# Patient Record
Sex: MALE | Race: WHITE | NOT HISPANIC OR LATINO | Employment: OTHER | URBAN - METROPOLITAN AREA
[De-identification: names, ages, dates, MRNs, and addresses within clinical notes are randomized per-mention and may not be internally consistent; named-entity substitution may affect disease eponyms.]

---

## 2018-05-16 ENCOUNTER — OFFICE VISIT (OUTPATIENT)
Dept: UROLOGY | Facility: AMBULATORY SURGERY CENTER | Age: 83
End: 2018-05-16
Payer: MEDICARE

## 2018-05-16 VITALS
HEIGHT: 67 IN | WEIGHT: 186.4 LBS | DIASTOLIC BLOOD PRESSURE: 62 MMHG | BODY MASS INDEX: 29.26 KG/M2 | HEART RATE: 76 BPM | SYSTOLIC BLOOD PRESSURE: 118 MMHG

## 2018-05-16 DIAGNOSIS — R39.9 LOWER URINARY TRACT SYMPTOMS: Primary | ICD-10-CM

## 2018-05-16 DIAGNOSIS — C61 PROSTATE CANCER (HCC): ICD-10-CM

## 2018-05-16 DIAGNOSIS — C67.0 MALIGNANT NEOPLASM OF TRIGONE OF URINARY BLADDER (HCC): ICD-10-CM

## 2018-05-16 PROCEDURE — 99204 OFFICE O/P NEW MOD 45 MIN: CPT | Performed by: UROLOGY

## 2018-05-16 RX ORDER — GABAPENTIN 100 MG/1
100 CAPSULE ORAL
COMMUNITY
End: 2018-07-10

## 2018-05-16 RX ORDER — FUROSEMIDE 40 MG/1
40 TABLET ORAL DAILY
COMMUNITY
End: 2018-05-25 | Stop reason: HOSPADM

## 2018-05-16 RX ORDER — MINERAL OIL AND PETROLATUM 150; 830 MG/G; MG/G
OINTMENT OPHTHALMIC
COMMUNITY
End: 2018-06-20 | Stop reason: CLARIF

## 2018-05-16 RX ORDER — CARBOXYMETHYLCELLULOSE SODIUM 5 MG/ML
1 SOLUTION/ DROPS OPHTHALMIC 3 TIMES DAILY PRN
COMMUNITY
End: 2018-06-20 | Stop reason: CLARIF

## 2018-05-16 RX ORDER — NITROGLYCERIN 0.4 MG/1
0.4 TABLET SUBLINGUAL
COMMUNITY
End: 2019-06-19 | Stop reason: HOSPADM

## 2018-05-16 RX ORDER — CARVEDILOL 3.12 MG/1
3.12 TABLET ORAL 2 TIMES DAILY WITH MEALS
COMMUNITY
End: 2018-08-30 | Stop reason: SDUPTHER

## 2018-05-16 RX ORDER — DIPHENOXYLATE HYDROCHLORIDE AND ATROPINE SULFATE 2.5; .025 MG/1; MG/1
1 TABLET ORAL DAILY
COMMUNITY
End: 2018-05-22

## 2018-05-16 RX ORDER — ASPIRIN 81 MG/1
81 TABLET ORAL DAILY
COMMUNITY

## 2018-05-16 RX ORDER — FINASTERIDE 5 MG/1
5 TABLET, FILM COATED ORAL DAILY
COMMUNITY
End: 2018-07-10 | Stop reason: SDUPTHER

## 2018-05-16 RX ORDER — OXYBUTYNIN CHLORIDE 10 MG/1
10 TABLET, EXTENDED RELEASE ORAL
Qty: 30 TABLET | Refills: 1 | Status: SHIPPED | OUTPATIENT
Start: 2018-05-16 | End: 2018-07-10 | Stop reason: SINTOL

## 2018-05-16 RX ORDER — TAMSULOSIN HYDROCHLORIDE 0.4 MG/1
0.4 CAPSULE ORAL
COMMUNITY
End: 2018-07-10 | Stop reason: SDUPTHER

## 2018-05-16 RX ORDER — MELATONIN
1000 DAILY
COMMUNITY
End: 2018-05-22

## 2018-05-16 RX ORDER — LANOLIN ALCOHOL/MO/W.PET/CERES
3 CREAM (GRAM) TOPICAL
COMMUNITY
End: 2018-08-16 | Stop reason: ALTCHOICE

## 2018-05-16 RX ORDER — POTASSIUM CHLORIDE 20 MEQ/1
20 TABLET, EXTENDED RELEASE ORAL 2 TIMES DAILY
COMMUNITY
End: 2018-08-30 | Stop reason: SDUPTHER

## 2018-05-16 RX ORDER — CHLORAL HYDRATE 500 MG
1000 CAPSULE ORAL DAILY
COMMUNITY
End: 2018-08-30 | Stop reason: SDUPTHER

## 2018-05-16 RX ORDER — ENALAPRIL MALEATE 2.5 MG/1
2.5 TABLET ORAL DAILY
COMMUNITY
End: 2018-08-16 | Stop reason: ALTCHOICE

## 2018-05-16 NOTE — ASSESSMENT & PLAN NOTE
The patient has not had any cystoscopy or surveillance since his surgery in December  We will schedule him for office cystoscopy to rule out any recurrence

## 2018-05-16 NOTE — ASSESSMENT & PLAN NOTE
The patient is currently on tamsulosin and finasteride  For now we will keep these medications going  Most of his symptoms are overactive in nature  I will start him on oxybutynin  Side effects were discussed  He will follow up in 4-6 weeks for us to re-evaluate his symptoms on the new medication

## 2018-05-16 NOTE — PROGRESS NOTES
Assessment/Plan:    Lower urinary tract symptoms  The patient is currently on tamsulosin and finasteride  For now we will keep these medications going  Most of his symptoms are overactive in nature  I will start him on oxybutynin  Side effects were discussed  He will follow up in 4-6 weeks for us to re-evaluate his symptoms on the new medication  Malignant neoplasm of trigone of urinary bladder [facility])  The patient has not had any cystoscopy or surveillance since his surgery in December  We will schedule him for office cystoscopy to rule out any recurrence  Prostate cancer [facility])  I will order a PSA and review this with him at his next visit  Diagnoses and all orders for this visit:    Lower urinary tract symptoms  -     oxybutynin (DITROPAN-XL) 10 MG 24 hr tablet; Take 1 tablet (10 mg total) by mouth daily at bedtime    Malignant neoplasm of trigone of urinary bladder (HCC)    Prostate cancer (HCC)  -     PSA Total, Diagnostic; Future    Other orders  -     artificial tear (LUBRIFRESH P M ) 83-15 % ophthalmic ointment; daily at bedtime  -     aspirin (ECOTRIN LOW STRENGTH) 81 mg EC tablet; Take 81 mg by mouth daily  -     carvedilol (COREG) 3 125 mg tablet; Take 3 125 mg by mouth 2 (two) times a day with meals  -     carbamide peroxide (DEBROX) 6 5 % otic solution; 5 drops 2 (two) times a day  -     enalapril (VASOTEC) 2 5 mg tablet; Take 2 5 mg by mouth daily  -     finasteride (PROSCAR) 5 mg tablet; Take 5 mg by mouth daily  -     Omega-3 Fatty Acids (FISH OIL) 1,000 mg; Take 1,000 mg by mouth daily  -     furosemide (LASIX) 40 mg tablet; Take 40 mg by mouth 2 (two) times a day  -     gabapentin (NEURONTIN) 100 mg capsule; Take 100 mg by mouth 3 (three) times a day  -     Empagliflozin (JARDIANCE) 10 MG TABS; Take 10 mg by mouth every morning  -     melatonin 3 mg; Take 3 mg by mouth daily at bedtime  -     potassium chloride (K-DUR,KLOR-CON) 20 mEq tablet;  Take 20 mEq by mouth 2 (two) times a day  -     carboxymethylcellulose 0 5 % SOLN; 1 drop 3 (three) times a day as needed for dry eyes  -     tamsulosin (FLOMAX) 0 4 mg; Take 0 4 mg by mouth daily with dinner  -     cholecalciferol (VITAMIN D3) 1,000 units tablet; Take 1,000 Units by mouth daily  -     nitroglycerin (NITROSTAT) 0 4 mg SL tablet; Place 0 4 mg under the tongue every 5 (five) minutes as needed for chest pain  -     Hypromellose (SYSTANE OVERNIGHT THERAPY) 0 3 % GEL; Apply to eye  -     multivitamin (THERAGRAN) TABS; Take 1 tablet by mouth daily  -     IPRATROPIUM-ALBUTEROL IN; Inhale          Total visit time was 60 minutes of which over 50% was spent on counseling  Subjective:     Patient ID: Marcus Dominguez is a 80 y o  male    49-year-old male presents to [facility] care  He was previously seeing another urologist who performed cystoscopy on him in December and discovered a papillary bladder tumor  Pathology demonstrated low grade cancer  He also has a distant history of prostate cancer and is status post radiation therapy with concurrent androgen deprivation therapy  He denies having any recent PSAs  He does have significant lower urinary tract symptoms including urinary frequency every 0 5 hr both during the day and at night  He did have some gross hematuria 3 days ago  He has no other complaints  The following portions of the patient's history were reviewed and updated as appropriate: allergies, current medications, past family history, past medical history, past social history, past surgical history and problem list     Review of Systems   Constitutional: Negative  HENT: Negative  Eyes: Negative  Respiratory: Negative  Cardiovascular: Negative  Gastrointestinal: Negative  Endocrine: Negative  Genitourinary:        As noted per HPI   Musculoskeletal: Negative  Skin: Negative  Allergic/Immunologic: Negative  Neurological: Negative  Hematological: Negative      Psychiatric/Behavioral: Negative  AUA SYMPTOM SCORE      Most Recent Value   AUA SYMPTOM SCORE   How often have you had a sensation of not emptying your bladder completely after you finished urinating? 3   How often have you had to urinate again less than two hours after you finished urinating? 5   How often have you found you stopped and started again several times when you urinate? 5   How often have you found it difficult to postpone urination? 5   How often have you had a weak urinary stream?  0   How often have you had to push or strain to begin urination? 0   How many times did you most typically get up to urinate from the time you went to bed at night until the time you got up in the morning? 5   Quality of Life: If you were to spend the rest of your life with your urinary condition just the way it is now, how would you feel about that?  3   AUA SYMPTOM SCORE  23          Urinary Incontinence Screening      Most Recent Value   Urinary Incontinence   Urinary Incontinence? No   Incomplete emptying? Yes   Urinary frequency? Yes   Urinary urgency? Yes   Urinary hesitancy? Yes   Dysuria (painful difficult urination)? Yes   Nocturia (waking up to use the bathroom)? Yes [every half hour]   Straining (having to push to go)? No   Weak stream?  No   Intermittent stream?  No   Post void dribbling? Yes          Objective:    Physical Exam   Constitutional: He is oriented to person, place, and time  He appears well-developed and well-nourished  Elderly male with walker   Neck: Normal range of motion  Cardiovascular: Intact distal pulses  Pulmonary/Chest: Effort normal    Abdominal: Soft  Bowel sounds are normal  He exhibits no distension and no mass  There is no tenderness  There is no rebound and no guarding  Musculoskeletal: Normal range of motion  Neurological: He is alert and oriented to person, place, and time  Skin: Skin is warm and dry  Psychiatric: He has a normal mood and affect     Vitals reviewed          Results  No results found for: PSA  No results found for: GLUCOSE, CALCIUM, NA, K, CO2, CL, BUN, CREATININE  No results found for: WBC, HGB, HCT, MCV, PLT    No results found for this or any previous visit (from the past 1 hour(s)) ]

## 2018-05-16 NOTE — LETTER
May 16, 2018     Kimberli Lyle, [address]     Patient: Marcus Dominguez   YOB: 1930   Date of Visit: 5/16/2018       Dear Dr Gely Meier:    Thank you for referring Marcus Dominguez to me for evaluation  Below are my notes for this consultation  If you have questions, please do not hesitate to call me  I look forward to following your patient along with you  Sincerely,        Nikhil Leon MD        CC: No Recipients  Nikhil Leon MD  5/16/2018  1:13 PM  Sign at close encounter  Assessment/Plan:    Lower urinary tract symptoms  The patient is currently on tamsulosin and finasteride  For now we will keep these medications going  Most of his symptoms are overactive in nature  I will start him on oxybutynin  Side effects were discussed  He will follow up in 4-6 weeks for us to re-evaluate his symptoms on the new medication  Malignant neoplasm of trigone of urinary bladder [facility])  The patient has not had any cystoscopy or surveillance since his surgery in December  We will schedule him for office cystoscopy to rule out any recurrence  Prostate cancer [facility])  I will order a PSA and review this with him at his next visit  Diagnoses and all orders for this visit:    Lower urinary tract symptoms  -     oxybutynin (DITROPAN-XL) 10 MG 24 hr tablet; Take 1 tablet (10 mg total) by mouth daily at bedtime    Malignant neoplasm of trigone of urinary bladder (HCC)    Prostate cancer (HCC)  -     PSA Total, Diagnostic; Future    Other orders  -     artificial tear (LUBRIFRESH P M ) 83-15 % ophthalmic ointment; daily at bedtime  -     aspirin (ECOTRIN LOW STRENGTH) 81 mg EC tablet; Take 81 mg by mouth daily  -     carvedilol (COREG) 3 125 mg tablet; Take 3 125 mg by mouth 2 (two) times a day with meals  -     carbamide peroxide (DEBROX) 6 5 % otic solution; 5 drops 2 (two) times a day  -     enalapril (VASOTEC) 2 5 mg tablet;  Take 2 5 mg by mouth daily  -     finasteride (PROSCAR) 5 mg tablet; Take 5 mg by mouth daily  -     Omega-3 Fatty Acids (FISH OIL) 1,000 mg; Take 1,000 mg by mouth daily  -     furosemide (LASIX) 40 mg tablet; Take 40 mg by mouth 2 (two) times a day  -     gabapentin (NEURONTIN) 100 mg capsule; Take 100 mg by mouth 3 (three) times a day  -     Empagliflozin (JARDIANCE) 10 MG TABS; Take 10 mg by mouth every morning  -     melatonin 3 mg; Take 3 mg by mouth daily at bedtime  -     potassium chloride (K-DUR,KLOR-CON) 20 mEq tablet; Take 20 mEq by mouth 2 (two) times a day  -     carboxymethylcellulose 0 5 % SOLN; 1 drop 3 (three) times a day as needed for dry eyes  -     tamsulosin (FLOMAX) 0 4 mg; Take 0 4 mg by mouth daily with dinner  -     cholecalciferol (VITAMIN D3) 1,000 units tablet; Take 1,000 Units by mouth daily  -     nitroglycerin (NITROSTAT) 0 4 mg SL tablet; Place 0 4 mg under the tongue every 5 (five) minutes as needed for chest pain  -     Hypromellose (SYSTANE OVERNIGHT THERAPY) 0 3 % GEL; Apply to eye  -     multivitamin (THERAGRAN) TABS; Take 1 tablet by mouth daily  -     IPRATROPIUM-ALBUTEROL IN; Inhale          Total visit time was 60 minutes of which over 50% was spent on counseling  Subjective:     Patient ID: Marcus Dominguez is a 80 y o  male    42-year-old male presents to establish care  He was previously seeing another urologist who performed cystoscopy on him in December and discovered a papillary bladder tumor  Pathology demonstrated low grade cancer  He also has a distant history of prostate cancer and is status post radiation therapy with concurrent androgen deprivation therapy  He denies having any recent PSAs  He does have significant lower urinary tract symptoms including urinary frequency every 0 5 hr both during the day and at night  He did have some gross hematuria 3 days ago  He has no other complaints            The following portions of the patient's history were reviewed and updated as appropriate: allergies, current medications, past family history, past medical history, past social history, past surgical history and problem list     Review of Systems   Constitutional: Negative  HENT: Negative  Eyes: Negative  Respiratory: Negative  Cardiovascular: Negative  Gastrointestinal: Negative  Endocrine: Negative  Genitourinary:        As noted per HPI   Musculoskeletal: Negative  Skin: Negative  Allergic/Immunologic: Negative  Neurological: Negative  Hematological: Negative  Psychiatric/Behavioral: Negative  AUA SYMPTOM SCORE      Most Recent Value   AUA SYMPTOM SCORE   How often have you had a sensation of not emptying your bladder completely after you finished urinating? 3   How often have you had to urinate again less than two hours after you finished urinating? 5   How often have you found you stopped and started again several times when you urinate? 5   How often have you found it difficult to postpone urination? 5   How often have you had a weak urinary stream?  0   How often have you had to push or strain to begin urination? 0   How many times did you most typically get up to urinate from the time you went to bed at night until the time you got up in the morning? 5   Quality of Life: If you were to spend the rest of your life with your urinary condition just the way it is now, how would you feel about that?  3   AUA SYMPTOM SCORE  23          Urinary Incontinence Screening      Most Recent Value   Urinary Incontinence   Urinary Incontinence? No   Incomplete emptying? Yes   Urinary frequency? Yes   Urinary urgency? Yes   Urinary hesitancy? Yes   Dysuria (painful difficult urination)? Yes   Nocturia (waking up to use the bathroom)? Yes [every half hour]   Straining (having to push to go)? No   Weak stream?  No   Intermittent stream?  No   Post void dribbling?   Yes          Objective:    Physical Exam   Constitutional: He is oriented to person, place, and time  He appears well-developed and well-nourished  Elderly male with walker   Neck: Normal range of motion  Cardiovascular: Intact distal pulses  Pulmonary/Chest: Effort normal    Abdominal: Soft  Bowel sounds are normal  He exhibits no distension and no mass  There is no tenderness  There is no rebound and no guarding  Musculoskeletal: Normal range of motion  Neurological: He is alert and oriented to person, place, and time  Skin: Skin is warm and dry  Psychiatric: He has a normal mood and affect  Vitals reviewed          Results  No results found for: PSA  No results found for: GLUCOSE, CALCIUM, NA, K, CO2, CL, BUN, CREATININE  No results found for: WBC, HGB, HCT, MCV, PLT    No results found for this or any previous visit (from the past 1 hour(s)) ]

## 2018-05-22 ENCOUNTER — HOSPITAL ENCOUNTER (INPATIENT)
Facility: HOSPITAL | Age: 83
LOS: 3 days | Discharge: HOME | DRG: 291 | End: 2018-05-25
Attending: EMERGENCY MEDICINE | Admitting: INTERNAL MEDICINE
Payer: MEDICARE

## 2018-05-22 ENCOUNTER — APPOINTMENT (EMERGENCY)
Dept: RADIOLOGY | Facility: HOSPITAL | Age: 83
DRG: 291 | End: 2018-05-22
Payer: MEDICARE

## 2018-05-22 DIAGNOSIS — J44.9 COPD (CHRONIC OBSTRUCTIVE PULMONARY DISEASE) (HCC): ICD-10-CM

## 2018-05-22 DIAGNOSIS — I50.9 ACUTE ON CHRONIC CONGESTIVE HEART FAILURE, UNSPECIFIED HEART FAILURE TYPE (HCC): ICD-10-CM

## 2018-05-22 DIAGNOSIS — I50.9 CHF EXACERBATION (HCC): Primary | ICD-10-CM

## 2018-05-22 DIAGNOSIS — I21.4 NSTEMI (NON-ST ELEVATED MYOCARDIAL INFARCTION) (HCC): ICD-10-CM

## 2018-05-22 DIAGNOSIS — I48.0 PAROXYSMAL ATRIAL FIBRILLATION (HCC): Chronic | ICD-10-CM

## 2018-05-22 PROBLEM — Z95.0 PACEMAKER: Chronic | Status: ACTIVE | Noted: 2018-05-22

## 2018-05-22 PROBLEM — I50.43 ACUTE ON CHRONIC COMBINED SYSTOLIC AND DIASTOLIC CHF (CONGESTIVE HEART FAILURE) (HCC): Status: ACTIVE | Noted: 2018-05-22

## 2018-05-22 PROBLEM — I25.10 CAD (CORONARY ARTERY DISEASE): Chronic | Status: ACTIVE | Noted: 2018-05-22

## 2018-05-22 PROBLEM — E11.9 TYPE 2 DIABETES MELLITUS (HCC): Chronic | Status: ACTIVE | Noted: 2018-05-22

## 2018-05-22 PROBLEM — N18.30 CKD (CHRONIC KIDNEY DISEASE) STAGE 3, GFR 30-59 ML/MIN (HCC): Chronic | Status: ACTIVE | Noted: 2018-05-22

## 2018-05-22 LAB
ALBUMIN SERPL BCP-MCNC: 3 G/DL (ref 3.5–5)
ALP SERPL-CCNC: 99 U/L (ref 46–116)
ALT SERPL W P-5'-P-CCNC: 13 U/L (ref 12–78)
ANION GAP SERPL CALCULATED.3IONS-SCNC: 6 MMOL/L (ref 4–13)
APTT PPP: 32 SECONDS (ref 24–36)
AST SERPL W P-5'-P-CCNC: 15 U/L (ref 5–45)
ATRIAL RATE: 76 BPM
BASOPHILS # BLD AUTO: 0.03 THOUSANDS/ΜL (ref 0–0.1)
BASOPHILS NFR BLD AUTO: 1 % (ref 0–1)
BILIRUB SERPL-MCNC: 0.69 MG/DL (ref 0.2–1)
BUN SERPL-MCNC: 22 MG/DL (ref 5–25)
CALCIUM SERPL-MCNC: 9 MG/DL (ref 8.3–10.1)
CHLORIDE SERPL-SCNC: 102 MMOL/L (ref 100–108)
CO2 SERPL-SCNC: 29 MMOL/L (ref 21–32)
CREAT SERPL-MCNC: 1.5 MG/DL (ref 0.6–1.3)
EOSINOPHIL # BLD AUTO: 0.1 THOUSAND/ΜL (ref 0–0.61)
EOSINOPHIL NFR BLD AUTO: 2 % (ref 0–6)
ERYTHROCYTE [DISTWIDTH] IN BLOOD BY AUTOMATED COUNT: 16.3 % (ref 11.6–15.1)
GFR SERPL CREATININE-BSD FRML MDRD: 41 ML/MIN/1.73SQ M
GLUCOSE SERPL-MCNC: 164 MG/DL (ref 65–140)
GLUCOSE SERPL-MCNC: 341 MG/DL (ref 65–140)
HCT VFR BLD AUTO: 33.7 % (ref 36.5–49.3)
HGB BLD-MCNC: 10.7 G/DL (ref 12–17)
INR PPP: 1.2 (ref 0.86–1.17)
LYMPHOCYTES # BLD AUTO: 0.5 THOUSANDS/ΜL (ref 0.6–4.47)
LYMPHOCYTES NFR BLD AUTO: 8 % (ref 14–44)
MCH RBC QN AUTO: 28.7 PG (ref 26.8–34.3)
MCHC RBC AUTO-ENTMCNC: 31.8 G/DL (ref 31.4–37.4)
MCV RBC AUTO: 90 FL (ref 82–98)
MONOCYTES # BLD AUTO: 0.52 THOUSAND/ΜL (ref 0.17–1.22)
MONOCYTES NFR BLD AUTO: 9 % (ref 4–12)
NEUTROPHILS # BLD AUTO: 4.98 THOUSANDS/ΜL (ref 1.85–7.62)
NEUTS SEG NFR BLD AUTO: 81 % (ref 43–75)
NRBC BLD AUTO-RTO: 0 /100 WBCS
NT-PROBNP SERPL-MCNC: 6044 PG/ML
PLATELET # BLD AUTO: 250 THOUSANDS/UL (ref 149–390)
PLATELET # BLD AUTO: 269 THOUSANDS/UL (ref 149–390)
PMV BLD AUTO: 10.5 FL (ref 8.9–12.7)
PMV BLD AUTO: 10.6 FL (ref 8.9–12.7)
POTASSIUM SERPL-SCNC: 4.1 MMOL/L (ref 3.5–5.3)
PROT SERPL-MCNC: 7.3 G/DL (ref 6.4–8.2)
PROTHROMBIN TIME: 15.3 SECONDS (ref 11.8–14.2)
QRS AXIS: 259 DEGREES
QRSD INTERVAL: 162 MS
QT INTERVAL: 468 MS
QTC INTERVAL: 536 MS
RBC # BLD AUTO: 3.73 MILLION/UL (ref 3.88–5.62)
SODIUM SERPL-SCNC: 137 MMOL/L (ref 136–145)
T WAVE AXIS: 73 DEGREES
TROPONIN I SERPL-MCNC: 0.05 NG/ML
VENTRICULAR RATE: 79 BPM
WBC # BLD AUTO: 6.14 THOUSAND/UL (ref 4.31–10.16)

## 2018-05-22 PROCEDURE — 36415 COLL VENOUS BLD VENIPUNCTURE: CPT

## 2018-05-22 PROCEDURE — 96374 THER/PROPH/DIAG INJ IV PUSH: CPT

## 2018-05-22 PROCEDURE — 83880 ASSAY OF NATRIURETIC PEPTIDE: CPT | Performed by: EMERGENCY MEDICINE

## 2018-05-22 PROCEDURE — 99285 EMERGENCY DEPT VISIT HI MDM: CPT

## 2018-05-22 PROCEDURE — 84484 ASSAY OF TROPONIN QUANT: CPT | Performed by: EMERGENCY MEDICINE

## 2018-05-22 PROCEDURE — 80053 COMPREHEN METABOLIC PANEL: CPT | Performed by: EMERGENCY MEDICINE

## 2018-05-22 PROCEDURE — 94640 AIRWAY INHALATION TREATMENT: CPT

## 2018-05-22 PROCEDURE — 87081 CULTURE SCREEN ONLY: CPT | Performed by: INTERNAL MEDICINE

## 2018-05-22 PROCEDURE — 71046 X-RAY EXAM CHEST 2 VIEWS: CPT

## 2018-05-22 PROCEDURE — 85025 COMPLETE CBC W/AUTO DIFF WBC: CPT | Performed by: EMERGENCY MEDICINE

## 2018-05-22 PROCEDURE — 85730 THROMBOPLASTIN TIME PARTIAL: CPT | Performed by: EMERGENCY MEDICINE

## 2018-05-22 PROCEDURE — 93010 ELECTROCARDIOGRAM REPORT: CPT | Performed by: INTERNAL MEDICINE

## 2018-05-22 PROCEDURE — 85610 PROTHROMBIN TIME: CPT | Performed by: EMERGENCY MEDICINE

## 2018-05-22 PROCEDURE — 82948 REAGENT STRIP/BLOOD GLUCOSE: CPT

## 2018-05-22 PROCEDURE — 85049 AUTOMATED PLATELET COUNT: CPT | Performed by: INTERNAL MEDICINE

## 2018-05-22 PROCEDURE — 93005 ELECTROCARDIOGRAM TRACING: CPT

## 2018-05-22 PROCEDURE — 96375 TX/PRO/DX INJ NEW DRUG ADDON: CPT

## 2018-05-22 PROCEDURE — 84484 ASSAY OF TROPONIN QUANT: CPT | Performed by: INTERNAL MEDICINE

## 2018-05-22 PROCEDURE — 99223 1ST HOSP IP/OBS HIGH 75: CPT | Performed by: INTERNAL MEDICINE

## 2018-05-22 RX ORDER — METHYLPREDNISOLONE SODIUM SUCCINATE 125 MG/2ML
125 INJECTION, POWDER, LYOPHILIZED, FOR SOLUTION INTRAMUSCULAR; INTRAVENOUS ONCE
Status: COMPLETED | OUTPATIENT
Start: 2018-05-22 | End: 2018-05-22

## 2018-05-22 RX ORDER — ASPIRIN 81 MG/1
81 TABLET ORAL DAILY
Status: DISCONTINUED | OUTPATIENT
Start: 2018-05-23 | End: 2018-05-25 | Stop reason: HOSPADM

## 2018-05-22 RX ORDER — ALBUTEROL SULFATE 2.5 MG/3ML
5 SOLUTION RESPIRATORY (INHALATION) ONCE
Status: COMPLETED | OUTPATIENT
Start: 2018-05-22 | End: 2018-05-22

## 2018-05-22 RX ORDER — ONDANSETRON 2 MG/ML
4 INJECTION INTRAMUSCULAR; INTRAVENOUS EVERY 6 HOURS PRN
Status: DISCONTINUED | OUTPATIENT
Start: 2018-05-22 | End: 2018-05-25 | Stop reason: HOSPADM

## 2018-05-22 RX ORDER — TAMSULOSIN HYDROCHLORIDE 0.4 MG/1
0.4 CAPSULE ORAL
Status: DISCONTINUED | OUTPATIENT
Start: 2018-05-22 | End: 2018-05-25 | Stop reason: HOSPADM

## 2018-05-22 RX ORDER — POLYVINYL ALCOHOL 14 MG/ML
1 SOLUTION/ DROPS OPHTHALMIC 4 TIMES DAILY
Status: DISCONTINUED | OUTPATIENT
Start: 2018-05-22 | End: 2018-05-25 | Stop reason: HOSPADM

## 2018-05-22 RX ORDER — GABAPENTIN 100 MG/1
100 CAPSULE ORAL
Status: DISCONTINUED | OUTPATIENT
Start: 2018-05-22 | End: 2018-05-25 | Stop reason: HOSPADM

## 2018-05-22 RX ORDER — NITROGLYCERIN 0.4 MG/1
0.4 TABLET SUBLINGUAL
Status: DISCONTINUED | OUTPATIENT
Start: 2018-05-22 | End: 2018-05-25 | Stop reason: HOSPADM

## 2018-05-22 RX ORDER — LANOLIN ALCOHOL/MO/W.PET/CERES
3 CREAM (GRAM) TOPICAL
Status: DISCONTINUED | OUTPATIENT
Start: 2018-05-22 | End: 2018-05-25 | Stop reason: HOSPADM

## 2018-05-22 RX ORDER — POTASSIUM CHLORIDE 20 MEQ/1
20 TABLET, EXTENDED RELEASE ORAL DAILY
Status: DISCONTINUED | OUTPATIENT
Start: 2018-05-23 | End: 2018-05-25 | Stop reason: HOSPADM

## 2018-05-22 RX ORDER — CARVEDILOL 3.12 MG/1
3.12 TABLET ORAL 2 TIMES DAILY WITH MEALS
Status: DISCONTINUED | OUTPATIENT
Start: 2018-05-22 | End: 2018-05-25 | Stop reason: HOSPADM

## 2018-05-22 RX ORDER — FINASTERIDE 5 MG/1
5 TABLET, FILM COATED ORAL
Status: DISCONTINUED | OUTPATIENT
Start: 2018-05-22 | End: 2018-05-25 | Stop reason: HOSPADM

## 2018-05-22 RX ORDER — FUROSEMIDE 10 MG/ML
20 INJECTION INTRAMUSCULAR; INTRAVENOUS ONCE
Status: COMPLETED | OUTPATIENT
Start: 2018-05-22 | End: 2018-05-22

## 2018-05-22 RX ORDER — HEPARIN SODIUM 5000 [USP'U]/ML
5000 INJECTION, SOLUTION INTRAVENOUS; SUBCUTANEOUS EVERY 8 HOURS SCHEDULED
Status: DISCONTINUED | OUTPATIENT
Start: 2018-05-22 | End: 2018-05-24

## 2018-05-22 RX ORDER — ERGOCALCIFEROL 1.25 MG/1
50000 CAPSULE ORAL WEEKLY
Status: DISCONTINUED | OUTPATIENT
Start: 2018-05-24 | End: 2018-05-25 | Stop reason: HOSPADM

## 2018-05-22 RX ORDER — OXYBUTYNIN CHLORIDE 5 MG/1
10 TABLET, EXTENDED RELEASE ORAL
Status: DISCONTINUED | OUTPATIENT
Start: 2018-05-22 | End: 2018-05-25 | Stop reason: HOSPADM

## 2018-05-22 RX ORDER — ACETAMINOPHEN 325 MG/1
650 TABLET ORAL EVERY 6 HOURS PRN
Status: DISCONTINUED | OUTPATIENT
Start: 2018-05-22 | End: 2018-05-25 | Stop reason: HOSPADM

## 2018-05-22 RX ORDER — ENALAPRIL MALEATE 2.5 MG/1
2.5 TABLET ORAL DAILY
Status: DISCONTINUED | OUTPATIENT
Start: 2018-05-23 | End: 2018-05-25 | Stop reason: HOSPADM

## 2018-05-22 RX ORDER — CHLORAL HYDRATE 500 MG
1000 CAPSULE ORAL DAILY
Status: DISCONTINUED | OUTPATIENT
Start: 2018-05-23 | End: 2018-05-25 | Stop reason: HOSPADM

## 2018-05-22 RX ORDER — FUROSEMIDE 10 MG/ML
40 INJECTION INTRAMUSCULAR; INTRAVENOUS DAILY
Status: DISCONTINUED | OUTPATIENT
Start: 2018-05-23 | End: 2018-05-23

## 2018-05-22 RX ORDER — ASPIRIN 81 MG/1
324 TABLET, CHEWABLE ORAL ONCE
Status: COMPLETED | OUTPATIENT
Start: 2018-05-22 | End: 2018-05-22

## 2018-05-22 RX ADMIN — GABAPENTIN 100 MG: 100 CAPSULE ORAL at 21:10

## 2018-05-22 RX ADMIN — INSULIN LISPRO 3 UNITS: 100 INJECTION, SOLUTION INTRAVENOUS; SUBCUTANEOUS at 23:40

## 2018-05-22 RX ADMIN — HEPARIN SODIUM 5000 UNITS: 5000 INJECTION, SOLUTION INTRAVENOUS; SUBCUTANEOUS at 16:45

## 2018-05-22 RX ADMIN — METHYLPREDNISOLONE SODIUM SUCCINATE 125 MG: 125 INJECTION, POWDER, FOR SOLUTION INTRAMUSCULAR; INTRAVENOUS at 13:02

## 2018-05-22 RX ADMIN — IPRATROPIUM BROMIDE 0.5 MG: 0.5 SOLUTION RESPIRATORY (INHALATION) at 13:08

## 2018-05-22 RX ADMIN — ASPIRIN 81 MG 324 MG: 81 TABLET ORAL at 13:03

## 2018-05-22 RX ADMIN — FUROSEMIDE 20 MG: 10 INJECTION, SOLUTION INTRAMUSCULAR; INTRAVENOUS at 13:03

## 2018-05-22 RX ADMIN — OXYBUTYNIN CHLORIDE 10 MG: 5 TABLET, FILM COATED, EXTENDED RELEASE ORAL at 21:10

## 2018-05-22 RX ADMIN — MELATONIN TAB 3 MG 3 MG: 3 TAB at 21:11

## 2018-05-22 RX ADMIN — ALBUTEROL SULFATE 5 MG: 2.5 SOLUTION RESPIRATORY (INHALATION) at 13:07

## 2018-05-22 RX ADMIN — TAMSULOSIN HYDROCHLORIDE 0.4 MG: 0.4 CAPSULE ORAL at 21:10

## 2018-05-22 RX ADMIN — HEPARIN SODIUM 5000 UNITS: 5000 INJECTION, SOLUTION INTRAVENOUS; SUBCUTANEOUS at 21:11

## 2018-05-22 RX ADMIN — POLYVINYL ALCOHOL 1 DROP: 14 SOLUTION/ DROPS OPHTHALMIC at 21:12

## 2018-05-22 RX ADMIN — FINASTERIDE 5 MG: 5 TABLET, FILM COATED ORAL at 21:10

## 2018-05-22 RX ADMIN — CARVEDILOL 3.12 MG: 3.12 TABLET, FILM COATED ORAL at 16:45

## 2018-05-22 NOTE — H&P
History and Physical - [facility] Internal Medicine    Patient Information: Shanell Duenas 80 y o  male MRN: [id number]  Unit/Bed#: [facility] 523-01 Encounter: [id number]  Admitting Physician: Roxy Sherwood MD  PCP: Michelle Muller MD  Date of Admission:  05/22/18    Assessment/plan:  1  Acute on chronic CHF - IV lasix, monitor I/O, daily weights  Get details of CHF and latest echo report from his primary cardiologist   Cardiology consult  2  Chest pressure - likely due to #1  R/o ACS/new CAD - Trop x 3  Ct meds per 1, 3  3  CAD s/p stents - ct ASA, BB, statins  4  Type 2 diabetes - hold Jardiance  Place on SSI  5  CKD-3 - daughter Karine Linder clarified CKD-3 but she and patient are unsure of his baseline creatinine  Get records from [place]  Monitor creatinine with diuresis  6  Afib - not on anticoagulation as outpatient  Ct ASA, BB  7  S/P PPM      VTE Prophylaxis: Heparin  / sequential compression device   Code Status: Level 1 - Full Code    Anticipated Length of Stay:  Patient will be admitted on an Inpatient basis with an anticipated length of stay of  Greater than 2 midnights  Justification for Hospital Stay: acute on chronic CHF    Discussed with daughter Karine Linder on the phone # [phone]    Total Time for Visit, including Counseling / Coordination of Care: 1 hour  Greater than 50% of this total time spent on direct patient counseling and coordination of care  Chief Complaint:     Shortness of breath    History of Present Illness:    Shanell Duenas is a 80 y o  male who presents with shortness of breath on mild exertion since 2 days  Since 2 days he has also noticed intermittent chest pressure lasting for a few minutes, not related to exertion and not associated with shortness of breath, diaphoresis or palpitations  He also complains of lower extremity edema and abdominal bloating since 2 days  No fever or cough  He has recently moved from [place] to [address]   He has a history of CAD status post 3 stents in 2016 and chronic CHF and has been following up with his cardiologist Dr Vannessa Morales or Dr Cesar Mora at [facility] in [place]  Review of Systems:    Review of Systems   Respiratory: Positive for shortness of breath  Cardiovascular: Positive for chest pain  All other systems reviewed and are negative  Past Medical and Surgical History:     Past Medical History:   Diagnosis Date    [facility]fib [facility] )     Anemia     Bladder cancer ([facility] )     CAD (coronary artery disease)     CHF (congestive heart failure) (HCC)     Chronic kidney failure     Colon cancer ([facility] )     Eye cancer, left (HCC)     Pacemaker     Prostate cancer ([facility] )     Type 2 diabetes mellitus ([place] [address] )        Past Surgical History:   Procedure Laterality Date    CARDIAC PACEMAKER PLACEMENT  2014    CORONARY ANGIOPLASTY WITH STENT PLACEMENT      GALLBLADDER SURGERY         Meds/Allergies:    all medications and allergies reviewed    Allergies: No Known Allergies  History:     Marital Status: Unknown   Substance Use History:   History   Alcohol Use    Yes     Comment: jayne cognac 1 oz/night      History   Smoking Status    Former Smoker   Smokeless Tobacco    Never Used     History   Drug Use No       Family History:    History reviewed  No pertinent family history  Physical Exam:     Vitals:   Blood Pressure: 130/68 (05/22/18 1500)  Pulse: 80 (05/22/18 1500)  Temperature: 97 7 °F (36 5 °C) (05/22/18 1500)  Temp Source: Oral (05/22/18 1500)  Respirations: 20 (05/22/18 1500)  Height: 5' 9" (175 3 cm) (05/22/18 1500)  Weight - Scale: 84 1 kg (185 lb 6 5 oz) (05/22/18 1500)  SpO2: 98 % (05/22/18 1500)    Physical Exam   Constitutional: He is oriented to person, place, and time  HENT:   Blind in left eye   Eyes: EOM are normal    Neck: Neck supple  No tracheal deviation present  No thyromegaly present  Cardiovascular: Normal rate, regular rhythm and normal heart sounds      Pulmonary/Chest: Effort normal    Basal rales Abdominal: Soft  Bowel sounds are normal  He exhibits no distension  There is no tenderness  There is no rebound  Musculoskeletal:   1 to 2+ bilateral lower extremity edema   Neurological: He is alert and oriented to person, place, and time  Skin: Skin is warm and dry  Psychiatric: He has a normal mood and affect  Lab Results: I have personally reviewed pertinent reports  Results from last 7 days  Lab Units 05/22/18  1147   WBC Thousand/uL 6 14   HEMOGLOBIN g/dL 10 7*   HEMATOCRIT % 33 7*   PLATELETS Thousands/uL 269   NEUTROS PCT % 81*   LYMPHS PCT % 8*   MONOS PCT % 9   EOS PCT % 2       Results from last 7 days  Lab Units 05/22/18  1147   SODIUM mmol/L 137   POTASSIUM mmol/L 4 1   CHLORIDE mmol/L 102   CO2 mmol/L 29   BUN mg/dL 22   CREATININE mg/dL 1 50*   CALCIUM mg/dL 9 0   TOTAL PROTEIN g/dL 7 3   BILIRUBIN TOTAL mg/dL 0 69   ALK PHOS U/L 99   ALT U/L 13   AST U/L 15   GLUCOSE RANDOM mg/dL 164*       Results from last 7 days  Lab Units 05/22/18  1147   INR  1 20*       Imaging: I have personally reviewed pertinent reports  X-ray Chest 2 Views    Result Date: 5/22/2018  Narrative: CHEST INDICATION:   Shortness of breath and chest pressure  COMPARISON:  None EXAM PERFORMED/VIEWS:  XR CHEST PA & LATERAL FINDINGS:  Left-sided chest wall pacemaker is identified  Pacemaker leads are intact  Cardiomediastinal silhouette appears unremarkable  No infiltrates  There is a small right pleural effusion, with a presumed loculated component along the right lower lateral chest wall  Osseous structures appear within normal limits for patient age  Impression: Small right pleural effusion, which appears to be partially loculated along the right lower lateral chest wall  Workstation performed: [id number]       EKG: V-paced    ** Please Note: Dragon 360 Dictation voice to text software may have been used in the creation of this document   **

## 2018-05-22 NOTE — ED ATTENDING ATTESTATION
Michelle Churchill DO, saw and evaluated the patient  I have discussed the patient with the resident/non-physician practitioner and agree with the resident's/non-physician practitioner's findings, Plan of Care, and MDM as documented in the resident's/non-physician practitioner's note, except where noted  All available labs and Radiology studies were reviewed  At this point I agree with the current assessment done in the Emergency Department  I have conducted an independent evaluation of this patient including a focused history and a physical exam     ED Note - Willie Andrade 80 y o  male MRN: [id number]  Unit/Bed#: ED 14 Encounter: [id number]    History of Present Illness   HPI  Willie Andrade is a 80 y o  male who presents for evaluation of progressive dyspnea/ RAYMOND and diffuse chest pressure  as well as edema  Hx of CHF on diuresis  No recent illness  No fever chills  Patiently recently moved from [place] and will be establishing cardiology care in this area  REVIEW OF SYSTEMS  See HPI for further details  12 systems reviewed and otherwise negative except as noted  Historical Information     PAST MEDICAL HISTORY  Past Medical History:   Diagnosis Date    [facility] ([facility] )     Anemia     Chronic kidney failure     [facility]     Edema     Hypothyroid     Prostate cancer (HCC)     Shortness of breath        FAMILY HISTORY  History reviewed  No pertinent family history      SOCIAL HISTORY  Social History     Social History    Marital status: Unknown     Spouse name: N/A    Number of children: N/A    Years of education: N/A     Social History Main Topics    Smoking status: Former Smoker    Smokeless tobacco: Never Used    Alcohol use Yes      Comment: jayne cognac    Drug use: No    Sexual activity: Not Asked     Other Topics Concern    None     Social History Narrative    None       SURGICAL HISTORY  Past Surgical History:   Procedure Laterality Date    CARDIAC PACEMAKER PLACEMENT 2014    GALLBLADDER SURGERY       Meds/Allergies     CURRENT MEDICATIONS  No current facility-administered medications for this encounter       Current Outpatient Prescriptions:     artificial tear (LUBRIFRESH P M ) 83-15 % ophthalmic ointment, daily at bedtime, Disp: , Rfl:     aspirin (ECOTRIN LOW STRENGTH) 81 mg EC tablet, Take 81 mg by mouth daily, Disp: , Rfl:     carbamide peroxide (DEBROX) 6 5 % otic solution, 5 drops 2 (two) times a day, Disp: , Rfl:     carboxymethylcellulose 0 5 % SOLN, 1 drop 3 (three) times a day as needed for dry eyes, Disp: , Rfl:     carvedilol (COREG) 3 125 mg tablet, Take 3 125 mg by mouth 2 (two) times a day with meals, Disp: , Rfl:     cholecalciferol (VITAMIN D3) 1,000 units tablet, Take 1,000 Units by mouth daily, Disp: , Rfl:     Empagliflozin (JARDIANCE) 10 MG TABS, Take 10 mg by mouth every morning, Disp: , Rfl:     enalapril (VASOTEC) 2 5 mg tablet, Take 2 5 mg by mouth daily, Disp: , Rfl:     finasteride (PROSCAR) 5 mg tablet, Take 5 mg by mouth daily, Disp: , Rfl:     furosemide (LASIX) 40 mg tablet, Take 40 mg by mouth 2 (two) times a day, Disp: , Rfl:     gabapentin (NEURONTIN) 100 mg capsule, Take 100 mg by mouth 3 (three) times a day, Disp: , Rfl:     Hypromellose (SYSTANE OVERNIGHT THERAPY) 0 3 % GEL, Apply to eye, Disp: , Rfl:     IPRATROPIUM-ALBUTEROL IN, Inhale, Disp: , Rfl:     melatonin 3 mg, Take 3 mg by mouth daily at bedtime, Disp: , Rfl:     multivitamin (THERAGRAN) TABS, Take 1 tablet by mouth daily, Disp: , Rfl:     nitroglycerin (NITROSTAT) 0 4 mg SL tablet, Place 0 4 mg under the tongue every 5 (five) minutes as needed for chest pain, Disp: , Rfl:     Omega-3 Fatty Acids (FISH OIL) 1,000 mg, Take 1,000 mg by mouth daily, Disp: , Rfl:     oxybutynin (DITROPAN-XL) 10 MG 24 hr tablet, Take 1 tablet (10 mg total) by mouth daily at bedtime, Disp: 30 tablet, Rfl: 1    potassium chloride (K-DUR,KLOR-CON) 20 mEq tablet, Take 20 mEq by mouth 2 (two) times a day, Disp: , Rfl:     tamsulosin (FLOMAX) 0 4 mg, Take 0 4 mg by mouth daily with dinner, Disp: , Rfl:     (Not in a hospital admission)    ALLERGIES  No Known Allergies  Objective     PHYSICAL EXAM    VITAL SIGNS: Blood pressure 113/58, pulse 75, temperature 98 1 °F (36 7 °C), temperature source Oral, resp  rate (!) 24, height 5' 9 5" (1 765 m), weight 84 4 kg (186 lb), SpO2 99 %  Constitutional:  Appears well developed and well nourished, no acute distress, non-toxic appearance   Eyes:  PERRL, EOMI, conjunctivae pink, sclerae non-icteric, no nystagmus, optic discs sharp/ no papilledema    HENT:  Normocephalic/Atraumatic, no rhinorrhea, mucous membranes moist   Neck: normal range of motion, no tenderness, supple   Respiratory:  No respiratory distress, no accessory muscle use, no intercostal retractions, +ve crackles, no rhonchi, +ve wheezing, no stridor   Cardiovascular:  Normal rate, normal rhythm, no murmurs, no gallops, no rubs, peripheral pulses intact, no carotid bruits, no JVD  GI:  Soft, non-tender, non-distended  :  No CVAT, no flank ecchymosis   Musculoskeletal:  No swelling or edema, no tenderness, no deformities  Integument:  Pink, warm, dry, Well hydrated, no rash, no erythema, no bullae   Lymphatic:  No cervical/ tonsillar/ submandibular lymphadenopathy noted   Neurologic:  Awake, Alert & oriented x 3, CN 2-12 intact, no focal neurological deficits  Psychiatric:  Speech and behavior appropriate       ED COURSE and MDM:    Assessment/Plan   Assessment:  Berlin Boxer is a 80 y o  male presents for evaluation of progressive dyspnea, CHF exacerbation  Plan:  Labs, EKG, CXR, Symptom management  Disposition as appropriate  Portions of the record may have been created with voice recognition software  Occasional wrong word or "sound a like" substitutions may have occurred due to the inherent limitations of voice recognition software       ED Provider  Electronically Signed by

## 2018-05-22 NOTE — ED PROVIDER NOTES
History  Chief Complaint   Patient presents with    Shortness of Breath     Patient presents to the E R  with worsening shortness of breath  This is an 49-year-old male with history of congestive heart failure multiple other medical problems who presents today with shortness of breath  Patient states for the past few days he has been extremely short of breath dyspnea on exertion  He states he can ambulate to the bathroom without - and puffing  He states he also noticed swelling in his ankles  He also has been having pressure in his chest not pain which comes and goes  He also states his  Abdomen is slightly bloated  No diarrhea constipation  He states he wakes up in the mouth and I going to the bathroom often  He has quit smoking   Many years ago  He does take a water pill which she has been compliant with  He states he recently moved here in March and does not have a cardiologist   He states today he came in because his shortness of breath got extremely worse  States he also increased his weight   49-year-old male that presents with shortness of breath  Most likely congestive heart failure will do a workup including labs and x-rays  Patient does exhibit some wheezing on exam   Will give her breathing treatments admit patient to the hospital             Prior to Admission Medications   Prescriptions Last Dose Informant Patient Reported? Taking?    Carbamide Peroxide (EAR DROPS OT)   Yes Yes   Sig: Administer into ears   Empagliflozin (JARDIANCE) 10 MG TABS  Outside Facility (Specify) Yes Yes   Sig: Take 10 mg by mouth every morning   Ergocalciferol (VITAMIN D2 PO)   Yes Yes   Sig: Take 50,000 Units by mouth   Hypromellose (SYSTANE OVERNIGHT THERAPY) 0 3 % GEL   Yes Yes   Sig: Apply to eye   IPRATROPIUM-ALBUTEROL IN   Yes Yes   Sig: Inhale as needed     Multiple Vitamin (TAB-A-ANETA PO)   Yes Yes   Sig: Take 1 tablet by mouth daily   Omega-3 Fatty Acids (FISH OIL) 1,000 mg  Outside Facility (Specify) Yes Yes   Sig: Take 1,000 mg by mouth daily   artificial tear (LUBRIFRESH P M ) 83-15 % ophthalmic ointment  Outside Facility (Specify) Yes Yes   Sig: daily at bedtime   aspirin (ECOTRIN LOW STRENGTH) 81 mg EC tablet  Outside Facility (Specify) Yes Yes   Sig: Take 81 mg by mouth daily   carboxymethylcellulose 0 5 % SOLN  Outside Facility (Specify) Yes Yes   Si drop 3 (three) times a day as needed for dry eyes   carvedilol (COREG) 3 125 mg tablet  Outside Facility (Specify) Yes Yes   Sig: Take 3 125 mg by mouth 2 (two) times a day with meals   enalapril (VASOTEC) 2 5 mg tablet  Outside Facility (Specify) Yes Yes   Sig: Take 2 5 mg by mouth daily   finasteride (PROSCAR) 5 mg tablet  Outside Facility (Specify) Yes Yes   Sig: Take 5 mg by mouth daily   furosemide (LASIX) 40 mg tablet  Outside Facility (Specify) Yes Yes   Sig: Take 40 mg by mouth daily     gabapentin (NEURONTIN) 100 mg capsule  Outside Facility (Specify) Yes Yes   Sig: Take 100 mg by mouth daily at bedtime     melatonin 3 mg  Outside Facility (Specify) Yes Yes   Sig: Take 3 mg by mouth daily at bedtime   nitroglycerin (NITROSTAT) 0 4 mg SL tablet   Yes Yes   Sig: Place 0 4 mg under the tongue every 5 (five) minutes as needed for chest pain   oxybutynin (DITROPAN-XL) 10 MG 24 hr tablet   No Yes   Sig: Take 1 tablet (10 mg total) by mouth daily at bedtime   potassium chloride (K-DUR,KLOR-CON) 20 mEq tablet  Outside Facility (Specify) Yes Yes   Sig: Take 20 mEq by mouth 2 (two) times a day   tamsulosin (FLOMAX) 0 4 mg   Yes Yes   Sig: Take 0 4 mg by mouth daily with dinner      Facility-Administered Medications: None       Past Medical History:   Diagnosis Date    [facility] )     Anemia     Chronic kidney failure     Colon cancer (HCC)     Edema     Hypothyroid     [facility]     Shortness of breath        Past Surgical History:   Procedure Laterality Date    CARDIAC PACEMAKER PLACEMENT      GALLBLADDER SURGERY         History reviewed  No pertinent family history  I have reviewed and agree with the history as documented  Social History   Substance Use Topics    Smoking status: Former Smoker    Smokeless tobacco: Never Used    Alcohol use Yes      Comment: jayne cognac 1 oz/night         Review of Systems   Constitutional: Positive for activity change  Negative for diaphoresis and fever  HENT: Negative  Respiratory: Positive for shortness of breath and wheezing  Negative for cough  Cardiovascular: Positive for leg swelling  Negative for chest pain and palpitations  Gastrointestinal: Negative for abdominal distention, abdominal pain, nausea and vomiting  Genitourinary: Negative  Musculoskeletal: Negative  Skin: Negative  Neurological: Negative  Psychiatric/Behavioral: Negative  All other systems reviewed and are negative  Physical Exam  ED Triage Vitals [05/22/18 1132]   Temperature Pulse Respirations Blood Pressure SpO2   98 1 °F (36 7 °C) 80 (!) 28 164/74 99 %      Temp Source Heart Rate Source Patient Position - Orthostatic VS BP Location FiO2 (%)   Oral Monitor Lying Right arm --      Pain Score       No Pain           Orthostatic Vital Signs  Vitals:    05/22/18 1132 05/22/18 1310 05/22/18 1457 05/22/18 1500   BP: 164/74 113/58 137/68 130/68   Pulse: 80 75 72 80   Patient Position - Orthostatic VS: Lying  Lying        Physical Exam   Constitutional: He is oriented to person, place, and time  He appears well-developed and well-nourished  No distress  HENT:   Head: Normocephalic and atraumatic  Nose: Nose normal    Mouth/Throat: Oropharynx is clear and moist    Eyes: Conjunctivae and EOM are normal  Pupils are equal, round, and reactive to light  Neck: Normal range of motion  Neck supple  Cardiovascular: Normal rate, regular rhythm and normal heart sounds  No murmur heard  Pulmonary/Chest: Effort normal  No respiratory distress  He has wheezes  He has rales  Abdominal: Soft   Bowel sounds are normal  He exhibits no distension  There is no tenderness  There is no rebound and no guarding  Musculoskeletal: Normal range of motion  He exhibits edema  He exhibits no tenderness or deformity  Neurological: He is alert and oriented to person, place, and time  No cranial nerve deficit  Skin: Skin is warm and dry  No rash noted  He is not diaphoretic  No pallor  Psychiatric: He has a normal mood and affect  Vitals reviewed        ED Medications  Medications   aspirin (ECOTRIN LOW STRENGTH) EC tablet 81 mg (not administered)   carvedilol (COREG) tablet 3 125 mg (3 125 mg Oral Given 5/22/18 1645)   enalapril (VASOTEC) tablet 2 5 mg (not administered)   finasteride (PROSCAR) tablet 5 mg (5 mg Oral Given 5/22/18 2110)   gabapentin (NEURONTIN) capsule 100 mg (100 mg Oral Given 5/22/18 2110)   melatonin tablet 3 mg (3 mg Oral Given 5/22/18 2111)   nitroglycerin (NITROSTAT) SL tablet 0 4 mg (not administered)   fish oil capsule 1,000 mg (not administered)   oxybutynin (DITROPAN-XL) 24 hr tablet 10 mg (10 mg Oral Given 5/22/18 2110)   potassium chloride (K-DUR,KLOR-CON) CR tablet 20 mEq (not administered)   tamsulosin (FLOMAX) capsule 0 4 mg (0 4 mg Oral Given 5/22/18 2110)   ergocalciferol (VITAMIN D2) capsule 50,000 Units (not administered)   ondansetron (ZOFRAN) injection 4 mg (not administered)   heparin (porcine) subcutaneous injection 5,000 Units (5,000 Units Subcutaneous Given 5/22/18 2111)   acetaminophen (TYLENOL) tablet 650 mg (not administered)   furosemide (LASIX) injection 40 mg (not administered)   polyvinyl alcohol (LIQUIFILM TEARS) 1 4 % ophthalmic solution 1 drop (1 drop Both Eyes Given 5/22/18 2112)   insulin lispro (HumaLOG) 100 units/mL subcutaneous injection 1-5 Units (not administered)   insulin lispro (HumaLOG) 100 units/mL subcutaneous injection 1-5 Units (not administered)   albuterol inhalation solution 5 mg (5 mg Nebulization Given 5/22/18 1307)   ipratropium (ATROVENT) 0 02 % inhalation solution 0 5 mg (0 5 mg Nebulization Given 5/22/18 1308)   methylPREDNISolone sodium succinate (Solu-MEDROL) injection 125 mg (125 mg Intravenous Given 5/22/18 1302)   aspirin chewable tablet 324 mg (324 mg Oral Given 5/22/18 1303)   furosemide (LASIX) injection 20 mg (20 mg Intravenous Given 5/22/18 1303)       Diagnostic Studies  Results Reviewed     Procedure Component Value Units Date/Time    BNP [[id number]]  (Abnormal) Collected:  05/22/18 1147    Lab Status:  Final result Specimen:  Blood from Arm, Right Updated:  05/22/18 1317     NT-proBNP 6,044 (H) pg/mL     Troponin I [[id number]]  (Abnormal) Collected:  05/22/18 1147    Lab Status:  Final result Specimen:  Blood from Arm, Right Updated:  05/22/18 1236     Troponin I 0 05 (H) ng/mL     Narrative:         [employer] Chemistry analyzer 99% cutoff is > 0 04 ng/mL in network labs    o cTnI 99% cutoff is useful only when applied to patients in the clinical setting of myocardial ischemia  o cTnI 99% cutoff should be interpreted in the context of clinical history, ECG findings and possibly cardiac imaging to establish correct diagnosis  o cTnI 99% cutoff may be suggestive but clearly not indicative of a coronary event without the clinical setting of myocardial ischemia      Comprehensive metabolic panel [[id number]]  (Abnormal) Collected:  05/22/18 1147    Lab Status:  Final result Specimen:  Blood from Arm, Right Updated:  05/22/18 1235     Sodium 137 mmol/L      Potassium 4 1 mmol/L      Chloride 102 mmol/L      CO2 29 mmol/L      Anion Gap 6 mmol/L      BUN 22 mg/dL      Creatinine 1 50 (H) mg/dL      Glucose 164 (H) mg/dL      Calcium 9 0 mg/dL      AST 15 U/L      ALT 13 U/L      Alkaline Phosphatase 99 U/L      Total Protein 7 3 g/dL      Albumin 3 0 (L) g/dL      Total Bilirubin 0 69 mg/dL      eGFR 41 ml/min/1 73sq m     Narrative:         National Kidney Disease Education Program recommendations are as follows:  GFR calculation is accurate only with a steady state creatinine  Chronic Kidney disease less than 60 ml/min/1 73 sq  meters  Kidney failure less than 15 ml/min/1 73 sq  meters  Protime-INR [[id number]]  (Abnormal) Collected:  05/22/18 1147    Lab Status:  Final result Specimen:  Blood from Arm, Right Updated:  05/22/18 1214     Protime 15 3 (H) seconds      INR 1 20 (H)    APTT [[id number]]  (Normal) Collected:  05/22/18 1147    Lab Status:  Final result Specimen:  Blood from Arm, Right Updated:  05/22/18 1214     PTT 32 seconds     CBC and differential [[id number]]  (Abnormal) Collected:  05/22/18 1147    Lab Status:  Final result Specimen:  Blood from Arm, Right Updated:  05/22/18 1205     WBC 6 14 Thousand/uL      RBC 3 73 (L) Million/uL      Hemoglobin 10 7 (L) g/dL      Hematocrit 33 7 (L) %      MCV 90 fL      MCH 28 7 pg      MCHC 31 8 g/dL      RDW 16 3 (H) %      MPV 10 6 fL      Platelets 390 Thousands/uL      nRBC 0 /100 WBCs      Neutrophils Relative 81 (H) %      Lymphocytes Relative 8 (L) %      Monocytes Relative 9 %      Eosinophils Relative 2 %      Basophils Relative 1 %      Neutrophils Absolute 4 98 Thousands/µL      Lymphocytes Absolute 0 50 (L) Thousands/µL      Monocytes Absolute 0 52 Thousand/µL      Eosinophils Absolute 0 10 Thousand/µL      Basophils Absolute 0 03 Thousands/µL                  X-ray chest 2 views   Final Result by Rich Harmon MD (05/22 1400)      Small right pleural effusion, which appears to be partially loculated along the right lower lateral chest wall              Workstation performed: [id number]               Procedures  Procedures      Phone Consults  ED Phone Contact    ED Course  ED Course as of May 22 2148   Tue May 22, 2018   1250 Troponin I: (!) 0 05                               MDM  CritCare Time    Disposition  Final diagnoses:   CHF exacerbation ([facility] )   COPD (chronic obstructive pulmonary disease) ([facility] )   NSTEMI (non-ST elevated myocardial infarction) [facility])     Time reflects when diagnosis was documented in both MDM as applicable and the Disposition within this note     Time User Action Codes Description Comment    5/22/2018  1:16 PM Sadia Quiles U  8  [I50 9] [facility]ation ([facility] )     5/22/2018  1:16 PM Odalys Quiles Add [J44 9] COPD (chronic obstructive pulmonary disease) ([facility] )     5/22/2018  1:16 PM Sadia Quiles U  8  [I21 4] NSTEMI (non-ST elevated myocardial infarction) [facility])       ED Disposition     ED Disposition Condition Comment    Admit  Case was discussed with deena and the patient's admission status was agreed to be Admission Status: inpatient status to the service of Dr Vikas Shah           Follow-up Information    None         Current Discharge Medication List      CONTINUE these medications which have NOT CHANGED    Details   artificial tear (LUBRIFRESH P M ) 83-15 % ophthalmic ointment daily at bedtime      aspirin (ECOTRIN LOW STRENGTH) 81 mg EC tablet Take 81 mg by mouth daily      Carbamide Peroxide (EAR DROPS OT) Administer into ears      carboxymethylcellulose 0 5 % SOLN 1 drop 3 (three) times a day as needed for dry eyes      carvedilol (COREG) 3 125 mg tablet Take 3 125 mg by mouth 2 (two) times a day with meals      Empagliflozin (JARDIANCE) 10 MG TABS Take 10 mg by mouth every morning      enalapril (VASOTEC) 2 5 mg tablet Take 2 5 mg by mouth daily      Ergocalciferol (VITAMIN D2 PO) Take 50,000 Units by mouth      finasteride (PROSCAR) 5 mg tablet Take 5 mg by mouth daily      furosemide (LASIX) 40 mg tablet Take 40 mg by mouth daily        gabapentin (NEURONTIN) 100 mg capsule Take 100 mg by mouth daily at bedtime        Hypromellose (SYSTANE OVERNIGHT THERAPY) 0 3 % GEL Apply to eye      IPRATROPIUM-ALBUTEROL IN Inhale as needed        melatonin 3 mg Take 3 mg by mouth daily at bedtime      Multiple Vitamin (TAB-A-ANETA PO) Take 1 tablet by mouth daily      nitroglycerin (NITROSTAT) 0 4 mg SL tablet Place 0 4 mg under the tongue every 5 (five) minutes as needed for chest pain Omega-3 Fatty Acids (FISH OIL) 1,000 mg Take 1,000 mg by mouth daily      oxybutynin (DITROPAN-XL) 10 MG 24 hr tablet Take 1 tablet (10 mg total) by mouth daily at bedtime  Qty: 30 tablet, Refills: 1    Associated Diagnoses: Lower urinary tract symptoms      potassium chloride (K-DUR,KLOR-CON) 20 mEq tablet Take 20 mEq by mouth 2 (two) times a day      tamsulosin (FLOMAX) 0 4 mg Take 0 4 mg by mouth daily with dinner           No discharge procedures on file  ED Provider  Attending physically available and evaluated Irina Erum SANDOVAL managed the patient along with the ED Attending      Electronically Signed by         Celso Gore MD  05/22/18 6830

## 2018-05-23 ENCOUNTER — APPOINTMENT (INPATIENT)
Dept: NON INVASIVE DIAGNOSTICS | Facility: HOSPITAL | Age: 83
DRG: 291 | End: 2018-05-23
Payer: MEDICARE

## 2018-05-23 LAB
ANION GAP SERPL CALCULATED.3IONS-SCNC: 8 MMOL/L (ref 4–13)
BASOPHILS # BLD AUTO: 0 THOUSANDS/ΜL (ref 0–0.1)
BASOPHILS NFR BLD AUTO: 0 % (ref 0–1)
BUN SERPL-MCNC: 28 MG/DL (ref 5–25)
CALCIUM SERPL-MCNC: 8.9 MG/DL (ref 8.3–10.1)
CHLORIDE SERPL-SCNC: 103 MMOL/L (ref 100–108)
CO2 SERPL-SCNC: 26 MMOL/L (ref 21–32)
CREAT SERPL-MCNC: 1.67 MG/DL (ref 0.6–1.3)
EOSINOPHIL # BLD AUTO: 0 THOUSAND/ΜL (ref 0–0.61)
EOSINOPHIL NFR BLD AUTO: 0 % (ref 0–6)
ERYTHROCYTE [DISTWIDTH] IN BLOOD BY AUTOMATED COUNT: 16.2 % (ref 11.6–15.1)
EST. AVERAGE GLUCOSE BLD GHB EST-MCNC: 166 MG/DL
GFR SERPL CREATININE-BSD FRML MDRD: 36 ML/MIN/1.73SQ M
GLUCOSE SERPL-MCNC: 160 MG/DL (ref 65–140)
GLUCOSE SERPL-MCNC: 198 MG/DL (ref 65–140)
GLUCOSE SERPL-MCNC: 218 MG/DL (ref 65–140)
GLUCOSE SERPL-MCNC: 219 MG/DL (ref 65–140)
GLUCOSE SERPL-MCNC: 227 MG/DL (ref 65–140)
HBA1C MFR BLD: 7.4 % (ref 4.2–6.3)
HCT VFR BLD AUTO: 32 % (ref 36.5–49.3)
HGB BLD-MCNC: 9.8 G/DL (ref 12–17)
LYMPHOCYTES # BLD AUTO: 0.36 THOUSANDS/ΜL (ref 0.6–4.47)
LYMPHOCYTES NFR BLD AUTO: 7 % (ref 14–44)
MAGNESIUM SERPL-MCNC: 2.6 MG/DL (ref 1.6–2.6)
MCH RBC QN AUTO: 27.9 PG (ref 26.8–34.3)
MCHC RBC AUTO-ENTMCNC: 30.6 G/DL (ref 31.4–37.4)
MCV RBC AUTO: 91 FL (ref 82–98)
MONOCYTES # BLD AUTO: 0.17 THOUSAND/ΜL (ref 0.17–1.22)
MONOCYTES NFR BLD AUTO: 3 % (ref 4–12)
MRSA NOSE QL CULT: NORMAL
NEUTROPHILS # BLD AUTO: 4.49 THOUSANDS/ΜL (ref 1.85–7.62)
NEUTS SEG NFR BLD AUTO: 89 % (ref 43–75)
NRBC BLD AUTO-RTO: 0 /100 WBCS
PLATELET # BLD AUTO: 245 THOUSANDS/UL (ref 149–390)
PMV BLD AUTO: 10.5 FL (ref 8.9–12.7)
POTASSIUM SERPL-SCNC: 4.1 MMOL/L (ref 3.5–5.3)
RBC # BLD AUTO: 3.51 MILLION/UL (ref 3.88–5.62)
SODIUM SERPL-SCNC: 137 MMOL/L (ref 136–145)
WBC # BLD AUTO: 5.03 THOUSAND/UL (ref 4.31–10.16)

## 2018-05-23 PROCEDURE — 85025 COMPLETE CBC W/AUTO DIFF WBC: CPT | Performed by: INTERNAL MEDICINE

## 2018-05-23 PROCEDURE — 93306 TTE W/DOPPLER COMPLETE: CPT

## 2018-05-23 PROCEDURE — 83735 ASSAY OF MAGNESIUM: CPT | Performed by: INTERNAL MEDICINE

## 2018-05-23 PROCEDURE — 80048 BASIC METABOLIC PNL TOTAL CA: CPT | Performed by: INTERNAL MEDICINE

## 2018-05-23 PROCEDURE — 93306 TTE W/DOPPLER COMPLETE: CPT | Performed by: INTERNAL MEDICINE

## 2018-05-23 PROCEDURE — 83036 HEMOGLOBIN GLYCOSYLATED A1C: CPT | Performed by: INTERNAL MEDICINE

## 2018-05-23 PROCEDURE — 99232 SBSQ HOSP IP/OBS MODERATE 35: CPT | Performed by: FAMILY MEDICINE

## 2018-05-23 PROCEDURE — 99221 1ST HOSP IP/OBS SF/LOW 40: CPT | Performed by: INTERNAL MEDICINE

## 2018-05-23 PROCEDURE — 82948 REAGENT STRIP/BLOOD GLUCOSE: CPT

## 2018-05-23 RX ORDER — FUROSEMIDE 10 MG/ML
40 INJECTION INTRAMUSCULAR; INTRAVENOUS
Status: DISCONTINUED | OUTPATIENT
Start: 2018-05-23 | End: 2018-05-25

## 2018-05-23 RX ADMIN — HEPARIN SODIUM 5000 UNITS: 5000 INJECTION, SOLUTION INTRAVENOUS; SUBCUTANEOUS at 14:17

## 2018-05-23 RX ADMIN — FINASTERIDE 5 MG: 5 TABLET, FILM COATED ORAL at 21:56

## 2018-05-23 RX ADMIN — HEPARIN SODIUM 5000 UNITS: 5000 INJECTION, SOLUTION INTRAVENOUS; SUBCUTANEOUS at 05:00

## 2018-05-23 RX ADMIN — INSULIN LISPRO 1 UNITS: 100 INJECTION, SOLUTION INTRAVENOUS; SUBCUTANEOUS at 08:26

## 2018-05-23 RX ADMIN — INSULIN LISPRO 2 UNITS: 100 INJECTION, SOLUTION INTRAVENOUS; SUBCUTANEOUS at 21:58

## 2018-05-23 RX ADMIN — INSULIN LISPRO 1 UNITS: 100 INJECTION, SOLUTION INTRAVENOUS; SUBCUTANEOUS at 12:26

## 2018-05-23 RX ADMIN — GABAPENTIN 100 MG: 100 CAPSULE ORAL at 21:57

## 2018-05-23 RX ADMIN — OXYBUTYNIN CHLORIDE 10 MG: 5 TABLET, FILM COATED, EXTENDED RELEASE ORAL at 21:56

## 2018-05-23 RX ADMIN — CARVEDILOL 3.12 MG: 3.12 TABLET, FILM COATED ORAL at 17:52

## 2018-05-23 RX ADMIN — ASPIRIN 81 MG: 81 TABLET, COATED ORAL at 08:25

## 2018-05-23 RX ADMIN — ENALAPRIL MALEATE 2.5 MG: 2.5 TABLET ORAL at 08:26

## 2018-05-23 RX ADMIN — FUROSEMIDE 40 MG: 10 INJECTION, SOLUTION INTRAMUSCULAR; INTRAVENOUS at 17:55

## 2018-05-23 RX ADMIN — POLYVINYL ALCOHOL 1 DROP: 14 SOLUTION/ DROPS OPHTHALMIC at 17:54

## 2018-05-23 RX ADMIN — FUROSEMIDE 40 MG: 10 INJECTION, SOLUTION INTRAMUSCULAR; INTRAVENOUS at 08:25

## 2018-05-23 RX ADMIN — POLYVINYL ALCOHOL 1 DROP: 14 SOLUTION/ DROPS OPHTHALMIC at 08:26

## 2018-05-23 RX ADMIN — Medication 1000 MG: at 08:25

## 2018-05-23 RX ADMIN — CARVEDILOL 3.12 MG: 3.12 TABLET, FILM COATED ORAL at 08:25

## 2018-05-23 RX ADMIN — INSULIN LISPRO 1 UNITS: 100 INJECTION, SOLUTION INTRAVENOUS; SUBCUTANEOUS at 17:55

## 2018-05-23 RX ADMIN — TAMSULOSIN HYDROCHLORIDE 0.4 MG: 0.4 CAPSULE ORAL at 21:57

## 2018-05-23 RX ADMIN — MELATONIN TAB 3 MG 3 MG: 3 TAB at 21:57

## 2018-05-23 RX ADMIN — POLYVINYL ALCOHOL 1 DROP: 14 SOLUTION/ DROPS OPHTHALMIC at 12:26

## 2018-05-23 RX ADMIN — HEPARIN SODIUM 5000 UNITS: 5000 INJECTION, SOLUTION INTRAVENOUS; SUBCUTANEOUS at 21:57

## 2018-05-23 RX ADMIN — POTASSIUM CHLORIDE 20 MEQ: 1500 TABLET, EXTENDED RELEASE ORAL at 08:25

## 2018-05-23 NOTE — SOCIAL WORK
CM met with patient, lives at [facility] [address] no  steps to entrance  As per Petra March at [address] living patient mostly uses wheelchair but has an walker and a cane  Has family support system daughters Keanu Lee and son Eneida Garcia  Fills prescription at assisted living  Reports POA but not in chart, patient stated will have daughter bring it  Denies ETOH, mental health dx or any mental health  inpatient stays  PCP is Dr Belgica Yarbrough [phone]  Patient will be discharged with [facility], QKN:[phone], CYT:[phone] or [phone], contact for discharge is Breann  Patient/caregiver received discharge checklist  Content reviewed  Patient/caregiver encouraged to participate in discharge plan of care prior to discharge home  CM reviewed d/c planning process including the following: identifying help at home, patient preference for d/c planning needs, Discharge Lounge, [facility] Meds to Bed program, availability of treatment team to discuss questions or concerns patient and/or family may have regarding understanding medications and recognizing signs and symptoms once discharged  CM also encouraged patient to follow up with all recommended appointments after discharge  Patient advised of importance for patient and family to participate in managing patients medical well being

## 2018-05-23 NOTE — ASSESSMENT & PLAN NOTE
· Patient with history of ischemic cardiomyopathy  · Obtaining records from [facility]  · Echocardiogram done earlier  · Cardiology evaluation appreciated  · Continue with the diuresis  · Symptomatically improving

## 2018-05-23 NOTE — PHYSICIAN ADVISOR
Current patient class: Inpatient  The patient is currently on Hospital Day: 2      The patient was admitted to the hospital at [phone] on 5/22/18 for the following diagnosis:  COPD (chronic obstructive pulmonary disease) ([facility] ) [J44 9]  SOB (shortness of breath) [R06 02]  NSTEMI (non-ST elevated myocardial infarction) ([place] [address] ) [I21 4]  CHF exacerbation ([place] [address] ) [I50 9]       There is documentation in the medical record of an expected length of stay of at least 2 midnights  The patient is therefore expected to satisfy the 2 midnight benchmark and given the 2 midnight presumption is appropriate for INPATIENT ADMISSION  Given this expectation of a satisfying stay, CMS instructs us that the patient is most often appropriate for inpatient admission under part A provided medical necessity is documented in the chart  After review of the relevant documentation, labs, vital signs and test results, the patient is appropriate for INPATIENT ADMISSION  Admission to the hospital as an inpatient is a complex decision making process which requires the practitioner to consider the patients presenting complaint, history and physical examination and all relevant testing  With this in mind, in this case, the patient was deemed appropriate for INPATIENT ADMISSION  After review of the documentation and testing available at the time of the admission I concur with this clinical determination of medical necessity  Rationale is as follows: The patient is a 80 yrs old Male who presented to the ED at 5/22/2018 11:29 AM with a chief complaint of Shortness of Breath (Patient presents to the E R  with worsening shortness of breath )     Patient admitted with a complaint of increasing shortness of breath for two days which has been associated with intermittent chest pressure  He also admits to lower extremity edema and abdominal bloating for two days    He has a history of CHF (unknown systolic or diastolic), CAD, and is s/p three cardiac stents  Records are being obtained to further clarify his history  Abnormal labs included a troponin of 0 05, creatinine of 1 67 and a BNP of 6,044  A CXR revealed a right pleural effusion  The patient is receiving IV diuretics ands was consulted with Cardiology who agreed with further diuresis  A two night admission status to the hospital would be considered appropriate for this patient      The patients vitals on arrival were ED Triage Vitals [05/22/18 1132]   Temperature Pulse Respirations Blood Pressure SpO2   98 1 °F (36 7 °C) 80 (!) 28 164/74 99 %      Temp Source Heart Rate Source Patient Position - Orthostatic VS BP Location FiO2 (%)   Oral Monitor Lying Right arm --      Pain Score       No Pain           Past Medical History:   Diagnosis Date    A-fib ([facility] )     Anemia     Bladder cancer (HCC)     CAD (coronary artery disease)     CHF (congestive heart failure) (HCC)     Chronic kidney failure     Colon cancer ([facility] )     Eye cancer, left ([facility] )     Pacemaker     Prostate cancer ([facility] )     Type 2 diabetes mellitus ([facility] )      Past Surgical History:   Procedure Laterality Date    CARDIAC PACEMAKER PLACEMENT  2014    CORONARY ANGIOPLASTY WITH STENT PLACEMENT      GALLBLADDER SURGERY             Consults have been placed to:   IP CONSULT TO CARDIOLOGY  IP CONSULT TO CASE MANAGEMENT    Vitals:    05/23/18 0538 05/23/18 0714 05/23/18 1017 05/23/18 1500   BP:  128/66 112/59 109/54   BP Location:       Pulse:  70 78 66   Resp:  18 20 18   Temp:  97 5 °F (36 4 °C) 97 8 °F (36 6 °C) 98 1 °F (36 7 °C)   TempSrc:    Oral   SpO2:  98% 99% 97%   Weight: 82 9 kg (182 lb 12 2 oz)      Height:           Most recent labs:    Recent Labs      05/22/18   1147   05/22/18   2212  05/23/18   0442   WBC  6 14   --    --   5 03   HGB  10 7*   --    --   9 8*   HCT  33 7*   --    --   32 0*   PLT  269   < >   --   245   K  4 1   --    --   4 1   NA  137   --    --   137   CALCIUM  9 0   --    --   8 9   BUN  22   -- --   28*   CREATININE  1 50*   --    --   1 67*   INR  1 20*   --    --    --    TROPONINI  0 05*   < >  0 05*   --    AST  15   --    --    --    ALT  13   --    --    --    ALKPHOS  99   --    --    --    BILITOT  0 69   --    --    --     < > = values in this interval not displayed         Scheduled Meds:  Current Facility-Administered Medications:  acetaminophen 650 mg Oral Q6H PRN Marcos Romero MD   aspirin 81 mg Oral Daily Marcos Romero MD   carvedilol 3 125 mg Oral BID With Meals Marcos Romero MD   enalapril 2 5 mg Oral Daily Marcos Romero MD   [START ON 5/24/2018] ergocalciferol 50,000 Units Oral Weekly Marcos Romero MD   finasteride 5 mg Oral HS Marcos Romero MD   fish oil 1,000 mg Oral Daily Marcos Romero MD   furosemide 40 mg Intravenous BID (diuretic) KEERTHI Quach   gabapentin 100 mg Oral HS Marcos Romero MD   heparin (porcine) 5,000 Units Subcutaneous [facility] Marcos Romero MD   insulin lispro 1-5 Units Subcutaneous TID [facility] Marcos Romero MD   insulin lispro 1-5 Units Subcutaneous HS Marcos Romero MD   melatonin 3 mg Oral HS Marcos Romero MD   nitroglycerin 0 4 mg Sublingual Q5 Min PRN Marcos Romero MD   ondansetron 4 mg Intravenous Q6H PRN Marcos Romero MD   oxybutynin 10 mg Oral HS Marcos Romero MD   polyvinyl alcohol 1 drop Both Eyes 4x Daily Marcos Romero MD   potassium chloride 20 mEq Oral Daily Marcos Romero MD   tamsulosin 0 4 mg Oral HS Marcos Romero MD     Continuous Infusions:   PRN Meds:   acetaminophen    nitroglycerin    ondansetron    Surgical procedures (if appropriate):

## 2018-05-23 NOTE — PROGRESS NOTES
Progress Note - Halle Meza 12/21/1930, 80 y o  male MRN: [id number]    Unit/Bed#: [facility] 523-01 Encounter: [id number]    Primary Care Provider: Eve Gavin MD   Date and time admitted to hospital: 5/22/2018 11:29 AM        Type 2 diabetes mellitus ([address] )   Assessment & Plan    · Continue with the sliding scale of insulin        CAD (coronary artery disease)   Assessment & Plan    · Continue with the current care  · Cardiology on board  · Obtain any records regarding his previous cardiac catheterization from [facility]        Paroxysmal atrial fibrillation [facility])   Assessment & Plan    · Patient with known history of paroxysmal atrial fibrillation  ·   Was on anticoagulation and stopped due to hematuria/rectal bleeding  · Monitor        CKD (chronic kidney disease) stage 3, GFR 30-59 ml/min   Assessment & Plan    · Monitor creatinine with diuresis        * CHF, acute on chronic [facility])   Assessment & Plan    · Patient with history of ischemic cardiomyopathy  · Obtaining records from [facility]  · Echocardiogram done earlier  · Cardiology evaluation appreciated  · Continue with the diuresis  · Symptomatically improving            VTE Pharmacologic Prophylaxis:   Pharmacologic: Heparin  Mechanical VTE Prophylaxis in Place: Yes    Patient Centered Rounds: I have performed bedside rounds with nursing staff today  Discussions with Specialists or Other Care Team Provider:  Cardiology    Education and Discussions with Family / Patient:     Time Spent for Care: 30 minutes  More than 50% of total time spent on counseling and coordination of care as described above  Current Length of Stay: 1 day(s)    Current Patient Status: Inpatient   Certification Statement: The patient will continue to require additional inpatient hospital stay due to CHF exacerbation requiring diuresis    Discharge Plan:     Code Status: Level 1 - Full Code      Subjective:   Patient seen and examined  Patient was complaining of shortness of breath , improving currently    Objective:     Vitals:   Temp (24hrs), Av 9 °F (36 6 °C), Min:97 4 °F (36 3 °C), Max:98 6 °F (37 °C)    HR:  [66-78] 75  Resp:  [18-20] 18  BP: (109-128)/(50-66) 112/54  SpO2:  [92 %-99 %] 94 %  Body mass index is 26 99 kg/m²  Input and Output Summary (last 24 hours): Intake/Output Summary (Last 24 hours) at 18  Last data filed at 18 1900   Gross per 24 hour   Intake              700 ml   Output             1845 ml   Net            -1145 ml       Physical Exam:     Physical Exam   Constitutional: He appears well-developed and well-nourished  HENT:   Head: Normocephalic and atraumatic  Eyes: EOM are normal  Pupils are equal, round, and reactive to light  Neck: Normal range of motion  Neck supple  No JVD present  Cardiovascular: Normal rate and regular rhythm  Pulmonary/Chest: Effort normal and breath sounds normal  No respiratory distress  bilaertal basal crackles   Abdominal: Soft  Bowel sounds are normal  He exhibits no distension  Musculoskeletal: Normal range of motion  He exhibits edema  Neurological: He is alert  No cranial nerve deficit         Additional Data:     Labs:      Results from last 7 days  Lab Units 18  0442   WBC Thousand/uL 5 03   HEMOGLOBIN g/dL 9 8*   HEMATOCRIT % 32 0*   PLATELETS Thousands/uL 245   NEUTROS PCT % 89*   LYMPHS PCT % 7*   MONOS PCT % 3*   EOS PCT % 0       Results from last 7 days  Lab Units 18  0442 18  1147   SODIUM mmol/L 137 137   POTASSIUM mmol/L 4 1 4 1   CHLORIDE mmol/L 103 102   CO2 mmol/L 26 29   BUN mg/dL 28* 22   CREATININE mg/dL 1 67* 1 50*   CALCIUM mg/dL 8 9 9 0   TOTAL PROTEIN g/dL  --  7 3   BILIRUBIN TOTAL mg/dL  --  0 69   ALK PHOS U/L  --  99   ALT U/L  --  13   AST U/L  --  15   GLUCOSE RANDOM mg/dL 198* 164*       Results from last 7 days  Lab Units 18  1147   INR  1 20*       * I Have Reviewed All Lab Data Listed Above   * Additional Pertinent Lab Tests Reviewed: Wendi [address] Admission Reviewed    Imaging:    Imaging Reports Reviewed Today Include:   Imaging Personally Reviewed by Myself Includes:     Recent Cultures (last 7 days):           Last 24 Hours Medication List:     Current Facility-Administered Medications:  acetaminophen 650 mg Oral Q6H PRN Reza Hooks MD   aspirin 81 mg Oral Daily Reza Hooks MD   carvedilol 3 125 mg Oral BID With Meals Reza Hooks MD   enalapril 2 5 mg Oral Daily Reza Hooks MD   [START ON 5/24/2018] ergocalciferol 50,000 Units Oral Weekly Reza Hooks MD   finasteride 5 mg Oral HS Reza Hooks MD   fish oil 1,000 mg Oral Daily Reza Hooks MD   furosemide 40 mg Intravenous BID (diuretic) KEERTHI Cabrera   gabapentin 100 mg Oral HS Reza Hooks MD   heparin (porcine) 5,000 Units Subcutaneous [facility] Reza Hooks MD   insulin lispro 1-5 Units Subcutaneous TID [facility] Reza Hooks MD   insulin lispro 1-5 Units Subcutaneous HS Reza Hooks MD   melatonin 3 mg Oral HS Reza Hooks MD   nitroglycerin 0 4 mg Sublingual Q5 Min PRN Reza Hooks MD   ondansetron 4 mg Intravenous Q6H PRN Reza Hooks MD   oxybutynin 10 mg Oral HS Reza Hooks MD   polyvinyl alcohol 1 drop Both Eyes 4x Daily Reza Hooks MD   potassium chloride 20 mEq Oral Daily Reza Hooks MD   tamsulosin 0 4 mg Oral HS Reza Hooks MD        Today, Patient Was Seen By: Rose Negro MD    ** Please Note: Dictation voice to text software may have been used in the creation of this document   **

## 2018-05-23 NOTE — CASE MANAGEMENT
Initial Clinical Review    Admission: Date/Time/Statement:INPT  5/22/18 @ 1316     Orders Placed This Encounter   Procedures    Inpatient Admission (expected length of stay for this patient is greater than two midnights)     Standing Status:   Standing     Number of Occurrences:   1     Order Specific Question:   Admitting Physician     Answer:   Lidia Jha [[id number]]     Order Specific Question:   Level of Care     Answer:   Med Surg [16]     Order Specific Question:   Estimated length of stay     Answer:   More than 2 Midnights     Order Specific Question:   Certification     Answer:   I certify that inpatient services are medically necessary for this patient for a duration of greater than two midnights  See H&P and MD Progress Notes for additional information about the patient's course of treatment  ED: Date/Time/Mode of Arrival:   ED Arrival Information     Expected Arrival Acuity Means of Arrival Escorted By Service Admission Type    - 5/22/2018 11:28 Emergent Ambulance [facility] Emergency    Arrival Complaint    SOB          Chief Complaint:   Chief Complaint   Patient presents with    Shortness of Breath     Patient presents to the E R  with worsening shortness of breath  History of Illness: Shanell Duenas is a 80 y o  male who presents with shortness of breath on mild exertion since 2 days  Since 2 days he has also noticed intermittent chest pressure lasting for a few minutes, not related to exertion and not associated with shortness of breath, diaphoresis or palpitations  He also complains of lower extremity edema and abdominal bloating since 2 days  No fever or cough  He has recently moved from [place] to [place] [address] [facility]   He has a history of CAD status post 3 stents in 2016 and chronic CHF and has been following up with his cardiologist Dr Quinn Garrett or Dr Jaylin Cevallos at [facility] in [place]      ED Vital Signs:   ED Triage Vitals [05/22/18 1132]   Temperature Pulse Respirations Blood Pressure SpO2   98 1 °F (36 7 °C) 80 (!) 28 164/74 99 %      Temp Source Heart Rate Source Patient Position - Orthostatic VS BP Location FiO2 (%)   Oral Monitor Lying Right arm --      Pain Score       No Pain        Wt Readings from Last 1 Encounters:   05/23/18 82 9 kg (182 lb 12 2 oz)       Vital Signs (abnormal):   05/22/18 1500  97 7 °F (36 5 °C)  80  20  130/68  98 %  --  --   05/22/18 1457  --  72  18  137/68  95 %  None (Room air)  Lying   05/22/18 1310  --  75   24  113/58  99 %  --  --   O2 Device: DUONEB at 05/22/18 1310   05/22/18 1132  98 1 °F (36 7 °C)  80   28  164/74  99 %  None (Room air)  Lying         Abnormal Labs/Diagnostic Test Results:   Lab Units 05/22/18  1147   WBC Thousand/uL 6 14   HEMOGLOBIN g/dL 10 7*   HEMATOCRIT % 33 7*   PLATELETS Thousands/uL 269   NEUTROS PCT % 81*   LYMPHS PCT % 8*   MONOS PCT % 9   EOS PCT % 2         Results from last 7 days  Lab Units 05/22/18  1147   SODIUM mmol/L 137   POTASSIUM mmol/L 4 1   CHLORIDE mmol/L 102   CO2 mmol/L 29   BUN mg/dL 22   CREATININE mg/dL 1 50*   CALCIUM mg/dL 9 0   TOTAL PROTEIN g/dL 7 3   BILIRUBIN TOTAL mg/dL 0 69   ALK PHOS U/L 99   ALT U/L 13   AST U/L 15   GLUCOSE RANDOM mg/dL 164*         Results from last 7 days  Lab Units 05/22/18  1147   INR   1 20*         Imaging: I have personally reviewed pertinent reports        X-ray Chest 2 Views     Result Date: 5/22/2018     Impression: Small right pleural effusion, which appears to be partially loculated along the right lower lateral chest wall        EKG: V-paced       ED Treatment:   Medication Administration from 05/22/2018 1128 to 05/22/2018 1528       Date/Time Order Dose Route Action Action by Comments     05/22/2018 1307 albuterol inhalation solution 5 mg 5 mg Nebulization Given Magda Garcia RN      08/83/5189 1308 ipratropium (ATROVENT) 0 02 % inhalation solution 0 5 mg 0 5 mg Nebulization Given Magda Garcia RN 05/22/2018 1302 methylPREDNISolone sodium succinate (Solu-MEDROL) injection 125 mg 125 mg Intravenous Given Dex Mahoney RN      55/39/7987 1303 aspirin chewable tablet 324 mg 324 mg Oral Given Dex Mahoney RN      89/53/4287 1303 furosemide (LASIX) injection 20 mg 20 mg Intravenous Given Dex Mahoney RN           Past Medical/Surgical History: Active Ambulatory Problems     Diagnosis Date Noted    Lower urinary tract symptoms 05/16/2018    Malignant neoplasm of trigone of urinary bladder ([place] [address] ) 05/16/2018    Prostate cancer ([place] [address] ) 05/16/2018     Resolved Ambulatory Problems     Diagnosis Date Noted    No Resolved Ambulatory Problems     Past Medical History:   Diagnosis Date    A-fib ([place] [address] )     Anemia     Bladder cancer ([place] [address] )     CAD (coronary artery disease)     [facility]     Chronic kidney failure     Colon cancer ([facility])     Eye cancer, left ([place] [address] )     Pacemaker     Prostate cancer ([place] [address] )     Type 2 diabetes mellitus ([place] [address] )        Admitting Diagnosis: COPD (chronic obstructive pulmonary disease) ([facility]) [J44 9]  SOB (shortness of breath) [R06 02]  NSTEMI (non-ST elevated myocardial infarction) ([place] [address] ) [I21 4]  CHF exacerbation ([place] [address] ) [I50 9]    Age/Sex: 80 y o  male    Assessment/Plan: Assessment/plan:  1  Acute on chronic CHF - IV lasix, monitor I/O, daily weights  Get details of CHF and latest echo report from his primary cardiologist   Cardiology consult  2  Chest pressure - likely due to #1  R/o ACS/new CAD - Trop x 3  Ct meds per 1, 3  3  CAD s/p stents - ct ASA, BB, statins  4  Type 2 diabetes - hold Jardiance  Place on SSI  5  CKD-3 - daughter Eryn Pruitt clarified CKD-3 but she and patient are unsure of his baseline creatinine  Get records from [place]  Monitor creatinine with diuresis  6  Afib - not on anticoagulation as outpatient  Ct ASA, BB  7   S/P PPM        VTE Prophylaxis: Heparin  / sequential compression device   Code Status: Level 1 - Full Code     Anticipated Length of Stay:  Patient will be admitted on an Inpatient basis with an anticipated length of stay of  Greater than 2 midnights     Justification for Hospital Stay: acute on chronic CHF    Admission Orders:  [address]  SEQ COMP DEVICE    Scheduled Meds:   Current Facility-Administered Medications:  acetaminophen 650 mg Oral Q6H PRN Reza Hooks MD   aspirin 81 mg Oral Daily Reza Hooks MD   carvedilol 3 125 mg Oral BID With Meals Reza Hooks MD   enalapril 2 5 mg Oral Daily Reza Hooks MD   [START ON 5/24/2018] ergocalciferol 50,000 Units Oral Weekly Reza Hooks MD   finasteride 5 mg Oral HS Reza Hooks MD   fish oil 1,000 mg Oral Daily Reza Hooks MD   furosemide 40 mg Intravenous BID (diuretic) KEERTHI Cabrera   gabapentin 100 mg Oral HS Reza Hooks MD   heparin (porcine) 5,000 Units Subcutaneous [facility] Reza Hooks MD   insulin lispro 1-5 Units Subcutaneous TID [facility] Reza Hooks MD   insulin lispro 1-5 Units Subcutaneous HS Reza Hooks MD   melatonin 3 mg Oral HS Reza Hooks MD   nitroglycerin 0 4 mg Sublingual Q5 Min PRN Reza Hooks MD   ondansetron 4 mg Intravenous Q6H PRN Reza Hooks MD   oxybutynin 10 mg Oral HS Reza Hooks MD   polyvinyl alcohol 1 drop Both Eyes 4x Daily Reza Hooks MD   potassium chloride 20 mEq Oral Daily Reza Hooks MD   tamsulosin 0 4 mg Oral HS Reza Hooks MD     Continuous Infusions:    PRN Meds:   acetaminophen    nitroglycerin    ondansetron

## 2018-05-23 NOTE — SOCIAL WORK
MCG Guide Used for Initial Round:Heart Failure RRG  Optimal GLOS: 2  Hospital Day: *1 day  DC Readiness: Discharge Readiness  Return to top of Heart Failure RRG - [facility]   Discharge readiness is indicated by patient meeting Recovery Milestones, including ALL of the following:   Hemodynamic stability   MI excluded   Cardiac rate and rhythm acceptable   Tachypnea absent   Immediate precipitating factors absent or controlled   Pulmonary edema absent or acceptable for next level of care   Peripheral or sacral edema absent, at baseline, or improved   Volume status acceptable on oral medication   Oxygenation at baseline or acceptable for next level of care   Renal function at baseline or acceptable for next level of care   Electrolyte levels normal or acceptable for next level of care   Mental status at baseline   Oral hydration, medications, and diet   Ambulatory   Discharge plans and education understood    Identified Barriers: IV Lasix  Discussion Date (Time): 05/23/18 with Dr Cynthia Cuba

## 2018-05-23 NOTE — PROGRESS NOTES
Spoke to Dr Keesha Mcgrath in regards to patient's O2 at night  Last night patient requested to be put on oxygen for sleeping only  Patient was on 1L NC  Per Dr Keesha Mcgrath we will evaluate him tonight and if he still feels he needs oxygen overnight she will consider ordering an overnight pulse ox for the following night 5/24/18

## 2018-05-23 NOTE — CONSULTS
Consultation - Cardiology Team One  Toño Grandchild 80 y o  male MRN: [id number]  Unit/Bed#: [facility] 523-01 Encounter: [id number]    Inpatient consult to Cardiology  Consult performed by: Veronika Roach  Consult ordered by: Soto Duenas        Physician Requesting Consult: Sasha Bermudez MD     Reason for Consult / Principal Problem: CHF    History of Present Illness        HPI: Toño Grandchild is a 80y o  year old male who has a history of atrial fibrillation not on [facility], CAD s p stenting 2016, s/p PPM, DM type II, CKD, hx of prostate and colon CA  Pt presented to ED on 5/22 with complaints of SOB  Pt states he has chronic exertional dyspnea but in the past few weeks it has been worse  He notes orthopnea and LE edema as well  He has been living in [place], [place] until march 2018 when he moved to assisted living at [facility] to be closer to his daughter  He was following with cardiology at [facility] in [place]  Records have been requested  He has reported history of CHF, unclear if systolic or diastolic  He takes lasix 40mg daily as OP; facility provides his food but he states he is on a salt restricted diet  He has not been experiencing any chest pain, palpitations or near syncopal episodes  Reports typical weight ~ 180lbs  He takes her pulse ox daily and reports typically % even with exertion; notes it was 88% yesterday AM      On presentation to ED :  He was afebrile, normotensive, mildy tachypneic; SPO2 98% on RA    EKG showed ventricular pacing at rate 79  NT-PRoBNP 6,044; serial troponin 0 05 x3  Cr 1 5, Na 137, hgn 10 7, WBC 6 14    Cxray showed partially loculated small right pleural effusion  He reportedly had wheezing on exam and was given 1 x dose of IV steroids  He was admitted and being treated for acute CHF; got 20mg IV lasix last night and started on 40mg IV daily  Pt reports large urine output although I/Os do not reflect this   His weight is down 3lbs overnight  He reports breathing is a little bit better this morning; he is still SOB with exertional and has LE edema  Other cardiac hx includes CAD with prior MI and stenting 2016  Also has hx of chris; appears to be maintaining SR; he is not on anticoagulation as OP  The patient does not think he has had any cardiac testing in past year  He is former smoker, quit ~ 40 years ago  He is also diabetic  He will be living in this area and would like to [facility]  Review of Systems   Constitution: Positive for malaise/fatigue  Negative for decreased appetite, fever, weakness and weight gain  Cardiovascular: Positive for dyspnea on exertion and leg swelling  Negative for chest pain, orthopnea, palpitations and syncope  Respiratory: Negative for cough, shortness of breath, sleep disturbances due to breathing and wheezing  Gastrointestinal: Negative for abdominal pain, nausea and vomiting  Genitourinary: Negative for dysuria  Neurological: Negative for dizziness and light-headedness  Psychiatric/Behavioral: Negative for altered mental status  Historical Information   Past Medical History:   Diagnosis Date    A-fib ([place] [address] )     Anemia     Bladder cancer ([place] [address] )     CAD (coronary artery disease)     CHF (congestive heart failure) (HCC)     Chronic kidney failure     Colon cancer ([place] [address] )     Eye cancer, left ([place] [address] )     Pacemaker     Prostate cancer ([place] [address] )     Type 2 diabetes mellitus ([place] [address] )      Past Surgical History:   Procedure Laterality Date    CARDIAC PACEMAKER PLACEMENT  2014    CORONARY ANGIOPLASTY WITH STENT PLACEMENT      GALLBLADDER SURGERY       History   Alcohol Use    Yes     Comment: jayne cognac 1 oz/night      History   Drug Use No     History   Smoking Status    Former Smoker   Smokeless Tobacco    Never Used     Family History: History reviewed  No pertinent family history      Meds/Allergies   current meds:   Current Facility-Administered Medications   Medication Dose Route Frequency    acetaminophen (TYLENOL) tablet 650 mg  650 mg Oral Q6H PRN    aspirin (ECOTRIN LOW STRENGTH) EC tablet 81 mg  81 mg Oral Daily    carvedilol (COREG) tablet 3 125 mg  3 125 mg Oral BID With Meals    enalapril (VASOTEC) tablet 2 5 mg  2 5 mg Oral Daily    [START ON 5/24/2018] ergocalciferol (VITAMIN D2) capsule 50,000 Units  50,000 Units Oral Weekly    finasteride (PROSCAR) tablet 5 mg  5 mg Oral HS    fish oil capsule 1,000 mg  1,000 mg Oral Daily    furosemide (LASIX) injection 40 mg  40 mg Intravenous Daily    gabapentin (NEURONTIN) capsule 100 mg  100 mg Oral HS    heparin (porcine) subcutaneous injection 5,000 Units  5,000 Units Subcutaneous Q8H [address]    insulin lispro (HumaLOG) 100 units/mL subcutaneous injection 1-5 Units  1-5 Units Subcutaneous TID AC    insulin lispro (HumaLOG) 100 units/mL subcutaneous injection 1-5 Units  1-5 Units Subcutaneous HS    melatonin tablet 3 mg  3 mg Oral HS    nitroglycerin (NITROSTAT) SL tablet 0 4 mg  0 4 mg Sublingual Q5 Min PRN    ondansetron (ZOFRAN) injection 4 mg  4 mg Intravenous Q6H PRN    oxybutynin (DITROPAN-XL) 24 hr tablet 10 mg  10 mg Oral HS    polyvinyl alcohol (LIQUIFILM TEARS) 1 4 % ophthalmic solution 1 drop  1 drop Both Eyes 4x Daily    potassium chloride (K-DUR,KLOR-CON) CR tablet 20 mEq  20 mEq Oral Daily    tamsulosin (FLOMAX) capsule 0 4 mg  0 4 mg Oral HS     No Known Allergies    Objective   Vitals: Blood pressure 128/66, pulse 70, temperature 97 5 °F (36 4 °C), resp  rate 18, height 5' 9" (1 753 m), weight 82 9 kg (182 lb 12 2 oz), SpO2 98 %  ,     Body mass index is 26 99 kg/m²  ,     Systolic (96HLY), TTX:172 , Min:113 , BIM:144     Diastolic (29DGQ), PNC:98, Min:57, Max:74      Intake/Output Summary (Last 24 hours) at 05/23/18 0946  Last data filed at 05/23/18 0824   Gross per 24 hour   Intake              620 ml   Output              700 ml   Net              -80 ml     Weight (last 2 days)     Date/Time   Weight    05/23/18 0538  82 9 (182 76)    05/22/18 1500  84 1 (185 41)    05/22/18 1132  84 4 (186)            Invasive Devices     Peripheral Intravenous Line            Peripheral IV 05/22/18 Right Antecubital less than 1 day              Physical Exam   Constitutional: He is oriented to person, place, and time  No distress  Pt laying nearly flat in bed in NAD   HENT:   Head: Normocephalic and atraumatic  Neck: JVD present  Cardiovascular: Normal rate, regular rhythm, S1 normal and S2 normal     No murmur heard  Mild +2 pitting b/l LE edema   Pulmonary/Chest: Effort normal  No respiratory distress  He has no wheezes  He has rales  Right sided rales   Abdominal: Soft  Musculoskeletal: He exhibits edema  Neurological: He is alert and oriented to person, place, and time  Skin: Skin is warm and dry  He is not diaphoretic  Psychiatric: He has a normal mood and affect  His behavior is normal    Nursing note and vitals reviewed      LABORATORY RESULTS:    Results from last 7 days  Lab Units 05/22/18  2212 05/22/18  1647 05/22/18  1147   TROPONIN I ng/mL 0 05* 0 05* 0 05*     CBC with diff:   Results from last 7 days  Lab Units 05/23/18  0442 05/22/18  1647 05/22/18  1147   WBC Thousand/uL 5 03  --  6 14   HEMOGLOBIN g/dL 9 8*  --  10 7*   HEMATOCRIT % 32 0*  --  33 7*   MCV fL 91  --  90   PLATELETS Thousands/uL 245 250 269   MCH pg 27 9  --  28 7   MCHC g/dL 30 6*  --  31 8   RDW % 16 2*  --  16 3*   MPV fL 10 5 10 5 10 6   NRBC AUTO /100 WBCs 0  --  0     CMP:  Results from last 7 days  Lab Units 05/23/18  0442 05/22/18  1147   SODIUM mmol/L 137 137   POTASSIUM mmol/L 4 1 4 1   CHLORIDE mmol/L 103 102   CO2 mmol/L 26 29   ANION GAP mmol/L 8 6   BUN mg/dL 28* 22   CREATININE mg/dL 1 67* 1 50*   GLUCOSE RANDOM mg/dL 198* 164*   CALCIUM mg/dL 8 9 9 0   AST U/L  --  15   ALT U/L  --  13   ALK PHOS U/L  --  99   TOTAL PROTEIN g/dL  --  7 3   BILIRUBIN TOTAL mg/dL  --  0 69   EGFR ml/min/1 73sq m 36 41     BMP:  Results from last 7 days  Lab Units 05/23/18  0442 05/22/18  1147   SODIUM mmol/L 137 137   POTASSIUM mmol/L 4 1 4 1   CHLORIDE mmol/L 103 102   CO2 mmol/L 26 29   BUN mg/dL 28* 22   CREATININE mg/dL 1 67* 1 50*   GLUCOSE RANDOM mg/dL 198* 164*   CALCIUM mg/dL 8 9 9 0     Lab Results   Component Value Date    NTBNP 6,044 (H) 05/22/2018        Results from last 7 days  Lab Units 05/23/18  0442   MAGNESIUM mg/dL 2 6        Results from last 7 days  Lab Units 05/23/18  0442   HEMOGLOBIN A1C % 7 4*       Results from last 7 days  Lab Units 05/22/18  1147   INR  1 20*     Imaging: I have personally reviewed pertinent reports  X-ray Chest 2 Views    Result Date: 5/22/2018  Narrative: CHEST INDICATION:   Shortness of breath and chest pressure  COMPARISON:  None EXAM PERFORMED/VIEWS:  XR CHEST PA & LATERAL FINDINGS:  Left-sided chest wall pacemaker is identified  Pacemaker leads are intact  Cardiomediastinal silhouette appears unremarkable  No infiltrates  There is a small right pleural effusion, with a presumed loculated component along the right lower lateral chest wall  Osseous structures appear within normal limits for patient age  Impression: Small right pleural effusion, which appears to be partially loculated along the right lower lateral chest wall  Workstation performed: [id number]       EKG reviewed personally:  5/22/18  Ventricular pacing   Rate 79    Telemetry reviewed personally:   Ventricular pacing    Assessment  Principal Problem:    CHF, acute on chronic ([facility])  Active Problems:    CKD (chronic kidney disease) stage 3, GFR 30-59 ml/min    Paroxysmal atrial fibrillation ([facility])    CAD (coronary artery disease)    Type 2 [facility] )    Pacemaker      Assessment/ Plan:    Acute on chronic congestive heart failure: unclear systolic vs diastolic?   Pt still volume overloaded on exam, increase lasix to 40mg IV BID; he takes 40mg PO daily as OP   - continue strict I/Os, daily weights and low Na diet  Unclear cause of exacerbation, perhaps diet change since moving to facility  Records have been requested from his former cardiologist in [place]; doubt pt has had echo in past year  Will check repeat echo here  Continue BB  He is also on ACE as OP  BMP in AM    CKD: unclear baseline, no prior records currently available  Cr 1 67 today; continue daily BMP while getting diuresed    Hx of PAF: unclear details; now in SR  He is not on AC as OP  Continue beta blocker    HTN: BP control adequate wes BB and ACE    CAD: with prior MI and stenting 2016; await records for details  Continue ASA, BB  He is not on statin as OP; check lipid panel in AM        Thank you for allowing us to participate in this patient's care  This pt will follow up with Dr Humaira Reeves once discharged  Counseling / Coordination of Care  Total floor / unit time spent today 50 minutes  Greater than 50% of total time was spent with the patient and / or family counseling and / or coordination of care  A description of the counseling / coordination of care: Review of history, current assessment, development of a plan  Code Status: Level 1 - Full Code    ** Please Note: Dragon 360 Dictation voice to text software may have been used in the creation of this document   **

## 2018-05-23 NOTE — PLAN OF CARE
Problem: Potential for Falls  Goal: Patient will remain free of falls  INTERVENTIONS:  - Assess patient frequently for physical needs  -  Identify cognitive and physical deficits and behaviors that affect risk of falls    -  [place] fall precautions as indicated by assessment   - Educate patient/family on patient safety including physical limitations  - Instruct patient to call for assistance with activity based on assessment  - Modify environment to reduce risk of injury  - Consider OT/PT consult to assist with strengthening/mobility   Outcome: Progressing      Problem: MUSCULOSKELETAL - ADULT  Goal: Maintain or return mobility to safest level of function  INTERVENTIONS:  - Assess patient's ability to carry out ADLs; assess patient's baseline for ADL function and identify physical deficits which impact ability to perform ADLs (bathing, care of mouth/teeth, toileting, grooming, dressing, etc )  - Assess/evaluate cause of self-care deficits   - Assess range of motion  - Assess patient's mobility; develop plan if impaired  - Assess patient's need for assistive devices and provide as appropriate  - Encourage maximum independence but intervene and supervise when necessary  - Involve family in performance of ADLs  - Assess for home care needs following discharge   - Request OT consult to assist with ADL evaluation and planning for discharge  - Provide patient education as appropriate  Outcome: Progressing    Goal: Maintain proper alignment of affected body part  INTERVENTIONS:  - Support, maintain and protect limb and body alignment  - Provide pt/fam with appropriate education  Outcome: Progressing      Problem: RESPIRATORY - ADULT  Goal: Achieves optimal ventilation and oxygenation  INTERVENTIONS:  - Assess for changes in respiratory status  - Assess for changes in mentation and behavior  - Position to facilitate oxygenation and minimize respiratory effort  - Oxygen administration by appropriate delivery method based on oxygen saturation (per order) or ABGs  - Initiate smoking cessation education as indicated  - Encourage broncho-pulmonary hygiene including cough, deep breathe, Incentive Spirometry  - Assess the need for suctioning and aspirate as needed  - Assess and instruct to report SOB or any respiratory difficulty  - Respiratory Therapy support as indicated  Outcome: Progressing

## 2018-05-23 NOTE — TREATMENT PLAN
Received cardiac cath report from [address] [place] [address] on 6/8/2016  Per report pt has prior MI and LUUL to left circumflex on 6/1/16 and returned for staged PCI and orbital atherectomy of LAD on 6/8/16  Per report:  Left main: large caliber vessel without evidence of disease  LAD: Long 80% complex bifurcating lesion in prox LAD; treated with atherectomy and Xience alpine 2 5 x 26mm LULU; post LULU 0% stenosis  D1: 80% lesion; treated with a TREX RX 2 0 x 12mm, Xience alpine 2 25 x 28mm and TREK RC 2 25 x 12mm; post intervention 0% stenosis  Final kissing balloon inflation was performed on the prox LAD/first D1 bifurcation  Circumflex: previously placed stent patent

## 2018-05-23 NOTE — ASSESSMENT & PLAN NOTE
· Patient with known history of paroxysmal atrial fibrillation  ·     Was on anticoagulation and stopped due to hematuria/rectal bleeding  · Monitor

## 2018-05-23 NOTE — ASSESSMENT & PLAN NOTE
· Continue with the current care  · Cardiology on board  · Obtain any records regarding his previous cardiac catheterization from [facility]

## 2018-05-24 LAB
ANION GAP SERPL CALCULATED.3IONS-SCNC: 7 MMOL/L (ref 4–13)
BASOPHILS # BLD AUTO: 0.02 THOUSANDS/ΜL (ref 0–0.1)
BASOPHILS NFR BLD AUTO: 0 % (ref 0–1)
BUN SERPL-MCNC: 32 MG/DL (ref 5–25)
CALCIUM SERPL-MCNC: 8.6 MG/DL (ref 8.3–10.1)
CHLORIDE SERPL-SCNC: 102 MMOL/L (ref 100–108)
CO2 SERPL-SCNC: 30 MMOL/L (ref 21–32)
CREAT SERPL-MCNC: 1.56 MG/DL (ref 0.6–1.3)
EOSINOPHIL # BLD AUTO: 0.16 THOUSAND/ΜL (ref 0–0.61)
EOSINOPHIL NFR BLD AUTO: 3 % (ref 0–6)
ERYTHROCYTE [DISTWIDTH] IN BLOOD BY AUTOMATED COUNT: 16.5 % (ref 11.6–15.1)
GFR SERPL CREATININE-BSD FRML MDRD: 39 ML/MIN/1.73SQ M
GLUCOSE SERPL-MCNC: 127 MG/DL (ref 65–140)
GLUCOSE SERPL-MCNC: 132 MG/DL (ref 65–140)
GLUCOSE SERPL-MCNC: 155 MG/DL (ref 65–140)
GLUCOSE SERPL-MCNC: 211 MG/DL (ref 65–140)
GLUCOSE SERPL-MCNC: 260 MG/DL (ref 65–140)
HCT VFR BLD AUTO: 33.3 % (ref 36.5–49.3)
HGB BLD-MCNC: 10.2 G/DL (ref 12–17)
LYMPHOCYTES # BLD AUTO: 0.59 THOUSANDS/ΜL (ref 0.6–4.47)
LYMPHOCYTES NFR BLD AUTO: 11 % (ref 14–44)
MCH RBC QN AUTO: 27.6 PG (ref 26.8–34.3)
MCHC RBC AUTO-ENTMCNC: 30.6 G/DL (ref 31.4–37.4)
MCV RBC AUTO: 90 FL (ref 82–98)
MONOCYTES # BLD AUTO: 0.57 THOUSAND/ΜL (ref 0.17–1.22)
MONOCYTES NFR BLD AUTO: 11 % (ref 4–12)
NEUTROPHILS # BLD AUTO: 3.85 THOUSANDS/ΜL (ref 1.85–7.62)
NEUTS SEG NFR BLD AUTO: 74 % (ref 43–75)
NRBC BLD AUTO-RTO: 0 /100 WBCS
PLATELET # BLD AUTO: 248 THOUSANDS/UL (ref 149–390)
PMV BLD AUTO: 10.6 FL (ref 8.9–12.7)
POTASSIUM SERPL-SCNC: 3.6 MMOL/L (ref 3.5–5.3)
RBC # BLD AUTO: 3.7 MILLION/UL (ref 3.88–5.62)
SODIUM SERPL-SCNC: 139 MMOL/L (ref 136–145)
WBC # BLD AUTO: 5.2 THOUSAND/UL (ref 4.31–10.16)

## 2018-05-24 PROCEDURE — 85025 COMPLETE CBC W/AUTO DIFF WBC: CPT | Performed by: FAMILY MEDICINE

## 2018-05-24 PROCEDURE — 99232 SBSQ HOSP IP/OBS MODERATE 35: CPT | Performed by: FAMILY MEDICINE

## 2018-05-24 PROCEDURE — 99232 SBSQ HOSP IP/OBS MODERATE 35: CPT | Performed by: INTERNAL MEDICINE

## 2018-05-24 PROCEDURE — 82948 REAGENT STRIP/BLOOD GLUCOSE: CPT

## 2018-05-24 PROCEDURE — 80048 BASIC METABOLIC PNL TOTAL CA: CPT | Performed by: NURSE PRACTITIONER

## 2018-05-24 RX ADMIN — POLYVINYL ALCOHOL 1 DROP: 14 SOLUTION/ DROPS OPHTHALMIC at 21:06

## 2018-05-24 RX ADMIN — INSULIN LISPRO 2 UNITS: 100 INJECTION, SOLUTION INTRAVENOUS; SUBCUTANEOUS at 21:23

## 2018-05-24 RX ADMIN — POLYVINYL ALCOHOL 1 DROP: 14 SOLUTION/ DROPS OPHTHALMIC at 08:16

## 2018-05-24 RX ADMIN — MELATONIN TAB 3 MG 3 MG: 3 TAB at 21:03

## 2018-05-24 RX ADMIN — CARVEDILOL 3.12 MG: 3.12 TABLET, FILM COATED ORAL at 08:13

## 2018-05-24 RX ADMIN — ERGOCALCIFEROL 50000 UNITS: 1.25 CAPSULE ORAL at 08:14

## 2018-05-24 RX ADMIN — GABAPENTIN 100 MG: 100 CAPSULE ORAL at 21:03

## 2018-05-24 RX ADMIN — FUROSEMIDE 40 MG: 10 INJECTION, SOLUTION INTRAMUSCULAR; INTRAVENOUS at 17:09

## 2018-05-24 RX ADMIN — INSULIN LISPRO 1 UNITS: 100 INJECTION, SOLUTION INTRAVENOUS; SUBCUTANEOUS at 11:35

## 2018-05-24 RX ADMIN — CARVEDILOL 3.12 MG: 3.12 TABLET, FILM COATED ORAL at 17:09

## 2018-05-24 RX ADMIN — TAMSULOSIN HYDROCHLORIDE 0.4 MG: 0.4 CAPSULE ORAL at 21:03

## 2018-05-24 RX ADMIN — OXYBUTYNIN CHLORIDE 10 MG: 5 TABLET, FILM COATED, EXTENDED RELEASE ORAL at 21:03

## 2018-05-24 RX ADMIN — FINASTERIDE 5 MG: 5 TABLET, FILM COATED ORAL at 21:03

## 2018-05-24 RX ADMIN — HEPARIN SODIUM 5000 UNITS: 5000 INJECTION, SOLUTION INTRAVENOUS; SUBCUTANEOUS at 05:26

## 2018-05-24 RX ADMIN — ASPIRIN 81 MG: 81 TABLET, COATED ORAL at 08:13

## 2018-05-24 RX ADMIN — INSULIN LISPRO 1 UNITS: 100 INJECTION, SOLUTION INTRAVENOUS; SUBCUTANEOUS at 08:14

## 2018-05-24 RX ADMIN — POTASSIUM CHLORIDE 20 MEQ: 1500 TABLET, EXTENDED RELEASE ORAL at 08:13

## 2018-05-24 RX ADMIN — POLYVINYL ALCOHOL 1 DROP: 14 SOLUTION/ DROPS OPHTHALMIC at 17:09

## 2018-05-24 RX ADMIN — FUROSEMIDE 40 MG: 10 INJECTION, SOLUTION INTRAMUSCULAR; INTRAVENOUS at 08:13

## 2018-05-24 RX ADMIN — APIXABAN 2.5 MG: 2.5 TABLET, FILM COATED ORAL at 17:08

## 2018-05-24 RX ADMIN — ENALAPRIL MALEATE 2.5 MG: 2.5 TABLET ORAL at 08:14

## 2018-05-24 RX ADMIN — POLYVINYL ALCOHOL 1 DROP: 14 SOLUTION/ DROPS OPHTHALMIC at 11:35

## 2018-05-24 RX ADMIN — Medication 1000 MG: at 08:13

## 2018-05-24 NOTE — ASSESSMENT & PLAN NOTE
· Patient with history of ischemic cardiomyopathy  · Echocardiogram reviewed  · Previous cardiology records reviewed  · Continue with the diuresis

## 2018-05-24 NOTE — SOCIAL WORK
Received call from [address] [place] [address] will be $189 24/mth   Pt made aware and is agreeable to cost

## 2018-05-24 NOTE — ASSESSMENT & PLAN NOTE
· Patient with known history of paroxysmal atrial fibrillation  ·  Was on anticoagulation and stopped due to hematuria/rectal bleeding  · Plan is to restart back on Pradaxa/Eliquis/Xarelto

## 2018-05-24 NOTE — PROGRESS NOTES
Cardiology Progress Note - Greg Garber 80 y o  male MRN: [id number]    Unit/Bed#: [facility] 523-01 Encounter: [id number]      Assessment:  1  Acute on chronic diastolic CHF  2  Moderate mitral regurgitation   3  CAD s/p LULU to proximal LAD, D1  4  Paroxysmal atrial fibrillation   5  Tachy-eduardo syndrome s/p PPM  6  Dyslipidemia    Plan:  1  Negative fluid balance, weight decreasing   2  Continue IV Lasix for next 24 hours  3  Blood pressure controlled   4  Agreeable to reinitiation of AC - Pradaxa 189/month - will look into cost of other two NOACs at request of daughter  5  AM BMP      Subjective:   Patient seen and examined  No significant events overnight  Objective:     Vitals: Blood pressure 123/61, pulse 67, temperature 97 5 °F (36 4 °C), temperature source Oral, resp  rate 16, height 5' 9" (1 753 m), weight 82 4 kg (181 lb 10 5 oz), SpO2 99 %  , Body mass index is 26 83 kg/m² , Orthostatic Blood Pressures      Most Recent Value   Blood Pressure  123/61 filed at 05/24/2018 0215   Patient Position - Orthostatic VS  Lying filed at 05/24/2018 7341            Intake/Output Summary (Last 24 hours) at 05/24/18 7018  Last data filed at 05/24/18 0532   Gross per 24 hour   Intake              360 ml   Output             2270 ml   Net            -1910 ml         Physical Exam:    GEN: Greg Garber appears well, alert and oriented x 3, pleasant and cooperative   HEENT: pupils equal, round, and reactive to light; extraocular muscles intact  NECK: supple, no carotid bruits, elevated JVP  HEART: regular rate and rhythm, normal S1/S2, no murmur   LUNGS:  Decreased breath sounds bilaterally   ABDOMEN: normal bowel sounds, soft, no tenderness, no distention  EXTREMITIES: peripheral pulses normal; no clubbing, cyanosis, + edema  NEURO: no focal findings   SKIN: normal without suspicious lesions on exposed skin    Medications:      Current Facility-Administered Medications:     acetaminophen (TYLENOL) tablet 650 mg, 650 mg, Oral, Q6H PRN, Alice Kidd MD    aspirin (ECOTRIN LOW STRENGTH) EC tablet 81 mg, 81 mg, Oral, Daily, Alice Kidd MD, 81 mg at 05/24/18 0813    carvedilol (COREG) tablet 3 125 mg, 3 125 mg, Oral, BID With Meals, Alice Kidd MD, 3 125 mg at 05/24/18 0813    enalapril (VASOTEC) tablet 2 5 mg, 2 5 mg, Oral, Daily, Alice Kidd MD, 2 5 mg at 05/24/18 6732    ergocalciferol (VITAMIN D2) capsule 50,000 Units, 50,000 Units, Oral, Weekly, Alice Kidd MD, 50,000 Units at 05/24/18 7866    finasteride (PROSCAR) tablet 5 mg, 5 mg, Oral, HS, Alice Kidd MD, 5 mg at 05/23/18 2156    fish oil capsule 1,000 mg, 1,000 mg, Oral, Daily, Alice Kidd MD, 1,000 mg at 05/24/18 0813    furosemide (LASIX) injection 40 mg, 40 mg, Intravenous, BID (diuretic), Maria Antoniajessica Grover, CRNP, 40 mg at 05/24/18 0813    gabapentin (NEURONTIN) capsule 100 mg, 100 mg, Oral, HS, Alice Kidd MD, 100 mg at 05/23/18 2157    heparin (porcine) subcutaneous injection 5,000 Units, 5,000 Units, Subcutaneous, Q8H [address], 5,000 Units at 05/24/18 0526 **AND** Platelet count, , , Once, Alice Kidd MD    insulin lispro (HumaLOG) 100 units/mL subcutaneous injection 1-5 Units, 1-5 Units, Subcutaneous, TID AC, 1 Units at 05/24/18 0814 **AND** Fingerstick Glucose (POCT), , , TID AC, Alice Kidd MD    insulin lispro (HumaLOG) 100 units/mL subcutaneous injection 1-5 Units, 1-5 Units, Subcutaneous, HS, Alice Kidd MD, 2 Units at 05/23/18 2158    melatonin tablet 3 mg, 3 mg, Oral, HS, Alice Kidd MD, 3 mg at 05/23/18 2157    nitroglycerin (NITROSTAT) SL tablet 0 4 mg, 0 4 mg, Sublingual, Q5 Min PRN, Alice Kidd MD    ondansetron [facility]) injection 4 mg, 4 mg, Intravenous, Q6H PRN, Alice Kidd MD    oxybutynin (DITROPAN-XL) 24 hr tablet 10 mg, 10 mg, Oral, HS, Alice Kidd MD, 10 mg at 05/23/18 2156    polyvinyl alcohol (LIQUIFILM TEARS) 1 4 % ophthalmic solution 1 drop, 1 drop, Both Eyes, 4x Daily, Alice Kidd MD, 1 drop at 05/24/18 0816    potassium chloride (K-DUR,KLOR-CON) CR tablet 20 mEq, 20 mEq, Oral, Daily, Alice Kidd MD, 20 mEq at 05/24/18 0813    tamsulosin [facility]) capsule 0 4 mg, 0 4 mg, Oral, HS, Alice Kidd MD, 0 4 mg at 05/23/18 2157     Labs & Results:      Results from last 7 days  Lab Units 05/22/18 2212 05/22/18  1647 05/22/18  1147   TROPONIN I ng/mL 0 05* 0 05* 0 05*       Results from last 7 days  Lab Units 05/24/18 0459 05/23/18 0442 05/22/18  1647 05/22/18  1147   WBC Thousand/uL 5 20 5 03  --  6 14   HEMOGLOBIN g/dL 10 2* 9 8*  --  10 7*   HEMATOCRIT % 33 3* 32 0*  --  33 7*   PLATELETS Thousands/uL 248 245 250 269           Results from last 7 days  Lab Units 05/24/18 0459 05/23/18  0442 05/22/18  1147   SODIUM mmol/L 139 137 137   POTASSIUM mmol/L 3 6 4 1 4 1   CHLORIDE mmol/L 102 103 102   CO2 mmol/L 30 26 29   BUN mg/dL 32* 28* 22   CREATININE mg/dL 1 56* 1 67* 1 50*   CALCIUM mg/dL 8 6 8 9 9 0   TOTAL PROTEIN g/dL  --   --  7 3   BILIRUBIN TOTAL mg/dL  --   --  0 69   ALK PHOS U/L  --   --  99   ALT U/L  --   --  13   AST U/L  --   --  15   GLUCOSE RANDOM mg/dL 132 198* 164*       Results from last 7 days  Lab Units 05/22/18  1147   INR  1 20*   PTT seconds 32       Results from last 7 days  Lab Units 05/23/18  0442   MAGNESIUM mg/dL 2 6       Counseling / Coordination of Care  Total floor / unit time spent today 20 minutes  Greater than 50% of total time was spent with the patient and / or family counseling and / or coordination of care

## 2018-05-24 NOTE — RESTORATIVE TECHNICIAN NOTE
Restorative Specialist Mobility Note       Activity: Chair, Dangle, Stand at bedside, Turn, Ambulate in room, Ambulate in glaser     Assistive Device: Front wheel walker     Ambulation Response:  Tolerated fairly well  Repositioned: Sitting, Up in chair

## 2018-05-24 NOTE — PROGRESS NOTES
Progress Note - Jessica Mcfarlane 12/21/1930, 80 y o  male MRN: [id number]    Unit/Bed#: [facility] 523-01 Encounter: [id number]    Primary Care Provider: Zakia Motley MD   Date and time admitted to hospital: 5/22/2018 11:29 AM        Type 2 diabetes mellitus ([facility] [address] )   Assessment & Plan    · Continue with the sliding scale of insulin        CAD (coronary artery disease)   Assessment & Plan    · Continue with the current care  · Cardiology on board  · Obtain any records regarding his previous cardiac catheterization from [facility]        Paroxysmal atrial fibrillation [facility])   Assessment & Plan    · Patient with known history of paroxysmal atrial fibrillation  ·  Was on anticoagulation and stopped due to hematuria/rectal bleeding  · Plan is to restart back on Pradaxa/Eliquis/Xarelto        CKD (chronic kidney disease) stage 3, GFR 30-59 ml/min   Assessment & Plan    · Monitor creatinine with diuresis        * CHF, acute on chronic (HCC)   Assessment & Plan    · Patient with history of ischemic cardiomyopathy  · Echocardiogram reviewed  · Previous cardiology records reviewed  · Continue with the diuresis            VTE Pharmacologic Prophylaxis:   Pharmacologic: Apixaban (Eliquis)  Mechanical VTE Prophylaxis in Place: Yes    Patient Centered Rounds: I have performed bedside rounds with nursing staff today  Discussions with Specialists or Other Care Team Provider: cardiololgy    Education and Discussions with Family / Patient: called daughter, updated over phoneTime Spent for Care: 30 minutes  More than 50% of total time spent on counseling and coordination of care as described above  Current Length of Stay: 2 day(s)    Current Patient Status: Inpatient   Certification Statement: The patient will continue to require additional inpatient hospital stay due to chf requiring diuresis    Discharge Plan:    Code Status: Level 1 - Full Code      Subjective:     Patient seen and examined    Patient denies any specific complaint  Still with shortness of breath with activity  Objective:     Vitals:   Temp (24hrs), Av 6 °F (36 4 °C), Min:97 3 °F (36 3 °C), Max:98 6 °F (37 °C)    HR:  [66-75] 67  Resp:  [16-20] 16  BP: (109-130)/(54-61) 109/54  SpO2:  [94 %-99 %] 99 %  Body mass index is 26 83 kg/m²  Input and Output Summary (last 24 hours): Intake/Output Summary (Last 24 hours) at 18 4828  Last data filed at 18 1700   Gross per 24 hour   Intake              600 ml   Output             2175 ml   Net            -1575 ml       Physical Exam:     Physical Exam   Constitutional: He appears well-developed and well-nourished  HENT:   Head: Normocephalic and atraumatic  Eyes: EOM are normal  Pupils are equal, round, and reactive to light  Neck: Normal range of motion  No JVD present  Cardiovascular:   Irregular   Pulmonary/Chest:   Decreased breath sounds bilateral   Abdominal: Soft  Bowel sounds are normal    Musculoskeletal: Normal range of motion  He exhibits edema (Bilateral trace lower extremity edema)  Neurological: He is alert  No cranial nerve deficit  Skin: Skin is warm and dry  Additional Data:     Labs:      Results from last 7 days  Lab Units 18  0459   WBC Thousand/uL 5 20   HEMOGLOBIN g/dL 10 2*   HEMATOCRIT % 33 3*   PLATELETS Thousands/uL 248   NEUTROS PCT % 74   LYMPHS PCT % 11*   MONOS PCT % 11   EOS PCT % 3       Results from last 7 days  Lab Units 18  0459  18  1147   SODIUM mmol/L 139  < > 137   POTASSIUM mmol/L 3 6  < > 4 1   CHLORIDE mmol/L 102  < > 102   CO2 mmol/L 30  < > 29   BUN mg/dL 32*  < > 22   CREATININE mg/dL 1 56*  < > 1 50*   CALCIUM mg/dL 8 6  < > 9 0   TOTAL PROTEIN g/dL  --   --  7 3   BILIRUBIN TOTAL mg/dL  --   --  0 69   ALK PHOS U/L  --   --  99   ALT U/L  --   --  13   AST U/L  --   --  15   GLUCOSE RANDOM mg/dL 132  < > 164*   < > = values in this interval not displayed      Results from last 7 days  Lab Units 05/22/18  1147   INR  1 20*       * I Have Reviewed All Lab Data Listed Above  * Additional Pertinent Lab Tests Reviewed: [address] 66 Admission Reviewed    Imaging:    Imaging Reports Reviewed Today Include:   Imaging Personally Reviewed by Myself Includes:      Recent Cultures (last 7 days):           Last 24 Hours Medication List:     Current Facility-Administered Medications:  acetaminophen 650 mg Oral Q6H PRN Harvie Goodell, MD   apixaban 2 5 mg Oral BID Fiorella Barron DO   aspirin 81 mg Oral Daily Harvie Goodell, MD   carvedilol 3 125 mg Oral BID With Meals Harvie Goodell, MD   enalapril 2 5 mg Oral Daily Harvie Goodell, MD   ergocalciferol 50,000 Units Oral Weekly Harvie Goodell, MD   finasteride 5 mg Oral HS Harvie Goodell, MD   fish oil 1,000 mg Oral Daily Harvie Goodell, MD   furosemide 40 mg Intravenous BID (diuretic) KEERTHI Matias   gabapentin 100 mg Oral HS Harvie Goodell, MD   insulin lispro 1-5 Units Subcutaneous TID Neelima Brooks MD   insulin lispro 1-5 Units Subcutaneous HS Harvie Goodell, MD   melatonin 3 mg Oral HS Harvie Goodell, MD   nitroglycerin 0 4 mg Sublingual Q5 Min PRN Harvie Goodell, MD   ondansetron 4 mg Intravenous Q6H PRN Harvie Goodell, MD   oxybutynin 10 mg Oral HS Harvie Goodell, MD   polyvinyl alcohol 1 drop Both Eyes 4x Daily Harvie Goodell, MD   potassium chloride 20 mEq Oral Daily Harvie Goodell, MD   tamsulosin 0 4 mg Oral HS Harvie Goodell, MD        Today, Patient Was Seen By: Antonio Manrique MD    ** Please Note: Dictation voice to text software may have been used in the creation of this document   **

## 2018-05-24 NOTE — SOCIAL WORK
Script for eliquis and xarelto sent to [facility] to check cost  eliquis and xarelto both $45/mth each  Daughter made aware and is agreeable to either one

## 2018-05-25 VITALS
WEIGHT: 181.88 LBS | RESPIRATION RATE: 16 BRPM | DIASTOLIC BLOOD PRESSURE: 65 MMHG | TEMPERATURE: 97.3 F | HEIGHT: 69 IN | OXYGEN SATURATION: 100 % | SYSTOLIC BLOOD PRESSURE: 137 MMHG | HEART RATE: 77 BPM | BODY MASS INDEX: 26.94 KG/M2

## 2018-05-25 LAB
ALBUMIN SERPL BCP-MCNC: 2.8 G/DL (ref 3.5–5)
ALP SERPL-CCNC: 88 U/L (ref 46–116)
ALT SERPL W P-5'-P-CCNC: 13 U/L (ref 12–78)
ANION GAP SERPL CALCULATED.3IONS-SCNC: 7 MMOL/L (ref 4–13)
AST SERPL W P-5'-P-CCNC: 10 U/L (ref 5–45)
BASOPHILS # BLD AUTO: 0.04 THOUSANDS/ΜL (ref 0–0.1)
BASOPHILS NFR BLD AUTO: 1 % (ref 0–1)
BILIRUB SERPL-MCNC: 0.71 MG/DL (ref 0.2–1)
BUN SERPL-MCNC: 38 MG/DL (ref 5–25)
CALCIUM SERPL-MCNC: 8.6 MG/DL (ref 8.3–10.1)
CHLORIDE SERPL-SCNC: 101 MMOL/L (ref 100–108)
CO2 SERPL-SCNC: 29 MMOL/L (ref 21–32)
CREAT SERPL-MCNC: 1.65 MG/DL (ref 0.6–1.3)
EOSINOPHIL # BLD AUTO: 0.22 THOUSAND/ΜL (ref 0–0.61)
EOSINOPHIL NFR BLD AUTO: 5 % (ref 0–6)
ERYTHROCYTE [DISTWIDTH] IN BLOOD BY AUTOMATED COUNT: 16.4 % (ref 11.6–15.1)
GFR SERPL CREATININE-BSD FRML MDRD: 37 ML/MIN/1.73SQ M
GLUCOSE SERPL-MCNC: 128 MG/DL (ref 65–140)
GLUCOSE SERPL-MCNC: 142 MG/DL (ref 65–140)
GLUCOSE SERPL-MCNC: 222 MG/DL (ref 65–140)
HCT VFR BLD AUTO: 32.8 % (ref 36.5–49.3)
HGB BLD-MCNC: 10.5 G/DL (ref 12–17)
LYMPHOCYTES # BLD AUTO: 0.72 THOUSANDS/ΜL (ref 0.6–4.47)
LYMPHOCYTES NFR BLD AUTO: 18 % (ref 14–44)
MCH RBC QN AUTO: 28.8 PG (ref 26.8–34.3)
MCHC RBC AUTO-ENTMCNC: 32 G/DL (ref 31.4–37.4)
MCV RBC AUTO: 90 FL (ref 82–98)
MONOCYTES # BLD AUTO: 0.52 THOUSAND/ΜL (ref 0.17–1.22)
MONOCYTES NFR BLD AUTO: 13 % (ref 4–12)
NEUTROPHILS # BLD AUTO: 2.53 THOUSANDS/ΜL (ref 1.85–7.62)
NEUTS SEG NFR BLD AUTO: 63 % (ref 43–75)
NRBC BLD AUTO-RTO: 0 /100 WBCS
PLATELET # BLD AUTO: 229 THOUSANDS/UL (ref 149–390)
PMV BLD AUTO: 10.6 FL (ref 8.9–12.7)
POTASSIUM SERPL-SCNC: 3.7 MMOL/L (ref 3.5–5.3)
PROT SERPL-MCNC: 6.8 G/DL (ref 6.4–8.2)
RBC # BLD AUTO: 3.64 MILLION/UL (ref 3.88–5.62)
SODIUM SERPL-SCNC: 137 MMOL/L (ref 136–145)
WBC # BLD AUTO: 4.04 THOUSAND/UL (ref 4.31–10.16)

## 2018-05-25 PROCEDURE — 80053 COMPREHEN METABOLIC PANEL: CPT | Performed by: FAMILY MEDICINE

## 2018-05-25 PROCEDURE — 99239 HOSP IP/OBS DSCHRG MGMT >30: CPT | Performed by: FAMILY MEDICINE

## 2018-05-25 PROCEDURE — 85025 COMPLETE CBC W/AUTO DIFF WBC: CPT | Performed by: INTERNAL MEDICINE

## 2018-05-25 PROCEDURE — 82948 REAGENT STRIP/BLOOD GLUCOSE: CPT

## 2018-05-25 PROCEDURE — 99232 SBSQ HOSP IP/OBS MODERATE 35: CPT | Performed by: INTERNAL MEDICINE

## 2018-05-25 RX ORDER — FUROSEMIDE 40 MG/1
40 TABLET ORAL
Qty: 60 TABLET | Refills: 0 | Status: SHIPPED | OUTPATIENT
Start: 2018-05-25 | End: 2018-07-10 | Stop reason: SDUPTHER

## 2018-05-25 RX ORDER — FUROSEMIDE 40 MG/1
40 TABLET ORAL
Status: DISCONTINUED | OUTPATIENT
Start: 2018-05-25 | End: 2018-05-25 | Stop reason: HOSPADM

## 2018-05-25 RX ORDER — IPRATROPIUM BROMIDE AND ALBUTEROL SULFATE 2.5; .5 MG/3ML; MG/3ML
3 SOLUTION RESPIRATORY (INHALATION) EVERY 6 HOURS PRN
Refills: 0 | Status: SHIPPED | OUTPATIENT
Start: 2018-05-25 | End: 2019-05-02 | Stop reason: CLARIF

## 2018-05-25 RX ADMIN — POLYVINYL ALCOHOL 1 DROP: 14 SOLUTION/ DROPS OPHTHALMIC at 10:23

## 2018-05-25 RX ADMIN — INSULIN LISPRO 1 UNITS: 100 INJECTION, SOLUTION INTRAVENOUS; SUBCUTANEOUS at 12:08

## 2018-05-25 RX ADMIN — Medication 1000 MG: at 09:00

## 2018-05-25 RX ADMIN — ENALAPRIL MALEATE 2.5 MG: 2.5 TABLET ORAL at 10:23

## 2018-05-25 RX ADMIN — ASPIRIN 81 MG: 81 TABLET, COATED ORAL at 09:01

## 2018-05-25 RX ADMIN — POTASSIUM CHLORIDE 20 MEQ: 1500 TABLET, EXTENDED RELEASE ORAL at 09:00

## 2018-05-25 RX ADMIN — FUROSEMIDE 40 MG: 10 INJECTION, SOLUTION INTRAMUSCULAR; INTRAVENOUS at 09:00

## 2018-05-25 RX ADMIN — CARVEDILOL 3.12 MG: 3.12 TABLET, FILM COATED ORAL at 09:00

## 2018-05-25 RX ADMIN — APIXABAN 2.5 MG: 2.5 TABLET, FILM COATED ORAL at 09:00

## 2018-05-25 NOTE — DISCHARGE INSTRUCTIONS
Take your medications as directed, and keep your follow up appointments  Adhere to a heart healthy lifestyle, maintaining a low sodium diet  Daily weight and record  If your weight increases 2-3 lbs in one day, or 5 lbs in 2 days, you are short of breath or have lower extremity swelling, please call the heart failure team at  [facility] at [phone]     BMP in a week

## 2018-05-25 NOTE — DISCHARGE SUMMARY
Discharge- Paula Age 12/21/1930, 80 y o  male MRN: [id number]    Unit/Bed#: [facility] 523-01 Encounter: [id number]    Primary Care Provider: Jose Bradshaw MD   Date and time admitted to hospital: 5/22/2018 11:29 AM        Type 2 diabetes mellitus ([facility] )   Assessment & Plan    · Restart back on the outpatient medication at the time of discharge        CAD (coronary artery disease)   Assessment & Plan    · Continue with the current care  · Cardiology on board    · Follow up with cardiology as outpatient        Paroxysmal atrial fibrillation [facility])   Assessment & Plan    · Patient with known history of paroxysmal atrial fibrillation  · Was on anticoagulation and stopped due to hematuria/rectal bleeding  · Currently is on Eliquis and tolerated the Eliquis as today  · Follow-up with Cardiology as an outpatient        CKD (chronic kidney disease) stage 3, GFR 30-59 ml/min   Assessment & Plan    · Monitor creatinine with diuresis  · Recheck BMP in a week        * CHF, acute on chronic ([place] [address] )   Assessment & Plan    · Patient with history of ischemic cardiomyopathy  · Echocardiogram reviewed  · Previous cardiology records reviewed  · Continue with the diuresis-plan is to change to p o  diuresis 40 mg of Lasix b i d  at the time of discharge  · Benazepril is for the cardiomyopathy            Discharging Physician / Practitioner: Itz Navarro MD  PCP: Jose Bradshaw MD  Admission Date:   Admission Orders     Ordered        05/22/18 1316  Inpatient Admission (expected length of stay for this patient is greater than two midnights)  Once             Discharge Date: 05/25/18    Resolved Problems  Date Reviewed: 5/25/2018    None          Consultations During Hospital Stay:  · Cardiology    Procedures Performed:     ·     Significant Findings / Test Results:     · Chest x-ray-small right effusion which appears to be partially loculated along the right lower lateral chest wall     Incidental Findings:   ·     Test Results Pending at Discharge (will require follow up):   ·      Outpatient Tests Requested:  · BMP in a week    Complications:  none    Reason for Admission:     Hospital Course:     Radha Moore is a 80 y o  male patient who originally presented to the hospital on 5/22/2018 due to shortness of breath  Patient with known history of ischemic cardiomyopathy  Patient was admitted to the hospital   Patient was diuresed with intravenous Lasix with significant improvement in his symptoms  Cardiology managed the diuresis while in the hospital   Patient with known history of atrial fibrillation and was on anticoagulation in the past and the anticoagulation has to be stopped due to hematuria  Given his high Cordell score it was decided that the patient is high risk for a on thromboembolic event and he will be restarted on the Eliquis at the time of discharge  Patient remained hemodynamically stable and afebrile and symptomatically he improved and he was discharged in a stable condition on 5/25/2018 with outpatient follow-up with the Cardiology  For details refer to the chart    Please see above list of diagnoses and related plan for additional information  Condition at Discharge: good     Discharge Day Visit / Exam:     Subjective:  Patient seen and examined  Patient reported that he is not going to get any better at this point and he wants to go home  Vitals: Blood Pressure: 137/65 (05/25/18 1051)  Pulse: 77 (05/25/18 1051)  Temperature: (!) 97 3 °F (36 3 °C) (05/25/18 1051)  Temp Source: Oral (05/25/18 1051)  Respirations: 16 (05/25/18 1051)  Height: 5' 9" (175 3 cm) (05/22/18 1500)  Weight - Scale: 82 5 kg (181 lb 14 1 oz) (05/25/18 0454)  SpO2: 100 % (05/25/18 1051)  Exam:   Physical Exam   Constitutional: He appears well-developed and well-nourished  HENT:   Head: Normocephalic and atraumatic  Eyes: EOM are normal  Pupils are equal, round, and reactive to light  Neck: Normal range of motion  Neck supple     Cardiovascular: Normal rate and regular rhythm  No murmur heard  Pulmonary/Chest: Effort normal and breath sounds normal  No respiratory distress  He has no wheezes  Abdominal: Soft  Bowel sounds are normal  He exhibits no distension  There is no tenderness  Musculoskeletal: Normal range of motion  He exhibits no edema  Neurological: He is alert  No cranial nerve deficit  Coordination normal    Skin: Skin is warm and dry  Discussion with Family: daughter updated over phone    Discharge instructions/Information to patient and family:   See after visit summary for information provided to patient and family  Provisions for Follow-Up Care:  See after visit summary for information related to follow-up care and any pertinent home health orders  Disposition:     Other: Assisted-living facility at [address] [place] [address] Cir to [facility]:   · Not Applicable to this Patient - Not Applicable to this Patient    Planned Readmission: none     Discharge Statement:  I spent 45  minutes discharging the patient  This time was spent on the day of discharge  I had direct contact with the patient on the day of discharge  Greater than 50% of the total time was spent examining patient, answering all patient questions, arranging and discussing plan of care with patient as well as directly providing post-discharge instructions  Additional time then spent on discharge activities  Discharge Medications:  See after visit summary for reconciled discharge medications provided to patient and family        ** Please Note: This note has been constructed using a voice recognition system **

## 2018-05-25 NOTE — ASSESSMENT & PLAN NOTE
· Continue with the current care  · Cardiology on board    · Follow up with cardiology as outpatient

## 2018-05-25 NOTE — ASSESSMENT & PLAN NOTE
· Patient with history of ischemic cardiomyopathy  · Echocardiogram reviewed  · Previous cardiology records reviewed  · Continue with the diuresis-plan is to change to p o  diuresis 40 mg of Lasix b i d  at the time of discharge  · Benazepril is for the cardiomyopathy

## 2018-05-25 NOTE — ASSESSMENT & PLAN NOTE
· Patient with known history of paroxysmal atrial fibrillation  · Was on anticoagulation and stopped due to hematuria/rectal bleeding  · Currently is on Eliquis and tolerated the Eliquis as today  · Follow-up with Cardiology as an outpatient

## 2018-05-25 NOTE — PROGRESS NOTES
Cardiology Progress Note - Sharlene Key 80 y o  male MRN: [id number]    Unit/Bed#: [facility] 523-01 Encounter: [id number]      Assessment:  1  Acute on chronic diastolic CHF  2  Moderate mitral regurgitation   3  CAD s/p LULU to proximal LAD, D1  4  Paroxysmal atrial fibrillation   5  Tachy-eduardo syndrome s/p PPM  6  Dyslipidemia    Plan:  1  Negative fluid balance, weight decreasing   2  Volume status improved - change to oral diuretics - Lasix 40 mg BID  3  Blood pressure controlled   4  Continue Eliquis at current dose  5  OK for discharge today - follow-up with CHF advanced practitioner 06/04  6  BMP in one week         Subjective:   Patient seen and examined  No significant events overnight  Objective:     Vitals: Blood pressure 119/53, pulse 66, temperature 97 6 °F (36 4 °C), temperature source Oral, resp  rate 16, height 5' 9" (1 753 m), weight 82 5 kg (181 lb 14 1 oz), SpO2 99 %  , Body mass index is 26 86 kg/m² ,   Orthostatic Blood Pressures      Most Recent Value   Blood Pressure  119/53 filed at 05/25/2018 8414   Patient Position - Orthostatic VS  Lying filed at 05/25/2018 3881            Intake/Output Summary (Last 24 hours) at 05/25/18 1019  Last data filed at 05/25/18 0900   Gross per 24 hour   Intake              900 ml   Output             1775 ml   Net             -875 ml         Physical Exam:    GEN: Sharlene Key appears well, alert and oriented x 3, pleasant and cooperative   HEENT: pupils equal, round, and reactive to light; extraocular muscles intact  NECK: supple, no carotid bruits, no elevated JVP  HEART: regular rate and rhythm, normal S1/S2, no murmur   LUNGS:  Decreased breath sounds bilaterally   ABDOMEN: normal bowel sounds, soft, no tenderness, no distention  EXTREMITIES: peripheral pulses normal; no clubbing, cyanosis, no edema  NEURO: no focal findings   SKIN: normal without suspicious lesions on exposed skin    Medications:      Current Facility-Administered Medications:   acetaminophen (TYLENOL) tablet 650 mg, 650 mg, Oral, Q6H PRN, Reza Hooks MD    [facility] tablet 2 5 mg, 2 5 mg, Oral, BID, María Duvall DO, 2 5 mg at 05/25/18 0900    aspirin (ECOTRIN LOW STRENGTH) EC tablet 81 mg, 81 mg, Oral, Daily, Reza Hooks MD, 81 mg at 05/25/18 0901    carvedilol (COREG) tablet 3 125 mg, 3 125 mg, Oral, BID With Meals, Reza Hooks MD, 3 125 mg at 05/25/18 0900    enalapril (VASOTEC) tablet 2 5 mg, 2 5 mg, Oral, Daily, Reza Hooks MD, 2 5 mg at 05/24/18 9439    ergocalciferol (VITAMIN D2) capsule 50,000 Units, 50,000 Units, Oral, Weekly, Reza Hooks MD, 50,000 Units at 05/24/18 7302    finasteride (PROSCAR) tablet 5 mg, 5 mg, Oral, HS, Reza Hooks MD, 5 mg at 05/24/18 2103    fish oil capsule 1,000 mg, 1,000 mg, Oral, Daily, Reza Hooks MD, 1,000 mg at 05/25/18 0900    furosemide (LASIX) injection 40 mg, 40 mg, Intravenous, BID (diuretic), KEERTHI Cabrera, 40 mg at 05/25/18 0900    gabapentin (NEURONTIN) capsule 100 mg, 100 mg, Oral, HS, Reza Hooks MD, 100 mg at 05/24/18 2103    insulin lispro (HumaLOG) 100 units/mL subcutaneous injection 1-5 Units, 1-5 Units, Subcutaneous, TID AC, 1 Units at 05/24/18 1135 **AND** Fingerstick Glucose (POCT), , , TID ACReza MD    insulin lispro (HumaLOG) 100 units/mL subcutaneous injection 1-5 Units, 1-5 Units, Subcutaneous, HS, Reza Hooks MD, 2 Units at 05/24/18 2123    melatonin tablet 3 mg, 3 mg, Oral, HS, Reza Hooks MD, 3 mg at 05/24/18 2103    nitroglycerin (NITROSTAT) SL tablet 0 4 mg, 0 4 mg, Sublingual, Q5 Min PRN, Reza Hooks MD    ondansetron [facility]) injection 4 mg, 4 mg, Intravenous, Q6H PRN, Reza Hooks MD    oxybutynin (DITROPAN-XL) 24 hr tablet 10 mg, 10 mg, Oral, HS, Reza Hooks MD, 10 mg at 05/24/18 2103    polyvinyl alcohol (LIQUIFILM TEARS) 1 4 % ophthalmic solution 1 drop, 1 drop, Both Eyes, 4x Daily, Mera Turk Moriah Vides MD, 1 drop at 05/24/18 2106    potassium chloride (K-DUR,KLOR-CON) CR tablet 20 mEq, 20 mEq, Oral, Daily, Sherry Hanson MD, 20 mEq at 05/25/18 0900    tamsulosin (FLOMAX) capsule 0 4 mg, 0 4 mg, Oral, HS, Sherry Hanson MD, 0 4 mg at 05/24/18 2103     Labs & Results:      Results from last 7 days  Lab Units 05/22/18  2212 05/22/18  1647 05/22/18  1147   TROPONIN I ng/mL 0 05* 0 05* 0 05*       Results from last 7 days  Lab Units 05/25/18  0453 05/24/18  0459 05/23/18  0442   WBC Thousand/uL 4 04* 5 20 5 03   HEMOGLOBIN g/dL 10 5* 10 2* 9 8*   HEMATOCRIT % 32 8* 33 3* 32 0*   PLATELETS Thousands/uL 229 248 245           Results from last 7 days  Lab Units 05/25/18  0453 05/24/18  0459 05/23/18  0442 05/22/18  1147   SODIUM mmol/L 137 139 137 137   POTASSIUM mmol/L 3 7 3 6 4 1 4 1   CHLORIDE mmol/L 101 102 103 102   CO2 mmol/L 29 30 26 29   BUN mg/dL 38* 32* 28* 22   CREATININE mg/dL 1 65* 1 56* 1 67* 1 50*   CALCIUM mg/dL 8 6 8 6 8 9 9 0   TOTAL PROTEIN g/dL 6 8  --   --  7 3   BILIRUBIN TOTAL mg/dL 0 71  --   --  0 69   ALK PHOS U/L 88  --   --  99   ALT U/L 13  --   --  13   AST U/L 10  --   --  15   GLUCOSE RANDOM mg/dL 128 132 198* 164*       Results from last 7 days  Lab Units 05/22/18  1147   INR  1 20*   PTT seconds 32       Results from last 7 days  Lab Units 05/23/18  0442   MAGNESIUM mg/dL 2 6       Counseling / Coordination of Care  Total floor / unit time spent today 20 minutes  Greater than 50% of total time was spent with the patient and / or family counseling and / or coordination of care

## 2018-05-25 NOTE — SOCIAL WORK
CM spoke with Gianfranco Waters at [facility], [address] [place] [address] faxed [phone] and reviewed  Facility willing to accept back patient  Facility arrange transportation

## 2018-05-25 NOTE — PLAN OF CARE
Problem: Potential for Falls  Goal: Patient will remain free of falls  INTERVENTIONS:  - Assess patient frequently for physical needs  -  Identify cognitive and physical deficits and behaviors that affect risk of falls    -  [place] fall precautions as indicated by assessment   - Educate patient/family on patient safety including physical limitations  - Instruct patient to call for assistance with activity based on assessment  - Modify environment to reduce risk of injury  - Consider OT/PT consult to assist with strengthening/mobility   Outcome: Progressing      Problem: MUSCULOSKELETAL - ADULT  Goal: Maintain or return mobility to safest level of function  INTERVENTIONS:  - Assess patient's ability to carry out ADLs; assess patient's baseline for ADL function and identify physical deficits which impact ability to perform ADLs (bathing, care of mouth/teeth, toileting, grooming, dressing, etc )  - Assess/evaluate cause of self-care deficits   - Assess range of motion  - Assess patient's mobility; develop plan if impaired  - Assess patient's need for assistive devices and provide as appropriate  - Encourage maximum independence but intervene and supervise when necessary  - Involve family in performance of ADLs  - Assess for home care needs following discharge   - Request OT consult to assist with ADL evaluation and planning for discharge  - Provide patient education as appropriate   Outcome: Progressing    Goal: Maintain proper alignment of affected body part  INTERVENTIONS:  - Support, maintain and protect limb and body alignment  - Provide pt/fam with appropriate education   Outcome: Progressing      Problem: RESPIRATORY - ADULT  Goal: Achieves optimal ventilation and oxygenation  INTERVENTIONS:  - Assess for changes in respiratory status  - Assess for changes in mentation and behavior  - Position to facilitate oxygenation and minimize respiratory effort  - Oxygen administration by appropriate delivery method based on oxygen saturation (per order) or ABGs  - Initiate smoking cessation education as indicated  - Encourage broncho-pulmonary hygiene including cough, deep breathe, Incentive Spirometry  - Assess the need for suctioning and aspirate as needed  - Assess and instruct to report SOB or any respiratory difficulty  - Respiratory Therapy support as indicated   Outcome: Progressing      Problem: DISCHARGE PLANNING - CARE MANAGEMENT  Goal: Discharge to post-acute care or home with appropriate resources  INTERVENTIONS:  - Conduct assessment to determine patient/family and health care team treatment goals, and need for post-acute services based on payer coverage, community resources, and patient preferences, and barriers to discharge  - Address psychosocial, clinical, and financial barriers to discharge as identified in assessment in conjunction with the patient/family and health care team  - Arrange appropriate level of post-acute services according to patient's   needs and preference and payer coverage in collaboration with the physician and health care team  - Communicate with and update the patient/family, physician, and health care team regarding progress on the discharge plan  - Arrange appropriate transportation to post-acute venues   Outcome: Progressing

## 2018-05-25 NOTE — PLAN OF CARE
Problem: DISCHARGE PLANNING - CARE MANAGEMENT  Goal: Discharge to post-acute care or home with appropriate resources  INTERVENTIONS:  - Conduct assessment to determine patient/family and health care team treatment goals, and need for post-acute services based on payer coverage, community resources, and patient preferences, and barriers to discharge  - Address psychosocial, clinical, and financial barriers to discharge as identified in assessment in conjunction with the patient/family and health care team  - Arrange appropriate level of post-acute services according to patient's   needs and preference and payer coverage in collaboration with the physician and health care team  - Communicate with and update the patient/family, physician, and health care team regarding progress on the discharge plan  - Arrange appropriate transportation to post-acute venues   Outcome: Adequate for Discharge  Pt discharge back to [facility]

## 2018-06-04 ENCOUNTER — OFFICE VISIT (OUTPATIENT)
Dept: CARDIOLOGY CLINIC | Facility: CLINIC | Age: 83
End: 2018-06-04
Payer: MEDICARE

## 2018-06-04 VITALS
WEIGHT: 179 LBS | OXYGEN SATURATION: 98 % | SYSTOLIC BLOOD PRESSURE: 110 MMHG | DIASTOLIC BLOOD PRESSURE: 56 MMHG | BODY MASS INDEX: 26.51 KG/M2 | HEIGHT: 69 IN

## 2018-06-04 DIAGNOSIS — I48.0 PAROXYSMAL ATRIAL FIBRILLATION (HCC): Chronic | ICD-10-CM

## 2018-06-04 DIAGNOSIS — I50.33 ACUTE ON CHRONIC DIASTOLIC CONGESTIVE HEART FAILURE (HCC): Primary | ICD-10-CM

## 2018-06-04 DIAGNOSIS — N18.30 CKD (CHRONIC KIDNEY DISEASE) STAGE 3, GFR 30-59 ML/MIN (HCC): Chronic | ICD-10-CM

## 2018-06-04 PROCEDURE — 99214 OFFICE O/P EST MOD 30 MIN: CPT | Performed by: NURSE PRACTITIONER

## 2018-06-04 NOTE — PROGRESS NOTES
Heart Failure Office Visit   Irina Danielson 80 y o  male MRN: [id number]    [facility]  Mr Irina Danielson is a 80y o  year old male who has a history of atrial fibrillation not on [facility], CAD s p stenting 2016, s/p PPM, DM type II, CKD, hx of prostate and colon CA  In 2018 Ralph Milian moved to [facility] from [place]  He was followed by cardiology at [address] [place] [address]   Ralph Milian was recently admitted to [facility] on 5/22-5/25/18 with Acute on chronic heart failure  He presented to [facility] ER with complaints of SOB  Pt states he has chronic exertional dyspnea but in the past few weeks it has become worse  He experienced orthopnea and LE edema  [facility]:    Left main: large caliber vessel without evidence of disease  LAD: Long 80% complex bifurcating lesion in prox LAD; treated with atherectomy and Xience alpine 2 5 x 26mm LULU; post LULU 0% stenosis  D1: 80% lesion; treated with a TREX RX 2 0 x 12mm, Xience alpine 2 25 x 28mm and TREK RC 2 25 x 12mm; post intervention 0% stenosis  Final kissing balloon inflation was performed on the prox LAD/first D1 bifurcation  Circumflex: previously placed stent patent  Ralph Milian was diuresed with IV Lasix with improvement of symptoms  TTE showed LVEF 08%, RV systolic function low normal, mod MR, mild TR, PAP 40mmHg  Ralph Milian has a history of Atrial fibrillation and was not on AC due to a hx of hematuria  He was started on Eliquis for stroke prevention  His weight at discharge 181 pounds  Lab studies showed BUN 38, creat 1 65    Today Ralph Milian presents to our office for a recent hospitalization follow up visit  He continues to reside at [facility]  He admits to dyspnea with exertion, denies CP, palpitations, orthopnea, lightheadedness or dizziness  His weight in the office is 179 pounds  I have re enforced a 2 gm sodium diet         Patient Active Problem List   Diagnosis  Lower urinary tract symptoms    Malignant neoplasm of trigone of urinary bladder (HCC)    Prostate cancer (HCC)    CHF, acute on chronic (HCC)    CKD (chronic kidney disease) stage 3, GFR 30-59 ml/min    Paroxysmal atrial fibrillation (HCC)    CAD (coronary artery disease)    Type 2 diabetes mellitus ([facility] )    Pacemaker     Review of Systems   Constitution: Negative  HENT: Negative  Eyes: Negative  Cardiovascular: Negative  Respiratory: Positive for shortness of breath  Skin: Negative  Musculoskeletal: Positive for arthritis  Gastrointestinal: Negative  Genitourinary: Negative  Psychiatric/Behavioral: Negative  Objective:   Vitals: HR 74 BPM,   Vitals:    06/04/18 1428   BP: 110/56   BP Location: Right arm   Patient Position: Sitting   Cuff Size: Standard   SpO2: 98%   Weight: 81 2 kg (179 lb)   Height: 5' 9" (1 753 m)     Body mass index is 26 43 kg/m²      Wt Readings from Last 3 Encounters:   06/04/18 81 2 kg (179 lb)   05/25/18 82 5 kg (181 lb 14 1 oz)   05/16/18 84 6 kg (186 lb 6 4 oz)         Physical Exam:  GEN: Paula Age appears well, alert and oriented x 3, pleasant and cooperative   HEENT: pupils equal, round, and reactive to light; extraocular muscles intact  NECK: supple, no carotid bruits   HEART: regular rhythm, normal S1 and S2, no murmurs, clicks, gallops or rubs, JVP is flat  LUNGS: clear to auscultation bilaterally; no wheezes, rales, or rhonchi   ABDOMEN: normal bowel sounds, soft, no tenderness, no distention  EXTREMITIES: peripheral pulses normal; no clubbing, cyanosis, trace christa LE edema  NEURO: no focal findings   SKIN: normal without suspicious lesions on exposed skin      Current Outpatient Prescriptions:     apixaban (ELIQUIS) 2 5 mg, Take 1 tablet (2 5 mg total) by mouth 2 (two) times a day, Disp: 30 tablet, Rfl: 0    artificial tear (LUBRIFRESH P M ) 83-15 % ophthalmic ointment, daily at bedtime, Disp: , Rfl:     aspirin (ECOTRIN LOW STRENGTH) 81 mg EC tablet, Take 81 mg by mouth daily, Disp: , Rfl:     carboxymethylcellulose 0 5 % SOLN, 1 drop 3 (three) times a day as needed for dry eyes, Disp: , Rfl:     carvedilol (COREG) 3 125 mg tablet, Take 3 125 mg by mouth 2 (two) times a day with meals, Disp: , Rfl:     Empagliflozin (JARDIANCE) 10 MG TABS, Take 10 mg by mouth every morning, Disp: , Rfl:     enalapril (VASOTEC) 2 5 mg tablet, Take 2 5 mg by mouth daily, Disp: , Rfl:     Ergocalciferol (VITAMIN D2 PO), Take 50,000 Units by mouth, Disp: , Rfl:     finasteride (PROSCAR) 5 mg tablet, Take 5 mg by mouth daily, Disp: , Rfl:     furosemide (LASIX) 40 mg tablet, Take 1 tablet (40 mg total) by mouth 2 (two) times a day, Disp: 60 tablet, Rfl: 0    gabapentin (NEURONTIN) 100 mg capsule, Take 100 mg by mouth daily at bedtime  , Disp: , Rfl:     Hypromellose (SYSTANE OVERNIGHT THERAPY) 0 3 % GEL, Apply to eye, Disp: , Rfl:     ipratropium-albuterol (DUO-NEB) 0 5-2 5 mg/3 mL, Take 3 mL by nebulization every 6 (six) hours as needed for wheezing, Disp: , Rfl: 0    melatonin 3 mg, Take 3 mg by mouth daily at bedtime, Disp: , Rfl:     Multiple Vitamin (TAB-A-ANETA PO), Take 1 tablet by mouth daily, Disp: , Rfl:     nitroglycerin (NITROSTAT) 0 4 mg SL tablet, Place 0 4 mg under the tongue every 5 (five) minutes as needed for chest pain, Disp: , Rfl:     Omega-3 Fatty Acids (FISH OIL) 1,000 mg, Take 1,000 mg by mouth daily, Disp: , Rfl:     oxybutynin (DITROPAN-XL) 10 MG 24 hr tablet, Take 1 tablet (10 mg total) by mouth daily at bedtime, Disp: 30 tablet, Rfl: 1    potassium chloride (K-DUR,KLOR-CON) 20 mEq tablet, Take 20 mEq by mouth 2 (two) times a day, Disp: , Rfl:     tamsulosin (FLOMAX) 0 4 mg, Take 0 4 mg by mouth daily with dinner, Disp: , Rfl:       Labs & Results:                Assessment/Plan:   1  Chronic diastolic heart failure NYHA class III stage C- Eu volemic and compensated HR and BP controlled on current medical regimen  Weight in the office is 179 pounds  Continue on Coreg 3 125mg BID Lasix 40mg BID, Enalapril 2 5mg daily, goal up titrate medications as tolerated  Daily weights  In view gain 3 pounds in one day, 5 pounds in one week, experience worsening shortness of breath or lower extremity swelling  Please call the heart failure office at [phone]  2  Paroxysmal atrial fibrillation -continues in atrial fibrillation controlled ventricular response 74 BPM, continue on BB and Eliquis 2 5mg BID  3  CKD III baseline creat 1 5-1 65    4  HTN- controlled goal  BP today is 110/56  Follow up with me in 2 week        KEERTHI Maya  6/4/2018  2:34 PM

## 2018-06-04 NOTE — PATIENT INSTRUCTIONS
Daily weights  If you gain 3 pounds in one day, 5 pounds in one week, experience worsening shortness of breath or lower extremity swelling    Please call the heart failure office at [phone]

## 2018-06-19 ENCOUNTER — OFFICE VISIT (OUTPATIENT)
Dept: CARDIOLOGY CLINIC | Facility: CLINIC | Age: 83
End: 2018-06-19
Payer: MEDICARE

## 2018-06-19 VITALS
HEART RATE: 52 BPM | SYSTOLIC BLOOD PRESSURE: 106 MMHG | OXYGEN SATURATION: 98 % | DIASTOLIC BLOOD PRESSURE: 50 MMHG | WEIGHT: 185.4 LBS | HEIGHT: 69 IN | BODY MASS INDEX: 27.46 KG/M2

## 2018-06-19 DIAGNOSIS — N18.30 CKD (CHRONIC KIDNEY DISEASE) STAGE 3, GFR 30-59 ML/MIN (HCC): Primary | Chronic | ICD-10-CM

## 2018-06-19 DIAGNOSIS — I50.32 CHRONIC DIASTOLIC CONGESTIVE HEART FAILURE (HCC): ICD-10-CM

## 2018-06-19 DIAGNOSIS — I48.0 PAROXYSMAL ATRIAL FIBRILLATION (HCC): Chronic | ICD-10-CM

## 2018-06-19 PROCEDURE — 99214 OFFICE O/P EST MOD 30 MIN: CPT | Performed by: NURSE PRACTITIONER

## 2018-06-19 RX ORDER — PREGABALIN 75 MG/1
CAPSULE ORAL
COMMUNITY
End: 2018-06-20 | Stop reason: CLARIF

## 2018-06-19 RX ORDER — NAPROXEN 375 MG/1
TABLET ORAL
COMMUNITY
End: 2018-06-20 | Stop reason: CLARIF

## 2018-06-19 RX ORDER — LEVOTHYROXINE SODIUM 0.03 MG/1
TABLET ORAL DAILY
COMMUNITY
Start: 2014-06-20 | End: 2018-06-20 | Stop reason: CLARIF

## 2018-06-19 RX ORDER — ATORVASTATIN CALCIUM 40 MG/1
TABLET, FILM COATED ORAL
COMMUNITY
End: 2018-06-20 | Stop reason: CLARIF

## 2018-06-19 RX ORDER — PREDNISOLONE ACETATE 10 MG/ML
SUSPENSION/ DROPS OPHTHALMIC
COMMUNITY
End: 2018-06-20 | Stop reason: CLARIF

## 2018-06-19 RX ORDER — GLIPIZIDE 10 MG/1
TABLET, FILM COATED, EXTENDED RELEASE ORAL
COMMUNITY
End: 2018-06-20 | Stop reason: CLARIF

## 2018-06-19 RX ORDER — DORZOLAMIDE HYDROCHLORIDE AND TIMOLOL MALEATE 20; 5 MG/ML; MG/ML
SOLUTION/ DROPS OPHTHALMIC
COMMUNITY
End: 2018-06-20 | Stop reason: CLARIF

## 2018-06-19 RX ORDER — MELOXICAM 7.5 MG/1
TABLET ORAL
COMMUNITY
End: 2018-06-20 | Stop reason: CLARIF

## 2018-06-19 RX ORDER — DIGOXIN 250 MCG
TABLET ORAL
COMMUNITY
End: 2018-06-20 | Stop reason: CLARIF

## 2018-06-19 RX ORDER — ASCORBIC ACID 100 MG
TABLET,CHEWABLE ORAL
COMMUNITY
Start: 1995-07-10 | End: 2018-06-20 | Stop reason: CLARIF

## 2018-06-19 NOTE — PROGRESS NOTES
Heart Failure Office Visit   Sam Herrera 80 y o  male MRN: [id number]    [facility]    Mr Sam Herrera is a 80y o  year old male who has a [facility], CAD s p stenting 2016, s/p PPM, DM type II, CKD, hx of prostate and colon CA  In 2018 Saskia Foley moved to [facility] from [place]  He was followed by cardiology at [address] [place] [address]   Saskia Foley was recently admitted to [facility] on 5/22-5/25/18 with Acute on chronic heart failure  He presented to [facility] ER with complaints of SOB  Pt states he has chronic exertional dyspnea but in the past few weeks it has become worse  He experienced orthopnea and LE edema  [facility]    Left main: large caliber vessel without evidence of disease  LAD: Long 80% complex bifurcating lesion in prox LAD; treated with atherectomy and Xience alpine 2 5 x 26mm LULU; post LULU 0% stenosis  D1: 80% lesion; treated with a TREX RX 2 0 x 12mm, Xience alpine 2 25 x 28mm and TREK RC 2 25 x 12mm; post intervention 0% stenosis  Final kissing balloon inflation was performed on the prox LAD/first D1 bifurcation  Circumflex: previously placed stent patent  Saskia Foley was diuresed with IV Lasix with improvement of symptoms  TTE showed LVEF 60%, RV systolic function low normal, mod MR, mild TR, PAP 40mmHg  Saskia Foley has a history of Atrial fibrillation and was not on AC due to a hx of hematuria  He was started on Eliquis for stroke prevention  His weight at discharge 181 pounds  Lab studies showed BUN 38, creat 1 65  He was last seen in our office for a hospitalization follow up visit  He was found to be eu volemic and compensated  Weight 179 pounds  Today Saskia Foley presents to our office for a follow up visit  He continues to reside at [facility]  He admits to dyspnea with exertion, denies CP, palpitations, orthopnea, lightheadedness or dizziness    His weight in the office is 185 pounds and at [facility] 182 pounds  He is eating what the facility is providing  I have re enforced a 2 gm sodium diet  Patient Active Problem List   Diagnosis    Lower urinary tract symptoms    Malignant neoplasm of trigone of urinary bladder (HCC)    Prostate cancer (HCC)    CHF, acute on chronic (HCC)    CKD (chronic kidney disease) stage 3, GFR 30-59 ml/min    Paroxysmal atrial fibrillation ([facility] [address] )    CAD (coronary artery disease)    Type 2 diabetes mellitus ([facility] )    Pacemaker     Review of Systems   Constitution: Negative  HENT: Negative  Eyes: Negative  Cardiovascular: Negative  Respiratory: Positive for shortness of breath  Skin: Negative  Musculoskeletal: Positive for arthritis  Gastrointestinal: Negative  Genitourinary: Negative  Psychiatric/Behavioral: Negative  Objective:   Vitals: HR 68 BPM, BP LUE sitting 120/56  Vitals:    06/19/18 1529   BP: 106/50   BP Location: Left arm   Patient Position: Sitting   Cuff Size: Standard   Pulse: (!) 52   SpO2: 98%   Weight: 84 1 kg (185 lb 6 4 oz)   Height: 5' 9" (1 753 m)     Body mass index is 27 38 kg/m²      Wt Readings from Last 3 Encounters:   06/19/18 84 1 kg (185 lb 6 4 oz)   06/04/18 81 2 kg (179 lb)   05/25/18 82 5 kg (181 lb 14 1 oz)         Physical Exam:  GEN: Maddie Zhu appears well, alert and oriented x 3, pleasant and cooperative   HEENT: pupils equal, round, and reactive to light; extraocular muscles intact  NECK: supple, no carotid bruits   HEART: regular rhythm, normal S1 and S2, no murmurs, clicks, gallops or rubs, JVP is flat  LUNGS: clear to auscultation bilaterally; no wheezes, rales, or rhonchi   ABDOMEN: normal bowel sounds, soft, no tenderness, no distention  EXTREMITIES: peripheral pulses normal; no clubbing, cyanosis, trace christa LE edema  NEURO: no focal findings   SKIN: normal without suspicious lesions on exposed skin      Current Outpatient Prescriptions:     apixaban (ELIQUIS) 2 5 mg, Take 1 tablet (2 5 mg total) by mouth 2 (two) times a day, Disp: 30 tablet, Rfl: 0    artificial tear (LUBRIFRESH P M ) 83-15 % ophthalmic ointment, daily at bedtime, Disp: , Rfl:     Ascorbic Acid (VITAMIN C) 100 MG CHEW, Vitamin C, Disp: , Rfl:     aspirin (ECOTRIN LOW STRENGTH) 81 mg EC tablet, Take 81 mg by mouth daily, Disp: , Rfl:     atorvastatin (LIPITOR) 40 mg tablet, atorvastatin 40 mg tablet, Disp: , Rfl:     carboxymethylcellulose 0 5 % SOLN, 1 drop 3 (three) times a day as needed for dry eyes, Disp: , Rfl:     carvedilol (COREG) 3 125 mg tablet, Take 3 125 mg by mouth 2 (two) times a day with meals, Disp: , Rfl:     digoxin (DIGOX) 0 25 mg tablet, Digox 250 mcg tablet, Disp: , Rfl:     dorzolamide-timolol (COSOPT) 22 3-6 8 MG/ML ophthalmic solution, dorzolamide 22 3 mg-timolol 6 8 mg/mL eye drops, Disp: , Rfl:     Empagliflozin (JARDIANCE) 10 MG TABS, Take 10 mg by mouth every morning, Disp: , Rfl:     enalapril (VASOTEC) 2 5 mg tablet, Take 2 5 mg by mouth daily, Disp: , Rfl:     Ergocalciferol (VITAMIN D2 PO), Take 50,000 Units by mouth, Disp: , Rfl:     finasteride (PROSCAR) 5 mg tablet, Take 5 mg by mouth daily, Disp: , Rfl:     fluticasone-salmeterol (ADVAIR DISKUS) 250-50 mcg/dose inhaler, Advair Diskus 250 mcg-50 mcg/dose powder for inhalation, Disp: , Rfl:     furosemide (LASIX) 40 mg tablet, Take 1 tablet (40 mg total) by mouth 2 (two) times a day, Disp: 60 tablet, Rfl: 0    gabapentin (NEURONTIN) 100 mg capsule, Take 100 mg by mouth daily at bedtime  , Disp: , Rfl:     gentamicin (GENTAK) 0 3 % ophthalmic ointment, Gentak 0 3 % (3 mg/gram) eye ointment, Disp: , Rfl:     glipiZIDE (GLUCOTROL XL) 10 mg 24 hr tablet, glipizide ER 10 mg tablet, extended release 24 hr, Disp: , Rfl:     Hypromellose (SYSTANE OVERNIGHT THERAPY) 0 3 % GEL, Apply to eye, Disp: , Rfl:     ipratropium-albuterol (DUO-NEB) 0 5-2 5 mg/3 mL, Take 3 mL by nebulization every 6 (six) hours as needed for wheezing, Disp: , Rfl: 0    leuprolide (LUPRON DEPOT, 3-MONTH,) 22 5 mg injection, 22 5 mg, Disp: , Rfl:     levothyroxine 25 mcg tablet, Daily, Disp: , Rfl:     melatonin 3 mg, Take 3 mg by mouth daily at bedtime, Disp: , Rfl:     meloxicam (MOBIC) 7 5 mg tablet, meloxicam 7 5 mg tablet, Disp: , Rfl:     metFORMIN (GLUCOPHAGE) 1000 MG tablet, metformin 1,000 mg tablet, Disp: , Rfl:     Multiple Vitamin (TAB-A-ANETA PO), Take 1 tablet by mouth daily, Disp: , Rfl:     naproxen (NAPROSYN) 375 mg tablet, naproxen 375 mg tablet, Disp: , Rfl:     nitroglycerin (NITROSTAT) 0 4 mg SL tablet, Place 0 4 mg under the tongue every 5 (five) minutes as needed for chest pain, Disp: , Rfl:     Omega-3 Fatty Acids (FISH OIL) 1,000 mg, Take 1,000 mg by mouth daily, Disp: , Rfl:     oxybutynin (DITROPAN-XL) 10 MG 24 hr tablet, Take 1 tablet (10 mg total) by mouth daily at bedtime, Disp: 30 tablet, Rfl: 1    potassium chloride (K-DUR,KLOR-CON) 20 mEq tablet, Take 20 mEq by mouth 2 (two) times a day, Disp: , Rfl:     prednisoLONE acetate (PRED FORTE) 1 % ophthalmic suspension, prednisolone acetate 1 % eye drops,suspension, Disp: , Rfl:     pregabalin (LYRICA) 75 mg capsule, Lyrica 75 mg capsule, Disp: , Rfl:     sitaGLIPtin (JANUVIA) 50 mg tablet, Januvia 50 mg tablet, Disp: , Rfl:     tamsulosin (FLOMAX) 0 4 mg, Take 0 4 mg by mouth daily with dinner, Disp: , Rfl:     Valsartan (DIOVAN PO), Diovan, Disp: , Rfl:     verapamil (CALAN-SR) 120 mg CR tablet, verapamil ER (SR) 120 mg tablet,extended release, Disp: , Rfl:       Labs & Results:                Assessment/Plan:   1  Chronic diastolic heart failure NYHA class III stage C- HR and BP controlled on current medical regimen  Continue on Coreg 3 125mg BID Lasix 40mg BID, Enalapril increase to 5mg daily at night,Weight in the office is 185 pounds  weight up 5 pounds since last office visit   Trace to 1+ christa tibial edema, increase lasix to 60mg in am and 40mg pm for 3 days then return to 40mg BID  BMP in one week         Daily weights  In view gain 3 pounds in one day, 5 pounds in one week, experience worsening shortness of breath or lower extremity swelling  Please call the heart failure office at [phone]  2  Paroxysmal atrial fibrillation - RRR with ectopic beats, HR 68 BPM, continue on BB and Eliquis 2 5mg BID  3  CKD III baseline creat 1 5-1 65  BMP in one week  4  HTN- controlled goal  BP today is 120/56    Dr Brandan Majano in one month      KEERTHI Bah  6/19/2018  3:46 PM

## 2018-06-19 NOTE — PATIENT INSTRUCTIONS
Increase lasix 60mg in am and 40mg in pm for 3 days then return to 40mg twice a day  Maintain a 2 gram daily sodium diet and 1500 ml daily fluid restriction  Check daily weights  If you gain 3 pounds in one day, 5 pounds in one week, or experience worsening shortness of breath or increasing lower leg swelling  Please call the heart failure office at [phone]    Please bring a  list of your current medications and daily weights to the office visit

## 2018-06-27 DIAGNOSIS — E08.59 DIABETES DUE TO UNDERLYING CONDITION W OTH CIRCULATORY COMP (HCC): Primary | ICD-10-CM

## 2018-06-27 RX ORDER — LANCETS 28 GAUGE
EACH MISCELLANEOUS
Qty: 100 EACH | Refills: 0 | Status: SHIPPED | OUTPATIENT
Start: 2018-06-27 | End: 2018-07-01

## 2018-07-01 ENCOUNTER — ANESTHESIA EVENT (OUTPATIENT)
Dept: PERIOP | Facility: HOSPITAL | Age: 83
DRG: 694 | End: 2018-07-01
Payer: MEDICARE

## 2018-07-01 ENCOUNTER — APPOINTMENT (OUTPATIENT)
Dept: RADIOLOGY | Facility: HOSPITAL | Age: 83
DRG: 694 | End: 2018-07-01
Payer: MEDICARE

## 2018-07-01 ENCOUNTER — HOSPITAL ENCOUNTER (INPATIENT)
Facility: HOSPITAL | Age: 83
LOS: 1 days | Discharge: HOME | DRG: 694 | End: 2018-07-03
Attending: EMERGENCY MEDICINE | Admitting: INTERNAL MEDICINE
Payer: MEDICARE

## 2018-07-01 ENCOUNTER — APPOINTMENT (EMERGENCY)
Dept: RADIOLOGY | Facility: HOSPITAL | Age: 83
DRG: 694 | End: 2018-07-01
Payer: MEDICARE

## 2018-07-01 ENCOUNTER — ANESTHESIA (OUTPATIENT)
Dept: PERIOP | Facility: HOSPITAL | Age: 83
DRG: 694 | End: 2018-07-01
Payer: MEDICARE

## 2018-07-01 DIAGNOSIS — S81.801A OPEN WOUND OF RIGHT LOWER EXTREMITY, INITIAL ENCOUNTER: ICD-10-CM

## 2018-07-01 DIAGNOSIS — N20.1 RIGHT URETERAL STONE: Primary | ICD-10-CM

## 2018-07-01 DIAGNOSIS — N39.0 UTI (URINARY TRACT INFECTION): ICD-10-CM

## 2018-07-01 DIAGNOSIS — N18.9 CKD (CHRONIC KIDNEY DISEASE): ICD-10-CM

## 2018-07-01 LAB
ALBUMIN SERPL BCP-MCNC: 2.9 G/DL (ref 3.5–5)
ALP SERPL-CCNC: 97 U/L (ref 46–116)
ALT SERPL W P-5'-P-CCNC: 13 U/L (ref 12–78)
ANION GAP SERPL CALCULATED.3IONS-SCNC: 5 MMOL/L (ref 4–13)
AST SERPL W P-5'-P-CCNC: 11 U/L (ref 5–45)
ATRIAL RATE: 72 BPM
BACTERIA UR QL AUTO: ABNORMAL /HPF
BASOPHILS # BLD AUTO: 0.04 THOUSANDS/ΜL (ref 0–0.1)
BASOPHILS NFR BLD AUTO: 1 % (ref 0–1)
BILIRUB SERPL-MCNC: 0.69 MG/DL (ref 0.2–1)
BILIRUB UR QL STRIP: NEGATIVE
BUN SERPL-MCNC: 26 MG/DL (ref 5–25)
CALCIUM SERPL-MCNC: 8.7 MG/DL (ref 8.3–10.1)
CHLORIDE SERPL-SCNC: 101 MMOL/L (ref 100–108)
CLARITY UR: CLEAR
CO2 SERPL-SCNC: 30 MMOL/L (ref 21–32)
COLOR UR: YELLOW
COLOR, POC: YELLOW
CREAT SERPL-MCNC: 1.7 MG/DL (ref 0.6–1.3)
EOSINOPHIL # BLD AUTO: 0.1 THOUSAND/ΜL (ref 0–0.61)
EOSINOPHIL NFR BLD AUTO: 2 % (ref 0–6)
ERYTHROCYTE [DISTWIDTH] IN BLOOD BY AUTOMATED COUNT: 15.9 % (ref 11.6–15.1)
GFR SERPL CREATININE-BSD FRML MDRD: 35 ML/MIN/1.73SQ M
GLUCOSE SERPL-MCNC: 160 MG/DL (ref 65–140)
GLUCOSE SERPL-MCNC: 175 MG/DL (ref 65–140)
GLUCOSE SERPL-MCNC: 264 MG/DL (ref 65–140)
GLUCOSE UR STRIP-MCNC: ABNORMAL MG/DL
HCT VFR BLD AUTO: 33.5 % (ref 36.5–49.3)
HGB BLD-MCNC: 10.3 G/DL (ref 12–17)
HGB UR QL STRIP.AUTO: ABNORMAL
IMM GRANULOCYTES # BLD AUTO: 0.02 THOUSAND/UL (ref 0–0.2)
IMM GRANULOCYTES NFR BLD AUTO: 1 % (ref 0–2)
KETONES UR STRIP-MCNC: NEGATIVE MG/DL
LACTATE SERPL-SCNC: 1.6 MMOL/L (ref 0.5–2)
LEUKOCYTE ESTERASE UR QL STRIP: ABNORMAL
LIPASE SERPL-CCNC: 98 U/L (ref 73–393)
LYMPHOCYTES # BLD AUTO: 0.37 THOUSANDS/ΜL (ref 0.6–4.47)
LYMPHOCYTES NFR BLD AUTO: 9 % (ref 14–44)
MCH RBC QN AUTO: 28.6 PG (ref 26.8–34.3)
MCHC RBC AUTO-ENTMCNC: 30.7 G/DL (ref 31.4–37.4)
MCV RBC AUTO: 93 FL (ref 82–98)
MONOCYTES # BLD AUTO: 0.52 THOUSAND/ΜL (ref 0.17–1.22)
MONOCYTES NFR BLD AUTO: 12 % (ref 4–12)
NEUTROPHILS # BLD AUTO: 3.25 THOUSANDS/ΜL (ref 1.85–7.62)
NEUTS SEG NFR BLD AUTO: 75 % (ref 43–75)
NITRITE UR QL STRIP: POSITIVE
NON-SQ EPI CELLS URNS QL MICRO: ABNORMAL /HPF
NRBC BLD AUTO-RTO: 0 /100 WBCS
PH UR STRIP.AUTO: 6.5 [PH] (ref 4.5–8)
PLATELET # BLD AUTO: 202 THOUSANDS/UL (ref 149–390)
PMV BLD AUTO: 10.3 FL (ref 8.9–12.7)
POTASSIUM SERPL-SCNC: 4.3 MMOL/L (ref 3.5–5.3)
PROT SERPL-MCNC: 6.9 G/DL (ref 6.4–8.2)
PROT UR STRIP-MCNC: ABNORMAL MG/DL
QRS AXIS: 263 DEGREES
QRSD INTERVAL: 168 MS
QT INTERVAL: 474 MS
QTC INTERVAL: 543 MS
RBC # BLD AUTO: 3.6 MILLION/UL (ref 3.88–5.62)
RBC #/AREA URNS AUTO: ABNORMAL /HPF
SODIUM SERPL-SCNC: 136 MMOL/L (ref 136–145)
SP GR UR STRIP.AUTO: 1.02 (ref 1–1.03)
T WAVE AXIS: 80 DEGREES
TROPONIN I SERPL-MCNC: 0.04 NG/ML
UROBILINOGEN UR QL STRIP.AUTO: 0.2 E.U./DL
VENTRICULAR RATE: 79 BPM
WBC # BLD AUTO: 4.3 THOUSAND/UL (ref 4.31–10.16)
WBC #/AREA URNS AUTO: ABNORMAL /HPF

## 2018-07-01 PROCEDURE — 87186 SC STD MICRODIL/AGAR DIL: CPT

## 2018-07-01 PROCEDURE — C2617 STENT, NON-COR, TEM W/O DEL: HCPCS | Performed by: UROLOGY

## 2018-07-01 PROCEDURE — 87077 CULTURE AEROBIC IDENTIFY: CPT

## 2018-07-01 PROCEDURE — 0T768DZ DILATION OF RIGHT URETER WITH INTRALUMINAL DEVICE, VIA NATURAL OR ARTIFICIAL OPENING ENDOSCOPIC: ICD-10-PCS | Performed by: UROLOGY

## 2018-07-01 PROCEDURE — 85025 COMPLETE CBC W/AUTO DIFF WBC: CPT | Performed by: EMERGENCY MEDICINE

## 2018-07-01 PROCEDURE — 96361 HYDRATE IV INFUSION ADD-ON: CPT

## 2018-07-01 PROCEDURE — 99220 PR INITIAL OBSERVATION CARE/DAY 70 MINUTES: CPT | Performed by: INTERNAL MEDICINE

## 2018-07-01 PROCEDURE — 83605 ASSAY OF LACTIC ACID: CPT | Performed by: EMERGENCY MEDICINE

## 2018-07-01 PROCEDURE — 93005 ELECTROCARDIOGRAM TRACING: CPT

## 2018-07-01 PROCEDURE — 94760 N-INVAS EAR/PLS OXIMETRY 1: CPT

## 2018-07-01 PROCEDURE — 99222 1ST HOSP IP/OBS MODERATE 55: CPT | Performed by: UROLOGY

## 2018-07-01 PROCEDURE — 81001 URINALYSIS AUTO W/SCOPE: CPT

## 2018-07-01 PROCEDURE — 74420 UROGRAPHY RTRGR +-KUB: CPT

## 2018-07-01 PROCEDURE — 87086 URINE CULTURE/COLONY COUNT: CPT

## 2018-07-01 PROCEDURE — 87081 CULTURE SCREEN ONLY: CPT | Performed by: INTERNAL MEDICINE

## 2018-07-01 PROCEDURE — BT1D1ZZ FLUOROSCOPY OF RIGHT KIDNEY, URETER AND BLADDER USING LOW OSMOLAR CONTRAST: ICD-10-PCS | Performed by: UROLOGY

## 2018-07-01 PROCEDURE — 84484 ASSAY OF TROPONIN QUANT: CPT | Performed by: EMERGENCY MEDICINE

## 2018-07-01 PROCEDURE — 83690 ASSAY OF LIPASE: CPT | Performed by: EMERGENCY MEDICINE

## 2018-07-01 PROCEDURE — 71046 X-RAY EXAM CHEST 2 VIEWS: CPT

## 2018-07-01 PROCEDURE — 74176 CT ABD & PELVIS W/O CONTRAST: CPT

## 2018-07-01 PROCEDURE — 87147 CULTURE TYPE IMMUNOLOGIC: CPT | Performed by: INTERNAL MEDICINE

## 2018-07-01 PROCEDURE — 96374 THER/PROPH/DIAG INJ IV PUSH: CPT

## 2018-07-01 PROCEDURE — 52332 CYSTOSCOPY AND TREATMENT: CPT | Performed by: UROLOGY

## 2018-07-01 PROCEDURE — 93010 ELECTROCARDIOGRAM REPORT: CPT | Performed by: INTERNAL MEDICINE

## 2018-07-01 PROCEDURE — 82948 REAGENT STRIP/BLOOD GLUCOSE: CPT

## 2018-07-01 PROCEDURE — 80053 COMPREHEN METABOLIC PANEL: CPT | Performed by: EMERGENCY MEDICINE

## 2018-07-01 PROCEDURE — 99285 EMERGENCY DEPT VISIT HI MDM: CPT

## 2018-07-01 PROCEDURE — C1769 GUIDE WIRE: HCPCS | Performed by: UROLOGY

## 2018-07-01 PROCEDURE — 36415 COLL VENOUS BLD VENIPUNCTURE: CPT | Performed by: EMERGENCY MEDICINE

## 2018-07-01 DEVICE — STENT URETERAL 6 FR 26CM INLAY OPTIMA
Type: IMPLANTABLE DEVICE | Site: URINARY BLADDER | Status: NON-FUNCTIONAL
Removed: 2018-07-31

## 2018-07-01 RX ORDER — IPRATROPIUM BROMIDE AND ALBUTEROL SULFATE 2.5; .5 MG/3ML; MG/3ML
3 SOLUTION RESPIRATORY (INHALATION) EVERY 6 HOURS PRN
Status: DISCONTINUED | OUTPATIENT
Start: 2018-07-01 | End: 2018-07-04 | Stop reason: HOSPADM

## 2018-07-01 RX ORDER — FENTANYL CITRATE 50 UG/ML
INJECTION, SOLUTION INTRAMUSCULAR; INTRAVENOUS AS NEEDED
Status: DISCONTINUED | OUTPATIENT
Start: 2018-07-01 | End: 2018-07-01 | Stop reason: SURG

## 2018-07-01 RX ORDER — POLYVINYL ALCOHOL 14 MG/ML
1 SOLUTION/ DROPS OPHTHALMIC
Status: DISCONTINUED | OUTPATIENT
Start: 2018-07-01 | End: 2018-07-01

## 2018-07-01 RX ORDER — LISINOPRIL 2.5 MG/1
2.5 TABLET ORAL DAILY
Status: DISCONTINUED | OUTPATIENT
Start: 2018-07-02 | End: 2018-07-04 | Stop reason: HOSPADM

## 2018-07-01 RX ORDER — FENTANYL CITRATE/PF 50 MCG/ML
25 SYRINGE (ML) INJECTION
Status: DISCONTINUED | OUTPATIENT
Start: 2018-07-01 | End: 2018-07-01 | Stop reason: HOSPADM

## 2018-07-01 RX ORDER — TAMSULOSIN HYDROCHLORIDE 0.4 MG/1
0.4 CAPSULE ORAL
Status: DISCONTINUED | OUTPATIENT
Start: 2018-07-01 | End: 2018-07-04 | Stop reason: HOSPADM

## 2018-07-01 RX ORDER — NITROGLYCERIN 0.4 MG/1
0.4 TABLET SUBLINGUAL
Status: DISCONTINUED | OUTPATIENT
Start: 2018-07-01 | End: 2018-07-04 | Stop reason: HOSPADM

## 2018-07-01 RX ORDER — LANOLIN ALCOHOL/MO/W.PET/CERES
3 CREAM (GRAM) TOPICAL
Status: DISCONTINUED | OUTPATIENT
Start: 2018-07-01 | End: 2018-07-04 | Stop reason: HOSPADM

## 2018-07-01 RX ORDER — SODIUM CHLORIDE, SODIUM LACTATE, POTASSIUM CHLORIDE, CALCIUM CHLORIDE 600; 310; 30; 20 MG/100ML; MG/100ML; MG/100ML; MG/100ML
100 INJECTION, SOLUTION INTRAVENOUS CONTINUOUS
Status: DISCONTINUED | OUTPATIENT
Start: 2018-07-01 | End: 2018-07-01

## 2018-07-01 RX ORDER — SODIUM CHLORIDE 9 MG/ML
50 INJECTION, SOLUTION INTRAVENOUS CONTINUOUS
Status: DISCONTINUED | OUTPATIENT
Start: 2018-07-01 | End: 2018-07-02

## 2018-07-01 RX ORDER — INSULIN GLARGINE 100 [IU]/ML
10 INJECTION, SOLUTION SUBCUTANEOUS
Status: DISCONTINUED | OUTPATIENT
Start: 2018-07-01 | End: 2018-07-04 | Stop reason: HOSPADM

## 2018-07-01 RX ORDER — CARVEDILOL 3.12 MG/1
3.12 TABLET ORAL 2 TIMES DAILY WITH MEALS
Status: DISCONTINUED | OUTPATIENT
Start: 2018-07-01 | End: 2018-07-04 | Stop reason: HOSPADM

## 2018-07-01 RX ORDER — OXYCODONE HYDROCHLORIDE 5 MG/1
5 TABLET ORAL EVERY 4 HOURS PRN
Status: DISCONTINUED | OUTPATIENT
Start: 2018-07-01 | End: 2018-07-04 | Stop reason: HOSPADM

## 2018-07-01 RX ORDER — MAGNESIUM HYDROXIDE 1200 MG/15ML
LIQUID ORAL AS NEEDED
Status: DISCONTINUED | OUTPATIENT
Start: 2018-07-01 | End: 2018-07-01 | Stop reason: HOSPADM

## 2018-07-01 RX ORDER — ONDANSETRON 2 MG/ML
4 INJECTION INTRAMUSCULAR; INTRAVENOUS EVERY 6 HOURS PRN
Status: DISCONTINUED | OUTPATIENT
Start: 2018-07-01 | End: 2018-07-04 | Stop reason: HOSPADM

## 2018-07-01 RX ORDER — FINASTERIDE 5 MG/1
5 TABLET, FILM COATED ORAL DAILY
Status: DISCONTINUED | OUTPATIENT
Start: 2018-07-01 | End: 2018-07-04 | Stop reason: HOSPADM

## 2018-07-01 RX ORDER — METOCLOPRAMIDE HYDROCHLORIDE 5 MG/ML
5 INJECTION INTRAMUSCULAR; INTRAVENOUS ONCE AS NEEDED
Status: DISCONTINUED | OUTPATIENT
Start: 2018-07-01 | End: 2018-07-01 | Stop reason: HOSPADM

## 2018-07-01 RX ORDER — SUCCINYLCHOLINE CHLORIDE 20 MG/ML
INJECTION INTRAMUSCULAR; INTRAVENOUS AS NEEDED
Status: DISCONTINUED | OUTPATIENT
Start: 2018-07-01 | End: 2018-07-01 | Stop reason: SURG

## 2018-07-01 RX ORDER — ALBUTEROL SULFATE 2.5 MG/3ML
2.5 SOLUTION RESPIRATORY (INHALATION) AS NEEDED
Status: DISCONTINUED | OUTPATIENT
Start: 2018-07-01 | End: 2018-07-01 | Stop reason: HOSPADM

## 2018-07-01 RX ORDER — ONDANSETRON 2 MG/ML
INJECTION INTRAMUSCULAR; INTRAVENOUS AS NEEDED
Status: DISCONTINUED | OUTPATIENT
Start: 2018-07-01 | End: 2018-07-01 | Stop reason: SURG

## 2018-07-01 RX ORDER — OXYBUTYNIN CHLORIDE 5 MG/1
5 TABLET ORAL EVERY 8 HOURS PRN
Status: DISCONTINUED | OUTPATIENT
Start: 2018-07-01 | End: 2018-07-01

## 2018-07-01 RX ORDER — FUROSEMIDE 40 MG/1
40 TABLET ORAL
Status: DISCONTINUED | OUTPATIENT
Start: 2018-07-02 | End: 2018-07-02

## 2018-07-01 RX ORDER — SODIUM CHLORIDE, SODIUM LACTATE, POTASSIUM CHLORIDE, CALCIUM CHLORIDE 600; 310; 30; 20 MG/100ML; MG/100ML; MG/100ML; MG/100ML
INJECTION, SOLUTION INTRAVENOUS CONTINUOUS PRN
Status: DISCONTINUED | OUTPATIENT
Start: 2018-07-01 | End: 2018-07-01 | Stop reason: SURG

## 2018-07-01 RX ORDER — OXYBUTYNIN CHLORIDE 5 MG/1
10 TABLET, EXTENDED RELEASE ORAL
Status: DISCONTINUED | OUTPATIENT
Start: 2018-07-01 | End: 2018-07-04 | Stop reason: HOSPADM

## 2018-07-01 RX ORDER — GABAPENTIN 100 MG/1
100 CAPSULE ORAL
Status: DISCONTINUED | OUTPATIENT
Start: 2018-07-01 | End: 2018-07-04 | Stop reason: HOSPADM

## 2018-07-01 RX ORDER — MINERAL OIL AND PETROLATUM 150; 830 MG/G; MG/G
OINTMENT OPHTHALMIC
Status: DISCONTINUED | OUTPATIENT
Start: 2018-07-01 | End: 2018-07-01

## 2018-07-01 RX ORDER — ASPIRIN 81 MG/1
81 TABLET, CHEWABLE ORAL DAILY
Status: DISCONTINUED | OUTPATIENT
Start: 2018-07-02 | End: 2018-07-04 | Stop reason: HOSPADM

## 2018-07-01 RX ORDER — POLYVINYL ALCOHOL 14 MG/ML
1 SOLUTION/ DROPS OPHTHALMIC AS NEEDED
Status: DISCONTINUED | OUTPATIENT
Start: 2018-07-01 | End: 2018-07-04 | Stop reason: HOSPADM

## 2018-07-01 RX ORDER — ONDANSETRON 2 MG/ML
4 INJECTION INTRAMUSCULAR; INTRAVENOUS ONCE AS NEEDED
Status: DISCONTINUED | OUTPATIENT
Start: 2018-07-01 | End: 2018-07-01 | Stop reason: HOSPADM

## 2018-07-01 RX ORDER — CHLORAL HYDRATE 500 MG
1000 CAPSULE ORAL DAILY
Status: DISCONTINUED | OUTPATIENT
Start: 2018-07-02 | End: 2018-07-04 | Stop reason: HOSPADM

## 2018-07-01 RX ORDER — POTASSIUM CHLORIDE 20 MEQ/1
20 TABLET, EXTENDED RELEASE ORAL 2 TIMES DAILY
Status: DISCONTINUED | OUTPATIENT
Start: 2018-07-02 | End: 2018-07-04 | Stop reason: HOSPADM

## 2018-07-01 RX ORDER — PROPOFOL 10 MG/ML
INJECTION, EMULSION INTRAVENOUS AS NEEDED
Status: DISCONTINUED | OUTPATIENT
Start: 2018-07-01 | End: 2018-07-01 | Stop reason: SURG

## 2018-07-01 RX ORDER — ACETAMINOPHEN 325 MG/1
650 TABLET ORAL EVERY 6 HOURS PRN
Status: DISCONTINUED | OUTPATIENT
Start: 2018-07-01 | End: 2018-07-04 | Stop reason: HOSPADM

## 2018-07-01 RX ADMIN — ONDANSETRON 4 MG: 2 INJECTION INTRAMUSCULAR; INTRAVENOUS at 14:57

## 2018-07-01 RX ADMIN — CEFTRIAXONE 1000 MG: 1 INJECTION, POWDER, FOR SOLUTION INTRAMUSCULAR; INTRAVENOUS at 11:53

## 2018-07-01 RX ADMIN — SODIUM CHLORIDE 500 ML: 0.9 INJECTION, SOLUTION INTRAVENOUS at 09:43

## 2018-07-01 RX ADMIN — INSULIN GLARGINE 10 UNITS: 100 INJECTION, SOLUTION SUBCUTANEOUS at 21:31

## 2018-07-01 RX ADMIN — CARVEDILOL 3.12 MG: 3.12 TABLET, FILM COATED ORAL at 16:29

## 2018-07-01 RX ADMIN — FENTANYL CITRATE 50 MCG: 50 INJECTION, SOLUTION INTRAMUSCULAR; INTRAVENOUS at 14:36

## 2018-07-01 RX ADMIN — SODIUM CHLORIDE 50 ML/HR: 0.9 INJECTION, SOLUTION INTRAVENOUS at 15:58

## 2018-07-01 RX ADMIN — INSULIN LISPRO 1 UNITS: 100 INJECTION, SOLUTION INTRAVENOUS; SUBCUTANEOUS at 16:34

## 2018-07-01 RX ADMIN — HYDROMORPHONE HYDROCHLORIDE 0.2 MG: 1 INJECTION, SOLUTION INTRAMUSCULAR; INTRAVENOUS; SUBCUTANEOUS at 19:36

## 2018-07-01 RX ADMIN — TAMSULOSIN HYDROCHLORIDE 0.4 MG: 0.4 CAPSULE ORAL at 16:29

## 2018-07-01 RX ADMIN — MELATONIN TAB 3 MG 3 MG: 3 TAB at 21:30

## 2018-07-01 RX ADMIN — GABAPENTIN 100 MG: 100 CAPSULE ORAL at 21:30

## 2018-07-01 RX ADMIN — SODIUM CHLORIDE, SODIUM LACTATE, POTASSIUM CHLORIDE, AND CALCIUM CHLORIDE: .6; .31; .03; .02 INJECTION, SOLUTION INTRAVENOUS at 14:35

## 2018-07-01 RX ADMIN — FENTANYL CITRATE 50 MCG: 50 INJECTION, SOLUTION INTRAMUSCULAR; INTRAVENOUS at 15:12

## 2018-07-01 RX ADMIN — OXYCODONE HYDROCHLORIDE 5 MG: 5 TABLET ORAL at 20:30

## 2018-07-01 RX ADMIN — OXYBUTYNIN CHLORIDE 10 MG: 5 TABLET, FILM COATED, EXTENDED RELEASE ORAL at 21:30

## 2018-07-01 RX ADMIN — FINASTERIDE 5 MG: 5 TABLET, FILM COATED ORAL at 13:39

## 2018-07-01 RX ADMIN — SODIUM CHLORIDE 50 ML/HR: 0.9 INJECTION, SOLUTION INTRAVENOUS at 13:40

## 2018-07-01 RX ADMIN — SUCCINYLCHOLINE CHLORIDE 100 MG: 20 INJECTION, SOLUTION INTRAMUSCULAR; INTRAVENOUS at 14:40

## 2018-07-01 RX ADMIN — DEXAMETHASONE SODIUM PHOSPHATE 5 MG: 10 INJECTION INTRAMUSCULAR; INTRAVENOUS at 14:57

## 2018-07-01 RX ADMIN — PROPOFOL 100 MG: 10 INJECTION, EMULSION INTRAVENOUS at 14:40

## 2018-07-01 NOTE — H&P
H&P- Fernando Lopez 12/21/1930, 80 y o  male MRN: [id number]    Unit/Bed#: ED 06 Encounter: [id number]    Primary Care Provider: Belgica Yarbrough MD   Date and time admitted to hospital: 7/1/2018  9:05 AM        * Right ureteral stone   Assessment & Plan    9 mm ureteral stone on the right with signs of infection on CT scan  Admit to inpatient  Consult Urology for cystoscopy and stent placement  IV ceftriaxone  IV analgesics  Urinary tract infection without hematuria   Assessment & Plan    Continue his ceftriaxone 1 g daily  Await blood and urine cultures  Monitor temps and WBC count daily  Paroxysmal atrial fibrillation (HCC)   Assessment & Plan    Continue coreg for rate control  Hold eliquis until after cysto        Type 2 diabetes mellitus ([facility] )   Assessment & Plan    Lab Results   Component Value Date    HGBA1C 7 4 (H) 05/23/2018       No results for input(s): POCGLU in the last 72 hours  Blood Sugar Average: Last 72 hrs: Well controlled as outpt  Hold empaglizlozin while inpatient  Basal lantus and sliding scale coverage while hospitalized  Restart empagliflozin upon discharge          CKD (chronic kidney disease) stage 3, GFR 30-59 ml/min   Assessment & Plan    Stable, near baseline  Monitor renal function while inpatient        CHF, acute on chronic [facility])   Assessment & Plan    Volume status is acceptable  Continue lasix after cystoscopy              History of Present Illness     HPI:  Fernando Lopez is a 80 y o  male who presents with worsening RLQ pain since early this morning  He reports occasional pain and dysuria in the area but not as severe and persistent as today  Denies fever,  Chills, sweats, hematuria  Respiratory status is stable, denies chest pain  Had one episode of "dry heaves" prior to arrival     Review of Systems   All other systems reviewed and are negative        Historical Information   Past Medical History:   Diagnosis Date    A-fib ([facility] )     Anemia     Bladder cancer ([facility] )     CAD (coronary artery disease)     CHF (congestive heart failure) (HCC)     Chronic kidney failure     Colon cancer ([place] [address] )     Eye cancer, left ([facility] )     Pacemaker     Prostate cancer ([place] [address] )     Type 2 diabetes mellitus ([place] [address] )      Past Surgical History:   Procedure Laterality Date    CARDIAC PACEMAKER PLACEMENT  2014    CORONARY ANGIOPLASTY WITH STENT PLACEMENT      GALLBLADDER SURGERY       Social History   History   Alcohol Use    Yes     Comment: jayne cognac 1 oz/night      History   Drug Use No     History   Smoking Status    Former Smoker   Smokeless Tobacco    Never Used     Family History: non-contributory    Meds/Allergies   all medications and allergies reviewed  Allergies   Allergen Reactions    Iv Contrast [Iodinated Diagnostic Agents]        Objective   Vitals: Blood pressure 124/78, pulse 78, temperature 97 7 °F (36 5 °C), temperature source Oral, resp  rate 16, weight 77 1 kg (170 lb), SpO2 98 %  No intake or output data in the 24 hours ending 07/01/18 1245    Invasive Devices     Peripheral Intravenous Line            Peripheral IV 07/01/18 Left Antecubital less than 1 day                Physical Exam   Constitutional: He is oriented to person, place, and time  He appears well-developed and well-nourished  HENT:   Head: Normocephalic and atraumatic  Eyes: EOM are normal    Left corneal opacitifcation   Neck: Neck supple  No JVD present  Cardiovascular: Normal rate and regular rhythm  Pulmonary/Chest: Effort normal  He has no wheezes  He has no rales  Abdominal: Soft  He exhibits no distension  There is tenderness  There is no rebound  Musculoskeletal:   Trace edema in legs bilateral   Neurological: He is alert and oriented to person, place, and time  Skin: Skin is warm and dry  Psychiatric: He has a normal mood and affect  His behavior is normal        Lab Results: I have personally reviewed pertinent reports      Imaging: I have personally reviewed pertinent reports  EKG, Pathology, and Other Studies: I have personally reviewed pertinent reports  Code Status: Prior  Advance Directive and Living Will:      Power of :    POLST:      Counseling / Coordination of Care  Total floor / unit time spent today 45 minutes  Greater than 50% of total time was spent with the patient and / or family counseling and / or coordination of care  A description of the counseling / coordination of care: discussed plan of care with the patient at the bedside

## 2018-07-01 NOTE — ASSESSMENT & PLAN NOTE
Continue his ceftriaxone 1 g daily  Await blood and urine cultures  Monitor temps and WBC count daily

## 2018-07-01 NOTE — ASSESSMENT & PLAN NOTE
Lab Results   Component Value Date    HGBA1C 7 4 (H) 05/23/2018       No results for input(s): POCGLU in the last 72 hours  Blood Sugar Average: Last 72 hrs: Well controlled as outpt    Hold empaglizlozin while inpatient  Basal lantus and sliding scale coverage while hospitalized  Restart empagliflozin upon discharge

## 2018-07-01 NOTE — PLAN OF CARE
DISCHARGE PLANNING     Discharge to home or other facility with appropriate resources Progressing        Knowledge Deficit     Patient/family/caregiver demonstrates understanding of disease process, treatment plan, medications, and discharge instructions Progressing        PAIN - ADULT     Verbalizes/displays adequate comfort level or baseline comfort level Progressing        Potential for Falls     Patient will remain free of falls Progressing

## 2018-07-01 NOTE — PERIOPERATIVE NURSING NOTE
Arrived PACU supine in bed  HOB 30 deg  Resps unlabored  No penile meatal drainage noted  Marte intact and patent punch colored urine

## 2018-07-01 NOTE — ANESTHESIA POSTPROCEDURE EVALUATION
Post-Op Assessment Note      CV Status:  Stable    Mental Status:  Alert and awake    Hydration Status:  Stable    PONV Controlled:  None    Airway Patency:  Patent    Post Op Vitals Reviewed: Yes          Staff: CRNA       Comments: hr 66, bp 135/65, O2 sat 98%, rr 20, temp 97 4          BP (P) 135/65 (07/01/18 1514)    Temp (!) (P) 97 4 °F (36 3 °C) (07/01/18 1514)    Pulse (P) 67 (07/01/18 1514)   Resp (P) 20 (07/01/18 1514)    SpO2

## 2018-07-01 NOTE — ED NOTES
[facility] called and informed of patients status in ED and pending admission        Tracey Simpson RN  07/01/18 4327

## 2018-07-01 NOTE — ED ATTENDING ATTESTATION
Howard Mckay DO, saw and evaluated the patient  I have discussed the patient with the resident/non-physician practitioner and agree with the resident's/non-physician practitioner's findings, Plan of Care, and MDM as documented in the resident's/non-physician practitioner's note, except where noted  All available labs and Radiology studies were reviewed  At this point I agree with the current assessment done in the Emergency Department  I have conducted an independent evaluation of this patient a history and physical is as follows:     51-year-old male presents emergency department with worsening right upper quadrant pain  He has had this pain intermittently for longer than he could recall but he says that has acutely worsened over night and into the morning  He is nauseated but has not vomited  Past Medical History:   Diagnosis Date    [facility]     Anemia     Bladder cancer ([facility]     CAD (coronary artery disease)     CHF (congestive heart failure) (HCC)     Chronic kidney failure     Colon cancer ([facility] )     [facility] cancer, left ([facility] )     Pacemaker     Prostate cancer ([facility] )     Type 2 diabetes mellitus ([facility] )      Past Surgical History:   Procedure Laterality Date    CARDIAC PACEMAKER PLACEMENT  2014    CORONARY ANGIOPLASTY WITH STENT PLACEMENT      GALLBLADDER SURGERY         /61 (BP Location: Right arm)   Pulse 78   Temp 97 7 °F (36 5 °C) (Oral)   Resp 18   Wt 77 1 kg (170 lb)   SpO2 93%   BMI 25 10 kg/m²     Patient alert and oriented x4  He is in no respiratory distress  Heart is a regular rate and rhythm  His abdomen is soft with right upper quadrant tenderness and guarding  His extremities have mild peripheral edema  Laboratory evaluation and CT scan of his abdomen pelvis  The patient states he does not have a contrast dye despite a documented history        Patient's renal function was not sufficient for contrast   Non con CT of abdomen pelvis revealed a ureteral stone        UTI evident on urinalysis, indicative of infected renal stone      Diagnosis   nephrolithiasis with renal colic an infected stone        Critical Care Time  CritCare Time    Procedures

## 2018-07-01 NOTE — CONSULTS
UROLOGY CONSULTATION NOTE     Patient Identifiers: Marisela Villeda (MRN [id number])  Service Requesting Consultation:  Medicine  Service Providing Consultation:  Urology, Gracy Parikh MD    Date of Service: 7/1/2018  Inpatient consult to Urology  Consult performed by: Clay Cintron ordered by: Sol Moreno          Reason for Consultation:  Ureteral calculus    History of Present Illness:     Marisela Villeda is a 80 y o  old with a history of nephrolithiasis, prostate cancer, and bladder cancer who presents to the ER complaining of right lower quadrant abdominal pain and nausea  The patient has also been experiencing worsening dysuria with urination  CT scan demonstrated a 9 mm right proximal ureteral calculus  He denies any fevers, chills, or gross hematuria  Past Medical, Past Surgical History:     Past Medical History:   Diagnosis Date    [facility]     Anemia     [facility]     CAD (coronary artery disease)     CHF (congestive heart failure) (HCC)     Chronic kidney failure     [facility]     [facility]     Pacemaker     Prostate cancer ([facility] )     Type 2 diabetes mellitus ([facility] )    :    Past Surgical History:   Procedure Laterality Date    CARDIAC PACEMAKER PLACEMENT  2014    CORONARY ANGIOPLASTY WITH STENT PLACEMENT      GALLBLADDER SURGERY     :    Medications, Allergies:     No current facility-administered medications for this encounter  Allergies: Allergies   Allergen Reactions    Iv Contrast [Iodinated Diagnostic Agents]    :    Social and Family History:   Social History:   Social History   Substance Use Topics    Smoking status: Former Smoker    Smokeless tobacco: Never Used    Alcohol use Yes      Comment: jayne cognac 1 oz/night      History   Smoking Status    Former Smoker   Smokeless Tobacco    Never Used       Family History:  History reviewed   No pertinent family history :     Review of Systems:     General: Fever, chills, or night sweats: negative  Cardiac: Negative for chest pain  Pulmonary: Negative for shortness of breath  Gastrointestinal: Abdominal pain positive  Nausea, vomiting, or diarrhea positive,  Genitourinary: See HPI above  Patient does not have hematuria  All other systems queried were negative  Physical Exam:   General: Patient is pleasant and in NAD  Awake and alert  /78 (BP Location: Right arm)   Pulse 78   Temp 97 7 °F (36 5 °C) (Oral)   Resp 16   Wt 77 1 kg (170 lb)   SpO2 98%   BMI 25 10 kg/m² Temp (24hrs), Av 7 °F (36 5 °C), Min:97 7 °F (36 5 °C), Max:97 7 °F (36 5 °C)  current; Temperature: 97 7 °F (36 5 °C)  No intake/output data recorded  Cardiac: Peripheral edema: negative  Pulmonary: Non-labored breathing  Abdomen: Soft, non-tender, non-distended  No surgical scars  No masses, tenderness, hernias noted  Genitourinary: Positive right CVA tenderness, negative suprapubic tenderness  Labs:     Lab Results   Component Value Date    HGB 10 3 (L) 2018    HCT 33 5 (L) 2018    WBC 4 30 (L) 2018     2018   ]    Lab Results   Component Value Date     2018    K 4 3 2018     2018    CO2 30 2018    BUN 26 (H) 2018    CREATININE 1 70 (H) 2018    CALCIUM 8 7 2018    GLUCOSE 175 (H) 2018   ]    Imaging:   I personally reviewed the images and report of the following studies, and reviewed them with the patient:    CT Abdomen/Pelvis: Per HPI      ASSESSMENT:     80 y o  old male with  right ureteral calculus in the setting of infection  PLAN:     I had a lengthy discussion with the patient  We discussed in the setting of UTI and ureteral stone we recommend stent placement  We will not go after the stone  Risks and benefits of cystoscopy, retrograde pyelogram, and stent placement were discussed and he was consented  Please keep the patient NPO  We will plan to go to the OR today  Otherwise continue broad-spectrum antibiotics and await urine culture results  Thank you for allowing me to participate in this patients care  Please do not hesitate to call with any additional questions  Tristen Ybarra MD    Total time of encounter was 80 minutes, of which 50 minutes was spent on counseling

## 2018-07-01 NOTE — ASSESSMENT & PLAN NOTE
9 mm ureteral stone on the right with signs of infection on CT scan  Admit to inpatient  Consult Urology for cystoscopy and stent placement  IV ceftriaxone  IV analgesics

## 2018-07-01 NOTE — OP NOTE
OPERATIVE REPORT  PATIENT NAME: Marcus Dominguez    :  1930  MRN: [id number]  Pt Location: [facility] CYSTO ROOM 01    SURGERY DATE: 2018    Surgeon(s) and Role:     Will Sena MD - Primary    Preop Diagnosis:  Right ureteral stone [N20 1]    Post-Op Diagnosis Codes:     * Right ureteral stone [N20 1]    Procedure(s) (LRB):  CYSTOSCOPY RETROGRADE PYELOGRAM WITH INSERTION STENT URETERAL (Right)    Specimen(s):  * No specimens in log *    Estimated Blood Loss:   Minimal    Drains:  Urethral Catheter Latex 18 Fr  (Active)   Site Assessment Clean 2018  2:54 PM   Collection Container Standard drainage bag 2018  2:54 PM   Securement Method Securing device (Describe) 2018  2:54 PM   Number of days: 0       Ureteral Drain/Stent Right ureter 6 Fr  (Active)   Number of days: 0       Anesthesia Type:   Choice    Operative Indications:  Right ureteral stone [N20 1]      Operative Findings: Moderate to severe hydronephrosis without filling defect  Purulent urine in the bladder  Complications:   None    Procedure and Technique:  After informed consent was obtained the patient was brought to the operating room  Preoperative antibiotics were given  Bilateral sequential compressive devices placed on lower extremities for DVT prophylaxis  A time-out was performed to identify the patient, surgery to be performed, and correct laterality  Patient was placed under general anesthesia prepped and draped in standard sterile fashion in dorsal lithotomy position  Twenty-two Korean rigid cystoscope was inserted per urethra and into the bladder  The bladder was inspected  The urine was bloody and purulent and our view was limited  The prostate was severely enlarged and appeared abnormal   There was some abnormality along the trigone floor that was difficult to tell whether this was just healing from his previous resection  The right ureteral orifice was identified and cannulated with a guidewire    The ureter was tortuous and difficult to traverse  I was finally able to get into the renal pelvis and this was confirmed with retrograde pyelogram which demonstrated severe hydronephrosis  The wire was replaced and a 6 x 26 double-J stent was placed under both visual and fluoroscopic guidance  No string was left on the stent  The cystoscope was removed and a 18 Pashto Marte catheter was placed  The patient was awoke and taken to the postanesthesia care unit stable condition     I was present for the entire procedure    Patient Disposition:  PACU     SIGNATURE: Eduardo Smith MD  DATE: July 1, 2018  TIME: 3:08 PM

## 2018-07-01 NOTE — ANESTHESIA PREPROCEDURE EVALUATION
Review of Systems/Medical History  Patient summary reviewed  Chart reviewed  No history of anesthetic complications     Cardiovascular  CAD , Dysrhythmias , atrial fibrillation, CHF compensated CHF,   Comment: Transthoracic Echocardiogram  2D, M-mode, Doppler, and Color Doppler     Study date:  23-May-2018     Diagnoses: I50 9 - Heart failure, unspecified     Sonographer:  Marley Bolaños RDCS  Primary Physician:  Marlena Gaspar MD  Referring Physician: KEERTHI Bermudez  Group:  [facility]  Interpreting Physician:  Marino Lopes MD     SUMMARY     LEFT VENTRICLE:  The cavity was small  Systolic function was normal  Ejection fraction was estimated to be 65 %  There were no regional wall motion abnormalities  Wall thickness was mildly to moderately increased,  Pulmonary  Smoker ex-smoker  ,        GI/Hepatic    GI malignancy,        Kidney stones (acutely developing chills/rigots - temp 99), Chronic kidney disease stage 3,   Comment: Bladder Ca     Endo/Other  Diabetes well controlled ,      GYN       Hematology  Anemia ,     Musculoskeletal  Negative musculoskeletal ROS        Neurology  Negative neurology ROS      Psychology   Negative psychology ROS              Physical Exam    Airway    Mallampati score: I  TM Distance: >3 FB       Dental   No notable dental hx     Cardiovascular      Pulmonary      Other Findings       Lab Results   Component Value Date    WBC 4 30 (L) 07/01/2018    HGB 10 3 (L) 07/01/2018     07/01/2018     Lab Results   Component Value Date     07/01/2018    K 4 3 07/01/2018    BUN 26 (H) 07/01/2018    CREATININE 1 70 (H) 07/01/2018    GLUCOSE 175 (H) 07/01/2018     Lab Results   Component Value Date    PTT 32 05/22/2018      Lab Results   Component Value Date    INR 1 20 (H) 05/22/2018     Lab Results   Component Value Date    HGBA1C 7 4 (H) 05/23/2018     Anesthesia Plan  ASA Score- 3 Emergent    Anesthesia Type- general with ASA Monitors  Additional Monitors:   Airway Plan: ETT  Plan Factors-    Induction- intravenous  Postoperative Plan-     Informed Consent- Anesthetic plan and risks discussed with patient  I personally reviewed this patient with the CRNA  Discussed and agreed on the Anesthesia Plan with the THONY Thomas

## 2018-07-01 NOTE — ED PROVIDER NOTES
History  Chief Complaint   Patient presents with    Abdominal Pain     RLQ abdominal pain intermittent x few days  reports increased pain this am that woke him out of sleep  + tenderness and gaurding  reports nausea  denies vomiting/diarrhea     80-year-old man with a history of AFib, anemia, bladder cancer, CAD, CHF, CKD, prostate cancer, diabetes, and colon cancer status post resection presents for evaluation of abdominal pain  Onset of symptoms was 2-3 days ago  Patient describes an intermittent sharp right lower quadrant pain without radiation  He woke up this morning with a worsening pain that is now constant  He has nausea, dysuria, and frequency without fevers, chills, vomiting, diarrhea, and hematuria  Previous abdominal surgeries include a partial colectomy, appendectomy, and cholecystectomy  On arrival, patient is afebrile with an oxygen saturation of 93 percent and otherwise normal vital signs  Physical exam shows significant tenderness to palpation in the right lower quadrant with guarding and no rebound  Remainder of physical exam is unremarkable for acute findings  Bedside ultrasound shows diameter of aorta less than 2 centimeters  Will check CBC, CMP, lipase, lactate, and urine dip  Will treat symptomatically with IV fluids, Zofran, fentanyl, and reassess  Will check CT abdomen/pelvis  Further evaluation and management pending above results  Prior to Admission Medications   Prescriptions Last Dose Informant Patient Reported? Taking?    Empagliflozin (JARDIANCE) 10 MG TABS 7/1/2018 at Unknown time Outside Facility ([address] [place] [address]) Yes Yes   Sig: Take 10 mg by mouth every morning   Ergocalciferol (VITAMIN D2 PO) 7/1/2018 at Unknown time Outside Facility (Specify) Yes Yes   Sig: Take 50,000 Units by mouth once a week     Hypromellose (SYSTANE OVERNIGHT THERAPY) 0 3 % GEL Unknown at Unknown time Outside Facility (Specify) Yes No   Sig: Apply to eye   Multiple Vitamin (TAB-A-ANETA PO) 7/1/2018 at Unknown time Outside Facility ([address] [place] [address]) Yes Yes   Sig: Take 1 tablet by mouth daily   Omega-3 Fatty Acids (FISH OIL) 1,000 mg 7/1/2018 at Unknown time Outside Facility ([address] [place] [address]) Yes Yes   Sig: Take 1,000 mg by mouth daily   Polyethyl Glycol-Propyl Glycol (SYSTANE) 0 4-0 3 % GEL Unknown at Unknown time Outside Facility (Specify) Yes No   Sig: Apply to eye   albuterol (5 mg/mL) 0 5 % nebulizer solution Unknown at Unknown time Outside Facility (Specify) Yes No   Sig: Take 2 5 mg by nebulization every 6 (six) hours as needed for wheezing   apixaban (ELIQUIS) 2 5 mg 7/1/2018 at Unknown time Outside Facility (Specify) No Yes   Sig: Take 1 tablet (2 5 mg total) by mouth 2 (two) times a day   aspirin (ECOTRIN LOW STRENGTH) 81 mg EC tablet 7/1/2018 at Unknown time Outside Facility ([address] [place] [address]) Yes Yes   Sig: Take 81 mg by mouth daily   carvedilol (COREG) 3 125 mg tablet 7/1/2018 at Unknown time Outside Facility ([address] [place] [address]) Yes Yes   Sig: Take 3 125 mg by mouth 2 (two) times a day with meals   enalapril (VASOTEC) 2 5 mg tablet 7/1/2018 at Unknown time Outside Facility ([address] [place] [address]) Yes Yes   Sig: Take 2 5 mg by mouth daily     finasteride (PROSCAR) 5 mg tablet 6/30/2018 at Unknown time Outside Facility ([address] [place] [address]) Yes Yes   Sig: Take 5 mg by mouth daily   furosemide (LASIX) 40 mg tablet 7/1/2018 at Unknown time Outside Facility (Specify) No Yes   Sig: Take 1 tablet (40 mg total) by mouth 2 (two) times a day   gabapentin (NEURONTIN) 100 mg capsule 6/30/2018 at Unknown time Outside Facility (Specify) Yes Yes   Sig: Take 100 mg by mouth daily at bedtime     ipratropium-albuterol (DUO-NEB) 0 5-2 5 mg/3 mL Unknown at Unknown time Outside Facility (Specify) No No   Sig: Take 3 mL by nebulization every 6 (six) hours as needed for wheezing   melatonin 3 mg 6/30/2018 at Unknown time Outside Facility (Specify) Yes Yes   Sig: Take 3 mg by mouth daily at bedtime   nitroglycerin (NITROSTAT) 0 4 mg SL tablet Unknown at Unknown time Outside Facility (Specify) Yes No   Sig: Place 0 4 mg under the tongue every 5 (five) minutes as needed for chest pain   oxybutynin (DITROPAN-XL) 10 MG 24 hr tablet 6/30/2018 at Unknown time Outside Facility (Specify) No Yes   Sig: Take 1 tablet (10 mg total) by mouth daily at bedtime   potassium chloride (K-DUR,KLOR-CON) 20 mEq tablet 7/1/2018 at Unknown time Outside Facility ([address] [place] [address]) Yes Yes   Sig: Take 20 mEq by mouth 2 (two) times a day   tamsulosin (FLOMAX) 0 4 mg 6/30/2018 at Unknown time Outside Facility ([address] [place] [address]) Yes Yes   Sig: Take 0 4 mg by mouth daily with dinner      Facility-Administered Medications: None       Past Medical History:   Diagnosis Date    A-fib ([place] [address] )     Anemia     Bladder cancer ([place] [address] )     CAD (coronary artery disease)     CHF (congestive heart failure) (HCC)     Chronic kidney failure     Colon cancer ([place] [address] )     Eye cancer, left ([place] [address] )     Pacemaker     Prostate cancer ([place] [address] )     Type 2 diabetes mellitus ([place] [address] )        Past Surgical History:   Procedure Laterality Date    CARDIAC PACEMAKER PLACEMENT  2014    CORONARY ANGIOPLASTY WITH STENT PLACEMENT      GALLBLADDER SURGERY         History reviewed  No pertinent family history  I have reviewed and agree with the history as documented  Social History   Substance Use Topics    Smoking status: Former Smoker    Smokeless tobacco: Never Used    Alcohol use Yes      Comment: jayne cognac 1 oz/night         Review of Systems   Constitutional: Negative for chills and fever  HENT: Negative for rhinorrhea and sore throat  Eyes: Negative for photophobia and visual disturbance  Respiratory: Positive for shortness of breath (Baseline)  Negative for cough  Cardiovascular: Negative for chest pain and leg swelling  Gastrointestinal: Positive for abdominal pain and nausea  Negative for blood in stool, constipation, diarrhea and vomiting  Genitourinary: Positive for dysuria and frequency  Negative for flank pain and hematuria  Musculoskeletal: Negative for back pain and neck pain  Skin: Negative for rash and wound  Neurological: Negative for light-headedness and headaches  Physical Exam  ED Triage Vitals [07/01/18 0919]   Temperature Pulse Respirations Blood Pressure SpO2   97 7 °F (36 5 °C) 78 18 116/61 93 %      Temp Source Heart Rate Source Patient Position - Orthostatic VS BP Location FiO2 (%)   Oral Monitor Sitting Right arm --      Pain Score       5           Orthostatic Vital Signs  Vitals:    07/01/18 0919 07/01/18 1100 07/01/18 1200   BP: 116/61 141/65 124/78   Pulse: 78 78 78   Patient Position - Orthostatic VS: Sitting Lying Lying       Physical Exam   Constitutional: He is oriented to person, place, and time  He appears well-developed and well-nourished  No distress  HENT:   Head: Normocephalic and atraumatic  Eyes: Conjunctivae are normal  Pupils are equal, round, and reactive to light  No scleral icterus  Neck: Neck supple  No tracheal deviation present  Cardiovascular: Normal rate, regular rhythm and normal heart sounds  Exam reveals no gallop and no friction rub  No murmur heard  Pulmonary/Chest: Effort normal and breath sounds normal  No respiratory distress  He has no wheezes  He has no rales  Abdominal: Soft  He exhibits no distension  There is tenderness (RLQ)  There is guarding  There is no rebound  Musculoskeletal: He exhibits no edema or tenderness  Neurological: He is alert and oriented to person, place, and time  Skin: Skin is warm and dry  He is not diaphoretic  Psychiatric: He has a normal mood and affect  His behavior is normal  Thought content normal    Vitals reviewed        ED Medications  Medications    EMS REPLENISHMENT MED ( Does not apply Given to EMS 7/1/18 0934)   sodium chloride 0 9 % bolus 500 mL (0 mL Intravenous Stopped 7/1/18 1038)   ceftriaxone (ROCEPHIN) 1 g/50 mL in dextrose IVPB (0 mg Intravenous Stopped 7/1/18 1232)       Diagnostic Studies  Results Reviewed Procedure Component Value Units Date/Time    Urine Microscopic [[id number]]  (Abnormal) Collected:  07/01/18 1059    Lab Status:  Final result Specimen:  Urine from Urine, Other Updated:  07/01/18 1159     RBC, UA 2-4 (A) /hpf      WBC, UA Innumerable (A) /hpf      Epithelial Cells None Seen /hpf      Bacteria, UA Innumerable (A) /hpf     Urine culture [[id number]] Collected:  07/01/18 1059    Lab Status: In process Specimen:  Urine from Urine, Other Updated:  07/01/18 1159    Lactic acid, plasma [[id number]]  (Normal) Collected:  07/01/18 1055    Lab Status:  Final result Specimen:  Blood from Arm, Left Updated:  07/01/18 1125     LACTIC ACID 1 6 mmol/L     Narrative:         Result may be elevated if tourniquet was used during collection      POCT urinalysis dipstick [[id number]]  (Normal) Resulted:  07/01/18 1052    Lab Status:  Final result Specimen:  Urine Updated:  07/01/18 1052     Color, UA yellow    ED Urine Macroscopic [[id number]]  (Abnormal) Collected:  07/01/18 1059    Lab Status:  Final result Specimen:  Urine Updated:  07/01/18 1051     Color, UA Yellow     Clarity, UA Clear     pH, UA 6 5     Leukocytes, UA Large (A)     Nitrite, UA Positive (A)     Protein,  (2+) (A) mg/dl      Glucose,  (1/2%) (A) mg/dl      Ketones, UA Negative mg/dl      Urobilinogen, UA 0 2 E U /dl      Bilirubin, UA Negative     Blood, UA Large (A)     Specific [place], UA 1 025    Narrative:       CLINITEK RESULT    Comprehensive metabolic panel [[id number]]  (Abnormal) Collected:  07/01/18 0942    Lab Status:  Final result Specimen:  Blood from Arm, Left Updated:  07/01/18 1019     Sodium 136 mmol/L      Potassium 4 3 mmol/L      Chloride 101 mmol/L      CO2 30 mmol/L      Anion Gap 5 mmol/L      BUN 26 (H) mg/dL      Creatinine 1 70 (H) mg/dL      Glucose 175 (H) mg/dL      Calcium 8 7 mg/dL      AST 11 U/L      ALT 13 U/L      Alkaline Phosphatase 97 U/L      Total Protein 6 9 g/dL      Albumin 2 9 (L) g/dL      Total Bilirubin 0 69 mg/dL      eGFR 35 ml/min/1 73sq m     Narrative:         National Kidney Disease Education Program recommendations are as follows:  GFR calculation is accurate only with a steady state creatinine  Chronic Kidney disease less than 60 ml/min/1 73 sq  meters  Kidney failure less than 15 ml/min/1 73 sq  meters  Lipase [[id number]]  (Normal) Collected:  07/01/18 0942    Lab Status:  Final result Specimen:  Blood from Arm, Left Updated:  07/01/18 1019     Lipase 98 u/L     Troponin I [[id number]]  (Normal) Collected:  07/01/18 0942    Lab Status:  Final result Specimen:  Blood from Arm, Left Updated:  07/01/18 1019     Troponin I 0 04 ng/mL     CBC and differential [[id number]]  (Abnormal) Collected:  07/01/18 0942    Lab Status:  Final result Specimen:  Blood from Arm, Left Updated:  07/01/18 1005     WBC 4 30 (L) Thousand/uL      RBC 3 60 (L) Million/uL      Hemoglobin 10 3 (L) g/dL      Hematocrit 33 5 (L) %      MCV 93 fL      MCH 28 6 pg      MCHC 30 7 (L) g/dL      RDW 15 9 (H) %      MPV 10 3 fL      Platelets 925 Thousands/uL      nRBC 0 /100 WBCs      Neutrophils Relative 75 %      Immat GRANS % 1 %      Lymphocytes Relative 9 (L) %      Monocytes Relative 12 %      Eosinophils Relative 2 %      Basophils Relative 1 %      Neutrophils Absolute 3 25 Thousands/µL      Immature Grans Absolute 0 02 Thousand/uL      Lymphocytes Absolute 0 37 (L) Thousands/µL      Monocytes Absolute 0 52 Thousand/µL      Eosinophils Absolute 0 10 Thousand/µL      Basophils Absolute 0 04 Thousands/µL                  XR chest 2 views   Final Result by Anna Wynn MD (07/01 1237)      Stable cardiomegaly  Small bilateral pleural effusions right greater than left  Workstation performed: [id number]         CT abdomen pelvis wo contrast   Final Result by Selam Ovalles MD (07/01 1123)      Right mid ureteral calculus, 9 mm in size    Although there is no significant hydronephrosis, there is prominence of right extrarenal pelvis and there is extensive inflammatory stranding surrounding the ureter at the site of the calculus, and these    findings suggest that this calculus is the cause of the patient's right lower quadrant pain  Changes of prior ascending colectomy  Colonic diverticulosis without acute diverticulitis  Mild mesenteric and presacral edema is nonspecific  No noncontrast CT evidence of metastatic disease in the abdomen or pelvis  Pleural effusions, somewhat loculated on the left  Cardiomegaly  Workstation performed: [id number]               Procedures  Procedures      Phone Consults  ED Phone Contact    ED Course  ED Course as of Jul 01 1258   Lore Ort Jul 01, 2018   1157 I spoke with the urology attending who recommended medical admission with stenting today  MDM  Number of Diagnoses or Management Options  CKD (chronic kidney disease):   Right ureteral stone:   UTI (urinary tract infection):   Diagnosis management comments: Workup remarkable for 9 mm right-sided ureteral stone without evidence of obstruction  There is concomitant urinary tract infection without a K I  Patient is systemically well and pain well controlled  I spoke with Urology, as above    Patient was admitted to [facility] for further management    CritCare Time    Disposition  Final diagnoses:   Right ureteral stone   UTI (urinary tract infection)   CKD (chronic kidney disease)     Time reflects when diagnosis was documented in both MDM as applicable and the Disposition within this note     Time User Action Codes Description Comment    7/1/2018 11:54 AM Catherine Dempsey [N20 1] Right ureteral stone     7/1/2018 12:06 PM Catherine Dempsey [N39 0] UTI (urinary tract infection)     7/1/2018 12:06 PM Kayli Dempsey [N18 9] CKD (chronic kidney disease)       ED Disposition     ED Disposition Condition Comment    Admit  Case was discussed with Dr Rodney Hampton and the patient's admission status was agreed to be Admission Status: observation status to the service of Dr Mindy Cooks  Follow-up Information    None         Patient's Medications   Discharge Prescriptions    No medications on file     No discharge procedures on file  ED Provider  Attending physically available and evaluated Nicolle Charter  I managed the patient along with the ED Attending      Electronically Signed by         Estelle Ortiz MD  07/01/18 8825

## 2018-07-02 ENCOUNTER — TELEPHONE (OUTPATIENT)
Dept: OTHER | Facility: HOSPITAL | Age: 83
End: 2018-07-02

## 2018-07-02 PROBLEM — E87.1 HYPONATREMIA: Status: ACTIVE | Noted: 2018-07-02

## 2018-07-02 PROBLEM — I50.32 CHRONIC DIASTOLIC CONGESTIVE HEART FAILURE (HCC): Status: ACTIVE | Noted: 2018-05-22

## 2018-07-02 PROBLEM — R31.9 URINARY TRACT INFECTION WITH HEMATURIA: Status: ACTIVE | Noted: 2018-07-01

## 2018-07-02 LAB
ANION GAP SERPL CALCULATED.3IONS-SCNC: 7 MMOL/L (ref 4–13)
ANISOCYTOSIS BLD QL SMEAR: PRESENT
BASOPHILS # BLD MANUAL: 0 THOUSAND/UL (ref 0–0.1)
BASOPHILS NFR MAR MANUAL: 0 % (ref 0–1)
BUN SERPL-MCNC: 30 MG/DL (ref 5–25)
CALCIUM SERPL-MCNC: 8.5 MG/DL (ref 8.3–10.1)
CHLORIDE SERPL-SCNC: 102 MMOL/L (ref 100–108)
CO2 SERPL-SCNC: 24 MMOL/L (ref 21–32)
CREAT SERPL-MCNC: 1.58 MG/DL (ref 0.6–1.3)
EOSINOPHIL # BLD MANUAL: 0 THOUSAND/UL (ref 0–0.4)
EOSINOPHIL NFR BLD MANUAL: 0 % (ref 0–6)
ERYTHROCYTE [DISTWIDTH] IN BLOOD BY AUTOMATED COUNT: 16 % (ref 11.6–15.1)
GFR SERPL CREATININE-BSD FRML MDRD: 39 ML/MIN/1.73SQ M
GLUCOSE SERPL-MCNC: 171 MG/DL (ref 65–140)
GLUCOSE SERPL-MCNC: 204 MG/DL (ref 65–140)
GLUCOSE SERPL-MCNC: 208 MG/DL (ref 65–140)
GLUCOSE SERPL-MCNC: 237 MG/DL (ref 65–140)
GLUCOSE SERPL-MCNC: 239 MG/DL (ref 65–140)
HCT VFR BLD AUTO: 33.7 % (ref 36.5–49.3)
HGB BLD-MCNC: 10.3 G/DL (ref 12–17)
LYMPHOCYTES # BLD AUTO: 0.17 THOUSAND/UL (ref 0.6–4.47)
LYMPHOCYTES # BLD AUTO: 2 % (ref 14–44)
MCH RBC QN AUTO: 28.4 PG (ref 26.8–34.3)
MCHC RBC AUTO-ENTMCNC: 30.6 G/DL (ref 31.4–37.4)
MCV RBC AUTO: 93 FL (ref 82–98)
MONOCYTES # BLD AUTO: 0.09 THOUSAND/UL (ref 0–1.22)
MONOCYTES NFR BLD: 1 % (ref 4–12)
NEUTROPHILS # BLD MANUAL: 8.39 THOUSAND/UL (ref 1.85–7.62)
NEUTS SEG NFR BLD AUTO: 97 % (ref 43–75)
NRBC BLD AUTO-RTO: 0 /100 WBCS
PLATELET # BLD AUTO: 209 THOUSANDS/UL (ref 149–390)
PLATELET BLD QL SMEAR: ADEQUATE
PMV BLD AUTO: 10.9 FL (ref 8.9–12.7)
POTASSIUM SERPL-SCNC: 4.5 MMOL/L (ref 3.5–5.3)
RBC # BLD AUTO: 3.63 MILLION/UL (ref 3.88–5.62)
RBC MORPH BLD: PRESENT
SODIUM SERPL-SCNC: 133 MMOL/L (ref 136–145)
WBC # BLD AUTO: 8.65 THOUSAND/UL (ref 4.31–10.16)

## 2018-07-02 PROCEDURE — 82948 REAGENT STRIP/BLOOD GLUCOSE: CPT

## 2018-07-02 PROCEDURE — G8978 MOBILITY CURRENT STATUS: HCPCS

## 2018-07-02 PROCEDURE — 80048 BASIC METABOLIC PNL TOTAL CA: CPT | Performed by: INTERNAL MEDICINE

## 2018-07-02 PROCEDURE — 85007 BL SMEAR W/DIFF WBC COUNT: CPT | Performed by: INTERNAL MEDICINE

## 2018-07-02 PROCEDURE — 97163 PT EVAL HIGH COMPLEX 45 MIN: CPT

## 2018-07-02 PROCEDURE — 85027 COMPLETE CBC AUTOMATED: CPT | Performed by: INTERNAL MEDICINE

## 2018-07-02 PROCEDURE — 99232 SBSQ HOSP IP/OBS MODERATE 35: CPT | Performed by: PHYSICIAN ASSISTANT

## 2018-07-02 PROCEDURE — 99232 SBSQ HOSP IP/OBS MODERATE 35: CPT | Performed by: NURSE PRACTITIONER

## 2018-07-02 PROCEDURE — G8979 MOBILITY GOAL STATUS: HCPCS

## 2018-07-02 RX ORDER — SODIUM CHLORIDE 9 MG/ML
75 INJECTION, SOLUTION INTRAVENOUS ONCE
Status: COMPLETED | OUTPATIENT
Start: 2018-07-02 | End: 2018-07-02

## 2018-07-02 RX ADMIN — INSULIN LISPRO 1 UNITS: 100 INJECTION, SOLUTION INTRAVENOUS; SUBCUTANEOUS at 16:51

## 2018-07-02 RX ADMIN — ASPIRIN 81 MG 81 MG: 81 TABLET ORAL at 08:10

## 2018-07-02 RX ADMIN — CEFTRIAXONE 1000 MG: 1 INJECTION, POWDER, FOR SOLUTION INTRAMUSCULAR; INTRAVENOUS at 08:17

## 2018-07-02 RX ADMIN — FINASTERIDE 5 MG: 5 TABLET, FILM COATED ORAL at 08:10

## 2018-07-02 RX ADMIN — OXYBUTYNIN CHLORIDE 10 MG: 5 TABLET, FILM COATED, EXTENDED RELEASE ORAL at 21:47

## 2018-07-02 RX ADMIN — OXYCODONE HYDROCHLORIDE 5 MG: 5 TABLET ORAL at 21:55

## 2018-07-02 RX ADMIN — POLYVINYL ALCOHOL 1 DROP: 14 SOLUTION/ DROPS OPHTHALMIC at 21:47

## 2018-07-02 RX ADMIN — SODIUM CHLORIDE 75 ML/HR: 0.9 INJECTION, SOLUTION INTRAVENOUS at 10:18

## 2018-07-02 RX ADMIN — OXYCODONE HYDROCHLORIDE 5 MG: 5 TABLET ORAL at 16:51

## 2018-07-02 RX ADMIN — INSULIN LISPRO 3 UNITS: 100 INJECTION, SOLUTION INTRAVENOUS; SUBCUTANEOUS at 11:25

## 2018-07-02 RX ADMIN — APIXABAN 2.5 MG: 2.5 TABLET, FILM COATED ORAL at 17:00

## 2018-07-02 RX ADMIN — TAMSULOSIN HYDROCHLORIDE 0.4 MG: 0.4 CAPSULE ORAL at 16:51

## 2018-07-02 RX ADMIN — OXYCODONE HYDROCHLORIDE 5 MG: 5 TABLET ORAL at 05:28

## 2018-07-02 RX ADMIN — POTASSIUM CHLORIDE 20 MEQ: 1500 TABLET, EXTENDED RELEASE ORAL at 08:10

## 2018-07-02 RX ADMIN — OXYCODONE HYDROCHLORIDE 5 MG: 5 TABLET ORAL at 13:17

## 2018-07-02 RX ADMIN — CARVEDILOL 3.12 MG: 3.12 TABLET, FILM COATED ORAL at 08:10

## 2018-07-02 RX ADMIN — APIXABAN 2.5 MG: 2.5 TABLET, FILM COATED ORAL at 08:10

## 2018-07-02 RX ADMIN — CARVEDILOL 3.12 MG: 3.12 TABLET, FILM COATED ORAL at 16:51

## 2018-07-02 RX ADMIN — INSULIN GLARGINE 10 UNITS: 100 INJECTION, SOLUTION SUBCUTANEOUS at 21:47

## 2018-07-02 RX ADMIN — INSULIN LISPRO 3 UNITS: 100 INJECTION, SOLUTION INTRAVENOUS; SUBCUTANEOUS at 16:52

## 2018-07-02 RX ADMIN — LISINOPRIL 2.5 MG: 2.5 TABLET ORAL at 08:10

## 2018-07-02 RX ADMIN — GABAPENTIN 100 MG: 100 CAPSULE ORAL at 21:47

## 2018-07-02 RX ADMIN — Medication 1000 MG: at 08:08

## 2018-07-02 RX ADMIN — POTASSIUM CHLORIDE 20 MEQ: 1500 TABLET, EXTENDED RELEASE ORAL at 17:00

## 2018-07-02 RX ADMIN — OXYCODONE HYDROCHLORIDE 5 MG: 5 TABLET ORAL at 09:14

## 2018-07-02 RX ADMIN — FUROSEMIDE 40 MG: 40 TABLET ORAL at 08:08

## 2018-07-02 RX ADMIN — BISACODYL 5 MG: 5 TABLET, COATED ORAL at 16:51

## 2018-07-02 RX ADMIN — MELATONIN TAB 3 MG 3 MG: 3 TAB at 21:47

## 2018-07-02 RX ADMIN — INSULIN LISPRO 2 UNITS: 100 INJECTION, SOLUTION INTRAVENOUS; SUBCUTANEOUS at 08:10

## 2018-07-02 NOTE — PHYSICAL THERAPY NOTE
PHYSICAL THERAPY EVALUATION  NAME: Babar Hopper  AGE:   80 y o  MRN:  [id number]  ADMIT DX: UTI (urinary tract infection) [N39 0]  Abdominal pain [R10 9]  CKD (chronic kidney disease) [N18 9]  Right ureteral stone [N20 1]    PMH:   Past Medical History:   Diagnosis Date    A-fib ([place] [address] )     Anemia     Bladder cancer ([place] [address] )     CAD (coronary artery disease)     CHF (congestive heart failure) (HCC)     Chronic kidney failure     Colon cancer ([place] [address] )     Eye cancer, left ([place] [address] )     Pacemaker     Prostate cancer ([place] [address] )     Type 2 diabetes mellitus ([place] [address] )      LENGTH OF STAY: 0     07/02/18 1428   Pain Assessment   Pain Assessment FLACC   Pain Location Abdomen   Pain Rating: FLACC (Rest) - Face 0   Pain Rating: FLACC (Rest) - Legs 0   Pain Rating: FLACC (Rest) - Activity 0   Pain Rating: FLACC (Rest) - Cry 0   Pain Rating: FLACC (Rest) - Consolability 1   Score: FLACC (Rest) 1   Pain Rating: FLACC (Activity) - Face 1   Pain Rating: FLACC (Activity) - Legs 0   Pain Rating: FLACC (Activity) - Activity 1   Pain Rating: FLACC (Activity) - Cry 1   Pain Rating: FLACC (Activity) - Consolability 1   Score: FLACC (Activity) 4   Home Living   Type of Home Assisted living  [facility])   Home Layout One level   Home Equipment Walker  (rollator)   Additional Comments Pt ambulates with mod I and rollator at baseline  Pt also reports he sometimes walks without AD  Prior Function   Level of [place] Independent with ADLs and functional mobility   Lives With Facility staff   Receives Help From Personal care attendant   ADL Assistance Independent   IADLs Independent   Falls in the last 6 months 0   Restrictions/Precautions   Weight Bearing Precautions Per Order No   Other Precautions Impulsive;Cognitive; Bed Alarm;Multiple lines; Fall Risk   General   Family/Caregiver Present No   Cognition   Overall Cognitive Status Impaired   Arousal/Participation Cooperative   Orientation Level Oriented to person;Oriented to place;Oriented to situation   Memory Decreased short term memory;Decreased recall of recent events;Decreased recall of precautions   Following Commands Follows one step commands with increased time or repetition   Comments Pt identified by name and hospital bracelet  RLE Assessment   RLE Assessment X   Strength RLE   RLE Overall Strength 4-/5  (functionally)   LLE Assessment   LLE Assessment X   Strength LLE   LLE Overall Strength 4-/5  (functionally)   Bed Mobility   Supine to Sit Unable to assess  (sitting on toilet upon approach)   Sit to Supine 5  Supervision   Additional items Increased time required;Verbal cues   Transfers   Sit to Stand 4  Minimal assistance  (cues for hand placement)   Additional items Assist x 1; Increased time required; Impulsive;Verbal cues  (CGA)   Stand to Sit 4  Minimal assistance   Additional items Assist x 1; Increased time required; Impulsive;Verbal cues   Ambulation/Elevation   Gait pattern Improper Weight shift; Forward Flexion;Decreased foot clearance;Shuffling  (increased lateral sway)   Gait Assistance 4  Minimal assist  (CGA)   Additional items Assist x 1;Verbal cues   Assistive Device Rolling walker  (trial with and without AD)   Distance 30` x1 and 8`x1  (trial with and without AD)   Balance   Static Sitting Good   Dynamic Sitting Fair +   Static Standing Fair   Dynamic Standing Fair -   Ambulatory Fair -   Endurance Deficit   Endurance Deficit Yes   Endurance Deficit Description limited ambulation distance, SOB   Activity Tolerance   Activity Tolerance Patient limited by fatigue   Nurse Made Aware updated MANJULA Cruz on status post session   Assessment   Prognosis Good   Problem List Decreased strength;Decreased endurance; Impaired balance;Decreased mobility; Decreased cognition; Impaired judgement;Decreased safety awareness;Pain   Goals   Patient Goals Pt reports he would like to breathe better/ambulate further     STG Expiration Date 07/11/18   Short Term Goal #1 Pt will be able to: (1) perform bed mobility with mod I (2) perform sit to stand with mod I (3) ambulate at least 300` with mod I and RW (4) initiate HEP (5) increase standing balance by 1 grade   Treatment Day 0   Plan   Treatment/Interventions Functional transfer training;LE strengthening/ROM; Therapeutic exercise; Endurance training;Patient/family training;Equipment eval/education; Bed mobility;Gait training   PT Frequency (3-5x/week)   Recommendation   Recommendation (return with PT services and increased assist if available)   Equipment Recommended Walker   Barthel Index   Feeding 10   Bathing 0   Grooming Score 5   Dressing Score 5   Bladder Score 0   Bowels Score 10   Toilet Use Score 5   Transfers (Bed/Chair) Score 10   Mobility (Level Surface) Score 0   Stairs Score 0   Barthel Index Score 45       Assessment: Pt is a 80 y o  male seen for PT evaluation s/p admit to [facility] on 7/1/2018 w/ Right ureteral stone  Order placed for PT  Comorbidities affecting pt's physical performance at time of assessment listed above  Personal factors affecting pt at time of IE include: advanced age, behavioral pattern, inability to perform ADLs and limited insight into impairments  Prior to admission, pt was was independent w/ all functional mobility w/ rollator as needed, lived in one floor environment and lives at [facility]  Upon evaluation: Pt requires min/CGA for sit to stand, min/CGA for ambulation, and SBA for bed mobility  Pt is impulsive and lacks safety awareness  Poor insight into deficits as pt reports no need for AD  (Please find full objective findings from PT assessment regarding body systems outlined above)  Impairments and limitations also listed above, especially due to  weakness, impaired balance, decreased endurance, gait deviations, pain, decreased activity tolerance, decreased safety awareness, impaired judgement, fall risk and SOB upon exertion   The following objective measures performed on IE also reveal limitations: Barthel Index 45/100  Pt's clinical presentation is currently unstable/unpredictable seen in pt's presentation of decreased safety awareness, impulsivity, and lack of insight into deficits  Pt to benefit from continued skilled PT tx while in hospital and upon DC to address deficits as defined above and maximize level of functional mobility  From PT/mobility standpoint, recommendation at time of d/c would be return to facility with increased assist and PT services pending progress in order to maximize pt's functional independence and consistency w/ mobility in order to facilitate return to PLOF  Recommend progression of ambulation, initiation of HEP, and dynamic balance activities as appropriate        Jia Solares, PT,DPT

## 2018-07-02 NOTE — TELEPHONE ENCOUNTER
PT still inpt, will call once discharged  PT had pending appointment for 7/5 for cysto with Dr Anastacio March that I cancelled

## 2018-07-02 NOTE — POST OP PROGRESS NOTES
UROLOGY PROGRESS NOTE   Patient Identifiers: Tyler Pickard (MRN [id number])  Date of Service: 7/2/2018    Subjective:     24 HR EVENTS:   no significant events  Patient has  no complaints  Patient remains afebrile  Objective:     VITALS:    Vitals:    07/02/18 0713   BP: 119/60   Pulse: 87   Resp: 18   Temp: 97 5 °F (36 4 °C)   SpO2: 98%       INS & OUTS:  I/O last 24 hours:   In: 1831 7 [P O :540; I V :1291 7]  Out: 1400 [Urine:1400]    LABS:  Lab Results   Component Value Date    HGB 10 3 (L) 07/02/2018    HCT 33 7 (L) 07/02/2018    WBC 8 65 07/02/2018     07/02/2018   ]    Lab Results   Component Value Date     (L) 07/02/2018    K 4 5 07/02/2018     07/02/2018    CO2 24 07/02/2018    BUN 30 (H) 07/02/2018    CREATININE 1 58 (H) 07/02/2018    CALCIUM 8 5 07/02/2018    GLUCOSE 208 (H) 07/02/2018   ]    INPATIENT MEDS:    Current Facility-Administered Medications:     acetaminophen (TYLENOL) tablet 650 mg, 650 mg, Oral, Q6H PRN, Erik Ro PA-C    apixaban (ELIQUIS) tablet 2 5 mg, 2 5 mg, Oral, BID, Luis English MD, 2 5 mg at 07/02/18 0810    aspirin chewable tablet 81 mg, 81 mg, Oral, Daily, Luis English MD, 81 mg at 07/02/18 0810    carvedilol (COREG) tablet 3 125 mg, 3 125 mg, Oral, BID With Meals, Luis English MD, 3 125 mg at 07/02/18 0810    cefTRIAXone (ROCEPHIN) 1,000 mg in dextrose 5 % 50 mL IVPB, 1,000 mg, Intravenous, Q24H, Luis English MD, Last Rate: 100 mL/hr at 07/02/18 0817, 1,000 mg at 07/02/18 0817    finasteride (PROSCAR) tablet 5 mg, 5 mg, Oral, Daily, Luis English MD, 5 mg at 07/02/18 0810    fish oil capsule 1,000 mg, 1,000 mg, Oral, Daily, Luis English MD, 1,000 mg at 07/02/18 0808    furosemide (LASIX) tablet 40 mg, 40 mg, Oral, BID (diuretic), Luis English MD, 40 mg at 07/02/18 0808    gabapentin (NEURONTIN) capsule 100 mg, 100 mg, Oral, HS, Luis English MD, 100 mg at 07/01/18 2130    insulin glargine (LANTUS) subcutaneous injection 10 Units 0 1 mL, 10 Units, Subcutaneous, HS, Leandra Lundy MD, 10 Units at 07/01/18 2131    insulin lispro (HumaLOG) 100 units/mL subcutaneous injection 1-6 Units, 1-6 Units, Subcutaneous, TID AC, 2 Units at 07/02/18 0810 **AND** Fingerstick Glucose (POCT), , , TID AC, Leandra Lundy MD    ipratropium-albuterol (DUO-NEB) 0 5-2 5 mg/3 mL inhalation solution 3 mL, 3 mL, Nebulization, Q6H PRN, Leandra Lundy MD    lisinopril (ZESTRIL) tablet 2 5 mg, 2 5 mg, Oral, Daily, Leandra Lundy MD, 2 5 mg at 07/02/18 0810    melatonin tablet 3 mg, 3 mg, Oral, HS, Leandra Lundy MD, 3 mg at 07/01/18 2130    nitroglycerin (NITROSTAT) SL tablet 0 4 mg, 0 4 mg, Sublingual, Q5 Min PRN, Leandra Lundy MD    ondansetron [facility]) injection 4 mg, 4 mg, Intravenous, Q6H PRN, Leandra Lundy MD    oxybutynin (DITROPAN-XL) 24 hr tablet 10 mg, 10 mg, Oral, HS, Leandra Lundy MD, 10 mg at 07/01/18 2130    oxyCODONE (ROXICODONE) IR tablet 5 mg, 5 mg, Oral, Q4H PRN, Erik Ro PA-C, 5 mg at 07/02/18 0528    polyvinyl alcohol (LIQUIFILM TEARS) 1 4 % ophthalmic solution 1 drop, 1 drop, Both Eyes, PRN, Leandra Lundy MD    potassium chloride (K-DUR,KLOR-CON) CR tablet 20 mEq, 20 mEq, Oral, BID, Leandra Lundy MD, 20 mEq at 07/02/18 0810    sodium chloride 0 9 % infusion, 50 mL/hr, Intravenous, Continuous, Leandra Lundy MD, Last Rate: 50 mL/hr at 07/01/18 1558, 50 mL/hr at 07/01/18 1558    tamsulosin (FLOMAX) capsule 0 4 mg, 0 4 mg, Oral, Daily With Linda Luevano MD, 0 4 mg at 07/01/18 1957      Physical Exam:     GEN: alert and oriented x 3    RESP: breathing comfortably with no accessory muscle use    ABD: soft, non-tender, non-distended   EXT: no significant peripheral edema   HUNT: in place draining clear, rust/dark jorge luis urine, no clots       Assessment:   POD 1 cystoscopy, retrograde pyelogram with insertion of right ureteral stent  Patient was noted to have purulent urine in the bladder intraoperatively    Patient denies any flank pain, dysuria, or stent discomfort  Marte catheter remains in place draining clear rust/dark jorge luis urine  He is tolerating regular diet, denies any nausea or vomiting  Plan:   -Urine culture remains pending, titrate antibiotics based on final report and sensitivity  Continue Rocephin per primary team  -Continue home dose of finasteride and tamsulosin  -Remove Marte catheter prior to discharge with void trial   Please call Urology if patient is symptomatic or PVR greater than 300 cc  -Our office will arrange follow-up within the next 2 weeks to discuss elective staged ureteroscopy    No additional urologic intervention needed at this time, please do not hesitate to call with any questions or concerns      RN participated in rounds with AP  Plan of care discussed with patient and RN

## 2018-07-02 NOTE — ASSESSMENT & PLAN NOTE
Lab Results   Component Value Date    HGBA1C 7 4 (H) 05/23/2018       Recent Labs      07/01/18   1631  07/01/18 2057  07/02/18   0720   POCGLU  160*  264*  204*       Blood Sugar Average: Last 72 hrs: Well controlled as outpt    Hold empaglizlozin while inpatient  Basal lantus and sliding scale coverage while hospitalized - add mealtime insulin  Restart empagliflozin upon discharge  (P) [phone]

## 2018-07-02 NOTE — PROGRESS NOTES
Progress Note - Fernando Lopez 12/21/1930, 80 y o  male MRN: [id number]    Unit/Bed#: [facility] 812-01 Encounter: [id number]    Primary Care Provider: Belgica Yarbrough MD   Date and time admitted to hospital: 7/1/2018  9:05 AM        * Right ureteral stone   Assessment & Plan    · 9 mm ureteral stone on the right with signs of infection on CT scan  · POD 1 cystoscopy with right ureteral stent placement  · Outpatient follow up with Urology in 2 weeks to plan 2nd urteroscopy  · Can have voiding trial prior to discharge - will plan for tomorrow          Urinary tract infection with hematuria   Assessment & Plan    · Continue his ceftriaxone 1 g daily  · Follow up final urine cultures  · Eliquis on hold secondary to hematuria        Hyponatremia   Assessment & Plan    · Suspect has some volume depletion  · Hold lasix today  · Continue IVF  · BMP in am        Chronic diastolic congestive heart failure (HCC)   Assessment & Plan    · Hold lasix today secondary to volume depletion  · Reassess in am        CKD (chronic kidney disease) stage 3, GFR 30-59 ml/min   Assessment & Plan    · Stable, near baseline  · Monitor renal function while inpatient        Paroxysmal atrial fibrillation (HCC)   Assessment & Plan    · Continue coreg for rate control  · Hold eliquis secondary to hematuria s/p cysto        Type 2 diabetes mellitus [facility])   Assessment & Plan    Lab Results   Component Value Date    HGBA1C 7 4 (H) 05/23/2018       Recent Labs      07/01/18   1631  07/01/18   2057  07/02/18   0720   POCGLU  160*  264*  204*       Blood Sugar Average: Last 72 hrs: Well controlled as outpt    Hold empaglizlozin while inpatient  Basal lantus and sliding scale coverage while hospitalized - add mealtime insulin  Restart empagliflozin upon discharge  (P) [phone]          VTE Pharmacologic Prophylaxis:   Pharmacologic: Pharmacologic VTE Prophylaxis contraindicated due to hematuria  Mechanical VTE Prophylaxis in Place: Yes    Patient Centered Rounds: I have performed bedside rounds with nursing staff today  Discussions with Specialists or Other Care Team Provider:     Education and Discussions with Family / Patient: patient, daughter called with update    Time Spent for Care: 30 minutes  More than 50% of total time spent on counseling and coordination of care as described above  Current Length of Stay: 0 day(s)    Current Patient Status: Inpatient   Certification Statement: The patient, admitted on an observation basis, will now require > 2 midnight hospital stay due to IV antibioitcs for infection and pending cultures    Discharge Plan: Discharge pending final urine cultures    Code Status: Level 1 - Full Code      Subjective:   Nauseated this am  Also had some lower abdominal pain this morning  Improved when some sediment was dislodged from cabrera  Had some chills last night  Objective:     Vitals:   Temp (24hrs), Av 3 °F (36 8 °C), Min:97 4 °F (36 3 °C), Max:99 1 °F (37 3 °C)    HR:  [62-87] 87  Resp:  [16-22] 18  BP: (119-149)/(50-78) 119/60  SpO2:  [96 %-100 %] 98 %  Body mass index is 28 4 kg/m²  Input and Output Summary (last 24 hours): Intake/Output Summary (Last 24 hours) at 18 1001  Last data filed at 18 4431   Gross per 24 hour   Intake           67 ml   Output             1400 ml   Net           611 67 ml       Physical Exam:     Physical Exam   Constitutional: He is oriented to person, place, and time  He appears well-developed  No distress  HENT:   Head: Normocephalic and atraumatic  Neck: Normal range of motion  Neck supple  Cardiovascular: Normal rate and regular rhythm  No murmur heard  Pulmonary/Chest: Effort normal and breath sounds normal  No respiratory distress  He has no wheezes  He has no rales  Abdominal: Soft  Bowel sounds are normal  He exhibits no distension  Genitourinary:   Genitourinary Comments: Cabrera in place with hematuria and sediment noted  Musculoskeletal: He exhibits no edema  Neurological: He is alert and oriented to person, place, and time  No cranial nerve deficit  Skin: Skin is warm and dry  No rash noted  Psychiatric: He has a normal mood and affect  Additional Data:     Labs:      Results from last 7 days  Lab Units 07/02/18  0523 07/01/18  0942   WBC Thousand/uL 8 65 4 30*   HEMOGLOBIN g/dL 10 3* 10 3*   HEMATOCRIT % 33 7* 33 5*   PLATELETS Thousands/uL 209 202   NEUTROS PCT %  --  75   LYMPHS PCT %  --  9*   MONOS PCT %  --  12   EOS PCT %  --  2       Results from last 7 days  Lab Units 07/02/18  0523 07/01/18  0942   SODIUM mmol/L 133* 136   POTASSIUM mmol/L 4 5 4 3   CHLORIDE mmol/L 102 101   CO2 mmol/L 24 30   BUN mg/dL 30* 26*   CREATININE mg/dL 1 58* 1 70*   CALCIUM mg/dL 8 5 8 7   TOTAL PROTEIN g/dL  --  6 9   BILIRUBIN TOTAL mg/dL  --  0 69   ALK PHOS U/L  --  97   ALT U/L  --  13   AST U/L  --  11   GLUCOSE RANDOM mg/dL 208* 175*           Results from last 7 days  Lab Units 07/02/18  0720 07/01/18  2057 07/01/18  1631   POC GLUCOSE mg/dl 204* 264* 160*             * I Have Reviewed All Lab Data Listed Above  * Additional Pertinent Lab Tests Reviewed:  Wendi 66 Admission Reviewed    Imaging:    Imaging Reports Reviewed Today Include: CXR, echocardiogram  Imaging Personally Reviewed by Myself Includes:  none    Recent Cultures (last 7 days):           Last 24 Hours Medication List:     Current Facility-Administered Medications:  acetaminophen 650 mg Oral Q6H PRN Erik Ro PA-C    apixaban 2 5 mg Oral BID Biju Alejandra MD    aspirin 81 mg Oral Daily Biju Alejandra MD    carvedilol 3 125 mg Oral BID With Meals Biju Alejandra MD    cefTRIAXone 1,000 mg Intravenous Q24H Biju Alejandra MD Last Rate: 1,000 mg (07/02/18 0817)   finasteride 5 mg Oral Daily Biju Alejandra MD    fish oil 1,000 mg Oral Daily Biju Alejandra MD    gabapentin 100 mg Oral HS Biju Alejandra MD    insulin glargine 10 Units Subcutaneous HS Sawyer Jones MD    insulin lispro 1-6 Units Subcutaneous TID AC Sawyer Jones MD    insulin lispro 3 Units Subcutaneous TID With Meals Darin Lee PA-C    ipratropium-albuterol 3 mL Nebulization Q6H PRN Sawyer Jones MD    lisinopril 2 5 mg Oral Daily Sawyer Jones MD    melatonin 3 mg Oral HS Sawyer Jones MD    nitroglycerin 0 4 mg Sublingual Q5 Min PRN Sawyer Jones MD    ondansetron 4 mg Intravenous Q6H PRN Sawyer Jones MD    oxybutynin 10 mg Oral HS Sawyer Jones MD    oxyCODONE 5 mg Oral Q4H PRN Erik Ro PA-C    polyvinyl alcohol 1 drop Both Eyes PRN Sawyer Jones MD    potassium chloride 20 mEq Oral BID Sawyer Jones MD    sodium chloride 75 mL/hr Intravenous Once Darin Lee PA-C    tamsulosin 0 4 mg Oral Daily With Osmani Bosch MD         Today, Patient Was Seen By: Darin Lee PA-C    ** Please Note: Dictation voice to text software may have been used in the creation of this document   **

## 2018-07-02 NOTE — SOCIAL WORK
CM met with patient, explained CM role with introduction  Patient lives at [place] [address], recently moved there approx 2 5 months ago  Patient independent with Bathing and dressing/ with exception to showers, he gets assistance with showering  CM called [facility], spoke with Tasia Alfaro, who gave additional information  Patient has a walker, rollator and shower chair  PCP is Dr Heri Figueroa gets meds from [facility], and facility assists with patient's medications  DAughter Fe Priest is POA  No hx of  or drug and alcohol treatment  Primary contact is daughter Rocael Morfin C:[phone], cell: [phone]  Patient has 2 adult daughters who are very supportive  Patient would like to return to [place] [address] on discharge  [facility] referral sent to [facility]

## 2018-07-02 NOTE — CASE MANAGEMENT
Initial Clinical Review    Admission: Date/Time/Statement: OBSERVATION 7/1/18 @ 1207 CHANGED TO INPATIENT ON 7/2/18 @ 0945 FOR URINARY OBSTRUCTION AND HUNT  Orders Placed This Encounter   Procedures     07/02/18 0943  Inpatient Admission Once     Transfer Service: General Medicine    Expected Discharge Date: 07/04/18       Question Answer Comment   Admitting Physician LOU PACE    Level of Care Med Surg    Estimated length of stay More than 2 Midnights    Certification I certify that inpatient services are medically necessary for this patient for a duration of greater than two midnights  See H&P and MD Progress Notes for additional information about the patient's course of treatment  07/02/18 0945     ED: Date/Time/Mode of Arrival:   ED Arrival Information     Expected Arrival Acuity Means of Arrival Escorted By Service Admission Type    - 7/1/2018 09:04 Urgent Ambulance [facility] General Medicine Urgent    Arrival Complaint    Abd Pain        Chief Complaint:   Chief Complaint   Patient presents with    Abdominal Pain     RLQ abdominal pain intermittent x few days  reports increased pain this am that woke him out of sleep  + tenderness and gaurding  reports nausea  denies vomiting/diarrhea     History of Illness:  Migue Wu is a 80 y o  male who presents with worsening RLQ pain since early this morning  He reports occasional pain and dysuria in the area but not as severe and persistent as today  Denies fever,  Chills, sweats, hematuria  Respiratory status is stable, denies chest pain   Had one episode of "dry heaves" prior to arrival      ED Vital Signs:   ED Triage Vitals   Temperature Pulse Respirations Blood Pressure SpO2   07/01/18 0919 07/01/18 0919 07/01/18 0919 07/01/18 0919 07/01/18 0919   97 7 °F (36 5 °C) 78 18 116/61 93 %      Temp Source Heart Rate Source Patient Position - Orthostatic VS BP Location FiO2 (%)   07/01/18 7336 07/01/18 0919 07/01/18 0919 07/01/18 9626 07/01/18 1514   Oral Monitor Sitting Right arm 100      Pain Score       07/01/18 0919       5        Wt Readings from Last 1 Encounters:   07/02/18 88 5 kg (195 lb 1 7 oz)     Vital Signs (abnormal):     07/01/18 1514   97 4 °F (36 3 °C)  67  20  135/65  98 %  Simple mask  X  --     Abnormal Labs:   7/1 7/2   Sodium 136    133      BUN 26 30   Creatinine 1 70 1 58   Glucose 175 208   Albumin 2 9      WBC 4 30 8 65   RBC 3 60 3 63   Hemoglobin 10 3 10 3   Hematocrit 33 5 33 7   MCHC 30 7 30 6   RDW 15 9 16 0     POC glucose 160, 264, 204  Trop WNL   MRSA and Urine culture      07/01/18 1059   Color, UA Yellow    Clarity, UA Clear    pH, UA 6 5    Leukocytes, UA Large     Nitrite, UA Positive     Protein,  (2+)     Glucose,  (1/2%)     Ketones, UA Negative    Urobilinogen, UA 0 2    Bilirubin, UA Negative    Blood, UA Large     Specific Doole, UA 1 025      Diagnostic Test Results:     7/1 EKG - Electronic ventricular pacemaker  Abnormal ECG  When compared with ECG of 22-MAY-2018 11:36,  No significant change was found    7/1 CT abd, pelvis -   Right mid ureteral calculus, 9 mm in size  Although there is no significant hydronephrosis, there is prominence of right extrarenal pelvis and there is extensive inflammatory stranding surrounding the ureter at the site of the calculus, and these findings suggest that this calculus is the cause of the patient's right lower quadrant pain  Changes of prior ascending colectomy  Colonic diverticulosis without acute diverticulitis  Mild mesenteric and presacral edema is nonspecific  No noncontrast CT evidence of metastatic disease in the abdomen or pelvis  Pleural effusions, somewhat loculated on the left  Cardiomegaly  7/1 CXR - Small bilateral pleural effusions right greater than left      7/1 Retrograde pyelogram - results pending      ED Treatment:   Medication Administration from 07/01/2018 0904 to 07/01/2018 1311    Date/Time Order Dose Route Action 07/01/2018 0943 sodium chloride 0 9 % bolus 500 mL 500 mL Intravenous New Bag   07/01/2018 1153 ceftriaxone (ROCEPHIN) 1 g/50 mL in dextrose IVPB 1,000 mg Intravenous New Bag        Past Medical/Surgical History:     Diagnosis    Lower urinary tract symptoms    Malignant neoplasm of trigone of urinary bladder (HCC)    Prostate cancer (HCC)    CHF, acute on chronic (HCC)    CKD (chronic kidney disease) stage 3, GFR 30-59 ml/min    Paroxysmal atrial fibrillation (HCC)    CAD (coronary artery disease)    Type 2 diabetes mellitus ([place] [address] )    Pacemaker     Resolved Ambulatory Problems     Diagnosis Date Noted    No Resolved Ambulatory Problems     Diagnosis    A-fib ([place] [address] )    Anemia    Bladder cancer ([place] [address] )    CAD (coronary artery disease)    [facility]est[facility] failure) [facility]    Chronic kidney failure    Colon cancer ([place] [address] )    Eye cancer, left ([place] [address] )    Pacemaker    Prostate cancer ([place] [address] )    Type 2 diabetes mellitus ([place] [address] )     Admitting Diagnosis: UTI (urinary tract infection) [N39 0]  Abdominal pain [R10 9]  CKD (chronic kidney disease) [N18 9]  Right ureteral stone [N20 1]    Age/Sex: 80 y o  male    Assessment/Plan:   * Right ureteral stone   Assessment & Plan     9 mm ureteral stone on the right with signs of infection on CT scan  Admit to inpatient  Consult Urology for cystoscopy and stent placement  IV ceftriaxone  IV analgesics           Urinary tract infection without hematuria   Assessment & Plan     Continue his ceftriaxone 1 g daily  Await blood and urine cultures    Monitor temps and WBC count daily           Paroxysmal atrial fibrillation (HCC)   Assessment & Plan     Continue coreg for rate control  Hold eliquis until after cysto          Type 2 diabetes mellitus ([place] [address] )   Assessment & Plan             Lab Results   Component Value Date     HGBA1C 7 4 (H) 05/23/2018         No results for input(s): POCGLU in the last 72 hours      Blood Sugar Average: Last 72 hrs:     Well controlled as outpt   Hold empaglizlozin while inpatient  Basal lantus and sliding scale coverage while hospitalized  Restart empagliflozin upon discharge             CKD (chronic kidney disease) stage 3, GFR 30-59 ml/min   Assessment & Plan     Stable, near baseline  Monitor renal function while inpatient          CHF, acute on chronic (HCC)   Assessment & Plan     Volume status is acceptable    Continue lasix after cystoscopy        Admission Orders:  Scheduled Meds:   Current Facility-Administered Medications:  acetaminophen 650 mg Oral Q6H PRN    apixaban 2 5 mg Oral BID    aspirin 81 mg Oral Daily    carvedilol 3 125 mg Oral BID With Meals    cefTRIAXone 1,000 mg Intravenous Q24H Last Rate: 1,000 mg (07/02/18 0817)   finasteride 5 mg Oral Daily    fish oil 1,000 mg Oral Daily    furosemide 40 mg Oral BID (diuretic)    gabapentin 100 mg Oral HS    insulin glargine 10 Units Subcutaneous HS    insulin lispro 1-6 Units Subcutaneous TID AC    ipratropium-albuterol 3 mL Nebulization Q6H PRN    lisinopril 2 5 mg Oral Daily    melatonin 3 mg Oral HS    nitroglycerin 0 4 mg Sublingual Q5 Min PRN    ondansetron 4 mg Intravenous Q6H PRN    oxybutynin 10 mg Oral HS    oxyCODONE 5 mg Oral Q4H PRN    polyvinyl alcohol 1 drop Both Eyes PRN    potassium chloride 20 mEq Oral BID    sodium chloride 50 mL/hr Intravenous Continuous Last Rate: 50 mL/hr (07/01/18 1558)   tamsulosin 0 4 mg Oral Daily With Dinner      Continuous Infusions:   sodium chloride 50 mL/hr Last Rate: 50 mL/hr (07/01/18 1558)     PRN Meds:   acetaminophen    ipratropium-albuterol    nitroglycerin    ondansetron    oxyCODONE x3    polyvinyl alcohol    POC glucose ac/hs   OOB as gabriela   Daily wt   SCDs  Bladder irrigation PRN   Diet Viet/CHO Controlled; Consistent Carbohydrate Diet Level 2 (5 carb servings/75 grams CHO/meal)  Cons Urology   _____________________  7/1 Urology Consult   80 y o  old male with  right ureteral calculus in the setting of infection     PLAN:   I had a lengthy discussion with the patient  We discussed in the setting of UTI and ureteral stone we recommend stent placement  We will not go after the stone  Risks and benefits of cystoscopy, retrograde pyelogram, and stent placement were discussed and he was consented  Please keep the patient NPO  We will plan to go to the OR today  Otherwise continue broad-spectrum antibiotics and await urine culture results  ____________________  7/1 Operative Report   SURGERY DATE: 7/1/2018     Surgeon(s) and Role:     Mandi Ardon MD - Primary     Preop Diagnosis:  Right ureteral stone [N20 1]     Post-Op Diagnosis Codes:     * Right ureteral stone [N20 1]     Procedure(s) (LRB):  CYSTOSCOPY RETROGRADE PYELOGRAM WITH INSERTION STENT URETERAL (Right)  ____________________  7/2 Urology Progress Note  POD 1 cystoscopy, retrograde pyelogram with insertion of right ureteral stent  Patient was noted to have purulent urine in the bladder intraoperatively  Patient denies any flank pain, dysuria, or stent discomfort  Marte catheter remains in place draining clear rust/dark jorge luis urine  He is tolerating regular diet, denies any nausea or vomiting    PLAN:   -Urine culture remains pending, titrate antibiotics based on final report and sensitivity    Continue Rocephin per primary team  -Continue home dose of finasteride and tamsulosin  -Remove Marte catheter prior to discharge with void trial   Please call Urology if patient is symptomatic or PVR greater than 300 cc  -Our office will arrange follow-up within the next 2 weeks to discuss elective staged ureteroscopy

## 2018-07-02 NOTE — ASSESSMENT & PLAN NOTE
· 9 mm ureteral stone on the right with signs of infection on CT scan    · POD 1 cystoscopy with right ureteral stent placement  · Outpatient follow up with Urology in 2 weeks to plan 2nd urteroscopy  · Can have voiding trial prior to discharge - will plan for tomorrow

## 2018-07-02 NOTE — TELEPHONE ENCOUNTER
Mr Jet Rosenberg is an 25-year-old male stented by Dr Sarah Poole for right ureteral calculus  Please contact patient/caregiver with hospital follow-up regarding outpatient ureteroscopic stone treatment in approximately 3 weeks  Thank you

## 2018-07-02 NOTE — PLAN OF CARE
Problem: PHYSICAL THERAPY ADULT  Goal: Performs mobility at highest level of function for planned discharge setting  See evaluation for individualized goals  Treatment/Interventions: Functional transfer training, LE strengthening/ROM, Therapeutic exercise, Endurance training, Patient/family training, Equipment eval/education, Bed mobility, Gait training  Equipment Recommended: Kelton Driscoll       See flowsheet documentation for full assessment, interventions and recommendations  Prognosis: Good  Problem List: Decreased strength, Decreased endurance, Impaired balance, Decreased mobility, Decreased cognition, Impaired judgement, Decreased safety awareness, Pain  Assessment: Pt is a 80 y o  male seen for PT evaluation s/p admit to [facility] on 7/1/2018 w/ Right ureteral stone  Order placed for PT  Comorbidities affecting pt's physical performance at time of assessment listed above  Personal factors affecting pt at time of IE include: advanced age, behavioral pattern, inability to perform ADLs and limited insight into impairments  Prior to admission, pt was was independent w/ all functional mobility w/ rollator as needed, lived in one floor environment and lives at [facility]  Upon evaluation: Pt requires min/CGA for sit to stand, min/CGA for ambulation, and SBA for bed mobility  Pt is impulsive and lacks safety awareness  Poor insight into deficits as pt reports no need for AD  (Please find full objective findings from PT assessment regarding body systems outlined above)  Impairments and limitations also listed above, especially due to  weakness, impaired balance, decreased endurance, gait deviations, pain, decreased activity tolerance, decreased safety awareness, impaired judgement, fall risk and SOB upon exertion  The following objective measures performed on IE also reveal limitations: Barthel Index 45/100   Pt's clinical presentation is currently unstable/unpredictable seen in pt's presentation of decreased safety awareness, impulsivity, and lack of insight into deficits  Pt to benefit from continued skilled PT tx while in hospital and upon DC to address deficits as defined above and maximize level of functional mobility  From PT/mobility standpoint, recommendation at time of d/c would be return to facility with increased assist and PT services pending progress in order to maximize pt's functional independence and consistency w/ mobility in order to facilitate return to PLOF  Recommend progression of ambulation, initiation of HEP, and dynamic balance activities as appropriate  Recommendation:  (return with PT services and increased assist if available)          See flowsheet documentation for full assessment

## 2018-07-02 NOTE — ASSESSMENT & PLAN NOTE
· Continue his ceftriaxone 1 g daily    · Follow up final urine cultures  · Eliquis on hold secondary to hematuria

## 2018-07-03 VITALS
OXYGEN SATURATION: 98 % | BODY MASS INDEX: 27.93 KG/M2 | RESPIRATION RATE: 18 BRPM | HEART RATE: 73 BPM | DIASTOLIC BLOOD PRESSURE: 82 MMHG | HEIGHT: 70 IN | SYSTOLIC BLOOD PRESSURE: 132 MMHG | WEIGHT: 195.11 LBS | TEMPERATURE: 99.3 F

## 2018-07-03 LAB
ANION GAP SERPL CALCULATED.3IONS-SCNC: 7 MMOL/L (ref 4–13)
BACTERIA UR CULT: ABNORMAL
BUN SERPL-MCNC: 36 MG/DL (ref 5–25)
CALCIUM SERPL-MCNC: 8.3 MG/DL (ref 8.3–10.1)
CHLORIDE SERPL-SCNC: 101 MMOL/L (ref 100–108)
CO2 SERPL-SCNC: 25 MMOL/L (ref 21–32)
CREAT SERPL-MCNC: 1.79 MG/DL (ref 0.6–1.3)
ERYTHROCYTE [DISTWIDTH] IN BLOOD BY AUTOMATED COUNT: 16 % (ref 11.6–15.1)
GFR SERPL CREATININE-BSD FRML MDRD: 33 ML/MIN/1.73SQ M
GLUCOSE SERPL-MCNC: 157 MG/DL (ref 65–140)
GLUCOSE SERPL-MCNC: 158 MG/DL (ref 65–140)
GLUCOSE SERPL-MCNC: 168 MG/DL (ref 65–140)
GLUCOSE SERPL-MCNC: 172 MG/DL (ref 65–140)
HCT VFR BLD AUTO: 33.8 % (ref 36.5–49.3)
HGB BLD-MCNC: 10.5 G/DL (ref 12–17)
MCH RBC QN AUTO: 29.1 PG (ref 26.8–34.3)
MCHC RBC AUTO-ENTMCNC: 31.1 G/DL (ref 31.4–37.4)
MCV RBC AUTO: 94 FL (ref 82–98)
MRSA NOSE QL CULT: ABNORMAL
MRSA NOSE QL CULT: ABNORMAL
PLATELET # BLD AUTO: 210 THOUSANDS/UL (ref 149–390)
PMV BLD AUTO: 10.6 FL (ref 8.9–12.7)
POTASSIUM SERPL-SCNC: 4.5 MMOL/L (ref 3.5–5.3)
RBC # BLD AUTO: 3.61 MILLION/UL (ref 3.88–5.62)
SODIUM SERPL-SCNC: 133 MMOL/L (ref 136–145)
WBC # BLD AUTO: 9.52 THOUSAND/UL (ref 4.31–10.16)

## 2018-07-03 PROCEDURE — 99239 HOSP IP/OBS DSCHRG MGMT >30: CPT | Performed by: HOSPITALIST

## 2018-07-03 PROCEDURE — 82948 REAGENT STRIP/BLOOD GLUCOSE: CPT

## 2018-07-03 PROCEDURE — 85027 COMPLETE CBC AUTOMATED: CPT | Performed by: PHYSICIAN ASSISTANT

## 2018-07-03 PROCEDURE — 80048 BASIC METABOLIC PNL TOTAL CA: CPT | Performed by: PHYSICIAN ASSISTANT

## 2018-07-03 PROCEDURE — 90714 TD VACC NO PRESV 7 YRS+ IM: CPT | Performed by: HOSPITALIST

## 2018-07-03 RX ORDER — CEPHALEXIN 500 MG/1
500 CAPSULE ORAL EVERY 12 HOURS SCHEDULED
Qty: 28 CAPSULE | Refills: 0 | Status: SHIPPED | OUTPATIENT
Start: 2018-07-03 | End: 2018-07-17

## 2018-07-03 RX ORDER — MORPHINE SULFATE 2 MG/ML
2 INJECTION, SOLUTION INTRAMUSCULAR; INTRAVENOUS ONCE
Status: COMPLETED | OUTPATIENT
Start: 2018-07-03 | End: 2018-07-03

## 2018-07-03 RX ORDER — LIDOCAINE HYDROCHLORIDE AND EPINEPHRINE 10; 10 MG/ML; UG/ML
INJECTION, SOLUTION INFILTRATION; PERINEURAL
Status: COMPLETED
Start: 2018-07-03 | End: 2018-07-03

## 2018-07-03 RX ORDER — LIDOCAINE HYDROCHLORIDE 10 MG/ML
1 INJECTION, SOLUTION EPIDURAL; INFILTRATION; INTRACAUDAL; PERINEURAL ONCE
Status: DISCONTINUED | OUTPATIENT
Start: 2018-07-03 | End: 2018-07-04 | Stop reason: HOSPADM

## 2018-07-03 RX ORDER — FUROSEMIDE 40 MG/1
40 TABLET ORAL
Status: DISCONTINUED | OUTPATIENT
Start: 2018-07-03 | End: 2018-07-04 | Stop reason: HOSPADM

## 2018-07-03 RX ADMIN — FUROSEMIDE 40 MG: 40 TABLET ORAL at 12:26

## 2018-07-03 RX ADMIN — INSULIN LISPRO 3 UNITS: 100 INJECTION, SOLUTION INTRAVENOUS; SUBCUTANEOUS at 12:26

## 2018-07-03 RX ADMIN — MORPHINE SULFATE 2 MG: 2 INJECTION, SOLUTION INTRAMUSCULAR; INTRAVENOUS at 15:32

## 2018-07-03 RX ADMIN — FUROSEMIDE 40 MG: 40 TABLET ORAL at 18:30

## 2018-07-03 RX ADMIN — INSULIN LISPRO 1 UNITS: 100 INJECTION, SOLUTION INTRAVENOUS; SUBCUTANEOUS at 18:40

## 2018-07-03 RX ADMIN — CEFTRIAXONE 1000 MG: 1 INJECTION, POWDER, FOR SOLUTION INTRAMUSCULAR; INTRAVENOUS at 08:37

## 2018-07-03 RX ADMIN — TAMSULOSIN HYDROCHLORIDE 0.4 MG: 0.4 CAPSULE ORAL at 18:30

## 2018-07-03 RX ADMIN — CARVEDILOL 3.12 MG: 3.12 TABLET, FILM COATED ORAL at 18:31

## 2018-07-03 RX ADMIN — POTASSIUM CHLORIDE 20 MEQ: 1500 TABLET, EXTENDED RELEASE ORAL at 08:34

## 2018-07-03 RX ADMIN — FINASTERIDE 5 MG: 5 TABLET, FILM COATED ORAL at 08:34

## 2018-07-03 RX ADMIN — OXYCODONE HYDROCHLORIDE 5 MG: 5 TABLET ORAL at 05:53

## 2018-07-03 RX ADMIN — INSULIN LISPRO 1 UNITS: 100 INJECTION, SOLUTION INTRAVENOUS; SUBCUTANEOUS at 12:26

## 2018-07-03 RX ADMIN — ASPIRIN 81 MG 81 MG: 81 TABLET ORAL at 08:34

## 2018-07-03 RX ADMIN — APIXABAN 2.5 MG: 2.5 TABLET, FILM COATED ORAL at 08:34

## 2018-07-03 RX ADMIN — POTASSIUM CHLORIDE 20 MEQ: 1500 TABLET, EXTENDED RELEASE ORAL at 18:30

## 2018-07-03 RX ADMIN — INSULIN LISPRO 1 UNITS: 100 INJECTION, SOLUTION INTRAVENOUS; SUBCUTANEOUS at 07:39

## 2018-07-03 RX ADMIN — LISINOPRIL 2.5 MG: 2.5 TABLET ORAL at 08:34

## 2018-07-03 RX ADMIN — Medication 1000 MG: at 08:34

## 2018-07-03 RX ADMIN — APIXABAN 2.5 MG: 2.5 TABLET, FILM COATED ORAL at 18:29

## 2018-07-03 RX ADMIN — CARVEDILOL 3.12 MG: 3.12 TABLET, FILM COATED ORAL at 07:39

## 2018-07-03 RX ADMIN — LIDOCAINE HYDROCHLORIDE AND EPINEPHRINE: 10; 10 INJECTION, SOLUTION INFILTRATION; PERINEURAL at 15:36

## 2018-07-03 RX ADMIN — CLOSTRIDIUM TETANI TOXOID ANTIGEN (FORMALDEHYDE INACTIVATED) AND CORYNEBACTERIUM DIPHTHERIAE TOXOID ANTIGEN (FORMALDEHYDE INACTIVATED) 0.5 ML: 5; 2 INJECTION, SUSPENSION INTRAMUSCULAR at 20:07

## 2018-07-03 RX ADMIN — INSULIN LISPRO 3 UNITS: 100 INJECTION, SOLUTION INTRAVENOUS; SUBCUTANEOUS at 18:39

## 2018-07-03 RX ADMIN — INSULIN LISPRO 3 UNITS: 100 INJECTION, SOLUTION INTRAVENOUS; SUBCUTANEOUS at 07:39

## 2018-07-03 NOTE — PROGRESS NOTES
Patient being wheeled out to [place] [id number] with  and PCA  Patient took step up into van and scraped his right leg on the step of the wheel chair Elvia Dye, per   Patient now with laceration on right shin with bloody drainage  Patient brought to bed, Dr Yumiko Ware at bedside to evaluate  Per Dr Yumiko Ware she has called trauma resident to evaluate for repair  Discharge cancelled  Patient aware  Patient will go back to [place] [address] with [facility] aware and P8 Charge RN Flo De Leon aware of scenario

## 2018-07-03 NOTE — PLAN OF CARE
DISCHARGE PLANNING     Discharge to home or other facility with appropriate resources Progressing        DISCHARGE PLANNING - CARE MANAGEMENT     Discharge to post-acute care or home with appropriate resources Progressing        Knowledge Deficit     Patient/family/caregiver demonstrates understanding of disease process, treatment plan, medications, and discharge instructions Progressing        PAIN - ADULT     Verbalizes/displays adequate comfort level or baseline comfort level Progressing        Potential for Falls     Patient will remain free of falls Progressing

## 2018-07-03 NOTE — SOCIAL WORK
Cm reviewed patient during care coordination rounds with Dr Bouchra Zee  Patient stable for discharge back to [facility]  Cm arranged transport through [facility] for Hakeem Moorep, Dr Bouchra Zee, [facility], and patient aware and in agreement with discharge plan  Cm following

## 2018-07-03 NOTE — PROGRESS NOTES
Patient medically stable to be discharge  Mounting the ambulance and suffered laceration middle third of right lower extremity, with profuse bleeding  He is currently on eliquis, which needs to be repaired  General surgery- consulted for laceration repair

## 2018-07-03 NOTE — DISCHARGE SUMMARY
Discharge- Bary Reach 12/21/1930, 80 y o  male MRN: [id number]    Unit/Bed#: [facility] 812-01 Encounter: [id number]    Primary Care Provider: Kaitlyn Dye MD   Date and time admitted to hospital: 7/1/2018  9:05 AM        Hyponatremia   Assessment & Plan    · Na 133        Urinary tract infection with hematuria   Assessment & Plan    · Continue his ceftriaxone 1 g daily  · Urine culture with E coli          Type 2 diabetes mellitus [facility])   Assessment & Plan    Lab Results   Component Value Date    HGBA1C 7 4 (H) 05/23/2018       Recent Labs      07/02/18   1059  07/02/18   1604  07/02/18   2112  07/03/18   0707   POCGLU  239*  171*  237*  158*       Blood Sugar Average: Last 72 hrs: Well controlled as outpt  Hold empaglizlozin while inpatient  Basal lantus and sliding scale coverage while hospitalized - add mealtime insulin  Restart empagliflozin upon discharge  (P) [phone]        Paroxysmal atrial fibrillation (HCC)   Assessment & Plan    · Continue coreg for rate control  · Continue AC with eliquis        CKD (chronic kidney disease) stage 3, GFR 30-59 ml/min   Assessment & Plan    · Stable, near baseline  · Monitor renal function while inpatient  · BUN/CR: 36/1 79        Chronic diastolic congestive heart failure (HCC)   Assessment & Plan    · Continue lasix        * Right ureteral stone   Assessment & Plan    · 9 mm ureteral stone on the right with signs of infection on CT scan  · POD 2 cystoscopy with right ureteral stent placement  · Outpatient follow up with Urology in 2 weeks to plan 2nd urteroscopy  · Void trials- able to pass urine              Discharging Physician / Practitioner: Pratibha Mcdaniel MD  PCP: Kaitlyn Dye MD  Admission Date:   Admission Orders     Ordered        07/02/18 0945  Inpatient Admission  Once         07/01/18 1206  Place in Observation (expected length of stay for this patient is less than two midnights)  Once             Discharge Date: 07/03/18    Resolved Problems  Date Reviewed: 7/3/2018    None          Consultations During Hospital Stay:  · Urology    Procedures Performed:     Cystoscopy with right ureteral stent placement  Significant Findings / Test Results:     E coli in the urine sensitive to cefazolin, will discharge on Keflex for 2 weeks  Incidental Findings:   · None    Test Results Pending at Discharge (will require follow up): · None     Outpatient Tests Requested:  · Follow-up Urology in 2 weeks for 2nd ureteroscopy    Complications:  none    Reason for Admission: RLQ pain, dysuria  Hospital Course:     Ivet Sheridan is a 80 y o  male patient who originally presented to the hospital on 7/1/2018 due to right lower quadrant pain, dysuria  He was found to have a 9 mm ureteral stone on the right with evidence of infection CT scan  Urology was consulted started on IV ceftriaxone, patient had successful cystoscopy, retrograde pyelogram with insertion of right ureteral stent  Patient is recommended to continue home dose of finasteride and tamsulosin, void trial successful, follow-up in office in 2 weeks for elective staged ureteroscopy  Patient will be discharged on Keflex for 2 weeks, E coli sensitive in urine  Please see above list of diagnoses and related plan for additional information  Condition at Discharge: good     Discharge Day Visit / Exam:     Subjective:  No complaints today  Review of systems negative otherwise  Vitals: Blood Pressure: 121/65 (07/03/18 0708)  Pulse: 80 (07/03/18 0708)  Temperature: (!) 97 2 °F (36 2 °C) (07/03/18 0708)  Temp Source: Oral (07/03/18 0708)  Respirations: 18 (07/03/18 0708)  Height: 5' 9 5" (176 5 cm) (07/01/18 1336)  Weight - Scale: 88 5 kg (195 lb 1 7 oz) (07/02/18 0600)  SpO2: 100 % (07/03/18 0708)  Exam:   Physical Exam   Constitutional: He is oriented to person, place, and time  No distress  HENT:   Head: Normocephalic and atraumatic     Reduced hearing in both ears, hearing in on right ear   Eyes: EOM are normal  Pupils are equal, round, and reactive to light  No scleral icterus  Neck: Normal range of motion  Neck supple  No JVD present  Cardiovascular: Normal rate  Exam reveals no gallop and no friction rub  No murmur heard  Pulmonary/Chest: Effort normal and breath sounds normal  No respiratory distress  He has no wheezes  Abdominal: Soft  Bowel sounds are normal  He exhibits no distension  There is no tenderness  Musculoskeletal: Normal range of motion  He exhibits no edema  Neurological: He is alert and oriented to person, place, and time  No cranial nerve deficit  Skin: Skin is warm  Psychiatric: He has a normal mood and affect  Discussion with Family:yes    Discharge instructions/Information to patient and family:   See after visit summary for information provided to patient and family  Provisions for Follow-Up Care:  See after visit summary for information related to follow-up care and any pertinent home health orders  Disposition:     Group Home / [place] at [address] [place] [address], [place] to [facility] SNF:   · Not Applicable to this Patient - Not Applicable to this Patient    Planned Readmission: none     Discharge Statement:  I spent 35 minutes discharging the patient  This time was spent on the day of discharge  I had direct contact with the patient on the day of discharge  Greater than 50% of the total time was spent examining patient, answering all patient questions, arranging and discussing plan of care with patient as well as directly providing post-discharge instructions  Additional time then spent on discharge activities  Discharge Medications:  See after visit summary for reconciled discharge medications provided to patient and family        ** Please Note: This note has been constructed using a voice recognition system **

## 2018-07-03 NOTE — ASSESSMENT & PLAN NOTE
· 9 mm ureteral stone on the right with signs of infection on CT scan  · POD 2 cystoscopy with right ureteral stent placement  · Outpatient follow up with Urology in 2 weeks to plan 2nd urteroscopy  · Void trials- able to pass urine

## 2018-07-03 NOTE — PROGRESS NOTES
Patient was transferring into wheelchair van and received a right lower leg laceration   Dr Tariq Portillo ([facility]) aware & has paged red surgery to come assess laceration

## 2018-07-03 NOTE — CONSULTS
Called to evaluate patient with RLE wound  Patient is on eliquis and suffered an approximately 6cm laceration in the shape of a V on the anterior portion fo this RLE  At time of evaluation patient was no longer bleeding  Dried blood and skin tear noted  Area cleaned and irrigated with normal saline Anesthestized with 10cc of lidocaine 1% with epi  Then 8 sutures 4-0 prolene utilized interrupted placed along suture bed  Dressed with adaptic, 4x4 and kerlix  Assessment: 81 yo M with RLE laceration and skin tear    Plan  S/p repair, procedure note above   Dr Olivia Lopez immediately available during procedure   Informed consent obtained   Patient to follow up with primary care doctor for suture removal in 7 days   Keep area clean and dry   Please ensure that patient is up to date on vaccinations including tetanus

## 2018-07-03 NOTE — DISCHARGE INSTRUCTIONS
Kidney Stones   WHAT YOU NEED TO KNOW:   What is a kidney stone? Kidney stones form in the urinary system when the water and waste in your urine are out of balance  When this happens, certain types of waste crystals separate from the urine  The crystals build up and form kidney stones  Kidney stones can be made of uric acid, calcium, phosphate, or oxalate crystals  You may have 1 or more kidney stones  What increases my risk for kidney stones? · You do not drink enough liquids (especially water) each day  · You have urinary tract infections often  · You follow a certain type of diet  For example, people who eat a diet high in meat or salt may be at higher risk for kidney stones  People who eat foods high in oxalate may also be at higher risk  Foods that are high in oxalate include nuts, chocolate, coffee, and green leafy vegetables  · You take certain medicines such as diuretics, steroids, and antacids  · A family member has had kidney stones  · You were born with a kidney or bowel disorder, or you have other medical problems such as gout  What are the signs and symptoms of kidney stones? · Pain in the middle of your back that moves across to your side or that may spread to your groin    · Nausea and vomiting    · Urge to urinate often, burning feeling when you urinate, or pink or red urine    · Tenderness in your lower back, side, or stomach  How are kidney stones diagnosed? Your healthcare provider will ask about your health, diet, and lifestyle  He may refer you to a urologist  [place] may need tests to find out what type of kidney stones you have  Tests can show the size of your kidney stones and where they are in your urinary system  You may have one or more of the following:  · Urine tests  may show if you have blood in your urine  They may also show high amounts of the substances that form kidney stones, such as uric acid  · Blood tests  show how well your kidneys are working  They may also be used to check the levels of calcium or uric acid in your blood  · A noncontrast helical CT scan  is a type of x-ray that uses a computer to take pictures of your kidneys  Healthcare providers use the pictures to check for kidney stones or other problems  · X-rays  of your kidneys, bladder, and ureters may be done  You may be given a dye before the pictures are taken to help healthcare providers see the pictures better  You may need to have more than one x-ray  Tell the healthcare provider if you have ever had an allergic reaction to contrast dye  · An abdominal ultrasound  uses sound waves to show pictures of your abdomen on a monitor  How are kidney stones treated? · NSAIDs , such as ibuprofen, help decrease swelling, pain, and fever  This medicine is available with or without a doctor's order  NSAIDs can cause stomach bleeding or kidney problems in certain people  If you take blood thinner medicine, always ask your healthcare provider if NSAIDs are safe for you  Always read the medicine label and follow directions  · Prescription medicine  may be given  Ask how to take this medicine safely  · Medicines  to balance your electrolytes may be needed  · A procedure or surgery  to remove the kidney stones may be needed if they do not pass on their own  Your treatment will depend on the size and location of your kidney stones  How can I manage my symptoms? · Drink plenty of liquids  Your healthcare provider may tell you to drink at least 8 to 12 (eight-ounce) cups of liquids each day  This helps flush out the kidney stones when you urinate  Water is the best liquid to drink  · Strain your urine every time you go to the bathroom  Urinate through a strainer or a piece of thin cloth to catch the stones  Take the stones to your healthcare provider so they can be sent to the lab for tests  This will help your healthcare providers plan the best treatment for you  · Eat a variety of healthy foods  Healthy foods include fruits, vegetables, whole-grain breads, low-fat dairy products, beans, and fish  You may need to limit how much sodium (salt) or protein you eat  Ask for information about the best foods for you  · Exercise regularly  Your stones may pass more easily if you stay active  Ask about the best activities for you  When should I seek immediate care? · You have vomiting that is not relieved by medicine  When should I contact my healthcare provider? · You have a fever  · You have trouble passing urine  · You see blood in your urine  · You have severe pain  · You have any questions or concerns about your condition or care  CARE AGREEMENT:   You have the right to help plan your care  Learn about your health condition and how it may be treated  Discuss treatment options with your caregivers to decide what care you want to receive  You always have the right to refuse treatment  The above information is an  only  It is not intended as medical advice for individual conditions or treatments  Talk to your doctor, nurse or pharmacist before following any medical regimen to see if it is safe and effective for you  © 2017 [address] [employer] [employer] Information is for End User's use only and may not be sold, redistributed or otherwise used for commercial purposes  All illustrations and images included in CareNotes® are the copyrighted property of A D A M , Inc  or Chepe Lacey

## 2018-07-03 NOTE — SOCIAL WORK
DC transport:    Pt's dc delayed when pt acquired laceration to LE and required suturing  CM called and spoke with Amanda Dominguez at [place]; S transport set up for 8-8:15 pm tonight  Pt requires 02 1L, contact Isolation for MRSA and attendant needed  CMN form completed and given with face sheet to MANJULA Ríos, Dr Michael Johnson and Neil Teixeira at [facility] advised

## 2018-07-03 NOTE — ASSESSMENT & PLAN NOTE
Lab Results   Component Value Date    HGBA1C 7 4 (H) 05/23/2018       Recent Labs      07/02/18   1059  07/02/18   1604  07/02/18   2112  07/03/18   0707   POCGLU  239*  171*  237*  158*       Blood Sugar Average: Last 72 hrs: Well controlled as outpt    Hold empaglizlozin while inpatient  Basal lantus and sliding scale coverage while hospitalized - add mealtime insulin  Restart empagliflozin upon discharge  (P) [phone]

## 2018-07-04 ENCOUNTER — HOSPITAL ENCOUNTER (INPATIENT)
Facility: HOSPITAL | Age: 83
LOS: 1 days | Discharge: HOME | DRG: 313 | End: 2018-07-06
Attending: EMERGENCY MEDICINE | Admitting: INTERNAL MEDICINE
Payer: MEDICARE

## 2018-07-04 ENCOUNTER — APPOINTMENT (EMERGENCY)
Dept: RADIOLOGY | Facility: HOSPITAL | Age: 83
DRG: 313 | End: 2018-07-04
Payer: MEDICARE

## 2018-07-04 DIAGNOSIS — Z87.440 HISTORY OF UTI: ICD-10-CM

## 2018-07-04 DIAGNOSIS — I25.10 CORONARY ARTERY DISEASE INVOLVING NATIVE CORONARY ARTERY OF NATIVE HEART WITHOUT ANGINA PECTORIS: Chronic | ICD-10-CM

## 2018-07-04 DIAGNOSIS — R31.9 URINARY TRACT INFECTION WITH HEMATURIA, SITE UNSPECIFIED: ICD-10-CM

## 2018-07-04 DIAGNOSIS — I21.4 NSTEMI (NON-ST ELEVATED MYOCARDIAL INFARCTION) (HCC): ICD-10-CM

## 2018-07-04 DIAGNOSIS — S81.811A LACERATION OF RIGHT LOWER EXTREMITY: ICD-10-CM

## 2018-07-04 DIAGNOSIS — R39.9 LOWER URINARY TRACT SYMPTOMS: ICD-10-CM

## 2018-07-04 DIAGNOSIS — R07.89 CHEST PRESSURE: Primary | ICD-10-CM

## 2018-07-04 DIAGNOSIS — R31.9 HEMATURIA: ICD-10-CM

## 2018-07-04 DIAGNOSIS — N39.0 URINARY TRACT INFECTION WITH HEMATURIA, SITE UNSPECIFIED: ICD-10-CM

## 2018-07-04 PROBLEM — R19.7 DIARRHEA: Status: ACTIVE | Noted: 2018-07-04

## 2018-07-04 LAB
ALBUMIN SERPL BCP-MCNC: 2.6 G/DL (ref 3.5–5)
ALP SERPL-CCNC: 87 U/L (ref 46–116)
ALT SERPL W P-5'-P-CCNC: 13 U/L (ref 12–78)
ANION GAP SERPL CALCULATED.3IONS-SCNC: 6 MMOL/L (ref 4–13)
AST SERPL W P-5'-P-CCNC: 10 U/L (ref 5–45)
ATRIAL RATE: 288 BPM
BACTERIA UR QL AUTO: ABNORMAL /HPF
BASOPHILS # BLD AUTO: 0.05 THOUSANDS/ΜL (ref 0–0.1)
BASOPHILS NFR BLD AUTO: 1 % (ref 0–1)
BILIRUB SERPL-MCNC: 0.44 MG/DL (ref 0.2–1)
BILIRUB UR QL STRIP: NEGATIVE
BUN SERPL-MCNC: 37 MG/DL (ref 5–25)
CALCIUM SERPL-MCNC: 8.3 MG/DL (ref 8.3–10.1)
CHLORIDE SERPL-SCNC: 101 MMOL/L (ref 100–108)
CLARITY UR: ABNORMAL
CO2 SERPL-SCNC: 30 MMOL/L (ref 21–32)
COLOR UR: ABNORMAL
CREAT SERPL-MCNC: 1.59 MG/DL (ref 0.6–1.3)
EOSINOPHIL # BLD AUTO: 0.14 THOUSAND/ΜL (ref 0–0.61)
EOSINOPHIL NFR BLD AUTO: 2 % (ref 0–6)
ERYTHROCYTE [DISTWIDTH] IN BLOOD BY AUTOMATED COUNT: 16 % (ref 11.6–15.1)
GFR SERPL CREATININE-BSD FRML MDRD: 38 ML/MIN/1.73SQ M
GLUCOSE SERPL-MCNC: 128 MG/DL (ref 65–140)
GLUCOSE SERPL-MCNC: 162 MG/DL (ref 65–140)
GLUCOSE SERPL-MCNC: 207 MG/DL (ref 65–140)
GLUCOSE UR STRIP-MCNC: ABNORMAL MG/DL
HCT VFR BLD AUTO: 31.9 % (ref 36.5–49.3)
HGB BLD-MCNC: 9.7 G/DL (ref 12–17)
HGB UR QL STRIP.AUTO: ABNORMAL
HYALINE CASTS #/AREA URNS LPF: ABNORMAL /LPF
IMM GRANULOCYTES # BLD AUTO: 0.03 THOUSAND/UL (ref 0–0.2)
IMM GRANULOCYTES NFR BLD AUTO: 1 % (ref 0–2)
KETONES UR STRIP-MCNC: NEGATIVE MG/DL
LEUKOCYTE ESTERASE UR QL STRIP: ABNORMAL
LYMPHOCYTES # BLD AUTO: 0.46 THOUSANDS/ΜL (ref 0.6–4.47)
LYMPHOCYTES NFR BLD AUTO: 8 % (ref 14–44)
MCH RBC QN AUTO: 28.4 PG (ref 26.8–34.3)
MCHC RBC AUTO-ENTMCNC: 30.4 G/DL (ref 31.4–37.4)
MCV RBC AUTO: 94 FL (ref 82–98)
MONOCYTES # BLD AUTO: 0.62 THOUSAND/ΜL (ref 0.17–1.22)
MONOCYTES NFR BLD AUTO: 11 % (ref 4–12)
NEUTROPHILS # BLD AUTO: 4.55 THOUSANDS/ΜL (ref 1.85–7.62)
NEUTS SEG NFR BLD AUTO: 77 % (ref 43–75)
NITRITE UR QL STRIP: NEGATIVE
NON-SQ EPI CELLS URNS QL MICRO: ABNORMAL /HPF
NRBC BLD AUTO-RTO: 0 /100 WBCS
PH UR STRIP.AUTO: 6 [PH] (ref 4.5–8)
PLATELET # BLD AUTO: 205 THOUSANDS/UL (ref 149–390)
PMV BLD AUTO: 10.4 FL (ref 8.9–12.7)
POTASSIUM SERPL-SCNC: 3.8 MMOL/L (ref 3.5–5.3)
PROT SERPL-MCNC: 6.4 G/DL (ref 6.4–8.2)
PROT UR STRIP-MCNC: ABNORMAL MG/DL
QRS AXIS: 269 DEGREES
QRSD INTERVAL: 162 MS
QT INTERVAL: 430 MS
QTC INTERVAL: 460 MS
RBC # BLD AUTO: 3.41 MILLION/UL (ref 3.88–5.62)
RBC #/AREA URNS AUTO: ABNORMAL /HPF
SODIUM SERPL-SCNC: 137 MMOL/L (ref 136–145)
SP GR UR STRIP.AUTO: 1.01 (ref 1–1.03)
T WAVE AXIS: 80 DEGREES
TROPONIN I SERPL-MCNC: 0.05 NG/ML
UROBILINOGEN UR QL STRIP.AUTO: 0.2 E.U./DL
VENTRICULAR RATE: 69 BPM
WBC # BLD AUTO: 5.85 THOUSAND/UL (ref 4.31–10.16)
WBC #/AREA URNS AUTO: ABNORMAL /HPF

## 2018-07-04 PROCEDURE — 82948 REAGENT STRIP/BLOOD GLUCOSE: CPT

## 2018-07-04 PROCEDURE — 84484 ASSAY OF TROPONIN QUANT: CPT | Performed by: EMERGENCY MEDICINE

## 2018-07-04 PROCEDURE — 93005 ELECTROCARDIOGRAM TRACING: CPT

## 2018-07-04 PROCEDURE — 99220 PR INITIAL OBSERVATION CARE/DAY 70 MINUTES: CPT | Performed by: GENERAL PRACTICE

## 2018-07-04 PROCEDURE — 81001 URINALYSIS AUTO W/SCOPE: CPT

## 2018-07-04 PROCEDURE — 85025 COMPLETE CBC W/AUTO DIFF WBC: CPT | Performed by: EMERGENCY MEDICINE

## 2018-07-04 PROCEDURE — 84484 ASSAY OF TROPONIN QUANT: CPT | Performed by: GENERAL PRACTICE

## 2018-07-04 PROCEDURE — 73590 X-RAY EXAM OF LOWER LEG: CPT

## 2018-07-04 PROCEDURE — 87493 C DIFF AMPLIFIED PROBE: CPT | Performed by: GENERAL PRACTICE

## 2018-07-04 PROCEDURE — 36415 COLL VENOUS BLD VENIPUNCTURE: CPT

## 2018-07-04 PROCEDURE — 80053 COMPREHEN METABOLIC PANEL: CPT | Performed by: EMERGENCY MEDICINE

## 2018-07-04 PROCEDURE — 99285 EMERGENCY DEPT VISIT HI MDM: CPT

## 2018-07-04 PROCEDURE — 96374 THER/PROPH/DIAG INJ IV PUSH: CPT

## 2018-07-04 PROCEDURE — 93010 ELECTROCARDIOGRAM REPORT: CPT | Performed by: INTERNAL MEDICINE

## 2018-07-04 PROCEDURE — 87086 URINE CULTURE/COLONY COUNT: CPT

## 2018-07-04 PROCEDURE — 71046 X-RAY EXAM CHEST 2 VIEWS: CPT

## 2018-07-04 PROCEDURE — 96361 HYDRATE IV INFUSION ADD-ON: CPT

## 2018-07-04 RX ORDER — TRANEXAMIC ACID 100 MG/ML
1000 INJECTION, SOLUTION INTRAVENOUS ONCE
Status: COMPLETED | OUTPATIENT
Start: 2018-07-04 | End: 2018-07-04

## 2018-07-04 RX ORDER — LISINOPRIL 5 MG/1
5 TABLET ORAL DAILY
Status: DISCONTINUED | OUTPATIENT
Start: 2018-07-04 | End: 2018-07-06 | Stop reason: HOSPADM

## 2018-07-04 RX ORDER — MINERAL OIL AND PETROLATUM 150; 830 MG/G; MG/G
OINTMENT OPHTHALMIC
Status: DISCONTINUED | OUTPATIENT
Start: 2018-07-04 | End: 2018-07-04

## 2018-07-04 RX ORDER — IPRATROPIUM BROMIDE AND ALBUTEROL SULFATE 2.5; .5 MG/3ML; MG/3ML
3 SOLUTION RESPIRATORY (INHALATION) EVERY 6 HOURS PRN
Status: DISCONTINUED | OUTPATIENT
Start: 2018-07-04 | End: 2018-07-04

## 2018-07-04 RX ORDER — MORPHINE SULFATE 4 MG/ML
4 INJECTION, SOLUTION INTRAMUSCULAR; INTRAVENOUS ONCE
Status: COMPLETED | OUTPATIENT
Start: 2018-07-04 | End: 2018-07-04

## 2018-07-04 RX ORDER — CARVEDILOL 3.12 MG/1
3.12 TABLET ORAL 2 TIMES DAILY WITH MEALS
Status: DISCONTINUED | OUTPATIENT
Start: 2018-07-04 | End: 2018-07-06 | Stop reason: HOSPADM

## 2018-07-04 RX ORDER — ONDANSETRON 2 MG/ML
4 INJECTION INTRAMUSCULAR; INTRAVENOUS EVERY 6 HOURS PRN
Status: DISCONTINUED | OUTPATIENT
Start: 2018-07-04 | End: 2018-07-06 | Stop reason: HOSPADM

## 2018-07-04 RX ORDER — LANOLIN ALCOHOL/MO/W.PET/CERES
3 CREAM (GRAM) TOPICAL
Status: DISCONTINUED | OUTPATIENT
Start: 2018-07-04 | End: 2018-07-06 | Stop reason: HOSPADM

## 2018-07-04 RX ORDER — OXYBUTYNIN CHLORIDE 5 MG/1
10 TABLET, EXTENDED RELEASE ORAL
Status: DISCONTINUED | OUTPATIENT
Start: 2018-07-04 | End: 2018-07-06 | Stop reason: HOSPADM

## 2018-07-04 RX ORDER — CEPHALEXIN 500 MG/1
500 CAPSULE ORAL EVERY 12 HOURS SCHEDULED
Status: DISCONTINUED | OUTPATIENT
Start: 2018-07-04 | End: 2018-07-06 | Stop reason: HOSPADM

## 2018-07-04 RX ORDER — ASPIRIN 81 MG/1
81 TABLET ORAL DAILY
Status: DISCONTINUED | OUTPATIENT
Start: 2018-07-04 | End: 2018-07-06 | Stop reason: HOSPADM

## 2018-07-04 RX ORDER — CHLORAL HYDRATE 500 MG
1000 CAPSULE ORAL DAILY
Status: DISCONTINUED | OUTPATIENT
Start: 2018-07-04 | End: 2018-07-06 | Stop reason: HOSPADM

## 2018-07-04 RX ORDER — ACETAMINOPHEN 325 MG/1
650 TABLET ORAL EVERY 6 HOURS PRN
Status: DISCONTINUED | OUTPATIENT
Start: 2018-07-04 | End: 2018-07-06 | Stop reason: HOSPADM

## 2018-07-04 RX ORDER — TAMSULOSIN HYDROCHLORIDE 0.4 MG/1
0.4 CAPSULE ORAL
Status: DISCONTINUED | OUTPATIENT
Start: 2018-07-04 | End: 2018-07-06 | Stop reason: HOSPADM

## 2018-07-04 RX ORDER — NITROGLYCERIN 0.4 MG/1
0.4 TABLET SUBLINGUAL
Status: DISCONTINUED | OUTPATIENT
Start: 2018-07-04 | End: 2018-07-06 | Stop reason: HOSPADM

## 2018-07-04 RX ORDER — FINASTERIDE 5 MG/1
5 TABLET, FILM COATED ORAL DAILY
Status: DISCONTINUED | OUTPATIENT
Start: 2018-07-04 | End: 2018-07-06 | Stop reason: HOSPADM

## 2018-07-04 RX ORDER — POTASSIUM CHLORIDE 20 MEQ/1
20 TABLET, EXTENDED RELEASE ORAL 2 TIMES DAILY
Status: DISCONTINUED | OUTPATIENT
Start: 2018-07-04 | End: 2018-07-06 | Stop reason: HOSPADM

## 2018-07-04 RX ORDER — MORPHINE SULFATE 4 MG/ML
INJECTION, SOLUTION INTRAMUSCULAR; INTRAVENOUS
Status: COMPLETED
Start: 2018-07-04 | End: 2018-07-04

## 2018-07-04 RX ORDER — LIDOCAINE HYDROCHLORIDE AND EPINEPHRINE 10; 10 MG/ML; UG/ML
10 INJECTION, SOLUTION INFILTRATION; PERINEURAL ONCE
Status: DISCONTINUED | OUTPATIENT
Start: 2018-07-04 | End: 2018-07-04

## 2018-07-04 RX ORDER — FUROSEMIDE 40 MG/1
40 TABLET ORAL
Status: DISCONTINUED | OUTPATIENT
Start: 2018-07-04 | End: 2018-07-06 | Stop reason: HOSPADM

## 2018-07-04 RX ORDER — POLYVINYL ALCOHOL 14 MG/ML
1 SOLUTION/ DROPS OPHTHALMIC
Status: DISCONTINUED | OUTPATIENT
Start: 2018-07-04 | End: 2018-07-06 | Stop reason: HOSPADM

## 2018-07-04 RX ORDER — GABAPENTIN 100 MG/1
100 CAPSULE ORAL
Status: DISCONTINUED | OUTPATIENT
Start: 2018-07-04 | End: 2018-07-06 | Stop reason: HOSPADM

## 2018-07-04 RX ORDER — OXYCODONE HYDROCHLORIDE 5 MG/1
5 TABLET ORAL EVERY 4 HOURS PRN
Status: DISCONTINUED | OUTPATIENT
Start: 2018-07-04 | End: 2018-07-06 | Stop reason: HOSPADM

## 2018-07-04 RX ORDER — OXYCODONE HYDROCHLORIDE 5 MG/1
2.5 TABLET ORAL EVERY 4 HOURS PRN
Status: DISCONTINUED | OUTPATIENT
Start: 2018-07-04 | End: 2018-07-06 | Stop reason: HOSPADM

## 2018-07-04 RX ADMIN — APIXABAN 2.5 MG: 2.5 TABLET, FILM COATED ORAL at 18:38

## 2018-07-04 RX ADMIN — MELATONIN TAB 3 MG 3 MG: 3 TAB at 21:47

## 2018-07-04 RX ADMIN — OXYCODONE HYDROCHLORIDE 5 MG: 5 TABLET ORAL at 16:18

## 2018-07-04 RX ADMIN — MORPHINE SULFATE 4 MG: 4 INJECTION INTRAVENOUS at 11:41

## 2018-07-04 RX ADMIN — INSULIN LISPRO 1 UNITS: 100 INJECTION, SOLUTION INTRAVENOUS; SUBCUTANEOUS at 18:38

## 2018-07-04 RX ADMIN — HYDROMORPHONE HYDROCHLORIDE 0.2 MG: 1 INJECTION, SOLUTION INTRAMUSCULAR; INTRAVENOUS; SUBCUTANEOUS at 16:29

## 2018-07-04 RX ADMIN — MORPHINE SULFATE 4 MG: 4 INJECTION, SOLUTION INTRAMUSCULAR; INTRAVENOUS at 11:41

## 2018-07-04 RX ADMIN — OXYBUTYNIN CHLORIDE 10 MG: 5 TABLET, EXTENDED RELEASE ORAL at 21:47

## 2018-07-04 RX ADMIN — CEPHALEXIN 500 MG: 500 CAPSULE ORAL at 21:47

## 2018-07-04 RX ADMIN — SODIUM CHLORIDE 1000 ML: 0.9 INJECTION, SOLUTION INTRAVENOUS at 13:04

## 2018-07-04 RX ADMIN — GABAPENTIN 100 MG: 100 CAPSULE ORAL at 21:47

## 2018-07-04 RX ADMIN — CARVEDILOL 3.12 MG: 3.12 TABLET, FILM COATED ORAL at 18:38

## 2018-07-04 RX ADMIN — MORPHINE SULFATE 4 MG: 4 INJECTION INTRAVENOUS at 14:12

## 2018-07-04 RX ADMIN — FUROSEMIDE 40 MG: 40 TABLET ORAL at 18:37

## 2018-07-04 RX ADMIN — POTASSIUM CHLORIDE 20 MEQ: 1500 TABLET, EXTENDED RELEASE ORAL at 18:37

## 2018-07-04 RX ADMIN — TAMSULOSIN HYDROCHLORIDE 0.4 MG: 0.4 CAPSULE ORAL at 18:37

## 2018-07-04 RX ADMIN — TRANEXAMIC ACID 1000 MG: 100 INJECTION, SOLUTION INTRAVENOUS at 14:12

## 2018-07-04 NOTE — PROGRESS NOTES
Patient's initial RLE dressing was saturated with blood  Kassidy Churchill MD, changed dressing at bedside prior to d/c  Patient left with transport team stable, no complaints, and dressing C/D/I  I attempted to call report to [facility] 3 times with no answer and no call back  Receiving facility aware of patient's return, per CM

## 2018-07-04 NOTE — ASSESSMENT & PLAN NOTE
First trop 0 05, which appears to be his baseline  Check 2 more Trops w/ EKGs  JAVON 3 - age, CAD, ASA use, and elevated trop  Per protocol, will consult cardiology  Check lipid panel  A1C 7 4

## 2018-07-04 NOTE — PROCEDURES
Called because nursing staff had been unable to place straight cath and pt had greater than 500 ml on bladder scan  Using standard technique, place 16 F coudet catheter in without difficulty urine was returned, slightly pink tinged  Balloon inflated with 10 ml of sterile water  Urine continued to drain from bag  Pt tolerated procedure well

## 2018-07-04 NOTE — ASSESSMENT & PLAN NOTE
S/p R ureteral stent 7/1by Dr Mayank Grossman  Due to pain and gross hematuria - will ask for urology consult  Also will check bladder scans  Pain control w/ oxycodone

## 2018-07-04 NOTE — H&P
H&P- Migue Wu 12/21/1930, 80 y o  male MRN: [id number]    Unit/Bed#: [facility] 708-01 Encounter: [id number]    Primary Care Provider: Josephine Alarcon MD   Date and time admitted to hospital: 7/4/2018 11:11 AM        * Other chest pain   Assessment & Plan    First trop 0 05, which appears to be his baseline  Check 2 more Trops w/ EKGs  JAVON 3 - age, CAD, ASA use, and elevated trop  Per protocol, will consult cardiology  Check lipid panel  A1C 7 4  Diarrhea   Assessment & Plan    POA  Check C diff        Laceration of right lower extremity   Assessment & Plan    Sutured by trauma yesterday  Will ask trauma to re-eval as he is still bleeding  Urinary tract infection with hematuria   Assessment & Plan    On 14 day course of Keflex  Repeat U Cx ordered by ER    Will check procal in case this U Cx grows different bacteria to see if abx need adjustment        Right ureteral stone   Assessment & Plan    S/p R ureteral stent 7/1by Dr Sarah Poole  Due to pain and gross hematuria - will ask for urology consult  Also will check bladder scans  Pain control w/ oxycodone        Type 2 diabetes mellitus [facility])   Assessment & Plan    Lab Results   Component Value Date    HGBA1C 7 4 (H) 05/23/2018       Recent Labs      07/02/18   2112  07/03/18   0707  07/03/18   1118  07/03/18   1724   POCGLU  237*  158*  172*  157*       Blood Sugar Average: Last 72 hrs:    ISS  Hold PO meds while inpt          CAD (coronary artery disease)   Assessment & Plan    S/p PCI 2-3 years ago  ASA        Paroxysmal atrial fibrillation (HCC)   Assessment & Plan    W/ PPM  C/w Coreg and Eliquis        CKD (chronic kidney disease) stage 3, GFR 30-59 ml/min   Assessment & Plan    B/l Cr 1 5-1 7  stable        Chronic diastolic congestive heart failure (HCC)   Assessment & Plan    No exac  C/w lasix  CXR shows R pleural effusion, but this was present on CXR 7/1            VTE Prophylaxis: Apixaban (Eliquis)  / sequential compression device Code Status: Full  POLST: POLST form is not discussed and not completed at this time  Discussion with family: yes    Anticipated Length of Stay:  Patient will be admitted on an Observation basis with an anticipated length of stay of  Less than 2 midnights  Justification for Hospital Stay: need to r/o ACS    Total Time for Visit, including Counseling / Coordination of Care: 45 minutes  Greater than 50% of this total time spent on direct patient counseling and coordination of care  Chief Complaint:   Chest pressure    History of Present Illness:    Jessica Mcfarlane is a 80 y o  male w/ CAD s/p PCI 2-3 years ago at [facility] who presents from [facility] with CP with positional change this AM   Pt desribes pain as 5/10 chest pressure associated with SOB  Did not have this pain last admission  Not associated w/ nausea and vomit  At this time, c/o 5/10 chest pressure  Pt also c/o severe lower abd pain  He had stone w/ urteral stent placed 7/1  Was dx w/ UTI and d/c on Keflex yesterday  C/o hematuria and inability to fully empty bladder  He is also having frequent loose BMs  Also c/o RLE bleeding  Had a lac yesterday repaired by trauma  Denies pain at this site  Review of Systems:    Review of Systems   Constitutional: Negative  HENT: Negative  Eyes: Negative  Respiratory: Positive for shortness of breath  Cardiovascular: Positive for chest pain  Gastrointestinal: Positive for abdominal pain and diarrhea  Endocrine: Negative  Genitourinary: Positive for dysuria and hematuria  Musculoskeletal: Negative  Skin: Positive for wound  Allergic/Immunologic: Negative  Neurological: Negative  Hematological: Negative  Psychiatric/Behavioral: Negative          Past Medical and Surgical History:     Past Medical History:   Diagnosis Date    [facility]     Anemia     Bladder cancer [facility] )     CAD (coronary artery disease)     CHF (congestive heart failure) (HCC)     Chronic kidney failure     [facility]     [facility] cancer, left [facility]     Pacemaker     [facility]     Type 2 diabetes mellitus ([facility] )        Past Surgical History:   Procedure Laterality Date    CARDIAC PACEMAKER PLACEMENT  2014    CORONARY ANGIOPLASTY WITH STENT PLACEMENT      GALLBLADDER SURGERY      [place] CYSTOURETHROSCOPY,URETER CATHETER Right 7/1/2018    Procedure: CYSTOSCOPY RETROGRADE PYELOGRAM WITH INSERTION STENT URETERAL;  Surgeon: Richard Gonzalez MD;  Location: [facility];  Service: Urology       Meds/Allergies:    Prior to Admission medications    Medication Sig Start Date End Date Taking?  Authorizing Provider   albuterol (5 mg/mL) 0 5 % nebulizer solution Take 2 5 mg by nebulization every 6 (six) hours as needed for wheezing   Yes Historical Provider, MD   apixaban (ELIQUIS) 2 5 mg Take 1 tablet (2 5 mg total) by mouth 2 (two) times a day 5/25/18  Yes Itz Navarro MD   aspirin (ECOTRIN LOW STRENGTH) 81 mg EC tablet Take 81 mg by mouth daily   Yes Historical Provider, MD   carvedilol (COREG) 3 125 mg tablet Take 3 125 mg by mouth 2 (two) times a day with meals   Yes Historical Provider, MD   cephalexin (KEFLEX) 500 mg capsule Take 1 capsule (500 mg total) by mouth every 12 (twelve) hours for 14 days 7/3/18 7/17/18 Yes Sandrita Butts MD   Empagliflozin (JARDIANCE) 10 MG TABS Take 10 mg by mouth every morning   Yes Historical Provider, MD   enalapril (VASOTEC) 2 5 mg tablet Take 2 5 mg by mouth daily     Yes Historical Provider, MD   Ergocalciferol (VITAMIN D2 PO) Take 50,000 Units by mouth once a week     Yes Historical Provider, MD   finasteride (PROSCAR) 5 mg tablet Take 5 mg by mouth daily   Yes Historical Provider, MD   furosemide (LASIX) 40 mg tablet Take 1 tablet (40 mg total) by mouth 2 (two) times a day 5/25/18  Yes Itz Navarro MD   gabapentin (NEURONTIN) 100 mg capsule Take 100 mg by mouth daily at bedtime     Yes Historical Provider, MD   Hypromellose (SYSTANE OVERNIGHT THERAPY) 0 3 % GEL Apply to eye   Yes Historical Provider, MD   ipratropium-albuterol (DUO-NEB) 0 5-2 5 mg/3 mL Take 3 mL by nebulization every 6 (six) hours as needed for wheezing 5/25/18  Yes Isa Mcgrath MD   melatonin 3 mg Take 3 mg by mouth daily at bedtime   Yes Historical Provider, MD   Multiple Vitamin (TAB-A-ANETA PO) Take 1 tablet by mouth daily   Yes Historical Provider, MD   nitroglycerin (NITROSTAT) 0 4 mg SL tablet Place 0 4 mg under the tongue every 5 (five) minutes as needed for chest pain   Yes Historical Provider, MD   Omega-3 Fatty Acids (FISH OIL) 1,000 mg Take 1,000 mg by mouth daily   Yes Historical Provider, MD   oxybutynin (DITROPAN-XL) 10 MG 24 hr tablet Take 1 tablet (10 mg total) by mouth daily at bedtime 5/16/18  Yes Gilda Ventura MD   Polyethyl Glycol-Propyl Glycol (SYSTANE) 0 4-0 3 % GEL Apply to eye   Yes Historical Provider, MD   potassium chloride (K-DUR,KLOR-CON) 20 mEq tablet Take 20 mEq by mouth 2 (two) times a day   Yes Historical Provider, MD   tamsulosin (FLOMAX) 0 4 mg Take 0 4 mg by mouth daily with dinner   Yes Historical Provider, MD     I have reviewed home medications using allscripts  Allergies: Allergies   Allergen Reactions    Iv Contrast [Iodinated Diagnostic Agents]        Social History:     Marital Status: Single     Substance Use History:   History   Alcohol Use    Yes     Comment: jayne cognac 1 oz/night      History   Smoking Status    Former Smoker   Smokeless Tobacco    Never Used     History   Drug Use No       Family History:    History reviewed  No pertinent family history  Physical Exam:     Vitals:   Blood Pressure: 119/72 (07/04/18 1415)  Pulse: 72 (07/04/18 1415)  Temperature: 98 6 °F (37 °C) (07/04/18 1117)  Respirations: 20 (07/04/18 1415)  Height: 5' 9 5" (176 5 cm) (07/04/18 1117)  Weight - Scale: 87 5 kg (193 lb) (07/04/18 1117)  SpO2: 99 % (07/04/18 1415)    Physical Exam   Constitutional: He is oriented to person, place, and time  No distress  HENT:   Head: Normocephalic and atraumatic  Eyes: Conjunctivae and EOM are normal    Neck: Normal range of motion  Neck supple  Cardiovascular: Normal rate and regular rhythm  Pulmonary/Chest: Effort normal and breath sounds normal  He has no wheezes  He has no rales  Abdominal: Bowel sounds are normal  There is tenderness (epigastric and suprapubic)  Musculoskeletal: Normal range of motion  He exhibits no edema  Neurological: He is alert and oriented to person, place, and time  Skin: He is not diaphoretic  RLE lac w/ BRB         Additional Data:     Lab Results: I have personally reviewed pertinent reports  Results from last 7 days  Lab Units 07/04/18  1128   WBC Thousand/uL 5 85   HEMOGLOBIN g/dL 9 7*   HEMATOCRIT % 31 9*   PLATELETS Thousands/uL 205   NEUTROS PCT % 77*   LYMPHS PCT % 8*   MONOS PCT % 11   EOS PCT % 2       Results from last 7 days  Lab Units 07/04/18  1128   SODIUM mmol/L 137   POTASSIUM mmol/L 3 8   CHLORIDE mmol/L 101   CO2 mmol/L 30   BUN mg/dL 37*   CREATININE mg/dL 1 59*   CALCIUM mg/dL 8 3   TOTAL PROTEIN g/dL 6 4   BILIRUBIN TOTAL mg/dL 0 44   ALK PHOS U/L 87   ALT U/L 13   AST U/L 10   GLUCOSE RANDOM mg/dL 162*           Results from last 7 days  Lab Units 07/03/18  1724 07/03/18  1118 07/03/18  0707 07/02/18  2112 07/02/18  1604 07/02/18  1059 07/02/18  0720 07/01/18  2057 07/01/18  1631   POC GLUCOSE mg/dl 157* 172* 158* 237* 171* 239* 204* 264* 160*           Imaging: I have personally reviewed pertinent reports  XR tibia fibula 2 views RIGHT   Final Result by Ryder Melara MD (07/04 1322)      No acute osseous abnormality  Workstation performed: [id number]         X-ray chest 2 views   Final Result by Ryder Melara MD (07/04 1321)      Cardiomegaly  Right pleural effusion including some loculated fluid in the posterior aspect of the right major fissure              Workstation performed: [id number]             EKG, Pathology, and Other Studies Reviewed on Admission:   · EKG: paced rhythm    Allscripts / Epic Records Reviewed: Yes     ** Please Note: This note has been constructed using a voice recognition system   **

## 2018-07-04 NOTE — ED ATTENDING ATTESTATION
Leonela Mckay DO, saw and evaluated the patient  I have discussed the patient with the resident/non-physician practitioner and agree with the resident's/non-physician practitioner's findings, Plan of Care, and MDM as documented in the resident's/non-physician practitioner's note, except where noted  All available labs and Radiology studies were reviewed  At this point I agree with the current assessment done in the Emergency Department  I have conducted an independent evaluation of this patient a history and physical is as follows:    80 yom with hematuria, from [facility]; on blood thinners  Also staff noted bleeding to a repaired lac on RLE  Recently admitted for large ureteral stone and received stent  He was discharged yesterday  Patient states he has had mild chest pressure and shortness of breath which seems to be worse from its chronic state    Past Medical History:   Diagnosis Date    [facility]     Anemia     Bladder cancer ([facility]     CAD (coronary artery disease)     CHF (congestive heart failure) (HCC)     Chronic kidney failure     Colon [facility] ([facility]     [facility]     Pacemaker     Prostate cancer ([facility]     Type 2 [facility]      Past Surgical History:   Procedure Laterality Date    CARDIAC PACEMAKER PLACEMENT  2014    CORONARY ANGIOPLASTY WITH STENT PLACEMENT      GALLBLADDER SURGERY      [place] CYSTOURETHROSCOPY,URETER CATHETER Right 7/1/2018    Procedure: CYSTOSCOPY RETROGRADE PYELOGRAM WITH INSERTION STENT URETERAL;  Surgeon: Neelam Adrian MD;  Location: [facility];  Service: Urology     /58   Pulse 76   Temp 98 6 °F (37 °C)   Resp 20   Ht 5' 9 5" (1 765 m)   Wt 87 5 kg (193 lb)   SpO2 99%   BMI 28 09 kg/m²   A&Ox4  CTA  RRR  Ab right CVA tenderness (improving)   RLE - repaired stellate laceration with oozing blood  Superficial skin tear  Tenderness to palpation overlying tibia      His recent urine culture show sensitivity to his current antibiotic, cefazolin   Will x-ray tibia  Sutures appear intact so we will attempt to control bleeding with Surgicel foam   Will discuss hematuria with Urology  EKG unremarkable as it shows a paced rhythm  Urology recommends treated with IV fluids and allowing hematuria the past   IV fluids were given and hematuria persisted  His CBC showed approximately half a gram of loss of hemoglobin  Attempted hemostasis with Surgicel and was unsuccessful  We were will apply TXA  If unsuccessful that point, surgery can be consulted as inpatient      Dx  Hematuria   CKD  Recent infected ureteral stone, treated  Laceration to RLE  Chest pain  Chronically positive troponin        Critical Care Time  CritCare Time    Procedures

## 2018-07-04 NOTE — RESPIRATORY THERAPY NOTE
RT Protocol Note  Paula Age 80 y o  male MRN: [id number]  [facility] Encounter: [id number]    Assessment    Principal Problem:    Other chest pain  Active Problems:    Chronic diastolic congestive heart failure (HCC)    CKD (chronic kidney disease) stage 3, GFR 30-59 ml/min    Paroxysmal atrial fibrillation (HCC)    CAD (coronary artery disease)    Type 2 diabetes mellitus (HCC)    Right ureteral stone    Urinary tract infection with hematuria    Laceration of right lower extremity    Diarrhea      Home Pulmonary Medications:  Nebs prn       Past Medical History:   Diagnosis Date    A-fib ([place] [address] )     Anemia     Bladder cancer ([place] [address] )     CAD (coronary artery disease)     [facility]     Chronic kidney failure     Colon cancer ([place] [address] )     Eye cancer, 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[place] [address] )     Pacemaker     Prostate cancer ([place] [address] )     Type 2 diabetes mellitus ([place] [address] )      Social History     Social History    Marital status: Single     Spouse name: N/A    Number of children: N/A    Years of education: N/A     Social History Main Topics    Smoking status: Former Smoker    Smokeless tobacco: Never Used    Alcohol use Yes      Comment: jayne cognac 1 oz/night     Drug use: No    Sexual activity: Not Currently     Other Topics Concern    None     Social History Narrative    None       Subjective         Objective    Physical Exam:   Assessment Type: (P) Assess only  General Appearance: (P) Alert, Awake  Respiratory Pattern: (P) Normal  Chest Assessment: (P) Chest expansion symmetrical  Bilateral Breath Sounds: (P) Diminished, Clear    Vitals:  Blood pressure 119/72, pulse 72, temperature 98 6 °F (37 °C), resp  rate 20, height 5' 9 5" (1 765 m), weight 87 5 kg (193 lb), SpO2 99 %  Imaging and other studies: I have personally reviewed pertinent reports              Plan    Respiratory Plan: (P) Home Bronchodilator Patient pathway        Resp Comments: (P) Pt  states he has nebs prn at [facility] but has not needed in awhile  Pt  C/O SOB with his chest pain   Will continue udn prn

## 2018-07-04 NOTE — ASSESSMENT & PLAN NOTE
On 14 day course of Keflex  Repeat U Cx ordered by ER    Will check procal in case this U Cx grows different bacteria to see if abx need adjustment

## 2018-07-04 NOTE — ED PROVIDER NOTES
History  Chief Complaint   Patient presents with    Blood in Urine     Pt coming in from [facility]  Per EMS pt was seen here last night for a R leg laceration  Per EMS bleeding is not fully controlled  Pt was sent here today for increased blood in his urine  Pt also reports generalized abdominal pain  80year old diabetic male, hx of bladder/colon ca  on eliquis  presents to the ED for evaluation of hematuria  He was discharged yesterday after he was admitted for 9mm right kidney stone and had ureteral stent placed but stone wasn't removed 2/2 to infected stone with plans for outpatient stone removal  This morning at 8am, at [facility], his RLE dressings were being removed and patient started to have chest pressure  Of note, he has hx of CAD with stent and CHF, states that he has chest pain that  comes and goes, left sided, chronic and stopped having it once he arrived at the ED  States episodes would last for minutes, unprovoked, and intermittent in nature  Patient was also sent in from nursing home for evaluation of his RLE which was oozing blood from one suture site  He otherwise denies shortness of breath, fevers, nausea, vomiting, abdominal pain, dysuria, constipation, or diarrhea  Prior to Admission Medications   Prescriptions Last Dose Informant Patient Reported? Taking?    Empagliflozin (JARDIANCE) 10 MG TABS  Outside Facility (Specify) Yes Yes   Sig: Take 10 mg by mouth every morning   Ergocalciferol (VITAMIN D2 PO)  Outside Facility (Specify) Yes Yes   Sig: Take 50,000 Units by mouth once a week     Hypromellose (SYSTANE OVERNIGHT THERAPY) 0 3 % GEL  Outside Facility (Specify) Yes Yes   Sig: Apply to eye   Multiple Vitamin (TAB-A-ANETA PO)  Outside Facility (Specify) Yes Yes   Sig: Take 1 tablet by mouth daily   Omega-3 Fatty Acids (FISH OIL) 1,000 mg  Outside Facility (Specify) Yes Yes   Sig: Take 1,000 mg by mouth daily   Polyethyl Glycol-Propyl Glycol (SYSTANE) 0 4-0 3 % GEL  Outside Facility (Specify) Yes Yes   Sig: Apply to eye   albuterol (5 mg/mL) 0 5 % nebulizer solution  Outside Facility (Specify) Yes Yes   Sig: Take 2 5 mg by nebulization every 6 (six) hours as needed for wheezing   apixaban (ELIQUIS) 2 5 mg  Outside Facility (Specify) No Yes   Sig: Take 1 tablet (2 5 mg total) by mouth 2 (two) times a day   aspirin (ECOTRIN LOW STRENGTH) 81 mg EC tablet  Outside Facility (Specify) Yes Yes   Sig: Take 81 mg by mouth daily   carvedilol (COREG) 3 125 mg tablet  Outside Facility (Specify) Yes Yes   Sig: Take 3 125 mg by mouth 2 (two) times a day with meals   cephalexin (KEFLEX) 500 mg capsule   No Yes   Sig: Take 1 capsule (500 mg total) by mouth every 12 (twelve) hours for 14 days   enalapril (VASOTEC) 2 5 mg tablet  Outside Facility (Specify) Yes Yes   Sig: Take 2 5 mg by mouth daily     finasteride (PROSCAR) 5 mg tablet  Outside Facility (Specify) Yes Yes   Sig: Take 5 mg by mouth daily   furosemide (LASIX) 40 mg tablet  Outside Facility (Specify) No Yes   Sig: Take 1 tablet (40 mg total) by mouth 2 (two) times a day   gabapentin (NEURONTIN) 100 mg capsule  Outside Facility (Specify) Yes Yes   Sig: Take 100 mg by mouth daily at bedtime     ipratropium-albuterol (DUO-NEB) 0 5-2 5 mg/3 mL  Outside Facility (Specify) No Yes   Sig: Take 3 mL by nebulization every 6 (six) hours as needed for wheezing   melatonin 3 mg  Outside Facility (Specify) Yes Yes   Sig: Take 3 mg by mouth daily at bedtime   nitroglycerin (NITROSTAT) 0 4 mg SL tablet  Outside Facility (Specify) Yes Yes   Sig: Place 0 4 mg under the tongue every 5 (five) minutes as needed for chest pain   oxybutynin (DITROPAN-XL) 10 MG 24 hr tablet  Outside Facility (Specify) No Yes   Sig: Take 1 tablet (10 mg total) by mouth daily at bedtime   potassium chloride (K-DUR,KLOR-CON) 20 mEq tablet  Outside Facility (Specify) Yes Yes   Sig: Take 20 mEq by mouth 2 (two) times a day   tamsulosin (FLOMAX) 0 4 mg  Outside Facility (Specify) Yes Yes   Sig: Take 0 4 mg by mouth daily with dinner      Facility-Administered Medications: None       Past Medical History:   Diagnosis Date    A-fib ([place] [address] )     Anemia     Bladder cancer ([place] [address] )     CAD (coronary artery disease)     CHF (congestive heart failure) (HCC)     Chronic kidney failure     Colon cancer ([place] [address] )     Eye cancer, left ([place] [address] )     Pacemaker     Prostate cancer ([place] [address] )     Type 2 diabetes mellitus ([place] [address] )        Past Surgical History:   Procedure Laterality Date    CARDIAC PACEMAKER PLACEMENT  2014    CORONARY ANGIOPLASTY WITH STENT PLACEMENT      GALLBLADDER SURGERY      IA CYSTOURETHROSCOPY,URETER CATHETER Right 7/1/2018    Procedure: CYSTOSCOPY RETROGRADE PYELOGRAM WITH INSERTION STENT URETERAL;  Surgeon: Jeet Gerard MD;  Location: [facility];  Service: Urology       History reviewed  No pertinent family history  I have reviewed and agree with the history as documented  Social History   Substance Use Topics    Smoking status: Former Smoker    Smokeless tobacco: Never Used    Alcohol use Yes      Comment: jayne cognac 1 oz/night         Review of Systems   Constitutional: Negative for appetite change, chills, diaphoresis, fatigue and fever  HENT: Negative for congestion, ear discharge, ear pain, hearing loss, postnasal drip, rhinorrhea, sneezing and sore throat  Eyes: Negative for pain, discharge and redness  Respiratory: Negative for cough, choking, chest tightness, shortness of breath, wheezing and stridor  Cardiovascular: Negative for chest pain and palpitations  Gastrointestinal: Negative for abdominal distention, abdominal pain, blood in stool, constipation, diarrhea, nausea and vomiting  Genitourinary: Positive for hematuria  Negative for decreased urine volume, difficulty urinating, dysuria, enuresis, flank pain, frequency and testicular pain  Musculoskeletal: Negative for arthralgias, gait problem, joint swelling and neck pain     Skin: Positive for wound  Negative for color change, pallor and rash  Allergic/Immunologic: Negative for environmental allergies, food allergies and immunocompromised state  Neurological: Negative for dizziness, seizures, weakness, light-headedness, numbness and headaches  Hematological: Negative for adenopathy  Bruises/bleeds easily  Psychiatric/Behavioral: Negative for agitation and behavioral problems  Physical Exam  ED Triage Vitals   Temperature Pulse Respirations Blood Pressure SpO2   07/04/18 1117 07/04/18 1117 07/04/18 1117 07/04/18 1118 07/04/18 1118   98 6 °F (37 °C) 76 20 122/58 99 %      Temp Source Heart Rate Source Patient Position - Orthostatic VS BP Location FiO2 (%)   07/04/18 1640 07/04/18 2342 07/04/18 1640 07/04/18 1640 --   Oral Monitor Lying Left arm       Pain Score       07/04/18 1117       9           Orthostatic Vital Signs  Vitals:    07/05/18 1514 07/05/18 1915 07/05/18 2354 07/06/18 0810   BP: 113/56 131/58 113/52 (!) 112/48   Pulse: 77 68 68 69   Patient Position - Orthostatic VS: Lying Lying Lying Lying       Physical Exam   Constitutional: He is oriented to person, place, and time  He appears well-developed and well-nourished  HENT:   Head: Normocephalic and atraumatic  Nose: Nose normal    Mouth/Throat: Oropharynx is clear and moist    Eyes: Conjunctivae and EOM are normal  Pupils are equal, round, and reactive to light  Neck: Normal range of motion  Neck supple  Cardiovascular: Normal rate, regular rhythm and normal heart sounds  Exam reveals no gallop and no friction rub  No murmur heard  Pulses:       Popliteal pulses are 2+ on the right side  Dorsalis pedis pulses are 2+ on the right side  Posterior tibial pulses are 2+ on the right side  Pulmonary/Chest: Effort normal and breath sounds normal  No respiratory distress  He has no wheezes  He has no rales  Abdominal: Soft  Bowel sounds are normal  He exhibits no distension   There is no tenderness  There is no rebound and no guarding  Musculoskeletal: Normal range of motion  He exhibits tenderness  Right lower extremity   Neurological: He is alert and oriented to person, place, and time  Skin: Skin is warm and dry  Psychiatric: He has a normal mood and affect  His behavior is normal    Nursing note and vitals reviewed        ED Medications  Medications   morphine (PF) 4 mg/mL injection 4 mg (4 mg Intravenous Given 7/4/18 1141)   sodium chloride 0 9 % bolus 1,000 mL (0 mL Intravenous Stopped 7/4/18 1430)   tranexamic acid 100mg/mL (for epistaxis) 1,000 mg (1,000 mg Nasal Given 7/4/18 1412)   morphine (PF) 4 mg/mL injection 4 mg (4 mg Intravenous Given 7/4/18 1412)       Diagnostic Studies  Results Reviewed     Procedure Component Value Units Date/Time    Urine culture [[id number]] Collected:  07/04/18 1143    Lab Status:  Final result Specimen:  Urine from Urine, Other Updated:  07/06/18 0828     Urine Culture <10,000 cfu/ml     Troponin I [[id number]]  (Abnormal) Collected:  07/04/18 1128    Lab Status:  Final result Specimen:  Blood from Arm, Right Updated:  07/04/18 1203     Troponin I 0 05 (H) ng/mL     Comprehensive metabolic panel [[id number]]  (Abnormal) Collected:  07/04/18 1128    Lab Status:  Final result Specimen:  Blood from Arm, Right Updated:  07/04/18 1159     Sodium 137 mmol/L      Potassium 3 8 mmol/L      Chloride 101 mmol/L      CO2 30 mmol/L      Anion Gap 6 mmol/L      BUN 37 (H) mg/dL      Creatinine 1 59 (H) mg/dL      Glucose 162 (H) mg/dL      Calcium 8 3 mg/dL      AST 10 U/L      ALT 13 U/L      Alkaline Phosphatase 87 U/L      Total Protein 6 4 g/dL      Albumin 2 6 (L) g/dL      Total Bilirubin 0 44 mg/dL      eGFR 38 ml/min/1 73sq m     Narrative:         National Kidney Disease Education Program recommendations are as follows:  GFR calculation is accurate only with a steady state creatinine  Chronic Kidney disease less than 60 ml/min/1 73 sq  meters  Kidney failure less than 15 ml/min/1 73 sq  meters  Urine Microscopic [[id number]]  (Abnormal) Collected:  07/04/18 1143    Lab Status:  Final result Specimen:  Urine from Urine, Other Updated:  07/04/18 1153     RBC, UA Innumerable (A) /hpf      WBC, UA Innumerable (A) /hpf      Epithelial Cells None Seen /hpf      Bacteria, UA None Seen /hpf      Hyaline Casts, UA 3-5 (A) /lpf     CBC and differential [[id number]]  (Abnormal) Collected:  07/04/18 1128    Lab Status:  Final result Specimen:  Blood from Arm, Right Updated:  07/04/18 1137     WBC 5 85 Thousand/uL      RBC 3 41 (L) Million/uL      Hemoglobin 9 7 (L) g/dL      Hematocrit 31 9 (L) %      MCV 94 fL      MCH 28 4 pg      MCHC 30 4 (L) g/dL      RDW 16 0 (H) %      MPV 10 4 fL      Platelets 494 Thousands/uL      nRBC 0 /100 WBCs      Neutrophils Relative 77 (H) %      Immat GRANS % 1 %      Lymphocytes Relative 8 (L) %      Monocytes Relative 11 %      Eosinophils Relative 2 %      Basophils Relative 1 %      Neutrophils Absolute 4 55 Thousands/µL      Immature Grans Absolute 0 03 Thousand/uL      Lymphocytes Absolute 0 46 (L) Thousands/µL      Monocytes Absolute 0 62 Thousand/µL      Eosinophils Absolute 0 14 Thousand/µL      Basophils Absolute 0 05 Thousands/µL     ED Urine Macroscopic [[id number]]  (Abnormal) Collected:  07/04/18 1143    Lab Status:  Final result Specimen:  Urine Updated:  07/04/18 1134     Color, UA Jamilah     Clarity, UA Slightly Cloudy     pH, UA 6 0     Leukocytes, UA Small (A)     Nitrite, UA Negative     Protein,  (2+) (A) mg/dl      Glucose,  (1/4%) (A) mg/dl      Ketones, UA Negative mg/dl      Urobilinogen, UA 0 2 E U /dl      Bilirubin, UA Negative     Blood, UA Large (A)     Specific Boon, UA 1 015    Narrative:       CLINITEK RESULT                 XR tibia fibula 2 views RIGHT   Final Result by Diane Jenkins MD (07/04 1322)      No acute osseous abnormality              Workstation performed: [id number]         X-ray chest 2 views   Final Result by Maame Leon MD (07/04 1321)      Cardiomegaly  Right pleural effusion including some loculated fluid in the posterior aspect of the right major fissure  Workstation performed: [id number]               Procedures  Procedures      Phone Consults  ED Phone Contact    ED Course  ED Course as of Jul 06 1550 Wed Jul 04, 2018   1141 Hemoglobin: (!) 9 7   1213 Contacted Urologist Dr Loraine Martinez regarding patient's chief complaint and presentation  Dr Loraine Martinez recommends IV fluids to clear hematuria and patient to continue antibiotic therapy upon discharge  Hematuria likely 2/2 stent placement  1239 Troponin I: (!) 0 05   1239 Creatinine: (!) 1 59           Identification of Seniors at Risk      Most Recent Value   (ISAR) Identification of Seniors at Risk   Before the illness or injury that brought you to the Emergency, did you need someone to help you on a regular basis? 0 Filed at: 07/04/2018 1119   In the last 24 hours, have you needed more help than usual?  0 Filed at: 07/04/2018 1119   Have you been hospitalized for one or more nights during the past 6 months? 1 Filed at: 07/04/2018 1119   In general, do you see well? 1 Filed at: 07/04/2018 1119   In general, do you have serious problems with your memory? 0 Filed at: 07/04/2018 1119   Do you take more than three different medications every day?   1 Filed at: 07/04/2018 1119   ISAR Score  3 Filed at: 07/04/2018 1119                  JAVON Risk Score      Most Recent Value   Age >= 72  1 Filed at: 07/04/2018 1458   Known CAD (stenosis >= 50%)  1 Filed at: 07/04/2018 1458   Recent (<=24 hrs) Service Angina  0 Filed at: 07/04/2018 1458   ST Deviation >= 0 5 mm  0 Filed at: 07/04/2018 1458   3+ CAD Risk Factors (FHx, HTN, HLP, DM, Smoker)  0 Filed at: 07/04/2018 1458   Aspirin Use Past 7 Days  1 Filed at: 07/04/2018 1458   Elevated Cardiac Markers  1 Filed at: 07/04/2018 1458   JAVON Risk Score (Calculated)  4 Filed at: 07/04/2018 1458              [facility]  Number of Diagnoses or Management Options  Chest pressure:   Hematuria:   History of UTI:   Laceration of right lower extremity:   NSTEMI (non-ST elevated myocardial infarction) [facility]):   Diagnosis management comments: 80year old male presents for hematuria  Will order cbc, cmp, trponin, ekg, urinalysis, chest xray, and RLE xray to evaluate for cardiac/pulmonary/metabolic cause of patient's chest pain and xray RLE to evaluate for fx given significant tenderness  Morphine will be given for pain relief of RLE      CritCare Time    Disposition  Final diagnoses:   Chest pressure   NSTEMI (non-ST elevated myocardial infarction) ([facility] [address] )   Hematuria   History of UTI   Laceration of right lower extremity     Time reflects when diagnosis was documented in both MDM as applicable and the Disposition within this note     Time User Action Codes Description Comment    7/4/2018  1:56 PM Leslie Bryant Add [R07 89] Chest pressure     7/4/2018  1:56 PM Rahle Barron Add [I21 4] NSTEMI (non-ST elevated myocardial infarction) ([facility] [address] )     7/4/2018  1:56 PM Leslie Bryant Add [R31 9] Hematuria     7/4/2018  1:56 PM Leslie Bryant Add [N39 0] UTI (urinary tract infection)     7/4/2018  1:56 PM Rahel Barron Remove [N39 0] UTI (urinary tract infection)     7/4/2018  1:57 PM Leslie Bryant Add [Z87 440] History of UTI     7/4/2018  1:57 PM Leslie Bryant Add [P43 410J] Laceration of right lower extremity     7/4/2018  3:41 PM Merian Alosa Add [I25 10] Coronary artery disease involving native coronary artery of native heart without angina pectoris     7/4/2018  3:41 PM Merian Alosa Modify [I25 10] Coronary artery disease involving native coronary artery of native heart without angina pectoris     7/4/2018  3:41 PM Merian Alosa Modify [I25 10] Coronary artery disease involving native coronary artery of native heart without angina pectoris     7/4/2018  3:45 PM Merian Alosa Add [N39 0,  R31 9] Urinary tract infection with hematuria, site unspecified     7/4/2018  3:45 PM Marko Becerra Add [R39 9] Lower urinary tract symptoms       ED Disposition     ED Disposition Condition Comment    Admit  Case was discussed with Dr Ambrocio Ellis and the patient's admission status was agreed to be Admission Status: observation status to the service of Dr Ambrocio Ellis   Follow-up Information     Follow up With Specialties Details Why [address] [facility] Schedule an appointment as soon as possible for a visit in 2 week(s) Urology--office will contact patient/caregiver with hospital follow-up appointment date and time  Patient will require further surgery for kidney/ureteral stone removal  [address] [facility] [address] [facility] [id number]  [address] [place] [address],[address], Family Medicine Follow up in 1 week(s)  [address]      Hernando Gnuter DO Cardiology, Multidisciplinary Follow up  [address] [facility]  [phone]            Discharge Medication List as of 7/6/2018 10:10 AM      START taking these medications    Details   acetaminophen (TYLENOL) 325 mg tablet Take 2 tablets (650 mg total) by mouth every 6 (six) hours as needed for mild pain, headaches or fever, Starting Fri 7/6/2018, No Print      oxyCODONE (ROXICODONE) 5 mg immediate release tablet Take 1 tablet (5 mg total) by mouth every 4 (four) hours as needed for severe pain for up to 10 days Max Daily Amount: 30 mg, Starting Fri 7/6/2018, Until Mon 7/16/2018, Print         CONTINUE these medications which have NOT CHANGED    Details   albuterol (5 mg/mL) 0 5 % nebulizer solution Take 2 5 mg by nebulization every 6 (six) hours as needed for wheezing, Historical Med      apixaban (ELIQUIS) 2 5 mg Take 1 tablet (2 5 mg total) by mouth 2 (two) times a day, Starting Fri 5/25/2018, Print      aspirin (ECOTRIN LOW STRENGTH) 81 mg EC tablet Take 81 mg by mouth daily, Historical Med      carvedilol (COREG) 3 125 mg tablet Take 3 125 mg by mouth 2 (two) times a day with meals, Historical Med      cephalexin (KEFLEX) 500 mg capsule Take 1 capsule (500 mg total) by mouth every 12 (twelve) hours for 14 days, Starting Tue 7/3/2018, Until Tue 7/17/2018, Print      Empagliflozin (JARDIANCE) 10 MG TABS Take 10 mg by mouth every morning, Historical Med      enalapril (VASOTEC) 2 5 mg tablet Take 2 5 mg by mouth daily  , Historical Med      Ergocalciferol (VITAMIN D2 PO) Take 50,000 Units by mouth once a week  , Historical Med      finasteride (PROSCAR) 5 mg tablet Take 5 mg by mouth daily, Historical Med      furosemide (LASIX) 40 mg tablet Take 1 tablet (40 mg total) by mouth 2 (two) times a day, Starting Fri 5/25/2018, Print      gabapentin (NEURONTIN) 100 mg capsule Take 100 mg by mouth daily at bedtime  , Historical Med      Hypromellose (SYSTANE OVERNIGHT THERAPY) 0 3 % GEL Apply to eye, Historical Med      ipratropium-albuterol (DUO-NEB) 0 5-2 5 mg/3 mL Take 3 mL by nebulization every 6 (six) hours as needed for wheezing, Starting Fri 5/25/2018, Print      melatonin 3 mg Take 3 mg by mouth daily at bedtime, Historical Med      Multiple Vitamin (TAB-A-ANETA PO) Take 1 tablet by mouth daily, Historical Med      nitroglycerin (NITROSTAT) 0 4 mg SL tablet Place 0 4 mg under the tongue every 5 (five) minutes as needed for chest pain, Historical Med      Omega-3 Fatty Acids (FISH OIL) 1,000 mg Take 1,000 mg by mouth daily, Historical Med      oxybutynin (DITROPAN-XL) 10 MG 24 hr tablet Take 1 tablet (10 mg total) by mouth daily at bedtime, Starting Wed 5/16/2018, Print      Polyethyl Glycol-Propyl Glycol (SYSTANE) 0 4-0 3 % GEL Apply to eye, Historical Med      potassium chloride (K-DUR,KLOR-CON) 20 mEq tablet Take 20 mEq by mouth 2 (two) times a day, Historical Med      tamsulosin (FLOMAX) 0 4 mg Take 0 4 mg by mouth daily with dinner, Historical Med Outpatient Discharge Orders  Discharge Diet     Discharge Condition:  Improving     Patient Aware of Diagnosis: Yes     Free of Communicable Disease: Yes     Activity:  As Tolerated     Physical Therapy Eval And Treat     Occupational Therapy Eval and Treat         ED Provider  Attending physically available and evaluated Carlos Marmolejo  JAIME managed the patient along with the ED Attending      Electronically Signed by         Cordell Rowley MD  07/06/18 3469

## 2018-07-04 NOTE — ASSESSMENT & PLAN NOTE
Lab Results   Component Value Date    HGBA1C 7 4 (H) 05/23/2018       Recent Labs      07/02/18   2112  07/03/18   0707  07/03/18   1118  07/03/18   1724   POCGLU  237*  158*  172*  157*       Blood Sugar Average: Last 72 hrs:    ISS  Hold PO meds while inpt

## 2018-07-05 LAB
ANION GAP SERPL CALCULATED.3IONS-SCNC: 5 MMOL/L (ref 4–13)
BUN SERPL-MCNC: 31 MG/DL (ref 5–25)
C DIFF TOX GENS STL QL NAA+PROBE: NORMAL
CALCIUM SERPL-MCNC: 7.8 MG/DL (ref 8.3–10.1)
CHLORIDE SERPL-SCNC: 104 MMOL/L (ref 100–108)
CHOLEST SERPL-MCNC: 127 MG/DL (ref 50–200)
CO2 SERPL-SCNC: 28 MMOL/L (ref 21–32)
CREAT SERPL-MCNC: 1.5 MG/DL (ref 0.6–1.3)
ERYTHROCYTE [DISTWIDTH] IN BLOOD BY AUTOMATED COUNT: 16.1 % (ref 11.6–15.1)
GFR SERPL CREATININE-BSD FRML MDRD: 41 ML/MIN/1.73SQ M
GLUCOSE SERPL-MCNC: 110 MG/DL (ref 65–140)
GLUCOSE SERPL-MCNC: 114 MG/DL (ref 65–140)
GLUCOSE SERPL-MCNC: 189 MG/DL (ref 65–140)
GLUCOSE SERPL-MCNC: 205 MG/DL (ref 65–140)
GLUCOSE SERPL-MCNC: 221 MG/DL (ref 65–140)
HCT VFR BLD AUTO: 28.8 % (ref 36.5–49.3)
HDLC SERPL-MCNC: 33 MG/DL (ref 40–60)
HGB BLD-MCNC: 8.8 G/DL (ref 12–17)
LDLC SERPL CALC-MCNC: 79 MG/DL (ref 0–100)
MAGNESIUM SERPL-MCNC: 2.2 MG/DL (ref 1.6–2.6)
MCH RBC QN AUTO: 28.3 PG (ref 26.8–34.3)
MCHC RBC AUTO-ENTMCNC: 30.6 G/DL (ref 31.4–37.4)
MCV RBC AUTO: 93 FL (ref 82–98)
NT-PROBNP SERPL-MCNC: 5690 PG/ML
PHOSPHATE SERPL-MCNC: 3.7 MG/DL (ref 2.3–4.1)
PLATELET # BLD AUTO: 201 THOUSANDS/UL (ref 149–390)
PMV BLD AUTO: 10.6 FL (ref 8.9–12.7)
POTASSIUM SERPL-SCNC: 3.8 MMOL/L (ref 3.5–5.3)
PROCALCITONIN SERPL-MCNC: 0.07 NG/ML
RBC # BLD AUTO: 3.11 MILLION/UL (ref 3.88–5.62)
SODIUM SERPL-SCNC: 137 MMOL/L (ref 136–145)
TRIGL SERPL-MCNC: 75 MG/DL
WBC # BLD AUTO: 5.21 THOUSAND/UL (ref 4.31–10.16)

## 2018-07-05 PROCEDURE — 99222 1ST HOSP IP/OBS MODERATE 55: CPT | Performed by: UROLOGY

## 2018-07-05 PROCEDURE — 94760 N-INVAS EAR/PLS OXIMETRY 1: CPT

## 2018-07-05 PROCEDURE — 84145 PROCALCITONIN (PCT): CPT | Performed by: GENERAL PRACTICE

## 2018-07-05 PROCEDURE — 82948 REAGENT STRIP/BLOOD GLUCOSE: CPT

## 2018-07-05 PROCEDURE — 0T9B70Z DRAINAGE OF BLADDER WITH DRAINAGE DEVICE, VIA NATURAL OR ARTIFICIAL OPENING: ICD-10-PCS | Performed by: UROLOGY

## 2018-07-05 PROCEDURE — 80061 LIPID PANEL: CPT | Performed by: GENERAL PRACTICE

## 2018-07-05 PROCEDURE — 99222 1ST HOSP IP/OBS MODERATE 55: CPT | Performed by: INTERNAL MEDICINE

## 2018-07-05 PROCEDURE — 83735 ASSAY OF MAGNESIUM: CPT | Performed by: GENERAL PRACTICE

## 2018-07-05 PROCEDURE — 80048 BASIC METABOLIC PNL TOTAL CA: CPT | Performed by: GENERAL PRACTICE

## 2018-07-05 PROCEDURE — 99232 SBSQ HOSP IP/OBS MODERATE 35: CPT | Performed by: INTERNAL MEDICINE

## 2018-07-05 PROCEDURE — 83880 ASSAY OF NATRIURETIC PEPTIDE: CPT | Performed by: INTERNAL MEDICINE

## 2018-07-05 PROCEDURE — 85027 COMPLETE CBC AUTOMATED: CPT | Performed by: GENERAL PRACTICE

## 2018-07-05 PROCEDURE — 84100 ASSAY OF PHOSPHORUS: CPT | Performed by: GENERAL PRACTICE

## 2018-07-05 RX ADMIN — INSULIN LISPRO 1 UNITS: 100 INJECTION, SOLUTION INTRAVENOUS; SUBCUTANEOUS at 11:18

## 2018-07-05 RX ADMIN — POTASSIUM CHLORIDE 20 MEQ: 1500 TABLET, EXTENDED RELEASE ORAL at 08:14

## 2018-07-05 RX ADMIN — CARVEDILOL 3.12 MG: 3.12 TABLET, FILM COATED ORAL at 15:46

## 2018-07-05 RX ADMIN — OXYCODONE HYDROCHLORIDE 5 MG: 5 TABLET ORAL at 15:45

## 2018-07-05 RX ADMIN — MELATONIN TAB 3 MG 3 MG: 3 TAB at 21:50

## 2018-07-05 RX ADMIN — POTASSIUM CHLORIDE 20 MEQ: 1500 TABLET, EXTENDED RELEASE ORAL at 17:56

## 2018-07-05 RX ADMIN — FINASTERIDE 5 MG: 5 TABLET, FILM COATED ORAL at 08:14

## 2018-07-05 RX ADMIN — OXYCODONE HYDROCHLORIDE 2.5 MG: 5 TABLET ORAL at 22:03

## 2018-07-05 RX ADMIN — INSULIN LISPRO 1 UNITS: 100 INJECTION, SOLUTION INTRAVENOUS; SUBCUTANEOUS at 21:53

## 2018-07-05 RX ADMIN — ASPIRIN 81 MG: 81 TABLET, COATED ORAL at 08:14

## 2018-07-05 RX ADMIN — INSULIN LISPRO 1 UNITS: 100 INJECTION, SOLUTION INTRAVENOUS; SUBCUTANEOUS at 17:56

## 2018-07-05 RX ADMIN — CEPHALEXIN 500 MG: 500 CAPSULE ORAL at 08:14

## 2018-07-05 RX ADMIN — APIXABAN 2.5 MG: 2.5 TABLET, FILM COATED ORAL at 17:56

## 2018-07-05 RX ADMIN — GABAPENTIN 100 MG: 100 CAPSULE ORAL at 21:50

## 2018-07-05 RX ADMIN — OXYBUTYNIN CHLORIDE 10 MG: 5 TABLET, EXTENDED RELEASE ORAL at 21:50

## 2018-07-05 RX ADMIN — TAMSULOSIN HYDROCHLORIDE 0.4 MG: 0.4 CAPSULE ORAL at 15:46

## 2018-07-05 RX ADMIN — FUROSEMIDE 40 MG: 40 TABLET ORAL at 15:46

## 2018-07-05 RX ADMIN — HYDROMORPHONE HYDROCHLORIDE 0.2 MG: 1 INJECTION, SOLUTION INTRAMUSCULAR; INTRAVENOUS; SUBCUTANEOUS at 11:28

## 2018-07-05 RX ADMIN — CEPHALEXIN 500 MG: 500 CAPSULE ORAL at 21:50

## 2018-07-05 RX ADMIN — APIXABAN 2.5 MG: 2.5 TABLET, FILM COATED ORAL at 08:14

## 2018-07-05 RX ADMIN — Medication 1000 MG: at 08:14

## 2018-07-05 NOTE — PLAN OF CARE
CARDIOVASCULAR - ADULT     Maintains optimal cardiac output and hemodynamic stability Progressing     Absence of cardiac dysrhythmias or at baseline rhythm Progressing        DISCHARGE PLANNING     Discharge to home or other facility with appropriate resources Progressing        GASTROINTESTINAL - ADULT     Maintains or returns to baseline bowel function Progressing     Maintains adequate nutritional intake Progressing        GENITOURINARY - ADULT     Maintains or returns to baseline urinary function Progressing     Absence of urinary retention Progressing     Urinary catheter remains patent Progressing        INFECTION - ADULT     Absence or prevention of progression during hospitalization Progressing     Absence of fever/infection during neutropenic period Progressing        Knowledge Deficit     Patient/family/caregiver demonstrates understanding of disease process, treatment plan, medications, and discharge instructions Progressing        MUSCULOSKELETAL - ADULT     Maintain or return mobility to safest level of function Progressing     Maintain proper alignment of affected body part Progressing        PAIN - ADULT     Verbalizes/displays adequate comfort level or baseline comfort level Progressing        Potential for Falls     Patient will remain free of falls Progressing        Prexisting or High Potential for Compromised Skin Integrity     Skin integrity is maintained or improved Progressing        SAFETY ADULT     Maintain or return to baseline ADL function Progressing     Maintain or return mobility status to optimal level Progressing     Patient will remain free of falls Progressing        SKIN/TISSUE INTEGRITY - ADULT     Skin integrity remains intact Progressing

## 2018-07-05 NOTE — CONSULTS
UROLOGY CONSULTATION NOTE     Patient Identifiers: Joseph Hernandez (MRN [id number])  Service Requesting Consultation:  Medicine  Service Providing Consultation:  Urology, Thais Pena MD    Date of Service: 7/5/2018  Inpatient consult to Urology  Consult performed by: Ron Barron ordered by: Júnior Chris          Reason for Consultation:  Gross hematuria and urinary retention after stent placement for nephrolithiasis    History of Present Illness:     Joseph Hernandez is a 80 y o  old with a history of nephrolithiasis who recently underwent emergent right ureteral stenting for a 9 mm proximal ureteral stone in the setting of UT I  The patient was subsequently discharged and re-presented to the emergency department with abdominal pain  Patient was also noted to have gross hematuria  The patient was eventually admitted for chest pain and Urology was consulted for the above issues  During workup he was noted to have elevated postvoid residuals and a Marte catheter was placed by the surgical PA  The patient states that his abdominal pain has decreased and is only occurring intermittently  He describes it now as it pain at the base of his penis that radiates towards the head of the penis      Past Medical, Past Surgical History:     Past Medical History:   Diagnosis Date    A-fib ([facility] )     Anemia     Bladder cancer ([facility] )     CAD (coronary artery disease)     CHF (congestive heart failure) (HCC)     Chronic kidney failure     Colon cancer ([place] [address] )     Eye cancer, left ([facility] )     Pacemaker     Prostate cancer ([facility] )     Type 2 diabetes mellitus ([place] [address] )    :    Past Surgical History:   Procedure Laterality Date    CARDIAC PACEMAKER PLACEMENT  2014    CORONARY ANGIOPLASTY WITH STENT PLACEMENT      GALLBLADDER SURGERY      AK CYSTOURETHROSCOPY,URETER CATHETER Right 7/1/2018    Procedure: CYSTOSCOPY RETROGRADE PYELOGRAM WITH INSERTION STENT URETERAL;  Surgeon: Thais Pena MD;  Location: [facility]; Service: Urology   :    Medications, Allergies:     Current Facility-Administered Medications   Medication Dose Route Frequency    acetaminophen (TYLENOL) tablet 650 mg  650 mg Oral Q6H PRN    albuterol inhalation solution 2 5 mg  2 5 mg Nebulization Q6H PRN    apixaban (ELIQUIS) tablet 2 5 mg  2 5 mg Oral BID    aspirin (ECOTRIN LOW STRENGTH) EC tablet 81 mg  81 mg Oral Daily    carvedilol (COREG) tablet 3 125 mg  3 125 mg Oral BID With Meals    ceFAZolin (ANCEF) 2 G in sodium chloride 0 9% 50 ml IVPB (CMPD ORDER)  2,000 mg Intravenous Once    cephalexin (KEFLEX) capsule 500 mg  500 mg Oral Q12H [address]    finasteride (PROSCAR) tablet 5 mg  5 mg Oral Daily    fish oil capsule 1,000 mg  1,000 mg Oral Daily    furosemide (LASIX) tablet 40 mg  40 mg Oral BID (diuretic)    gabapentin (NEURONTIN) capsule 100 mg  100 mg Oral HS    HYDROmorphone (DILAUDID) injection 0 2 mg  0 2 mg Intravenous Q3H PRN    insulin lispro (HumaLOG) 100 units/mL subcutaneous injection 1-5 Units  1-5 Units Subcutaneous TID AC    insulin lispro (HumaLOG) 100 units/mL subcutaneous injection 1-5 Units  1-5 Units Subcutaneous HS    lisinopril (ZESTRIL) tablet 5 mg  5 mg Oral Daily    melatonin tablet 3 mg  3 mg Oral HS    nitroglycerin (NITROSTAT) SL tablet 0 4 mg  0 4 mg Sublingual Q5 Min PRN    ondansetron (ZOFRAN) injection 4 mg  4 mg Intravenous Q6H PRN    oxybutynin (DITROPAN-XL) 24 hr tablet 10 mg  10 mg Oral HS    oxyCODONE (ROXICODONE) IR tablet 2 5 mg  2 5 mg Oral Q4H PRN    oxyCODONE (ROXICODONE) IR tablet 5 mg  5 mg Oral Q4H PRN    polyvinyl alcohol (LIQUIFILM TEARS) 1 4 % ophthalmic solution 1 drop  1 drop Both Eyes HS PRN    potassium chloride (K-DUR,KLOR-CON) CR tablet 20 mEq  20 mEq Oral BID    tamsulosin (FLOMAX) capsule 0 4 mg  0 4 mg Oral Daily With Dinner       Allergies:   Allergies   Allergen Reactions    Iv Contrast [Iodinated Diagnostic Agents]    :    Social and Family History:   Social History:   Social History   Substance Use Topics    Smoking status: Former Smoker    Smokeless tobacco: Never Used    Alcohol use Yes      Comment: jayne cognac 1 oz/night      History   Smoking Status    Former Smoker   Smokeless Tobacco    Never Used       Family History:  History reviewed  No pertinent family history :     Review of Systems:     General: Fever, chills, or night sweats: negative  Cardiac:  Positive for chest pain  Pulmonary: Negative for shortness of breath  Gastrointestinal: Abdominal pain positive  Nausea, vomiting, or diarrhea negative,  Genitourinary: See HPI above  Patient does have hematuria  All other systems queried were negative  Physical Exam:   General: Patient is pleasant and in NAD  Awake and alert  BP (!) 86/43 (BP Location: Right arm)   Pulse 69   Temp 97 5 °F (36 4 °C) (Oral)   Resp 18   Ht 5' 9 5" (1 765 m)   Wt 87 5 kg (193 lb)   SpO2 97%   BMI 28 09 kg/m² Temp (24hrs), Av 1 °F (36 7 °C), Min:97 5 °F (36 4 °C), Max:98 8 °F (37 1 °C)  current; Temperature: 97 5 °F (36 4 °C)  I/O last 24 hours: In: 1300 [P O :300; IV Piggyback:1000]  Out: 3800 [Urine:3800]  Cardiac: Peripheral edema: negative  Pulmonary: Non-labored breathing  Abdomen: Soft, non-tender, non-distended  No surgical scars  No masses, tenderness, hernias noted  Genitourinary: Negative CVA tenderness, negative suprapubic tenderness      HUNT: in place draining clear yellow urine    Labs:     Lab Results   Component Value Date    HGB 8 8 (L) 2018    HCT 28 8 (L) 2018    WBC 5 21 2018     2018   ]    Lab Results   Component Value Date     2018    K 3 8 2018     2018    CO2 28 2018    BUN 31 (H) 2018    CREATININE 1 50 (H) 2018    CALCIUM 7 8 (L) 2018    GLUCOSE 110 2018   ]    Imaging:   I personally reviewed the images and report of the following studies, and reviewed them with the patient:    none      ASSESSMENT:     80 y o  old male with  right ureteral stone and UTI status post right ureteral stent placement now with hematuria, elevated postvoid residuals, and abdominal pain  PLAN:     The patient's hematuria has seem to resolve  We discussed that intermittent hematuria is expected with stents in place  We will plan for outpatient ureteroscopy for his kidney stone  Cardiology is seeing the patient and will clear him for surgery  In regards to his elevated postvoid residuals we will continue with the Marte catheter  I would like to start him on tamsulosin but he has been hypotensive  I will leave this up to Cardiology and Internal Medicine  Hopefully he can be discharged with tamsulosin  My office will set up his next surgery  He is to finish his course of antibiotics  Thank you for allowing me to participate in this patients care  Please do not hesitate to call with any additional questions  Gilda Ventura MD    Total time of encounter was 80 minutes, of which 50 minutes was spent on counseling

## 2018-07-05 NOTE — CASE MANAGEMENT
Initial Clinical Review    Admission: Date/Time/Statement:   07/04/18 1356  OBSERVATION CHANGED TO INPATIENT  07/05/18 1155          07/05/18 1155 Inpatient Admission Once     Transfer Service: General Medicine       Question Answer   Admitting Physician Waldo Ho Med Surg   Estimated length of stay More than 2 Midnights   Certification I certify that inpatient services are medically necessary for this patient for a duration of greater than two midnights  See H&P and MD Progress Notes for additional information about the patient's course of treatment  ED: Date/Time/Mode of Arrival:   ED Arrival Information     Expected Arrival Acuity Means of Arrival Escorted By Service Admission Type    - 7/4/2018 11:11 Urgent Ambulance [address] [facility] General Medicine Urgent    Arrival Complaint    BLOOD IN URINE          Chief Complaint:   Chief Complaint   Patient presents with    Blood in Urine     Pt coming in from [facility]  Per EMS pt was seen here last night for a R leg laceration  Per EMS bleeding is not fully controlled  Pt was sent here today for increased blood in his urine  Pt also reports generalized abdominal pain  History of Illness:      Sam Herrera is a 80 y o  male w/ CAD s/p PCI 2-3 years ago at [facility] who presents from [facility] with CP with positional change this AM   Pt desribes pain as 5/10 chest pressure associated with SOB  At this time, c/o 5/10 chest pressure      Pt also c/o severe lower abd pain  He had stone w/ urteral stent placed 7/1  Was dx w/ UTI and d/c on Keflex yesterday  C/o hematuria and inability to fully empty bladder  He is also having frequent loose BMs                ED Vital Signs:   ED Triage Vitals   Temperature Pulse Respirations Blood Pressure SpO2   07/04/18 1117 07/04/18 1117 07/04/18 1117 07/04/18 1118 07/04/18 1118   98 6 °F (37 °C) 76 20 122/58 99 %      Pain Score       07/04/18 1117       9        Wt Readings from Last 1 Encounters:   07/04/18 87 5 kg (193 lb)       Vital Signs (abnormal):    Date/Time  Temp  Pulse  Resp  BP  SpO2  O2 Device  Patient Position - Orthostatic VS   07/05/18 0816  --  --  --   102/48  --  --  Lying   07/05/18 0757  97 5 °F (36 4 °C)  69  18   86/43  97 %  None (Room air)  Lying   07/05/18 0411  97 7 °F (36 5 °C)  70  18   105/49  98 %  None (Room air)  Lying   07/04/18 2342  97 5 °F (36 4 °C)  68  18   92/41  97 %  None (Room air)             Abnormal Labs/Diagnostic Test Results:     Urine    Color, UA Jamilah    Clarity, UA Slightly Cloudy    pH, UA 6 0    Leukocytes, UA Small (A)    Protein,  (2+) (A)    Glucose,  (1/4%) (A)    Blood, UA Large (A)      RBC 3 41 (L)    Hemoglobin 9 7 (L)    Hematocrit 31 9 (L)     Urine, Other    RBC, UA Innumerable (A)    WBC, UA Innumerable (A)    Hyaline Casts, UA 3-5 (A)     BUN 37 (H)   Creatinine 1 59 (H)       Troponin I  1 0 05 (H)     Troponin I   2 0 05 (H)           X-ray chest 2 views   Cardiomegaly  Right pleural effusion including some loculated fluid in the posterior aspect of the right major fissure  ED Treatment:   Medication Administration from 07/04/2018 1111 to 07/04/2018 1445       Date/Time Order Dose Route     07/04/2018 1141 morphine (PF) 4 mg/mL injection 4 mg 4 mg Intravenous     07/04/2018 1304 sodium chloride 0 9 % bolus 1,000 mL 1,000 mL Intravenous     07/04/2018 1412 tranexamic acid 100mg/mL (for epistaxis) 1,000 mg 1,000 mg Nasal     07/04/2018 1412 morphine (PF) 4 mg/mL injection 4 mg 4 mg Intravenous       Past Medical/Surgical History:    Active Ambulatory Problems     Diagnosis Date Noted    Lower urinary tract symptoms 05/16/2018    Malignant neoplasm of trigone of urinary bladder (HCC) 05/16/2018    Pro[facility] cancer ([facility] ) 05/16/2018    Chronic diastolic congestive heart failure ([facility] ) 05/22/2018    CKD (chronic kidney disease) stage 3, GFR 30-59 ml/min 05/22/2018    Paroxysmal atrial fibrillation ([place] [address] ) 05/22/2018    CAD (coronary artery disease) 05/22/2018    Type 2 diabetes mellitus ([place] [address] ) 05/22/2018    Pacemaker 05/22/2018    Right ureteral stone 07/01/2018    Urinary tract infection with hematuria 07/01/2018    Hyponatremia 07/02/2018           Admitting Diagnosis: Blood in urine [R31 9]  Chest pressure [R07 89]  Hematuria [R31 9]  NSTEMI (non-ST elevated myocardial infarction) ([place] [address] ) [I21 4]  Laceration of right lower extremity [S81 811A]  History of UTI [Z87 440]    Age/Sex: 80 y o  male    Assessment/Plan:     UROLOGY :  The patient's hematuria has seem to resolve  We discussed that intermittent hematuria is expected with stents in place  We will plan for outpatient ureteroscopy for his kidney stone  Cardiology is seeing the patient and will clear him for surgery  In regards to his elevated postvoid residuals we will continue with the Marte catheter  I would like to start him on tamsulosin but he has been hypotensive  I will leave this up to Cardiology and Internal Medicine  Hopefully he can be discharged with tamsulosin  My office will set up his next surgery  He is to finish his course of antibiotics  Called because nursing staff had been unable to place straight cath and pt had greater than 500 ml on bladder scan  Using standard technique, place 16 F coudet catheter in without difficulty urine was returned, slightly pink tinged  Balloon inflated with 10 ml of sterile water  Urine continued to drain from bag       CARDIOLOGY  71-year-old male with a past medical history of CAD status post PCI 2-3 years ago, atrial fibrillation on Xarelto, CHF, DMII, and pacemaker complaining of chest pressure with a Troponin of 0 05       -states he has been having chest pressure for 40 years off and on  -chest pain appears to be positional and has since resolved  -Troponin was also elevated to 0 05 on admission which appears to be baseline  -EKG shows paced rhythm with no ST T wave abnormalities  -trending troponins  -spoke with Urology, they plan to add tamsulosin 0 04 mg however blood pressure was soft this morning     Plan:  -continue AC with Apixaban  -continue Carvedilol 3 125 bid  -continue Furosemide 40mg bid  -trend Troponin for total of 3  -monitor pain  -add Tamsulosin per urology if BP improves      Admission Orders:    STOOL C DIFF   URINE CULTURE   TELEMETRY  CONTACT ISOLATION   TROPONIN SHERYL Q3 HR  SEQUENTIAL COMPRESSION DEVICE    CONSULT UROLOGY  Urinary tract infection with hematuria, site unspecified [N39 0, R31 9]       Lower urinary tract symptoms [R39 9]        URINARY RETENTION PROTOCOL    CONSULT TRAUMA   Laceration of right lower extremity [S81 811A]     CONSULT CARDIOLOGY   Coronary artery disease involving native coronary artery of native heart without angina pectoris [I25 10]   EKG WITH THIRD TROPONIN     Scheduled Meds:   Current Facility-Administered Medications:  acetaminophen 650 mg Oral Q6H PRN   albuterol 2 5 mg Nebulization Q6H PRN   apixaban 2 5 mg Oral BID   aspirin 81 mg Oral Daily   carvedilol 3 125 mg Oral BID With Meals   cefazolin 2,000 mg Intravenous Once   cephalexin 500 mg Oral Q12H [facility]   finasteride 5 mg Oral Daily   fish oil 1,000 mg Oral Daily   furosemide 40 mg Oral BID (diuretic)   gabapentin 100 mg Oral HS   HYDROmorphone 0 2 mg Intravenous Q3H PRN   insulin lispro 1-5 Units Subcutaneous TID AC   insulin lispro 1-5 Units Subcutaneous HS   lisinopril 5 mg Oral Daily   melatonin 3 mg Oral HS   nitroglycerin 0 4 mg Sublingual Q5 Min PRN   ondansetron 4 mg Intravenous Q6H PRN   oxybutynin 10 mg Oral HS   oxyCODONE 2 5 mg Oral Q4H PRN   oxyCODONE 5 mg Oral Q4H PRN   polyvinyl alcohol 1 drop Both Eyes HS PRN   potassium chloride 20 mEq Oral BID   tamsulosin 0 4 mg Oral Daily With Dinner     Continuous Infusions:    PRN Meds:   acetaminophen    albuterol    HYDROmorphone    nitroglycerin    ondansetron    oxyCODONE    oxyCODONE    polyvinyl alcohol

## 2018-07-05 NOTE — PROGRESS NOTES
[facility] Internal Medicine Progress Note  Patient: Skylar Jeffrey 80 y o  male   MRN: [id number]  PCP: Flip Neumann MD  Unit/Bed#: [facility] 723-44 Encounter: [id number]  Date Of Visit: 07/05/18    Assessment:    Principal Problem:    Other chest pain  Active Problems:    Chronic diastolic congestive heart failure (HCC)    CKD (chronic kidney disease) stage 3, GFR 30-59 ml/min    Paroxysmal atrial fibrillation (HCC)    CAD (coronary artery disease)    Type 2 diabetes mellitus ([facility] )    Right ureteral stone    Urinary tract infection with hematuria    Laceration of right lower extremity    Diarrhea      Plan:    1  Chest pain with elevated troponin, improving,  patient with underlying CAD s/p PCI, awaiting cardiology evaluation, continue aspirin and  Coreg  2  Nephrolithiasis with recent right ureteral stent placement and UTI,  now with hematuria and urinary retention, urology input appreciated, Marte catheter was inserted, started on Flomax, hematuria improving,  recommendation for outpatient follow-up for ureteroscopy, continue with oral Keflex  3  Paroxysmal atrial fibrillation, PPM, continue Eliquis and Coreg  4  Chronic diastolic CHF, EF 66% with moderate MR,  continue Lasix  5  Chronic kidney disease stage 3, stable  6  Diabetes mellitus type 2, A1c 7 4, acceptable blood sugar, oral meds on hold  7  Right lower extremity laceration, sutured by trauma , no further bleeding, suture removal in 7 days  8  Diarrhea, resolved,  negative C diff       VTE Pharmacologic Prophylaxis:   Pharmacologic: Apixaban (Eliquis)  Mechanical VTE Prophylaxis in Place: Yes    Patient Centered Rounds: I have performed bedside rounds with nursing staff today  Discussions with Specialists or Other Care Team Provider:     Education and Discussions with Family / Patient:  Patient and his daughter    Time Spent for Care: 30 minutes    More than 50% of total time spent on counseling and coordination of care as described above     Current Length of Stay: 0 day(s)    Current Patient Status:  Inpatient  Certification Statement: The patient will continue to require additional inpatient hospital stay due to Management of chest pain and urinary retention    Discharge Plan / Estimated Discharge Date:  Possible back to assisted living tomorrow    Code Status: Level 1 - Full Code      Subjective:   Patient seen and examined  Comfortable in bed  Denied chest pain or shortness of breath  Marte catheter inserted no more hematuria    Objective:     Vitals:   Temp (24hrs), Av 9 °F (36 6 °C), Min:97 5 °F (36 4 °C), Max:98 8 °F (37 1 °C)    HR:  [68-80] 69  Resp:  [18-20] 18  BP: ()/(41-72) 102/48  SpO2:  [94 %-100 %] 97 %  Body mass index is 28 09 kg/m²  Input and Output Summary (last 24 hours):        Intake/Output Summary (Last 24 hours) at 18 1119  Last data filed at 18 0900   Gross per 24 hour   Intake             1820 ml   Output             4050 ml   Net            -2230 ml       Physical Exam:     Physical Exam  Patient is awake alert in no acute distress  Lung clear to auscultation bilateral  Heart positive S1-S2 no murmur  Abdomen soft nontender positive bowel sounds  Marte catheter in place with yellow dark urine  Lower extremities trace edema  Right lower extremity shin covered with gauze    Additional Data:     Labs:      Results from last 7 days  Lab Units 18  0502 18  1128   WBC Thousand/uL 5 21 5 85   HEMOGLOBIN g/dL 8 8* 9 7*   HEMATOCRIT % 28 8* 31 9*   PLATELETS Thousands/uL 201 205   NEUTROS PCT %  --  77*   LYMPHS PCT %  --  8*   MONOS PCT %  --  11   EOS PCT %  --  2       Results from last 7 days  Lab Units 18  0502 18  1128   SODIUM mmol/L 137 137   POTASSIUM mmol/L 3 8 3 8   CHLORIDE mmol/L 104 101   CO2 mmol/L 28 30   BUN mg/dL 31* 37*   CREATININE mg/dL 1 50* 1 59*   CALCIUM mg/dL 7 8* 8 3   TOTAL PROTEIN g/dL  --  6 4   BILIRUBIN TOTAL mg/dL  --  0 44   ALK PHOS U/L  -- 87   ALT U/L  --  13   AST U/L  --  10   GLUCOSE RANDOM mg/dL 110 162*           * I Have Reviewed All Lab Data Listed Above  * Additional Pertinent Lab Tests Reviewed:  [facility] Admission Reviewed    Imaging:    Imaging Reports Reviewed Today Include:   Imaging Personally Reviewed by Myself Includes:      Recent Cultures (last 7 days):       Results from last 7 days  Lab Units 07/04/18  1720 07/04/18  1143 07/01/18  1059   URINE CULTURE   --  Culture too young- will reincubate >100,000 cfu/ml Escherichia coli*   C DIFF TOXIN B  NEGATIVE for C difficle toxin by PCR    --   --        Last 24 Hours Medication List:     Current Facility-Administered Medications:  acetaminophen 650 mg Oral Q6H PRN Doug Jade, DO    albuterol 2 5 mg Nebulization Q6H PRN Doug Jade, DO    apixaban 2 5 mg Oral BID Doug Jade, DO    aspirin 81 mg Oral Daily Doug Jade, DO    carvedilol 3 125 mg Oral BID With Meals Doug Jade, DO    cefazolin 2,000 mg Intravenous Once Ajith Campbell MD Last Rate: Stopped (07/05/18 0710)   cephalexin 500 mg Oral Q12H [address] Doug Jade, DO    finasteride 5 mg Oral Daily Doug Jade, DO    fish oil 1,000 mg Oral Daily Doug Jade, DO    furosemide 40 mg Oral BID (diuretic) Doug Jade, DO    gabapentin 100 mg Oral HS Doug Jade, DO    HYDROmorphone 0 2 mg Intravenous Q3H PRN Doug Jade, DO    insulin lispro 1-5 Units Subcutaneous TID PIERCE Pierce, DO    insulin lispro 1-5 Units Subcutaneous HS Doug Jade, DO    lisinopril 5 mg Oral Daily Doug Jade, DO    melatonin 3 mg Oral HS Doug Jade, DO    nitroglycerin 0 4 mg Sublingual Q5 Min PRN Doug Jade, DO    ondansetron 4 mg Intravenous Q6H PRN Doug Jade, DO    oxybutynin 10 mg Oral HS Doug Jade, DO    oxyCODONE 2 5 mg Oral Q4H PRN Doug Jade, DO    oxyCODONE 5 mg Oral Q4H PRN Doug Jade, DO    polyvinyl alcohol 1 drop Both Eyes HS PRN Doug Jade, DO    potassium chloride 20 mEq Oral BID Wagner Reason, DO    tamsulosin 0 4 mg Oral Daily With [facility] Life, DO         Today, Patient Was Seen By: Edouard Story DO    ** Please Note: This note has been constructed using a voice recognition system   **

## 2018-07-05 NOTE — CONSULTS
Consultation - Cardiology   Chao Flores 80 y o  male MRN: [id number]  Unit/Bed#: [facility] 708-01 Encounter: [id number]    Assessment/Plan     Assessment:  22-year-old male with a past medical history of CAD status post PCI 2-3 years ago, atrial fibrillation on Xarelto, CHF, DMII, and pacemaker complaining of chest pressure with a Troponin of 0 05      -states he has been having chest pressure for 40 years off and on  -chest pain appears to be positional and has since resolved  -Troponin was also elevated to 0 05 on admission which appears to be baseline  -EKG shows paced rhythm with no ST T wave abnormalities  -trending troponins  -spoke with Urology, they plan to add tamsulosin 0 04 mg however blood pressure was soft this morning    Plan:  -continue AC with Apixaban  -continue Carvedilol 3 125 bid  -continue Furosemide 40mg bid  -trend Troponin for total of 3  -monitor pain  -add Tamsulosin per urology if BP improves    History of Present Illness   Physician Requesting Consult: Gauri Dougherty DO  Reason for Consult / Principal Problem: Chest Pain  HPI: Chao Flores is a 80y o  year old male with a PMHx of CAD s/p PCI (2-3 years ago), CHF, Afib on Xarelto, DMII, and packemaker placement who presents with chest pressure  Mr Dixie Mcadams was recently admitted for nephrolithiasis and UTI for which he is currently being treated with Keflex  Mr Dixie Mcadams returned to the ED yesterday complaining of abdominal pain, potentially caused by the ureteral stent  Mr Dixie Mcadams then began to complain of chest pressure  He describes the pressure as 5/10 on the pain scale, describes it as a pressure sensation, with no radiation  However he has suffered from this chest pressure for over 40 years  In the ED, troponins were elevated to 0 05, which appears to be his baseline  EKG showed no ST and T-wave abnormalities  Denies associated diaphoresis, shortness of breath, nausea      This AM, patient states he has no chest pain, and that it has completely resolved  Denies SOB, diaphoresis, leg swelling, nausea  Endorses abdominal pain  Spoke with Urology and they wish to add tamsulosin 0 04 mg  However the patient's blood pressure was soft this morning  Consults    Review of Systems   Constitutional: Negative  HENT: Negative  Eyes: Negative  Respiratory: Negative  Cardiovascular: Negative  Gastrointestinal: Positive for abdominal pain  Endocrine: Negative  Genitourinary: Negative  Musculoskeletal: Negative  Allergic/Immunologic: Negative  Neurological: Negative  Hematological: Negative  Psychiatric/Behavioral: Negative  Historical Information   Past Medical History:   Diagnosis Date    A-fib ([place] [address] )     Anemia     Bladder cancer ([place] [address] )     CAD (coronary artery disease)     CHF (congestive heart failure) (HCC)     Chronic kidney failure     Colon cancer ([place] [address] )     Eye cancer, left ([place] [address] )     Pacemaker     Prostate cancer ([place] [address] )     Type 2 diabetes mellitus ([place] [address] )      Past Surgical History:   Procedure Laterality Date    CARDIAC PACEMAKER PLACEMENT  2014    CORONARY ANGIOPLASTY WITH STENT PLACEMENT      GALLBLADDER SURGERY      [place] CYSTOURETHROSCOPY,URETER CATHETER Right 7/1/2018    Procedure: CYSTOSCOPY RETROGRADE PYELOGRAM WITH INSERTION STENT URETERAL;  Surgeon: Boy Tracy MD;  [facility];  Service: Urology     History   Alcohol Use    Yes     Comment: jayne cognac 1 oz/night      History   Drug Use No     History   Smoking Status    Former Smoker   Smokeless Tobacco    Never Used     Family History: non-contributory    Meds/Allergies   all current active meds have been reviewed  Allergies   Allergen Reactions    Iv Contrast [Iodinated Diagnostic Agents]        Objective   Vitals: Blood pressure (!) 86/43, pulse 69, temperature 97 5 °F (36 4 °C), temperature source Oral, resp  rate 18, height 5' 9 5" (1 765 m), weight 87 5 kg (193 lb), SpO2 97 %    Orthostatic Blood Pressures Most Recent Value   Blood Pressure   86/43 filed at 07/05/2018 0757   Patient Position - Orthostatic VS  Lying filed at 07/05/2018 0757            Intake/Output Summary (Last 24 hours) at 07/05/18 1627  Last data filed at 07/05/18 6801   Gross per 24 hour   Intake             1300 ml   Output             3800 ml   Net            -2500 ml       Invasive Devices     Peripheral Intravenous Line            Peripheral IV 07/04/18 Right Antecubital less than 1 day          Drain            Ureteral Drain/Stent Right ureter 6 Fr  3 days    Urethral Catheter Coude less than 1 day                Physical Exam   Constitutional: He is oriented to person, place, and time  He appears well-developed and well-nourished  No distress  HENT:   Head: Normocephalic and atraumatic  Eyes: Conjunctivae and EOM are normal  Pupils are equal, round, and reactive to light  Neck: Normal range of motion  Neck supple  No JVD present  Cardiovascular: Normal rate, regular rhythm and normal heart sounds  Exam reveals no gallop and no friction rub  No murmur heard  Pulmonary/Chest: Effort normal and breath sounds normal  He has no wheezes  He has no rales  Abdominal: There is tenderness  Musculoskeletal: Normal range of motion  He exhibits no edema  Neurological: He is alert and oriented to person, place, and time  Skin: Skin is warm and dry  Psychiatric: He has a normal mood and affect  His behavior is normal        Lab Results:   Lipid Profile:   Results from last 7 days  Lab Units 07/05/18  0502   HDL mg/dL 33*   CHOLESTEROL mg/dL 127   LDL CALC mg/dL 79   TRIGLYCERIDES mg/dL 75     Imaging: I have personally reviewed pertinent reports  EKG: Ventricular paced rhythm   No ST-T wave abnormalities  VTE Prophylaxis: Reason for no pharmacologic prophylaxis AC on Xarelto    Code Status: Level 1 - Full Code  Advance Directive and Living Will:      Power of :    POLST:      Counseling / Coordination of Care  Total floor / unit time spent today 30 minutes  Greater than 50% of total time was spent with the patient and / or family counseling and / or coordination of care  A description of the counseling / coordination of care: Jessy Watts

## 2018-07-05 NOTE — SOCIAL WORK
MCG Guide Used for Initial Round: Chest Pain RRG  Optimal GLOS: 1  Hospital Day: 0 days  DC Readiness:   Discharge Readiness  Return to top of Chest Pain RRG - ISC  · Discharge readiness is indicated by patient meeting Recovery Milestones, including ALL of the following:  ? Acute coronary syndrome ruled out  ? No identification of etiology of pain requiring inpatient care  ? Pain absent or managed  ? Ambulatory  ? Oral hydration, medications, and diet  ?  Discharge plans and education understood    Identified Barriers: Await cardiology eval  Discussion Date (Time): 07/05/18 with Dr Francoise Dickson

## 2018-07-05 NOTE — PHYSICIAN ADVISOR
Current patient class: Inpatient  The patient is currently on Hospital Day: 2 at [address] [place] [address]        The patient was admitted to the hospital  on 7/5/18 at 11:56 AM for the following diagnosis:  Blood in urine [R31 9]  Chest pressure [R07 89]  Hematuria [R31 9]  NSTEMI (non-ST elevated myocardial infarction) ([facility] [address] ) [I21 4]  Laceration of right lower extremity [S81 811A]  History of UTI [Z87 440]       There is documentation in the medical record of an expected length of stay of at least 2 midnights  The patient is therefore expected to satisfy the 2 midnight benchmark and given the 2 midnight presumption is appropriate for INPATIENT ADMISSION  Given this expectation of a satisfying stay, CMS instructs us that the patient is most often appropriate for inpatient admission under part A provided medical necessity is documented in the chart  After review of the relevant documentation, labs, vital signs and test results, the patient is appropriate for INPATIENT ADMISSION  Admission to the hospital as an inpatient is a complex decision making process which requires the practitioner to consider the patients presenting complaint, history and physical examination and all relevant testing  With this in mind, in this case, the patient was deemed appropriate for INPATIENT ADMISSION  After review of the documentation and testing available at the time of the admission I concur with this clinical determination of medical necessity  The patient does have an inpatient admission within the previous 30 days  The patient was admitted on 7/1/18 and discharged on 7/3/18 as an inpatient  The patient therefore required readmission review  In this case the patient should be considered a SEPARATE and UNRELATED INPATIENT ADMISSION  The patient had been discharged in stable condition with a completed care plan   There were no unresolved acute medical issues at the time of discharged which would have reasonably been expected to prompt this readmission  Rationale is as follows: The patient is a 80 yrs old  Male who presented to the ED at 7/4/2018 11:11 AM with a chief complaint of Blood in Urine (Pt coming in from [facility]  Per EMS pt was seen here last night for a R leg laceration  Per EMS bleeding is not fully controlled  Pt was sent here today for increased blood in his urine  Pt also reports generalized abdominal pain  )   Patient complained of chest pain -associated with SOB - admitted for chest pain and had serial troponins checked , also had serial EKG   He was seen in consultation by cardiology service for chest pain   Seen by urology service for nephrolithiasis with recent right ureteral stent placement and UTI- Marte catheter placed for elevated postvoid residuals and he will undergo ureteroscopy as out patient   Patient was admitted from July ist-July 3rd ureteral stone and underwent cystoscopy with stent placement       Current admission is therefore separate and unrelated     The patients vitals on arrival were ED Triage Vitals   Temperature Pulse Respirations Blood Pressure SpO2   07/04/18 1117 07/04/18 1117 07/04/18 1117 07/04/18 1118 07/04/18 1118   98 6 °F (37 °C) 76 20 122/58 99 %      Temp Source Heart Rate Source Patient Position - Orthostatic VS BP Location FiO2 (%)   07/04/18 1640 07/04/18 2342 07/04/18 1640 07/04/18 1640 --   Oral Monitor Lying Left arm       Pain Score       07/04/18 1117       9           Past Medical History:   Diagnosis Date    [facility]fib [facility] [address] )     Anemia     Bladder cancer ([facility] [address] )     CAD (coronary artery disease)     CHF (congestive heart failure) (HCC)     Chronic kidney failure     Colon cancer ([facility] [address] )     Eye cancer, left ([facility] [address] )     Pacemaker     Prostate cancer ([facility] [address] )     [facility]      Past Surgical History:   Procedure Laterality Date    CARDIAC PACEMAKER PLACEMENT  2014    CORONARY ANGIOPLASTY WITH STENT PLACEMENT      GALLBLADDER SURGERY      [place] CYSTOURETHROSCOPY,URETER CATHETER Right 7/1/2018    Procedure: CYSTOSCOPY RETROGRADE PYELOGRAM WITH INSERTION STENT URETERAL;  Surgeon: Zhang Long MD;  [facility];  Service: Urology           Consults have been placed to:   IP CONSULT TO CARDIOLOGY  IP CONSULT TO UROLOGY  INPATIENT CONSULT TO TRAUMA (INPATIENT ONLY)    Vitals:    07/05/18 0411 07/05/18 0757 07/05/18 0816 07/05/18 1128   BP: (!) 105/49 (!) 86/43 (!) 102/48 104/53   BP Location: Left arm Right arm Left arm Left arm   Pulse: 70 69     Resp: 18 18 18   Temp: 97 7 °F (36 5 °C) 97 5 °F (36 4 °C)     TempSrc: Oral Oral     SpO2: 98% 97%  98%   Weight:       Height:           Most recent labs:    Recent Labs      07/04/18   1128   07/04/18   2148  07/05/18   0502   WBC  5 85   --    --   5 21   HGB  9 7*   --    --   8 8*   HCT  31 9*   --    --   28 8*   PLT  205   --    --   201   K  3 8   --    --   3 8   NA  137   --    --   137   CALCIUM  8 3   --    --   7 8*   BUN  37*   --    --   31*   CREATININE  1 59*   --    --   1 50*   TROPONINI  0 05*   < >  0 05*   --    AST  10   --    --    --    ALT  13   --    --    --    ALKPHOS  87   --    --    --    BILITOT  0 44   --    --    --     < > = values in this interval not displayed         Scheduled Meds:  Current Facility-Administered Medications:  acetaminophen 650 mg Oral Q6H PRN Jigna Lab, DO    albuterol 2 5 mg Nebulization Q6H PRN Jigna Lab, DO    apixaban 2 5 mg Oral BID Jigna Lab, DO    aspirin 81 mg Oral Daily Jigna Lab, DO    carvedilol 3 125 mg Oral BID With Meals Jigna Lab, DO    cefazolin 2,000 mg Intravenous Once Zhang Long MD Last Rate: Stopped (07/05/18 0710)   cephalexin 500 mg Oral Q12H [address] Jigna Lab, DO    finasteride 5 mg Oral Daily Jigna Lab, DO    fish oil 1,000 mg Oral Daily Jigna Lab, DO    furosemide 40 mg Oral BID (diuretic) Jigna Lab, DO    gabapentin 100 mg Oral HS Jigna Lab, DO    HYDROmorphone 0 2 mg Intravenous Q3H PRN Bernis Ruse, DO    insulin lispro 1-5 Units Subcutaneous TID PIERCE Pierce, DO    insulin lispro 1-5 Units Subcutaneous HS Bernis Ruse, DO    lisinopril 5 mg Oral Daily Bernis Ruse, DO    melatonin 3 mg Oral HS Bernis Ruse, DO    nitroglycerin 0 4 mg Sublingual Q5 Min PRN Bernis Ruse, DO    ondansetron 4 mg Intravenous Q6H PRN Bernis Ruse, DO    oxybutynin 10 mg Oral HS Bernis Ruse, DO    oxyCODONE 2 5 mg Oral Q4H PRN Bernis Ruse, DO    oxyCODONE 5 mg Oral Q4H PRN Bernis Ruse, DO    polyvinyl alcohol 1 drop Both Eyes HS PRN Bernis Ruse, DO    potassium chloride 20 mEq Oral BID Bernis Ruse, DO    tamsulosin 0 4 mg Oral Daily With Earlyne Stakes, DO      Continuous Infusions:   PRN Meds:   acetaminophen    albuterol    HYDROmorphone    nitroglycerin    ondansetron    oxyCODONE    oxyCODONE    polyvinyl alcohol    Surgical procedures (if appropriate):

## 2018-07-05 NOTE — PLAN OF CARE
Problem: DISCHARGE PLANNING - CARE MANAGEMENT  Goal: Discharge to post-acute care or home with appropriate resources  INTERVENTIONS:  - Conduct assessment to determine patient/family and health care team treatment goals, and need for post-acute services based on payer coverage, community resources, and patient preferences, and barriers to discharge  - Address psychosocial, clinical, and financial barriers to discharge as identified in assessment in conjunction with the patient/family and health care team  - Arrange appropriate level of post-acute services according to patient's   needs and preference and payer coverage in collaboration with the physician and health care team  - Communicate with and update the patient/family, physician, and health care team regarding progress on the discharge plan  - Arrange appropriate transportation to post-acute venues  - Plan for discharge to [facility]    Outcome: Progressing

## 2018-07-05 NOTE — SOCIAL WORK
CM was informed that pt was admitted from [facility]  CM called [facility] and spoke to [facility] to discuss prior level of functioning  Hefsy states pt receives minimal assistance with bathing and dressing  Pt is able to stand pivot to into [facility] and self propel in WC to dining glaser  Staff administers medications  [facility]arm[facility]  Pt's PCP is Dr Kathy Coburn  No hx of mental health or D&A treatment  Contact: Jerome Cali (daughter) [phone] (h) or [phone] (c)  Pt states plan is to return to [facility]

## 2018-07-06 VITALS
BODY MASS INDEX: 27.63 KG/M2 | WEIGHT: 193 LBS | RESPIRATION RATE: 18 BRPM | HEIGHT: 70 IN | SYSTOLIC BLOOD PRESSURE: 112 MMHG | DIASTOLIC BLOOD PRESSURE: 48 MMHG | OXYGEN SATURATION: 98 % | HEART RATE: 69 BPM | TEMPERATURE: 98.1 F

## 2018-07-06 PROBLEM — R19.7 DIARRHEA: Status: RESOLVED | Noted: 2018-07-04 | Resolved: 2018-07-06

## 2018-07-06 LAB
BACTERIA UR CULT: NORMAL
BASOPHILS # BLD AUTO: 0.04 THOUSANDS/ΜL (ref 0–0.1)
BASOPHILS NFR BLD AUTO: 1 % (ref 0–1)
EOSINOPHIL # BLD AUTO: 0.15 THOUSAND/ΜL (ref 0–0.61)
EOSINOPHIL NFR BLD AUTO: 3 % (ref 0–6)
ERYTHROCYTE [DISTWIDTH] IN BLOOD BY AUTOMATED COUNT: 16 % (ref 11.6–15.1)
GLUCOSE SERPL-MCNC: 185 MG/DL (ref 65–140)
GLUCOSE SERPL-MCNC: 224 MG/DL (ref 65–140)
HCT VFR BLD AUTO: 27 % (ref 36.5–49.3)
HGB BLD-MCNC: 8.5 G/DL (ref 12–17)
IMM GRANULOCYTES # BLD AUTO: 0.03 THOUSAND/UL (ref 0–0.2)
IMM GRANULOCYTES NFR BLD AUTO: 1 % (ref 0–2)
LYMPHOCYTES # BLD AUTO: 0.72 THOUSANDS/ΜL (ref 0.6–4.47)
LYMPHOCYTES NFR BLD AUTO: 12 % (ref 14–44)
MCH RBC QN AUTO: 29.1 PG (ref 26.8–34.3)
MCHC RBC AUTO-ENTMCNC: 31.5 G/DL (ref 31.4–37.4)
MCV RBC AUTO: 93 FL (ref 82–98)
MONOCYTES # BLD AUTO: 0.83 THOUSAND/ΜL (ref 0.17–1.22)
MONOCYTES NFR BLD AUTO: 14 % (ref 4–12)
NEUTROPHILS # BLD AUTO: 4.06 THOUSANDS/ΜL (ref 1.85–7.62)
NEUTS SEG NFR BLD AUTO: 69 % (ref 43–75)
NRBC BLD AUTO-RTO: 0 /100 WBCS
PLATELET # BLD AUTO: 196 THOUSANDS/UL (ref 149–390)
PMV BLD AUTO: 11.1 FL (ref 8.9–12.7)
RBC # BLD AUTO: 2.92 MILLION/UL (ref 3.88–5.62)
WBC # BLD AUTO: 5.83 THOUSAND/UL (ref 4.31–10.16)

## 2018-07-06 PROCEDURE — 85025 COMPLETE CBC W/AUTO DIFF WBC: CPT | Performed by: INTERNAL MEDICINE

## 2018-07-06 PROCEDURE — 82948 REAGENT STRIP/BLOOD GLUCOSE: CPT

## 2018-07-06 PROCEDURE — 99239 HOSP IP/OBS DSCHRG MGMT >30: CPT | Performed by: INTERNAL MEDICINE

## 2018-07-06 PROCEDURE — 94760 N-INVAS EAR/PLS OXIMETRY 1: CPT

## 2018-07-06 RX ORDER — OXYCODONE HYDROCHLORIDE 5 MG/1
5 TABLET ORAL EVERY 4 HOURS PRN
Qty: 10 TABLET | Refills: 0 | Status: SHIPPED | OUTPATIENT
Start: 2018-07-06 | End: 2018-07-16

## 2018-07-06 RX ORDER — ACETAMINOPHEN 325 MG/1
650 TABLET ORAL EVERY 6 HOURS PRN
Qty: 30 TABLET | Refills: 0
Start: 2018-07-06 | End: 2018-08-16 | Stop reason: ALTCHOICE

## 2018-07-06 RX ADMIN — INSULIN LISPRO 1 UNITS: 100 INJECTION, SOLUTION INTRAVENOUS; SUBCUTANEOUS at 09:31

## 2018-07-06 RX ADMIN — APIXABAN 2.5 MG: 2.5 TABLET, FILM COATED ORAL at 09:29

## 2018-07-06 RX ADMIN — CARVEDILOL 3.12 MG: 3.12 TABLET, FILM COATED ORAL at 09:30

## 2018-07-06 RX ADMIN — LISINOPRIL 5 MG: 5 TABLET ORAL at 09:29

## 2018-07-06 RX ADMIN — ASPIRIN 81 MG: 81 TABLET, COATED ORAL at 09:29

## 2018-07-06 RX ADMIN — FINASTERIDE 5 MG: 5 TABLET, FILM COATED ORAL at 09:29

## 2018-07-06 RX ADMIN — FUROSEMIDE 40 MG: 40 TABLET ORAL at 09:31

## 2018-07-06 RX ADMIN — POTASSIUM CHLORIDE 20 MEQ: 1500 TABLET, EXTENDED RELEASE ORAL at 09:32

## 2018-07-06 RX ADMIN — Medication 1000 MG: at 09:29

## 2018-07-06 RX ADMIN — CEPHALEXIN 500 MG: 500 CAPSULE ORAL at 09:29

## 2018-07-06 NOTE — DISCHARGE SUMMARY
Discharge Summary - [facility]    Patient Information: Irina Danielson 80 y o  male MRN: [id number]  [facility] Encounter: [id number]    Discharging Physician / Practitioner: Olivia Madsen DO  PCP: Yahir Chew MD  Admission Date: 7/4/2018  Discharge Date: 07/06/18    Disposition:     Other: [facility]    Reason for Admission:   Atypical chest pain  Urinary retention with hematuria resolved after Marte catheter placement    Discharge Diagnoses:     Principal Problem:    Other chest pain  Active Problems:    Chronic diastolic congestive heart failure (HCC)    CKD (chronic kidney disease) stage 3, GFR 30-59 ml/min    Paroxysmal atrial fibrillation ([facility])    CAD (coronary artery disease)    Type 2 diabetes mellitus ([facility] [address] )    Right ureteral stone    Urinary tract infection with hematuria    Laceration of right lower extremity  Resolved Problems:    Diarrhea      Consultations During Hospital Stay:  · Cardiology  · Urology    Procedures Performed:   Chest x-ray with cardiomegaly and right pleural effusion  Right tibia x-ray no acute fracture  ·     Significant Findings / Test Results:     As above  Incidental Findings:  none    Test Results Pending at Discharge (will require follow up):   · none     Outpatient Tests Requested:  · Urology follow-up    Complications:  none    Hospital Course:     Irina Danielson is a 80 y o  male patient who originally presented to the hospital on 7/4/2018 due to chest pain and lower abdominal pain   Patient was recently discharged from the hospital after he was found to have 9 mm ureteral stone on the right with UTI,  patient at the time had emergent cystoscopy and right ureteral stent placement and was discharged on oral antibiotic to finish a total of 2 weeks of antibiotic and outpatient follow-up    Patient was admitted the hospital,  was noted to have hematuria and during the workup he was noted to have elevated postvoiding residuals, urology consulted, Marte catheter was inserted recommendation to continue with Flomax, discharge with Marte catheter,  and outpatient urology follow-up for ureteroscopy for his kidney stone  Patient with underlying CAD status post drug-eluting stent placement, cardiology consulted regarding chest pain his troponin was minimally elevated, no ACS, atypical chest pain, no further inpatient workup cardiology recommending no further workup outpatient follow-up    Currently patient is in stable condition tolerating oral diet, tolerating Marte catheter without complication, he will be discharged back to [facility] to follow with his family doctor in 1 week and with Urology and Cardiology as an outpatient    Patient with right lower extremity laceration, it was sutured by trauma loss admission, no further bleeding recommended for suture removal in 4 days    Condition at Discharge: stable     Discharge Day Visit / Exam:     Subjective:    Patient seen and examined  Comfortable in bed  Denied chest pain or shortness of breath  No further hematuria  Vitals: Blood Pressure: (!) 112/48 (07/06/18 0810)  Pulse: 69 (07/06/18 0810)  Temperature: 98 1 °F (36 7 °C) (07/06/18 0810)  Temp Source: Oral (07/06/18 0810)  Respirations: 18 (07/06/18 0810)  Height: 5' 9 5" (176 5 cm) (07/04/18 1117)  Weight - Scale: 87 5 kg (193 lb) (07/04/18 1117)  SpO2: 98 % (07/06/18 0810)  Exam:   Physical Exam  Patient is awake alert in no acute distress  Lung clear to auscultation bilateral  Heart positive S1-S2 no murmur  Abdomen soft nontender positive bowel sounds  Marte catheter in place with yellow dark urine  Lower extremities trace edema  Right lower extremity shin covered with gauze  Discussion with Family:  Patient's daughter    Discharge instructions/Information to patient and family:   See after visit summary for information provided to patient and family        Provisions for Follow-Up Care:  See after visit summary for information related to follow-up care and any pertinent home health orders  Planned Readmission: no     Discharge Statement:  I spent 38 minutes discharging the patient  This time was spent on the day of discharge  I had direct contact with the patient on the day of discharge  Greater than 50% of the total time was spent examining patient, answering all patient questions, arranging and discussing plan of care with patient as well as directly providing post-discharge instructions  Additional time then spent on discharge activities  Discharge Medications:  See after visit summary for reconciled discharge medications provided to patient and family        ** Please Note: This note has been constructed using a voice recognition system **

## 2018-07-06 NOTE — SOCIAL WORK
Received a call from [facility] at [facility] who is requesting a Documentation of Medical Evaluation (DME) form be completed  CM faxed completed form to [facility]

## 2018-07-06 NOTE — TELEPHONE ENCOUNTER
PT has been scheduled by our office for 7/17/2018 with Dr Leandro De La Torre  Notes: hospital follow-up regarding outpatient ureteroscopic stone treatment    Made On: 7/5/2018 12:26 PM By: Good Laura [[id number]] (ES)

## 2018-07-06 NOTE — PROGRESS NOTES
Report given to [address] [facility] Edu[facility] RN at [facility]  Paperwork faxed  Pt is ready to leave  Will continue to monitor

## 2018-07-06 NOTE — SOCIAL WORK
CM was informed that pt is medically stable for dc to [place] [address]  Met with pt to discuss same  CM discussed [facility] transport with pt and daughter Francia--pt and Sharee Victoria are agreeable to cost  CM arranged with Jordy Dunn for a 11am dc to [facility]  CM notified pt, pts daughter Shareechika Victoria, pt's bedside RN Francisco Sandhu, and Viktor at [place] [address] of dc time  Facility transfer form completed Chart copy requested

## 2018-07-06 NOTE — PLAN OF CARE
CARDIOVASCULAR - ADULT     Maintains optimal cardiac output and hemodynamic stability Adequate for Discharge     Absence of cardiac dysrhythmias or at baseline rhythm Adequate for Discharge        DISCHARGE PLANNING     Discharge to home or other facility with appropriate resources Adequate for Discharge        GASTROINTESTINAL - ADULT     Maintains or returns to baseline bowel function Adequate for Discharge     Maintains adequate nutritional intake Adequate for Discharge        GENITOURINARY - ADULT     Maintains or returns to baseline urinary function Adequate for Discharge     Absence of urinary retention Adequate for Discharge     Urinary catheter remains patent Adequate for Discharge        INFECTION - ADULT     Absence or prevention of progression during hospitalization Adequate for Discharge     Absence of fever/infection during neutropenic period Adequate for Discharge        Knowledge Deficit     Patient/family/caregiver demonstrates understanding of disease process, treatment plan, medications, and discharge instructions Adequate for Discharge        MUSCULOSKELETAL - ADULT     Maintain or return mobility to safest level of function Adequate for Discharge     Maintain proper alignment of affected body part Adequate for Discharge        PAIN - ADULT     Verbalizes/displays adequate comfort level or baseline comfort level Adequate for Discharge        Potential for Falls     Patient will remain free of falls Adequate for Discharge        Prexisting or High Potential for Compromised Skin Integrity     Skin integrity is maintained or improved Adequate for Discharge        SAFETY ADULT     Maintain or return to baseline ADL function Adequate for Discharge     Maintain or return mobility status to optimal level Adequate for Discharge     Patient will remain free of falls Adequate for Discharge        SKIN/TISSUE INTEGRITY - ADULT     Skin integrity remains intact Adequate for Discharge

## 2018-07-10 DIAGNOSIS — E55.9 VITAMIN D DEFICIENCY: ICD-10-CM

## 2018-07-10 DIAGNOSIS — I50.9 ACUTE ON CHRONIC CONGESTIVE HEART FAILURE, UNSPECIFIED HEART FAILURE TYPE (HCC): ICD-10-CM

## 2018-07-10 DIAGNOSIS — N40.1 BENIGN PROSTATIC HYPERPLASIA WITH URINARY FREQUENCY: Primary | ICD-10-CM

## 2018-07-10 DIAGNOSIS — R35.0 BENIGN PROSTATIC HYPERPLASIA WITH URINARY FREQUENCY: Primary | ICD-10-CM

## 2018-07-10 PROBLEM — D64.9 ANEMIA: Status: ACTIVE | Noted: 2018-03-19

## 2018-07-10 PROBLEM — G62.9 PERIPHERAL NEUROPATHY: Status: ACTIVE | Noted: 2018-03-19

## 2018-07-10 PROBLEM — H54.40 BLINDNESS OF LEFT EYE: Status: ACTIVE | Noted: 2018-03-20

## 2018-07-10 PROBLEM — N40.0 BENIGN PROSTATIC HYPERPLASIA: Status: ACTIVE | Noted: 2018-07-10

## 2018-07-10 PROBLEM — N20.0 KIDNEY STONE: Status: ACTIVE | Noted: 2018-07-10

## 2018-07-11 PROBLEM — Z95.5 STATUS POST INSERTION OF DRUG-ELUTING STENT INTO LEFT ANTERIOR DESCENDING (LAD) ARTERY: Status: ACTIVE | Noted: 2018-07-11

## 2018-07-11 RX ORDER — TAMSULOSIN HYDROCHLORIDE 0.4 MG/1
CAPSULE ORAL
Qty: 28 CAPSULE | Refills: 5 | Status: SHIPPED | OUTPATIENT
Start: 2018-07-11 | End: 2019-01-10 | Stop reason: SDUPTHER

## 2018-07-11 RX ORDER — FINASTERIDE 5 MG/1
TABLET, FILM COATED ORAL
Qty: 28 TABLET | Refills: 5 | Status: SHIPPED | OUTPATIENT
Start: 2018-07-11 | End: 2019-01-10 | Stop reason: SDUPTHER

## 2018-07-11 RX ORDER — ERGOCALCIFEROL 1.25 MG/1
CAPSULE ORAL
Qty: 4 CAPSULE | Refills: 11 | Status: SHIPPED | OUTPATIENT
Start: 2018-07-11 | End: 2018-08-30 | Stop reason: SDUPTHER

## 2018-07-11 RX ORDER — FUROSEMIDE 40 MG/1
TABLET ORAL
Qty: 56 TABLET | Refills: 5 | Status: SHIPPED | OUTPATIENT
Start: 2018-07-11 | End: 2018-07-12 | Stop reason: CLARIF

## 2018-07-11 NOTE — PROGRESS NOTES
Cardiology Follow Up    Ciro Jacobs  12/21/1930  [id number]  [address] [facility]  [address] [place] [address] [address]    1  Coronary artery disease involving native coronary artery of native heart without angina pectoris     2  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 (LAD) artery     3  Paroxysmal atrial fibrillation (HCC)     4  Chronic diastolic congestive heart failure (HCC)     5  Cardiac pacemaker in situ         Discussion/Summary:  Mr Laura Whittaker  Is a pleasant 40-year-old gentleman who presents to the office today for hospital follow-up  He does appear volume overloaded on exam and reports shortness of breath with transfers from his wheelchair  I will change his furosemide to torsemide 40 mg twice daily  I have asked him to have a BMP redrawn in about a week  The facility should notify the office for 2 to 3 lb weight gain overnight or 5 lb weight gain in one week  His blood pressure is well controlled in the office today  It is unclear why he is not on statin therapy in the setting of his known coronary disease  I will discuss this with him at his next visit  Otherwise his device will now be followed through our device clinic  I will notify them he will be scheduled for device check  He is maintained on systemic anticoagulation given his history of atrial fibrillation  I have asked him to follow up in the office in one month with our heart failure advanced practitioner and with me thereafter  Interval History:   Mr Laura Whittaker is an 40-year-old gentleman who presents to the office today for follow-up  I had met him initially when he was admitted to [address] [place] [address] in [place] with acute diastolic CHF  He was placed on an IV diuretic regimen    Also given his known history of atrial fibrillation he was started on systemic anticoagulation in the form of Eliquis  He was discharged at a weight of 181 pounds  He was seen in follow-up by one of our heart failure advanced practitioners  At that time his weight was stable  He returned a short time later for follow-up with her and his weight was up 5 lb  His diuretic regimen was intensified for few days  He was readmitted with abdominal pain and chest pressure  I had seen him during that hospital stay  Due to the atypical and chronic nature of his pain no cardiac testing was advised  He was seen at the facility where he resides on July 10th and at that time due to weight gain his Lasix dose was increased to 80 mg in AM and 40 mg in PM for 4 days  He continues to report shortness of breath with transfers  He has weigh daily at the facility  He denies utilization of added salt to his diet  With the limited activity he performs he denies any exertional chest pain  He denies paroxysmal nocturnal dyspnea, orthopnea, or increasing abdominal girth  He denies palpitations or symptoms of claudication with the limited activity he performs  Problem List     Lower urinary tract symptoms    Malignant neoplasm of trigone of urinary bladder (HCC)    Prostate cancer (HCC)    Chronic diastolic congestive heart failure (HCC)    CKD (chronic kidney disease) stage 3, GFR 30-59 ml/min (Chronic)    Paroxysmal atrial fibrillation (HCC) (Chronic)    CAD (coronary artery disease) (Chronic)    Type 2 diabetes mellitus (HCC) (Chronic)    Lab Results   Component Value Date    HGBA1C 7 4 (H) 05/23/2018       No results for input(s): POCGLU in the last 72 hours      Blood Sugar Average: Last 72 hrs:          Cardiac pacemaker in situ (Chronic)    Right ureteral stone    Overview Signed 7/1/2018 11:57 AM by Pat Christian MD     Added automatically from request for surgery [id number]         Urinary tract infection with hematuria    Hyponatremia    Other chest pain    Laceration of right lower extremity    Anemia    Benign prostatic hyperplasia    Blindness of left eye    Kidney stone    Peripheral neuropathy        Past Medical History:   Diagnosis Date    [facility]fib ([facility] )     Anemia     Bladder cancer ([place] [address] )     CAD (coronary artery disease)     CHF (congestive heart failure) (HCC)     Chronic kidney failure     Colon cancer (HCC)     Eye cancer, left ([facility] )     Pacemaker     Prostate cancer ([place] [address] )     Type 2 diabetes mellitus ([place] [address] )      Social History     Social History    Marital status: Single     Spouse name: N/A    Number of children: N/A    Years of education: N/A     Occupational History    Not on file  Social History Main Topics    Smoking status: Former Smoker    Smokeless tobacco: Never Used    Alcohol use Yes      Comment: jayne cognac 1 oz/night     Drug use: No    Sexual activity: Not Currently     Other Topics Concern    Not on file     Social History Narrative    No narrative on file      No family history on file    Past Surgical History:   Procedure Laterality Date    CARDIAC PACEMAKER PLACEMENT  2014    CORONARY ANGIOPLASTY WITH STENT PLACEMENT      GALLBLADDER SURGERY      AL CYSTOURETHROSCOPY,URETER CATHETER Right 7/1/2018    Procedure: CYSTOSCOPY RETROGRADE PYELOGRAM WITH INSERTION STENT URETERAL;  Surgeon: Johanne Colon MD;  [facility];  Service: Urology       Current Outpatient Prescriptions:     acetaminophen (TYLENOL) 325 mg tablet, Take 2 tablets (650 mg total) by mouth every 6 (six) hours as needed for mild pain, headaches or fever, Disp: 30 tablet, Rfl: 0    albuterol (5 mg/mL) 0 5 % nebulizer solution, Take 2 5 mg by nebulization every 6 (six) hours as needed for wheezing, Disp: , Rfl:     apixaban (ELIQUIS) 2 5 mg, Take 1 tablet (2 5 mg total) by mouth 2 (two) times a day, Disp: 30 tablet, Rfl: 0    Artificial Tear Ointment (ARTIFICIAL TEARS), as needed for dry eyes, Disp: , Rfl:     aspirin (ECOTRIN LOW STRENGTH) 81 mg EC tablet, Take 81 mg by mouth daily, Disp: , Rfl:     carvedilol (COREG) 3 125 mg tablet, Take 3 125 mg by mouth 2 (two) times a day with meals, Disp: , Rfl:     cephalexin (KEFLEX) 500 mg capsule, Take 1 capsule (500 mg total) by mouth every 12 (twelve) hours for 14 days, Disp: 28 capsule, Rfl: 0    Empagliflozin (JARDIANCE) 10 MG TABS, Take 10 mg by mouth every morning, Disp: , Rfl:     enalapril (VASOTEC) 2 5 mg tablet, Take 2 5 mg by mouth daily  , Disp: , Rfl:     ergocalciferol (VITAMIN D2) 50,000 units, TAKE 1 CAPSULE BY MOUTH ONCE WEEKLY, Disp: 4 capsule, Rfl: 11    finasteride (PROSCAR) 5 mg tablet, TAKE 1 TABLET BY MOUTH ONCE DAILY  , Disp: 28 tablet, Rfl: 5    furosemide (LASIX) 40 mg tablet, TAKE 1 TABLET BY MOUTH TWICE DAILY  (Patient taking differently: TAKE 1 TABLET BY MOUTH TWICE DAILY   in addition extra 40mg for 5 days endind 7/15), Disp: 56 tablet, Rfl: 5    gabapentin (NEURONTIN) 100 mg capsule, Take 1 capsule (100 mg total) by mouth daily (Patient taking differently: Take 100 mg by mouth 2 (two) times a day  ), Disp: 28 capsule, Rfl: 5    gabapentin (NEURONTIN) 300 mg capsule, Take 1 capsule (300 mg total) by mouth daily at bedtime, Disp: 28 capsule, Rfl: 5    Hypromellose (SYSTANE OVERNIGHT THERAPY) 0 3 % GEL, Apply to eye, Disp: , Rfl:     ipratropium-albuterol (DUO-NEB) 0 5-2 5 mg/3 mL, Take 3 mL by nebulization every 6 (six) hours as needed for wheezing, Disp: , Rfl: 0    melatonin 3 mg, Take 3 mg by mouth daily at bedtime, Disp: , Rfl:     Multiple Vitamin (TAB-A-ANETA PO), Take 1 tablet by mouth daily, Disp: , Rfl:     nitroglycerin (NITROSTAT) 0 4 mg SL tablet, Place 0 4 mg under the tongue every 5 (five) minutes as needed for chest pain, Disp: , Rfl:     Omega-3 Fatty Acids (FISH OIL) 1,000 mg, Take 1,000 mg by mouth daily, Disp: , Rfl:     oxyCODONE (ROXICODONE) 5 mg immediate release tablet, Take 1 tablet (5 mg total) by mouth every 4 (four) hours as needed for severe pain for up to 10 days Max Daily Amount: 30 mg, Disp: 10 tablet, Rfl: 0    Polyethyl Glycol-Propyl Glycol (SYSTANE) 0 4-0 3 % GEL, Apply to eye, Disp: , Rfl:     potassium chloride (K-DUR,KLOR-CON) 20 mEq tablet, Take 20 mEq by mouth 2 (two) times a day, Disp: , Rfl:     tamsulosin (FLOMAX) 0 4 mg, TAKE 1 CAPSULE BY MOUTH WITH EVENING MEAL, Disp: 28 capsule, Rfl: 5  Allergies   Allergen Reactions    Iv Contrast [Iodinated Diagnostic Agents]        Labs:     Chemistry        Component Value Date/Time     07/05/2018 0502    K 3 8 07/05/2018 0502     07/05/2018 0502    CO2 28 07/05/2018 0502    BUN 31 (H) 07/05/2018 0502    CREATININE 1 50 (H) 07/05/2018 0502        Component Value Date/Time    CALCIUM 7 8 (L) 07/05/2018 0502    ALKPHOS 87 07/04/2018 1128    AST 10 07/04/2018 1128    ALT 13 07/04/2018 1128    BILITOT 0 44 07/04/2018 1128            Lab Results   Component Value Date    CHOL 127 07/05/2018     Lab Results   Component Value Date    HDL 33 (L) 07/05/2018     Lab Results   Component Value Date    LDLCALC 79 07/05/2018     Lab Results   Component Value Date    TRIG 75 07/05/2018     No components found for: CHOLHDL    Imaging: Ct Abdomen Pelvis Wo Contrast    Result Date: 7/1/2018  Narrative: CT ABDOMEN AND PELVIS WITHOUT IV CONTRAST INDICATION:   Right lower quadrant abdominal pain  Colon cancer  COMPARISON: None  TECHNIQUE:  CT examination of the abdomen and pelvis was performed without intravenous contrast   Axial, sagittal, and coronal 2D reformatted images were created from the source data and submitted for interpretation  Radiation dose length product (DLP) for this visit:  761 mGy-cm   This examination, like all CT scans performed in the [facility], was performed utilizing techniques to minimize radiation dose exposure, including the use of iterative reconstruction and automated exposure control  Enteric contrast was not administered  FINDINGS: ABDOMEN LOWER CHEST:  Pacer leads are noted in the right heart  Heart is enlarged  There is trace pericardial effusion  There is small moderate loculated effusion in the medial lower left chest   Small freely layering right pleural effusion is noted  Calcified right lower chest granuloma is noted  Emphysematous changes are noted in the lower lungs as are atelectatic changes  LIVER/BILIARY TREE:  Unremarkable  No noncontrast CT evidence of suspicious hepatic mass or biliary dilatation  GALLBLADDER:  Gallbladder is surgically absent  SPLEEN:  Unremarkable  PANCREAS:  Unremarkable  ADRENAL GLANDS:  Low-density thickening of adrenal glands bilaterally without discrete adrenal mass suggest adenomatous hyperplasia  KIDNEYS/URETERS:  Renal vascular calcifications and bilateral renal cysts are noted  There is a proximal right ureteral calculus, 9 mm in size at the approximate upper L4 vertebral endplate level  There is extensive periureteral stranding at the level of the calculus  There is distention of right extrarenal pelvis  Only mild prominence of right intrarenal calyces is noted  The right ureter is prominent in caliber both proximal to and distal to the ureteral calculus  No left ureteral calculi  No left-sided hydroureteronephrosis  STOMACH AND BOWEL:  Anastomotic staple line is noted in the right upper quadrant  There has been resection of the ascending colon  No bowel obstruction  No free intraperitoneal gas  Sigmoid diverticulosis without evidence of acute diverticulitis  APPENDIX:  No findings to suggest appendicitis  ABDOMINOPELVIC CAVITY:  Nonspecific right lower quadrant mesenteric and presacral edema is noted  No abdominal pelvic abscess  No retroperitoneal, mesenteric, or pelvic lymphadenopathy  VESSELS:  Extensive atherosclerotic gastric calcification noted  PELVIS REPRODUCTIVE ORGANS:  Marked prostatomegaly noted  Prostate measures 6 7 x 6 5 cm in transverse dimensions by approximately 7 6 cm in craniocaudal dimension   URINARY BLADDER:  Diffuse thickening of the wall the urinary bladder with minimal perivesical inflammatory edema suggestive of cystitis or the sequela of chronic bladder obstruction  ABDOMINAL WALL/INGUINAL REGIONS:  Small fat-containing umbilical hernia is noted  OSSEOUS STRUCTURES:  No acute fracture or destructive osseous lesion  Advanced lumbar bilateral hip osteoarthritic degenerative changes  Impression: Right mid ureteral calculus, 9 mm in size  Although there is no significant hydronephrosis, there is prominence of right extrarenal pelvis and there is extensive inflammatory stranding surrounding the ureter at the site of the calculus, and these findings suggest that this calculus is the cause of the patient's right lower quadrant pain  Changes of prior ascending colectomy  Colonic diverticulosis without acute diverticulitis  Mild mesenteric and presacral edema is nonspecific  No noncontrast CT evidence of metastatic disease in the abdomen or pelvis  Pleural effusions, somewhat loculated on the left  Cardiomegaly  Workstation performed: [id number]     X-ray Chest 2 Views    Result Date: 7/4/2018  Narrative: CHEST INDICATION:   Chest pain  Trauma  Abrasions and lacerations involving right leg COMPARISON:  July 1, 2018  EXAM PERFORMED/VIEWS:  XR CHEST PA & LATERAL FINDINGS: Dual-lead left chest pacemaker is noted with intact pacer leads seen  Heart shadow is enlarged but unchanged from previous examination  There is small to moderate right pleural effusion  There is density in the posterior right suprahilar chest which appears to represent loculated fluid in the right major fissure  Left lung is clear  No pneumothorax  Glenohumeral and spinal degenerative changes  No acute osseous abnormality  Impression: Cardiomegaly  Right pleural effusion including some loculated fluid in the posterior aspect of the right major fissure   Workstation performed: [id number]     Xr Chest 2 Views    Result Date: 7/1/2018  Narrative: CHEST INDICATION:   Dyspnea  Severe abdominal pain this morning  COMPARISON:  May 22, 2018 EXAM PERFORMED/VIEWS:  XR CHEST PA & LATERAL FINDINGS: The heart mediastinum are stable  Pacing device unchanged in position  There is continued mild cardiomegaly  Small bilateral pleural effusions are again noted  Osseous structures appear within normal limits for patient age  Impression: Stable cardiomegaly  Small bilateral pleural effusions right greater than left  Workstation performed: [id number]     Xr Tibia Fibula 2 Views Right    Result Date: 7/4/2018  Narrative: RIGHT TIBIA AND FIBULA INDICATION:   Trauma  Right leg pain with abrasion and laceration  COMPARISON:  None VIEWS:  XR TIBIA FIBULA 2 VW RIGHT FINDINGS: There is no acute fracture or dislocation  No significant degenerative changes seen  No lytic or blastic lesions are seen  There are atherosclerotic calcifications  Soft tissues are otherwise unremarkable  Impression: No acute osseous abnormality  Workstation performed: [id number]     Fl Retrograde Pyelogram    Result Date: 7/2/2018  Narrative: RIGHT RETROGRADE PYELOGRAM INDICATION:   Right ureteral stone [N20 1]  COMPARISON: CT 7/1/2018 IMAGES:  3 FLUOROSCOPY TIME:   17 seconds CONTRAST: 8 mL of iohexol (OMNIPAQUE) FINDINGS: Fluoroscopic guidance provided for retrograde pyelogram  Contrast administration into the right kidney collecting system demonstrates dilatation  Final images reveal placement of a right ureteral stent Osseous and soft tissue detail limited by technique  Impression: Fluoroscopic guidance provided for right retrograde pyelogram   Placement of right ureteral stent  Please see procedure report for further details  Workstation performed: [id number]       Review of Systems   Cardiovascular: Positive for dyspnea on exertion  Negative for chest pain         Vitals:    07/12/18 1355   BP: (!) 106/46   Pulse: 76   SpO2: 98%     Vitals:    07/12/18 1355   Weight: 87 4 kg (192 lb 11 2 oz)     Height: 5' 9" (175 3 cm)   Body mass index is 28 46 kg/m²      Physical Exam:   General appearance:  Appears stated age, alert, well appearing and in no distress  HEENT:  PERRLA, EOMI, no scleral icterus, no conjunctival pallor  NECK: Elevated JVP  HEART:  Regular rate and rhythm, normal S1/S2, no S3/S4, no murmur or rub  LUNGS:  Decreased breath sounds at bases bilaterally   ABDOMEN:  Soft, non-tender, positive bowel sounds, no rebound or guarding, no organomegaly   EXTREMITIES:  2+ edema   VASCULAR:  Normal pedal pulses   SKIN: No lesions or rashes on exposed skin  NEURO:  CN II-XII intact, no focal deficits

## 2018-07-12 ENCOUNTER — OFFICE VISIT (OUTPATIENT)
Dept: CARDIOLOGY CLINIC | Facility: CLINIC | Age: 83
End: 2018-07-12
Payer: MEDICARE

## 2018-07-12 VITALS
SYSTOLIC BLOOD PRESSURE: 106 MMHG | OXYGEN SATURATION: 98 % | WEIGHT: 192.7 LBS | HEIGHT: 69 IN | BODY MASS INDEX: 28.54 KG/M2 | HEART RATE: 76 BPM | DIASTOLIC BLOOD PRESSURE: 46 MMHG

## 2018-07-12 DIAGNOSIS — I50.32 CHRONIC DIASTOLIC CONGESTIVE HEART FAILURE (HCC): ICD-10-CM

## 2018-07-12 DIAGNOSIS — Z95.0 CARDIAC PACEMAKER IN SITU: Chronic | ICD-10-CM

## 2018-07-12 DIAGNOSIS — E08.59 DIABETES DUE TO UNDERLYING CONDITION W OTH CIRCULATORY COMP (HCC): ICD-10-CM

## 2018-07-12 DIAGNOSIS — I25.10 CORONARY ARTERY DISEASE INVOLVING NATIVE CORONARY ARTERY OF NATIVE HEART WITHOUT ANGINA PECTORIS: Primary | Chronic | ICD-10-CM

## 2018-07-12 DIAGNOSIS — I48.0 PAROXYSMAL ATRIAL FIBRILLATION (HCC): Chronic | ICD-10-CM

## 2018-07-12 DIAGNOSIS — Z95.5 STATUS POST INSERTION OF DRUG-ELUTING STENT INTO LEFT ANTERIOR DESCENDING (LAD) ARTERY: ICD-10-CM

## 2018-07-12 PROCEDURE — 99214 OFFICE O/P EST MOD 30 MIN: CPT | Performed by: INTERNAL MEDICINE

## 2018-07-12 RX ORDER — TORSEMIDE 20 MG/1
40 TABLET ORAL
Qty: 60 TABLET | Refills: 11
Start: 2018-07-12 | End: 2018-09-04 | Stop reason: SDUPTHER

## 2018-07-12 RX ORDER — MINERAL OIL, PETROLATUM 425; 573 MG/G; MG/G
OINTMENT OPHTHALMIC AS NEEDED
COMMUNITY
End: 2018-08-16 | Stop reason: ALTCHOICE

## 2018-07-13 ENCOUNTER — PREP FOR PROCEDURE (OUTPATIENT)
Dept: UROLOGY | Facility: AMBULATORY SURGERY CENTER | Age: 83
End: 2018-07-13

## 2018-07-13 DIAGNOSIS — N20.1 CALCULUS OF URETER: Primary | ICD-10-CM

## 2018-07-13 RX ORDER — LANCETS 28 GAUGE
EACH MISCELLANEOUS
Qty: 100 EACH | Refills: 11 | Status: SHIPPED | OUTPATIENT
Start: 2018-07-13

## 2018-07-17 ENCOUNTER — OFFICE VISIT (OUTPATIENT)
Dept: UROLOGY | Facility: AMBULATORY SURGERY CENTER | Age: 83
End: 2018-07-17
Payer: MEDICARE

## 2018-07-17 VITALS
WEIGHT: 192 LBS | SYSTOLIC BLOOD PRESSURE: 124 MMHG | HEIGHT: 69 IN | HEART RATE: 72 BPM | BODY MASS INDEX: 28.44 KG/M2 | DIASTOLIC BLOOD PRESSURE: 82 MMHG

## 2018-07-17 DIAGNOSIS — R33.9 URINARY RETENTION: ICD-10-CM

## 2018-07-17 DIAGNOSIS — N20.1 URETER, CALCULUS: Primary | ICD-10-CM

## 2018-07-17 PROCEDURE — 99214 OFFICE O/P EST MOD 30 MIN: CPT | Performed by: UROLOGY

## 2018-07-17 NOTE — PROGRESS NOTES
Assessment/Plan:    Ureter, calculus  We will plan for right ureteroscopy with laser lithotripsy stone extraction and stent exchange  Risks and benefits were discussed and he was consented for the procedure  Urinary retention  Patient is doing well with Marte catheter  He should remain on tamsulosin and finasteride  We will address a void trial once we treat his kidney stone  Diagnoses and all orders for this visit:    Ureter, calculus    Urinary retention          Total visit time was 25 minutes of which over 50% was spent on counseling  Subjective:     Patient ID: Kyleigh Sandoval is a 80 y o  male    27-year-old male presents for follow-up for right ureteral calculus status post stenting and urinary retention  The patient is doing well with the catheter and states that he does not mind the catheter at all  He he denies any recurrent hematuria since he was seen in the hospital   He has no new complaints  The following portions of the patient's history were reviewed and updated as appropriate: allergies, current medications, past family history, past medical history, past social history, past surgical history and problem list     Review of Systems   Constitutional: Negative  HENT: Negative  Eyes: Negative  Respiratory: Negative  Cardiovascular: Negative  Gastrointestinal: Negative  Endocrine: Negative  Genitourinary:        As noted per HPI   Musculoskeletal: Negative  Skin: Negative  Allergic/Immunologic: Negative  Neurological: Negative  Hematological: Negative  Psychiatric/Behavioral: Negative  Objective:    Physical Exam   Constitutional: He is oriented to person, place, and time  He appears well-developed and well-nourished  Neck: Normal range of motion  Cardiovascular: Intact distal pulses  Pulmonary/Chest: Effort normal    Abdominal: Soft  Bowel sounds are normal  He exhibits no distension and no mass  There is no tenderness  There is no rebound and no guarding  Musculoskeletal: Normal range of motion  Neurological: He is alert and oriented to person, place, and time  Skin: Skin is warm and dry  Psychiatric: He has a normal mood and affect  Vitals reviewed          Results  No results found for: PSA  Lab Results   Component Value Date    GLUCOSE 110 07/05/2018    CALCIUM 7 8 (L) 07/05/2018     07/05/2018    K 3 8 07/05/2018    CO2 28 07/05/2018     07/05/2018    BUN 31 (H) 07/05/2018    CREATININE 1 50 (H) 07/05/2018     Lab Results   Component Value Date    WBC 5 83 07/06/2018    HGB 8 5 (L) 07/06/2018    HCT 27 0 (L) 07/06/2018    MCV 93 07/06/2018     07/06/2018       No results found for this or any previous visit (from the past 1 hour(s)) ]

## 2018-07-17 NOTE — ASSESSMENT & PLAN NOTE
Patient is doing well with Marte catheter  He should remain on tamsulosin and finasteride  We will address a void trial once we treat his kidney stone

## 2018-07-19 ENCOUNTER — TRANSCRIBE ORDERS (OUTPATIENT)
Dept: LAB | Facility: CLINIC | Age: 83
End: 2018-07-19

## 2018-07-19 ENCOUNTER — APPOINTMENT (OUTPATIENT)
Dept: LAB | Facility: CLINIC | Age: 83
End: 2018-07-19
Payer: MEDICARE

## 2018-07-19 DIAGNOSIS — Z01.818 PREOP EXAMINATION: Primary | ICD-10-CM

## 2018-07-19 DIAGNOSIS — Z01.818 PREOP EXAMINATION: ICD-10-CM

## 2018-07-19 LAB
BACTERIA UR QL AUTO: ABNORMAL /HPF
BILIRUB UR QL STRIP: NEGATIVE
CLARITY UR: ABNORMAL
COLOR UR: YELLOW
ERYTHROCYTE [DISTWIDTH] IN BLOOD BY AUTOMATED COUNT: 15.8 % (ref 11.6–15.1)
GLUCOSE UR STRIP-MCNC: ABNORMAL MG/DL
HCT VFR BLD AUTO: 31.3 % (ref 36.5–49.3)
HGB BLD-MCNC: 9.4 G/DL (ref 12–17)
HGB UR QL STRIP.AUTO: ABNORMAL
KETONES UR STRIP-MCNC: NEGATIVE MG/DL
LEUKOCYTE ESTERASE UR QL STRIP: ABNORMAL
MCH RBC QN AUTO: 27.4 PG (ref 26.8–34.3)
MCHC RBC AUTO-ENTMCNC: 30 G/DL (ref 31.4–37.4)
MCV RBC AUTO: 91 FL (ref 82–98)
NITRITE UR QL STRIP: POSITIVE
NON-SQ EPI CELLS URNS QL MICRO: ABNORMAL /HPF
PH UR STRIP.AUTO: 6.5 [PH] (ref 4.5–8)
PLATELET # BLD AUTO: 353 THOUSANDS/UL (ref 149–390)
PMV BLD AUTO: 10.2 FL (ref 8.9–12.7)
PROT UR STRIP-MCNC: ABNORMAL MG/DL
RBC # BLD AUTO: 3.43 MILLION/UL (ref 3.88–5.62)
RBC #/AREA URNS AUTO: ABNORMAL /HPF
SP GR UR STRIP.AUTO: 1.01 (ref 1–1.03)
UROBILINOGEN UR QL STRIP.AUTO: 0.2 E.U./DL
WBC # BLD AUTO: 6.24 THOUSAND/UL (ref 4.31–10.16)
WBC #/AREA URNS AUTO: ABNORMAL /HPF

## 2018-07-19 PROCEDURE — 85027 COMPLETE CBC AUTOMATED: CPT

## 2018-07-19 PROCEDURE — 36415 COLL VENOUS BLD VENIPUNCTURE: CPT

## 2018-07-19 PROCEDURE — 81001 URINALYSIS AUTO W/SCOPE: CPT | Performed by: UROLOGY

## 2018-07-23 ENCOUNTER — TELEPHONE (OUTPATIENT)
Dept: UROLOGY | Facility: HOSPITAL | Age: 83
End: 2018-07-23

## 2018-07-23 ENCOUNTER — TELEPHONE (OUTPATIENT)
Dept: CARDIOLOGY CLINIC | Facility: CLINIC | Age: 83
End: 2018-07-23

## 2018-07-23 NOTE — TELEPHONE ENCOUNTER
Received a call from [facility], [address],  reporting pt gained 3 8lbs overnight  Wt yesterday 192 2, wt today 196 0  Asymptomatic   Taking Torsemide 40mg BID and K-20meq BID   C/b#[phone]

## 2018-07-24 ENCOUNTER — IN HOME VISIT (OUTPATIENT)
Dept: GERIATRICS | Age: 83
End: 2018-07-24
Payer: MEDICARE

## 2018-07-24 ENCOUNTER — TELEPHONE (OUTPATIENT)
Dept: UROLOGY | Facility: CLINIC | Age: 83
End: 2018-07-24

## 2018-07-24 VITALS — DIASTOLIC BLOOD PRESSURE: 60 MMHG | SYSTOLIC BLOOD PRESSURE: 126 MMHG | TEMPERATURE: 97.3 F | HEART RATE: 67 BPM

## 2018-07-24 DIAGNOSIS — N20.1 URETER, CALCULUS: Primary | ICD-10-CM

## 2018-07-24 PROCEDURE — 99335 PR DOM/R-HOME E/M EST PT LW MOD SEVERITY 25 MINUTES: CPT | Performed by: NURSE PRACTITIONER

## 2018-07-24 RX ORDER — GINSENG 100 MG
1 CAPSULE ORAL DAILY
COMMUNITY
End: 2018-12-18 | Stop reason: CLARIF

## 2018-07-24 RX ORDER — PETROLATUM,WHITE
1 OINTMENT IN PACKET (GRAM) TOPICAL AS NEEDED
COMMUNITY
End: 2019-06-19 | Stop reason: HOSPADM

## 2018-07-24 NOTE — PROGRESS NOTES
Assessment/Plan:    No problem-specific Assessment & Plan notes found for this encounter  Diagnoses and all orders for this visit:    Ureter, calculus  Comments:  -scheduled with Dr Mayco Hurtado for procedure on 8/3/18  PCP has already given medication hold orders to [facility] facility    Other orders  -     bacitracin topical ointment 500 units/g topical ointment; Apply 1 large application topically daily To 3 small areas on right shin  -     glucose blood test strip; 1 each by Other route daily Use as instructed  -     OxyCODONE HCl 5 MG TABA; Take 1 tablet by mouth every 4 (four) hours as needed for pain Severe pain  -     white petrolatum; Apply 1 application topically as needed Apply topically to buttocks as needed  May keep in room and self administer          Subjective:      Patient ID: Isabel Prado is a 80 y o  male  80year old male presents for physical exam prior to outpatient procedure  He is scheduled for cystoscopy with right ureteroscopy with laser lithotripsy and right retrograde pyelogram with right stent insertion  This is scheduled for 8/3/18 with Dr Tristen Ybarra  PCP has already given orders to [facility], where he resides, as to what medications to hold and starting when  Patient reports back pain and is observed laying flat in recliner chair in room  The following portions of the patient's history were reviewed and updated as appropriate: allergies, current medications and problem list     Review of Systems   Constitutional: Negative for chills and fever  HENT: Negative for congestion, ear pain and sore throat  Respiratory: Negative for cough and shortness of breath  Cardiovascular: Negative for chest pain and leg swelling  Gastrointestinal: Negative for abdominal pain, constipation, diarrhea, nausea and vomiting  Genitourinary: Negative for dysuria and hematuria  Musculoskeletal: Positive for back pain  Skin: Negative for color change     Neurological: Negative for dizziness and light-headedness  Psychiatric/Behavioral: Negative for behavioral problems  Objective: There were no vitals taken for this visit  Physical Exam   Constitutional: He is oriented to person, place, and time  He appears well-developed  No distress  HENT:   Head: Normocephalic and atraumatic  Nose: Nose normal    Eyes: Conjunctivae are normal  Right eye exhibits no discharge  Left eye exhibits no discharge  Neck: No JVD present  No thyromegaly present  Cardiovascular: Normal rate  An irregularly irregular rhythm present  Pulmonary/Chest: Effort normal and breath sounds normal  No respiratory distress  He has no wheezes  He has no rales  He exhibits no tenderness  Lungs auscultated anteriorly and laterally as pt laying flat in recliner chair and doesn't want to move   Abdominal: Soft  Bowel sounds are normal  He exhibits no distension  There is no tenderness  There is no guarding  Genitourinary:   Genitourinary Comments: Marte catheter draining yellow urine   Musculoskeletal: He exhibits edema  1-2+ pitting edema to BLEs and ankles  Dressing intact to RLE  Small abrasions to right shin   Lymphadenopathy:     He has no cervical adenopathy  Neurological: He is alert and oriented to person, place, and time  Is oriented to place (facility name) and time (year, month, and date, not day)   Skin: Skin is warm and dry  He is not diaphoretic     Psychiatric: His behavior is normal

## 2018-07-24 NOTE — TELEPHONE ENCOUNTER
Talked with [place] Karen at [facility] is in discomfort, won't come out of his room he is on oxycodone   Discussed with dr Anastacio March, He is scheduled for surgery on 8/3 for ureteroscopy, if anyone can get him in sooner he is fine with this

## 2018-07-24 NOTE — TELEPHONE ENCOUNTER
Spoke with Jarek Gomez this afternoon regarding pt and his upcoming procedure  [address] said that pt  Has been having a lot of pain and she is concerned  She said that she has left mesgs for Dr Lorenza Meigs  I will send Telma Parker a message asking her to call [facility] back when she has a moment

## 2018-07-26 ENCOUNTER — HOSPITAL ENCOUNTER (INPATIENT)
Facility: HOSPITAL | Age: 83
LOS: 8 days | Discharge: NURSING, REHAB OR LONG-TERM CARE | DRG: 669 | End: 2018-08-03
Attending: EMERGENCY MEDICINE | Admitting: HOSPITALIST
Payer: MEDICARE

## 2018-07-26 ENCOUNTER — APPOINTMENT (EMERGENCY)
Dept: RADIOLOGY | Facility: HOSPITAL | Age: 83
DRG: 669 | End: 2018-07-26
Payer: MEDICARE

## 2018-07-26 ENCOUNTER — TELEPHONE (OUTPATIENT)
Dept: UROLOGY | Facility: HOSPITAL | Age: 83
End: 2018-07-26

## 2018-07-26 DIAGNOSIS — S81.811D LACERATION OF RIGHT LOWER EXTREMITY, SUBSEQUENT ENCOUNTER: ICD-10-CM

## 2018-07-26 DIAGNOSIS — N20.1 URETER, CALCULUS: ICD-10-CM

## 2018-07-26 DIAGNOSIS — N17.9 AKI (ACUTE KIDNEY INJURY) (HCC): ICD-10-CM

## 2018-07-26 DIAGNOSIS — M54.9 BACK PAIN: ICD-10-CM

## 2018-07-26 DIAGNOSIS — R10.9 FLANK PAIN: ICD-10-CM

## 2018-07-26 DIAGNOSIS — Z46.6 ENCOUNTER FOR REMOVAL OF URETERAL STENT: ICD-10-CM

## 2018-07-26 DIAGNOSIS — N20.1 RIGHT URETERAL STONE: Primary | ICD-10-CM

## 2018-07-26 LAB
ALBUMIN SERPL BCP-MCNC: 2.3 G/DL (ref 3.5–5)
ALP SERPL-CCNC: 91 U/L (ref 46–116)
ALT SERPL W P-5'-P-CCNC: 15 U/L (ref 12–78)
ANION GAP SERPL CALCULATED.3IONS-SCNC: 5 MMOL/L (ref 4–13)
AST SERPL W P-5'-P-CCNC: 17 U/L (ref 5–45)
BACTERIA UR QL AUTO: ABNORMAL /HPF
BASOPHILS # BLD AUTO: 0.04 THOUSANDS/ΜL (ref 0–0.1)
BASOPHILS NFR BLD AUTO: 1 % (ref 0–1)
BILIRUB SERPL-MCNC: 0.39 MG/DL (ref 0.2–1)
BILIRUB UR QL STRIP: NEGATIVE
BUN SERPL-MCNC: 31 MG/DL (ref 5–25)
CALCIUM SERPL-MCNC: 8.1 MG/DL (ref 8.3–10.1)
CHLORIDE SERPL-SCNC: 94 MMOL/L (ref 100–108)
CLARITY UR: ABNORMAL
CO2 SERPL-SCNC: 31 MMOL/L (ref 21–32)
COLOR UR: YELLOW
CREAT SERPL-MCNC: 1.85 MG/DL (ref 0.6–1.3)
EOSINOPHIL # BLD AUTO: 0.15 THOUSAND/ΜL (ref 0–0.61)
EOSINOPHIL NFR BLD AUTO: 4 % (ref 0–6)
ERYTHROCYTE [DISTWIDTH] IN BLOOD BY AUTOMATED COUNT: 15.6 % (ref 11.6–15.1)
GFR SERPL CREATININE-BSD FRML MDRD: 32 ML/MIN/1.73SQ M
GLUCOSE SERPL-MCNC: 142 MG/DL (ref 65–140)
GLUCOSE SERPL-MCNC: 153 MG/DL (ref 65–140)
GLUCOSE SERPL-MCNC: 265 MG/DL (ref 65–140)
GLUCOSE UR STRIP-MCNC: ABNORMAL MG/DL
HCT VFR BLD AUTO: 27.2 % (ref 36.5–49.3)
HGB BLD-MCNC: 8.2 G/DL (ref 12–17)
HGB UR QL STRIP.AUTO: ABNORMAL
IMM GRANULOCYTES # BLD AUTO: 0.02 THOUSAND/UL (ref 0–0.2)
IMM GRANULOCYTES NFR BLD AUTO: 1 % (ref 0–2)
KETONES UR STRIP-MCNC: NEGATIVE MG/DL
LACTATE SERPL-SCNC: 1.2 MMOL/L (ref 0.5–2)
LEUKOCYTE ESTERASE UR QL STRIP: ABNORMAL
LIPASE SERPL-CCNC: 130 U/L (ref 73–393)
LYMPHOCYTES # BLD AUTO: 0.49 THOUSANDS/ΜL (ref 0.6–4.47)
LYMPHOCYTES NFR BLD AUTO: 12 % (ref 14–44)
MCH RBC QN AUTO: 26.9 PG (ref 26.8–34.3)
MCHC RBC AUTO-ENTMCNC: 30.1 G/DL (ref 31.4–37.4)
MCV RBC AUTO: 89 FL (ref 82–98)
MONOCYTES # BLD AUTO: 0.69 THOUSAND/ΜL (ref 0.17–1.22)
MONOCYTES NFR BLD AUTO: 17 % (ref 4–12)
NEUTROPHILS # BLD AUTO: 2.66 THOUSANDS/ΜL (ref 1.85–7.62)
NEUTS SEG NFR BLD AUTO: 65 % (ref 43–75)
NITRITE UR QL STRIP: POSITIVE
NON-SQ EPI CELLS URNS QL MICRO: ABNORMAL /HPF
NRBC BLD AUTO-RTO: 0 /100 WBCS
PH UR STRIP.AUTO: 6 [PH] (ref 4.5–8)
PLATELET # BLD AUTO: 224 THOUSANDS/UL (ref 149–390)
PMV BLD AUTO: 10.7 FL (ref 8.9–12.7)
POTASSIUM SERPL-SCNC: 3.6 MMOL/L (ref 3.5–5.3)
PROT SERPL-MCNC: 6.5 G/DL (ref 6.4–8.2)
PROT UR STRIP-MCNC: >=300 MG/DL
RBC # BLD AUTO: 3.05 MILLION/UL (ref 3.88–5.62)
RBC #/AREA URNS AUTO: ABNORMAL /HPF
SODIUM SERPL-SCNC: 130 MMOL/L (ref 136–145)
SP GR UR STRIP.AUTO: 1.02 (ref 1–1.03)
UROBILINOGEN UR QL STRIP.AUTO: 0.2 E.U./DL
WBC # BLD AUTO: 4.05 THOUSAND/UL (ref 4.31–10.16)
WBC #/AREA URNS AUTO: ABNORMAL /HPF

## 2018-07-26 PROCEDURE — 83690 ASSAY OF LIPASE: CPT | Performed by: EMERGENCY MEDICINE

## 2018-07-26 PROCEDURE — 81001 URINALYSIS AUTO W/SCOPE: CPT

## 2018-07-26 PROCEDURE — 96374 THER/PROPH/DIAG INJ IV PUSH: CPT

## 2018-07-26 PROCEDURE — 87040 BLOOD CULTURE FOR BACTERIA: CPT | Performed by: EMERGENCY MEDICINE

## 2018-07-26 PROCEDURE — 87186 SC STD MICRODIL/AGAR DIL: CPT

## 2018-07-26 PROCEDURE — 80053 COMPREHEN METABOLIC PANEL: CPT | Performed by: EMERGENCY MEDICINE

## 2018-07-26 PROCEDURE — 83605 ASSAY OF LACTIC ACID: CPT | Performed by: EMERGENCY MEDICINE

## 2018-07-26 PROCEDURE — 99222 1ST HOSP IP/OBS MODERATE 55: CPT | Performed by: HOSPITALIST

## 2018-07-26 PROCEDURE — 99222 1ST HOSP IP/OBS MODERATE 55: CPT | Performed by: UROLOGY

## 2018-07-26 PROCEDURE — 87086 URINE CULTURE/COLONY COUNT: CPT

## 2018-07-26 PROCEDURE — 85025 COMPLETE CBC W/AUTO DIFF WBC: CPT | Performed by: EMERGENCY MEDICINE

## 2018-07-26 PROCEDURE — 74176 CT ABD & PELVIS W/O CONTRAST: CPT

## 2018-07-26 PROCEDURE — 36415 COLL VENOUS BLD VENIPUNCTURE: CPT | Performed by: EMERGENCY MEDICINE

## 2018-07-26 PROCEDURE — 82948 REAGENT STRIP/BLOOD GLUCOSE: CPT

## 2018-07-26 PROCEDURE — 87077 CULTURE AEROBIC IDENTIFY: CPT

## 2018-07-26 PROCEDURE — 99285 EMERGENCY DEPT VISIT HI MDM: CPT

## 2018-07-26 RX ORDER — NITROGLYCERIN 0.4 MG/1
0.4 TABLET SUBLINGUAL
Status: DISCONTINUED | OUTPATIENT
Start: 2018-07-26 | End: 2018-08-03 | Stop reason: HOSPADM

## 2018-07-26 RX ORDER — FINASTERIDE 5 MG/1
5 TABLET, FILM COATED ORAL DAILY
Status: DISCONTINUED | OUTPATIENT
Start: 2018-07-27 | End: 2018-08-03 | Stop reason: HOSPADM

## 2018-07-26 RX ORDER — CHLORHEXIDINE GLUCONATE 4 G/100ML
SOLUTION TOPICAL DAILY PRN
COMMUNITY
End: 2018-08-16 | Stop reason: ALTCHOICE

## 2018-07-26 RX ORDER — OXYCODONE HYDROCHLORIDE 5 MG/1
5 TABLET ORAL EVERY 4 HOURS PRN
Status: DISCONTINUED | OUTPATIENT
Start: 2018-07-26 | End: 2018-07-27

## 2018-07-26 RX ORDER — ACETAMINOPHEN 325 MG/1
650 TABLET ORAL EVERY 6 HOURS PRN
Status: DISCONTINUED | OUTPATIENT
Start: 2018-07-26 | End: 2018-07-31

## 2018-07-26 RX ORDER — LANOLIN ALCOHOL/MO/W.PET/CERES
3 CREAM (GRAM) TOPICAL
Status: DISCONTINUED | OUTPATIENT
Start: 2018-07-26 | End: 2018-08-03 | Stop reason: HOSPADM

## 2018-07-26 RX ORDER — MINERAL OIL AND PETROLATUM 150; 830 MG/G; MG/G
OINTMENT OPHTHALMIC
Status: DISCONTINUED | OUTPATIENT
Start: 2018-07-26 | End: 2018-08-03 | Stop reason: HOSPADM

## 2018-07-26 RX ORDER — TORSEMIDE 20 MG/1
40 TABLET ORAL
Status: DISCONTINUED | OUTPATIENT
Start: 2018-07-26 | End: 2018-07-27

## 2018-07-26 RX ORDER — TAMSULOSIN HYDROCHLORIDE 0.4 MG/1
0.4 CAPSULE ORAL
Status: DISCONTINUED | OUTPATIENT
Start: 2018-07-26 | End: 2018-08-03 | Stop reason: HOSPADM

## 2018-07-26 RX ORDER — POTASSIUM CHLORIDE 20 MEQ/1
20 TABLET, EXTENDED RELEASE ORAL 2 TIMES DAILY
Status: DISCONTINUED | OUTPATIENT
Start: 2018-07-26 | End: 2018-08-03 | Stop reason: HOSPADM

## 2018-07-26 RX ORDER — LIDOCAINE HYDROCHLORIDE 20 MG/ML
JELLY TOPICAL ONCE
Status: COMPLETED | OUTPATIENT
Start: 2018-07-26 | End: 2018-07-26

## 2018-07-26 RX ORDER — CARVEDILOL 3.12 MG/1
3.12 TABLET ORAL 2 TIMES DAILY WITH MEALS
Status: DISCONTINUED | OUTPATIENT
Start: 2018-07-26 | End: 2018-08-03 | Stop reason: HOSPADM

## 2018-07-26 RX ORDER — ONDANSETRON 2 MG/ML
4 INJECTION INTRAMUSCULAR; INTRAVENOUS EVERY 6 HOURS PRN
Status: DISCONTINUED | OUTPATIENT
Start: 2018-07-26 | End: 2018-08-03 | Stop reason: HOSPADM

## 2018-07-26 RX ORDER — GABAPENTIN 300 MG/1
300 CAPSULE ORAL
Status: DISCONTINUED | OUTPATIENT
Start: 2018-07-26 | End: 2018-08-03 | Stop reason: HOSPADM

## 2018-07-26 RX ORDER — ACETAMINOPHEN 325 MG/1
650 TABLET ORAL EVERY 6 HOURS PRN
Status: DISCONTINUED | OUTPATIENT
Start: 2018-07-26 | End: 2018-07-27

## 2018-07-26 RX ORDER — CHLORAL HYDRATE 500 MG
1000 CAPSULE ORAL DAILY
Status: DISCONTINUED | OUTPATIENT
Start: 2018-07-27 | End: 2018-08-03 | Stop reason: HOSPADM

## 2018-07-26 RX ORDER — GABAPENTIN 100 MG/1
100 CAPSULE ORAL 2 TIMES DAILY
Status: DISCONTINUED | OUTPATIENT
Start: 2018-07-26 | End: 2018-08-03 | Stop reason: HOSPADM

## 2018-07-26 RX ORDER — ASPIRIN 81 MG/1
81 TABLET ORAL DAILY
Status: DISCONTINUED | OUTPATIENT
Start: 2018-07-27 | End: 2018-08-03 | Stop reason: HOSPADM

## 2018-07-26 RX ORDER — TRAMADOL HYDROCHLORIDE 50 MG/1
50 TABLET ORAL EVERY 6 HOURS PRN
Status: DISCONTINUED | OUTPATIENT
Start: 2018-07-26 | End: 2018-07-27

## 2018-07-26 RX ADMIN — OXYCODONE HYDROCHLORIDE 5 MG: 5 TABLET ORAL at 18:33

## 2018-07-26 RX ADMIN — APIXABAN 2.5 MG: 2.5 TABLET, FILM COATED ORAL at 18:33

## 2018-07-26 RX ADMIN — GABAPENTIN 300 MG: 300 CAPSULE ORAL at 22:56

## 2018-07-26 RX ADMIN — POTASSIUM CHLORIDE 20 MEQ: 1500 TABLET, EXTENDED RELEASE ORAL at 18:33

## 2018-07-26 RX ADMIN — TAMSULOSIN HYDROCHLORIDE 0.4 MG: 0.4 CAPSULE ORAL at 18:33

## 2018-07-26 RX ADMIN — GABAPENTIN 100 MG: 100 CAPSULE ORAL at 18:33

## 2018-07-26 RX ADMIN — INSULIN LISPRO 2 UNITS: 100 INJECTION, SOLUTION INTRAVENOUS; SUBCUTANEOUS at 23:00

## 2018-07-26 RX ADMIN — HYDROMORPHONE HYDROCHLORIDE 1 MG: 1 INJECTION, SOLUTION INTRAMUSCULAR; INTRAVENOUS; SUBCUTANEOUS at 10:37

## 2018-07-26 RX ADMIN — LIDOCAINE HYDROCHLORIDE: 20 JELLY TOPICAL at 15:08

## 2018-07-26 RX ADMIN — MINERAL OIL AND WHITE PETROLATUM 1 APPLICATION: 150; 830 OINTMENT OPHTHALMIC at 22:56

## 2018-07-26 RX ADMIN — MELATONIN 3 MG: at 22:56

## 2018-07-26 RX ADMIN — SODIUM CHLORIDE 1000 ML: 0.9 INJECTION, SOLUTION INTRAVENOUS at 11:49

## 2018-07-26 NOTE — ASSESSMENT & PLAN NOTE
· Likely 2/2 urolithiasis & ureteric stent +/- UTI  · CT abdomen showing persistent & stable 8 mm right ureteric stone, intact stent  · Urology consulted - f/u rec  They preponed his stent removal to 7/31/18 as outpatient before admission due to pain  · UA positive with LE, nitrities  · F/u urine cultures, previously grew E coli earlier this month  Status post treatment for 2 weeks at that time    Given his persistent pain and stone, will place him on IV Ancef and follow up Urology recommendations  · Pain controlled with oral pain medications and IV for breakthrough pain

## 2018-07-26 NOTE — TELEPHONE ENCOUNTER
[address]  today, spoke with Vilma Moore regarding re-scheduling patient to 7/31 at [facility] with Dr Erin Parada  Per Vilma Moore, patient was taken to the hospital this morning    Marzena ramirez for 7/31 and let her know that patient will not need to hold Eliquis or Aspirin if he is having surgery with Dr Dav Jones

## 2018-07-26 NOTE — ED ATTENDING ATTESTATION
Cale Myers MD, saw and evaluated the patient  I have discussed the patient with the resident/non-physician practitioner and agree with the resident's/non-physician practitioner's findings, Plan of Care, and MDM as documented in the resident's/non-physician practitioner's note, except where noted  All available labs and Radiology studies were reviewed  At this point I agree with the current assessment done in the Emergency Department  I have conducted an independent evaluation of this patient a history and physical is as follows:    Patient with right ureteral stent which was placed for a 9 mm stone  Patient has had ongoing pain but then his abdomen and his back  Patient has been evaluated for this in the past, and is scheduled for ureteroscopy next week  Patient was having increasing pain, and so had his appointment moved up to this week  Patient is unable to tolerate the pain, and so presents for care  Patient is a difficult historian because he is very hard of hearing, and will not wear his hearing aids  The patient states he does not have fever  He complains of belly pain  He denies chest pain or shortness of breath  Further review of systems is unable to be obtained secondary to patient's status  On exam the patient is awake, alert, interactive  He has a soft blood pressure  His HEENT exam is unremarkable  His neck is supple and nontender with no adenopathy  His heart is regular without murmurs  His lungs are clear bilaterally  The patient has significant right CVA tenderness and a very tender abdomen throughout, with some voluntary guarding  His extremities are intact with no rashes or skin changes  Labs reviewed, patient with acute kidney injury    Will plan to CT for abdominal tenderness, admit to the hospital for a KI, intractable pain    Critical Care Time  CritCare Time    Procedures

## 2018-07-26 NOTE — ED PROVIDER NOTES
History  Chief Complaint   Patient presents with    Flank Pain     Pt presents s/p stent placement and known R sided kidney stone  Pt scheduled to have it removed Tuesday  Pt c/o R sided flank pain that raidates to his entire back  Comes from [facility]   Back Pain     80year-old male with right ureteral stent for 8 mm that was placed on July 1st   Patient is from [facility] and was having complaints of generalized abdominal pain and right flank pain to which staff members from patient's urologist were called about patient's pain and decision was made to move stent removal surgery day from August 3rd to July 31st   Patient is a poor historian and unable to state when the exact onset of his pain symptoms occurred  Patient is very hard of hearing  He denies fever denies chest pain denies shortness of breath for nausea or vomiting  Patient does have a Marte catheter in place since July 1st   Patient denies constipation or diarrhea  Prior to Admission Medications   Prescriptions Last Dose Informant Patient Reported? Taking? Artificial Tear Ointment (ARTIFICIAL TEARS)  Outside Facility (Specify) Yes No   Sig: as needed for dry eyes   EASY TOUCH LANCETS 28G MISC   No No   Sig: USE AS DIRECTED     Empagliflozin (JARDIANCE) 10 MG TABS Unknown at Unknown time Outside Facility (Specify) Yes No   Sig: Take 10 mg by mouth every morning   Hypromellose (SYSTANE OVERNIGHT THERAPY) 0 3 % GEL Unknown at Unknown time Outside Facility (Specify) Yes No   Sig: Apply to eye   Multiple Vitamin (TAB-A-ANETA PO) Unknown at Unknown time Outside Facility (Specify) Yes No   Sig: Take 1 tablet by mouth daily   Omega-3 Fatty Acids (FISH OIL) 1,000 mg Unknown at Unknown time Outside Facility (Specify) Yes No   Sig: Take 1,000 mg by mouth daily   OxyCODONE HCl 5 MG TABA Unknown at Unknown time  Yes No   Sig: Take 1 tablet by mouth every 4 (four) hours as needed for pain Severe pain   Polyethyl Glycol-Propyl Glycol (SYSTANE) 0 4-0 3 % GEL Unknown at Unknown time Outside Facility (Specify) Yes No   Sig: Apply to eye   acetaminophen (TYLENOL) 325 mg tablet  Outside Facility (Specify) No No   Sig: Take 2 tablets (650 mg total) by mouth every 6 (six) hours as needed for mild pain, headaches or fever   albuterol (5 mg/mL) 0 5 % nebulizer solution Unknown at Unknown time Outside Facility (Specify) Yes No   Sig: Take 2 5 mg by nebulization every 6 (six) hours as needed for wheezing   apixaban (ELIQUIS) 2 5 mg Unknown at Unknown time Outside Facility (Specify) No No   Sig: Take 1 tablet (2 5 mg total) by mouth 2 (two) times a day   aspirin (ECOTRIN LOW STRENGTH) 81 mg EC tablet Unknown at Unknown time Outside Facility (Specify) Yes No   Sig: Take 81 mg by mouth daily   bacitracin topical ointment 500 units/g topical ointment Unknown at Unknown time  Yes No   Sig: Apply 1 large application topically daily To 3 small areas on right shin   carvedilol (COREG) 3 125 mg tablet Unknown at Unknown time Outside Facility (Specify) Yes No   Sig: Take 3 125 mg by mouth 2 (two) times a day with meals   chlorhexidine (HIBICLENS) 4 % external liquid   Yes Yes   Sig: Apply topically daily as needed for wound care   enalapril (VASOTEC) 2 5 mg tablet Unknown at Unknown time Outside Facility (Specify) Yes No   Sig: Take 2 5 mg by mouth daily     ergocalciferol (VITAMIN D2) 50,000 units Unknown at Unknown time Outside Facility (Specify) No No   Sig: TAKE 1 CAPSULE BY MOUTH ONCE WEEKLY   finasteride (PROSCAR) 5 mg tablet Unknown at Unknown time Outside Facility (Specify) No No   Sig: TAKE 1 TABLET BY MOUTH ONCE DAILY     gabapentin (NEURONTIN) 100 mg capsule Unknown at Unknown time Outside Facility (Specify) No No   Sig: Take 1 capsule (100 mg total) by mouth daily   Patient taking differently: Take 100 mg by mouth 2 (two) times a day     gabapentin (NEURONTIN) 300 mg capsule Unknown at Unknown time  Yes No   Sig: Take 300 mg by mouth daily at bedtime     glucose blood test strip Unknown at Unknown time  Yes No   Si each by Other route daily Use as instructed   ipratropium-albuterol (DUO-NEB) 0 5-2 5 mg/3 mL Unknown at Unknown time Outside Facility (Specify) No No   Sig: Take 3 mL by nebulization every 6 (six) hours as needed for wheezing   melatonin 3 mg Unknown at Unknown time Outside Facility (Specify) Yes No   Sig: Take 3 mg by mouth daily at bedtime   nitroglycerin (NITROSTAT) 0 4 mg SL tablet Unknown at Unknown time Outside Facility (Specify) Yes No   Sig: Place 0 4 mg under the tongue every 5 (five) minutes as needed for chest pain   potassium chloride (K-DUR,KLOR-CON) 20 mEq tablet Unknown at Unknown time Outside Facility (Specify) Yes No   Sig: Take 20 mEq by mouth 2 (two) times a day   tamsulosin (FLOMAX) 0 4 mg Unknown at Unknown time Outside Facility (Specify) No No   Sig: TAKE 1 CAPSULE BY MOUTH WITH EVENING MEAL   torsemide (DEMADEX) 20 mg tablet Unknown at Unknown time  No No   Sig: Take 2 tablets (40 mg total) by mouth 2 (two) times a day   Patient taking differently: Take 80 mg by mouth 2 (two) times a day To end 18, then resume torsemide 40 mg  Twice a day  white petrolatum   Yes No   Sig: Apply 1 application topically as needed Apply topically to buttocks as needed   May keep in room and self administer      Facility-Administered Medications: None       Past Medical History:   Diagnosis Date    A-fib ([place] [address] )     Anemia     Bladder cancer ([place] [address] )     CAD (coronary artery disease)     CHF (congestive heart failure) (HCC)     Chronic kidney failure     [facility] )     [facility] cancer, left ([facility] )     Hypertension     Incontinence     Pacemaker     Prostate cancer ([facility] )     Type 2 diabetes mellitus ([place] [address] )     Wears hearing aid in both ears        Past Surgical History:   Procedure Laterality Date    CARDIAC PACEMAKER PLACEMENT      CORONARY ANGIOPLASTY WITH STENT PLACEMENT      GALLBLADDER SURGERY      LA CYSTOURETHROSCOPY,URETER CATHETER Right 7/1/2018    Procedure: CYSTOSCOPY RETROGRADE PYELOGRAM WITH INSERTION STENT URETERAL;  Surgeon: Dianne Barger MD;  Location: [facility];  Service: Urology       History reviewed  No pertinent family history  I have reviewed and agree with the history as documented  Social History   Substance Use Topics    Smoking status: Former Smoker    Smokeless tobacco: Never Used    Alcohol use Yes      Comment: jayne cognac 1 oz/night         Review of Systems   Constitutional: Negative for appetite change, chills, diaphoresis, fatigue and fever  HENT: Negative for congestion, ear discharge, ear pain, hearing loss, postnasal drip, rhinorrhea, sneezing and sore throat  Eyes: Negative for pain, discharge and redness  Respiratory: Negative for cough, choking, chest tightness, shortness of breath, wheezing and stridor  Cardiovascular: Negative for chest pain and palpitations  Gastrointestinal: Negative for abdominal distention, abdominal pain, blood in stool, constipation, diarrhea, nausea and vomiting  Genitourinary: Positive for flank pain  Negative for decreased urine volume, difficulty urinating, dysuria, frequency and hematuria  Musculoskeletal: Positive for back pain  Negative for arthralgias, gait problem, joint swelling and neck pain  Skin: Negative for color change, pallor and rash  Allergic/Immunologic: Negative for environmental allergies, food allergies and immunocompromised state  Neurological: Negative for dizziness, seizures, weakness, light-headedness, numbness and headaches  Hematological: Negative for adenopathy  Does not bruise/bleed easily  Psychiatric/Behavioral: Negative for agitation and behavioral problems         Physical Exam  ED Triage Vitals   Temperature Pulse Respirations Blood Pressure SpO2   07/26/18 0938 07/26/18 0938 07/26/18 0938 07/26/18 0938 07/26/18 0938   98 2 °F (36 8 °C) 77 18 102/57 95 %      Temp Source Heart Rate Source Patient Position - Orthostatic VS BP Location FiO2 (%)   07/26/18 0938 07/26/18 1017 07/26/18 1040 07/26/18 1017 --   Oral Monitor Lying Right arm       Pain Score       07/26/18 0938       9           Orthostatic Vital Signs  Vitals:    07/28/18 0030 07/28/18 0755 07/28/18 1510 07/28/18 2316   BP: 96/58 106/56 112/64 142/74   Pulse: 69 78 76 66   Patient Position - Orthostatic VS:  Lying Lying Lying       Physical Exam   Constitutional: He is oriented to person, place, and time  He appears well-developed and well-nourished  HENT:   Head: Normocephalic and atraumatic  Nose: Nose normal    Mouth/Throat: Oropharynx is clear and moist    Eyes: Conjunctivae and EOM are normal  Pupils are equal, round, and reactive to light  Neck: Normal range of motion  Neck supple  Cardiovascular: Normal rate, regular rhythm and normal heart sounds  Exam reveals no gallop and no friction rub  No murmur heard  Pulmonary/Chest: Effort normal and breath sounds normal  No respiratory distress  He has no wheezes  He has no rales  Abdominal: Soft  Bowel sounds are normal  He exhibits no distension  There is tenderness  There is guarding  There is no rebound  Right flank pain  Marte catheter in place   Genitourinary: Penis normal    Genitourinary Comments: Marte catheter in place   Musculoskeletal: Normal range of motion  Neurological: He is alert and oriented to person, place, and time  Skin: Skin is warm and dry  Psychiatric: He has a normal mood and affect  His behavior is normal    Nursing note and vitals reviewed        ED Medications  Medications   albuterol inhalation solution 2 5 mg (not administered)   apixaban (ELIQUIS) tablet 2 5 mg (2 5 mg Oral Given 7/28/18 1722)   artificial tear (LUBRIFRESH P M ) ophthalmic ointment (1 application Both Eyes Given 7/28/18 7896)   aspirin (ECOTRIN LOW STRENGTH) EC tablet 81 mg (81 mg Oral Given 7/28/18 0833)   carvedilol (COREG) tablet 3 125 mg (3 125 mg Oral Given 7/28/18 1536)   finasteride (PROSCAR) tablet 5 mg (5 mg Oral Given 7/28/18 0834)   gabapentin (NEURONTIN) capsule 100 mg (100 mg Oral Given 7/28/18 1722)   gabapentin (NEURONTIN) capsule 300 mg (300 mg Oral Given 7/28/18 2147)   melatonin tablet 3 mg (3 mg Oral Given 7/28/18 2146)   nitroglycerin (NITROSTAT) SL tablet 0 4 mg (not administered)   fish oil capsule 1,000 mg (1,000 mg Oral Given 7/28/18 0837)   tamsulosin (FLOMAX) capsule 0 4 mg (0 4 mg Oral Given 7/28/18 1536)   potassium chloride (K-DUR,KLOR-CON) CR tablet 20 mEq (20 mEq Oral Given 7/28/18 1722)   ondansetron (ZOFRAN) injection 4 mg (not administered)   acetaminophen (TYLENOL) tablet 650 mg (not administered)   insulin lispro (HumaLOG) 100 units/mL subcutaneous injection 1-6 Units (2 Units Subcutaneous Given 7/28/18 1644)   insulin lispro (HumaLOG) 100 units/mL subcutaneous injection 1-5 Units (3 Units Subcutaneous Given 7/28/18 2147)   cefTRIAXone (ROCEPHIN) 1,000 mg in dextrose 5 % 50 mL IVPB (0 mg Intravenous Stopped 7/28/18 1625)   insulin glargine (LANTUS) subcutaneous injection 5 Units 0 05 mL (5 Units Subcutaneous Given 7/28/18 2147)   docusate sodium (COLACE) capsule 100 mg (100 mg Oral Given 7/28/18 1722)   senna (SENOKOT) tablet 8 6 mg (8 6 mg Oral Not Given 7/28/18 2119)   acetaminophen (TYLENOL) tablet 975 mg (975 mg Oral Given 7/29/18 0703)   lidocaine (LIDODERM) 5 % patch 1 patch (1 patch Transdermal Patch Removed 7/28/18 2119)   oxyCODONE (ROXICODONE) IR tablet 2 5 mg (not administered)   oxyCODONE (ROXICODONE) IR tablet 5 mg (5 mg Oral Given 7/28/18 2347)   HYDROmorphone (DILAUDID) injection 0 2 mg (not administered)   lisinopril (ZESTRIL) tablet 5 mg (not administered)   torsemide (DEMADEX) tablet 40 mg (not administered)   nystatin (MYCOSTATIN) powder ( Topical Given 7/28/18 1723)   magnesium hydroxide (MILK OF MAGNESIA) 400 mg/5 mL oral suspension 30 mL (30 mL Oral Given 7/28/18 1536)   HYDROmorphone (DILAUDID) injection 1 mg (1 mg Intravenous Given 7/26/18 1037)   sodium chloride 0 9 % bolus 1,000 mL (0 mL Intravenous Stopped 7/26/18 1448)   lidocaine (URO-JET) 2 % topical gel ( Topical Given 7/26/18 1508)       Diagnostic Studies  Results Reviewed     Procedure Component Value Units Date/Time    Blood culture #1 [[id number]] Collected:  07/26/18 1150    Lab Status:  Preliminary result Specimen:  Blood from Arm, Left Updated:  07/28/18 1601     Blood Culture No Growth at 48 hrs  Blood culture #2 [[id number]] Collected:  07/26/18 1150    Lab Status:  Preliminary result Specimen:  Blood from Hand, Right Updated:  07/28/18 1601     Blood Culture No Growth at 48 hrs      Urine culture [[id number]]  (Abnormal) Collected:  07/26/18 1511    Lab Status:  Preliminary result Specimen:  Urine from Urine, Indwelling Marte Catheter Updated:  07/28/18 1422     Urine Culture >100,000 cfu/ml Citrobacter freundii (A)    Urine Microscopic [[id number]]  (Abnormal) Collected:  07/26/18 1511    Lab Status:  Final result Specimen:  Urine from Urine, Indwelling Marte Catheter Updated:  07/26/18 1548     RBC, UA Innumerable (A) /hpf      WBC, UA Innumerable (A) /hpf      Epithelial Cells None Seen /hpf      Bacteria, UA Moderate (A) /hpf     ED Urine Macroscopic [[id number]]  (Abnormal) Collected:  07/26/18 1511    Lab Status:  Final result Specimen:  Urine Updated:  07/26/18 1500     Color, UA Yellow     Clarity, UA Cloudy     pH, UA 6 0     Leukocytes, UA Large (A)     Nitrite, UA Positive (A)     Protein, UA >=300 (A) mg/dl      Glucose,  (1/4%) (A) mg/dl      Ketones, UA Negative mg/dl      Urobilinogen, UA 0 2 E U /dl      Bilirubin, UA Negative     Blood, UA Large (A)     Specific [place], UA 1 020    Narrative:       CLINITEK RESULT    Lactic acid, plasma [[id number]]  (Normal) Collected:  07/26/18 1038    Lab Status:  Final result Specimen:  Blood from Arm, Left Updated:  07/26/18 1110     LACTIC ACID 1 2 mmol/L     Narrative:         Result may be elevated if tourniquet was used during collection  Comprehensive metabolic panel [[id number]]  (Abnormal) Collected:  07/26/18 1018    Lab Status:  Final result Specimen:  Blood from Arm, Left Updated:  07/26/18 1048     Sodium 130 (L) mmol/L      Potassium 3 6 mmol/L      Chloride 94 (L) mmol/L      CO2 31 mmol/L      Anion Gap 5 mmol/L      BUN 31 (H) mg/dL      Creatinine 1 85 (H) mg/dL      Glucose 153 (H) mg/dL      Calcium 8 1 (L) mg/dL      AST 17 U/L      ALT 15 U/L      Alkaline Phosphatase 91 U/L      Total Protein 6 5 g/dL      Albumin 2 3 (L) g/dL      Total Bilirubin 0 39 mg/dL      eGFR 32 ml/min/1 73sq m     Narrative:         National Kidney Disease Education Program recommendations are as follows:  GFR calculation is accurate only with a steady state creatinine  Chronic Kidney disease less than 60 ml/min/1 73 sq  meters  Kidney failure less than 15 ml/min/1 73 sq  meters      Lipase [[id number]]  (Normal) Collected:  07/26/18 1018    Lab Status:  Final result Specimen:  Blood from Arm, Left Updated:  07/26/18 1048     Lipase 130 u/L     CBC and differential [[id number]]  (Abnormal) Collected:  07/26/18 1018    Lab Status:  Final result Specimen:  Blood from Arm, Left Updated:  07/26/18 1027     WBC 4 05 (L) Thousand/uL      RBC 3 05 (L) Million/uL      Hemoglobin 8 2 (L) g/dL      Hematocrit 27 2 (L) %      MCV 89 fL      MCH 26 9 pg      MCHC 30 1 (L) g/dL      RDW 15 6 (H) %      MPV 10 7 fL      Platelets 393 Thousands/uL      nRBC 0 /100 WBCs      Neutrophils Relative 65 %      Immat GRANS % 1 %      Lymphocytes Relative 12 (L) %      Monocytes Relative 17 (H) %      Eosinophils Relative 4 %      Basophils Relative 1 %      Neutrophils Absolute 2 66 Thousands/µL      Immature Grans Absolute 0 02 Thousand/uL      Lymphocytes Absolute 0 49 (L) Thousands/µL      Monocytes Absolute 0 69 Thousand/µL      Eosinophils Absolute 0 15 Thousand/µL      Basophils Absolute 0 04 Thousands/µL                  CT abdomen pelvis wo contrast   Final Result by Shayla Chanel MD (07/26 1051)      1  Status post right nephroureteral system and placement with stable position of 8 mm right mid ureteral calculus  No hydronephrosis  2   Stable changes at the lung bases with pleural effusions and groundglass opacity, nonspecific though possibly pulmonary edema  3   Severe prostatomegaly  Workstation performed: [id number]               Procedures  Procedures      Phone Consults  ED Phone Contact    ED Course  ED Course as of Jul 29 0706   Thu Jul 26, 2018   1027 Hemoglobin: (!) 8 2   1110 LACTIC ACID: 1 2   1125 Spoke with urology SHAWN Jones, recomends patient's cabrera cathether be changed, blood cultures to be drawn  Will admit to medicine with urology consult                                MDM  Number of Diagnoses or Management Options  DEANA (acute kidney injury) [facility]):   Back pain:   Encounter for removal of ureteral stent:   Flank pain:   Right ureteral stone:   Diagnosis management comments: 58-year-old male presents emergent department for evaluation of flank pain  Patient does have a ureteral stent and is here for stent removal     Medical management decision making:  I will order CBCs CMP lipase urinalysis CT abdomen and pelvis without contrast   I will also consult Urology for further recommendations  Will give Dilaudid for pain control  We will then reassess patient      CritCare Time    Disposition  Final diagnoses:   Right ureteral stone   DEANA (acute kidney injury) ([facility] )   Encounter for removal of ureteral stent   Back pain   Flank pain     Time reflects when diagnosis was documented in both MDM as applicable and the Disposition within this note     Time User Action Codes Description Comment    7/26/2018 11:28 AM Leandra Silva Add [N20 1] Right ureteral stone     7/26/2018 11:28 AM Leandra Silva Modify [N20 1] Right ureteral stone     7/26/2018 11:28 AM Leandra Silva Modify [N20 1] Right ureteral stone     7/26/2018 11:30 AM Amanda Persaud Add [N17 9] DEANA (acute kidney injury) ([facility] )     7/26/2018 11:31 AM Amandaveto Persaud Add [Z46 6] Encounter for removal of ureteral stent     7/26/2018 11:31 AM Amanda Persaud Add [M54 9] Back pain     7/26/2018 11:31 AM Amanda Persaud Add [R10 9] Flank pain     7/28/2018 12:00 PM Macarena Helm Add [S81 811D] Laceration of right lower extremity, subsequent encounter       ED Disposition     ED Disposition Condition Comment    Admit  Case was discussed with RICK and the patient's admission status was agreed to be Admission Status: inpatient status to the service of Dr Sara Santiago   Follow-up Information     Follow up With Specialties Details Why Cesar Feliciano MD Geriatric Medicine, Family Medicine In 1 day  [address]            Current Discharge Medication List      CONTINUE these medications which have NOT CHANGED    Details   chlorhexidine (HIBICLENS) 4 % external liquid Apply topically daily as needed for wound care      acetaminophen (TYLENOL) 325 mg tablet Take 2 tablets (650 mg total) by mouth every 6 (six) hours as needed for mild pain, headaches or fever  Qty: 30 tablet, Refills: 0    Associated Diagnoses: Laceration of right lower extremity      albuterol (5 mg/mL) 0 5 % nebulizer solution Take 2 5 mg by nebulization every 6 (six) hours as needed for wheezing      apixaban (ELIQUIS) 2 5 mg Take 1 tablet (2 5 mg total) by mouth 2 (two) times a day  Qty: 30 tablet, Refills: 0    Associated Diagnoses: Paroxysmal atrial fibrillation (HCC)      Artificial Tear Ointment (ARTIFICIAL TEARS) as needed for dry eyes      aspirin (ECOTRIN LOW STRENGTH) 81 mg EC tablet Take 81 mg by mouth daily      bacitracin topical ointment 500 units/g topical ointment Apply 1 large application topically daily To 3 small areas on right shin      carvedilol (COREG) 3 125 mg tablet Take 3 125 mg by mouth 2 (two) times a day with meals      EASY TOUCH LANCETS 28G MISC USE AS DIRECTED  Qty: 100 each, Refills: 11    Associated Diagnoses: 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 gabapentin (NEURONTIN) 100 mg capsule Take 1 capsule (100 mg total) by mouth daily  Qty: 28 capsule, Refills: 5    Associated Diagnoses: Hyperesthesia      !! gabapentin (NEURONTIN) 300 mg capsule Take 300 mg by mouth daily at bedtime        glucose blood test strip 1 each by Other route daily Use as instructed      Hypromellose (SYSTANE OVERNIGHT THERAPY) 0 3 % GEL Apply to eye      ipratropium-albuterol (DUO-NEB) 0 5-2 5 mg/3 mL Take 3 mL by nebulization every 6 (six) hours as needed for wheezing  Refills: 0    Associated Diagnoses: COPD (chronic obstructive pulmonary disease) ([facility])      melatonin 3 mg Take 3 mg by mouth daily at bedtime      Multiple Vitamin (TAB-A-ANETA PO) Take 1 tablet by mouth daily      nitroglycerin (NITROSTAT) 0 4 mg SL tablet Place 0 4 mg under the tongue every 5 (five) minutes as needed for chest pain      Omega-3 Fatty Acids (FISH OIL) 1,000 mg Take 1,000 mg by mouth daily      OxyCODONE HCl 5 MG TABA Take 1 tablet by mouth every 4 (four) hours as needed for pain Severe pain      Polyethyl Glycol-Propyl Glycol (SYSTANE) 0 4-0 3 % GEL Apply to eye      potassium chloride (K-DUR,KLOR-CON) 20 mEq tablet Take 20 mEq by mouth 2 (two) times a day      tamsulosin (FLOMAX) 0 4 mg TAKE 1 CAPSULE BY MOUTH WITH EVENING MEAL  Qty: 28 capsule, Refills: 5    Associated Diagnoses: Benign prostatic hyperplasia with urinary frequency      torsemide (DEMADEX) 20 mg tablet Take 2 tablets (40 mg total) by mouth 2 (two) times a day  Qty: 60 tablet, Refills: 11    Associated Diagnoses: Chronic diastolic congestive heart failure (HCC)      white petrolatum Apply 1 application topically as needed Apply topically to buttocks as needed  May keep in room and self administer       !! - Potential duplicate medications found  Please discuss with provider  No discharge procedures on file  ED Provider  Attending physically available and evaluated Emaline Qualia  I managed the patient along with the ED Attending      Electronically Signed by         Francie Siddiqi MD  07/29/18 0749

## 2018-07-26 NOTE — ASSESSMENT & PLAN NOTE
· Discharge with Marte due to urinary retention earlier this month    Followed with Urology as outpatient who recommended to continue the Marte catheter until the stone is taken care of

## 2018-07-26 NOTE — RESPIRATORY THERAPY NOTE
RT Protocol Note  Berlin Boxer 80 y o  male MRN: [id number]  Unit/Bed#: -01 Encounter: [id number]    Assessment    Principal Problem:    Right flank pain  Active Problems:    Chronic diastolic congestive heart failure (HCC)    CKD (chronic kidney disease) stage 3, GFR 30-59 ml/min    Paroxysmal atrial fibrillation (HCC)    CAD (coronary artery disease)    Type 2 diabetes mellitus (HCC)    Right ureteral stone    Kidney stones    Urinary retention    DEANA (acute kidney injury) ([facility] )      Home Pulmonary Medications:  Nebs prn       Past Medical History:   Diagnosis Date    A-fib ([place] [address] )     Anemia     Bladder cancer ([facility] )     CAD (coronary artery disease)     [facility]     Chronic kidney failure     Colon cancer ([place] [address] )     Eye cancer, left ([place] [address] )     Hypertension     Incontinence     Pacemaker     Prostate cancer ([place] [address] )     Type 2 diabetes mellitus ([place] [address] )     Wears hearing aid in both ears      Social History     Social History    Marital status: Single     Spouse name: N/A    Number of children: N/A    Years of education: N/A     Social History Main Topics    Smoking status: Former Smoker    Smokeless tobacco: Never Used    Alcohol use Yes      Comment: jayne cognac 1 oz/night     Drug use: No    Sexual activity: Not Currently     Other Topics Concern    None     Social History Narrative    None       Subjective         Objective    Physical Exam:   Assessment Type: (P) Assess only  General Appearance: (P) Alert, Awake  Respiratory Pattern: (P) Normal  Chest Assessment: (P) Chest expansion symmetrical  Bilateral Breath Sounds: (P) Diminished    Vitals:  Blood pressure 114/57, pulse 70, temperature 98 3 °F (36 8 °C), temperature source Oral, resp  rate 18, height 5' 10" (1 778 m), weight 88 1 kg (194 lb 4 8 oz), SpO2 100 %  Imaging and other studies: I have personally reviewed pertinent reports              Plan    Respiratory Plan: (P) Home Bronchodilator Patient pathway        Resp Comments: (P) Pt  admitted for flank pain  Uses nebs PRN at [facility]  No resp  distress  Will continue udn prn as per NH use

## 2018-07-26 NOTE — ASSESSMENT & PLAN NOTE
· Earlier this month he was admitted with pain found to have her right-sided 8 millimeter ureteric stone without hydronephrosis but some surrounding stranding, underwent cystoscopy with a right-sided stent placement  Discharge with oral antibiotics for total 2 weeks    Found to have urinary retention at that time status post Marte insertion  · Follow up with Urology as outpatient and was scheduled for stone lithotripsy and stent exchange 1st week of August, but at [facility] due to his persistent pain they contacted Urology office and the report it to 07/31/2018

## 2018-07-26 NOTE — ED NOTES
Spoke to Tanesha Marcial from [place] and made her aware this patient will be up shortly        Liv Hicks  07/26/18 [id number]

## 2018-07-26 NOTE — CONSULTS
CONSULT    Patient Name: Radha Moore  Patient MRN: [id number]  Date of Service: 7/26/2018   Date / Time Note Created: 7/26/2018 4:20 PM   Referring Provider: Lane Pineda MD  Provider Creating Note: KEERTHI Velez    PCP: Silvestre Fry  Attending Provider:  Lane Pineda MD    Reason for Consult: Flank Pain    History of present illness:   Mr Wright Host 80year-old male status post ureteral stent placement for obstructed and infected right ureteral stone also known to our service for prostate & bladder cancer  Patient was discharged to nursing home with Marte catheter secondary to postoperative urinary retention and was scheduled for ureteroscopy July 31st   Patient was transferred from nursing home to emergency room for chief complaint of abdominal and right flank pain since yesterday evening; not accompanied by fever or chills  CT scan is negative hydronephrosis and confirms favorable stent placement  Creatinine is 1 85 from baseline of 1 5  Patient is afebrile and without leukocytosis  Urologic consultation was requested against patient's  background       Active Problems:    Patient Active Problem List   Diagnosis    Lower urinary tract symptoms    Malignant neoplasm of trigone of urinary bladder (HCC)    Prostate cancer (HCC)    Chronic diastolic congestive heart failure (HCC)    CKD (chronic kidney disease) stage 3, GFR 30-59 ml/min    Paroxysmal atrial fibrillation (HCC)    CAD (coronary artery disease)    Type 2 diabetes mellitus ([address] )    Cardiac pacemaker in situ    Right ureteral stone    Urinary tract infection with hematuria    Hyponatremia    Other chest pain    Laceration of right lower extremity    Anemia    Benign prostatic hyperplasia    Blindness of left eye    Kidney stones    Peripheral neuropathy    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 (LAD) artery    Ureter, calculus    Urinary retention    Right flank pain    DEANA (acute kidney injury) [facility])            Impressions  Right Ureteral Calculus  R/O UTI/pyelonephritis  Urinary retention status--status post Marte catheter insertion  History prostate and bladder cancer    Recommendations  Replace Marte catheter  Obtain sterile urine sample  Await cultures  Unfortunately, urinalysis alone is not reliable diagnostic stool in patients with indwelling urinary drains/foreign bodies  Hydrate  Monitor creatinine  Continue medical optimization  If patient is infected should be treated prior to proceeding with ureteroscopy  Will maintain current OR schedule date for July 31st   Will follow patient's progress peripherally to determine if any change is needed  Past Medical History:   Diagnosis Date    A-fib ([facility] )     Anemia     Bladder cancer ([facility] )     CAD (coronary artery disease)     CHF (congestive heart failure) (HCC)     Chronic kidney failure     Colon cancer ([place] [address] )     Eye cancer, left ([place] [address] )     Hypertension     Incontinence     Pacemaker     [facility]  )     Type 2 diabetes mellitus ([place] [address] )     Wears hearing aid in both ears        Past Surgical History:   Procedure Laterality Date    CARDIAC PACEMAKER PLACEMENT  2014    CORONARY ANGIOPLASTY WITH STENT PLACEMENT      GALLBLADDER SURGERY      ID CYSTOURETHROSCOPY,URETER CATHETER Right 7/1/2018    Procedure: CYSTOSCOPY RETROGRADE PYELOGRAM WITH INSERTION STENT URETERAL;  Surgeon: Sade George MD;  Location: [facility];  Service: Urology       History reviewed  No pertinent family history  Social History     Social History    Marital status: Single     Spouse name: N/A    Number of children: N/A    Years of education: N/A     Occupational History    Not on file       Social History Main Topics    Smoking status: Former Smoker    Smokeless tobacco: Never Used    Alcohol use Yes      Comment: jayne cognac 1 oz/night     Drug use: No    Sexual activity: Not Currently     Other Topics Concern  Not on file     Social History Narrative    No narrative on file       Allergies   Allergen Reactions    Iv Contrast [Iodinated Diagnostic Agents]        Review of Systems  10 point review of systems negative except as noted in HPI  Constitutional:   negative for - chills or fever  Cardiovascular:   no chest pain or dyspnea on exertion  Gastrointestinal:   positive for - abdominal pain  Genito-Urinary:   Positive for recent urinary retention  Neurological:   no TIA or stroke symptoms     Chart Review   Allergies, current medications, history, problem list    Vital Signs  /57 (BP Location: Right arm)   Pulse 70   Temp 98 3 °F (36 8 °C) (Oral)   Resp 18   Ht 5' 10" (1 778 m)   Wt 88 1 kg (194 lb 4 8 oz)   SpO2 100%   BMI 27 88 kg/m²     Physical Exam  General appearance: alert and oriented, in no acute distress  Head: Normocephalic, without obvious abnormality, atraumatic  Neck: no adenopathy, no carotid bruit, no JVD, supple, symmetrical, trachea midline and thyroid not enlarged, symmetric, no tenderness/mass/nodules  Lungs: diminished breath sounds  Heart: regular rate and rhythm, S1, S2 normal, no murmur, click, rub or gallop  Abdomen: abnormal findings:  mild tenderness in the lower abdomen  Extremities: extremities normal, warm and well-perfused; no cyanosis, clubbing, or edema  Pulses: 2+ and symmetric  Neurologic: Grossly normal  Marte patent for clear yellow urine     Laboratory Studies  Lab Results   Component Value Date    HGBA1C 7 4 (H) 05/23/2018     (L) 07/26/2018    K 3 6 07/26/2018    CL 94 (L) 07/26/2018    CO2 31 07/26/2018    GLUCOSE 153 (H) 07/26/2018    CREATININE 1 85 (H) 07/26/2018    BUN 31 (H) 07/26/2018    MG 2 2 07/05/2018    PHOS 3 7 07/05/2018     Lab Results   Component Value Date    WBC 4 05 (L) 07/26/2018    RBC 3 05 (L) 07/26/2018    HGB 8 2 (L) 07/26/2018    HCT 27 2 (L) 07/26/2018    MCV 89 07/26/2018    MCH 26 9 07/26/2018    RDW 15 6 (H) 07/26/2018    PLT 224 07/26/2018         Imaging and Other Studies  )  Ct Abdomen Pelvis Wo Contrast    Result Date: 7/26/2018  Narrative: CT ABDOMEN AND PELVIS WITHOUT IV CONTRAST INDICATION:   abdominal pain  COMPARISON:  7/1/2018 TECHNIQUE:  CT examination of the abdomen and pelvis was performed without intravenous contrast   Axial, sagittal, and coronal 2D reformatted images were created from the source data and submitted for interpretation  Radiation dose length product (DLP) for this visit:  1113 71 mGy-cm   This examination, like all CT scans performed in the [facility], was performed utilizing techniques to minimize radiation dose exposure, including the use of iterative reconstruction and automated exposure control  Enteric contrast was not administered in accordance with physician request  FINDINGS: ABDOMEN LOWER CHEST:  There is cardiomegaly and a small pericardial effusion  There are small bilateral pleural effusions with right basilar atelectasis and mild groundglass opacity which is nonspecific though possibly pulmonary edema  This is similar to the prior study  LIVER/BILIARY TREE:  Unremarkable  GALLBLADDER:  Gallbladder is surgically absent  SPLEEN:  Unremarkable  PANCREAS:  Unremarkable  ADRENAL GLANDS:  There is thickening of the adrenal glands, left greater than right with low-attenuation consistent with adenomatous hyperplasia  KIDNEYS/URETERS:  There has been interval placement of a right nephroureteral stent  There is no hydronephrosis  There is a mid ureteral calculus measuring 4 x 8 mm, unchanged in position from the prior study  There are bilateral renal cysts  STOMACH AND BOWEL:  Status post right hemicolectomy  There is colonic diverticulosis without evidence of acute diverticulitis  APPENDIX:  No findings to suggest appendicitis  ABDOMINOPELVIC CAVITY:  No ascites or free intraperitoneal air  No lymphadenopathy  VESSELS:  Unremarkable for patient's age   PELVIS REPRODUCTIVE ORGANS: The prostate is severely enlarged  URINARY BLADDER:  The bladder is collapsed with a Marte catheter  ABDOMINAL WALL/INGUINAL REGIONS:  Unremarkable  OSSEOUS STRUCTURES:  No acute fracture or destructive osseous lesion  Impression: 1  Status post right nephroureteral system and placement with stable position of 8 mm right mid ureteral calculus  No hydronephrosis  2   Stable changes at the lung bases with pleural effusions and groundglass opacity, nonspecific though possibly pulmonary edema  3   Severe prostatomegaly  Workstation performed: [id number]     Ct Abdomen Pelvis Wo Contrast    Result Date: 7/1/2018  Narrative: CT ABDOMEN AND PELVIS WITHOUT IV CONTRAST INDICATION:   Right lower quadrant abdominal pain  Colon cancer  COMPARISON: None  TECHNIQUE:  CT examination of the abdomen and pelvis was performed without intravenous contrast   Axial, sagittal, and coronal 2D reformatted images were created from the source data and submitted for interpretation  Radiation dose length product (DLP) for this visit:  761 mGy-cm   This examination, like all CT scans performed in the [facility], was performed utilizing techniques to minimize radiation dose exposure, including the use of iterative reconstruction and automated exposure control  Enteric contrast was not administered  FINDINGS: ABDOMEN LOWER CHEST:  Pacer leads are noted in the right heart  Heart is enlarged  There is trace pericardial effusion  There is small moderate loculated effusion in the medial lower left chest   Small freely layering right pleural effusion is noted  Calcified right lower chest granuloma is noted  Emphysematous changes are noted in the lower lungs as are atelectatic changes  LIVER/BILIARY TREE:  Unremarkable  No noncontrast CT evidence of suspicious hepatic mass or biliary dilatation  GALLBLADDER:  Gallbladder is surgically absent  SPLEEN:  Unremarkable  PANCREAS:  Unremarkable   ADRENAL GLANDS:  Low-density thickening of adrenal glands bilaterally without discrete adrenal mass suggest adenomatous hyperplasia  KIDNEYS/URETERS:  Renal vascular calcifications and bilateral renal cysts are noted  There is a proximal right ureteral calculus, 9 mm in size at the approximate upper L4 vertebral endplate level  There is extensive periureteral stranding at the level of the calculus  There is distention of right extrarenal pelvis  Only mild prominence of right intrarenal calyces is noted  The right ureter is prominent in caliber both proximal to and distal to the ureteral calculus  No left ureteral calculi  No left-sided hydroureteronephrosis  STOMACH AND BOWEL:  Anastomotic staple line is noted in the right upper quadrant  There has been resection of the ascending colon  No bowel obstruction  No free intraperitoneal gas  Sigmoid diverticulosis without evidence of acute diverticulitis  APPENDIX:  No findings to suggest appendicitis  ABDOMINOPELVIC CAVITY:  Nonspecific right lower quadrant mesenteric and presacral edema is noted  No abdominal pelvic abscess  No retroperitoneal, mesenteric, or pelvic lymphadenopathy  VESSELS:  Extensive atherosclerotic gastric calcification noted  PELVIS REPRODUCTIVE ORGANS:  Marked prostatomegaly noted  Prostate measures 6 7 x 6 5 cm in transverse dimensions by approximately 7 6 cm in craniocaudal dimension  URINARY BLADDER:  Diffuse thickening of the wall the urinary bladder with minimal perivesical inflammatory edema suggestive of cystitis or the sequela of chronic bladder obstruction  ABDOMINAL WALL/INGUINAL REGIONS:  Small fat-containing umbilical hernia is noted  OSSEOUS STRUCTURES:  No acute fracture or destructive osseous lesion  Advanced lumbar bilateral hip osteoarthritic degenerative changes  Impression: Right mid ureteral calculus, 9 mm in size    Although there is no significant hydronephrosis, there is prominence of right extrarenal pelvis and there is extensive inflammatory stranding surrounding the ureter at the site of the calculus, and these findings suggest that this calculus is the cause of the patient's right lower quadrant pain  Changes of prior ascending colectomy  Colonic diverticulosis without acute diverticulitis  Mild mesenteric and presacral edema is nonspecific  No noncontrast CT evidence of metastatic disease in the abdomen or pelvis  Pleural effusions, somewhat loculated on the left  Cardiomegaly  Workstation performed: [id number]     X-ray Chest 2 Views    Result Date: 7/4/2018  Narrative: CHEST INDICATION:   Chest pain  Trauma  Abrasions and lacerations involving right leg COMPARISON:  July 1, 2018  EXAM PERFORMED/VIEWS:  XR CHEST PA & LATERAL FINDINGS: Dual-lead left chest pacemaker is noted with intact pacer leads seen  Heart shadow is enlarged but unchanged from previous examination  There is small to moderate right pleural effusion  There is density in the posterior right suprahilar chest which appears to represent loculated fluid in the right major fissure  Left lung is clear  No pneumothorax  Glenohumeral and spinal degenerative changes  No acute osseous abnormality  Impression: Cardiomegaly  Right pleural effusion including some loculated fluid in the posterior aspect of the right major fissure  Workstation performed: [id number]     Xr Chest 2 Views    Result Date: 7/1/2018  Narrative: CHEST INDICATION:   Dyspnea  Severe abdominal pain this morning  COMPARISON:  May 22, 2018 EXAM PERFORMED/VIEWS:  XR CHEST PA & LATERAL FINDINGS: The heart mediastinum are stable  Pacing device unchanged in position  There is continued mild cardiomegaly  Small bilateral pleural effusions are again noted  Osseous structures appear within normal limits for patient age  Impression: Stable cardiomegaly  Small bilateral pleural effusions right greater than left   Workstation performed: [id number]     Xr Tibia Fibula 2 Views Right    Result Date: 7/4/2018  Narrative: RIGHT TIBIA AND FIBULA INDICATION:   Trauma  Right leg pain with abrasion and laceration  COMPARISON:  None VIEWS:  XR TIBIA FIBULA 2 VW RIGHT FINDINGS: There is no acute fracture or dislocation  No significant degenerative changes seen  No lytic or blastic lesions are seen  There are atherosclerotic calcifications  Soft tissues are otherwise unremarkable  Impression: No acute osseous abnormality  Workstation performed: [id number]     Fl Retrograde Pyelogram    Result Date: 7/2/2018  Narrative: RIGHT RETROGRADE PYELOGRAM INDICATION:   Right ureteral stone [N20 1]  COMPARISON: CT 7/1/2018 IMAGES:  3 FLUOROSCOPY TIME:   17 seconds CONTRAST: 8 mL of iohexol (OMNIPAQUE) FINDINGS: Fluoroscopic guidance provided for retrograde pyelogram  Contrast administration into the right kidney collecting system demonstrates dilatation  Final images reveal placement of a right ureteral stent Osseous and soft tissue detail limited by technique  Impression: Fluoroscopic guidance provided for right retrograde pyelogram   Placement of right ureteral stent  Please see procedure report for further details  Workstation performed: [id number]       Medications   Scheduled Meds:  Current Facility-Administered Medications:  acetaminophen 650 mg Oral Q6H PRN Ceci Balderas MD   HYDROmorphone 0 2 mg Intravenous Q6H PRN Ceci Balderas MD   insulin lispro 1-5 Units Subcutaneous HS Ghazala Araujo MD   insulin lispro 1-6 Units Subcutaneous TID AC Ceci Balderas MD   traMADol 50 mg Oral Q6H PRN Ceci Balderas MD     Continuous Infusions:   PRN Meds:   acetaminophen    HYDROmorphone    traMADol      Total time spent with patient 30 minutes, >50% spent counseling and/or coordination of care           KEERTHI Bullock

## 2018-07-26 NOTE — ASSESSMENT & PLAN NOTE
· Recent echo last month with EF of 60 percent  · Due to weight gain at [facility] his diuretics were increased from 40 milligrams to 80 milligrams of torsemide twice a day for 3 days which is supposed to be till tomorrow    However given the DEANA will cardiac back down to 40 milligrams b i d   · Currently he appears stable, no pedal edema, has some basal crackles

## 2018-07-26 NOTE — TELEPHONE ENCOUNTER
Was able to contact Danie Black - Davidson's daughter and let her know that his surgery will be moved up to 7/31 - She said he is in excreciating pain  I said if he is in that much pain he needs to go to the ER

## 2018-07-26 NOTE — H&P
H&P- Sam Herrera 12/21/1930, 80 y o  male MRN: [id number]    Unit/Bed#: -Laci Encounter: [id number]    Primary Care Provider: Alex Watkins MD   Date and time admitted to hospital: 7/26/2018  9:44 AM        * Right flank pain   Assessment & Plan    · Likely 2/2 urolithiasis & ureteric stent +/- UTI  · CT abdomen showing persistent & stable 8 mm right ureteric stone, intact stent  · Urology consulted - f/u rec  They preponed his stent removal to 7/31/18 as outpatient before admission due to pain  · UA positive with LE, nitrities  · F/u urine cultures, previously grew E coli earlier this month  Status post treatment for 2 weeks at that time  Given his persistent pain and stone, will place him on IV Ancef and follow up Urology recommendations  · Pain controlled with oral pain medications and IV for breakthrough pain        DEANA (acute kidney injury) ([facility] [address] )   Assessment & Plan    · Could be secondary to the persistent stone/UTI plus component of increased diuretics by Cardiology due to weight gain  · Baseline creatinine 1 5  Today 1 8   · As per records, as recommended by Cardiology to increase his torsemide from 40 milligrams to 80 milligrams b i d  for 3 days which is supposed to be till tomorrow and then cut it back to his previous dose  At present he appears euvolemic with some basal crackles  At this time will cultures torsemide back to 40 milligrams b i d     Will hold enalapril 2 5 milligrams as well  Reassess his renal function tomorrow morning  · Follow up Urology recommendations in regards to stone removal and stent removal  · Marte catheter in place for urinary retention which has been changed today as well  · RX UTI  · Repeat in AM        Urinary retention   Assessment & Plan    · Discharge with Marte due to urinary retention earlier this month    Followed with Urology as outpatient who recommended to continue the Marte catheter until the stone is taken care of        Right ureteral stone Assessment & Plan    · Earlier this month he was admitted with pain found to have her right-sided 8 millimeter ureteric stone without hydronephrosis but some surrounding stranding, underwent cystoscopy with a right-sided stent placement  Discharge with oral antibiotics for total 2 weeks  Found to have urinary retention at that time status post Marte insertion  · Follow up with Urology as outpatient and was scheduled for stone lithotripsy and stent exchange 1st week of August, but at [facility] due to his persistent pain they contacted Urology office and the report it to 07/31/2018        Type 2 diabetes mellitus [facility])   Assessment & Plan    Lab Results   Component Value Date    HGBA1C 7 4 (H) 05/23/2018       No results for input(s): POCGLU in the last 72 hours  Blood Sugar Average: Last 72 hrs:     Hold empagliflozin  ISS        CAD (coronary artery disease)   Assessment & Plan    · History of PCI to LAD  Continue aspirin, Coreg  Not on statin  · Follows with Dr Jared Strickland        Paroxysmal atrial fibrillation [facility])   Assessment & Plan    · Continue Coreg and Eliquis        CKD (chronic kidney disease) stage 3, GFR 30-59 ml/min   Assessment & Plan    · Baseline creatinine 1 5  Today 1 8        Chronic diastolic congestive heart failure (HCC)   Assessment & Plan    · Recent echo last month with EF of 60 percent  · Due to weight gain at [facility] his diuretics were increased from 40 milligrams to 80 milligrams of torsemide twice a day for 3 days which is supposed to be till tomorrow    However given the DEANA will cardiac back down to 40 milligrams b i d   · Currently he appears stable, no pedal edema, has some basal crackles              History and Physical - [facility]    Patient Information: Fernando Lopez 80 y o  male MRN: [id number]  Unit/Bed#: -01 Encounter: [id number]  Admitting Physician: Jenna Hood MD  PCP: Belgica Yarbrough MD  Date of Admission: 07/26/18    Assessment/Plan:    Hospital Problem List:     Principal Problem:    Right flank pain  Active Problems:    DEANA (acute kidney injury) ([facility] [address] )    Chronic diastolic congestive heart failure (HCC)    CKD (chronic kidney disease) stage 3, GFR 30-59 ml/min    Paroxysmal atrial fibrillation (HCC)    CAD (coronary artery disease)    Type 2 diabetes mellitus ([facility] [address] )    Right ureteral stone    Kidney stones    Urinary retention        VTE Prophylaxis: Apixaban (Eliquis)  / sequential compression device   Code Status:   POLST: There is no POLST form on file for this patient (pre-hospital)    Anticipated Length of Stay:  Patient will be admitted on an Inpatient basis with an anticipated length of stay of  > 2 midnights  Justification for Hospital Stay: DEANA, flank pain, stent    Total Time for Visit, including Counseling / Coordination of Care: 45 minutes  Greater than 50% of this total time spent on direct patient counseling and coordination of care  Chief Complaint:   Right flank pain    History of Present Illness:    Willie Andrade is a 80 y o  male who presents with worsening right-sided flank pain  Patient was admitted here in May 2018 treated for CHF exacerbation as well as atrial fibrillation  Subsequently was admitted again on 07/01/2018 with right lower quadrant pain dysuria found to have 8 millimeter right ureteric stone, UTI, treated with cystoscopy and right-sided stent placement as well as antibiotics for total 2 weeks treatment  Also found a urinary retention at that time and discharged with Marte catheter  Subsequently he came back to the hospital next day due to chest pain and lower abdominal pain  Evaluated by Cardiology, pain thought to be atypical hence no further workup was recommended  And he was discharged home and    He was as outpatient scheduled to get his stent removed in the 1st week of August   However as per review of records, patient has been having persistent right-sided flank pain, [facility] has been in contact with Urology office, the pre bone his stent removal to 7/30 1/18, however in the meantime due to persistent severe pain, they recommended him to come to the emergency room  Patient says that he has severe right-sided flank pain, does not radiate to his abdomen, any kind of movement worsens it  Denies any fever chills nausea vomiting abdominal pain diarrhea or constipation  Patient is very hard of hearing    Review of Systems:    Review of Systems   Constitutional: Negative for activity change, appetite change, chills and fever  HENT: Negative for congestion, rhinorrhea and sore throat  Respiratory: Negative for cough, shortness of breath and wheezing  Cardiovascular: Negative for chest pain and palpitations  Gastrointestinal: Negative for abdominal pain, diarrhea, nausea and vomiting  Genitourinary: Negative for difficulty urinating  Musculoskeletal: Positive for back pain  Neurological: Negative for dizziness and light-headedness  All other systems reviewed and are negative        Past Medical and Surgical History:     Past Medical History:   Diagnosis Date    A-fib ([facility] )     Anemia     Bladder cancer ([facility] )     CAD (coronary artery disease)     CHF (congestive heart failure) (HCC)     Chronic kidney failure     [facility]     [facility] cancer, left ([facility] )     Hypertension     Incontinence     Pacemaker     Prostate cancer ([facility] )     Type 2 diabetes mellitus ([facility] )     Wears hearing aid in both ears        Past Surgical History:   Procedure Laterality Date    CARDIAC PACEMAKER PLACEMENT  2014    CORONARY ANGIOPLASTY WITH STENT PLACEMENT      GALLBLADDER SURGERY      [place] CYSTOURETHROSCOPY,URETER CATHETER Right 7/1/2018    Procedure: CYSTOSCOPY RETROGRADE PYELOGRAM WITH INSERTION STENT URETERAL;  Surgeon: Dianne Barger MD;  Location: [facility];  Service: Urology       Meds/Allergies:    Prior to Admission medications    Medication Sig Start Date End Date Taking? Authorizing Provider   chlorhexidine (HIBICLENS) 4 % external liquid Apply topically daily as needed for wound care   Yes Historical Provider, MD   acetaminophen (TYLENOL) 325 mg tablet Take 2 tablets (650 mg total) by mouth every 6 (six) hours as needed for mild pain, headaches or fever 7/6/18   Tasneem Eaton DO   albuterol (5 mg/mL) 0 5 % nebulizer solution Take 2 5 mg by nebulization every 6 (six) hours as needed for wheezing    Historical Provider, MD   apixaban (ELIQUIS) 2 5 mg Take 1 tablet (2 5 mg total) by mouth 2 (two) times a day 5/25/18   Terrance Griffith MD   Artificial Tear Ointment (ARTIFICIAL TEARS) as needed for dry eyes    Historical Provider, MD   aspirin (ECOTRIN LOW STRENGTH) 81 mg EC tablet Take 81 mg by mouth daily    Historical Provider, MD   bacitracin topical ointment 500 units/g topical ointment Apply 1 large application topically daily To 3 small areas on right shin    Historical Provider, MD   carvedilol (COREG) 3 125 mg tablet Take 3 125 mg by mouth 2 (two) times a day with meals    Historical Provider, MD   EASY TOUCH LANCETS 28G MISC USE AS DIRECTED  7/13/18   Ganga Mary MD   Empagliflozin (JARDIANCE) 10 MG TABS Take 10 mg by mouth every morning    Historical Provider, MD   enalapril (VASOTEC) 2 5 mg tablet Take 2 5 mg by mouth daily      Historical Provider, MD   ergocalciferol (VITAMIN D2) 50,000 units TAKE 1 CAPSULE BY MOUTH ONCE WEEKLY 7/11/18   Ganga Mary MD   finasteride (PROSCAR) 5 mg tablet TAKE 1 TABLET BY MOUTH ONCE DAILY   7/11/18   Ganga Mary MD   gabapentin (NEURONTIN) 100 mg capsule Take 1 capsule (100 mg total) by mouth daily  Patient taking differently: Take 100 mg by mouth 2 (two) times a day   7/10/18   Ganga Mary MD   gabapentin (NEURONTIN) 300 mg capsule Take 300 mg by mouth daily at bedtime      Historical Provider, MD   glucose blood test strip 1 each by Other route daily Use as instructed    Historical Provider, MD   Hypromellose (SYSTANE OVERNIGHT THERAPY) 0 3 % GEL Apply to eye    Historical Provider, MD   ipratropium-albuterol (DUO-NEB) 0 5-2 5 mg/3 mL Take 3 mL by nebulization every 6 (six) hours as needed for wheezing 5/25/18   Marvel Alejandra MD   melatonin 3 mg Take 3 mg by mouth daily at bedtime    Historical Provider, MD   Multiple Vitamin (TAB-A-ANETA PO) Take 1 tablet by mouth daily    Historical Provider, MD   nitroglycerin (NITROSTAT) 0 4 mg SL tablet Place 0 4 mg under the tongue every 5 (five) minutes as needed for chest pain    Historical Provider, MD   Omega-3 Fatty Acids (FISH OIL) 1,000 mg Take 1,000 mg by mouth daily    Historical Provider, MD   OxyCODONE HCl 5 MG TABA Take 1 tablet by mouth every 4 (four) hours as needed for pain Severe pain    Historical Provider, MD   Polyethyl Glycol-Propyl Glycol (SYSTANE) 0 4-0 3 % GEL Apply to eye    Historical Provider, MD   potassium chloride (K-DUR,KLOR-CON) 20 mEq tablet Take 20 mEq by mouth 2 (two) times a day    Historical Provider, MD   tamsulosin (FLOMAX) 0 4 mg TAKE 1 CAPSULE BY MOUTH WITH EVENING MEAL 7/11/18   Misti Benoit MD   torsemide [facility]) 20 mg tablet Take 2 tablets (40 mg total) by mouth 2 (two) times a day  Patient taking differently: Take 80 mg by mouth 2 (two) times a day To end 7/27/18, then resume torsemide 40 mg  Twice a day  7/12/18   Morena Persaud DO   white petrolatum Apply 1 application topically as needed Apply topically to buttocks as needed  May keep in room and self administer    Historical Provider, MD     I have reveiwed home medications using records provided by [facility]  Allergies:    Allergies   Allergen Reactions    Iv Contrast [Iodinated Diagnostic Agents]        Social History:     Marital Status: Single   Occupation:   Patient Pre-hospital Living Situation:  [facility]  Patient Pre-hospital Level of Mobility:   Patient Pre-hospital Diet Restrictions: none  Substance Use History:   History   Alcohol Use    Yes     Comment: jayne cognac 1 oz/night      History   Smoking Status    Former Smoker   Smokeless Tobacco    Never Used     History   Drug Use No       Family History:    non-contributory    Physical Exam:     Vitals:   Blood Pressure: 114/57 (07/26/18 1556)  Pulse: 70 (07/26/18 1556)  Temperature: 98 3 °F (36 8 °C) (07/26/18 1556)  Temp Source: Oral (07/26/18 1556)  Respirations: 18 (07/26/18 1556)  Height: 5' 10" (177 8 cm) (07/26/18 1556)  Weight - Scale: 88 1 kg (194 lb 4 8 oz) (07/26/18 1556)  SpO2: 100 % (07/26/18 1556)    Physical Exam   Constitutional: He is oriented to person, place, and time  He appears well-developed and well-nourished  No distress  HENT:   Head: Normocephalic and atraumatic  Mouth/Throat: Oropharynx is clear and moist    Eyes: Conjunctivae are normal  No scleral icterus  Cardiovascular: Normal rate, regular rhythm and normal heart sounds  Exam reveals no gallop and no friction rub  No murmur heard  Pulmonary/Chest: Effort normal and breath sounds normal    Bibasal crackles   Abdominal: Soft  He exhibits no distension  There is no tenderness  Severe tenderness to superficial palpation in the right flank area   Musculoskeletal: He exhibits no edema  Neurological: He is alert and oriented to person, place, and time  Skin: He is not diaphoretic  Vitals reviewed  Additional Data:     Lab Results: I have personally reviewed pertinent reports          Results from last 7 days  Lab Units 07/26/18  1018   WBC Thousand/uL 4 05*   HEMOGLOBIN g/dL 8 2*   HEMATOCRIT % 27 2*   PLATELETS Thousands/uL 224   NEUTROS PCT % 65   LYMPHS PCT % 12*   MONOS PCT % 17*   EOS PCT % 4       Results from last 7 days  Lab Units 07/26/18  1018   SODIUM mmol/L 130*   POTASSIUM mmol/L 3 6   CHLORIDE mmol/L 94*   CO2 mmol/L 31   BUN mg/dL 31*   CREATININE mg/dL 1 85*   CALCIUM mg/dL 8 1*   TOTAL PROTEIN g/dL 6 5   BILIRUBIN TOTAL mg/dL 0 39   ALK PHOS U/L 91   ALT U/L 15   AST U/L 17   GLUCOSE RANDOM mg/dL 153*           Imaging: I have personally reviewed pertinent reports  Ct Abdomen Pelvis Wo Contrast    Result Date: 7/26/2018  Narrative: CT ABDOMEN AND PELVIS WITHOUT IV CONTRAST INDICATION:   abdominal pain  COMPARISON:  7/1/2018 TECHNIQUE:  CT examination of the abdomen and pelvis was performed without intravenous contrast   Axial, sagittal, and coronal 2D reformatted images were created from the source data and submitted for interpretation  Radiation dose length product (DLP) for this visit:  1113 71 mGy-cm   This examination, like all CT scans performed in the [facility], was performed utilizing techniques to minimize radiation dose exposure, including the use of iterative reconstruction and automated exposure control  Enteric contrast was not administered in accordance with physician request  FINDINGS: ABDOMEN LOWER CHEST:  There is cardiomegaly and a small pericardial effusion  There are small bilateral pleural effusions with right basilar atelectasis and mild groundglass opacity which is nonspecific though possibly pulmonary edema  This is similar to the prior study  LIVER/BILIARY TREE:  Unremarkable  GALLBLADDER:  Gallbladder is surgically absent  SPLEEN:  Unremarkable  PANCREAS:  Unremarkable  ADRENAL GLANDS:  There is thickening of the adrenal glands, left greater than right with low-attenuation consistent with adenomatous hyperplasia  KIDNEYS/URETERS:  There has been interval placement of a right nephroureteral stent  There is no hydronephrosis  There is a mid ureteral calculus measuring 4 x 8 mm, unchanged in position from the prior study  There are bilateral renal cysts  STOMACH AND BOWEL:  Status post right hemicolectomy  There is colonic diverticulosis without evidence of acute diverticulitis  APPENDIX:  No findings to suggest appendicitis  ABDOMINOPELVIC CAVITY:  No ascites or free intraperitoneal air   No lymphadenopathy  VESSELS:  Unremarkable for patient's age  PELVIS REPRODUCTIVE ORGANS:  The prostate is severely enlarged  URINARY BLADDER:  The bladder is collapsed with a Maret catheter  ABDOMINAL WALL/INGUINAL REGIONS:  Unremarkable  OSSEOUS STRUCTURES:  No acute fracture or destructive osseous lesion  Impression: 1  Status post right nephroureteral system and placement with stable position of 8 mm right mid ureteral calculus  No hydronephrosis  2   Stable changes at the lung bases with pleural effusions and groundglass opacity, nonspecific though possibly pulmonary edema  3   Severe prostatomegaly  Workstation performed: [id number]     Ct Abdomen Pelvis Wo Contrast    Result Date: 7/1/2018  Narrative: CT ABDOMEN AND PELVIS WITHOUT IV CONTRAST INDICATION:   Right lower quadrant abdominal pain  Colon cancer  COMPARISON: None  TECHNIQUE:  CT examination of the abdomen and pelvis was performed without intravenous contrast   Axial, sagittal, and coronal 2D reformatted images were created from the source data and submitted for interpretation  Radiation dose length product (DLP) for this visit:  761 mGy-cm   This examination, like all CT scans performed in the [facility], was performed utilizing techniques to minimize radiation dose exposure, including the use of iterative reconstruction and automated exposure control  Enteric contrast was not administered  FINDINGS: ABDOMEN LOWER CHEST:  Pacer leads are noted in the right heart  Heart is enlarged  There is trace pericardial effusion  There is small moderate loculated effusion in the medial lower left chest   Small freely layering right pleural effusion is noted  Calcified right lower chest granuloma is noted  Emphysematous changes are noted in the lower lungs as are atelectatic changes  LIVER/BILIARY TREE:  Unremarkable  No noncontrast CT evidence of suspicious hepatic mass or biliary dilatation   GALLBLADDER:  Gallbladder is surgically absent  SPLEEN:  Unremarkable  PANCREAS:  Unremarkable  ADRENAL GLANDS:  Low-density thickening of adrenal glands bilaterally without discrete adrenal mass suggest adenomatous hyperplasia  KIDNEYS/URETERS:  Renal vascular calcifications and bilateral renal cysts are noted  There is a proximal right ureteral calculus, 9 mm in size at the approximate upper L4 vertebral endplate level  There is extensive periureteral stranding at the level of the calculus  There is distention of right extrarenal pelvis  Only mild prominence of right intrarenal calyces is noted  The right ureter is prominent in caliber both proximal to and distal to the ureteral calculus  No left ureteral calculi  No left-sided hydroureteronephrosis  STOMACH AND BOWEL:  Anastomotic staple line is noted in the right upper quadrant  There has been resection of the ascending colon  No bowel obstruction  No free intraperitoneal gas  Sigmoid diverticulosis without evidence of acute diverticulitis  APPENDIX:  No findings to suggest appendicitis  ABDOMINOPELVIC CAVITY:  Nonspecific right lower quadrant mesenteric and presacral edema is noted  No abdominal pelvic abscess  No retroperitoneal, mesenteric, or pelvic lymphadenopathy  VESSELS:  Extensive atherosclerotic gastric calcification noted  PELVIS REPRODUCTIVE ORGANS:  Marked prostatomegaly noted  Prostate measures 6 7 x 6 5 cm in transverse dimensions by approximately 7 6 cm in craniocaudal dimension  URINARY BLADDER:  Diffuse thickening of the wall the urinary bladder with minimal perivesical inflammatory edema suggestive of cystitis or the sequela of chronic bladder obstruction  ABDOMINAL WALL/INGUINAL REGIONS:  Small fat-containing umbilical hernia is noted  OSSEOUS STRUCTURES:  No acute fracture or destructive osseous lesion  Advanced lumbar bilateral hip osteoarthritic degenerative changes  Impression: Right mid ureteral calculus, 9 mm in size    Although there is no significant hydronephrosis, there is prominence of right extrarenal pelvis and there is extensive inflammatory stranding surrounding the ureter at the site of the calculus, and these findings suggest that this calculus is the cause of the patient's right lower quadrant pain  Changes of prior ascending colectomy  Colonic diverticulosis without acute diverticulitis  Mild mesenteric and presacral edema is nonspecific  No noncontrast CT evidence of metastatic disease in the abdomen or pelvis  Pleural effusions, somewhat loculated on the left  Cardiomegaly  Workstation performed: [id number]     X-ray Chest 2 Views    Result Date: 7/4/2018  Narrative: CHEST INDICATION:   Chest pain  Trauma  Abrasions and lacerations involving right leg COMPARISON:  July 1, 2018  EXAM PERFORMED/VIEWS:  XR CHEST PA & LATERAL FINDINGS: Dual-lead left chest pacemaker is noted with intact pacer leads seen  Heart shadow is enlarged but unchanged from previous examination  There is small to moderate right pleural effusion  There is density in the posterior right suprahilar chest which appears to represent loculated fluid in the right major fissure  Left lung is clear  No pneumothorax  Glenohumeral and spinal degenerative changes  No acute osseous abnormality  Impression: Cardiomegaly  Right pleural effusion including some loculated fluid in the posterior aspect of the right major fissure  Workstation performed: [id number]     Xr Chest 2 Views    Result Date: 7/1/2018  Narrative: CHEST INDICATION:   Dyspnea  Severe abdominal pain this morning  COMPARISON:  May 22, 2018 EXAM PERFORMED/VIEWS:  XR CHEST PA & LATERAL FINDINGS: The heart mediastinum are stable  Pacing device unchanged in position  There is continued mild cardiomegaly  Small bilateral pleural effusions are again noted  Osseous structures appear within normal limits for patient age  Impression: Stable cardiomegaly  Small bilateral pleural effusions right greater than left  Workstation performed: [id number]     Xr Tibia Fibula 2 Views Right    Result Date: 7/4/2018  Narrative: RIGHT TIBIA AND FIBULA INDICATION:   Trauma  Right leg pain with abrasion and laceration  COMPARISON:  None VIEWS:  XR TIBIA FIBULA 2 VW RIGHT FINDINGS: There is no acute fracture or dislocation  No significant degenerative changes seen  No lytic or blastic lesions are seen  There are atherosclerotic calcifications  Soft tissues are otherwise unremarkable  Impression: No acute osseous abnormality  Workstation performed: [id number]     Fl Retrograde Pyelogram    Result Date: 7/2/2018  Narrative: RIGHT RETROGRADE PYELOGRAM INDICATION:   Right ureteral stone [N20 1]  COMPARISON: CT 7/1/2018 IMAGES:  3 FLUOROSCOPY TIME:   17 seconds CONTRAST: 8 mL of iohexol (OMNIPAQUE) FINDINGS: Fluoroscopic guidance provided for retrograde pyelogram  Contrast administration into the right kidney collecting system demonstrates dilatation  Final images reveal placement of a right ureteral stent Osseous and soft tissue detail limited by technique  Impression: Fluoroscopic guidance provided for right retrograde pyelogram   Placement of right ureteral stent  Please see procedure report for further details  Workstation performed: [id number]       EKG, Pathology, and Other Studies Reviewed on Admission:   · EKG:     Allscripts / Epic Records Reviewed: Yes     ** Please Note: This note has been constructed using a voice recognition system   **

## 2018-07-26 NOTE — ASSESSMENT & PLAN NOTE
Lab Results   Component Value Date    HGBA1C 7 4 (H) 05/23/2018       No results for input(s): POCGLU in the last 72 hours  Blood Sugar Average: Last 72 hrs:     Hold empagliflozin   ISS

## 2018-07-26 NOTE — ASSESSMENT & PLAN NOTE
· Could be secondary to the persistent stone/UTI plus component of increased diuretics by Cardiology due to weight gain  · Baseline creatinine 1 5  Today 1 8   · As per records, as recommended by Cardiology to increase his torsemide from 40 milligrams to 80 milligrams b i d  for 3 days which is supposed to be till tomorrow and then cut it back to his previous dose  At present he appears euvolemic with some basal crackles  At this time will cultures torsemide back to 40 milligrams b i d     Will hold enalapril 2 5 milligrams as well    Reassess his renal function tomorrow morning  · Follow up Urology recommendations in regards to stone removal and stent removal  · Marte catheter in place for urinary retention which has been changed today as well  · RX UTI  · Repeat in AM

## 2018-07-27 PROBLEM — T83.511A URINARY TRACT INFECTION ASSOCIATED WITH INDWELLING URETHRAL CATHETER (HCC): Status: ACTIVE | Noted: 2018-07-27

## 2018-07-27 PROBLEM — N39.0 URINARY TRACT INFECTION ASSOCIATED WITH INDWELLING URETHRAL CATHETER (HCC): Status: ACTIVE | Noted: 2018-07-27

## 2018-07-27 LAB
ANION GAP SERPL CALCULATED.3IONS-SCNC: 7 MMOL/L (ref 4–13)
BASOPHILS # BLD AUTO: 0.04 THOUSANDS/ΜL (ref 0–0.1)
BASOPHILS NFR BLD AUTO: 1 % (ref 0–1)
BUN SERPL-MCNC: 36 MG/DL (ref 5–25)
CALCIUM SERPL-MCNC: 8.7 MG/DL (ref 8.3–10.1)
CHLORIDE SERPL-SCNC: 93 MMOL/L (ref 100–108)
CO2 SERPL-SCNC: 31 MMOL/L (ref 21–32)
CREAT SERPL-MCNC: 1.87 MG/DL (ref 0.6–1.3)
EOSINOPHIL # BLD AUTO: 0.25 THOUSAND/ΜL (ref 0–0.61)
EOSINOPHIL NFR BLD AUTO: 4 % (ref 0–6)
ERYTHROCYTE [DISTWIDTH] IN BLOOD BY AUTOMATED COUNT: 15.7 % (ref 11.6–15.1)
GFR SERPL CREATININE-BSD FRML MDRD: 32 ML/MIN/1.73SQ M
GLUCOSE SERPL-MCNC: 169 MG/DL (ref 65–140)
GLUCOSE SERPL-MCNC: 169 MG/DL (ref 65–140)
GLUCOSE SERPL-MCNC: 181 MG/DL (ref 65–140)
GLUCOSE SERPL-MCNC: 193 MG/DL (ref 65–140)
GLUCOSE SERPL-MCNC: 233 MG/DL (ref 65–140)
HCT VFR BLD AUTO: 28.1 % (ref 36.5–49.3)
HGB BLD-MCNC: 8.5 G/DL (ref 12–17)
IMM GRANULOCYTES # BLD AUTO: 0.02 THOUSAND/UL (ref 0–0.2)
IMM GRANULOCYTES NFR BLD AUTO: 0 % (ref 0–2)
LYMPHOCYTES # BLD AUTO: 0.6 THOUSANDS/ΜL (ref 0.6–4.47)
LYMPHOCYTES NFR BLD AUTO: 11 % (ref 14–44)
MCH RBC QN AUTO: 27 PG (ref 26.8–34.3)
MCHC RBC AUTO-ENTMCNC: 30.2 G/DL (ref 31.4–37.4)
MCV RBC AUTO: 89 FL (ref 82–98)
MONOCYTES # BLD AUTO: 0.84 THOUSAND/ΜL (ref 0.17–1.22)
MONOCYTES NFR BLD AUTO: 15 % (ref 4–12)
NEUTROPHILS # BLD AUTO: 3.94 THOUSANDS/ΜL (ref 1.85–7.62)
NEUTS SEG NFR BLD AUTO: 69 % (ref 43–75)
NRBC BLD AUTO-RTO: 0 /100 WBCS
PLATELET # BLD AUTO: 238 THOUSANDS/UL (ref 149–390)
PMV BLD AUTO: 10.9 FL (ref 8.9–12.7)
POTASSIUM SERPL-SCNC: 3.8 MMOL/L (ref 3.5–5.3)
RBC # BLD AUTO: 3.15 MILLION/UL (ref 3.88–5.62)
SODIUM SERPL-SCNC: 131 MMOL/L (ref 136–145)
WBC # BLD AUTO: 5.69 THOUSAND/UL (ref 4.31–10.16)

## 2018-07-27 PROCEDURE — 99232 SBSQ HOSP IP/OBS MODERATE 35: CPT | Performed by: PHYSICIAN ASSISTANT

## 2018-07-27 PROCEDURE — 85025 COMPLETE CBC W/AUTO DIFF WBC: CPT | Performed by: HOSPITALIST

## 2018-07-27 PROCEDURE — 82948 REAGENT STRIP/BLOOD GLUCOSE: CPT

## 2018-07-27 PROCEDURE — 80048 BASIC METABOLIC PNL TOTAL CA: CPT | Performed by: HOSPITALIST

## 2018-07-27 RX ORDER — DOCUSATE SODIUM 100 MG/1
100 CAPSULE, LIQUID FILLED ORAL 2 TIMES DAILY
Status: DISCONTINUED | OUTPATIENT
Start: 2018-07-27 | End: 2018-08-02

## 2018-07-27 RX ORDER — OXYCODONE HYDROCHLORIDE 5 MG/1
2.5 TABLET ORAL EVERY 4 HOURS PRN
Status: DISCONTINUED | OUTPATIENT
Start: 2018-07-27 | End: 2018-08-03

## 2018-07-27 RX ORDER — INSULIN GLARGINE 100 [IU]/ML
5 INJECTION, SOLUTION SUBCUTANEOUS
Status: DISCONTINUED | OUTPATIENT
Start: 2018-07-27 | End: 2018-08-03 | Stop reason: HOSPADM

## 2018-07-27 RX ORDER — OXYCODONE HYDROCHLORIDE 5 MG/1
5 TABLET ORAL EVERY 4 HOURS PRN
Status: DISCONTINUED | OUTPATIENT
Start: 2018-07-27 | End: 2018-08-03

## 2018-07-27 RX ORDER — ACETAMINOPHEN 325 MG/1
975 TABLET ORAL EVERY 8 HOURS SCHEDULED
Status: DISCONTINUED | OUTPATIENT
Start: 2018-07-27 | End: 2018-08-03 | Stop reason: HOSPADM

## 2018-07-27 RX ORDER — LIDOCAINE 50 MG/G
1 PATCH TOPICAL DAILY
Status: DISCONTINUED | OUTPATIENT
Start: 2018-07-27 | End: 2018-08-03 | Stop reason: HOSPADM

## 2018-07-27 RX ORDER — SENNOSIDES 8.6 MG
1 TABLET ORAL
Status: DISCONTINUED | OUTPATIENT
Start: 2018-07-27 | End: 2018-08-03 | Stop reason: HOSPADM

## 2018-07-27 RX ADMIN — ACETAMINOPHEN 975 MG: 325 TABLET ORAL at 17:01

## 2018-07-27 RX ADMIN — Medication 1000 MG: at 09:02

## 2018-07-27 RX ADMIN — POTASSIUM CHLORIDE 20 MEQ: 1500 TABLET, EXTENDED RELEASE ORAL at 09:01

## 2018-07-27 RX ADMIN — INSULIN GLARGINE 5 UNITS: 100 INJECTION, SOLUTION SUBCUTANEOUS at 21:35

## 2018-07-27 RX ADMIN — GABAPENTIN 100 MG: 100 CAPSULE ORAL at 09:01

## 2018-07-27 RX ADMIN — GABAPENTIN 100 MG: 100 CAPSULE ORAL at 17:00

## 2018-07-27 RX ADMIN — LIDOCAINE 1 PATCH: 50 PATCH CUTANEOUS at 17:01

## 2018-07-27 RX ADMIN — CEFTRIAXONE SODIUM 1000 MG: 10 INJECTION, POWDER, FOR SOLUTION INTRAVENOUS at 17:04

## 2018-07-27 RX ADMIN — INSULIN LISPRO 3 UNITS: 100 INJECTION, SOLUTION INTRAVENOUS; SUBCUTANEOUS at 11:09

## 2018-07-27 RX ADMIN — OXYCODONE HYDROCHLORIDE 5 MG: 5 TABLET ORAL at 15:27

## 2018-07-27 RX ADMIN — TAMSULOSIN HYDROCHLORIDE 0.4 MG: 0.4 CAPSULE ORAL at 18:12

## 2018-07-27 RX ADMIN — APIXABAN 2.5 MG: 2.5 TABLET, FILM COATED ORAL at 17:01

## 2018-07-27 RX ADMIN — POTASSIUM CHLORIDE 20 MEQ: 1500 TABLET, EXTENDED RELEASE ORAL at 17:01

## 2018-07-27 RX ADMIN — GABAPENTIN 300 MG: 300 CAPSULE ORAL at 21:35

## 2018-07-27 RX ADMIN — SENNOSIDES 8.6 MG: 8.6 TABLET, FILM COATED ORAL at 21:35

## 2018-07-27 RX ADMIN — ASPIRIN 81 MG: 81 TABLET, COATED ORAL at 09:01

## 2018-07-27 RX ADMIN — MINERAL OIL AND WHITE PETROLATUM: 150; 830 OINTMENT OPHTHALMIC at 21:35

## 2018-07-27 RX ADMIN — INSULIN LISPRO 1 UNITS: 100 INJECTION, SOLUTION INTRAVENOUS; SUBCUTANEOUS at 07:46

## 2018-07-27 RX ADMIN — OXYCODONE HYDROCHLORIDE 5 MG: 5 TABLET ORAL at 21:34

## 2018-07-27 RX ADMIN — FINASTERIDE 5 MG: 5 TABLET, FILM COATED ORAL at 09:01

## 2018-07-27 RX ADMIN — INSULIN LISPRO 1 UNITS: 100 INJECTION, SOLUTION INTRAVENOUS; SUBCUTANEOUS at 21:35

## 2018-07-27 RX ADMIN — ACETAMINOPHEN 975 MG: 325 TABLET ORAL at 21:35

## 2018-07-27 RX ADMIN — INSULIN LISPRO 2 UNITS: 100 INJECTION, SOLUTION INTRAVENOUS; SUBCUTANEOUS at 17:02

## 2018-07-27 RX ADMIN — MELATONIN 3 MG: at 21:35

## 2018-07-27 RX ADMIN — DOCUSATE SODIUM 100 MG: 100 CAPSULE, LIQUID FILLED ORAL at 17:01

## 2018-07-27 RX ADMIN — APIXABAN 2.5 MG: 2.5 TABLET, FILM COATED ORAL at 09:01

## 2018-07-27 NOTE — PHYSICIAN ADVISOR
Current patient class: Inpatient  The patient is currently on Hospital Day: 2 at [address] [place] [address]        The patient was admitted to the hospital  on 7/26/18 at [id number] for the following diagnosis:  Back pain [M54 9]  Kidney stones [N20 0]  Flank pain [R10 9]  DEANA (acute kidney injury) ([address] ) [N17 9]  Right ureteral stone [N20 1]  Encounter for removal of ureteral stent [Z46 6]       There is documentation in the medical record of an expected length of stay of at least 2 midnights  The patient is therefore expected to satisfy the 2 midnight benchmark and given the 2 midnight presumption is appropriate for INPATIENT ADMISSION  Given this expectation of a satisfying stay, CMS instructs us that the patient is most often appropriate for inpatient admission under part A provided medical necessity is documented in the chart  After review of the relevant documentation, labs, vital signs and test results, the patient is appropriate for INPATIENT ADMISSION  Admission to the hospital as an inpatient is a complex decision making process which requires the practitioner to consider the patients presenting complaint, history and physical examination and all relevant testing  With this in mind, in this case, the patient was deemed appropriate for INPATIENT ADMISSION  After review of the documentation and testing available at the time of the admission I concur with this clinical determination of medical necessity  The patient does have an inpatient admission within the previous 30 days  The patient was admitted on 7/4/18 and discharged on 7/6/18 as an inpatient  The patient therefore required readmission review  In this case the patient should be considered a SEPARATE and UNRELATED INPATIENT ADMISSION  The patient had been discharged in stable condition with a completed care plan   There were no unresolved acute medical issues at the time of discharged which would have reasonably been expected to prompt this readmission  Rationale is as follows: The patient is a 80 yrs   Male who presented to the ED at 7/26/2018  9:44 AM with a chief complaint of Flank Pain (Pt presents s/p stent placement and known R sided kidney stone  Pt scheduled to have it removed Tuesday  Pt c/o R sided flank pain that raidates to his entire back  Comes [facility] ) and Back Pain     The patient had a prior admission from 7/4-7/6 for chest pain and urinary retention with hematuria  The patient presented with right flank pain  The patient is being admitted with right sided flank pain and DEANA  The plan of care includes urine culture, IV abx, Urology consultation,hold ACE inhibitor and repeat labs  This patient is appropriate for INPATIENT admission as his length of stay is expected to be at least 2 midnights  This admission is UNRELATED to his prior admission as he was treated and discharged in stable condition      The patients vitals on arrival were ED Triage Vitals   Temperature Pulse Respirations Blood Pressure SpO2   07/26/18 0938 07/26/18 0938 07/26/18 0938 07/26/18 0938 07/26/18 0938   98 2 °F (36 8 °C) 77 18 102/57 95 %      Temp Source Heart Rate Source Patient Position - Orthostatic VS BP Location FiO2 (%)   07/26/18 0938 07/26/18 1017 07/26/18 1040 07/26/18 1017 --   Oral Monitor Lying Right arm       Pain Score       07/26/18 0938       9           Past Medical History:   Diagnosis Date    [facility]     Anemia     Bladder cancer [facility]     CAD (coronary artery disease)     CHF (congestive heart failure) (HCC)     Chronic kidney failure     [facility]     Eye cancer, left [facility] )     Hypertension     Incontinence     Pacemaker     Prostate cancer ([facility] )     Type 2 diabetes mellitus ([facility] )     Wears hearing aid in both ears      Past Surgical History:   Procedure Laterality Date    CARDIAC PACEMAKER PLACEMENT  2014    CORONARY ANGIOPLASTY WITH STENT PLACEMENT      GALLBLADDER SURGERY      AL CYSTOURETHROSCOPY,URETER CATHETER Right 7/1/2018    Procedure: CYSTOSCOPY RETROGRADE PYELOGRAM WITH INSERTION STENT URETERAL;  Surgeon: Pat Christian MD;  Location: [facility];  Service: Urology           Consults have been placed to:   [address]:    07/26/18 1830 07/26/18 2333 07/27/18 0708 07/27/18 1513   BP: 103/54 101/53 95/52 108/50   BP Location:  Right arm Right arm Right arm   Pulse: 69 70 79 70   Resp:  18 20 21   Temp:  98 6 °F (37 °C) 98 3 °F (36 8 °C) 98 6 °F (37 °C)   TempSrc:  Oral Oral Oral   SpO2:  93% 93% 94%   Weight:       Height:           Most recent labs:    Recent Labs      07/26/18   1018  07/27/18   0524   WBC  4 05*  5 69   HGB  8 2*  8 5*   HCT  27 2*  28 1*   PLT  224  238   K  3 6  3 8   NA  130*  131*   CALCIUM  8 1*  8 7   BUN  31*  36*   CREATININE  1 85*  1 87*   LIPASE  130   --    AST  17   --    ALT  15   --    ALKPHOS  91   --    BILITOT  0 39   --        Scheduled Meds:  Current Facility-Administered Medications:  acetaminophen 650 mg Oral Q6H PRN Ceci Balderas MD    acetaminophen 975 mg Oral Q8H [facility] Dragan Cantu PA-C    albuterol 2 5 mg Nebulization Q6H PRN Ceci Balderas MD    apixaban 2 5 mg Oral BID Ceci Balderas MD    artificial tear  Both Eyes HS Ceci Balderas MD    aspirin 81 mg Oral Daily Ceci Balderas MD    carvedilol 3 125 mg Oral BID With Meals Lane Pineda MD    cefTRIAXone 1,000 mg Intravenous Q24H Dragan Cantu PA-C Last Rate: 1,000 mg (07/27/18 1704)   docusate sodium 100 mg Oral BID Dragan Cantu PA-C    finasteride 5 mg Oral Daily Ceci Balderas MD    fish oil 1,000 mg Oral Daily Ceci Balderas MD    gabapentin 100 mg Oral BID Ceci Balderas MD    gabapentin 300 mg Oral HS Ceci Balderas MD    HYDROmorphone 0 2 mg Intravenous Q6H PRN Dragan Cantu PA-C    insulin glargine 5 Units Subcutaneous HS Dragan Cantu PA-C    insulin lispro 1-5 Units Subcutaneous HS Ceci Balderas MD    insulin lispro 1-6 Units Subcutaneous TID AC Ceci Balderas MD    lidocaine 1 patch Transdermal Daily Darleen Gallegos PA-C    melatonin 3 mg Oral HS Ceci Balderas MD    nitroglycerin 0 4 mg Sublingual Q5 Min PRN eCci Balderas MD    ondansetron 4 mg Intravenous Q6H PRN Tarik Mcclellan MD    oxyCODONE 2 5 mg Oral Q4H PRN Darleen Gallegos PA-C    oxyCODONE 5 mg Oral Q4H PRN Darleen Gallegos PA-C    potassium chloride 20 mEq Oral BID Tarik Mcclellan MD    senna 1 tablet Oral HS Darleen Gallegos PA-C    tamsulosin 0 4 mg Oral Daily With Stan Deal MD      Continuous Infusions:   PRN Meds:   acetaminophen    albuterol    HYDROmorphone    nitroglycerin    ondansetron    oxyCODONE    oxyCODONE    Surgical procedures (if appropriate):

## 2018-07-27 NOTE — PROGRESS NOTES
Progress Note - Sam Herrera 12/21/1930, 80 y o  male MRN: [id number]    Unit/Bed#: -01 Encounter: [id number]    Primary Care Provider: Alex Watkins MD   Date and time admitted to hospital: 7/26/2018  9:44 AM        * Right flank pain   Assessment & Plan    · Likely 2/2 urolithiasis & ureteric stent +/- pyelo  · CT abdomen showing persistent & stable 8 mm right ureteric stone, intact stent  · Urology following- ureteroscopy tentatively scheduled for 7/31 although needs UTI treated prior to procedure  · Patient with significant pain- continue pain control        Urinary tract infection associated with indwelling urethral catheter ([address] )   Assessment & Plan    · Does have flank pain so consider pyelo (although no evidence of this on imaging) vs cystitis  Right flank pain may just be due to stone  · Urine culture growing gram negative rods- infection vs colonization  Note no fever or white count  · Will treat with Rocephin given plans for ureteroscopy  · F/U cultures        Right ureteral stone   Assessment & Plan    · Earlier this month he was admitted with pain found to have her right-sided 8 millimeter ureteric stone without hydronephrosis but some surrounding stranding, underwent cystoscopy with a right-sided stent placement  Discharge with oral antibiotics for total 2 weeks  Found to have urinary retention at that time status post Marte insertion  · Followed up with Urology as outpatient and was scheduled for stone lithotripsy and stent exchange 1st week of August, but due to his persistent pain they contacted Urology office and it was moved up to 07/31/2018        DEANA (acute kidney injury) [facility])   Assessment & Plan    · Could be secondary to the persistent stone/UTI plus component of increased diuretics by Cardiology due to weight gain  · Baseline creatinine 1 5    Today 1 8   · Recent increase in diuretics to 80 mg torsemide BID for a few days due to weight gain- will hold for now  · Hold ACEI  · No hydronephrosis noted on imaging        Urinary retention   Assessment & Plan    · Marte in place        Type 2 diabetes mellitus [facility])   Assessment & Plan    Lab Results   Component Value Date    HGBA1C 7 4 (H) 2018       Recent Labs      18   1618  18   2107  18   0633  18   1040   POCGLU  142*  265*  181*  233*       Blood Sugar Average: Last 72 hrs:  (P) 205 25   Hold empagliflozin  ISS  Blood sugars elevated- add low dose Lantus at night        Paroxysmal atrial fibrillation (HCC)   Assessment & Plan    · Continue Coreg and Eliquis        Chronic diastolic congestive heart failure (HCC)   Assessment & Plan    · Recent echo last month with EF of 60 percent  · Due to weight gain his diuretics were increased from 40 milligrams to 80 milligrams of torsemide twice a day for 3 days  However given the DEANA will hold for now  · Monitor I&Os, daily weights          VTE Pharmacologic Prophylaxis:   Pharmacologic: Apixaban (Eliquis)  Mechanical VTE Prophylaxis in Place: Yes    Patient Centered Rounds: I have performed bedside rounds with nursing staff today  Discussions with Specialists or Other Care Team Provider:      Education and Discussions with Family / Patient: Patient  Updated daughter, Namrata Gomez, by phone  Time Spent for Care: 30 minutes  More than 50% of total time spent on counseling and coordination of care as described above  Current Length of Stay: 1 day(s)    Current Patient Status: Inpatient   Certification Statement: The patient will continue to require additional inpatient hospital stay due to UTI, uncontrolled pain, DEANA, ureteral stone    Discharge Plan: Likely cannot discharge until after procedure given his uncontrolled pain    Code Status: Level 1 - Full Code      Subjective:   Reports ongoing severe right flank pain  Unable to move around secondary to pain   Also reports constipation    Objective:     Vitals:   Temp (24hrs), Av 5 °F (36 9 °C), Min:98 3 °F (36 8 °C), Max:98 6 °F (37 °C)    HR:  [69-79] 70  Resp:  [18-21] 21  BP: ()/(50-57) 108/50  SpO2:  [93 %-100 %] 94 %  Body mass index is 27 88 kg/m²  Input and Output Summary (last 24 hours): Intake/Output Summary (Last 24 hours) at 07/27/18 1536  Last data filed at 07/27/18 1230   Gross per 24 hour   Intake              840 ml   Output             1100 ml   Net             -260 ml       Physical Exam:     Physical Exam   Constitutional: No distress  HENT:   Head: Normocephalic and atraumatic  Extremely hard of hearing   Eyes: No scleral icterus  Neck: Normal range of motion  Neck supple  Cardiovascular: Normal rate, regular rhythm and normal heart sounds  Pulmonary/Chest: Effort normal and breath sounds normal  No respiratory distress  He has no wheezes  He has no rales  Abdominal:   +RLQ pain   Musculoskeletal: He exhibits edema  Patient cannot even tolerate slowly lifting the head of the bed up for me to examine his back  Yelling in pain   Neurological: He is alert  Skin: Skin is warm and dry  He is not diaphoretic  Psychiatric: He has a normal mood and affect  His behavior is normal        Additional Data:     Labs:      Results from last 7 days  Lab Units 07/27/18  0524   WBC Thousand/uL 5 69   HEMOGLOBIN g/dL 8 5*   HEMATOCRIT % 28 1*   PLATELETS Thousands/uL 238   NEUTROS PCT % 69   LYMPHS PCT % 11*   MONOS PCT % 15*   EOS PCT % 4       Results from last 7 days  Lab Units 07/27/18  0524 07/26/18  1018   SODIUM mmol/L 131* 130*   POTASSIUM mmol/L 3 8 3 6   CHLORIDE mmol/L 93* 94*   CO2 mmol/L 31 31   BUN mg/dL 36* 31*   CREATININE mg/dL 1 87* 1 85*   CALCIUM mg/dL 8 7 8 1*   TOTAL PROTEIN g/dL  --  6 5   BILIRUBIN TOTAL mg/dL  --  0 39   ALK PHOS U/L  --  91   ALT U/L  --  15   AST U/L  --  17   GLUCOSE RANDOM mg/dL 169* 153*           * I Have Reviewed All Lab Data Listed Above  * Additional Pertinent Lab Tests Reviewed:  All Labs Within Last 24 Hours Reviewed    Imaging:    Imaging Reports Reviewed Today Include: CT a/p: ureteral stone  Imaging Personally Reviewed by Myself Includes:  None    Recent Cultures (last 7 days):       Results from last 7 days  Lab Units 07/26/18  1511   URINE CULTURE  >100,000 cfu/ml Gram Negative Winston Enteric Like*       Last 24 Hours Medication List:     Current Facility-Administered Medications:  acetaminophen 650 mg Oral Q6H PRN Ceci Balderas MD   acetaminophen 650 mg Oral Q6H PRN Ceci Balderas MD   albuterol 2 5 mg Nebulization Q6H PRN Jimmy Collins MD   apixaban 2 5 mg Oral BID Jimmy Collins MD   artificial tear  Both Eyes HS Jimmy Collins MD   aspirin 81 mg Oral Daily Jimmy Collins MD   carvedilol 3 125 mg Oral BID With Meals Ceci Balderas MD   cefTRIAXone 1,000 mg Intravenous Q24H Jacob Payne PA-C   finasteride 5 mg Oral Daily Ceci Balderas MD   fish oil 1,000 mg Oral Daily Jimmy Collins MD   gabapentin 100 mg Oral BID Jimmy Collins MD   gabapentin 300 mg Oral HS Ceci Balderas MD   HYDROmorphone 0 2 mg Intravenous Q6H PRN Jimmy Collins MD   insulin glargine 5 Units Subcutaneous HS Jacob Payne PA-C   insulin lispro 1-5 Units Subcutaneous HS Jimmy Collins MD   insulin lispro 1-6 Units Subcutaneous TID AC Jimmy Collins MD   melatonin 3 mg Oral HS Ceci Balderas MD   nitroglycerin 0 4 mg Sublingual Q5 Min PRN Ceci Balderas MD   ondansetron 4 mg Intravenous Q6H PRN Ceci Balderas MD   oxyCODONE 5 mg Oral Q4H PRN Jimmy Collins MD   potassium chloride 20 mEq Oral BID Jimmy Collins MD   tamsulosin 0 4 mg Oral Daily With Dante Chand MD   torsemide 40 mg Oral BID (diuretic) Jimmy Collins MD   traMADol 50 mg Oral Q6H PRN Jimmy Collins MD        Today, Patient Was Seen By: Lewes Chatters, PA-C    ** Please Note: Dragon 360 Dictation voice to text software may have been used in the creation of this document   **

## 2018-07-27 NOTE — ASSESSMENT & PLAN NOTE
· Does have flank pain so consider pyelo (although no evidence of this on imaging) vs cystitis  Right flank pain may just be due to stone  · Urine culture growing gram negative rods- infection vs colonization   Note no fever or white count  · Will treat with Rocephin given plans for ureteroscopy  · F/U cultures

## 2018-07-27 NOTE — ASSESSMENT & PLAN NOTE
· Likely 2/2 urolithiasis & ureteric stent +/- pyelo  · CT abdomen showing persistent & stable 8 mm right ureteric stone, intact stent  · Urology following- ureteroscopy tentatively scheduled for 7/31 although needs UTI treated prior to procedure  · Patient with significant pain- continue pain control

## 2018-07-27 NOTE — ASSESSMENT & PLAN NOTE
· Recent echo last month with EF of 60 percent  · Due to weight gain his diuretics were increased from 40 milligrams to 80 milligrams of torsemide twice a day for 3 days   However given the DEANA will hold for now  · Monitor I&Os, daily weights

## 2018-07-27 NOTE — ASSESSMENT & PLAN NOTE
Lab Results   Component Value Date    HGBA1C 7 4 (H) 05/23/2018       Recent Labs      07/26/18   1618  07/26/18   2107  07/27/18   0633  07/27/18   1040   POCGLU  142*  265*  181*  233*       Blood Sugar Average: Last 72 hrs:  (P) 205 25   Hold empagliflozin   ISS  Blood sugars elevated- add low dose Lantus at night

## 2018-07-27 NOTE — CASE MANAGEMENT
Initial Clinical Review    Admission: Date/Time/Statement: 7/26/18 @ 1208  Orders Placed This Encounter   Procedures    Inpatient Admission (expected length of stay for this patient is greater than two midnights)     Standing Status:   Standing     Number of Occurrences:   1     Order Specific Question:   Admitting Physician     Answer:   Betito Irvin [[id number]]     Order Specific Question:   Level of Care     Answer:   Med Surg [[id number]]     Order Specific Question:   Estimated length of stay     Answer:   More than 2 Midnights     Order Specific Question:   Certification     Answer:   I certify that inpatient services are medically necessary for this patient for a duration of greater than two midnights  See H&P and MD Progress Notes for additional information about the patient's course of treatment  ED: Date/Time/Mode of Arrival:   ED Arrival Information     Expected Arrival Acuity Means of Arrival Escorted By Service Admission Type    - 7/26/2018 09:35 Urgent Ambulance [facility] General Medicine Urgent    Arrival Complaint    Kidney Stones          Chief Complaint:   Chief Complaint   Patient presents with    Flank Pain     Pt presents s/p stent placement and known R sided kidney stone  Pt scheduled to have it removed Tuesday  Pt c/o R sided flank pain that raidates to his entire back  Comes from [facility]   Back Pain       History of Illness:   Nicolle Morin is a 80 y o  male who presents with worsening right-sided flank pain  Patient was admitted here in May 2018 treated for CHF exacerbation as well as atrial fibrillation  Subsequently was admitted again on 07/01/2018 with right lower quadrant pain dysuria found to have 8 millimeter right ureteric stone, UTI, treated with cystoscopy and right-sided stent placement as well as antibiotics for total 2 weeks treatment  Also found a urinary retention at that time and discharged with Marte catheter    Subsequently he came back to the hospital next day due to chest pain and lower abdominal pain  Evaluated by Cardiology, pain thought to be atypical hence no further workup was recommended  And he was discharged home and  He was as outpatient scheduled to get his stent removed in the 1st week of August   However as per review of records, patient has been having persistent right-sided flank pain, [facility] has been in contact with Urology office, the pre bone his stent removal to 7/30 1/18, however in the meantime due to persistent severe pain, they recommended him to come to the emergency room  Patient says that he has severe right-sided flank pain, does not radiate to his abdomen, any kind of movement worsens it       ED Vital Signs:   ED Triage Vitals   Temperature Pulse Respirations Blood Pressure SpO2   07/26/18 0938 07/26/18 0938 07/26/18 0938 07/26/18 0938 07/26/18 0938   98 2 °F (36 8 °C) 77 18 102/57 95 %      Temp Source Heart Rate Source Patient Position - Orthostatic VS BP Location FiO2 (%)   07/26/18 0938 07/26/18 1017 07/26/18 1040 07/26/18 1017 --   Oral Monitor Lying Right arm       Pain Score       07/26/18 0938       9        Wt Readings from Last 1 Encounters:   07/26/18 88 1 kg (194 lb 4 8 oz)       Abnormal Labs/Diagnostic Test Results:   WBC Thousand/uL 4 05*   HEMOGLOBIN g/dL 8 2*   HEMATOCRIT % 27 2*   PLATELETS Thousands/uL 224     SODIUM mmol/L 130*   POTASSIUM mmol/L 3 6   CHLORIDE mmol/L 94*   CO2 mmol/L 31   BUN mg/dL 31*   CREATININE mg/dL 1 85*   CALCIUM mg/dL 8 1*   TOTAL PROTEIN g/dL 6 5   BILIRUBIN TOTAL mg/dL 0 39   ALK PHOS U/L 91   ALT U/L 15   AST U/L 17   GLUCOSE RANDOM mg/dL 153*           ED Treatment:   Medication Administration from 07/26/2018 0935 to 07/26/2018 1554       Date/Time Order Dose Route Action Action by Comments     07/26/2018 1037 HYDROmorphone (DILAUDID) injection 1 mg 1 mg Intravenous Given Demond Tate RN      07/26/2018 1448 sodium chloride 0 9 % bolus 1,000 mL 0 mL Intravenous Stopped Efrain Cadena RN      07/26/2018 1149 sodium chloride 0 9 % bolus 1,000 mL 1,000 mL Intravenous [address] Kike Braun RN      07/26/2018 1508 lidocaine (URO-JET) 2 % topical gel   Topical Given Efrain Cadena RN           Past Medical/Surgical History:    Active Ambulatory Problems     Diagnosis Date Noted    Lower urinary tract symptoms 05/16/2018    Malignant neoplasm of trigone of urinary bladder ([facility] ) 05/16/2018    Prostate cancer ([place] [address] ) 05/16/2018    Chronic diastolic congestive heart failure ([facility] ) 05/22/2018    CKD (chronic kidney disease) stage 3, GFR 30-59 ml/min 05/22/2018    Paroxysmal atrial fibrillation ([facility] ) 05/22/2018    CAD (coronary artery disease) 05/22/2018    Type 2 diabetes mellitus ([place] [address] ) 05/22/2018    Cardiac pacemaker in situ 05/22/2018    Right ureteral stone 07/01/2018    Urinary tract infection with hematuria 07/01/2018    Hyponatremia 07/02/2018    Other chest pain 07/04/2018    Laceration of right lower extremity 07/04/2018    Anemia 03/19/2018    Benign prostatic hyperplasia 07/10/2018    Blindness of left eye 03/20/2018    Kidney stones 07/10/2018    Peripheral neuropathy 03/19/2018    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 (LAD) artery 07/11/2018    Ureter, calculus 07/13/2018    Urinary retention 07/17/2018     Resolved Ambulatory Problems     Diagnosis Date Noted    Diarrhea 07/04/2018     Past Medical History:   Diagnosis Date    A-fib ([place] [address] )     Anemia     Bladder cancer ([place] [address] )     CAD (coronary artery disease)     CHF (congestive heart failure) (HCC)     Chronic kidney failure     Colon cancer ([facility] )     Eye cancer, left ([place] [address] )     Hypertension     Incontinence     Pacemaker     Prostate cancer ([facility] )     Type 2 diabetes mellitus ([facility] )     Wears hearing aid in both ears        Admitting Diagnosis: Back pain [M54 9]  Kidney stones [N20 0]  Flank pain [R10 9]  DEANA (acute kidney injury) ([facility] ) [N17 9]  Right ureteral stone [N20 1]  Encounter for removal of ureteral stent [Z46 6]    Age/Sex: 80 y o  male    Assessment/Plan:   Principal Problem:    Right flank pain  Active Problems:    DEANA (acute kidney injury) ([facility] )    Chronic diastolic congestive heart failure (HCC)    CKD (chronic kidney disease) stage 3, GFR 30-59 ml/min    Paroxysmal atrial fibrillation (HCC)    CAD (coronary artery disease)    Type 2 diabetes mellitus ([facility] )    Right ureteral stone    Kidney stones    Urinary retention    * Right flank pain   Assessment & Plan     · Likely 2/2 urolithiasis & ureteric stent +/- UTI  · CT abdomen showing persistent & stable 8 mm right ureteric stone, intact stent  · Urology consulted - f/u rec  They preponed his stent removal to 7/31/18 as outpatient before admission due to pain  · UA positive with LE, nitrities  · F/u urine cultures, previously grew E coli earlier this month  Status post treatment for 2 weeks at that time  Given his persistent pain and stone, will place him on IV Ancef and follow up Urology recommendations  · Pain controlled with oral pain medications and IV for breakthrough pain          DEANA (acute kidney injury) ([facility] )   Assessment & Plan     · Could be secondary to the persistent stone/UTI plus component of increased diuretics by Cardiology due to weight gain  · Baseline creatinine 1 5  Today 1 8   · As per records, as recommended by Cardiology to increase his torsemide from 40 milligrams to 80 milligrams b i d  for 3 days which is supposed to be till tomorrow and then cut it back to his previous dose  At present he appears euvolemic with some basal crackles  At this time will cultures torsemide back to 40 milligrams b i d     Will hold enalapril 2 5 milligrams as well    Reassess his renal function tomorrow morning  · Follow up Urology recommendations in regards to stone removal and stent removal  · Marte catheter in place for urinary retention which has been changed today as well  · RX UTI  · Repeat in AM          Urinary retention   Assessment & Plan     · Discharge with Marte due to urinary retention earlier this month  Followed with Urology as outpatient who recommended to continue the Marte catheter until the stone is taken care of          Right ureteral stone   Assessment & Plan     · Earlier this month he was admitted with pain found to have her right-sided 8 millimeter ureteric stone without hydronephrosis but some surrounding stranding, underwent cystoscopy with a right-sided stent placement  Discharge with oral antibiotics for total 2 weeks  Found to have urinary retention at that time status post Marte insertion  · Follow up with Urology as outpatient and was scheduled for stone lithotripsy and stent exchange 1st week of August, but at [facility] due to his persistent pain they contacted Urology office and the report it to 07/31/2018          Type 2 diabetes mellitus ([facility] )   Assessment & Plan             Lab Results   Component Value Date     HGBA1C 7 4 (H) 05/23/2018         No results for input(s): POCGLU in the last 72 hours      Blood Sugar Average: Last 72 hrs:     Hold empagliflozin  ISS          CAD (coronary artery disease)   Assessment & Plan     · History of PCI to LAD  Continue aspirin, Coreg  Not on statin  · Follows with Dr David Gamez          Paroxysmal atrial fibrillation [facility])   Assessment & Plan     · Continue Coreg and Eliquis          CKD (chronic kidney disease) stage 3, GFR 30-59 ml/min   Assessment & Plan     · Baseline creatinine 1 5  Today 1 8          Chronic diastolic congestive heart failure (HCC)   Assessment & Plan     · Recent echo last month with EF of 60 percent  · Due to weight gain at [facility] his diuretics were increased from 40 milligrams to 80 milligrams of torsemide twice a day for 3 days which is supposed to be till tomorrow    However given the DEANA will cardiac back down to 40 milligrams b i d   · Currently he appears stable, no pedal edema, has some basal crackles   VTE Prophylaxis: Apixaban (Eliquis)  / sequential compression device      Anticipated Length of Stay:  Patient will be admitted on an Inpatient basis with an anticipated length of stay of  > 2 midnights     Justification for Hospital Stay: DEANA, flank pain, stent       Admission Orders:  Scheduled Meds:   Current Facility-Administered Medications:  acetaminophen 650 mg Oral Q6H PRN Ceci Balderas MD   acetaminophen 650 mg Oral Q6H PRN Ceci Balderas MD   albuterol 2 5 mg Nebulization Q6H PRN Mukund Bhatti MD   apixaban 2 5 mg Oral BID Mukund Bhatti MD   artificial tear  Both Eyes HS Mukund Bhatti MD   aspirin 81 mg Oral Daily Mukund Bhatti MD   carvedilol 3 125 mg Oral BID With Meals Ceci Balderas MD   finasteride 5 mg Oral Daily Mukund Bhatti MD   fish oil 1,000 mg Oral Daily Mukund Bhatti MD   gabapentin 100 mg Oral BID Mukund Bhatti MD   gabapentin 300 mg Oral HS Ceci Balderas MD   HYDROmorphone 0 2 mg Intravenous Q6H PRN Mukund Bhatti MD   insulin lispro 1-5 Units Subcutaneous HS Mukund Bhatti MD   insulin lispro 1-6 Units Subcutaneous TID AC Mukund Bhatti MD   melatonin 3 mg Oral HS Ceci Balderas MD   nitroglycerin 0 4 mg Sublingual Q5 Min PRN Ceci Balderas MD   ondansetron 4 mg Intravenous Q6H PRN Ceci Balderas MD   oxyCODONE 5 mg Oral Q4H PRN Mukund Bhatti MD   potassium chloride 20 mEq Oral BID Ceci Balderas MD   tamsulosin 0 4 mg Oral Daily With Dom Callahan MD   torsemide 40 mg Oral BID (diuretic) Ceci Balderas MD   traMADol 50 mg Oral Q6H PRN Mukund Bhatti MD     Consult PT

## 2018-07-27 NOTE — ASSESSMENT & PLAN NOTE
· Earlier this month he was admitted with pain found to have her right-sided 8 millimeter ureteric stone without hydronephrosis but some surrounding stranding, underwent cystoscopy with a right-sided stent placement  Discharge with oral antibiotics for total 2 weeks    Found to have urinary retention at that time status post Marte insertion  · Followed up with Urology as outpatient and was scheduled for stone lithotripsy and stent exchange 1st week of August, but due to his persistent pain they contacted Urology office and it was moved up to 07/31/2018

## 2018-07-27 NOTE — PLAN OF CARE
Problem: DISCHARGE PLANNING - CARE MANAGEMENT  Goal: Discharge to post-acute care or home with appropriate resources  INTERVENTIONS:  - Conduct assessment to determine patient/family and health care team treatment goals, and need for post-acute services based on payer coverage, community resources, and patient preferences, and barriers to discharge  - Address psychosocial, clinical, and financial barriers to discharge as identified in assessment in conjunction with the patient/family and health care team  - Arrange appropriate level of post-acute services according to patient's   needs and preference and payer coverage in collaboration with the physician and health care team  - Communicate with and update the patient/family, physician, and health care team regarding progress on the discharge plan  - Arrange appropriate transportation to post-acute venues  Pt will go back to [facility] when medically clear for discharge  Outcome: Progressing

## 2018-07-27 NOTE — PHYSICAL THERAPY NOTE
Physical Therapy Note    Orders received and chart reviewed  Attempted to see pt for PT eval as ordered, but pt refusing to participate with any activity until he has his procedure  Please re-consult physical therapy with new orders when pt appropriate and willing to participate

## 2018-07-27 NOTE — ASSESSMENT & PLAN NOTE
· Could be secondary to the persistent stone/UTI plus component of increased diuretics by Cardiology due to weight gain  · Baseline creatinine 1 5    Today 1 8   · Recent increase in diuretics to 80 mg torsemide BID for a few days due to weight gain- will hold for now  · Hold ACEI  · No hydronephrosis noted on imaging

## 2018-07-27 NOTE — SOCIAL WORK
Pt is a <30 days readmission  Pt's last admission was on 7/4-7/6 d/t chest pain  Pt was admitted this time d/t R flank pain  Pt is a bundle  CM met pt at bedside and made aware of CM role at dc  Pt reported that he has a LW and POA  Pt reported that her DTR Salomón Schmid is his POA  Pt reported that he is from [facility]  Pt reported that he is a minimum assist with ADL's, uses a WC for ambulation and can self propel  Pt stated that the dcp is for him to return to [facility]  [facility] or pt's family managed pt's transportation  PCP is Dr Gavin Linder  Ph[facility]  Pt/caregiver received dc checklist  Content reviewed  Pt/caregiver encouraged to participate in dcp of care prior to dc home  CM reviewed d/c planning process including the following: identifying help at home, patient preference for d/c planning needs, Discharge Lounge, [facility] Meds to Bed program, availability of treatment team to discuss questions or concerns patient and/or family may have regarding understanding medications and recognizing signs and symptoms once discharged  CM also encouraged patient to follow up with all recommended appointments after discharge  Patient advised of importance for patient and family to participate in managing patients medical well being

## 2018-07-28 PROBLEM — N18.30 ACUTE RENAL FAILURE SUPERIMPOSED ON STAGE 3 CHRONIC KIDNEY DISEASE (HCC): Chronic | Status: ACTIVE | Noted: 2018-07-26

## 2018-07-28 PROBLEM — N17.9 ACUTE RENAL FAILURE SUPERIMPOSED ON STAGE 3 CHRONIC KIDNEY DISEASE (HCC): Chronic | Status: ACTIVE | Noted: 2018-07-26

## 2018-07-28 PROBLEM — E11.65 TYPE 2 DIABETES MELLITUS WITH HYPERGLYCEMIA, WITH LONG-TERM CURRENT USE OF INSULIN (HCC): Chronic | Status: ACTIVE | Noted: 2018-05-22

## 2018-07-28 PROBLEM — Z79.4 TYPE 2 DIABETES MELLITUS WITH HYPERGLYCEMIA, WITH LONG-TERM CURRENT USE OF INSULIN (HCC): Chronic | Status: ACTIVE | Noted: 2018-05-22

## 2018-07-28 LAB
ANION GAP SERPL CALCULATED.3IONS-SCNC: 6 MMOL/L (ref 4–13)
BASOPHILS # BLD AUTO: 0.03 THOUSANDS/ΜL (ref 0–0.1)
BASOPHILS NFR BLD AUTO: 1 % (ref 0–1)
BUN SERPL-MCNC: 37 MG/DL (ref 5–25)
CALCIUM SERPL-MCNC: 8.4 MG/DL (ref 8.3–10.1)
CHLORIDE SERPL-SCNC: 97 MMOL/L (ref 100–108)
CO2 SERPL-SCNC: 29 MMOL/L (ref 21–32)
CREAT SERPL-MCNC: 1.65 MG/DL (ref 0.6–1.3)
EOSINOPHIL # BLD AUTO: 0.21 THOUSAND/ΜL (ref 0–0.61)
EOSINOPHIL NFR BLD AUTO: 5 % (ref 0–6)
ERYTHROCYTE [DISTWIDTH] IN BLOOD BY AUTOMATED COUNT: 15.4 % (ref 11.6–15.1)
GFR SERPL CREATININE-BSD FRML MDRD: 37 ML/MIN/1.73SQ M
GLUCOSE SERPL-MCNC: 198 MG/DL (ref 65–140)
GLUCOSE SERPL-MCNC: 219 MG/DL (ref 65–140)
GLUCOSE SERPL-MCNC: 254 MG/DL (ref 65–140)
GLUCOSE SERPL-MCNC: 271 MG/DL (ref 65–140)
GLUCOSE SERPL-MCNC: 279 MG/DL (ref 65–140)
HCT VFR BLD AUTO: 26.7 % (ref 36.5–49.3)
HGB BLD-MCNC: 8.2 G/DL (ref 12–17)
IMM GRANULOCYTES # BLD AUTO: 0.01 THOUSAND/UL (ref 0–0.2)
IMM GRANULOCYTES NFR BLD AUTO: 0 % (ref 0–2)
LYMPHOCYTES # BLD AUTO: 0.58 THOUSANDS/ΜL (ref 0.6–4.47)
LYMPHOCYTES NFR BLD AUTO: 12 % (ref 14–44)
MCH RBC QN AUTO: 27.2 PG (ref 26.8–34.3)
MCHC RBC AUTO-ENTMCNC: 30.7 G/DL (ref 31.4–37.4)
MCV RBC AUTO: 89 FL (ref 82–98)
MONOCYTES # BLD AUTO: 0.54 THOUSAND/ΜL (ref 0.17–1.22)
MONOCYTES NFR BLD AUTO: 12 % (ref 4–12)
NEUTROPHILS # BLD AUTO: 3.33 THOUSANDS/ΜL (ref 1.85–7.62)
NEUTS SEG NFR BLD AUTO: 70 % (ref 43–75)
NRBC BLD AUTO-RTO: 0 /100 WBCS
PLATELET # BLD AUTO: 215 THOUSANDS/UL (ref 149–390)
PMV BLD AUTO: 11 FL (ref 8.9–12.7)
POTASSIUM SERPL-SCNC: 3.7 MMOL/L (ref 3.5–5.3)
RBC # BLD AUTO: 3.01 MILLION/UL (ref 3.88–5.62)
SODIUM SERPL-SCNC: 132 MMOL/L (ref 136–145)
WBC # BLD AUTO: 4.7 THOUSAND/UL (ref 4.31–10.16)

## 2018-07-28 PROCEDURE — 99232 SBSQ HOSP IP/OBS MODERATE 35: CPT | Performed by: PHYSICIAN ASSISTANT

## 2018-07-28 PROCEDURE — 80048 BASIC METABOLIC PNL TOTAL CA: CPT | Performed by: PHYSICIAN ASSISTANT

## 2018-07-28 PROCEDURE — 82948 REAGENT STRIP/BLOOD GLUCOSE: CPT

## 2018-07-28 PROCEDURE — 85025 COMPLETE CBC W/AUTO DIFF WBC: CPT | Performed by: PHYSICIAN ASSISTANT

## 2018-07-28 RX ORDER — TORSEMIDE 20 MG/1
40 TABLET ORAL 2 TIMES DAILY
Status: DISCONTINUED | OUTPATIENT
Start: 2018-07-29 | End: 2018-08-03 | Stop reason: HOSPADM

## 2018-07-28 RX ORDER — LISINOPRIL 5 MG/1
5 TABLET ORAL DAILY
Status: DISCONTINUED | OUTPATIENT
Start: 2018-07-29 | End: 2018-08-03 | Stop reason: HOSPADM

## 2018-07-28 RX ORDER — NYSTATIN 100000 [USP'U]/G
POWDER TOPICAL 2 TIMES DAILY
Status: DISCONTINUED | OUTPATIENT
Start: 2018-07-28 | End: 2018-08-03 | Stop reason: HOSPADM

## 2018-07-28 RX ADMIN — TAMSULOSIN HYDROCHLORIDE 0.4 MG: 0.4 CAPSULE ORAL at 15:36

## 2018-07-28 RX ADMIN — INSULIN LISPRO 4 UNITS: 100 INJECTION, SOLUTION INTRAVENOUS; SUBCUTANEOUS at 08:35

## 2018-07-28 RX ADMIN — CARVEDILOL 3.12 MG: 3.12 TABLET, FILM COATED ORAL at 15:36

## 2018-07-28 RX ADMIN — Medication 1000 MG: at 08:37

## 2018-07-28 RX ADMIN — APIXABAN 2.5 MG: 2.5 TABLET, FILM COATED ORAL at 17:22

## 2018-07-28 RX ADMIN — CEFTRIAXONE SODIUM 1000 MG: 10 INJECTION, POWDER, FOR SOLUTION INTRAVENOUS at 15:12

## 2018-07-28 RX ADMIN — OXYCODONE HYDROCHLORIDE 5 MG: 5 TABLET ORAL at 23:47

## 2018-07-28 RX ADMIN — MINERAL OIL AND WHITE PETROLATUM 1 APPLICATION: 150; 830 OINTMENT OPHTHALMIC at 21:48

## 2018-07-28 RX ADMIN — POTASSIUM CHLORIDE 20 MEQ: 1500 TABLET, EXTENDED RELEASE ORAL at 12:21

## 2018-07-28 RX ADMIN — LIDOCAINE 1 PATCH: 50 PATCH CUTANEOUS at 08:34

## 2018-07-28 RX ADMIN — MELATONIN 3 MG: at 21:46

## 2018-07-28 RX ADMIN — INSULIN LISPRO 2 UNITS: 100 INJECTION, SOLUTION INTRAVENOUS; SUBCUTANEOUS at 16:44

## 2018-07-28 RX ADMIN — ACETAMINOPHEN 975 MG: 325 TABLET ORAL at 06:26

## 2018-07-28 RX ADMIN — NYSTATIN: 100000 POWDER TOPICAL at 17:23

## 2018-07-28 RX ADMIN — POTASSIUM CHLORIDE 20 MEQ: 1500 TABLET, EXTENDED RELEASE ORAL at 17:22

## 2018-07-28 RX ADMIN — MAGNESIUM HYDROXIDE 30 ML: 400 SUSPENSION ORAL at 15:36

## 2018-07-28 RX ADMIN — APIXABAN 2.5 MG: 2.5 TABLET, FILM COATED ORAL at 08:33

## 2018-07-28 RX ADMIN — INSULIN GLARGINE 5 UNITS: 100 INJECTION, SOLUTION SUBCUTANEOUS at 21:47

## 2018-07-28 RX ADMIN — DOCUSATE SODIUM 100 MG: 100 CAPSULE, LIQUID FILLED ORAL at 08:34

## 2018-07-28 RX ADMIN — INSULIN LISPRO 3 UNITS: 100 INJECTION, SOLUTION INTRAVENOUS; SUBCUTANEOUS at 21:47

## 2018-07-28 RX ADMIN — ASPIRIN 81 MG: 81 TABLET, COATED ORAL at 08:33

## 2018-07-28 RX ADMIN — OXYCODONE HYDROCHLORIDE 5 MG: 5 TABLET ORAL at 15:12

## 2018-07-28 RX ADMIN — INSULIN LISPRO 2 UNITS: 100 INJECTION, SOLUTION INTRAVENOUS; SUBCUTANEOUS at 12:21

## 2018-07-28 RX ADMIN — DOCUSATE SODIUM 100 MG: 100 CAPSULE, LIQUID FILLED ORAL at 17:22

## 2018-07-28 RX ADMIN — GABAPENTIN 300 MG: 300 CAPSULE ORAL at 21:47

## 2018-07-28 RX ADMIN — ACETAMINOPHEN 975 MG: 325 TABLET ORAL at 13:53

## 2018-07-28 RX ADMIN — ACETAMINOPHEN 975 MG: 325 TABLET ORAL at 21:46

## 2018-07-28 RX ADMIN — GABAPENTIN 100 MG: 100 CAPSULE ORAL at 08:34

## 2018-07-28 RX ADMIN — FINASTERIDE 5 MG: 5 TABLET, FILM COATED ORAL at 08:34

## 2018-07-28 RX ADMIN — GABAPENTIN 100 MG: 100 CAPSULE ORAL at 17:22

## 2018-07-28 NOTE — PLAN OF CARE
Problem: Potential for Falls  Goal: Patient will remain free of falls  INTERVENTIONS:  - Assess patient frequently for physical needs  -  Identify cognitive and physical deficits and behaviors that affect risk of falls    -  [facility] fall precautions as indicated by assessment   - Educate patient/family on patient safety including physical limitations  - Instruct patient to call for assistance with activity based on assessment  - Modify environment to reduce risk of injury  - Consider OT/PT consult to assist with strengthening/mobility   Outcome: Progressing      Problem: Prexisting or High Potential for Compromised Skin Integrity  Goal: Skin integrity is maintained or improved  INTERVENTIONS:  - Identify patients at risk for skin breakdown  - Assess and monitor skin integrity  - Assess and monitor nutrition and hydration status  - Monitor labs (i e  albumin)  - Assess for incontinence   - Turn and reposition patient  - Assist with mobility/ambulation  - Relieve pressure over bony prominences  - Avoid friction and shearing  - Provide appropriate hygiene as needed including keeping skin clean and dry  - Evaluate need for skin moisturizer/barrier cream  - Collaborate with interdisciplinary team (i e  Nutrition, Rehabilitation, etc )   - Patient/family teaching   Outcome: Progressing      Problem: PAIN - ADULT  Goal: Verbalizes/displays adequate comfort level or baseline comfort level  Interventions:  - Encourage patient to monitor pain and request assistance  - Assess pain using appropriate pain scale  - Administer analgesics based on type and severity of pain and evaluate response  - Implement non-pharmacological measures as appropriate and evaluate response  - Consider cultural and social influences on pain and pain management  - Notify physician/advanced practitioner if interventions unsuccessful or patient reports new pain   Outcome: Progressing      Problem: INFECTION - ADULT  Goal: Absence or prevention of progression during hospitalization  INTERVENTIONS:  - Assess and monitor for signs and symptoms of infection  - Monitor lab/diagnostic results  - Monitor all insertion sites, i e  indwelling lines, tubes, and drains  - Monitor endotracheal (as able) and nasal secretions for changes in amount and color  - [place] appropriate cooling/warming therapies per order  - Administer medications as ordered  - Instruct and encourage patient and family to use good hand hygiene technique  - Identify and instruct in appropriate isolation precautions for identified infection/condition   Outcome: Progressing      Problem: SAFETY ADULT  Goal: Maintain or return to baseline ADL function  INTERVENTIONS:  -  Assess patient's ability to carry out ADLs; assess patient's baseline for ADL function and identify physical deficits which impact ability to perform ADLs (bathing, care of mouth/teeth, toileting, grooming, dressing, etc )  - Assess/evaluate cause of self-care deficits   - Assess range of motion  - Assess patient's mobility; develop plan if impaired  - Assess patient's need for assistive devices and provide as appropriate  - Encourage maximum independence but intervene and supervise when necessary  ¯ Involve family in performance of ADLs  ¯ Assess for home care needs following discharge   ¯ Request OT consult to assist with ADL evaluation and planning for discharge  ¯ Provide patient education as appropriate   Outcome: Progressing    Goal: Maintain or return mobility status to optimal level  INTERVENTIONS:  - Assess patient's baseline mobility status (ambulation, transfers, stairs, etc )    - Identify cognitive and physical deficits and behaviors that affect mobility  - Identify mobility aids required to assist with transfers and/or ambulation (gait belt, sit-to-stand, lift, walker, cane, etc )  - [place] fall precautions as indicated by assessment  - Record patient progress and toleration of activity level on Mobility SBAR; progress patient to next Phase/Stage  - Instruct patient to call for assistance with activity based on assessment  - Request Rehabilitation consult to assist with strengthening/weightbearing, etc    Outcome: Progressing      Problem: DISCHARGE PLANNING  Goal: Discharge to home or other facility with appropriate resources  INTERVENTIONS:  - Identify barriers to discharge w/patient and caregiver  - Arrange for needed discharge resources and transportation as appropriate  - Identify discharge learning needs (meds, wound care, etc )  - Arrange for interpretive services to assist at discharge as needed  - Refer to Case Management Department for coordinating discharge planning if the patient needs post-hospital services based on physician/advanced practitioner order or complex needs related to functional status, cognitive ability, or social support system   Outcome: Progressing      Problem: Knowledge Deficit  Goal: Patient/family/caregiver demonstrates understanding of disease process, treatment plan, medications, and discharge instructions  Complete learning assessment and assess knowledge base    Interventions:  - Provide teaching at level of understanding  - Provide teaching via preferred learning methods   Outcome: Progressing      Problem: DISCHARGE PLANNING - CARE MANAGEMENT  Goal: Discharge to post-acute care or home with appropriate resources  INTERVENTIONS:  - Conduct assessment to determine patient/family and health care team treatment goals, and need for post-acute services based on payer coverage, community resources, and patient preferences, and barriers to discharge  - Address psychosocial, clinical, and financial barriers to discharge as identified in assessment in conjunction with the patient/family and health care team  - Arrange appropriate level of post-acute services according to patient's   needs and preference and payer coverage in collaboration with the physician and health care team  - Communicate with and update the patient/family, physician, and health care team regarding progress on the discharge plan  - Arrange appropriate transportation to post-acute venues  Pt will go back to [facility] when medically clear for discharge   Outcome: Progressing

## 2018-07-28 NOTE — PROGRESS NOTES
Family left at this time  Pt still having difficult time having a BM  Pain under control  Will continue to monitor

## 2018-07-28 NOTE — ASSESSMENT & PLAN NOTE
· Recent echo last month with EF of 60 percent  Due to weight gain his diuretics were increased from 40 milligrams to 80 milligrams of torsemide twice a day for 3 days   Creatinine trending down, no signs of overload   · Monitor I&Os, daily weights  · Restart prior dosage of diuretics tomorrow

## 2018-07-28 NOTE — ASSESSMENT & PLAN NOTE
· Does have flank pain so consider pyelo (although no evidence of this on imaging) vs cystitis  Right flank pain may just be due to stone  Urine culture growing gram negative rods- infection vs colonization   Note no fever or white count  · Will treat with Rocephin given plans for ureteroscopy  · F/U cultures

## 2018-07-28 NOTE — ASSESSMENT & PLAN NOTE
· POA, Could be secondary to the persistent stone/UTI plus component of increased diuretics by Cardiology due to weight gain   Baseline creatinine 1 5, creatinine trending down to 1 65 today  · Restart Demedex 40 mg BID (prior dosage to increase) tomorrow  · Restart Enalapril - substituted for Lisinopril while here

## 2018-07-28 NOTE — PROGRESS NOTES
Pt was found to have 2 stage 2 on his buttocks  Looks like they were being treated by his nursing home as they had a dressing on them already  That dressing was removed and an allevyn was placed over both    Patient was also positioned on his Right side with foam wedges

## 2018-07-28 NOTE — ASSESSMENT & PLAN NOTE
Lab Results   Component Value Date    HGBA1C 7 4 (H) 05/23/2018       Recent Labs      07/27/18   1601  07/27/18   2106  07/28/18   0629  07/28/18   1054   POCGLU  193*  169*  271*  198*       Blood Sugar Average: Last 72 hrs:  (P) 206 5   · BG elevated over the last 24 hours  · Increase bedtime Lantus to 10 U QHS  · Increase SSI algorithm to algorithm 3   · Continue QID accuchecks   · Restart empagliflozin at discharge

## 2018-07-28 NOTE — PROGRESS NOTES
Pt visiting with family  Pt c/o pain and not being able to have a bowel movement  He states that the stool is right there but won't come out  Pt had small BM yesterday  Slim notified  Milk of magnesia and prune juice given  Oxy 5mg given for pain

## 2018-07-28 NOTE — PROGRESS NOTES
[facility]  Progress Note - Halle Meza 12/21/1930, 80 y o  male MRN: [id number]    Unit/Bed#: -[facility]aci Encounter: [id number]    Primary Care Provider: Eve Gavin MD   Date and time admitted to hospital: 7/26/2018  9:44 AM        * Right ureteral stone   Assessment & Plan    · Earlier this month he was admitted with pain found to have a right-sided 8 millimeter ureteric stone without hydronephrosis but some surrounding stranding, underwent cystoscopy with a right-sided stent placement  Discharge with oral antibiotics for total 2 weeks  Found to have urinary retention at that time status post Marte insertion  Followed up with Urology as outpatient and was scheduled for stone lithotripsy and stent exchange 1st week of August, but due to his persistent pain they contacted Urology office and it was moved up to 07/31/2018  · Urology following - will have ureteroscopy on 7/31  · Continue with current antibiotics         Acute renal failure superimposed on stage 3 chronic kidney disease ([address] )   Assessment & Plan    · POA, Could be secondary to the persistent stone/UTI plus component of increased diuretics by Cardiology due to weight gain  Baseline creatinine 1 5, creatinine trending down to 1 65 today  · Restart Demedex 40 mg BID (prior dosage to increase) tomorrow  · Restart Enalapril - substituted for Lisinopril while here         Urinary tract infection associated with indwelling urethral catheter ([address] )   Assessment & Plan    · Does have flank pain so consider pyelo (although no evidence of this on imaging) vs cystitis  Right flank pain may just be due to stone  Urine culture growing gram negative rods- infection vs colonization  Note no fever or white count  · Will treat with Rocephin given plans for ureteroscopy  · F/U cultures        Chronic diastolic congestive heart failure (HCC)   Assessment & Plan    · Recent echo last month with EF of 60 percent   Due to weight gain his diuretics were increased from 40 milligrams to 80 milligrams of torsemide twice a day for 3 days  Creatinine trending down, no signs of overload   · Monitor I&Os, daily weights  · Restart prior dosage of diuretics tomorrow         Paroxysmal atrial fibrillation (HCC)   Assessment & Plan    · Rate controlled   · Continue Coreg and Eliquis        Type 2 diabetes mellitus with hyperglycemia, with long-term current use of insulin [facility])   Assessment & Plan    Lab Results   Component Value Date    HGBA1C 7 4 (H) 05/23/2018       Recent Labs      07/27/18   1601  07/27/18   2106  07/28/18   0629  07/28/18   1054   POCGLU  193*  169*  271*  198*       Blood Sugar Average: Last 72 hrs:  (P) 206 5   · BG elevated over the last 24 hours  · Increase bedtime Lantus to 10 U QHS  · Increase SSI algorithm to algorithm 3   · Continue QID accuchecks   · Restart empagliflozin at discharge         CAD (coronary artery disease)   Assessment & Plan    · Patient without chest pain   · Continue home medical management - ASA, coreg           VTE Pharmacologic Prophylaxis:   Pharmacologic: Apixaban (Eliquis)  Mechanical VTE Prophylaxis in Place: No    Patient Centered Rounds: I have performed bedside rounds with nursing staff today  Discussions with Specialists or Other Care Team Provider: Discussed with RN    Education and Discussions with Family / Patient: Discussed with patient     Time Spent for Care: 30 minutes  More than 50% of total time spent on counseling and coordination of care as described above  Current Length of Stay: 2 day(s)    Current Patient Status: Inpatient   Certification Statement: The patient will continue to require additional inpatient hospital stay due to on going IV antibiotics, planning for urology procedure on 7/31    Discharge Plan: Pending urology recommendations     Code Status: Level 1 - Full Code      Subjective:   Patient reports that overall he is doing well  Reports right sided back pain   Denies fevers or chills  Denies chest pain or difficulty breathing  Denies problems eating or drinking  Objective:     Vitals:   Temp (24hrs), Av 1 °F (36 7 °C), Min:97 5 °F (36 4 °C), Max:98 6 °F (37 °C)    HR:  [69-78] 78  Resp:  [20-21] 20  BP: ()/(50-58) 106/56  SpO2:  [94 %-100 %] 100 %  Body mass index is 27 99 kg/m²  Input and Output Summary (last 24 hours): Intake/Output Summary (Last 24 hours) at 18 1207  Last data filed at 18 8188   Gross per 24 hour   Intake              600 ml   Output             1850 ml   Net            -1250 ml       Physical Exam:     Physical Exam   Constitutional: He is oriented to person, place, and time  Vital signs are normal  He appears well-developed and well-nourished  Non-toxic appearance  No distress  HENT:   Head: Normocephalic and atraumatic  Eyes: Conjunctivae and EOM are normal  Pupils are equal, round, and reactive to light  Left eye abnormal      Neck: Neck supple  Cardiovascular: Normal rate, S1 normal, S2 normal, normal heart sounds and intact distal pulses  An irregularly irregular rhythm present  Exam reveals no S3 and no S4  No murmur heard  Pulmonary/Chest: Effort normal  No accessory muscle usage  No respiratory distress  He has no decreased breath sounds  He has no wheezes  He has no rhonchi  He has rales in the right lower field and the left lower field  He exhibits no tenderness  Abdominal: Soft  Bowel sounds are normal  He exhibits no distension and no mass  There is no tenderness  There is no rigidity, no rebound and no guarding  Neurological: He is alert and oriented to person, place, and time  Skin: Skin is warm and dry     Multiple wounds noted on bilateral lower legs, none of which appear to have erythema or drainage          Additional Data:     Labs:      Results from last 7 days  Lab Units 18  0524   WBC Thousand/uL 4 70   HEMOGLOBIN g/dL 8 2*   HEMATOCRIT % 26 7*   PLATELETS Thousands/uL 215   NEUTROS PCT % 70   LYMPHS PCT % 12*   MONOS PCT % 12   EOS PCT % 5       Results from last 7 days  Lab Units 07/28/18  0524  07/26/18  1018   SODIUM mmol/L 132*  < > 130*   POTASSIUM mmol/L 3 7  < > 3 6   CHLORIDE mmol/L 97*  < > 94*   CO2 mmol/L 29  < > 31   BUN mg/dL 37*  < > 31*   CREATININE mg/dL 1 65*  < > 1 85*   CALCIUM mg/dL 8 4  < > 8 1*   TOTAL PROTEIN g/dL  --   --  6 5   BILIRUBIN TOTAL mg/dL  --   --  0 39   ALK PHOS U/L  --   --  91   ALT U/L  --   --  15   AST U/L  --   --  17   GLUCOSE RANDOM mg/dL 254*  < > 153*   < > = values in this interval not displayed  * I Have Reviewed All Lab Data Listed Above  * Additional Pertinent Lab Tests Reviewed: Wendi Ferreira Admission Reviewed    Imaging:    Imaging Reports Reviewed Today Include: None  Imaging Personally Reviewed by Myself Includes:  None    Recent Cultures (last 7 days):       Results from last 7 days  Lab Units 07/26/18  1511 07/26/18  1150   BLOOD CULTURE   --  No Growth at 24 hrs  No Growth at 24 hrs     URINE CULTURE  >100,000 cfu/ml Gram Negative Winston Enteric Like*  --        Last 24 Hours Medication List:     Current Facility-Administered Medications:  acetaminophen 650 mg Oral Q6H PRN Ceci Balderas MD    acetaminophen 975 mg Oral Q8H [address] Khadijah Gale, PA-C    albuterol 2 5 mg Nebulization Q6H PRN Ceci Balderas MD    apixaban 2 5 mg Oral BID Ceci Balderas MD    artificial tear  Both Eyes HS Manolo Burns MD    aspirin 81 mg Oral Daily Manolo Burns MD    carvedilol 3 125 mg Oral BID With Meals Manolo Burns MD    cefTRIAXone 1,000 mg Intravenous Q24H Khadijahjh Beasley PA-C Last Rate: 1,000 mg (07/27/18 1704)   docusate sodium 100 mg Oral BID Khadijah Phenshona, PA-C    finasteride 5 mg Oral Daily Ceci Balderas MD    fish oil 1,000 mg Oral Daily Manolo Burns MD    gabapentin 100 mg Oral BID Manolo Burns MD    gabapentin 300 mg Oral HS Manolo Burns MD    HYDROmorphone 0 2 mg Intravenous Q6H PRN Kelsie Dumont PA-C    insulin glargine 5 Units Subcutaneous HS Kelsie Dumont PA-C    insulin lispro 1-5 Units Subcutaneous HS Ceci Balderas MD    insulin lispro 1-6 Units Subcutaneous TID AC Ceci Balderas MD    lidocaine 1 patch Transdermal Daily Kelsie Dumont PA-C    [START ON 7/29/2018] lisinopril 5 mg Oral Daily Venkat Osei PA-C    melatonin 3 mg Oral HS Ceci Balderas MD    nitroglycerin 0 4 mg Sublingual Q5 Min PRN Ceci Balderas MD    ondansetron 4 mg Intravenous Q6H PRN Kashmir Moore MD    oxyCODONE 2 5 mg Oral Q4H PRN Kelsie Dumont PA-C    oxyCODONE 5 mg Oral Q4H PRN Kelsie Dumont PA-C    potassium chloride 20 mEq Oral BID Kashmir Moore MD    senna 1 tablet Oral HS Kelsie Dumont PA-C    tamsulosin 0 4 mg Oral Daily With Paco Warren MD    [START ON 7/29/2018] torsemide 40 mg Oral BID Latisha Huggins PA-C         Today, Patient Was Seen By: Latisha Huggins PA-C    ** Please Note: Dictation voice to text software may have been used in the creation of this document   **

## 2018-07-28 NOTE — ASSESSMENT & PLAN NOTE
· Earlier this month he was admitted with pain found to have a right-sided 8 millimeter ureteric stone without hydronephrosis but some surrounding stranding, underwent cystoscopy with a right-sided stent placement  Discharge with oral antibiotics for total 2 weeks  Found to have urinary retention at that time status post Marte insertion   Followed up with Urology as outpatient and was scheduled for stone lithotripsy and stent exchange 1st week of August, but due to his persistent pain they contacted Urology office and it was moved up to 07/31/2018  · Urology following - will have ureteroscopy on 7/31  · Continue with current antibiotics

## 2018-07-29 LAB
ANION GAP SERPL CALCULATED.3IONS-SCNC: 4 MMOL/L (ref 4–13)
BUN SERPL-MCNC: 27 MG/DL (ref 5–25)
CALCIUM SERPL-MCNC: 8.4 MG/DL (ref 8.3–10.1)
CHLORIDE SERPL-SCNC: 100 MMOL/L (ref 100–108)
CO2 SERPL-SCNC: 31 MMOL/L (ref 21–32)
CREAT SERPL-MCNC: 1.25 MG/DL (ref 0.6–1.3)
GFR SERPL CREATININE-BSD FRML MDRD: 51 ML/MIN/1.73SQ M
GLUCOSE SERPL-MCNC: 143 MG/DL (ref 65–140)
GLUCOSE SERPL-MCNC: 151 MG/DL (ref 65–140)
GLUCOSE SERPL-MCNC: 152 MG/DL (ref 65–140)
GLUCOSE SERPL-MCNC: 160 MG/DL (ref 65–140)
GLUCOSE SERPL-MCNC: 206 MG/DL (ref 65–140)
POTASSIUM SERPL-SCNC: 4.4 MMOL/L (ref 3.5–5.3)
SODIUM SERPL-SCNC: 135 MMOL/L (ref 136–145)

## 2018-07-29 PROCEDURE — 82948 REAGENT STRIP/BLOOD GLUCOSE: CPT

## 2018-07-29 PROCEDURE — 80048 BASIC METABOLIC PNL TOTAL CA: CPT | Performed by: PHYSICIAN ASSISTANT

## 2018-07-29 PROCEDURE — 99232 SBSQ HOSP IP/OBS MODERATE 35: CPT | Performed by: PHYSICIAN ASSISTANT

## 2018-07-29 RX ADMIN — ACETAMINOPHEN 975 MG: 325 TABLET ORAL at 21:59

## 2018-07-29 RX ADMIN — INSULIN GLARGINE 5 UNITS: 100 INJECTION, SOLUTION SUBCUTANEOUS at 21:59

## 2018-07-29 RX ADMIN — MINERAL OIL AND WHITE PETROLATUM: 150; 830 OINTMENT OPHTHALMIC at 21:59

## 2018-07-29 RX ADMIN — NYSTATIN: 100000 POWDER TOPICAL at 08:26

## 2018-07-29 RX ADMIN — MELATONIN 3 MG: at 21:59

## 2018-07-29 RX ADMIN — Medication 1000 MG: at 08:27

## 2018-07-29 RX ADMIN — OXYCODONE HYDROCHLORIDE 5 MG: 5 TABLET ORAL at 08:26

## 2018-07-29 RX ADMIN — CEFTRIAXONE SODIUM 1000 MG: 10 INJECTION, POWDER, FOR SOLUTION INTRAVENOUS at 16:16

## 2018-07-29 RX ADMIN — NYSTATIN: 100000 POWDER TOPICAL at 17:46

## 2018-07-29 RX ADMIN — TAMSULOSIN HYDROCHLORIDE 0.4 MG: 0.4 CAPSULE ORAL at 16:16

## 2018-07-29 RX ADMIN — TORSEMIDE 40 MG: 20 TABLET ORAL at 08:25

## 2018-07-29 RX ADMIN — ASPIRIN 81 MG: 81 TABLET, COATED ORAL at 08:24

## 2018-07-29 RX ADMIN — HYDROMORPHONE HYDROCHLORIDE 0.2 MG: 1 INJECTION, SOLUTION INTRAMUSCULAR; INTRAVENOUS; SUBCUTANEOUS at 22:05

## 2018-07-29 RX ADMIN — POTASSIUM CHLORIDE 20 MEQ: 1500 TABLET, EXTENDED RELEASE ORAL at 08:25

## 2018-07-29 RX ADMIN — GABAPENTIN 100 MG: 100 CAPSULE ORAL at 08:25

## 2018-07-29 RX ADMIN — GABAPENTIN 300 MG: 300 CAPSULE ORAL at 21:59

## 2018-07-29 RX ADMIN — ACETAMINOPHEN 975 MG: 325 TABLET ORAL at 13:51

## 2018-07-29 RX ADMIN — LISINOPRIL 5 MG: 5 TABLET ORAL at 08:24

## 2018-07-29 RX ADMIN — INSULIN LISPRO 1 UNITS: 100 INJECTION, SOLUTION INTRAVENOUS; SUBCUTANEOUS at 22:00

## 2018-07-29 RX ADMIN — ACETAMINOPHEN 975 MG: 325 TABLET ORAL at 07:03

## 2018-07-29 RX ADMIN — FINASTERIDE 5 MG: 5 TABLET, FILM COATED ORAL at 08:24

## 2018-07-29 RX ADMIN — CARVEDILOL 3.12 MG: 3.12 TABLET, FILM COATED ORAL at 08:24

## 2018-07-29 RX ADMIN — DOCUSATE SODIUM 100 MG: 100 CAPSULE, LIQUID FILLED ORAL at 08:25

## 2018-07-29 RX ADMIN — LIDOCAINE 1 PATCH: 50 PATCH CUTANEOUS at 08:25

## 2018-07-29 RX ADMIN — INSULIN LISPRO 1 UNITS: 100 INJECTION, SOLUTION INTRAVENOUS; SUBCUTANEOUS at 11:33

## 2018-07-29 RX ADMIN — APIXABAN 2.5 MG: 2.5 TABLET, FILM COATED ORAL at 08:24

## 2018-07-29 RX ADMIN — GABAPENTIN 100 MG: 100 CAPSULE ORAL at 17:46

## 2018-07-29 RX ADMIN — OXYCODONE HYDROCHLORIDE 5 MG: 5 TABLET ORAL at 20:22

## 2018-07-29 RX ADMIN — POTASSIUM CHLORIDE 20 MEQ: 1500 TABLET, EXTENDED RELEASE ORAL at 17:46

## 2018-07-29 RX ADMIN — APIXABAN 2.5 MG: 2.5 TABLET, FILM COATED ORAL at 17:46

## 2018-07-29 RX ADMIN — OXYCODONE HYDROCHLORIDE 5 MG: 5 TABLET ORAL at 12:38

## 2018-07-29 RX ADMIN — INSULIN LISPRO 1 UNITS: 100 INJECTION, SOLUTION INTRAVENOUS; SUBCUTANEOUS at 08:26

## 2018-07-29 RX ADMIN — TORSEMIDE 40 MG: 20 TABLET ORAL at 17:46

## 2018-07-29 NOTE — ASSESSMENT & PLAN NOTE
· POA, Could be secondary to the persistent stone/UTI plus component of increased diuretics by Cardiology due to weight gain  · Resolved now   Torsemide and ACEI resumed  · Monitor creatinine

## 2018-07-29 NOTE — PROGRESS NOTES
Progress Note - Irina Danielson 12/21/1930, 80 y o  male MRN: [id number]    Unit/Bed#: -Laci Encounter: [id number]    Primary Care Provider: Yahir Chew MD   Date and time admitted to hospital: 7/26/2018  9:44 AM        * Right ureteral stone   Assessment & Plan    · Earlier this month he was admitted with pain found to have a right-sided 8 millimeter ureteric stone without hydronephrosis but some surrounding stranding, underwent cystoscopy with a right-sided stent placement  Discharge with oral antibiotics for total 2 weeks  Found to have urinary retention at that time status post Marte insertion  Followed up with Urology as outpatient and was scheduled for stone lithotripsy and stent exchange 1st week of August, but due to his persistent pain they contacted Urology office and it was moved up to 07/31/2018  · Urology following - will have ureteroscopy on 7/31  · Continue with current antibiotics         Urinary tract infection associated with indwelling urethral catheter ([facility] [address] )   Assessment & Plan    · Does have flank pain so consider pyelo (although no evidence of this on imaging) vs cystitis  Right flank pain may just be due to stone  Urine culture growing Citrobacter- infection vs colonization  Note no fever or white count  · Continue Rocephin day 3 given plans for ureteroscopy  · F/U sensitivities        Acute renal failure superimposed on stage 3 chronic kidney disease ([address] )   Assessment & Plan    · POA, Could be secondary to the persistent stone/UTI plus component of increased diuretics by Cardiology due to weight gain  · Resolved now   Torsemide and ACEI resumed  · Monitor creatinine        Urinary retention   Assessment & Plan    · Marte in place        Type 2 diabetes mellitus with hyperglycemia, with long-term current use of insulin [facility])   Assessment & Plan    Lab Results   Component Value Date    HGBA1C 7 4 (H) 05/23/2018       Recent Labs      07/28/18   1616  07/28/18   2120  07/29/18 1315  18   1106   POCGLU  219*  279*  151*  160*       Blood Sugar Average: Last 72 hrs:  (P) 092 [id number]   · Sugars improved today with addition of Lantus  · Restart empagliflozin at discharge         Paroxysmal atrial fibrillation (HCC)   Assessment & Plan    · Rate controlled   · Continue Coreg and Eliquis        Chronic diastolic congestive heart failure (HCC)   Assessment & Plan    · Recent echo last month with EF of 60 percent  Due to weight gain his diuretics were increased from 40 milligrams to 80 milligrams of torsemide twice a day for 3 days  Creatinine trending down, no signs of overload   · Torsemide restarted at normal dose  · I&Os, daily weights          VTE Pharmacologic Prophylaxis:   Pharmacologic: Apixaban (Eliquis)  Mechanical VTE Prophylaxis in Place: Yes    Patient Centered Rounds: I have performed bedside rounds with nursing staff today  Discussions with Specialists or Other Care Team Provider:      Education and Discussions with Family / Patient: Patient  Updated daughter, Eryn Pruitt, by phone  Time Spent for Care: 30 minutes  More than 50% of total time spent on counseling and coordination of care as described above  Current Length of Stay: 3 day(s)    Current Patient Status: Inpatient   Certification Statement: The patient will continue to require additional inpatient hospital stay due to pain unable to be controlled at home, urologic procedure, UTI    Discharge Plan: TBD after ureteroscopy    Code Status: Level 1 - Full Code      Subjective:   Reports back pain with movement  Unable to move due to pain  No nausea, vomiting  Objective:     Vitals:   Temp (24hrs), Av 9 °F (36 6 °C), Min:97 5 °F (36 4 °C), Max:98 2 °F (36 8 °C)    HR:  [65-76] 65  Resp:  [18-20] 18  BP: (112-142)/(57-74) 115/57  SpO2:  [100 %] 100 %  Body mass index is 28 25 kg/m²  Input and Output Summary (last 24 hours):        Intake/Output Summary (Last 24 hours) at 18 1403  Last data filed at 07/29/18 0931   Gross per 24 hour   Intake              340 ml   Output             2400 ml   Net            -2060 ml       Physical Exam:     Physical Exam   Constitutional: No distress  HENT:   Head: Normocephalic and atraumatic  Eyes: No scleral icterus  Neck: Normal range of motion  Neck supple  Cardiovascular: Normal rate, regular rhythm and normal heart sounds  Pulmonary/Chest: Effort normal and breath sounds normal  No respiratory distress  He has no wheezes  He has no rales  Abdominal: Soft  Bowel sounds are normal  He exhibits no distension  There is no tenderness  There is no rebound  Musculoskeletal:   Trace edema   Neurological: He is alert  Skin: Skin is warm and dry  He is not diaphoretic  Psychiatric: He has a normal mood and affect  His behavior is normal        Additional Data:     Labs:      Results from last 7 days  Lab Units 07/28/18  0524   WBC Thousand/uL 4 70   HEMOGLOBIN g/dL 8 2*   HEMATOCRIT % 26 7*   PLATELETS Thousands/uL 215   NEUTROS PCT % 70   LYMPHS PCT % 12*   MONOS PCT % 12   EOS PCT % 5       Results from last 7 days  Lab Units 07/29/18  0451  07/26/18  1018   SODIUM mmol/L 135*  < > 130*   POTASSIUM mmol/L 4 4  < > 3 6   CHLORIDE mmol/L 100  < > 94*   CO2 mmol/L 31  < > 31   BUN mg/dL 27*  < > 31*   CREATININE mg/dL 1 25  < > 1 85*   CALCIUM mg/dL 8 4  < > 8 1*   TOTAL PROTEIN g/dL  --   --  6 5   BILIRUBIN TOTAL mg/dL  --   --  0 39   ALK PHOS U/L  --   --  91   ALT U/L  --   --  15   AST U/L  --   --  17   GLUCOSE RANDOM mg/dL 152*  < > 153*   < > = values in this interval not displayed  * I Have Reviewed All Lab Data Listed Above  * Additional Pertinent Lab Tests Reviewed:  All Labs Within Last 24 Hours Reviewed    Imaging:    Imaging Reports Reviewed Today Include: NOne  Imaging Personally Reviewed by Myself Includes:  None    Recent Cultures (last 7 days):       Results from last 7 days  Lab Units 07/26/18  1511 07/26/18  1150   BLOOD CULTURE   --  No Growth at 48 hrs  No Growth at 48 hrs     URINE CULTURE  >100,000 cfu/ml Citrobacter freundii*  --        Last 24 Hours Medication List:     Current Facility-Administered Medications:  acetaminophen 650 mg Oral Q6H PRN Santy Funes MD    acetaminophen 975 mg Oral Q8H [facility] Blima Drop, PA-C    albuterol 2 5 mg Nebulization Q6H PRN Ceci Balderas MD    apixaban 2 5 mg Oral BID Ceci Balderas MD    artificial tear  Both Eyes HS Ceci Balderas MD    aspirin 81 mg Oral Daily Santy Funes MD    carvedilol 3 125 mg Oral BID With Meals Santy Funes MD    cefTRIAXone 1,000 mg Intravenous Q24H Blima Drop, PA-C Last Rate: Stopped (07/28/18 1625)   docusate sodium 100 mg Oral BID Blima Drop, PA-C    finasteride 5 mg Oral Daily Ceci Balderas MD    fish oil 1,000 mg Oral Daily Ceci Balderas MD    gabapentin 100 mg Oral BID Ceci Balderas MD    gabapentin 300 mg Oral HS Ceci Balderas MD    HYDROmorphone 0 2 mg Intravenous Q6H PRN Blima Drop, PA-C    insulin glargine 5 Units Subcutaneous HS Blima Drop, PA-C    insulin lispro 1-5 Units Subcutaneous HS Ceci Balderas MD    insulin lispro 1-6 Units Subcutaneous TID AC Ceci Balderas MD    lidocaine 1 patch Transdermal Daily Blima Drop, PA-C    lisinopril 5 mg Oral Daily Venkat Osei, PA-C    magnesium hydroxide 30 mL Oral Daily PRN Venkat Osei, PA-C    melatonin 3 mg Oral HS Ceci Balderas MD    nitroglycerin 0 4 mg Sublingual Q5 Min PRN Ceci Balderas MD    nystatin  Topical BID Venkat Osei, PA-C    ondansetron 4 mg Intravenous Q6H PRN Santy Funes MD    oxyCODONE 2 5 mg Oral Q4H PRN Blima Drop, PA-C    oxyCODONE 5 mg Oral Q4H PRN Blima Drop, PA-C    potassium chloride 20 mEq Oral BID Santy Funes MD    senna 1 tablet Oral HS Blima Drop, PA-C    tamsulosin 0 4 mg Oral Daily With Lamont Cornelius MD torsemide 40 mg Oral BID Sadiq Shirley PA-C         Today, Patient Was Seen By: Jacob Payne PA-C    ** Please Note: Dragon 360 Dictation voice to text software may have been used in the creation of this document   **

## 2018-07-29 NOTE — ASSESSMENT & PLAN NOTE
· Does have flank pain so consider pyelo (although no evidence of this on imaging) vs cystitis  Right flank pain may just be due to stone  Urine culture growing Citrobacter- infection vs colonization   Note no fever or white count  · Continue Rocephin day 3 given plans for ureteroscopy  · F/U sensitivities

## 2018-07-29 NOTE — ASSESSMENT & PLAN NOTE
Lab Results   Component Value Date    HGBA1C 7 4 (H) 05/23/2018       Recent Labs      07/28/18   1616  07/28/18   2120  07/29/18   0632  07/29/18   1106   POCGLU  219*  279*  151*  160*       Blood Sugar Average: Last 72 hrs:  (P) 849 [id number]   · Sugars improved today with addition of Lantus  · Restart empagliflozin at discharge

## 2018-07-29 NOTE — PROGRESS NOTES
Orders received and acknowledged  Attempted to do a PT eval but patient refused and stated that laying down is the only comfortable position for him for now and does not really want to move  He is waiting for his procedure tomorrow and would like to wait until then to try to do any therapy

## 2018-07-29 NOTE — CONSULTS
Urology consult previously completed    Please see prior notes    Call if there is a specific question

## 2018-07-29 NOTE — ASSESSMENT & PLAN NOTE
· Recent echo last month with EF of 60 percent  Due to weight gain his diuretics were increased from 40 milligrams to 80 milligrams of torsemide twice a day for 3 days   Creatinine trending down, no signs of overload   · Torsemide restarted at normal dose  · I&Os, daily weights

## 2018-07-30 LAB
ANION GAP SERPL CALCULATED.3IONS-SCNC: 7 MMOL/L (ref 4–13)
BACTERIA UR CULT: ABNORMAL
BACTERIA UR CULT: ABNORMAL
BASOPHILS # BLD AUTO: 0.05 THOUSANDS/ΜL (ref 0–0.1)
BASOPHILS NFR BLD AUTO: 1 % (ref 0–1)
BUN SERPL-MCNC: 30 MG/DL (ref 5–25)
CALCIUM SERPL-MCNC: 8.3 MG/DL (ref 8.3–10.1)
CHLORIDE SERPL-SCNC: 98 MMOL/L (ref 100–108)
CO2 SERPL-SCNC: 31 MMOL/L (ref 21–32)
CREAT SERPL-MCNC: 1.49 MG/DL (ref 0.6–1.3)
EOSINOPHIL # BLD AUTO: 0.24 THOUSAND/ΜL (ref 0–0.61)
EOSINOPHIL NFR BLD AUTO: 4 % (ref 0–6)
ERYTHROCYTE [DISTWIDTH] IN BLOOD BY AUTOMATED COUNT: 15.6 % (ref 11.6–15.1)
GFR SERPL CREATININE-BSD FRML MDRD: 42 ML/MIN/1.73SQ M
GLUCOSE SERPL-MCNC: 103 MG/DL (ref 65–140)
GLUCOSE SERPL-MCNC: 116 MG/DL (ref 65–140)
GLUCOSE SERPL-MCNC: 162 MG/DL (ref 65–140)
GLUCOSE SERPL-MCNC: 166 MG/DL (ref 65–140)
GLUCOSE SERPL-MCNC: 199 MG/DL (ref 65–140)
HCT VFR BLD AUTO: 27.7 % (ref 36.5–49.3)
HGB BLD-MCNC: 8.2 G/DL (ref 12–17)
IMM GRANULOCYTES # BLD AUTO: 0.03 THOUSAND/UL (ref 0–0.2)
IMM GRANULOCYTES NFR BLD AUTO: 1 % (ref 0–2)
LYMPHOCYTES # BLD AUTO: 0.76 THOUSANDS/ΜL (ref 0.6–4.47)
LYMPHOCYTES NFR BLD AUTO: 12 % (ref 14–44)
MCH RBC QN AUTO: 26.4 PG (ref 26.8–34.3)
MCHC RBC AUTO-ENTMCNC: 29.6 G/DL (ref 31.4–37.4)
MCV RBC AUTO: 89 FL (ref 82–98)
MONOCYTES # BLD AUTO: 0.58 THOUSAND/ΜL (ref 0.17–1.22)
MONOCYTES NFR BLD AUTO: 9 % (ref 4–12)
NEUTROPHILS # BLD AUTO: 4.93 THOUSANDS/ΜL (ref 1.85–7.62)
NEUTS SEG NFR BLD AUTO: 73 % (ref 43–75)
NRBC BLD AUTO-RTO: 0 /100 WBCS
PLATELET # BLD AUTO: 239 THOUSANDS/UL (ref 149–390)
PMV BLD AUTO: 10.8 FL (ref 8.9–12.7)
POTASSIUM SERPL-SCNC: 4.4 MMOL/L (ref 3.5–5.3)
RBC # BLD AUTO: 3.11 MILLION/UL (ref 3.88–5.62)
SODIUM SERPL-SCNC: 136 MMOL/L (ref 136–145)
WBC # BLD AUTO: 6.59 THOUSAND/UL (ref 4.31–10.16)

## 2018-07-30 PROCEDURE — 82948 REAGENT STRIP/BLOOD GLUCOSE: CPT

## 2018-07-30 PROCEDURE — 80048 BASIC METABOLIC PNL TOTAL CA: CPT | Performed by: PHYSICIAN ASSISTANT

## 2018-07-30 PROCEDURE — 99233 SBSQ HOSP IP/OBS HIGH 50: CPT | Performed by: NURSE PRACTITIONER

## 2018-07-30 PROCEDURE — 99232 SBSQ HOSP IP/OBS MODERATE 35: CPT | Performed by: PHYSICIAN ASSISTANT

## 2018-07-30 PROCEDURE — 85025 COMPLETE CBC W/AUTO DIFF WBC: CPT | Performed by: PHYSICIAN ASSISTANT

## 2018-07-30 PROCEDURE — 99232 SBSQ HOSP IP/OBS MODERATE 35: CPT | Performed by: NURSE PRACTITIONER

## 2018-07-30 RX ORDER — NITROFURANTOIN 25; 75 MG/1; MG/1
100 CAPSULE ORAL 2 TIMES DAILY WITH MEALS
Status: DISCONTINUED | OUTPATIENT
Start: 2018-07-30 | End: 2018-07-30

## 2018-07-30 RX ORDER — SULFAMETHOXAZOLE AND TRIMETHOPRIM 800; 160 MG/1; MG/1
1 TABLET ORAL EVERY 12 HOURS SCHEDULED
Status: DISCONTINUED | OUTPATIENT
Start: 2018-07-30 | End: 2018-08-02

## 2018-07-30 RX ADMIN — ACETAMINOPHEN 975 MG: 325 TABLET ORAL at 06:01

## 2018-07-30 RX ADMIN — Medication 1000 MG: at 08:58

## 2018-07-30 RX ADMIN — MELATONIN 3 MG: at 21:02

## 2018-07-30 RX ADMIN — POTASSIUM CHLORIDE 20 MEQ: 1500 TABLET, EXTENDED RELEASE ORAL at 17:53

## 2018-07-30 RX ADMIN — ACETAMINOPHEN 975 MG: 325 TABLET ORAL at 21:02

## 2018-07-30 RX ADMIN — INSULIN LISPRO 1 UNITS: 100 INJECTION, SOLUTION INTRAVENOUS; SUBCUTANEOUS at 21:27

## 2018-07-30 RX ADMIN — NYSTATIN: 100000 POWDER TOPICAL at 08:59

## 2018-07-30 RX ADMIN — POTASSIUM CHLORIDE 20 MEQ: 1500 TABLET, EXTENDED RELEASE ORAL at 08:57

## 2018-07-30 RX ADMIN — ASPIRIN 81 MG: 81 TABLET, COATED ORAL at 08:58

## 2018-07-30 RX ADMIN — FINASTERIDE 5 MG: 5 TABLET, FILM COATED ORAL at 08:58

## 2018-07-30 RX ADMIN — TAMSULOSIN HYDROCHLORIDE 0.4 MG: 0.4 CAPSULE ORAL at 16:45

## 2018-07-30 RX ADMIN — NYSTATIN: 100000 POWDER TOPICAL at 17:54

## 2018-07-30 RX ADMIN — INSULIN LISPRO 1 UNITS: 100 INJECTION, SOLUTION INTRAVENOUS; SUBCUTANEOUS at 11:04

## 2018-07-30 RX ADMIN — GABAPENTIN 100 MG: 100 CAPSULE ORAL at 17:53

## 2018-07-30 RX ADMIN — INSULIN LISPRO 1 UNITS: 100 INJECTION, SOLUTION INTRAVENOUS; SUBCUTANEOUS at 16:45

## 2018-07-30 RX ADMIN — GABAPENTIN 100 MG: 100 CAPSULE ORAL at 08:58

## 2018-07-30 RX ADMIN — APIXABAN 2.5 MG: 2.5 TABLET, FILM COATED ORAL at 08:57

## 2018-07-30 RX ADMIN — LIDOCAINE 1 PATCH: 50 PATCH CUTANEOUS at 08:58

## 2018-07-30 RX ADMIN — SULFAMETHOXAZOLE AND TRIMETHOPRIM 1 TABLET: 800; 160 TABLET ORAL at 13:23

## 2018-07-30 RX ADMIN — MINERAL OIL AND WHITE PETROLATUM: 150; 830 OINTMENT OPHTHALMIC at 21:11

## 2018-07-30 RX ADMIN — INSULIN GLARGINE 5 UNITS: 100 INJECTION, SOLUTION SUBCUTANEOUS at 21:04

## 2018-07-30 RX ADMIN — ACETAMINOPHEN 975 MG: 325 TABLET ORAL at 13:23

## 2018-07-30 RX ADMIN — APIXABAN 2.5 MG: 2.5 TABLET, FILM COATED ORAL at 17:53

## 2018-07-30 RX ADMIN — DOCUSATE SODIUM 100 MG: 100 CAPSULE, LIQUID FILLED ORAL at 17:53

## 2018-07-30 RX ADMIN — OXYCODONE HYDROCHLORIDE 5 MG: 5 TABLET ORAL at 06:00

## 2018-07-30 RX ADMIN — OXYCODONE HYDROCHLORIDE 5 MG: 5 TABLET ORAL at 16:48

## 2018-07-30 RX ADMIN — SENNOSIDES 8.6 MG: 8.6 TABLET, FILM COATED ORAL at 21:03

## 2018-07-30 RX ADMIN — OXYCODONE HYDROCHLORIDE 5 MG: 5 TABLET ORAL at 21:07

## 2018-07-30 RX ADMIN — GABAPENTIN 300 MG: 300 CAPSULE ORAL at 21:02

## 2018-07-30 RX ADMIN — SULFAMETHOXAZOLE AND TRIMETHOPRIM 1 TABLET: 800; 160 TABLET ORAL at 21:03

## 2018-07-30 NOTE — PROGRESS NOTES
Progress Note - George Velásquez 12/21/1930, 80 y o  male MRN: [id number]    Unit/Bed#: -Laci Encounter: [id number]    Primary Care Provider: India Sprague MD   Date and time admitted to hospital: 7/26/2018  9:44 AM    * Right ureteral stone   Assessment & Plan    · Earlier this month he was admitted with pain found to have a right-sided 8 millimeter ureteric stone without hydronephrosis but some surrounding stranding, underwent cystoscopy with a right-sided stent placement  Discharge with oral antibiotics for total 2 weeks  Found to have urinary retention at that time status post Marte insertion  Followed up with Urology as outpatient and was scheduled for stone lithotripsy and stent exchange 1st week of August, but due to his persistent pain they contacted Urology office and it was moved up to 07/31/2018  · Urology following - will have ureteroscopy on 7/31          Urinary tract infection associated with indwelling urethral catheter ([facility] [address] )   Assessment & Plan    · Does have flank pain so consider pyelo (although no evidence of this on imaging) vs cystitis  Right flank pain may just be due to stone  Urine culture growing >100,000 cfu Citrobacter and 40,000-49,000 cfu enterococcus- infection vs colonization  Note no fever or white count  Will treat given plans for ureteroscopy  · Citrobacter resistant to Rocephin  D/C Rocephin  · Will start Bactrim        Acute renal failure superimposed on stage 3 chronic kidney disease ([facility] [address] )   Assessment & Plan    · POA, Could be secondary to the persistent stone/UTI plus component of increased diuretics by Cardiology due to weight gain  · Resolved now  Torsemide and ACEI resumed  · Monitor creatinine        Urinary retention   Assessment & Plan    · Marte in place   Will likely be discharged with Marte  · Flomax and finasteride        Type 2 diabetes mellitus with hyperglycemia, with long-term current use of insulin [facility])   Assessment & Plan    Lab Results   Component Value Date    HGBA1C 7 4 (H) 2018       Recent Labs      18   1612  18   2116  18   0639  18   1044   POCGLU  143*  206*  116  162*       Blood Sugar Average: Last 72 hrs:  (P) 191 5   · Sugars improved today with addition of Lantus  · Restart empagliflozin at discharge         Paroxysmal atrial fibrillation (HCC)   Assessment & Plan    · Continue Coreg and Eliquis  · Discussed with urology- Eliquis does not need to be held prior to procedure        Chronic diastolic congestive heart failure (HCC)   Assessment & Plan    · Recent echo last month with EF of 60 percent  Due to weight gain his diuretics were increased from 40 milligrams to 80 milligrams of torsemide twice a day for 3 days  · Torsemide restarted at normal dose  · I&Os, daily weights          VTE Pharmacologic Prophylaxis:   Pharmacologic: Apixaban (Eliquis)  Mechanical VTE Prophylaxis in Place: Yes    Patient Centered Rounds: I have performed bedside rounds with nursing staff today  Discussions with Specialists or Other Care Team Provider:  Discussed with urology yesterday- no need to hold Eliquis  Planning to proceed with procedure tomorrow  Education and Discussions with Family / Patient: Patient  Updated daughter, Karine Linder, yesterday on plans for OR Wednesday    Time Spent for Care: 30 minutes  More than 50% of total time spent on counseling and coordination of care as described above  Current Length of Stay: 4 day(s)    Current Patient Status: Inpatient   Certification Statement: The patient will continue to require additional inpatient hospital stay due to urologic procedure    Discharge Plan: Pending urologic procedure tomorrow and PT eval    Code Status: Level 1 - Full Code      Subjective:   Reports back is "terrible " Has been refusing to participate in therapy until procedure completed      Objective:     Vitals:   Temp (24hrs), Av 8 °F (36 6 °C), Min:97 5 °F (36 4 °C), Max:98 1 °F (36 7 °C)    HR: [66-93] 93  Resp:  [17-20] 20  BP: ()/(45-76) 86/45  SpO2:  [96 %-100 %] 100 %  Body mass index is 28 25 kg/m²  Input and Output Summary (last 24 hours): Intake/Output Summary (Last 24 hours) at 07/30/18 1132  Last data filed at 07/30/18 0900   Gross per 24 hour   Intake              400 ml   Output             2250 ml   Net            -1850 ml       Physical Exam:     Physical Exam   Constitutional: No distress  HENT:   Extremely hard of hearing   Eyes:   Left eye abnormal   Neck: Neck supple  Cardiovascular: Normal rate, regular rhythm and normal heart sounds  Pulmonary/Chest: Effort normal and breath sounds normal  No respiratory distress  He has no wheezes  He has no rales  Abdominal: Soft  Bowel sounds are normal  He exhibits no distension  There is no tenderness  There is no rebound  Musculoskeletal:   Trace edema   Neurological: He is alert  Skin: He is not diaphoretic  Lower extremity wounds wrapped   Psychiatric: He has a normal mood and affect  His behavior is normal        Additional Data:     Labs:      Results from last 7 days  Lab Units 07/30/18  0548   WBC Thousand/uL 6 59   HEMOGLOBIN g/dL 8 2*   HEMATOCRIT % 27 7*   PLATELETS Thousands/uL 239   NEUTROS PCT % 73   LYMPHS PCT % 12*   MONOS PCT % 9   EOS PCT % 4       Results from last 7 days  Lab Units 07/30/18  0548  07/26/18  1018   SODIUM mmol/L 136  < > 130*   POTASSIUM mmol/L 4 4  < > 3 6   CHLORIDE mmol/L 98*  < > 94*   CO2 mmol/L 31  < > 31   BUN mg/dL 30*  < > 31*   CREATININE mg/dL 1 49*  < > 1 85*   CALCIUM mg/dL 8 3  < > 8 1*   TOTAL PROTEIN g/dL  --   --  6 5   BILIRUBIN TOTAL mg/dL  --   --  0 39   ALK PHOS U/L  --   --  91   ALT U/L  --   --  15   AST U/L  --   --  17   GLUCOSE RANDOM mg/dL 103  < > 153*   < > = values in this interval not displayed  * I Have Reviewed All Lab Data Listed Above  * Additional Pertinent Lab Tests Reviewed:  All Labs Within Last 24 Hours Reviewed    Imaging:    Imaging Reports Reviewed Today Include: None  Imaging Personally Reviewed by Myself Includes:  None    Recent Cultures (last 7 days):       Results from last 7 days  Lab Units 07/26/18  1511 07/26/18  1150   BLOOD CULTURE   --  No Growth at 72 hrs  No Growth at 72 hrs     URINE CULTURE  >100,000 cfu/ml Citrobacter freundii*  40,000-49,000 cfu/ml Enterococcus faecalis*  --        Last 24 Hours Medication List:     Current Facility-Administered Medications:  acetaminophen 650 mg Oral Q6H PRN Francisca Jack MD   acetaminophen 975 mg Oral Q8H [facility] GABBY SmithC   albuterol 2 5 mg Nebulization Q6H PRN Ceci Balderas MD   apixaban 2 5 mg Oral BID Ceci Balderas MD   artificial tear  Both Eyes HS Ceci Balderas MD   aspirin 81 mg Oral Daily Ceci Balderas MD   carvedilol 3 125 mg Oral BID With Meals Francisca Jack MD   docusate sodium 100 mg Oral BID Su Lizama PA-C   finasteride 5 mg Oral Daily Ceci Balderas MD   fish oil 1,000 mg Oral Daily Ceci Balderas MD   gabapentin 100 mg Oral BID Ceci Balderas MD   gabapentin 300 mg Oral HS Ceci Balderas MD   HYDROmorphone 0 2 mg Intravenous Q6H PRN Su Lizama PA-C   insulin glargine 5 Units Subcutaneous HS Su Lizama PA-C   insulin lispro 1-5 Units Subcutaneous HS Ceci Balderas MD   insulin lispro 1-6 Units Subcutaneous TID AC Ceci Balderas MD   lidocaine 1 patch Transdermal Daily Su Lizama PA-C   lisinopril 5 mg Oral Daily Venkat Osei PA-C   magnesium hydroxide 30 mL Oral Daily PRN Venkat Osei PA-C   melatonin 3 mg Oral HS Ceci Balderas MD   nitroglycerin 0 4 mg Sublingual Q5 Min PRN Ceci Balderas MD   nystatin  Topical BID Venkat Osei PA-C   ondansetron 4 mg Intravenous Q6H PRN Francisca Jack MD   oxyCODONE 2 5 mg Oral Q4H PRN [place], PA-C   oxyCODONE 5 mg Oral Q4H PRN Maple [place], SHAWN   potassium chloride 20 mEq Oral BID Francisca Jack MD senna 1 tablet Oral HS Cinthia Bence, PA-C   sulfamethoxazole-trimethoprim 1 tablet Oral Q12H [facility] Cinthia Bence, PA-C   tamsulosin 0 4 mg Oral Daily With Joseph Navarro MD   torsemide 40 mg Oral BID [place] PA-C        Today, Patient Was Seen By: Cinthia Bence, PA-C    ** Please Note: Dragon 360 Dictation voice to text software may have been used in the creation of this document   **

## 2018-07-30 NOTE — ASSESSMENT & PLAN NOTE
· Recent echo last month with EF of 60 percent  Due to weight gain his diuretics were increased from 40 milligrams to 80 milligrams of torsemide twice a day for 3 days    · Torsemide restarted at normal dose  · I&Os, daily weights

## 2018-07-30 NOTE — ASSESSMENT & PLAN NOTE
· Earlier this month he was admitted with pain found to have a right-sided 8 millimeter ureteric stone without hydronephrosis but some surrounding stranding, underwent cystoscopy with a right-sided stent placement  Discharge with oral antibiotics for total 2 weeks  Found to have urinary retention at that time status post Marte insertion   Followed up with Urology as outpatient and was scheduled for stone lithotripsy and stent exchange 1st week of August, but due to his persistent pain they contacted Urology office and it was moved up to 07/31/2018  · Urology following - will have ureteroscopy on 7/31

## 2018-07-30 NOTE — ASSESSMENT & PLAN NOTE
· Does have flank pain so consider pyelo (although no evidence of this on imaging) vs cystitis  Right flank pain may just be due to stone  Urine culture growing >100,000 cfu Citrobacter and 40,000-49,000 cfu enterococcus- infection vs colonization  Note no fever or white count  Will treat given plans for ureteroscopy  · Citrobacter resistant to Rocephin   D/C Rocephin  · Will start Bactrim

## 2018-07-30 NOTE — ASSESSMENT & PLAN NOTE
· Continue Coreg and Eliquis  · Discussed with urology- Eliquis does not need to be held prior to procedure

## 2018-07-30 NOTE — PROGRESS NOTES
UROLOGY PROGRESS NOTE   Patient Identifiers: Ashish Tay (MRN [id number])  Date of Service: 7/30/2018    Subjective:     24 HR EVENTS:   no significant events  Patient has  no complaints  He denies any flank pain or discomfort  Objective:     VITALS:    Vitals:    07/30/18 0722   BP: (!) 86/45   Pulse: 93   Resp: 20   Temp: 98 1 °F (36 7 °C)   SpO2: 100%       INS & OUTS:  I/O last 24 hours:   In: 120 [P O :120]  Out: 2850 [Urine:2850]    LABS:  Lab Results   Component Value Date    HGB 8 2 (L) 07/30/2018    HCT 27 7 (L) 07/30/2018    WBC 6 59 07/30/2018     07/30/2018   ]    Lab Results   Component Value Date     07/30/2018    K 4 4 07/30/2018    CL 98 (L) 07/30/2018    CO2 31 07/30/2018    BUN 30 (H) 07/30/2018    CREATININE 1 49 (H) 07/30/2018    CALCIUM 8 3 07/30/2018    GLUCOSE 103 07/30/2018   ]    INPATIENT MEDS:    Current Facility-Administered Medications:     acetaminophen (TYLENOL) tablet 650 mg, 650 mg, Oral, Q6H PRN, Mahin Whittaker MD    acetaminophen (TYLENOL) tablet 975 mg, 975 mg, Oral, Q8H [address], Nola Gagnon PA-C, 975 mg at 07/30/18 0601    albuterol inhalation solution 2 5 mg, 2 5 mg, Nebulization, Q6H PRN, Mahin Whittaker MD    apixaban (ELIQUIS) tablet 2 5 mg, 2 5 mg, Oral, BID, Mahin Whittaker MD, 2 5 mg at 07/29/18 1746    artificial tear (LUBRIFRESH P M ) ophthalmic ointment, , Both Eyes, HS, Ceci Balderas MD    aspirin (ECOTRIN LOW STRENGTH) EC tablet 81 mg, 81 mg, Oral, Daily, Ceci Balderas MD, 81 mg at 07/29/18 0824    carvedilol (COREG) tablet 3 125 mg, 3 125 mg, Oral, BID With Meals, Mahin Whittaker MD, Stopped at 07/30/18 0728    cefTRIAXone (ROCEPHIN) 1,000 mg in dextrose 5 % 50 mL IVPB, 1,000 mg, Intravenous, Q24H, Nola Gagnon PA-C, Last Rate: 100 mL/hr at 07/29/18 1616, 1,000 mg at 07/29/18 1616    docusate sodium (COLACE) capsule 100 mg, 100 mg, Oral, BID, Nola Gagnon PA-C, 100 mg at 07/29/18 0812   finasteride (PROSCAR) tablet 5 mg, 5 mg, Oral, Daily, Mukund Bhatti MD, 5 mg at 07/29/18 0824    fish oil capsule 1,000 mg, 1,000 mg, Oral, Daily, Mukund Bhatti MD, 1,000 mg at 07/29/18 0827    gabapentin (NEURONTIN) capsule 100 mg, 100 mg, Oral, BID, Mukund Bhatti MD, 100 mg at 07/29/18 1746    gabapentin (NEURONTIN) capsule 300 mg, 300 mg, Oral, HS, Ceci Balderas MD, 300 mg at 07/29/18 2159    HYDROmorphone (DILAUDID) injection 0 2 mg, 0 2 mg, Intravenous, Q6H PRN, Marybeth Rivera PA-C, 0 2 mg at 07/29/18 2205    insulin glargine (LANTUS) subcutaneous injection 5 Units 0 05 mL, 5 Units, Subcutaneous, HS, Marybeth Rivera PA-C, 5 Units at 07/29/18 2159    insulin lispro (HumaLOG) 100 units/mL subcutaneous injection 1-5 Units, 1-5 Units, Subcutaneous, HS, Ceci Balderas MD, 1 Units at 07/29/18 2200    insulin lispro (HumaLOG) 100 units/mL subcutaneous injection 1-6 Units, 1-6 Units, Subcutaneous, TID AC, 1 Units at 07/29/18 1133 **AND** Fingerstick Glucose (POCT), , , TID AC, Ceci Balderas MD    lidocaine (LIDODERM) 5 % patch 1 patch, 1 patch, Transdermal, Daily, Marybeth Rivera PA-C, 1 patch at 07/29/18 0825    lisinopril (ZESTRIL) tablet 5 mg, 5 mg, Oral, Daily, Venkat Osei PA-C, 5 mg at 07/29/18 0824    magnesium hydroxide (MILK OF MAGNESIA) 400 mg/5 mL oral suspension 30 mL, 30 mL, Oral, Daily PRN, Venkat Osei PA-C, 30 mL at 07/28/18 1536    melatonin tablet 3 mg, 3 mg, Oral, HS, Ceci Balderas MD, 3 mg at 07/29/18 2159    nitroglycerin (NITROSTAT) SL tablet 0 4 mg, 0 4 mg, Sublingual, Q5 Min PRN, Mukund Bhatti MD    nystatin (MYCOSTATIN) powder, , Topical, BID, Venkat Osei PA-C    ondansetron (ZOFRAN) injection 4 mg, 4 mg, Intravenous, Q6H PRN, Mukund Bhatti MD    oxyCODONE (ROXICODONE) IR tablet 2 5 mg, 2 5 mg, Oral, Q4H PRN, Marybeth Rivera PA-C    oxyCODONE (ROXICODONE) IR tablet 5 mg, 5 mg, Oral, Q4H PRN, Rueda Lei, PA-C, 5 mg at 07/30/18 0600    potassium chloride (K-DUR,KLOR-CON) CR tablet 20 mEq, 20 mEq, Oral, BID, Ceci Balderas MD, 20 mEq at 07/29/18 1746    senna (SENOKOT) tablet 8 6 mg, 1 tablet, Oral, HS, GABBY McgowanC, 8 6 mg at 07/27/18 2135    tamsulosin (FLOMAX) capsule 0 4 mg, 0 4 mg, Oral, Daily With Ama Banks MD, 0 4 mg at 07/29/18 1616    torsemide (DEMADEX) tablet 40 mg, 40 mg, Oral, BID, Venkat Osei PA-C, Stopped at 07/30/18 0804      Physical Exam:     GEN: alert and oriented x 3    CV: HRR, palpable pulses, no murmur, no rub, no gallop   RESP: breathing comfortably with no accessory muscle use  +breath sounds, clear  ABD: soft, non-tender, non-distended normoactive bs, +flatus  EXT: no significant peripheral edema   : negative CVA or suprapubic discomfort   HUNT: in place draining clear yellow urine  no clots      RADIOLOGY:     CT abdomen pelvis wo contrast 7/26/18    KIDNEYS/URETERS:  There has been interval placement of a right nephroureteral stent  There is no hydronephrosis  There is a mid ureteral calculus measuring 4 x 8 mm, unchanged in position from the prior study  There are bilateral renal cysts  IMPRESSION:     1   Status post right nephroureteral system and placement with stable position of 8 mm right mid ureteral calculus  No hydronephrosis      2   Stable changes at the lung bases with pleural effusions and groundglass opacity, nonspecific though possibly pulmonary edema      3  Severe prostatomegaly  Assessment:   79 y/o male s/p right ureteral stent placement on 7/1/18  Patient was scheduled for upcoming staged ureteroscopy on 7/31/18 with Dr Janette Rodriguez  He was discharged back to nursing home with hunt catheter in place, secondary to urinary retention  Patient returned to ER for persistent right sided flank pain and abdominal discomfort on 7/26/18  CT scan confirmed appropriate stent placement, negative hydronephrosis  Additional urologic history includes BPH with lower urinary tract symptoms on Flomax and oxybutynin daily, as well as low grade bladder cancer, and distant history of prostate cancer s/p XRT on androgen deprivation therapy by prior urologist       Plan:   -Continue antibiotics per primary team  -Maintain cabrera catheter to gravity drainage secondary to urinary retention  Patient will likely be discharged back to nursing home with cabrera catheter in place due to significant prostatomegaly and urinary retention  Continue Flomax and Finasteride daily  -Patient will remain inpatient until scheduled ureteroscopy on 7/31/18 with Dr Lawrence Tsang  Informed consent provided  Surgery consent to be signed by performing surgeon      RN participated in rounds with AP  Plan of care discussed with patient and RN

## 2018-07-30 NOTE — CASE MANAGEMENT
Continued Stay Review    Date: 07/30/2018    Vital Signs: BP (!) 86/45 (BP Location: Right arm)   Pulse 93   Temp 98 1 °F (36 7 °C) (Oral)   Resp 20   Ht 5' 10" (1 778 m)   Wt 89 3 kg (196 lb 13 9 oz)   SpO2 100%   BMI 28 25 kg/m²     Medications:   Scheduled Meds:   Current Facility-Administered Medications:  acetaminophen 650 mg Oral Q6H PRN Mukund Bhatti MD   acetaminophen 975 mg Oral Q8H [address] Marybeth Rivera PA-C   albuterol 2 5 mg Nebulization Q6H PRN Ceci Balderas MD   apixaban 2 5 mg Oral BID Ceci Balderas MD   artificial tear  Both Eyes HS Ceci Balderas MD   aspirin 81 mg Oral Daily Ceci Balderas MD   carvedilol 3 125 mg Oral BID With Meals Mukund Bhatti MD   docusate sodium 100 mg Oral BID Marybeth Rivera PA-C   finasteride 5 mg Oral Daily Ceci Balderas MD   fish oil 1,000 mg Oral Daily Ceci Balderas MD   gabapentin 100 mg Oral BID Ceci Balderas MD   gabapentin 300 mg Oral HS Ceci Balderas MD   HYDROmorphone 0 2 mg Intravenous Q6H PRN Marybeth Rivera PA-C   insulin glargine 5 Units Subcutaneous HS Marybeth Rivera PA-C   insulin lispro 1-5 Units Subcutaneous HS Ceci Balderas MD   insulin lispro 1-6 Units Subcutaneous TID AC Ceci Balderas MD   lidocaine 1 patch Transdermal Daily Marybeth Rivera PA-C   lisinopril 5 mg Oral Daily Venkat Osei PA-C   magnesium hydroxide 30 mL Oral Daily PRN Venkat Osei PA-C   melatonin 3 mg Oral HS Ceci Balderas MD   nitroglycerin 0 4 mg Sublingual Q5 Min PRN Ceci Balderas MD   nystatin  Topical BID Venkat Osei PA-C   ondansetron 4 mg Intravenous Q6H PRN Mukund Bhatti MD   oxyCODONE 2 5 mg Oral Q4H PRN Marybeth Rivera PA-C   oxyCODONE 5 mg Oral Q4H PRN Marybeth Rivera PA-C   potassium chloride 20 mEq Oral BID Muuknd Bhatti MD   senna 1 tablet Oral HS Marybeth Rivera PA-C   sulfamethoxazole-trimethoprim 1 tablet Oral Q12H [address] Marybeth Rivera PA-C tamsulosin 0 4 mg Oral Daily With Sylvia Johnson MD   torsemide 40 mg Oral BID Sadiq Shirley PA-C     Continuous Infusions:    PRN Meds:   acetaminophen    albuterol    HYDROmorphone    magnesium hydroxide    nitroglycerin    ondansetron    oxyCODONE    oxyCODONE    Abnormal Labs/Diagnostic Results:     Age/Sex: 80 y o  male     Assessment/Plan:   * Right ureteral stone   Assessment & Plan     · Earlier this month he was admitted with pain found to have a right-sided 8 millimeter ureteric stone without hydronephrosis but some surrounding stranding, underwent cystoscopy with a right-sided stent placement  Discharge with oral antibiotics for total 2 weeks  Found to have urinary retention at that time status post Marte insertion  Followed up with Urology as outpatient and was scheduled for stone lithotripsy and stent exchange 1st week of August, but due to his persistent pain they contacted Urology office and it was moved up to 07/31/2018  · Urology following - will have ureteroscopy on 7/31             Urinary tract infection associated with indwelling urethral catheter ([facility] [address] )   Assessment & Plan     · Does have flank pain so consider pyelo (although no evidence of this on imaging) vs cystitis  Right flank pain may just be due to stone  Urine culture growing >100,000 cfu Citrobacter and 40,000-49,000 cfu enterococcus- infection vs colonization  Note no fever or white count  Will treat given plans for ureteroscopy  · Citrobacter resistant to Rocephin  D/C Rocephin  · Will start Bactrim          Acute renal failure superimposed on stage 3 chronic kidney disease ([facility] [address] )   Assessment & Plan     · POA, Could be secondary to the persistent stone/UTI plus component of increased diuretics by Cardiology due to weight gain  · Resolved now  Torsemide and ACEI resumed  · Monitor creatinine          Urinary retention   Assessment & Plan     · Marte in place   Will likely be discharged with Marte  · Flomax and finasteride          Type 2 diabetes mellitus with hyperglycemia, with long-term current use of insulin [facility])   Assessment & Plan             Lab Results   Component Value Date     HGBA1C 7 4 (H) 05/23/2018                Recent Labs      07/29/18   1612  07/29/18   2116  07/30/18   0639  07/30/18   1044   POCGLU  143*  206*  116  162*         Blood Sugar Average: Last 72 hrs:  (P) 191 5   ? Sugars improved today with addition of Lantus  ? Restart empagliflozin at discharge           Paroxysmal atrial fibrillation (HCC)   Assessment & Plan     · Continue Coreg and Eliquis  · Discussed with urology- Eliquis does not need to be held prior to procedure          Chronic diastolic congestive heart failure (HCC)   Assessment & Plan     · Recent echo last month with EF of 60 percent  Due to weight gain his diuretics were increased from 40 milligrams to 80 milligrams of torsemide twice a day for 3 days  · Torsemide restarted at normal dose  · I&Os, daily weights             VTE Pharmacologic Prophylaxis:   Pharmacologic: Apixaban (Eliquis)  Mechanical VTE Prophylaxis in Place: Yes     Time Spent for Care: 30 minutes    More than 50% of total time spent on counseling and coordination of care as described above      Current Length of Stay: 4 day(s)     Current Patient Status: Inpatient   Certification Statement: The patient will continue to require additional inpatient hospital stay due to urologic procedure     Discharge Plan: Pending urologic procedure tomorrow and PT eval       Discharge Plan:

## 2018-07-30 NOTE — CONSULTS
Consult Note - Wound   Sharlene Key 80 y o  male MRN: [id number]  Unit/Bed#: -[facility] Encounter: [id number]      History and Present Illness:  Patient is an 80year old male presenting with R flank pain and adm with R ureteral stone and POA skin integrity issues  Assessment Findings:   1-R leg distal aspect with "V" shaped skin laceration, well approximated with suture measuring as documented  Periwound is macerated, wound bed is moist, with yellow brown slough at base with visible blue suture at distal aspect  2- L lateral heel unstageable pressure injury likely POA due to wounds presentation of wound with new skin to periwound  Wound bed with moist brown eschar, patient does elaborate having a wound to foot  Wound measures as documented with minimal purulent exudate  3- L buttock with small POA unstageable pressure injury measuring as documented with yellow at base  4-Tip of L great toe with HA DTI per charting measuring as documented and presenting as deep maroon, non blanchable discoloration  Allevyn sacral foam to bilateral heels as well as sacrum noted, however  Sacral foam removed since patient is grossly incontinent of stool with maceration to bilateral sacrum  Mild candidiasis to bilateral inner thigh with slightly denuded skin noted  Nystatin powder is already ordered  Skin care plans:  1-Calazime to sacrum, buttocks TID and PRN  2- -Allevyn foam to heels, pee w/P, R side, T to L side, peel foam check skin integrity q-shift  Change q5d  3-Elevate heels to offload pressure  4-Soft care cushion when out of bed  5-Turn/repoisiton q2h or when medically stable for pressure re-distribution on skin  6-Moisturize skin daily with skin nourishing cream  7-Cleanse RLE laceration with NSS, adaptic, calcium alginte, 4 x 4 and dandy daily    8-Hydraguard to L great toe DTI, BID and PRN    Vitals: Blood pressure (!) 86/45, pulse 93, temperature 98 1 °F (36 7 °C), temperature source Oral, resp  rate 20, height 5' 10" (1 778 m), weight 89 3 kg (196 lb 13 9 oz), SpO2 100 %  ,Body mass index is 28 25 kg/m²  Pressure Ulcer 07/27/18 Buttocks Left (Active)   Staging Unstagable 7/30/2018  1:23 PM   Wound Description Yellow 7/30/2018  1:23 PM   [place]-wound Assessment Clean;Dry; Intact 7/30/2018  9:04 AM   Wound Length (cm) 0 5 cm 7/30/2018  1:23 PM   Wound Width (cm) 0 5 cm 7/30/2018  1:23 PM   Calculated Wound Area (cm^2) 0 25 cm^2 7/30/2018  1:23 PM   Tunneling 0 cm 7/30/2018  1:23 PM   Undermining 0 7/30/2018  1:23 PM   Treatment Cleansed; Offload 7/30/2018  9:04 AM   Dressing Foam, Silicone (eg  Allevyn, etc) 7/30/2018  9:04 AM   Dressing Changed New dressing applied 7/27/2018  7:00 PM   Patient Tolerance Tolerated well 7/30/2018  1:23 PM   Dressing Status Clean;Dry; Intact 7/30/2018  9:04 AM       Pressure Ulcer 07/28/18 Toe (Comment which one) Anterior (Active)   Staging Deep Tissue Injury 7/30/2018  1:23 PM   Wound Description Light purple 7/30/2018  1:23 PM   [place]-wound Assessment Clean;Dry; Intact 7/30/2018  7:23 AM   Shape oval 7/30/2018  7:23 AM   Wound Length (cm) 1 4 cm 7/30/2018  1:23 PM   Wound Width (cm) 1 cm 7/30/2018  1:23 PM   Calculated Wound Area (cm^2) 1 4 cm^2 7/30/2018  1:23 PM   Tunneling 0 cm 7/30/2018  1:23 PM   Undermining 0 7/30/2018  1:23 PM   Patient Tolerance Tolerated well 7/30/2018  1:23 PM   Dressing Status Open to air 7/30/2018  7:23 AM       Pressure Ulcer 07/30/18 Heel Left; Outer L lateral heel unstageable PI (Active)   Staging Unstagable 7/30/2018  1:23 PM   Wound Description Black; Brown 7/30/2018  1:23 PM   [place]-wound Assessment Intact; Pink 7/30/2018  1:23 PM   Wound Length (cm) 1 5 cm 7/30/2018  1:23 PM   Wound Width (cm) 1 5 cm 7/30/2018  1:23 PM   Calculated Wound Area (cm^2) 2 25 cm^2 7/30/2018  1:23 PM   Tunneling 0 cm 7/30/2018  1:23 PM   Undermining 0 7/30/2018  1:23 PM   Treatment Elevated with pillow(s); Offload 7/30/2018  1:23 PM   Dressing Foam, Silicone (eg  Allevyn, etc) 7/30/2018  1:23 PM   Patient Tolerance Tolerated well 7/30/2018  1:23 PM       Wound 07/03/18 Laceration Leg Right; Lower (Active)   Wound Description Brown 7/30/2018  1:23 PM   [place]-wound Assessment Maceration 7/30/2018  1:23 PM   Wound Length (cm) 5 5 cm 7/30/2018  1:23 PM   Wound Width (cm) 1 cm 7/30/2018  1:23 PM   Tunneling 0 cm 7/30/2018  1:23 PM   Undermining 0 7/30/2018  1:23 PM   Drainage Amount Small 7/30/2018  1:23 PM   Drainage Description Purulent 7/30/2018  1:23 PM   Non-staged Wound Description Full thickness 7/30/2018  1:23 PM   Treatments Cleansed;Site care 7/30/2018  1:23 PM   Dressing Non adherent; Other (Comment) 7/30/2018  1:23 PM   Dressing Changed Reinforced 7/27/2018  7:51 AM   Patient Tolerance Tolerated well 7/30/2018  1:23 PM   Dressing Status Clean;Dry; Intact 7/30/2018 12:36 AM         Our skin care recommendations were placed as orders, please call ext [phone] or [phone] with questions or concerns  Wound care to continue following      Jacklyn Moralez RN, BSN, [employer]

## 2018-07-30 NOTE — DISCHARGE INSTR - OTHER ORDERS
Skin care plans:  1-Calazime to sacrum, buttocks TID and PRN  2- -Allevyn foam to heels, pee w/P, R side, T to L side, peel foam check skin integrity q-shift  Change q5d  3-Elevate heels to offload pressure  4-Soft care cushion when out of bed  5-Turn/repoisiton q2h or when medically stable for pressure re-distribution on skin  6-Moisturize skin daily with skin nourishing cream  7-Cleanse RLE laceration with NSS, adaptic, calcium alginte, 4 x 4 and dandy daily

## 2018-07-30 NOTE — PHYSICAL THERAPY NOTE
Physical Therapy Cancellation Note      Pt refusing to participate with therapy, stating he will not get out of bed until he has his procedure done tomorrow

## 2018-07-30 NOTE — OCCUPATIONAL THERAPY NOTE
OT CANCEL NOTE     Orders received  Chart review completed  Pt refuses to participate in OT evaluation until procedure scheduled for 7/31 is complete  Will continue to follow to complete OT eval as medically appropriate/able after completion of procedure 7/31           Fausto Sewell MS, OTR/L , CBIS

## 2018-07-30 NOTE — ASSESSMENT & PLAN NOTE
Lab Results   Component Value Date    HGBA1C 7 4 (H) 05/23/2018       Recent Labs      07/29/18   1612  07/29/18   2116  07/30/18   0639  07/30/18   1044   POCGLU  143*  206*  116  162*       Blood Sugar Average: Last 72 hrs:  (P) 191 5   · Sugars improved today with addition of Lantus  · Restart empagliflozin at discharge

## 2018-07-31 ENCOUNTER — ANESTHESIA (INPATIENT)
Dept: PERIOP | Facility: HOSPITAL | Age: 83
DRG: 669 | End: 2018-07-31
Payer: MEDICARE

## 2018-07-31 ENCOUNTER — ANESTHESIA EVENT (INPATIENT)
Dept: PERIOP | Facility: HOSPITAL | Age: 83
DRG: 669 | End: 2018-07-31
Payer: MEDICARE

## 2018-07-31 ENCOUNTER — APPOINTMENT (INPATIENT)
Dept: RADIOLOGY | Facility: HOSPITAL | Age: 83
DRG: 669 | End: 2018-07-31
Payer: MEDICARE

## 2018-07-31 LAB
ANION GAP SERPL CALCULATED.3IONS-SCNC: 5 MMOL/L (ref 4–13)
BACTERIA BLD CULT: NORMAL
BACTERIA BLD CULT: NORMAL
BUN SERPL-MCNC: 28 MG/DL (ref 5–25)
CALCIUM SERPL-MCNC: 8.6 MG/DL (ref 8.3–10.1)
CHLORIDE SERPL-SCNC: 99 MMOL/L (ref 100–108)
CO2 SERPL-SCNC: 30 MMOL/L (ref 21–32)
CREAT SERPL-MCNC: 1.34 MG/DL (ref 0.6–1.3)
GFR SERPL CREATININE-BSD FRML MDRD: 47 ML/MIN/1.73SQ M
GLUCOSE SERPL-MCNC: 104 MG/DL (ref 65–140)
GLUCOSE SERPL-MCNC: 110 MG/DL (ref 65–140)
GLUCOSE SERPL-MCNC: 111 MG/DL (ref 65–140)
GLUCOSE SERPL-MCNC: 113 MG/DL (ref 65–140)
GLUCOSE SERPL-MCNC: 132 MG/DL (ref 65–140)
GLUCOSE SERPL-MCNC: 230 MG/DL (ref 65–140)
POTASSIUM SERPL-SCNC: 4.7 MMOL/L (ref 3.5–5.3)
SODIUM SERPL-SCNC: 134 MMOL/L (ref 136–145)

## 2018-07-31 PROCEDURE — 0T768DZ DILATION OF RIGHT URETER WITH INTRALUMINAL DEVICE, VIA NATURAL OR ARTIFICIAL OPENING ENDOSCOPIC: ICD-10-PCS | Performed by: UROLOGY

## 2018-07-31 PROCEDURE — 87186 SC STD MICRODIL/AGAR DIL: CPT | Performed by: UROLOGY

## 2018-07-31 PROCEDURE — 87077 CULTURE AEROBIC IDENTIFY: CPT | Performed by: UROLOGY

## 2018-07-31 PROCEDURE — 87147 CULTURE TYPE IMMUNOLOGIC: CPT | Performed by: UROLOGY

## 2018-07-31 PROCEDURE — 87086 URINE CULTURE/COLONY COUNT: CPT | Performed by: UROLOGY

## 2018-07-31 PROCEDURE — C1769 GUIDE WIRE: HCPCS | Performed by: UROLOGY

## 2018-07-31 PROCEDURE — 80048 BASIC METABOLIC PNL TOTAL CA: CPT | Performed by: PHYSICIAN ASSISTANT

## 2018-07-31 PROCEDURE — 0T2BX0Z CHANGE DRAINAGE DEVICE IN BLADDER, EXTERNAL APPROACH: ICD-10-PCS | Performed by: UROLOGY

## 2018-07-31 PROCEDURE — 52356 CYSTO/URETERO W/LITHOTRIPSY: CPT | Performed by: UROLOGY

## 2018-07-31 PROCEDURE — 99231 SBSQ HOSP IP/OBS SF/LOW 25: CPT | Performed by: PHYSICIAN ASSISTANT

## 2018-07-31 PROCEDURE — 74420 UROGRAPHY RTRGR +-KUB: CPT

## 2018-07-31 PROCEDURE — C2617 STENT, NON-COR, TEM W/O DEL: HCPCS | Performed by: UROLOGY

## 2018-07-31 PROCEDURE — 82948 REAGENT STRIP/BLOOD GLUCOSE: CPT

## 2018-07-31 PROCEDURE — 82360 CALCULUS ASSAY QUANT: CPT | Performed by: UROLOGY

## 2018-07-31 PROCEDURE — 0TC68ZZ EXTIRPATION OF MATTER FROM RIGHT URETER, VIA NATURAL OR ARTIFICIAL OPENING ENDOSCOPIC: ICD-10-PCS | Performed by: UROLOGY

## 2018-07-31 DEVICE — STENT URETERAL 6 FR 26CM INLAY OPTIMA: Type: IMPLANTABLE DEVICE | Site: KIDNEY | Status: FUNCTIONAL

## 2018-07-31 RX ORDER — FENTANYL CITRATE 50 UG/ML
INJECTION, SOLUTION INTRAMUSCULAR; INTRAVENOUS AS NEEDED
Status: DISCONTINUED | OUTPATIENT
Start: 2018-07-31 | End: 2018-07-31 | Stop reason: SURG

## 2018-07-31 RX ORDER — SODIUM CHLORIDE, SODIUM LACTATE, POTASSIUM CHLORIDE, CALCIUM CHLORIDE 600; 310; 30; 20 MG/100ML; MG/100ML; MG/100ML; MG/100ML
INJECTION, SOLUTION INTRAVENOUS CONTINUOUS PRN
Status: DISCONTINUED | OUTPATIENT
Start: 2018-07-31 | End: 2018-07-31

## 2018-07-31 RX ORDER — FENTANYL CITRATE/PF 50 MCG/ML
50 SYRINGE (ML) INJECTION
Status: DISCONTINUED | OUTPATIENT
Start: 2018-07-31 | End: 2018-07-31 | Stop reason: HOSPADM

## 2018-07-31 RX ORDER — CIPROFLOXACIN 2 MG/ML
400 INJECTION, SOLUTION INTRAVENOUS ONCE
Status: COMPLETED | OUTPATIENT
Start: 2018-07-31 | End: 2018-07-31

## 2018-07-31 RX ORDER — ONDANSETRON 2 MG/ML
4 INJECTION INTRAMUSCULAR; INTRAVENOUS ONCE AS NEEDED
Status: DISCONTINUED | OUTPATIENT
Start: 2018-07-31 | End: 2018-07-31 | Stop reason: HOSPADM

## 2018-07-31 RX ORDER — PROPOFOL 10 MG/ML
INJECTION, EMULSION INTRAVENOUS AS NEEDED
Status: DISCONTINUED | OUTPATIENT
Start: 2018-07-31 | End: 2018-07-31 | Stop reason: SURG

## 2018-07-31 RX ORDER — MAGNESIUM HYDROXIDE 1200 MG/15ML
LIQUID ORAL AS NEEDED
Status: DISCONTINUED | OUTPATIENT
Start: 2018-07-31 | End: 2018-07-31 | Stop reason: HOSPADM

## 2018-07-31 RX ORDER — FENTANYL CITRATE/PF 50 MCG/ML
25 SYRINGE (ML) INJECTION
Status: COMPLETED | OUTPATIENT
Start: 2018-07-31 | End: 2018-07-31

## 2018-07-31 RX ORDER — LIDOCAINE HYDROCHLORIDE 10 MG/ML
INJECTION, SOLUTION INFILTRATION; PERINEURAL AS NEEDED
Status: DISCONTINUED | OUTPATIENT
Start: 2018-07-31 | End: 2018-07-31 | Stop reason: SURG

## 2018-07-31 RX ORDER — SODIUM CHLORIDE, SODIUM LACTATE, POTASSIUM CHLORIDE, CALCIUM CHLORIDE 600; 310; 30; 20 MG/100ML; MG/100ML; MG/100ML; MG/100ML
100 INJECTION, SOLUTION INTRAVENOUS CONTINUOUS
Status: DISCONTINUED | OUTPATIENT
Start: 2018-07-31 | End: 2018-08-02

## 2018-07-31 RX ADMIN — LIDOCAINE HYDROCHLORIDE 50 MG: 10 INJECTION, SOLUTION INFILTRATION; PERINEURAL at 13:27

## 2018-07-31 RX ADMIN — Medication 1000 MG: at 09:13

## 2018-07-31 RX ADMIN — GABAPENTIN 100 MG: 100 CAPSULE ORAL at 09:12

## 2018-07-31 RX ADMIN — APIXABAN 2.5 MG: 2.5 TABLET, FILM COATED ORAL at 17:48

## 2018-07-31 RX ADMIN — FENTANYL CITRATE 50 MCG: 50 INJECTION, SOLUTION INTRAMUSCULAR; INTRAVENOUS at 15:25

## 2018-07-31 RX ADMIN — CARVEDILOL 3.12 MG: 3.12 TABLET, FILM COATED ORAL at 17:48

## 2018-07-31 RX ADMIN — MAGNESIUM HYDROXIDE 30 ML: 400 SUSPENSION ORAL at 22:51

## 2018-07-31 RX ADMIN — FENTANYL CITRATE 25 MCG: 50 INJECTION, SOLUTION INTRAMUSCULAR; INTRAVENOUS at 13:30

## 2018-07-31 RX ADMIN — GABAPENTIN 100 MG: 100 CAPSULE ORAL at 17:48

## 2018-07-31 RX ADMIN — FENTANYL CITRATE 25 MCG: 50 INJECTION, SOLUTION INTRAMUSCULAR; INTRAVENOUS at 14:42

## 2018-07-31 RX ADMIN — MELATONIN 3 MG: at 22:52

## 2018-07-31 RX ADMIN — GABAPENTIN 300 MG: 300 CAPSULE ORAL at 22:52

## 2018-07-31 RX ADMIN — AMPICILLIN SODIUM 1000 MG: 1 INJECTION, POWDER, FOR SOLUTION INTRAMUSCULAR; INTRAVENOUS at 13:29

## 2018-07-31 RX ADMIN — FINASTERIDE 5 MG: 5 TABLET, FILM COATED ORAL at 09:13

## 2018-07-31 RX ADMIN — ACETAMINOPHEN 975 MG: 325 TABLET ORAL at 05:41

## 2018-07-31 RX ADMIN — SENNOSIDES 8.6 MG: 8.6 TABLET, FILM COATED ORAL at 22:51

## 2018-07-31 RX ADMIN — DOCUSATE SODIUM 100 MG: 100 CAPSULE, LIQUID FILLED ORAL at 09:13

## 2018-07-31 RX ADMIN — LIDOCAINE 1 PATCH: 50 PATCH CUTANEOUS at 09:13

## 2018-07-31 RX ADMIN — POTASSIUM CHLORIDE 20 MEQ: 1500 TABLET, EXTENDED RELEASE ORAL at 09:12

## 2018-07-31 RX ADMIN — DOCUSATE SODIUM 100 MG: 100 CAPSULE, LIQUID FILLED ORAL at 17:47

## 2018-07-31 RX ADMIN — NYSTATIN: 100000 POWDER TOPICAL at 09:15

## 2018-07-31 RX ADMIN — SULFAMETHOXAZOLE AND TRIMETHOPRIM 1 TABLET: 800; 160 TABLET ORAL at 22:00

## 2018-07-31 RX ADMIN — TORSEMIDE 40 MG: 20 TABLET ORAL at 17:48

## 2018-07-31 RX ADMIN — NYSTATIN: 100000 POWDER TOPICAL at 17:57

## 2018-07-31 RX ADMIN — FENTANYL CITRATE 25 MCG: 50 INJECTION, SOLUTION INTRAMUSCULAR; INTRAVENOUS at 13:34

## 2018-07-31 RX ADMIN — OXYCODONE HYDROCHLORIDE 5 MG: 5 TABLET ORAL at 17:49

## 2018-07-31 RX ADMIN — INSULIN GLARGINE 5 UNITS: 100 INJECTION, SOLUTION SUBCUTANEOUS at 22:51

## 2018-07-31 RX ADMIN — MINERAL OIL AND WHITE PETROLATUM: 150; 830 OINTMENT OPHTHALMIC at 23:00

## 2018-07-31 RX ADMIN — TAMSULOSIN HYDROCHLORIDE 0.4 MG: 0.4 CAPSULE ORAL at 17:47

## 2018-07-31 RX ADMIN — SULFAMETHOXAZOLE AND TRIMETHOPRIM 1 TABLET: 800; 160 TABLET ORAL at 09:13

## 2018-07-31 RX ADMIN — PROPOFOL 100 MG: 10 INJECTION, EMULSION INTRAVENOUS at 13:27

## 2018-07-31 RX ADMIN — ASPIRIN 81 MG: 81 TABLET, COATED ORAL at 09:13

## 2018-07-31 RX ADMIN — SODIUM CHLORIDE, SODIUM LACTATE, POTASSIUM CHLORIDE, AND CALCIUM CHLORIDE: .6; .31; .03; .02 INJECTION, SOLUTION INTRAVENOUS at 13:11

## 2018-07-31 RX ADMIN — CIPROFLOXACIN: 2 INJECTION INTRAVENOUS at 13:29

## 2018-07-31 RX ADMIN — ACETAMINOPHEN 975 MG: 325 TABLET ORAL at 22:51

## 2018-07-31 RX ADMIN — INSULIN LISPRO 2 UNITS: 100 INJECTION, SOLUTION INTRAVENOUS; SUBCUTANEOUS at 23:00

## 2018-07-31 RX ADMIN — APIXABAN 2.5 MG: 2.5 TABLET, FILM COATED ORAL at 09:13

## 2018-07-31 RX ADMIN — POTASSIUM CHLORIDE 20 MEQ: 1500 TABLET, EXTENDED RELEASE ORAL at 17:48

## 2018-07-31 RX ADMIN — FENTANYL CITRATE 25 MCG: 50 INJECTION, SOLUTION INTRAMUSCULAR; INTRAVENOUS at 14:50

## 2018-07-31 NOTE — PHYSICAL THERAPY NOTE
Pt scheduled for cystoscopy today; will cont to follow up with pt after procedure as ordered and as appropriate

## 2018-07-31 NOTE — ASSESSMENT & PLAN NOTE
· Does have flank pain so consider pyelo (although no evidence of this on imaging) vs cystitis  Right flank pain may just be due to stone  Urine culture growing >100,000 cfu Citrobacter and 40,000-49,000 cfu enterococcus- infection vs colonization  Note no fever or white count  Will treat given plans for ureteroscopy  · Citrobacter resistant to Rocephin   D/C Rocephin  · Started on Bactrim - continue

## 2018-07-31 NOTE — ANESTHESIA POSTPROCEDURE EVALUATION
Post-Op Assessment Note      CV Status:  Stable    Mental Status:  Alert and awake    Hydration Status:  Euvolemic    PONV Controlled:  Controlled    Airway Patency:  Patent    Post Op Vitals Reviewed: Yes          Staff: CRNA           BP (P) 127/62 (07/31/18 1421)    Temp (P) 98 °F (36 7 °C) (07/31/18 1421)    Pulse (P) 76 (07/31/18 1421)   Resp (P) 16 (07/31/18 1421)    SpO2

## 2018-07-31 NOTE — OP NOTE
OPERATIVE REPORT  PATIENT NAME: Radha Moore    :  1930  MRN: [id number]  Pt Location: [facility] ROOM 01    SURGERY DATE: 2018    Surgeon(s) and Role:     * Bryanna Dangelo MD - Primary    Preop Diagnosis:  Ureter, calculus [N20 1]    Post-Op Diagnosis Codes:     * Ureter, calculus [N20 1]    Procedure(s) (LRB):  CYSTOSCOPY URETEROSCOPY WITH LITHOTRIPSY HOLMIUM LASER ,BASKET STONE EXTRACTION  RETROGRADE PYELOGRAM AND exchange STENT URETERAL (Right)    Specimen(s):  ID Type Source Tests Collected by Time Destination   A :  Urine Urine, Cystoscopic URINE CULTURE Bryanna Dangelo MD 2018 1341    B : right ureteral calculus Calculus Ureter, Right STONE ANALYSIS Bryanna Dangelo MD 2018 1354        Estimated Blood Loss:   Minimal    Drains:  Urethral Catheter 18 Fr  (Active)   Number of days: 0       Ureteral Drain/Stent Right ureter 6 Fr  (Active)   Number of days: 30       Anesthesia Type:   General    Operative Indications:  Ureter, calculus [N20 1]      Operative Findings:  Large Right proximal ureteral stone decimated with holmium laser and removed with zero tip basket  New R ureteral stent placed  Complications:   None     Procedure and Technique:      Procedure Description: Radha Moore is a 80y o -year-old male  with a history of R 8 mm prox stone s/p R ureteral stent  Risk and benefits of ureteroscopy were discussed and reviewed  Informed consent was obtained for R ureteroscopy with laser and stent exchange  The patient was brought to the operating room on 2018  After the smooth induction of GLMA anesthesia, the patient was placed in the dorsal lithotomy position  His genitalia was prepped and draped in a sterile fashion  Intravenous antibiotics were administered  A timeout was performed with all members of the operative team confirmed the patient's identity, procedure to be performed, and laterality of the case      A 22 [place] rigid cystoscope with 30° lens was inserted  The bladder was thoroughly inspected  It was no evidence of mucosal abnormalities or lesions  There were no calculi identified  A very large median lobe of the prostate was noted  Attention was focused on the right ureteral orifice  A 5 Wallisian open-ended catheter was inserted and a right retrograde pyelogram was performed  A filling defect in the right prox ureter was identified consistent with the stone  A wire was passed proximal to the stone and secured as a safety  A 12 Wallisian Marte catheter was inserted for continuous bladder drainage  A long semirigid ureteroscope was then inserted adjacent to the wire  The stone was identified and was engaged and decimated with a 400 µ holmium laser fiber  The fragments were removed with a dimension ZeroTip basket  The ureteroscope was then repassed multiple times to ensure that the ureter was free and clear of any residual stones  Additional contrast was injected as a roadmap for stent insertion  The ureteroscope and Marte catheter were then removed  The wire was backloaded through the cystoscope  The cystoscope was repassed into the bladder  A 26-6 [place] R double-J ureteral stent was then placed without difficulty  The proximal coil was appreciated in the R renal pelvis and the distal coil was visualized within the bladder  The bladder was emptied and the cystoscope was removed  A string was left in place and secured to the dorsum of the phallus with Tegaderm  Overall the patient tolerated the procedure well and were no complications  The patient was extubated in the operating room and transferred to the PACU in stable condition at the conclusion of the case  Patient Disposition:  PACU extubated and stable      SIGNATURE: Raquel Gomez MD  DATE: July 31, 2018  TIME: 2:20 PM

## 2018-07-31 NOTE — PROGRESS NOTES
Upon assessment, pts IV was still in from an insertion on 7/26, for 5 days  IV pulled, incident report written, and Nighat Barber PA-C made aware about incident  Pt resting comfortably turned to the right side  Will continue to monitor

## 2018-07-31 NOTE — ASSESSMENT & PLAN NOTE
Lab Results   Component Value Date    HGBA1C 7 4 (H) 05/23/2018       Recent Labs      07/30/18   1612  07/30/18   2120  07/31/18   0623  07/31/18   1120   POCGLU  166*  199*  113  110       Blood Sugar Average: Last 72 hrs:  (P) 032 [id number]   · Sugars improved with addition of Lantus  · Restart empagliflozin at discharge

## 2018-07-31 NOTE — ASSESSMENT & PLAN NOTE
· Earlier this month he was admitted with pain found to have a right-sided 8 millimeter ureteric stone without hydronephrosis but some surrounding stranding, underwent cystoscopy with a right-sided stent placement  Discharge with oral antibiotics for total 2 weeks  Found to have urinary retention at that time status post Marte insertion   Followed up with Urology as outpatient and was scheduled for stone lithotripsy and stent exchange 1st week of August, but due to his persistent pain they contacted Urology office and it was moved up to 07/31/2018  · Urology following - will have ureteroscopy today on 7/31

## 2018-07-31 NOTE — ANESTHESIA POSTPROCEDURE EVALUATION
Post-Op Assessment Note      CV Status:  Stable    Mental Status:  Alert and awake    Hydration Status:  Stable    PONV Controlled:  None    Airway Patency:  Patent    Post Op Vitals Reviewed: Yes          Staff: Anesthesiologist           BP   118/64   Temp      Pulse  79   Resp   14   SpO2   99

## 2018-07-31 NOTE — PROGRESS NOTES
Serena Polanco is an 80-year-old male known to Dr Mayank Grossman  He underwent right ureteral stent insertion for an 8 mm right proximal ureteral calculus with hydronephrosis and infection  The patient was transferred from his nursing facility back to [facility]  He has been mid now for 5 days on IV and oral antibiotics for Citrobacter and Enterococcus in the urine  CT scan shows the 8 mm stone is unchanged  In position with a right ureteral stent in place  Right-sided ureteroscopy with holmium laser lithotripsy, right retrograde pyelography and right ureteral stent exchange is recommended  Risk and benefits of the procedure were discussed and reviewed  Informed consent was obtained  Preoperative ciprofloxacin to cover the Citrobacter has been ordered  Pre operative ampicillin to cover the enterococcus has been ordered as well

## 2018-07-31 NOTE — ANESTHESIA PREPROCEDURE EVALUATION
Review of Systems/Medical History          Cardiovascular  Hypertension controlled, CAD , CAD status: 3VD, CHF , NYHA Classification: II compensated CHF,    Pulmonary       GI/Hepatic  No dysphagia,   No PUD,        Kidney stones, Kidney disease ARF,        Endo/Other  Diabetes well controlled type 2 ,      GYN       Hematology  Anemia ,     Musculoskeletal       Neurology   Psychology           Physical Exam    Airway    Mallampati score: II  TM Distance: >3 FB  Neck ROM: full     Dental       Cardiovascular  Rhythm: regular, Rate: normal, Cardiovascular exam normal    Pulmonary  Pulmonary exam normal Breath sounds clear to auscultation, No rhonchi, No wheezes,     Other Findings        Anesthesia Plan  ASA Score- 3     Anesthesia Type- general with ASA Monitors  Additional Monitors:   Airway Plan: LMA  Plan Factors-    Induction- intravenous  Postoperative Plan- Plan for postoperative opioid use  Planned trial extubation    Informed Consent- Anesthetic plan and risks discussed with patient  I personally reviewed this patient with the CRNA  Discussed and agreed on the Anesthesia Plan with the CRNA  Yandel Sanford

## 2018-07-31 NOTE — OCCUPATIONAL THERAPY NOTE
OT CANCEL NOTE     Orders received  Chart review completed  Pt scheduled for procedure 7/31/18 and previously refused OT evaluation until after procedure  Will continue to follow to complete OT eval as medically appropriate/able          Monette Mohs, MS, OTR/L , CBIS

## 2018-07-31 NOTE — PROGRESS NOTES
Progress Note - Marcus Dominguez 12/21/1930, 80 y o  male MRN: [id number]    Unit/Bed#: -01 Encounter: [id number]    Primary Care Provider: Elier Parada MD   Date and time admitted to hospital: 7/26/2018  9:44 AM      * Right ureteral stone   Assessment & Plan    · Earlier this month he was admitted with pain found to have a right-sided 8 millimeter ureteric stone without hydronephrosis but some surrounding stranding, underwent cystoscopy with a right-sided stent placement  Discharge with oral antibiotics for total 2 weeks  Found to have urinary retention at that time status post Marte insertion  Followed up with Urology as outpatient and was scheduled for stone lithotripsy and stent exchange 1st week of August, but due to his persistent pain they contacted Urology office and it was moved up to 07/31/2018  · Urology following - will have ureteroscopy today on 7/31          Urinary tract infection associated with indwelling urethral catheter ([facility] )   Assessment & Plan    · Does have flank pain so consider pyelo (although no evidence of this on imaging) vs cystitis  Right flank pain may just be due to stone  Urine culture growing >100,000 cfu Citrobacter and 40,000-49,000 cfu enterococcus- infection vs colonization  Note no fever or white count  Will treat given plans for ureteroscopy  · Citrobacter resistant to Rocephin  D/C Rocephin  · Started on Bactrim - continue        Acute renal failure superimposed on stage 3 chronic kidney disease ([facility] )   Assessment & Plan    · POA, Could be secondary to the persistent stone/UTI plus component of increased diuretics by Cardiology due to weight gain  · Resolved now  Torsemide and ACEI resumed  · Monitor creatinine        Urinary retention   Assessment & Plan    · Marte in place   Will likely be discharged with Marte  · Flomax and finasteride        Type 2 diabetes mellitus with hyperglycemia, with long-term current use of insulin [facility])   Assessment & Plan    Lab Results   Component Value Date    HGBA1C 7 4 (H) 05/23/2018       Recent Labs      07/30/18   1612  07/30/18   2120  07/31/18   0623  07/31/18   1120   POCGLU  166*  199*  113  110       Blood Sugar Average: Last 72 hrs:  (P) 068 [id number]   · Sugars improved with addition of Lantus  · Restart empagliflozin at discharge         CAD (coronary artery disease)   Assessment & Plan    · Patient without chest pain   · Continue home medical management - ASA, coreg         Paroxysmal atrial fibrillation (HCC)   Assessment & Plan    · Continue Coreg and Eliquis  · Discussed with urology- Eliquis does not need to be held prior to procedure        Chronic diastolic congestive heart failure (HCC)   Assessment & Plan    · Recent echo last month with EF of 60 percent  Due to weight gain his diuretics were increased from 40 milligrams to 80 milligrams of torsemide twice a day for 3 days  · Torsemide restarted at normal dose  · I&Os, daily weights          VTE Pharmacologic Prophylaxis:   Pharmacologic: Apixaban (Eliquis)  Mechanical VTE Prophylaxis in Place: No    Patient Centered Rounds: I have performed bedside rounds with nursing staff today  Discussions with Specialists or Other Care Team Provider: RN    Education and Discussions with Family / Patient: Called daughter, Sofía Chiu, and gave update over phone    Time Spent for Care: 30 minutes  More than 50% of total time spent on counseling and coordination of care as described above  Current Length of Stay: 5 day(s)    Current Patient Status: Inpatient   Certification Statement: The patient will continue to require additional inpatient hospital stay due to ureteroscopy today, PT evaluation    Discharge Plan: following procedure and PT evaluation     Code Status: Level 1 - Full Code      Subjective:   Patient has no complaints today, other than pain in his right flank associated with movement  Reports he will participate with PT after procedure    ROS otherwise negative, denies fevers, CP, SOB, N/V, abdominal pain, edema  Objective:     Vitals:   Temp (24hrs), Av 2 °F (36 8 °C), Min:97 7 °F (36 5 °C), Max:98 6 °F (37 °C)    HR:  [62-79] 66  Resp:  [20] 20  BP: (106-125)/(51-58) 107/52  SpO2:  [95 %-100 %] 100 %  Body mass index is 28 15 kg/m²  Input and Output Summary (last 24 hours): Intake/Output Summary (Last 24 hours) at 18 1151  Last data filed at 18 0900   Gross per 24 hour   Intake              360 ml   Output             1750 ml   Net            -1390 ml       Physical Exam:     Physical Exam   Constitutional: He is oriented to person, place, and time  He appears well-developed and well-nourished  No distress  [place]   HENT:   Head: Normocephalic and atraumatic  Eyes: Right eye exhibits no discharge  Left eye exhibits no discharge  PERRL OD, Left eye abnormal - chronic   Neck: Normal range of motion  Neck supple  No thyromegaly present  Cardiovascular: Normal rate, regular rhythm and normal heart sounds  No murmur heard  Pulmonary/Chest: Effort normal and breath sounds normal  No respiratory distress  He has no wheezes  He exhibits no tenderness  Abdominal: Soft  Bowel sounds are normal  He exhibits no distension  There is no tenderness  Musculoskeletal: Normal range of motion  He exhibits edema (trace b/l LE)  He exhibits no tenderness  Wrapped wounds of LE   Neurological: He is alert and oriented to person, place, and time  No cranial nerve deficit  Skin: Skin is warm and dry  No rash noted  He is not diaphoretic  No erythema  Psychiatric: He has a normal mood and affect  His behavior is normal  Judgment and thought content normal    Vitals reviewed      Additional Data:     Labs:      Results from last 7 days  Lab Units 18  0548   WBC Thousand/uL 6 59   HEMOGLOBIN g/dL 8 2*   HEMATOCRIT % 27 7*   PLATELETS Thousands/uL 239   NEUTROS PCT % 73   LYMPHS PCT % 12*   MONOS PCT % 9   EOS PCT % 4       Results from last 7 days  Lab Units 07/31/18  0520  07/26/18  1018   SODIUM mmol/L 134*  < > 130*   POTASSIUM mmol/L 4 7  < > 3 6   CHLORIDE mmol/L 99*  < > 94*   CO2 mmol/L 30  < > 31   BUN mg/dL 28*  < > 31*   CREATININE mg/dL 1 34*  < > 1 85*   CALCIUM mg/dL 8 6  < > 8 1*   TOTAL PROTEIN g/dL  --   --  6 5   BILIRUBIN TOTAL mg/dL  --   --  0 39   ALK PHOS U/L  --   --  91   ALT U/L  --   --  15   AST U/L  --   --  17   GLUCOSE RANDOM mg/dL 104  < > 153*   < > = values in this interval not displayed  Results from last 7 days  Lab Units 07/31/18  1120 07/31/18  0623 07/30/18  2120 07/30/18  1612 07/30/18  1044 07/30/18  0639 07/29/18  2116 07/29/18  1612 07/29/18  1106 07/29/18  0632 07/28/18  2120 07/28/18  1616   POC GLUCOSE mg/dl 110 113 199* 166* 162* 116 206* 143* 160* 151* 279* 219*             * I Have Reviewed All Lab Data Listed Above  * Additional Pertinent Lab Tests Reviewed: All Labs Within Last 24 Hours Reviewed    Imaging:    Imaging Reports Reviewed Today Include: none  Imaging Personally Reviewed by Myself Includes:  none    Recent Cultures (last 7 days):       Results from last 7 days  Lab Units 07/26/18  1511 07/26/18  1150   BLOOD CULTURE   --  No Growth After 4 Days  No Growth After 4 Days     URINE CULTURE  >100,000 cfu/ml Citrobacter freundii*  40,000-49,000 cfu/ml Enterococcus faecalis*  --        Last 24 Hours Medication List:     Current Facility-Administered Medications:  acetaminophen 650 mg Oral Q6H PRN Ceci Balderas MD   acetaminophen 975 mg Oral Q8H [address] Joyce Dudley PA-C   albuterol 2 5 mg Nebulization Q6H PRN Sonia Arechiga MD   apixaban 2 5 mg Oral BID Sonia Arechiga MD   artificial tear  Both Eyes HS Sonia Arechiga MD   aspirin 81 mg Oral Daily Ceci Balderas MD   carvedilol 3 125 mg Oral BID With Meals Ceci Balderas MD   docusate sodium 100 mg Oral BID Joyce Dudley PA-C   finasteride 5 mg Oral Daily Ceci Balderas MD   fish oil 1,000 mg Oral Daily Alexandra Hdz MD   gabapentin 100 mg Oral BID Ceci Balderas MD   gabapentin 300 mg Oral HS Ceci Balderas MD   HYDROmorphone 0 2 mg Intravenous Q6H PRN Gloria Perez PA-C   insulin glargine 5 Units Subcutaneous HS Gloria Perez PA-C   insulin lispro 1-5 Units Subcutaneous HS Ceci Balderas MD   insulin lispro 1-6 Units Subcutaneous TID AC Ceci Balderas MD   lidocaine 1 patch Transdermal Daily Gloria Perez PA-C   lisinopril 5 mg Oral Daily Venkat Osei PA-C   magnesium hydroxide 30 mL Oral Daily PRN Venkat Osei PA-C   melatonin 3 mg Oral HS Ceci Balderas MD   nitroglycerin 0 4 mg Sublingual Q5 Min PRN Ceci Balderas MD   nystatin  Topical BID Venkat Osei PA-C   ondansetron 4 mg Intravenous Q6H PRN Alexandra Hdz MD   oxyCODONE 2 5 mg Oral Q4H PRN Gloria Perez PA-C   oxyCODONE 5 mg Oral Q4H PRN Gloria Perez PA-C   potassium chloride 20 mEq Oral BID Alexandra Hdz MD   senna 1 tablet Oral HS Gloria Perez PA-C   sulfamethoxazole-trimethoprim 1 tablet Oral Q12H [facility] Gloria Perez PA-C   tamsulosin 0 4 mg Oral Daily With Kerry Gandhi MD   torsemide 40 mg Oral BID Whit Alvarez PA-C        Today, Patient Was Seen By: Mercedes Bryan PA-C    ** Please Note: Dictation voice to text software may have been used in the creation of this document   **

## 2018-08-01 LAB
ANION GAP SERPL CALCULATED.3IONS-SCNC: 7 MMOL/L (ref 4–13)
BASOPHILS # BLD AUTO: 0.03 THOUSANDS/ΜL (ref 0–0.1)
BASOPHILS NFR BLD AUTO: 1 % (ref 0–1)
BUN SERPL-MCNC: 28 MG/DL (ref 5–25)
CALCIUM SERPL-MCNC: 8.6 MG/DL (ref 8.3–10.1)
CHLORIDE SERPL-SCNC: 100 MMOL/L (ref 100–108)
CO2 SERPL-SCNC: 29 MMOL/L (ref 21–32)
CREAT SERPL-MCNC: 1.5 MG/DL (ref 0.6–1.3)
EOSINOPHIL # BLD AUTO: 0.15 THOUSAND/ΜL (ref 0–0.61)
EOSINOPHIL NFR BLD AUTO: 3 % (ref 0–6)
ERYTHROCYTE [DISTWIDTH] IN BLOOD BY AUTOMATED COUNT: 15.6 % (ref 11.6–15.1)
GFR SERPL CREATININE-BSD FRML MDRD: 41 ML/MIN/1.73SQ M
GLUCOSE SERPL-MCNC: 124 MG/DL (ref 65–140)
GLUCOSE SERPL-MCNC: 126 MG/DL (ref 65–140)
GLUCOSE SERPL-MCNC: 149 MG/DL (ref 65–140)
GLUCOSE SERPL-MCNC: 166 MG/DL (ref 65–140)
GLUCOSE SERPL-MCNC: 213 MG/DL (ref 65–140)
HCT VFR BLD AUTO: 26.6 % (ref 36.5–49.3)
HGB BLD-MCNC: 8 G/DL (ref 12–17)
IMM GRANULOCYTES # BLD AUTO: 0.02 THOUSAND/UL (ref 0–0.2)
IMM GRANULOCYTES NFR BLD AUTO: 0 % (ref 0–2)
LYMPHOCYTES # BLD AUTO: 0.49 THOUSANDS/ΜL (ref 0.6–4.47)
LYMPHOCYTES NFR BLD AUTO: 10 % (ref 14–44)
MCH RBC QN AUTO: 26.6 PG (ref 26.8–34.3)
MCHC RBC AUTO-ENTMCNC: 30.1 G/DL (ref 31.4–37.4)
MCV RBC AUTO: 88 FL (ref 82–98)
MONOCYTES # BLD AUTO: 0.49 THOUSAND/ΜL (ref 0.17–1.22)
MONOCYTES NFR BLD AUTO: 10 % (ref 4–12)
NEUTROPHILS # BLD AUTO: 3.92 THOUSANDS/ΜL (ref 1.85–7.62)
NEUTS SEG NFR BLD AUTO: 76 % (ref 43–75)
NRBC BLD AUTO-RTO: 0 /100 WBCS
PLATELET # BLD AUTO: 274 THOUSANDS/UL (ref 149–390)
PMV BLD AUTO: 10.3 FL (ref 8.9–12.7)
POTASSIUM SERPL-SCNC: 4.6 MMOL/L (ref 3.5–5.3)
RBC # BLD AUTO: 3.01 MILLION/UL (ref 3.88–5.62)
SODIUM SERPL-SCNC: 136 MMOL/L (ref 136–145)
WBC # BLD AUTO: 5.1 THOUSAND/UL (ref 4.31–10.16)

## 2018-08-01 PROCEDURE — 99232 SBSQ HOSP IP/OBS MODERATE 35: CPT | Performed by: PHYSICIAN ASSISTANT

## 2018-08-01 PROCEDURE — 85025 COMPLETE CBC W/AUTO DIFF WBC: CPT | Performed by: PHYSICIAN ASSISTANT

## 2018-08-01 PROCEDURE — 97167 OT EVAL HIGH COMPLEX 60 MIN: CPT

## 2018-08-01 PROCEDURE — G8988 SELF CARE GOAL STATUS: HCPCS

## 2018-08-01 PROCEDURE — 82948 REAGENT STRIP/BLOOD GLUCOSE: CPT

## 2018-08-01 PROCEDURE — 80048 BASIC METABOLIC PNL TOTAL CA: CPT | Performed by: PHYSICIAN ASSISTANT

## 2018-08-01 PROCEDURE — 99232 SBSQ HOSP IP/OBS MODERATE 35: CPT | Performed by: NURSE PRACTITIONER

## 2018-08-01 PROCEDURE — G8987 SELF CARE CURRENT STATUS: HCPCS

## 2018-08-01 RX ADMIN — LISINOPRIL 5 MG: 5 TABLET ORAL at 09:00

## 2018-08-01 RX ADMIN — TORSEMIDE 40 MG: 20 TABLET ORAL at 09:40

## 2018-08-01 RX ADMIN — ACETAMINOPHEN 975 MG: 325 TABLET ORAL at 05:34

## 2018-08-01 RX ADMIN — SULFAMETHOXAZOLE AND TRIMETHOPRIM 1 TABLET: 800; 160 TABLET ORAL at 09:42

## 2018-08-01 RX ADMIN — FINASTERIDE 5 MG: 5 TABLET, FILM COATED ORAL at 09:40

## 2018-08-01 RX ADMIN — NYSTATIN: 100000 POWDER TOPICAL at 09:42

## 2018-08-01 RX ADMIN — APIXABAN 2.5 MG: 2.5 TABLET, FILM COATED ORAL at 17:19

## 2018-08-01 RX ADMIN — INSULIN LISPRO 2 UNITS: 100 INJECTION, SOLUTION INTRAVENOUS; SUBCUTANEOUS at 11:46

## 2018-08-01 RX ADMIN — Medication 1000 MG: at 09:42

## 2018-08-01 RX ADMIN — ASPIRIN 81 MG: 81 TABLET, COATED ORAL at 09:41

## 2018-08-01 RX ADMIN — ACETAMINOPHEN 975 MG: 325 TABLET ORAL at 13:33

## 2018-08-01 RX ADMIN — CARVEDILOL 3.12 MG: 3.12 TABLET, FILM COATED ORAL at 15:47

## 2018-08-01 RX ADMIN — SULFAMETHOXAZOLE AND TRIMETHOPRIM 1 TABLET: 800; 160 TABLET ORAL at 21:51

## 2018-08-01 RX ADMIN — GABAPENTIN 100 MG: 100 CAPSULE ORAL at 17:19

## 2018-08-01 RX ADMIN — INSULIN LISPRO 1 UNITS: 100 INJECTION, SOLUTION INTRAVENOUS; SUBCUTANEOUS at 21:52

## 2018-08-01 RX ADMIN — APIXABAN 2.5 MG: 2.5 TABLET, FILM COATED ORAL at 09:40

## 2018-08-01 RX ADMIN — OXYCODONE HYDROCHLORIDE 5 MG: 5 TABLET ORAL at 05:41

## 2018-08-01 RX ADMIN — POTASSIUM CHLORIDE 20 MEQ: 1500 TABLET, EXTENDED RELEASE ORAL at 17:19

## 2018-08-01 RX ADMIN — LIDOCAINE 1 PATCH: 50 PATCH CUTANEOUS at 09:39

## 2018-08-01 RX ADMIN — INSULIN GLARGINE 5 UNITS: 100 INJECTION, SOLUTION SUBCUTANEOUS at 21:51

## 2018-08-01 RX ADMIN — GABAPENTIN 100 MG: 100 CAPSULE ORAL at 09:41

## 2018-08-01 RX ADMIN — MELATONIN 3 MG: at 21:50

## 2018-08-01 RX ADMIN — ACETAMINOPHEN 975 MG: 325 TABLET ORAL at 21:50

## 2018-08-01 RX ADMIN — MINERAL OIL AND WHITE PETROLATUM: 150; 830 OINTMENT OPHTHALMIC at 21:52

## 2018-08-01 RX ADMIN — POTASSIUM CHLORIDE 20 MEQ: 1500 TABLET, EXTENDED RELEASE ORAL at 09:40

## 2018-08-01 RX ADMIN — NYSTATIN: 100000 POWDER TOPICAL at 17:20

## 2018-08-01 RX ADMIN — DOCUSATE SODIUM 100 MG: 100 CAPSULE, LIQUID FILLED ORAL at 17:20

## 2018-08-01 RX ADMIN — DOCUSATE SODIUM 100 MG: 100 CAPSULE, LIQUID FILLED ORAL at 09:40

## 2018-08-01 RX ADMIN — TAMSULOSIN HYDROCHLORIDE 0.4 MG: 0.4 CAPSULE ORAL at 15:47

## 2018-08-01 RX ADMIN — GABAPENTIN 300 MG: 300 CAPSULE ORAL at 21:50

## 2018-08-01 NOTE — PROGRESS NOTES
Attempted to see pt  For PT eval  Pt  Stated that he just had the procedure yesterday and is still painful  He will try to participate tomorrow  Will f/u with pt  Tomorrow   Nurse made aware

## 2018-08-01 NOTE — OCCUPATIONAL THERAPY NOTE
[address]  Evaluation     Patient Name: Ana GUZMAN Date: 8/1/2018  Problem List  Patient Active Problem List   Diagnosis    Lower urinary tract symptoms    Malignant neoplasm of trigone of urinary bladder (HCC)    Prostate cancer (HCC)    Chronic diastolic congestive heart failure (HCC)    CKD (chronic kidney disease) stage 3, GFR 30-59 ml/min    Paroxysmal atrial fibrillation ([address] )    CAD (coronary artery disease)    Type 2 diabetes mellitus with hyperglycemia, with long-term current use of insulin ([address] )    Cardiac pacemaker in situ    Right ureteral stone    Urinary tract infection with hematuria    Hyponatremia    Other chest pain    Laceration of right lower extremity    Anemia    Benign prostatic hyperplasia    Blindness of left eye    Kidney stones    Peripheral neuropathy    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 (LAD) artery    Ureter, calculus    Urinary retention    Right flank pain    Acute renal failure superimposed on stage 3 chronic kidney disease (HCC)    Urinary tract infection associated with indwelling urethral catheter ([address] )     Past Medical History  Past Medical History:   Diagnosis Date    A-fib ([address] )     Anemia     Bladder cancer ([address] )     CAD (coronary artery disease)     CHF (congestive heart failure) (HCC)     Chronic kidney failure     Colon cancer ([address] )     Eye cancer, left ([address] )     Hypertension     Incontinence     Pacemaker     Prostate cancer ([address] )     Type 2 diabetes mellitus ([address] )     Wears hearing aid in both ears      Past Surgical History  Past Surgical History:   Procedure Laterality Date    CARDIAC PACEMAKER PLACEMENT  2014    CORONARY ANGIOPLASTY WITH STENT PLACEMENT      GALLBLADDER SURGERY      NC CYSTOURETHROSCOPY,URETER CATHETER Right 7/1/2018    Procedure: CYSTOSCOPY RETROGRADE PYELOGRAM WITH INSERTION STENT URETERAL;  Surgeon: Zhang Long MD;  [facility]: [facility]; Service: Urology         08/01/18 8910   Note Type   Note type Eval only   Restrictions/Precautions   Weight Bearing Precautions Per Order No   Other Precautions O2;Fall Risk;Pain;Bed Alarm;Hard of hearing;Visual impairment   Pain Assessment   Pain Assessment 0-10   Pain Score Worst Possible Pain   Pain Location [facility]   Hospital Pain Intervention(s) Repositioned   Response to Interventions pt unable to tolerate further bed mobility    Home Living   Type of Home Apartment;Assisted living  [facility] )   Home Layout One level   Bathroom Shower/Tub Walk-in shower   Bathroom Toilet Raised   Bathroom Equipment Grab bars in shower;Grab bars around toilet   [facility]; Wheelchair-manual   Additional Comments Pt reports he completes functional mobility within room/bathroom independently using a walker and sometimes no device  Pt uses w/c for mobility to/from dining glaser    Prior Function   Level of [place] Needs assistance with IADLs; Needs assistance with ADLs and functional mobility   Lives With Alone   Receives Help From Personal care attendant   ADL Assistance Needs assistance   IADLs Needs assistance   Vocational Retired   Lifestyle   Autonomy Per chart review pt requires min assist for ADLS at baseline and is able to self-propel w/c  Pt reports he completes grooming, toileting and functional mobility within room/bathroom independently  Pt showers a few times/week and other completes spongebathing independently    Reciprocal Relationships Pt's wife passed away from [address] [place] [address] Road   Pt reports having 2 supportive daughters and 11 grandchildren    Service to Others Pt is a retired     Intrinsic Gratification Pt enjoys going to [email or web address] and [email or web address]    Psychosocial   Psychosocial (WDL) WDL   Subjective   Subjective "Tell the doctors that I'm grumpy and I won't get up"    ADL   Eating Assistance 5  Supervision/Setup   Grooming Assistance 5  Supervision/Setup   UB Bathing Assistance 3  Moderate Assistance   LB Bathing Assistance 1  Total Assistance   UB Dressing Assistance 3  Moderate Assistance   LB Dressing Assistance 1  Total Assistance   Toileting Assistance  1  Total Assistance   Functional Assistance 2  Maximal Assistance   Functional Deficit Verbal cueing; Increased time to complete   Additional Comments Currently pt completes ADLS at baseline 2* inability to tolerate sitting EOB/OOB 2* c/o severe pain    Bed Mobility   Rolling R 3  Moderate assistance   Additional items Assist x 1; Increased time required;Verbal cues   Rolling L 4  Minimal assistance   Additional items Assist x 1; Increased time required;Verbal cues   Additional Comments Pt requires assist x2 for repositioning in bed  Pt refuses to perform supine <> sit transfer 2* c/o severe pain  Pt reports he will attempt tomorrow  OTR/L educated pt on benefits of repositioning/OOB/participation in OT  Pt verbalizes understanding and states "If I could then I would but I cannot get up today"  OTR will continue to assess further mobility during OT tx sessions    Transfers   Sit to Stand Unable to assess   Stand to Sit Unable to assess   Activity Tolerance   Activity Tolerance Patient limited by pain   Nurse Made Aware RN Jay Jacobs aware pt presents with hematuria in catheter bag - RNs with pt at end of session    RUE Assessment   RUE Assessment WFL   LUE Assessment   LUE Assessment WFL   Hand Function   Gross Motor Coordination Functional   Fine Motor Coordination Functional   Vision - Complex Assessment   Additional Comments (L) eye visual impairments at baseline    Cognition   Arousal/Participation Alert   Attention Within functional limits   Orientation Level Oriented to person;Oriented to place;Oriented to situation   Following Commands Follows one step commands without difficulty   Comments pt is [place], however when is able to hear directions can follow 1 step commands appropriately   Pt is alert and oriented to person, place, situation, however presents with decreased insight  Assessment   Limitation Decreased ADL status; Decreased UE strength;Decreased endurance;Decreased self-care trans;Decreased high-level ADLs   Prognosis Fair   Assessment Pt is a 80 y o  male who was admitted to [facility] on 7/26/2018 with (R) flank pain  Pt's current problem list includes: ureter calculus s/p cystoscopy and exchange of (R) ureteral stent 7/31/18, DEANA, urinary retention, (R) ureteral stone, DM 2, CAD, AFIB, CKD, CHF currently on 02  At baseline pt was completing ADLS with min assist per facility and pt reports independent with use of walker/no device within room/bathroom at [facility]  Currently pt requires overall max assist for overall ADLS bed level 2* inability to tolerate EOB/OOB 2* c/o severe pain  Pt requires assist x1 for rolling in bed and assist x2 for repositioning in bed  Pt currently presents with impairments in the following categories - activity tolerance, endurance and UE strength  These impairments, as well as pt's fatigue, SOB, RAYMOND, pain and risk for falls  limit pt's ability to safely engage in all baseline areas of occupation, including eating, grooming, bathing, dressing, toileting, functional mobility/transfers, social participation  and leisure activities    From OT standpoint, currently recommend inpatient rehab upon D/C 2* pt's functional barriers and limitations noted above, which limit pt's ability to return to assisted living  OT will continue to follow to address the below stated goals, OTR to assess further functional transfers/mobility  Goals   Patient Goals decrease pain    LTG Time Frame 10-14  (all goals )   Long Term Goal #1 see below    Plan   Treatment Interventions ADL retraining;UE strengthening/ROM; Endurance training;Patient/family training;Cognitive reorientation;Equipment evaluation/education; Compensatory technique education;Continued evaluation; Energy conservation; Activityengagement   Goal Expiration Date 08/15/18   OT Frequency 3-5x/wk   Recommendation   OT Discharge Recommendation Short Term Rehab   Barthel Index   Feeding 5   Bathing 0   Grooming Score 0   Dressing Score 0   Bladder Score 0   Bowels Score 10   Toilet Use Score 0   Transfers (Bed/Chair) Score 0   Mobility (Level Surface) Score 0   Stairs Score 0   Barthel Index Score 15   Modified [place] Scale   Modified [place] Scale 4     1  Pt will complete UB/LB ADLS with min assist     2  Pt will complete toileting, including thorough hygiene, with min assist and use of DME PRN     3  Pt will complete bed mobility and transfer to EOB with min assist to increase bed mobility to increase participation in ADLS/IADLS    4  Pt will complete functional transfers on/off all surfaces, including toilet, with min assist to increase participation in ADLS/IADLS - to be assessed by OTR     5  Pt will complete light grooming task while standing at sink for 3-5 minutes with min assist and good balance, DME PRN     6  Pt will increase activity tolerance to 20-25 minutes during OT tx session to increase endurance during all functional tasks     7  Pt will increase standing balance to fair+ for 10-15 minutes while engaging in ADLS     8  Pt will be attentive 100% of the time during formal cognitive assessment - as needed     9   Pt will complete UB HEP independently s/p review to increase UB strength/activity tolerance during functional transfers/ADLS      Obed Espinoza MS, OTR/L , CBIS

## 2018-08-01 NOTE — PLAN OF CARE
Problem: OCCUPATIONAL THERAPY ADULT  Goal: Performs self-care activities at highest level of function for planned discharge setting  See evaluation for individualized goals  Treatment Interventions: ADL retraining, UE strengthening/ROM, Endurance training, Patient/family training, Cognitive reorientation, Equipment evaluation/education, Compensatory technique education, Continued evaluation, Energy conservation, Activityengagement          See flowsheet documentation for full assessment, interventions and recommendations  Limitation: Decreased ADL status, Decreased UE strength, Decreased endurance, Decreased self-care trans, Decreased high-level ADLs  Prognosis: Fair  Assessment: Pt is a 80 y o  male [facility] with (R) flank pain  Pt's current problem list includes: ureter calculus s/p cystoscopy and exchange of (R) ureteral stent 7/31/18, DEANA, urinary retention, (R) ureteral stone, DM 2, CAD, AFIB, CKD, CHF currently on 02  At baseline pt was completing ADLS with min assist per facility and pt reports independent with use of walker/no device within room/bathroom at [facility]  Currently pt requires overall max assist for overall ADLS bed level 2* inability to tolerate EOB/OOB 2* c/o severe pain  Pt requires assist x1 for rolling in bed and assist x2 for repositioning in bed  Pt currently presents with impairments in the following categories - activity tolerance, endurance and UE strength  These impairments, as well as pt's fatigue, SOB, RAYMOND, pain and risk for falls  limit pt's ability to safely engage in all baseline areas of occupation, including eating, grooming, bathing, dressing, toileting, functional mobility/transfers, social participation  and leisure activities    From OT standpoint, currently recommend inpatient rehab upon D/C 2* pt's functional barriers and limitations noted above, which limit pt's ability to return to assisted living   OT will continue to follow to address the below stated goals, OTR to assess further functional transfers/mobility         OT Discharge Recommendation: Short Term Rehab

## 2018-08-01 NOTE — ASSESSMENT & PLAN NOTE
· Earlier this month he was admitted with pain found to have a right-sided 8 millimeter ureteric stone without hydronephrosis but some surrounding stranding, underwent cystoscopy with a right-sided stent placement  Discharge with oral antibiotics for total 2 weeks  Found to have urinary retention at that time status post Cabrera insertion   Followed up with Urology as outpatient and was scheduled for stone lithotripsy and stent exchange 1st week of August, but due to his persistent pain they contacted Urology office and it was moved up to 07/31/2018  · Urology following, had ureteroscopy on 7/31   · Urology recommending maintain cabrera catheter to drainage, monitor hematuria, irrigate q shift  · Continue flomax, finasteride

## 2018-08-01 NOTE — ASSESSMENT & PLAN NOTE
· POA, Could be secondary to the persistent stone/UTI plus component of increased diuretics by Cardiology due to weight gain  · Resolved now   Torsemide and ACEI resumed  · S/p ureteroscopy 7/31  · Monitor creatinine, at baseline of 1 5 today, repeat in AM

## 2018-08-01 NOTE — PROGRESS NOTES
Progress Note - Irina Dainelson 12/21/1930, 80 y o  male MRN: [id number]    Unit/Bed#: -Laci Encounter: [id number]    Primary Care Provider: Yahir Chew MD   Date and time admitted to hospital: 7/26/2018  9:44 AM      * Right ureteral stone   Assessment & Plan    · Earlier this month he was admitted with pain found to have a right-sided 8 millimeter ureteric stone without hydronephrosis but some surrounding stranding, underwent cystoscopy with a right-sided stent placement  Discharge with oral antibiotics for total 2 weeks  Found to have urinary retention at that time status post Cabrera insertion  Followed up with Urology as outpatient and was scheduled for stone lithotripsy and stent exchange 1st week of August, but due to his persistent pain they contacted Urology office and it was moved up to 07/31/2018  · Urology following, had ureteroscopy on 7/31   · Urology recommending maintain cabrera catheter to drainage, monitor hematuria, irrigate q shift  · Continue flomax, finasteride           Urinary tract infection associated with indwelling urethral catheter ([address] )   Assessment & Plan    · Does have flank pain so consider pyelo (although no evidence of this on imaging) vs cystitis  Right flank pain may just be due to stone  Urine culture growing >100,000 cfu Citrobacter and 40,000-49,000 cfu enterococcus- infection vs colonization  Note no fever or white count  · Citrobacter resistant to Rocephin  D/C Rocephin  · Started on Bactrim - continue        Acute renal failure superimposed on stage 3 chronic kidney disease ([address] )   Assessment & Plan    · POA, Could be secondary to the persistent stone/UTI plus component of increased diuretics by Cardiology due to weight gain  · Resolved now  Torsemide and ACEI resumed  · S/p ureteroscopy 7/31  · Monitor creatinine, at baseline of 1 5 today, repeat in AM        Urinary retention   Assessment & Plan    · Cabrera in place   Will likely be discharged with Cabrera  · Flomax and finasteride  · Urology following        Type 2 diabetes mellitus with hyperglycemia, with long-term current use of insulin [facility])   Assessment & Plan    Lab Results   Component Value Date    HGBA1C 7 4 (H) 05/23/2018       Recent Labs      07/31/18   1603  07/31/18   2105  08/01/18   0630  08/01/18   1100   POCGLU  111  230*  126  213*       Blood Sugar Average: Last 72 hrs:  (P) 583 [id number]   · Sugars improved with addition of Lantus  · Restart empagliflozin at discharge         CAD (coronary artery disease)   Assessment & Plan    · Patient without chest pain   · Continue home medical management - ASA, coreg         Paroxysmal atrial fibrillation (HCC)   Assessment & Plan    · Continue Coreg and Eliquis        Chronic diastolic congestive heart failure (HCC)   Assessment & Plan    · Recent echo last month with EF of 60 percent  Due to weight gain his diuretics were increased from 40 milligrams to 80 milligrams of torsemide twice a day for 3 days  · Torsemide restarted at normal dose  · I&Os, daily weights            VTE Pharmacologic Prophylaxis:   Pharmacologic: Apixaban (Eliquis)  Mechanical VTE Prophylaxis in Place: Yes    Patient Centered Rounds: I have performed bedside rounds with nursing staff today  Discussions with Specialists or Other Care Team Provider:     Education and Discussions with Family / Patient: patient and daughter, Pete Coon, at bedside    Time Spent for Care: 30 minutes  More than 50% of total time spent on counseling and coordination of care as described above  Current Length of Stay: 6 day(s)    Current Patient Status: Inpatient   Certification Statement: The patient will continue to require additional inpatient hospital stay due to monitor sx, pt evaluation    Discharge Plan: when stable    Code Status: Level 1 - Full Code      Subjective:   Patient did not wish to work with PT today due to pain    Patient states right sided flank pain is improving following the procedure, however it is still present with movement  Patient otherwise offers no complaints, denies fevers/sweats/chills, CP, SOB, N/V, abdominal pain  Reports he had a BM this AM without difficulty  Objective:     Vitals:   Temp (24hrs), Av 9 °F (36 6 °C), Min:97 5 °F (36 4 °C), Max:99 °F (37 2 °C)    HR:  [68-81] 75  Resp:  [10-28] 20  BP: (105-140)/(52-88) 108/52  SpO2:  [95 %-100 %] 96 %  Body mass index is 28 22 kg/m²  Input and Output Summary (last 24 hours): Intake/Output Summary (Last 24 hours) at 18 1240  Last data filed at 18 0900   Gross per 24 hour   Intake             1160 ml   Output             1750 ml   Net             -590 ml       Physical Exam:     Physical Exam   Constitutional: He is oriented to person, place, and time  He appears well-developed and well-nourished  No distress  HENT:   Head: Normocephalic and atraumatic  Mouth/Throat: Oropharynx is clear and moist    Eyes: Pupils are equal, round, and reactive to light  No scleral icterus  left eye abnormal   Neck: Normal range of motion  Neck supple  No tracheal deviation present  No thyromegaly present  Cardiovascular: Normal rate, regular rhythm and normal heart sounds  No murmur heard  Pulmonary/Chest: Effort normal and breath sounds normal  No respiratory distress  He has no wheezes  He has no rales  He exhibits no tenderness  Abdominal: Soft  Bowel sounds are normal  He exhibits no distension  There is no tenderness  Genitourinary:   Genitourinary Comments: Marte draining pink, clear urine   Musculoskeletal: Normal range of motion  He exhibits no edema or tenderness  Neurological: He is alert and oriented to person, place, and time  No cranial nerve deficit  Skin: Skin is warm and dry  He is not diaphoretic  No erythema  Vitals reviewed      Additional Data:     Labs:      Results from last 7 days  Lab Units 18  0522   WBC Thousand/uL 5 10   HEMOGLOBIN g/dL 8 0*   HEMATOCRIT % 26 6* PLATELETS Thousands/uL 274   NEUTROS PCT % 76*   LYMPHS PCT % 10*   MONOS PCT % 10   EOS PCT % 3       Results from last 7 days  Lab Units 08/01/18  0522  07/26/18  1018   SODIUM mmol/L 136  < > 130*   POTASSIUM mmol/L 4 6  < > 3 6   CHLORIDE mmol/L 100  < > 94*   CO2 mmol/L 29  < > 31   BUN mg/dL 28*  < > 31*   CREATININE mg/dL 1 50*  < > 1 85*   CALCIUM mg/dL 8 6  < > 8 1*   TOTAL PROTEIN g/dL  --   --  6 5   BILIRUBIN TOTAL mg/dL  --   --  0 39   ALK PHOS U/L  --   --  91   ALT U/L  --   --  15   AST U/L  --   --  17   GLUCOSE RANDOM mg/dL 124  < > 153*   < > = values in this interval not displayed  Results from last 7 days  Lab Units 08/01/18  1100 08/01/18  0630 07/31/18  2105 07/31/18  1603 07/31/18  1422 07/31/18  1120 07/31/18  0623 07/30/18  2120 07/30/18  1612 07/30/18  1044 07/30/18  0639 07/29/18  2116   POC GLUCOSE mg/dl 213* 126 230* 111 132 110 113 199* 166* 162* 116 206*             * I Have Reviewed All Lab Data Listed Above  * Additional Pertinent Lab Tests Reviewed: All Labs Within Last 24 Hours Reviewed    Imaging:    Imaging Reports Reviewed Today Include: None  Imaging Personally Reviewed by Myself Includes:  None    Recent Cultures (last 7 days):       Results from last 7 days  Lab Units 07/26/18  1511 07/26/18  1150   BLOOD CULTURE   --  No Growth After 5 Days  No Growth After 5 Days     URINE CULTURE  >100,000 cfu/ml Citrobacter freundii*  40,000-49,000 cfu/ml Enterococcus faecalis*  --        Last 24 Hours Medication List:     Current Facility-Administered Medications:  acetaminophen 975 mg Oral Q8H [facility] Stevie Hightower PA-C   albuterol 2 5 mg Nebulization Q6H PRN Ceci Balderas MD   apixaban 2 5 mg Oral BID Ceci Balderas MD   artificial tear  Both Eyes HS Ceci Balderas MD   aspirin 81 mg Oral Daily Ceci Balderas MD   carvedilol 3 125 mg Oral BID With Meals Ceci Balderas MD   docusate sodium 100 mg Oral BID Stevie Hightower PA-C   finasteride 5 mg Oral Daily Ceci Balderas MD   fish oil 1,000 mg Oral Daily Ceci Balderas MD   gabapentin 100 mg Oral BID Ceci Balderas MD   gabapentin 300 mg Oral HS Ceci Balderas MD   HYDROmorphone 0 2 mg Intravenous Q6H PRN Marlon Christiansen, PA-SOREN   insulin glargine 5 Units Subcutaneous HS Marlon Christiansen, PA-C   insulin lispro 1-5 Units Subcutaneous HS Ceci Balderas MD   insulin lispro 1-6 Units Subcutaneous TID AC Ceci Balderas MD   lactated ringers 100 mL/hr Intravenous Continuous Janeen Hanson CRNA   lidocaine 1 patch Transdermal Daily Marlon Christiansen PA-C   lisinopril 5 mg Oral Daily Venkat Osei PA-C   magnesium hydroxide 30 mL Oral Daily PRN Venkat Osei PA-C   melatonin 3 mg Oral HS Ceci Balderas MD   nitroglycerin 0 4 mg Sublingual Q5 Min PRN Ceci Balderas MD   nystatin  Topical BID Venkat Osei PA-C   ondansetron 4 mg Intravenous Q6H PRN Kajal Mckeon MD   oxyCODONE 2 5 mg Oral Q4H PRN Marlon Christiansen PA-C   oxyCODONE 5 mg Oral Q4H PRN Marlon Christiansen PA-C   potassium chloride 20 mEq Oral BID Kajal Mckeon MD   senna 1 tablet Oral HS Marlon Christiansen PA-C   sulfamethoxazole-trimethoprim 1 tablet Oral Q12H [address] Marlon Christiansen PA-C   tamsulosin 0 4 mg Oral Daily With Dulce Cruz MD   torsemide 40 mg Oral BID Tamy Burrows PA-C        Today, Patient Was Seen By: Adin Villela PA-C    ** Please Note: Dictation voice to text software may have been used in the creation of this document   **

## 2018-08-01 NOTE — ASSESSMENT & PLAN NOTE
Lab Results   Component Value Date    HGBA1C 7 4 (H) 05/23/2018       Recent Labs      07/31/18   1603  07/31/18   2105  08/01/18   0630  08/01/18   1100   POCGLU  111  230*  126  213*       Blood Sugar Average: Last 72 hrs:  (P) 275 [id number]   · Sugars improved with addition of Lantus  · Restart empagliflozin at discharge

## 2018-08-01 NOTE — PROGRESS NOTES
UROLOGY PROGRESS NOTE   Patient Identifiers: Carlos Marmolejo (MRN [id number])  Date of Service: 8/1/2018    Subjective:     24 HR EVENTS:   no significant events  Patient has  complaints of discomfort from caberra catheter site  Objective:     VITALS:    Vitals:    08/01/18 0712   BP: 108/52   Pulse: 75   Resp: 20   Temp: 97 5 °F (36 4 °C)   SpO2: 96%       INS & OUTS:  I/O last 24 hours: In: 740 [P O :240;  I V :300; IV Piggyback:200]  Out: 2000 [Urine:2000]    LABS:  Lab Results   Component Value Date    HGB 8 0 (L) 08/01/2018    HCT 26 6 (L) 08/01/2018    WBC 5 10 08/01/2018     08/01/2018   ]    Lab Results   Component Value Date     08/01/2018    K 4 6 08/01/2018     08/01/2018    CO2 29 08/01/2018    BUN 28 (H) 08/01/2018    CREATININE 1 50 (H) 08/01/2018    CALCIUM 8 6 08/01/2018    GLUCOSE 124 08/01/2018   ]    INPATIENT MEDS:    Current Facility-Administered Medications:     acetaminophen (TYLENOL) tablet 975 mg, 975 mg, Oral, Q8H [address], Marie Galan PA-C, 975 mg at 08/01/18 0534    albuterol inhalation solution 2 5 mg, 2 5 mg, Nebulization, Q6H PRN, Frankie Levy MD    apixaban (ELIQUIS) tablet 2 5 mg, 2 5 mg, Oral, BID, Frankie Levy MD, 2 5 mg at 07/31/18 1748    artificial tear (LUBRIFRESH P M ) ophthalmic ointment, , Both Eyes, HS, Ceci Balderas MD    aspirin (ECOTRIN LOW STRENGTH) EC tablet 81 mg, 81 mg, Oral, Daily, Ceci Balderas MD, 81 mg at 07/31/18 0913    carvedilol (COREG) tablet 3 125 mg, 3 125 mg, Oral, BID With Meals, Frankie Levy MD, 3 125 mg at 07/31/18 1748    docusate sodium (COLACE) capsule 100 mg, 100 mg, Oral, BID, Marie Galan PA-C, 100 mg at 07/31/18 1744    finasteride (PROSCAR) tablet 5 mg, 5 mg, Oral, Daily, Ceci Balderas MD, 5 mg at 07/31/18 0913    fish oil capsule 1,000 mg, 1,000 mg, Oral, Daily, Ceci Balderas MD, 1,000 mg at 07/31/18 0913    gabapentin (NEURONTIN) capsule 100 mg, 100 mg, Oral, BID, Crow Marte MD, 100 mg at 07/31/18 1748    gabapentin (NEURONTIN) capsule 300 mg, 300 mg, Oral, HS, Ceci Balderas MD, 300 mg at 07/31/18 2252    HYDROmorphone (DILAUDID) injection 0 2 mg, 0 2 mg, Intravenous, Q6H PRN, Roberto Kelly PA-C, 0 2 mg at 07/29/18 2205    insulin glargine (LANTUS) subcutaneous injection 5 Units 0 05 mL, 5 Units, Subcutaneous, HS, Roberto Kelly PA-C, 5 Units at 07/31/18 2251    insulin lispro (HumaLOG) 100 units/mL subcutaneous injection 1-5 Units, 1-5 Units, Subcutaneous, HS, Crow Marte MD, 2 Units at 07/31/18 2300    insulin lispro (HumaLOG) 100 units/mL subcutaneous injection 1-6 Units, 1-6 Units, Subcutaneous, TID AC, 1 Units at 07/30/18 1645 **AND** Fingerstick Glucose (POCT), , , TID AC, Ceci Balderas MD    lactated ringers infusion, 100 mL/hr, Intravenous, Continuous, Yemi Mishra CRNA    lidocaine (LIDODERM) 5 % patch 1 patch, 1 patch, Transdermal, Daily, Roberto Kelly PA-C, 1 patch at 07/31/18 0913    lisinopril (ZESTRIL) tablet 5 mg, 5 mg, Oral, Daily, Venkat Osei PA-C, 5 mg at 07/29/18 0824    magnesium hydroxide (MILK OF MAGNESIA) 400 mg/5 mL oral suspension 30 mL, 30 mL, Oral, Daily PRN, Venkat Osei PA-C, 30 mL at 07/31/18 2251    melatonin tablet 3 mg, 3 mg, Oral, HS, Ceci Balderas MD, 3 mg at 07/31/18 2252    nitroglycerin (NITROSTAT) SL tablet 0 4 mg, 0 4 mg, Sublingual, Q5 Min PRN, Crow Marte MD    nystatin (MYCOSTATIN) powder, , Topical, BID, Venkat Osei PA-C    ondansetron (ZOFRAN) injection 4 mg, 4 mg, Intravenous, Q6H PRN, Crow Marte MD    oxyCODONE (ROXICODONE) IR tablet 2 5 mg, 2 5 mg, Oral, Q4H PRN, Roberto Kelly PA-C    oxyCODONE (ROXICODONE) IR tablet 5 mg, 5 mg, Oral, Q4H PRN, Roberto Kelly PA-C, 5 mg at 08/01/18 0541    potassium chloride (K-DUR,KLOR-CON) CR tablet 20 mEq, 20 mEq, Oral, BID, Ceci Balderas MD, 20 mEq at 07/31/18 7293    senna (SENOKOT) tablet 8 6 mg, 1 tablet, Oral, HS, Ella Gillespie PA-C, 8 6 mg at 07/31/18 2251    sulfamethoxazole-trimethoprim (BACTRIM DS) 800-160 mg per tablet 1 tablet, 1 tablet, Oral, Q12H [address], Ella Gillespie PA-C, 1 tablet at 07/31/18 2200    tamsulosin (FLOMAX) capsule 0 4 mg, 0 4 mg, Oral, Daily With Acacia Mcadams MD, 0 4 mg at 07/31/18 1747    torsemide (DEMADEX) tablet 40 mg, 40 mg, Oral, BID, Venkat Osei PA-C, 40 mg at 07/31/18 1748      Physical Exam:     GEN: alert and oriented x 3    RESP: breathing comfortably with no accessory muscle use    ABD: soft, non-tender, non-distended   EXT: no significant peripheral edema   : bloody discharge from meatus, string remains intact taped to dorsum of penis  HUNT: draining pink clear urine  no clots      Assessment:   POD 1 cystoscopy, ureteroscopy, and exchange of right ureteral stent  Successful holmium laser of large right ureteral stone  Of note, patient was also identified to have very large median lobe of the prostate via cystoscopy  Patient remains afebrile  He is tolerating diet  Patient reports that he refuses to get OOB today and work with physical therapy  Plan:   -Labs remain stable, creatinine 1 50 (patient's baseline), Hgb 8 0, WBC 5 10  -Maintain hunt catheter to gravity drainage  Continue to monitor gross hematuria, and irrigate Qshift and as need to keep patent and prevent clot burden  Consider removing once hematuria has cleared for at least 24 hours  Patient remains on aspirin and Eliquis  -Continue Flomax and Finasteride  -Explained to patient that it is OK for him to get OOB with hunt catheter in place  Encouraged patient to cooperate with physical therapy today as immobility can increase his risk of not passing void trial  -No further urologic intervention needed at this time, we will continue to monitor hematuria      RN participated in rounds with AP   Plan of care discussed with patient and RN

## 2018-08-01 NOTE — ASSESSMENT & PLAN NOTE
· Does have flank pain so consider pyelo (although no evidence of this on imaging) vs cystitis  Right flank pain may just be due to stone  Urine culture growing >100,000 cfu Citrobacter and 40,000-49,000 cfu enterococcus- infection vs colonization  Note no fever or white count  · Citrobacter resistant to Rocephin   D/C Rocephin  · Started on Bactrim - continue

## 2018-08-01 NOTE — ASSESSMENT & PLAN NOTE
· Marte in place   Will likely be discharged with Marte  · Flomax and finasteride  · Urology following

## 2018-08-02 LAB
ANION GAP SERPL CALCULATED.3IONS-SCNC: 3 MMOL/L (ref 4–13)
BUN SERPL-MCNC: 28 MG/DL (ref 5–25)
CALCIUM SERPL-MCNC: 8.5 MG/DL (ref 8.3–10.1)
CHLORIDE SERPL-SCNC: 100 MMOL/L (ref 100–108)
CO2 SERPL-SCNC: 31 MMOL/L (ref 21–32)
CREAT SERPL-MCNC: 1.75 MG/DL (ref 0.6–1.3)
ERYTHROCYTE [DISTWIDTH] IN BLOOD BY AUTOMATED COUNT: 15.6 % (ref 11.6–15.1)
GFR SERPL CREATININE-BSD FRML MDRD: 34 ML/MIN/1.73SQ M
GLUCOSE SERPL-MCNC: 113 MG/DL (ref 65–140)
GLUCOSE SERPL-MCNC: 117 MG/DL (ref 65–140)
GLUCOSE SERPL-MCNC: 124 MG/DL (ref 65–140)
GLUCOSE SERPL-MCNC: 162 MG/DL (ref 65–140)
GLUCOSE SERPL-MCNC: 184 MG/DL (ref 65–140)
HCT VFR BLD AUTO: 26.4 % (ref 36.5–49.3)
HGB BLD-MCNC: 8 G/DL (ref 12–17)
MCH RBC QN AUTO: 26.4 PG (ref 26.8–34.3)
MCHC RBC AUTO-ENTMCNC: 30.3 G/DL (ref 31.4–37.4)
MCV RBC AUTO: 87 FL (ref 82–98)
PLATELET # BLD AUTO: 253 THOUSANDS/UL (ref 149–390)
PMV BLD AUTO: 9.9 FL (ref 8.9–12.7)
POTASSIUM SERPL-SCNC: 4.2 MMOL/L (ref 3.5–5.3)
RBC # BLD AUTO: 3.03 MILLION/UL (ref 3.88–5.62)
SODIUM SERPL-SCNC: 134 MMOL/L (ref 136–145)
WBC # BLD AUTO: 3.25 THOUSAND/UL (ref 4.31–10.16)

## 2018-08-02 PROCEDURE — 97530 THERAPEUTIC ACTIVITIES: CPT

## 2018-08-02 PROCEDURE — 82948 REAGENT STRIP/BLOOD GLUCOSE: CPT

## 2018-08-02 PROCEDURE — 80048 BASIC METABOLIC PNL TOTAL CA: CPT | Performed by: PHYSICIAN ASSISTANT

## 2018-08-02 PROCEDURE — 99232 SBSQ HOSP IP/OBS MODERATE 35: CPT | Performed by: PHYSICIAN ASSISTANT

## 2018-08-02 PROCEDURE — 85027 COMPLETE CBC AUTOMATED: CPT | Performed by: PHYSICIAN ASSISTANT

## 2018-08-02 RX ORDER — SULFAMETHOXAZOLE AND TRIMETHOPRIM 800; 160 MG/1; MG/1
1 TABLET ORAL DAILY
Status: DISCONTINUED | OUTPATIENT
Start: 2018-08-03 | End: 2018-08-03 | Stop reason: HOSPADM

## 2018-08-02 RX ADMIN — LIDOCAINE 1 PATCH: 50 PATCH CUTANEOUS at 09:14

## 2018-08-02 RX ADMIN — ACETAMINOPHEN 975 MG: 325 TABLET ORAL at 21:31

## 2018-08-02 RX ADMIN — MINERAL OIL AND WHITE PETROLATUM: 150; 830 OINTMENT OPHTHALMIC at 21:36

## 2018-08-02 RX ADMIN — MELATONIN 3 MG: at 21:31

## 2018-08-02 RX ADMIN — APIXABAN 2.5 MG: 2.5 TABLET, FILM COATED ORAL at 17:06

## 2018-08-02 RX ADMIN — INSULIN GLARGINE 5 UNITS: 100 INJECTION, SOLUTION SUBCUTANEOUS at 21:32

## 2018-08-02 RX ADMIN — INSULIN LISPRO 1 UNITS: 100 INJECTION, SOLUTION INTRAVENOUS; SUBCUTANEOUS at 21:33

## 2018-08-02 RX ADMIN — Medication 1000 MG: at 09:14

## 2018-08-02 RX ADMIN — ACETAMINOPHEN 975 MG: 325 TABLET ORAL at 13:25

## 2018-08-02 RX ADMIN — APIXABAN 2.5 MG: 2.5 TABLET, FILM COATED ORAL at 09:13

## 2018-08-02 RX ADMIN — SULFAMETHOXAZOLE AND TRIMETHOPRIM 1 TABLET: 800; 160 TABLET ORAL at 09:14

## 2018-08-02 RX ADMIN — POTASSIUM CHLORIDE 20 MEQ: 1500 TABLET, EXTENDED RELEASE ORAL at 17:05

## 2018-08-02 RX ADMIN — SENNOSIDES 8.6 MG: 8.6 TABLET, FILM COATED ORAL at 21:31

## 2018-08-02 RX ADMIN — TAMSULOSIN HYDROCHLORIDE 0.4 MG: 0.4 CAPSULE ORAL at 17:05

## 2018-08-02 RX ADMIN — ASPIRIN 81 MG: 81 TABLET, COATED ORAL at 09:13

## 2018-08-02 RX ADMIN — POTASSIUM CHLORIDE 20 MEQ: 1500 TABLET, EXTENDED RELEASE ORAL at 09:13

## 2018-08-02 RX ADMIN — FINASTERIDE 5 MG: 5 TABLET, FILM COATED ORAL at 09:12

## 2018-08-02 RX ADMIN — GABAPENTIN 100 MG: 100 CAPSULE ORAL at 17:06

## 2018-08-02 RX ADMIN — NYSTATIN: 100000 POWDER TOPICAL at 09:14

## 2018-08-02 RX ADMIN — ACETAMINOPHEN 975 MG: 325 TABLET ORAL at 05:45

## 2018-08-02 RX ADMIN — GABAPENTIN 300 MG: 300 CAPSULE ORAL at 21:31

## 2018-08-02 RX ADMIN — GABAPENTIN 100 MG: 100 CAPSULE ORAL at 09:13

## 2018-08-02 RX ADMIN — INSULIN LISPRO 1 UNITS: 100 INJECTION, SOLUTION INTRAVENOUS; SUBCUTANEOUS at 11:21

## 2018-08-02 RX ADMIN — NYSTATIN: 100000 POWDER TOPICAL at 17:08

## 2018-08-02 NOTE — ASSESSMENT & PLAN NOTE
· Cabrera in place until hematuria complete resolved, nursing to irrigate q shift   · Flomax and finasteride  · Urology following, maintain cabrera today  Possible void trial tomorrow

## 2018-08-02 NOTE — PLAN OF CARE
Problem: OCCUPATIONAL THERAPY ADULT  Goal: Performs self-care activities at highest level of function for planned discharge setting  See evaluation for individualized goals  Treatment Interventions: ADL retraining, UE strengthening/ROM, Endurance training, Patient/family training, Cognitive reorientation, Equipment evaluation/education, Compensatory technique education, Continued evaluation, Energy conservation, Activityengagement          See flowsheet documentation for full assessment, interventions and recommendations  Outcome: Progressing  Limitation: Decreased ADL status, Decreased UE strength, Decreased endurance, Decreased self-care trans, Decreased high-level ADLs  Prognosis: Fair  Assessment: Pt participated in skilled OT tx session focused on bed mobility, functional mobility/transfers as prerequisite for further engagement in ADLS, LB dressing  See above for further details on functional performance  First time pt has transferred [address] [place] [address] since admission 7/26/18  Pt required mod assist x2 hand hold assist as pt declined use of RW - recommend using RW during next OT tx session to decrease physical assistance from staff  Pt requires total assist to don/doff footwear  Pt is limited 2* SOB, RAYMOND, impaired activity tolerance, impaired standing balance/tolerance, and fatigue  Pt on 1L 02, at 98% after transfer, however noted with increased SOB/RAYMOND  Recommend inpatient rehab upon D/C  OT will continue to follow to address prior stated goals        OT Discharge Recommendation: Short Term Rehab

## 2018-08-02 NOTE — PHYSICAL THERAPY NOTE
Physical Therapy Cancellation Note      Pt unwilling to perform PT at this time, requested later time  Pt education continues to be provided about role of PT in progressing functional mobility   To follow up with pt to complete PT eval

## 2018-08-02 NOTE — ASSESSMENT & PLAN NOTE
· R flank likely 2/2 ureteral stone, no evidnce of pyelo on imaging   · Urine culture growing >100,000 cfu Citrobacter and 40,000-49,000 cfu enterococcus- infection vs colonization  Note no fever or white count  · Has received 1 dose of ampicillin, 3 days of rocephin, 1 dose of cipro, and 4 days of bactrim so far  · Citrobacter resistant to Rocephin   D/C Rocephin  · Started on Bactrim - continue for at least 5 days, dec to 1 tab daily given renal function

## 2018-08-02 NOTE — OCCUPATIONAL THERAPY NOTE
[address] [place] Progress Note     Patient Name: George Velásquez  VHDXL'P Date: 8/2/2018  Problem List  Patient Active Problem List   Diagnosis    Lower urinary tract symptoms    Malignant neoplasm of trigone of urinary bladder (HCC)    Prostate cancer (HCC)    Chronic diastolic congestive heart failure (HCC)    CKD (chronic kidney disease) stage 3, GFR 30-59 ml/min    Paroxysmal atrial fibrillation ([facility] )    CAD (coronary artery disease)    Type 2 diabetes mellitus with hyperglycemia, with long-term current use of insulin ([facility] )    Cardiac pacemaker in situ    Right ureteral stone    Urinary tract infection with hematuria    Hyponatremia    Other chest pain    Laceration of right lower extremity    Diarrhea    Anemia    Benign prostatic hyperplasia    Blindness of left eye    Kidney stones    Peripheral neuropathy    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 (LAD) artery    Ureter, calculus    Urinary retention    Right flank pain    Acute renal failure superimposed on stage 3 chronic kidney disease ([facility] )    Urinary tract infection associated with indwelling urethral catheter ([facility] )           08/02/18 1300   Restrictions/Precautions   Other Precautions Fall Risk;Bed Alarm; Chair Alarm;O2;Hard of hearing   Lifestyle   Autonomy "okay I'll get out of bed"    Pain Assessment   Pain Assessment No/denies pain   ADL   LB Dressing Deficit Don/doff R sock; Don/doff L sock   LB Dressing Comments pt requires total assist to don/doff footwear at EOB    Bed Mobility   Supine to Sit 4  Minimal assistance   Additional items HOB elevated; Increased time required;Verbal cues; Bedrails   Transfers   Sit to Stand 3  Moderate assistance   Additional items Assist x 2; Increased time required;Verbal cues   Stand to Sit 3  Moderate assistance   Additional items Assist x 1; Increased time required;Verbal cues   Stand pivot 3  Moderate assistance   Additional items Assist x 2; Increased time required;Verbal cues   Functional Mobility   Functional Mobility 3  Moderate assistance  (x2)   Additional Comments pt completed short distance functional mobility with mod assist x2 hand hold assist as pt declined using RW, although does use RW at home  Plan to assess  functional mobility/transfers during next session with use of RW    Cognition   Overall Cognitive Status WFL   Arousal/Participation Alert   Attention Within functional limits   Orientation Level Oriented to person;Oriented to place;Oriented to situation   Comments [place]    Activity Tolerance   Activity Tolerance Patient limited by fatigue   Medical Staff Made Aware MANJULA Mei  Assessment   Assessment Pt participated in skilled OT tx session focused on bed mobility, functional mobility/transfers as prerequisite for further engagement in ADLS, LB dressing  See above for further details on functional performance  First time pt has transferred [address] [place] [address] since admission 7/26/18  Pt required mod assist x2 hand hold assist as pt declined use of RW - recommend using RW during next OT tx session to decrease physical assistance from staff  Pt requires total assist to don/doff footwear  Pt is limited 2* SOB, RAYMOND, impaired activity tolerance, impaired standing balance/tolerance, and fatigue  Pt on 1L 02, at 98% after transfer, however noted with increased SOB/RAYMOND  Recommend inpatient rehab upon D/C  OT will continue to follow to address prior stated goals  Plan   Treatment Interventions ADL retraining;Functional transfer training; Endurance training;Patient/family training;Equipment evaluation/education; Compensatory technique education   Goal Expiration Date 08/15/18   Treatment Day 1   OT Frequency 3-5x/wk   Recommendation   OT Discharge Recommendation Short Term Rehab   Barthel Index   Feeding 5   Bathing 0   Grooming Score 0   Dressing Score 0   Bladder Score 0   Bowels Score 10   Toilet Use Score 0   Transfers (Bed/Chair) Score 5   Mobility (Level Surface) Score 0   Stairs Score 0   Barthel Index Score 20   Modified [place] Scale   Modified Elen Scale 4       Anita Bennett MS, OTR/L , CBIS

## 2018-08-02 NOTE — PROGRESS NOTES
Progress Note - Ciro Jacobs 12/21/1930, 80 y o  male MRN: [id number]  Unit/Bed#: -01 Encounter: [id number] DOS: 8/2/18  Primary Care Provider: Colleen Ramsay MD   Date and time admitted to hospital: 7/26/2018  9:44 AM    * Right ureteral stone   Assessment & Plan    · Admitted in July with pain found to have a right-sided 8 mm ureteric stone without hydro but surrounding stranding, underwent cystoscopy with a right-sided stent placement  Discharged with oral antibiotics for total 2 weeks  Found to have urinary retention at that time status post Cabrera insertion  Followed up with Urology as outpatient and was scheduled for stone lithotripsy and stent exchange 1st week of August, but due to his persistent pain they contacted Urology office and it was moved up to 07/31/2018  · Urology following, had ureteroscopy on 7/31 with stent exchange and removal of stone   · Urology recommending maintain cabrera catheter to drainage, monitor hematuria, irrigate q shift  · Stent has string in place and can be removed early next week inpatient or outpatient   · Continue flomax, finasteride        Acute renal failure superimposed on stage 3 chronic kidney disease ([address] )   Assessment & Plan    · POA, baseline appears to be 1 5-1 7  ·  Estimated Creatinine Clearance: 33 4 mL/min (A) (by C-G formula based on SCr of 1 75 mg/dL (H))  · Possibly 2/2 persistent stone/UTI plus component of increased diuretics by Cardiology due to weight gain recently   · Resolved now  Torsemide and ACEI resumed  · S/p ureteroscopy 7/31  · Monitor creatinine, slightly elevated today, monitor         Urinary tract infection associated with indwelling urethral catheter ([address] )   Assessment & Plan    · R flank likely 2/2 ureteral stone, no evidnce of pyelo on imaging   · Urine culture growing >100,000 cfu Citrobacter and 40,000-49,000 cfu enterococcus- infection vs colonization  Note no fever or white count     · Has received 1 dose of ampicillin, 3 days of rocephin, 1 dose of cipro, and 4 days of bactrim so far  · Citrobacter resistant to Rocephin  D/C Rocephin  · Started on Bactrim - continue for at least 5 days, dec to 1 tab daily given renal function         Urinary retention   Assessment & Plan    · Cabrera in place until hematuria complete resolved, nursing to irrigate q shift   · Flomax and finasteride  · Urology following, maintain cabrera today  Possible void trial tomorrow  Diarrhea   Assessment & Plan    · D/c colace   · Monitor over 48 hrs, if persists off of stool softeners will check C diff   · Had neg c diff on 7/4/18        Type 2 diabetes mellitus with hyperglycemia, with long-term current use of insulin [facility])   Assessment & Plan    Lab Results   Component Value Date    HGBA1C 7 4 (H) 05/23/2018     Recent Labs      08/01/18   1642  08/01/18   2122  08/02/18   0636  08/02/18   1111   POCGLU  149*  166*  117  184*     Blood Sugar Average: Last 72 hrs:  (P) 558 [id number]   · Sugars improved with addition of Lantus  · Restart empagliflozin at discharge         CAD (coronary artery disease)   Assessment & Plan    · Patient without chest pain   · Continue home medical management - ASA, coreg         Paroxysmal atrial fibrillation (HCC)   Assessment & Plan    · Continue Coreg and Eliquis        Chronic diastolic congestive heart failure (HCC)   Assessment & Plan    · Recent echo last month with EF of 60 percent  Due to weight gain his diuretics were increased from 40 milligrams to 80 milligrams of torsemide twice a day for 3 days  · Torsemide restarted at normal dose  · I&Os, daily weights          VTE Pharmacologic Prophylaxis:   Pharmacologic: Apixaban (Eliquis)  Mechanical VTE Prophylaxis in Place: Yes    Patient Centered Rounds: I have performed bedside rounds with nursing staff today      Discussions with Specialists or Other Care Team Provider: nursing, cm     Education and Discussions with Family / Patient: patient, family at bedside     Time Spent for Care: 30 minutes  More than 50% of total time spent on counseling and coordination of care as described above  Current Length of Stay: 7 day(s)    Current Patient Status: Inpatient   Certification Statement: The patient will continue to require additional inpatient hospital stay due to PT OT evals, safe discharge planning, hematuria ongoing urology workup     Discharge Plan / Estimated Discharge Date: pending, will need STR     Code Status: Level 1 - Full Code    Subjective:   Cheerful  Pain improved  Agreeable to PT today  Had BM yesterday  Objective:     Vitals:   Temp (24hrs), Av °F (36 7 °C), Min:97 6 °F (36 4 °C), Max:98 4 °F (36 9 °C)    HR:  [40-79] 69  Resp:  [18-20] 20  BP: (105-138)/(51-70) 105/70  SpO2:  [98 %-100 %] 98 %  Body mass index is 28 22 kg/m²  Input and Output Summary (last 24 hours): Intake/Output Summary (Last 24 hours) at 18 1500  Last data filed at 18 1234   Gross per 24 hour   Intake             1221 ml   Output             3600 ml   Net            -2379 ml     Physical Exam:     Physical Exam   Constitutional: He is oriented to person, place, and time  He appears well-developed and well-nourished  HENT:   Head: Normocephalic and atraumatic  Mouth/Throat: Oropharynx is clear and moist    Eyes: EOM are normal  No scleral icterus  Blind left eye    Neck: Normal range of motion  Neck supple  Cardiovascular: Normal rate, regular rhythm and normal heart sounds  No murmur heard  Pulmonary/Chest: Effort normal and breath sounds normal    Abdominal: Soft  Bowel sounds are normal    Genitourinary:   Genitourinary Comments: Marte in place, dark yellow urine    Neurological: He is alert and oriented to person, place, and time  Skin: Skin is warm and dry  Psychiatric: He has a normal mood and affect  Pleasant and cooperative    Nursing note and vitals reviewed      Additional Data:     Labs:      Results from last 7 days  Lab Units 08/02/18  0455 08/01/18  0522   WBC Thousand/uL 3 25* 5 10   HEMOGLOBIN g/dL 8 0* 8 0*   HEMATOCRIT % 26 4* 26 6*   PLATELETS Thousands/uL 253 274   NEUTROS PCT %  --  76*   LYMPHS PCT %  --  10*   MONOS PCT %  --  10   EOS PCT %  --  3       Results from last 7 days  Lab Units 08/02/18  0455   SODIUM mmol/L 134*   POTASSIUM mmol/L 4 2   CHLORIDE mmol/L 100   CO2 mmol/L 31   BUN mg/dL 28*   CREATININE mg/dL 1 75*   CALCIUM mg/dL 8 5   GLUCOSE RANDOM mg/dL 113           * I Have Reviewed All Lab Data Listed Above  * Additional Pertinent Lab Tests Reviewed:  [address] Admission Reviewed    Imaging:    Imaging Reports Reviewed Today Include: all  Imaging Personally Reviewed by Myself Includes:  none    Recent Cultures (last 7 days):       Results from last 7 days  Lab Units 07/31/18  1341 07/26/18  1511   URINE CULTURE  >100,000 cfu/ml Enterococcus species* >100,000 cfu/ml Citrobacter freundii*  40,000-49,000 cfu/ml Enterococcus faecalis*       Last 24 Hours Medication List:     Current Facility-Administered Medications:  acetaminophen 975 mg Oral Q8H [facility] Darleen Gallegos PA-C   albuterol 2 5 mg Nebulization Q6H PRN Ceci Balderas MD   apixaban 2 5 mg Oral BID Ceci Balderas MD   artificial tear  Both Eyes HS Ceci Balderas MD   aspirin 81 mg Oral Daily Ceci Balderas MD   carvedilol 3 125 mg Oral BID With Meals Ceci Balderas MD   finasteride 5 mg Oral Daily Ceci Balderas MD   fish oil 1,000 mg Oral Daily Ceci Balderas MD   gabapentin 100 mg Oral BID Ceci Balderas MD   gabapentin 300 mg Oral HS Ceci Balderas MD   HYDROmorphone 0 2 mg Intravenous Q6H PRN Darleen Gallegos PA-C   insulin glargine 5 Units Subcutaneous HS Darleen Gallegos PA-C   insulin lispro 1-5 Units Subcutaneous HS Ceci Balderas MD   insulin lispro 1-6 Units Subcutaneous TID AC Ceci Balderas MD   lidocaine 1 patch Transdermal Daily Darleen Gallegos PA-C lisinopril 5 mg Oral Daily Venkat Osei PA-C   magnesium hydroxide 30 mL Oral Daily PRN Venkat Osei PA-C   melatonin 3 mg Oral HS Ceci Baldears MD   nitroglycerin 0 4 mg Sublingual Q5 Min PRN Rajan Hairston MD   nystatin  Topical BID Venkat Osei PA-C   ondansetron 4 mg Intravenous Q6H PRN Rajan Hairston MD   oxyCODONE 2 5 mg Oral Q4H PRN Cinthia Bence, PA-C   oxyCODONE 5 mg Oral Q4H PRN Cinthia Bence, PA-C   potassium chloride 20 mEq Oral BID Rajan Hairston MD   senna 1 tablet Oral HS Cinthia Bence, PA-C   [START ON 8/3/2018] sulfamethoxazole-trimethoprim 1 tablet Oral Daily Erik Ro PA-C   tamsulosin 0 4 mg Oral Daily With Virginia Wiseman MD   torsemide 40 mg Oral BID [place] PA-C        Today, Patient Was Seen By: Pat Degroot PA-C    ** Please Note: This note has been constructed using a voice recognition system   **

## 2018-08-02 NOTE — ASSESSMENT & PLAN NOTE
· POA, baseline appears to be 1 5-1 7  ·  Estimated Creatinine Clearance: 33 4 mL/min (A) (by C-G formula based on SCr of 1 75 mg/dL (H))  · Possibly 2/2 persistent stone/UTI plus component of increased diuretics by Cardiology due to weight gain recently   · Resolved now   Torsemide and ACEI resumed  · S/p ureteroscopy 7/31  · Monitor creatinine, slightly elevated today, monitor

## 2018-08-02 NOTE — PROGRESS NOTES
Status post right ureteroscopy with holmium laser lithotripsy  New right ureteral stent in place  Marte catheter also placed for hematuria  Marte catheter can be removed when hematuria resolves  Ureteral stent has a string in place and can be removed the early next week if the patient is still in a hospital or in the outpatient setting  An update was provided via telephone to the patient's daughter Dianna Calvin

## 2018-08-02 NOTE — ASSESSMENT & PLAN NOTE
· Admitted in July with pain found to have a right-sided 8 mm ureteric stone without hydro but surrounding stranding, underwent cystoscopy with a right-sided stent placement  Discharged with oral antibiotics for total 2 weeks  Found to have urinary retention at that time status post Cabrera insertion   Followed up with Urology as outpatient and was scheduled for stone lithotripsy and stent exchange 1st week of August, but due to his persistent pain they contacted Urology office and it was moved up to 07/31/2018  · Urology following, had ureteroscopy on 7/31 with stent exchange and removal of stone   · Urology recommending maintain cabrera catheter to drainage, monitor hematuria, irrigate q shift  · Stent has string in place and can be removed early next week inpatient or outpatient   · Continue flomax, finasteride

## 2018-08-02 NOTE — SOCIAL WORK
CM called Freddyserg with [facility] to inform that pt will need STR when dc  Hefsy informed CM that they have [facility] come for OT and PT but only 2x a week  CM informed pt and DTR Danilo Norris of such  CM met pt to discuss dcp  Pt requested CM to call his daughter Danilo Norris for referrals  CM called pt's daughter Danilo Norris- [phone] and informed her that Ot's recommendation is STR  Danilo Norris requested for CM to send referrals to all [place] [facility], referrals in [place]

## 2018-08-02 NOTE — ASSESSMENT & PLAN NOTE
Lab Results   Component Value Date    HGBA1C 7 4 (H) 05/23/2018     Recent Labs      08/01/18   1642  08/01/18   2122  08/02/18   0636  08/02/18   1111   POCGLU  149*  166*  117  184*     Blood Sugar Average: Last 72 hrs:  (P) 310 [id number]   · Sugars improved with addition of Lantus  · Restart empagliflozin at discharge

## 2018-08-02 NOTE — ASSESSMENT & PLAN NOTE
· D/c colace   · Monitor over 48 hrs, if persists off of stool softeners will check C diff   · Had neg c diff on 7/4/18

## 2018-08-03 VITALS
HEIGHT: 70 IN | DIASTOLIC BLOOD PRESSURE: 59 MMHG | HEART RATE: 70 BPM | SYSTOLIC BLOOD PRESSURE: 124 MMHG | BODY MASS INDEX: 27.08 KG/M2 | WEIGHT: 189.15 LBS | TEMPERATURE: 97.5 F | RESPIRATION RATE: 18 BRPM | OXYGEN SATURATION: 99 %

## 2018-08-03 LAB
ANION GAP SERPL CALCULATED.3IONS-SCNC: 5 MMOL/L (ref 4–13)
BACTERIA UR CULT: ABNORMAL
BUN SERPL-MCNC: 24 MG/DL (ref 5–25)
CALCIUM SERPL-MCNC: 8.8 MG/DL (ref 8.3–10.1)
CHLORIDE SERPL-SCNC: 100 MMOL/L (ref 100–108)
CO2 SERPL-SCNC: 30 MMOL/L (ref 21–32)
CREAT SERPL-MCNC: 1.7 MG/DL (ref 0.6–1.3)
ERYTHROCYTE [DISTWIDTH] IN BLOOD BY AUTOMATED COUNT: 15.7 % (ref 11.6–15.1)
GFR SERPL CREATININE-BSD FRML MDRD: 35 ML/MIN/1.73SQ M
GLUCOSE SERPL-MCNC: 114 MG/DL (ref 65–140)
GLUCOSE SERPL-MCNC: 131 MG/DL (ref 65–140)
GLUCOSE SERPL-MCNC: 171 MG/DL (ref 65–140)
GLUCOSE SERPL-MCNC: 200 MG/DL (ref 65–140)
HCT VFR BLD AUTO: 28.9 % (ref 36.5–49.3)
HGB BLD-MCNC: 8.6 G/DL (ref 12–17)
MCH RBC QN AUTO: 26.5 PG (ref 26.8–34.3)
MCHC RBC AUTO-ENTMCNC: 29.8 G/DL (ref 31.4–37.4)
MCV RBC AUTO: 89 FL (ref 82–98)
PLATELET # BLD AUTO: 304 THOUSANDS/UL (ref 149–390)
PMV BLD AUTO: 10 FL (ref 8.9–12.7)
POTASSIUM SERPL-SCNC: 4 MMOL/L (ref 3.5–5.3)
RBC # BLD AUTO: 3.24 MILLION/UL (ref 3.88–5.62)
SODIUM SERPL-SCNC: 135 MMOL/L (ref 136–145)
WBC # BLD AUTO: 4.5 THOUSAND/UL (ref 4.31–10.16)

## 2018-08-03 PROCEDURE — 85027 COMPLETE CBC AUTOMATED: CPT | Performed by: PHYSICIAN ASSISTANT

## 2018-08-03 PROCEDURE — 97535 SELF CARE MNGMENT TRAINING: CPT

## 2018-08-03 PROCEDURE — G8978 MOBILITY CURRENT STATUS: HCPCS

## 2018-08-03 PROCEDURE — 99232 SBSQ HOSP IP/OBS MODERATE 35: CPT | Performed by: UROLOGY

## 2018-08-03 PROCEDURE — 82948 REAGENT STRIP/BLOOD GLUCOSE: CPT

## 2018-08-03 PROCEDURE — 99239 HOSP IP/OBS DSCHRG MGMT >30: CPT | Performed by: PHYSICIAN ASSISTANT

## 2018-08-03 PROCEDURE — G8979 MOBILITY GOAL STATUS: HCPCS

## 2018-08-03 PROCEDURE — 97162 PT EVAL MOD COMPLEX 30 MIN: CPT

## 2018-08-03 PROCEDURE — 80048 BASIC METABOLIC PNL TOTAL CA: CPT | Performed by: PHYSICIAN ASSISTANT

## 2018-08-03 RX ORDER — OXYCODONE HYDROCHLORIDE 5 MG/1
2.5 TABLET ORAL EVERY 6 HOURS PRN
Status: DISCONTINUED | OUTPATIENT
Start: 2018-08-03 | End: 2018-08-03 | Stop reason: HOSPADM

## 2018-08-03 RX ORDER — BACITRACIN, NEOMYCIN, POLYMYXIN B 400; 3.5; 5 [USP'U]/G; MG/G; [USP'U]/G
1 OINTMENT TOPICAL 2 TIMES DAILY
Status: DISCONTINUED | OUTPATIENT
Start: 2018-08-03 | End: 2018-08-03 | Stop reason: HOSPADM

## 2018-08-03 RX ORDER — OXYCODONE HYDROCHLORIDE 5 MG/1
5 TABLET ORAL EVERY 6 HOURS PRN
Qty: 15 TABLET | Refills: 0 | Status: SHIPPED | OUTPATIENT
Start: 2018-08-03 | End: 2018-08-18

## 2018-08-03 RX ADMIN — NYSTATIN: 100000 POWDER TOPICAL at 09:01

## 2018-08-03 RX ADMIN — OXYCODONE HYDROCHLORIDE 5 MG: 5 TABLET ORAL at 09:09

## 2018-08-03 RX ADMIN — ASPIRIN 81 MG: 81 TABLET, COATED ORAL at 09:00

## 2018-08-03 RX ADMIN — APIXABAN 2.5 MG: 2.5 TABLET, FILM COATED ORAL at 09:00

## 2018-08-03 RX ADMIN — LISINOPRIL 5 MG: 5 TABLET ORAL at 08:59

## 2018-08-03 RX ADMIN — ACETAMINOPHEN 975 MG: 325 TABLET ORAL at 05:06

## 2018-08-03 RX ADMIN — LIDOCAINE 1 PATCH: 50 PATCH CUTANEOUS at 09:00

## 2018-08-03 RX ADMIN — FINASTERIDE 5 MG: 5 TABLET, FILM COATED ORAL at 09:00

## 2018-08-03 RX ADMIN — ACETAMINOPHEN 975 MG: 325 TABLET ORAL at 15:16

## 2018-08-03 RX ADMIN — POTASSIUM CHLORIDE 20 MEQ: 1500 TABLET, EXTENDED RELEASE ORAL at 09:00

## 2018-08-03 RX ADMIN — BACITRACIN, NEOMYCIN, POLYMYXIN B 1 SMALL APPLICATION: 400; 3.5; 5 OINTMENT TOPICAL at 11:07

## 2018-08-03 RX ADMIN — GABAPENTIN 100 MG: 100 CAPSULE ORAL at 08:59

## 2018-08-03 RX ADMIN — SULFAMETHOXAZOLE AND TRIMETHOPRIM 1 TABLET: 800; 160 TABLET ORAL at 09:01

## 2018-08-03 RX ADMIN — CARVEDILOL 3.12 MG: 3.12 TABLET, FILM COATED ORAL at 08:59

## 2018-08-03 RX ADMIN — TORSEMIDE 40 MG: 20 TABLET ORAL at 09:00

## 2018-08-03 RX ADMIN — INSULIN LISPRO 2 UNITS: 100 INJECTION, SOLUTION INTRAVENOUS; SUBCUTANEOUS at 11:20

## 2018-08-03 RX ADMIN — Medication 1000 MG: at 09:01

## 2018-08-03 NOTE — PROGRESS NOTES
Pt being inappropriate with staff, stating, "i like to watch you walk out, oh the way you move your body " pt redirected, and educated on pt and staff relations

## 2018-08-03 NOTE — ASSESSMENT & PLAN NOTE
· POA, baseline appears to be 1 5-1 7  ·  Estimated Creatinine Clearance: 31 6 mL/min (A) (by C-G formula based on SCr of 1 7 mg/dL (H))  · Possibly 2/2 persistent stone/UTI plus component of increased diuretics by Cardiology due to weight gain recently   · Resolved now   Torsemide and ACEI resumed  · S/p ureteroscopy 7/31  · Monitor creatinine, 1 70 today

## 2018-08-03 NOTE — DISCHARGE INSTRUCTIONS
Please keep stent string taped where it is visible to either thigh, dorsal aspect of penis or suprapubic area  Cabrera Cath Care instructions---Maintain cabrera catheter to straight drainage  May use leg bag and shower  May flush TID prn using Minh syringe and 120 ml NSS  May use more saline ad venkata to prevent/treat cath obstruction  Remove catheter on 3rd day rehab by 0600 hrs  Bladder scan if no void or less than 200ml in 6hrs  Straight cath for bladder scan >350ml and repeat process  If patient requires straight cath x3 , re-insert cabrera and call for Urologist follow-up  Information above  Cabrera can remain in place for up to 4 weeks at a time   Cabrera placed at [facility] on July 31, 2018    Continue bactrim for 2 more days for UTI

## 2018-08-03 NOTE — OCCUPATIONAL THERAPY NOTE
[address] [place] Progress Note     Patient Name: Greg Garber  DTDTW'A Date: 8/3/2018  Problem List  Patient Active Problem List   Diagnosis    Lower urinary tract symptoms    Malignant neoplasm of trigone of urinary bladder (HCC)    Prostate cancer (HCC)    Chronic diastolic congestive heart failure (HCC)    CKD (chronic kidney disease) stage 3, GFR 30-59 ml/min    Paroxysmal atrial fibrillation ([address] )    CAD (coronary artery disease)    Type 2 diabetes mellitus with hyperglycemia, with long-term current use of insulin ([address] )    Cardiac pacemaker in situ    Right ureteral stone    Urinary tract infection with hematuria    Hyponatremia    Other chest pain    Laceration of right lower extremity    Diarrhea    Anemia    Benign prostatic hyperplasia    Blindness of left eye    Kidney stones    Peripheral neuropathy    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 (LAD) artery    Ureter, calculus    Urinary retention    Right flank pain    Acute renal failure superimposed on stage 3 chronic kidney disease (HCC)    Urinary tract infection associated with indwelling urethral catheter ([address] )         08/03/18 1100   Restrictions/Precautions   Weight Bearing Precautions Per Order No   Other Precautions Bed Alarm; Chair Alarm; Fall Risk;O2;Hard of hearing   Pain Assessment   Pain Assessment 0-10   Pain Score 4   Pain Type Acute pain   Pain Location Back   Pain Orientation Right   Hospital Pain Intervention(s) Repositioned; Ambulation/increased activity;Relaxation technique; Environmental changes   ADL   Where Assessed Chair   Grooming Assistance 5  Supervision/Setup   Grooming Deficit Setup;Supervision/safety; Increased time to complete   Grooming Comments Pt refused to complete in stance  Required chair 2* to decreased activity tolerance  UB Bathing Assistance 5  Supervision/Setup   UB Bathing Deficit Setup;Steadying;Verbal cueing;Supervision/safety; Increased time to complete   UB Bathing Comments Pt is able to complete all portions of UB bathing with VC's for task initiation/completion  LB Bathing Assistance 3  Moderate Assistance   LB Bathing Deficit Steadying;Verbal cueing;Setup;Supervision/safety; Increased time to complete; Buttocks;Right lower leg including foot; Left lower leg including foot   LB Bathing Comments Pt requires A for BLEs and rear hygiene  UB Dressing Assistance 5  Supervision/Setup   UB Dressing Deficit Setup;Verbal cueing;Supervision/safety; Increased time to complete   LB Dressing Assistance 2  Maximal Assistance   LB Dressing Deficit Don/doff R sock; Don/doff L sock;Pull up over hips;Steadying; Requires assistive device for steadying;Supervision/safety; Increased time to complete; Thread RLE into pants; Thread LLE into pants   LB Dressing Comments Pt total A to don/doff socks, A to thread BLEs into pants and to pull up over hips  Toileting Assistance  2  Maximal Assistance   Toileting Deficit Supervison/safety; Increased time to complete; Bedside commode;Clothing management up;Clothing management down   Toileting Comments Pt requires A for rear hygiene and for CM over hips  Transfers   Sit to Stand 4  Minimal assistance   Stand to Sit 4  Minimal assistance   Stand pivot 4  Minimal assistance   Functional Mobility   Functional Mobility 4  Minimal assistance   Additional Comments Use of RW  A to manage O2  Toilet Transfers   Toilet Transfer From [employer] Transfer Type To and from   Toilet Transfer to Standard bedside commode   Toilet Transfer Technique Stand pivot   Toilet Transfers Minimal assistance   Toilet Transfers Comments Use of RW for all functional transfers      Cognition   Overall Cognitive Status WFL   Arousal/Participation Alert   Attention Within functional limits   Orientation Level Oriented X4   Memory Within functional limits   Following Commands Follows one step commands without difficulty   Comments [place]    Activity Tolerance Activity Tolerance Patient tolerated treatment well   Assessment   Assessment Pt participated in skilled OT tx session with focus on bed mobility, functional mobility/transfers, and ADL retraining  See above for further details on functional performance  Pt has demonstrated increased independence with functional transfers and mobility this session with use of RW, requiring CGA  Pt does require A for O2 management  Pt continues to require total assist to don/doff footwear and pants  Pt continues to be limited by decreased endurance, decreased activity tolerance, RAYMOND/SOB  Pt on 1L 02, at 96% after transfer, however noted with increased SOB/RAYMOND  Recommend inpatient rehab upon D/C  OT will continue to follow to address prior stated goals  Plan   Treatment Interventions ADL retraining;Functional transfer training; Endurance training;Equipment evaluation/education; Energy conservation; Activityengagement   Treatment Day 2   OT Frequency 3-5x/wk   Recommendation   OT Discharge Recommendation Short Term Rehab   Barthel Index   Feeding 5   Bathing 0   Grooming Score 0   Dressing Score 0   Bladder Score 0   Bowels Score 10   Toilet Use Score 5   Transfers (Bed/Chair) Score 5   Mobility (Level Surface) Score 0   Stairs Score 0   Barthel Index Score 25   Modified Marston Scale   Modified Marston Scale 4

## 2018-08-03 NOTE — ASSESSMENT & PLAN NOTE
Lab Results   Component Value Date    HGBA1C 7 4 (H) 05/23/2018     Recent Labs      08/02/18   1604  08/02/18   2109  08/03/18   0202  08/03/18   0645   POCGLU  124  162*  114  131     Blood Sugar Average: Last 72 hrs:  (P) 054 [id number]   · Sugars improved with addition of Lantus  · Restart empagliflozin at discharge

## 2018-08-03 NOTE — ASSESSMENT & PLAN NOTE
· Admitted in July with pain found to have a right-sided 8 mm ureteric stone without hydro but surrounding stranding, underwent cystoscopy with a right-sided stent placement  Discharged with oral antibiotics for total 2 weeks  Found to have urinary retention at that time status post Cabrera insertion   Followed up with Urology as outpatient and was scheduled for stone lithotripsy and stent exchange 1st week of August, but due to his persistent pain they contacted Urology office and it was moved up to 07/31/2018  · Urology following, had ureteroscopy on 7/31 with stent exchange and removal of stone   · Urology recommending maintain cabrera catheter to drainage, minimal hematuria overnight, irrigate q shift  · Stent has string in place and can be removed early next week inpatient or outpatient   · Continue flomax, finasteride

## 2018-08-03 NOTE — ASSESSMENT & PLAN NOTE
· R flank likely 2/2 ureteral stone, no evidnce of pyelo on imaging   · Urine culture growing >100,000 cfu Citrobacter and 40,000-49,000 cfu enterococcus- infection vs colonization  Note no fever or white count  · Has received 1 dose of ampicillin, 3 days of rocephin, 1 dose of cipro, and 4 days of bactrim so far  · Citrobacter resistant to Rocephin   D/C Rocephin  · Started on Bactrim for 5 days, defer further abx use as patient has history of c diff

## 2018-08-03 NOTE — PHYSICAL THERAPY NOTE
Physical Therapy Evaluation    Patient Name: Basil Rivera    KTMGI'B Date: 8/3/2018     Problem List  Patient Active Problem List   Diagnosis    Lower urinary tract symptoms    Malignant neoplasm of trigone of urinary bladder (HCC)    Prostate cancer (HCC)    Chronic diastolic congestive heart failure (HCC)    CKD (chronic kidney disease) stage 3, GFR 30-59 ml/min    Paroxysmal atrial fibrillation ([address] )    CAD (coronary artery disease)    Type 2 diabetes mellitus with hyperglycemia, with long-term current use of insulin ([address] )    Cardiac pacemaker in situ    Right ureteral stone    Urinary tract infection with hematuria    Hyponatremia    Other chest pain    Laceration of right lower extremity    Diarrhea    Anemia    Benign prostatic hyperplasia    Blindness of left eye    Kidney stones    Peripheral neuropathy    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 (LAD) artery    Ureter, calculus    Urinary retention    Right flank pain    Acute renal failure superimposed on stage 3 chronic kidney disease (HCC)    Urinary tract infection associated with indwelling urethral catheter ([address] )        Past Medical History  Past Medical History:   Diagnosis Date    A-fib ([address] )     Anemia     Bladder cancer ([address] )     CAD (coronary artery disease)     CHF (congestive heart failure) (HCC)     Chronic kidney failure     Colon cancer ([address] )     Eye cancer, left ([address] )     Hypertension     Incontinence     Pacemaker     Prostate cancer ([address] )     Type 2 diabetes mellitus ([address] )     Wears hearing aid in both ears         Past Surgical History  Past Surgical History:   Procedure Laterality Date    CARDIAC PACEMAKER PLACEMENT  2014    CORONARY ANGIOPLASTY WITH STENT PLACEMENT      GALLBLADDER SURGERY      OR CYSTO/URETERO W/LITHOTRIPSY &INDWELL STENT INSRT Right 7/31/2018    Procedure: CYSTOSCOPY URETEROSCOPY WITH LITHOTRIPSY HOLMIUM LASER ,BASKET STONE EXTRACTION  RETROGRADE PYELOGRAM AND exchange STENT URETERAL;  Surgeon: Mary Hardy MD;  [facility];  Service: Urology    NM CYSTOURETHROSCOPY,URETER CATHETER Right 7/1/2018    Procedure: CYSTOSCOPY RETROGRADE PYELOGRAM WITH INSERTION STENT URETERAL;  Surgeon: Candance Netter, MD;  [facility];  Service: Urology         08/03/18 1109   Note Type   Note type Eval only   Pain Assessment   Pain Score 4   Pain Type Acute pain   Pain Location Back   Pain Orientation Right   Hospital Pain Intervention(s) Ambulation/increased activity; Distraction   Home Living   Type of Home Assisted living   Home Layout One level   Bathroom Shower/Tub Walk-in shower  (with shower seat)   Bathroom Toilet Raised   [address] [facility]; Wheelchair-manual   Additional Comments Pt  reported he either uses his RW or w/c to move around    Prior Function   Level of [place] Needs assistance with IADLs; Needs assistance with ADLs and functional mobility   Receives Help From Personal care attendant   ADL Assistance Needs assistance   IADLs Needs assistance   Falls in the last 6 months 0  (as per patient )   Comments Pt  stated that he walks around or wheels himself  without assistance    Restrictions/Precautions   Other Precautions Contact/isolation; Chair Alarm; Bed Alarm; Fall Risk  (1 L O2 with activity )   Cognition   Attention Within functional limits   Following Commands Follows one step commands with increased time or repetition   Comments [place]   RLE Assessment   RLE Assessment WFL   Strength RLE   RLE Overall Strength 4/5   LLE Assessment   LLE Assessment WFL   Strength LLE   LLE Overall Strength 4/5   Light Touch   RLE Light Touch Grossly intact   LLE Light Touch Grossly intact   Sharp/Dull   RLE Sharp/Dull Grossly intact   LLE Sharp/Dull Grossly intact   Bed Mobility   Supine to Sit 4  Minimal assistance   Additional items HOB elevated; Increased time required   Transfers   Sit to Stand 4  Minimal assistance   Additional items Assist x 1   Stand to Sit 4  Minimal assistance   Additional items Assist x 1   Stand pivot 4  Minimal assistance   Additional items Assist x 1   Ambulation/Elevation   Gait pattern Improper Weight shift; Foward flexed   Gait Assistance 4  Minimal assist   Additional items Assist x 2  (O2 management and CF for safety )   Assistive Device Rolling walker   Distance 70   Stair Management Assistance Not tested  (does not have to manage steps at [facility]EWEL)   Balance   Static Sitting Good   Dynamic Sitting Fair +   Static Standing Fair -   Dynamic Standing Poor +   Ambulatory Poor +   Activity Tolerance   Activity Tolerance Patient tolerated treatment well   Nurse Made Aware Nurse    Assessment   Prognosis Good   Problem List Decreased strength;Decreased endurance; Impaired balance;Decreased mobility; Decreased coordination;Decreased safety awareness   Assessment Pt  is a a 79 y/o male who was admitted dur to worsening R sided flank pain  Pt  dx includes:  ureter calculus s/p cystoscopy and exchange of (R) ureteral stent 7/31/18, DEANA, urinary retention, (R) ureteral stone, DM 2, CAD, AFIB, CKD, CHF  PT eval completed and pt  presents with dec activity tolerance, dec balance and difficulty with functional mobility  Pt  lives in an [facility] and was receiving assist from staff with ADL's but pt  reported that he was moving around on his own either with a RW or a w/c  Pt  will benefit from acute care PT to address functional decline and to facilitate safe d/c to next level of care  pt  would benefit from continued PT through short term rehab      Barriers to Discharge None   Goals   Patient Goals To go back to [facility]    STG Expiration Date 08/10/18   Short Term Goal #1 Pt  will be S with functional transfers using RW   Short Term Goal #2 Pt  will be S with ambulation X 150 feet with S    Plan Treatment/Interventions Functional transfer training;LE strengthening/ROM; Therapeutic exercise; Endurance training;Patient/family training;Equipment eval/education; Bed mobility;Gait training   PT Frequency 5x/wk   Recommendation   Recommendation Short-term skilled PT   Equipment Recommended Walker   PT - OK to Discharge Yes   Additional Comments when medically stable to SNF   Barthel Index   Feeding 5   Bathing 0   Grooming Score 0   Dressing Score 0   Bladder Score 0   Bowels Score 10   Toilet Use Score 5   Transfers (Bed/Chair) Score 5   Mobility (Level Surface) Score 0   Stairs Score 0   Barthel Index Score 25

## 2018-08-03 NOTE — CASE MANAGEMENT
Continued Stay Review    Date: 8/3/2018    Vital Signs: /59 (BP Location: Right arm)   Pulse 70   Temp 97 5 °F (36 4 °C) (Oral)   Resp 18   Ht 5' 10" (1 778 m)   Wt 85 8 kg (189 lb 2 5 oz)   SpO2 99%   BMI 27 14 kg/m²     Medications:   Scheduled Meds:   Current Facility-Administered Medications:  acetaminophen 975 mg Oral Q8H [facility] Alba Hussain Sargent PA-C   apixaban 2 5 mg Oral BID Ceci Balderas MD   artificial tear  Both Eyes HS Ceci Balderas MD   aspirin 81 mg Oral Daily Ceci Balderas MD   carvedilol 3 125 mg Oral BID With Meals Ceci Balderas MD   finasteride 5 mg Oral Daily Ceci Balderas MD   fish oil 1,000 mg Oral Daily Ceci Balderas MD   gabapentin 100 mg Oral BID Elis Negro MD   gabapentin 300 mg Oral HS Elis Negro MD   insulin glargine 5 Units Subcutaneous HS Hussain Sargent PA-C   insulin lispro 1-5 Units Subcutaneous HS Ceci Balderas MD   insulin lispro 1-6 Units Subcutaneous TID AC Ceci Balderas MD   lidocaine 1 patch Transdermal Daily Hussain Sargent PA-C   lisinopril 5 mg Oral Daily Venkat Osei PA-C   magnesium hydroxide 30 mL Oral Daily PRN Venkat Osei PA-C   melatonin 3 mg Oral HS Elis Negro MD   neomycin-bacitracin-polymyxin b 1 small application Topical BID Erik Ro PA-C   nitroglycerin 0 4 mg Sublingual Q5 Min PRN Ceci Balderas MD   nystatin  Topical BID Venkat Osei PA-C   ondansetron 4 mg Intravenous Q6H PRN Elis Negro MD   oxyCODONE 2 5 mg Oral Q6H PRN Erik Ro PA-C   potassium chloride 20 mEq Oral BID Elis Negro MD   senna 1 tablet Oral HS Hussain Sargent PA-C   sulfamethoxazole-trimethoprim 1 tablet Oral Daily Erik Ro PA-C   tamsulosin 0 4 mg Oral Daily With Dinner Elis Negro MD   torsemide 40 mg Oral BID Venkat Osei PA-C     Continuous Infusions:    PRN Meds: magnesium hydroxide    nitroglycerin    ondansetron    oxyCODONE    Abnormal Labs/Diagnostic Results: hgb 8 6/28 9  Na 135--cr 1 70--bs 171    Age/Sex: 80 y o  male     Assessment/Plan: Mr Scot Aguilar is an elderly and frail 51-year-old male with right ureteral obstruction secondary to ureteral calculus status post right ureteroscopy with laser lithotripsy and exchange of right ureteral stent  Marte catheter was placed during admission for gross hematuria; now resolved  Patient is working with therapy and will require transfer to short-term rehab/SNF      Plan:  Informed by nursing staff, patient be transferred to short-term rehab this afternoon  Maintain catheter to straight drainage and proceed with void trial Monday morning at rehab  Please secure string from stent to suprapubic area, dorsal aspect of penis or thigh      Discharge Plan  Possible : transfer to [facility] this afternoon for str

## 2018-08-03 NOTE — ASSESSMENT & PLAN NOTE
· Admitted in July with pain found to have a right-sided 8 mm ureteric stone without hydro but surrounding stranding, underwent cystoscopy with a right-sided stent placement  Discharged with oral antibiotics for total 2 weeks  Found to have urinary retention at that time status post Cabrera insertion   Followed up with Urology as outpatient and was scheduled for stone lithotripsy and stent exchange 1st week of August, but due to his persistent pain they contacted Urology office and it was moved up to 07/31/2018  · Urology following, had ureteroscopy on 7/31 with stent exchange and removal of stone   · Urology recommending maintain cabrera catheter to drainage, monitor for hematuria irrigate q shift  · Stent has string in place and can be removed early next week inpatient or outpatient   · Proceed with void trial on Monday at rehab   · Continue flomax, finasteride

## 2018-08-03 NOTE — ASSESSMENT & PLAN NOTE
· Cabrera in place until hematuria complete resolved, nursing to irrigate q shift   · Flomax and finasteride  · Urology following, maintain cabrera   · Outpatient urology follow up   · Void trial on Monday at rehab

## 2018-08-03 NOTE — PROGRESS NOTES
Progress Note - Ronald Simpson 80 y o  male MRN: [id number]    Unit/Bed#: -Laci Encounter: [id number]      Assessment:  Mr Jayda Bell is an elderly and frail 51-year-old male with right ureteral obstruction secondary to ureteral calculus status post right ureteroscopy with laser lithotripsy and exchange of right ureteral stent  Marte catheter was placed during admission for gross hematuria; now resolved  Patient is working with therapy and will require transfer to short-term rehab/SNF  Plan:  Informed by nursing staff, patient be transferred to short-term rehab this afternoon  Maintain catheter to straight drainage and proceed with void trial Monday morning at rehab  Please secure string from stent to suprapubic area, dorsal aspect of penis or thigh  Subjective:  " What do you want?"      Objective:     Vitals: Blood pressure 124/59, pulse 70, temperature 97 5 °F (36 4 °C), temperature source Oral, resp  rate 18, height 5' 10" (1 778 m), weight 85 8 kg (189 lb 2 5 oz), SpO2 99 %  ,Body mass index is 27 14 kg/m²  Intake/Output Summary (Last 24 hours) at 08/03/18 1223  Last data filed at 08/03/18 0900   Gross per 24 hour   Intake              743 ml   Output             1850 ml   Net            -1107 ml       Physical Exam: General appearance: alert, appears stated age, cooperative and slowed mentation  Head: Normocephalic, without obvious abnormality, atraumatic  Neck: no adenopathy, no carotid bruit, no JVD, supple, symmetrical, trachea midline and thyroid not enlarged, symmetric, no tenderness/mass/nodules  Lungs: diminished breath sounds  Heart: regular rate and rhythm, S1, S2 normal, no murmur, click, rub or gallop  Abdomen: soft, non-tender; bowel sounds normal; no masses,  no organomegaly  Extremities: extremities normal, warm and well-perfused; no cyanosis, clubbing, or edema  Pulses: 2+ and symmetric  Neurologic: Mental status: alertness: [place]  Marte patent for grossly clear yellow urine  Invasive Devices     Peripheral Intravenous Line            Peripheral IV 07/30/18 Left Arm 4 days          Drain            Ureteral Drain/Stent Right ureter 6 Fr  32 days    Urethral Catheter 18 Fr  2 days              Lab Results   Component Value Date    WBC 4 50 08/03/2018    HGB 8 6 (L) 08/03/2018    HCT 28 9 (L) 08/03/2018    MCV 89 08/03/2018     08/03/2018     Lab Results   Component Value Date    GLUCOSE 171 (H) 08/03/2018    CALCIUM 8 8 08/03/2018     (L) 08/03/2018    K 4 0 08/03/2018    CO2 30 08/03/2018     08/03/2018    BUN 24 08/03/2018    CREATININE 1 70 (H) 08/03/2018       Lab, Imaging and other studies: I have personally reviewed pertinent reports

## 2018-08-03 NOTE — PLAN OF CARE
Problem: OCCUPATIONAL THERAPY ADULT  Goal: Performs self-care activities at highest level of function for planned discharge setting  See evaluation for individualized goals  Treatment Interventions: ADL retraining, UE strengthening/ROM, Endurance training, Patient/family training, Cognitive reorientation, Equipment evaluation/education, Compensatory technique education, Continued evaluation, Energy conservation, Activityengagement          See flowsheet documentation for full assessment, interventions and recommendations  Outcome: Progressing  Limitation: Decreased ADL status, Decreased UE strength, Decreased endurance, Decreased self-care trans, Decreased high-level ADLs  Prognosis: Fair  Assessment: Pt participated in skilled OT tx session with focus on bed mobility, functional mobility/transfers, and ADL retraining  See above for further details on functional performance  Pt has demonstrated increased independence with functional transfers and mobility this session with use of RW, requiring CGA  Pt does require A for O2 management  Pt continues to require total assist to don/doff footwear and pants  Pt continues to be limited by decreased endurance, decreased activity tolerance, RAYMOND/SOB  Pt on 1L 02, at 96% after transfer, however noted with increased SOB/RAYMOND  Recommend inpatient rehab upon D/C  OT will continue to follow to address prior stated goals       OT Discharge Recommendation: Short Term Rehab

## 2018-08-03 NOTE — PROGRESS NOTES
Progress Note - Carla Shaffer 12/21/1930, 80 y o  male MRN: [id number]  Unit/Bed#: -01 Encounter: [id number] DOS: 8/3/18  Primary Care Provider: Aram Curry MD   Date and time admitted to hospital: 7/26/2018  9:44 AM    * Right ureteral stone   Assessment & Plan    · Admitted in July with pain found to have a right-sided 8 mm ureteric stone without hydro but surrounding stranding, underwent cystoscopy with a right-sided stent placement  Discharged with oral antibiotics for total 2 weeks  Found to have urinary retention at that time status post Cabrera insertion  Followed up with Urology as outpatient and was scheduled for stone lithotripsy and stent exchange 1st week of August, but due to his persistent pain they contacted Urology office and it was moved up to 07/31/2018  · Urology following, had ureteroscopy on 7/31 with stent exchange and removal of stone   · Urology recommending maintain cabrera catheter to drainage, minimal hematuria overnight, irrigate q shift  · Stent has string in place and can be removed early next week inpatient or outpatient   · Continue flomax, finasteride        Acute renal failure superimposed on stage 3 chronic kidney disease ([facility] [address] )   Assessment & Plan    · POA, baseline appears to be 1 5-1 7  ·  Estimated Creatinine Clearance: 30 7 mL/min (A) (by C-G formula based on SCr of 1 75 mg/dL (H))  · Possibly 2/2 persistent stone/UTI plus component of increased diuretics by Cardiology due to weight gain recently   · Resolved now  Torsemide and ACEI resumed  · S/p ureteroscopy 7/31  · Monitor creatinine, 1 75 yesterday, follow up AM labs         Urinary tract infection associated with indwelling urethral catheter ([facility] [address] )   Assessment & Plan    · R flank likely 2/2 ureteral stone, no evidnce of pyelo on imaging   · Urine culture growing >100,000 cfu Citrobacter and 40,000-49,000 cfu enterococcus- infection vs colonization  Note no fever or white count     · Has received 1 dose of ampicillin, 3 days of rocephin, 1 dose of cipro, and 4 days of bactrim so far  · Citrobacter resistant to Rocephin  D/C Rocephin  · Started on Bactrim - continue for 7 days, dec to 1 tab daily given renal function         Urinary retention   Assessment & Plan    · Cabrera in place until hematuria complete resolved, nursing to irrigate q shift   · Flomax and finasteride  · Urology following, maintain cabrera   · Outpatient urology follow up   · Void trial likely as outpatient vs at rehab         Diarrhea   Assessment & Plan    · D/c colace   · Monitor over another 24 hrs, if persists off of stool softeners will check C diff   · Had neg c diff on 7/4/18        Type 2 diabetes mellitus with hyperglycemia, with long-term current use of insulin [facility])   Assessment & Plan    Lab Results   Component Value Date    HGBA1C 7 4 (H) 05/23/2018     Recent Labs      08/02/18   1604  08/02/18   2109  08/03/18   0202  08/03/18   0645   POCGLU  124  162*  114  131     Blood Sugar Average: Last 72 hrs:  (P) 980 [id number]   · Sugars improved with addition of Lantus  · Restart empagliflozin at discharge         CAD (coronary artery disease)   Assessment & Plan    · Patient without chest pain   · Continue home medical management - ASA, coreg         Paroxysmal atrial fibrillation (HCC)   Assessment & Plan    · Continue Coreg and Eliquis        Chronic diastolic congestive heart failure (HCC)   Assessment & Plan    · Recent echo last month with EF of 60 percent  Due to weight gain his diuretics were increased from 40 milligrams to 80 milligrams of torsemide twice a day for 3 days  · Torsemide restarted at normal dose  · I&Os, daily weights          VTE Pharmacologic Prophylaxis:   Pharmacologic: Apixaban (Eliquis)  Mechanical VTE Prophylaxis in Place: Yes    Patient Centered Rounds: I have performed bedside rounds with nursing staff today      Discussions with Specialists or Other Care Team Provider: nursing    Education and Discussions with Family / Patient: patient, will call dtr this afternoon     Time Spent for Care: 30 minutes  More than 50% of total time spent on counseling and coordination of care as described above  Current Length of Stay: 8 day(s)    Current Patient Status: Inpatient   Certification Statement: The patient will continue to require additional inpatient hospital stay due to safe discharge planning, ongoing urology work up     Discharge Plan / Estimated Discharge Date: pending safe discharge plan, f/u AM labs and further urology input     Code Status: Level 1 - Full Code    Subjective:   Patient being inappropriate towards staff  Encouraged against this behavior  Flank pain worse today per patient  Objective:     Vitals:   Temp (24hrs), Av 7 °F (36 5 °C), Min:97 5 °F (36 4 °C), Max:97 9 °F (36 6 °C)    HR:  [67-84] 70  Resp:  [18-20] 18  BP: ()/(58-90) 124/59  SpO2:  [93 %-99 %] 99 %  Body mass index is 27 14 kg/m²  Input and Output Summary (last 24 hours): Intake/Output Summary (Last 24 hours) at 18 1003  Last data filed at 18 0900   Gross per 24 hour   Intake              743 ml   Output             1850 ml   Net            -1107 ml     Physical Exam:     Physical Exam   Constitutional: He is oriented to person, place, and time  He appears well-developed and well-nourished  Chronically ill appearing    HENT:   Head: Normocephalic and atraumatic  Mouth/Throat: Oropharynx is clear and moist    [place]   Eyes: EOM are normal    Blind left eye    Neck: Normal range of motion  Neck supple  Cardiovascular: Normal rate, regular rhythm and normal heart sounds  No murmur heard  Pulmonary/Chest: Effort normal and breath sounds normal    Abdominal: Soft  Bowel sounds are normal  There is tenderness (left flank )  Musculoskeletal: Normal range of motion  He exhibits no edema  RLE wound   Neurological: He is alert and oriented to person, place, and time     Skin: Skin is warm and dry  Psychiatric: He has a normal mood and affect  Additional Data:     Labs:      Results from last 7 days  Lab Units 08/02/18  0455 08/01/18  0522   WBC Thousand/uL 3 25* 5 10   HEMOGLOBIN g/dL 8 0* 8 0*   HEMATOCRIT % 26 4* 26 6*   PLATELETS Thousands/uL 253 274   NEUTROS PCT %  --  76*   LYMPHS PCT %  --  10*   MONOS PCT %  --  10   EOS PCT %  --  3       Results from last 7 days  Lab Units 08/02/18  0455   SODIUM mmol/L 134*   POTASSIUM mmol/L 4 2   CHLORIDE mmol/L 100   CO2 mmol/L 31   BUN mg/dL 28*   CREATININE mg/dL 1 75*   CALCIUM mg/dL 8 5   GLUCOSE RANDOM mg/dL 113           * I Have Reviewed All Lab Data Listed Above  * Additional Pertinent Lab Tests Reviewed:  [address] Admission Reviewed    Imaging:    Imaging Reports Reviewed Today Include: all  Imaging Personally Reviewed by Myself Includes:  none    Recent Cultures (last 7 days):       Results from last 7 days  Lab Units 07/31/18  1341   URINE CULTURE  >100,000 cfu/ml Enterococcus faecalis*       Last 24 Hours Medication List:     Current Facility-Administered Medications:  acetaminophen 975 mg Oral Q8H [facility] Cristohper Gold PA-C   apixaban 2 5 mg Oral BID Ceci Balderas MD   artificial tear  Both Eyes HS Ceci Balderas MD   aspirin 81 mg Oral Daily Ceci Balderas MD   carvedilol 3 125 mg Oral BID With Meals Ceci Balderas MD   finasteride 5 mg Oral Daily Ceci Balderas MD   fish oil 1,000 mg Oral Daily Ceci Balderas MD   gabapentin 100 mg Oral BID Mindy Lance MD   gabapentin 300 mg Oral HS Mindy Lance MD   insulin glargine 5 Units Subcutaneous HS Cristopher Gold PA-C   insulin lispro 1-5 Units Subcutaneous HS Mindy Lance MD   insulin lispro 1-6 Units Subcutaneous TID AC Ceci Balderas MD   lidocaine 1 patch Transdermal Daily Cristopher Gold PA-C   lisinopril 5 mg Oral Daily Venkat SYLVIA Osei PA-C   magnesium hydroxide 30 mL Oral Daily PRN Javier Castillo PA-C melatonin 3 mg Oral HS Renita Alvarez MD   neomycin-bacitracin-polymyxin b 1 small application Topical BID Erik Ro PA-C   nitroglycerin 0 4 mg Sublingual Q5 Min PRN Ceci Balderas MD   nystatin  Topical BID Venkat Osei PA-C   ondansetron 4 mg Intravenous Q6H PRN Renita Alvarez MD   oxyCODONE 2 5 mg Oral Q6H PRN Erik Ro PA-C   potassium chloride 20 mEq Oral BID Renita Alvarez MD   senna 1 tablet Oral HS Paulette Morrison PA-C   sulfamethoxazole-trimethoprim 1 tablet Oral Daily Erik Ro PA-C   tamsulosin 0 4 mg Oral Daily With Netta Garcia MD   torsemide 40 mg Oral BID Gonzalez Chávez PA-C        Today, Patient Was Seen By: Ramon Malik PA-C    ** Please Note: This note has been constructed using a voice recognition system   **

## 2018-08-03 NOTE — PHYSICAL THERAPY NOTE
Physical Therapy Evaluation     Patient Name: Fernando PETTIT Date: 8/3/2018     Problem List  Patient Active Problem List   Diagnosis    Lower urinary tract symptoms    Malignant neoplasm of trigone of urinary bladder (HCC)    Prostate cancer (HCC)    Chronic diastolic congestive heart failure (HCC)    CKD (chronic kidney disease) stage 3, GFR 30-59 ml/min    Paroxysmal atrial fibrillation ([address] )    CAD (coronary artery disease)    Type 2 diabetes mellitus with hyperglycemia, with long-term current use of insulin ([address] )    Cardiac pacemaker in situ    Right ureteral stone    Urinary tract infection with hematuria    Hyponatremia    Other chest pain    Laceration of right lower extremity    Diarrhea    Anemia    Benign prostatic hyperplasia    Blindness of left eye    Kidney stones    Peripheral neuropathy    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 (LAD) artery    Ureter, calculus    Urinary retention    Right flank pain    Acute renal failure superimposed on stage 3 chronic kidney disease (HCC)    Urinary tract infection associated with indwelling urethral catheter ([address] )        Past Medical History  Past Medical History:   Diagnosis Date    A-fib ([address] )     Anemia     Bladder cancer ([address] )     CAD (coronary artery disease)     CHF (congestive heart failure) (HCC)     Chronic kidney failure     Colon cancer ([address] )     Eye cancer, left ([address] )     Hypertension     Incontinence     Pacemaker     Prostate cancer ([address] )     Type 2 diabetes mellitus ([address] )     Wears hearing aid in both ears         Past Surgical History  Past Surgical History:   Procedure Laterality Date    CARDIAC PACEMAKER PLACEMENT  2014    CORONARY ANGIOPLASTY WITH STENT PLACEMENT      GALLBLADDER SURGERY      RI CYSTO/URETERO W/LITHOTRIPSY &INDWELL STENT INSRT Right 7/31/2018    Procedure: CYSTOSCOPY URETEROSCOPY WITH LITHOTRIPSY HOLMIUM LASER ,BASKET STONE EXTRACTION  RETROGRADE PYELOGRAM AND exchange STENT URETERAL;  Surgeon: Bryanna Dangelo MD;  [facility];  Service: Urology    WI CYSTOURETHROSCOPY,URETER CATHETER Right 7/1/2018    Procedure: CYSTOSCOPY RETROGRADE PYELOGRAM WITH INSERTION STENT URETERAL;  Surgeon: Pat Christian MD;  [facility];  Service: Urology         08/03/18 1109   Note Type   Note type Eval only   Pain Assessment   Pain Score 4   Pain Type Acute pain   Pain Location Back   Pain Orientation Right   Hospital Pain Intervention(s) Ambulation/increased activity; Distraction   Home Living   Type of Home Assisted living   Home Layout One level   Bathroom Shower/Tub Walk-in shower  (with shower seat)   Bathroom Toilet Raised   [address] [facility]; Wheelchair-manual   Additional Comments Pt  reported he either uses his RW or w/c to move around    Prior Function   Level of [place] Needs assistance with IADLs; Needs assistance with ADLs and functional mobility   Receives Help From Personal care attendant   ADL Assistance Needs assistance   IADLs Needs assistance   Falls in the last 6 months 0  (as per patient )   Comments Pt  stated that he walks around or wheels himself  without assistance    Restrictions/Precautions   Other Precautions Contact/isolation; Chair Alarm; Bed Alarm; Fall Risk  (1 L O2 with activity )   Cognition   Attention Within functional limits   Following Commands Follows one step commands with increased time or repetition   Comments [place]   RLE Assessment   RLE Assessment WFL   Strength RLE   RLE Overall Strength 4/5   LLE Assessment   LLE Assessment WFL   Strength LLE   LLE Overall Strength 4/5   Light Touch   RLE Light Touch Grossly intact   LLE Light Touch Grossly intact   Sharp/Dull   RLE Sharp/Dull Grossly intact   LLE Sharp/Dull Grossly intact   Bed Mobility   Supine to Sit 4  Minimal assistance   Additional items HOB elevated; Increased time required   Transfers   Sit to Stand 4  Minimal assistance   Additional items Assist x 1   Stand to Sit 4  Minimal assistance   Additional items Assist x 1   Stand pivot 4  Minimal assistance   Additional items Assist x 1   Ambulation/Elevation   Gait pattern Improper Weight shift; Foward flexed   Gait Assistance 4  Minimal assist   Additional items Assist x 2  (O2 management and CF for safety )   Assistive Device Rolling walker   Distance 70   Stair Management Assistance Not tested  (does not have to manage steps at [facility])   Balance   Static Sitting Good   Dynamic Sitting Fair +   Static Standing Fair -   Dynamic Standing Poor +   Ambulatory Poor +   Activity Tolerance   Activity Tolerance Patient tolerated treatment well   Nurse Made Aware Nurse    Assessment   Prognosis Good   Problem List Decreased strength;Decreased endurance; Impaired balance;Decreased mobility; Decreased coordination;Decreased safety awareness   Assessment Pt  is a a 81 y/o male who was admitted dur to worsening R sided flank pain  Pt  dx includes:  ureter calculus s/p cystoscopy and exchange of (R) ureteral stent 7/31/18, DEANA, urinary retention, (R) ureteral stone, DM 2, CAD, AFIB, CKD, CHF  PT eval completed and pt  presents with dec activity tolerance, dec balance and difficulty with functional mobility  Pt  lives in an detention and was receiving assist from staff with ADL's but pt  reported that he was moving around on his own either with a RW or a w/c  Pt  will benefit from acute care PT to address functional decline and to facilitate safe d/c to next level of care  pt  would benefit from continued PT through short term rehab      Barriers to Discharge None   Goals   Patient Goals To go back to [facility]    STG Expiration Date 08/10/18   Short Term Goal #1 Pt  will be S with functional transfers using RW   Short Term Goal #2 Pt  will be S with ambulation X 150 feet with S    Plan Treatment/Interventions Functional transfer training;LE strengthening/ROM; Therapeutic exercise; Endurance training;Patient/family training;Equipment eval/education; Bed mobility;Gait training   Recommendation   Recommendation Short-term skilled PT   Equipment Recommended Walker   Barthel Index   Feeding 5   Bathing 0   Grooming Score 0   Dressing Score 0   Bladder Score 0   Bowels Score 10   Toilet Use Score 5   Transfers (Bed/Chair) Score 5   Mobility (Level Surface) Score 0   Stairs Score 0   Barthel Index Score 25

## 2018-08-03 NOTE — PLAN OF CARE
Problem: PHYSICAL THERAPY ADULT  Goal: Performs mobility at highest level of function for planned discharge setting  See evaluation for individualized goals  Treatment/Interventions: Functional transfer training, LE strengthening/ROM, Therapeutic exercise, Endurance training, Patient/family training, Equipment eval/education, Bed mobility, Gait training  Equipment Recommended: Taran Rutherford       See flowsheet documentation for full assessment, interventions and recommendations  Outcome: Progressing  Prognosis: Good  Problem List: Decreased strength, Decreased endurance, Impaired balance, Decreased mobility, Decreased coordination, Decreased safety awareness  Assessment: Pt  is a a 81 y/o male who was admitted dur to worsening R sided flank pain  Pt  dx includes:  ureter calculus s/p cystoscopy and exchange of (R) ureteral stent 7/31/18, DEANA, urinary retention, (R) ureteral stone, DM 2, CAD, AFIB, CKD, CHF  PT eval completed and pt  presents with dec activity tolerance, dec balance and difficulty with functional mobility  Pt  lives in an [facility] and was receiving assist from staff with ADL's but pt  reported that he was moving around on his own either with a RW or a w/c  Pt  will benefit from acute care PT to address functional decline and to facilitate safe d/c to next level of care  pt  would benefit from continued PT through short term rehab  Barriers to Discharge: None     Recommendation: Short-term skilled PT     PT - OK to Discharge: Yes    See flowsheet documentation for full assessment

## 2018-08-03 NOTE — DISCHARGE SUMMARY
Discharge- Halle Meza 12/21/1930, 80 y o  male MRN: [id number]  Unit/Bed#: -[facility] Encounter: [id number] DOS: 8/3/18  Primary Care Provider: Eve Gavin MD   Date and time admitted to hospital: 7/26/2018  9:44 AM    * Right ureteral stone   Assessment & Plan    · Admitted in July with pain found to have a right-sided 8 mm ureteric stone without hydro but surrounding stranding, underwent cystoscopy with a right-sided stent placement  Discharged with oral antibiotics for total 2 weeks  Found to have urinary retention at that time status post Cabrera insertion  Followed up with Urology as outpatient and was scheduled for stone lithotripsy and stent exchange 1st week of August, but due to his persistent pain they contacted Urology office and it was moved up to 07/31/2018  · Urology following, had ureteroscopy on 7/31 with stent exchange and removal of stone   · Urology recommending maintain cabrera catheter to drainage, monitor for hematuria irrigate q shift  · Stent has string in place and can be removed early next week inpatient or outpatient   · Proceed with void trial on Monday at rehab   · Continue flomax, finasteride        Acute renal failure superimposed on stage 3 chronic kidney disease ([facility] )   Assessment & Plan    · POA, baseline appears to be 1 5-1 7  ·  Estimated Creatinine Clearance: 31 6 mL/min (A) (by C-G formula based on SCr of 1 7 mg/dL (H))  · Possibly 2/2 persistent stone/UTI plus component of increased diuretics by Cardiology due to weight gain recently   · Resolved now  Torsemide and ACEI resumed  · S/p ureteroscopy 7/31  · Monitor creatinine, 1 70 today        Urinary tract infection associated with indwelling urethral catheter ([facility] )   Assessment & Plan    · R flank likely 2/2 ureteral stone, no evidnce of pyelo on imaging   · Urine culture growing >100,000 cfu Citrobacter and 40,000-49,000 cfu enterococcus- infection vs colonization  Note no fever or white count     · Has received 1 dose of ampicillin, 3 days of rocephin, 1 dose of cipro, and 4 days of bactrim so far  · Citrobacter resistant to Rocephin  D/C Rocephin  · Started on Bactrim - continue for 7 days, dec to 1 tab daily given renal function         Urinary retention   Assessment & Plan    · Cabrera in place until hematuria complete resolved, nursing to irrigate q shift   · Flomax and finasteride  · Urology following, maintain cabrera   · Outpatient urology follow up   · Void trial on Monday at rehab         Type 2 diabetes mellitus with hyperglycemia, with long-term current use of insulin [facility])   Assessment & Plan    Lab Results   Component Value Date    HGBA1C 7 4 (H) 05/23/2018     Recent Labs      08/02/18   2109  08/03/18   0202  08/03/18   0645  08/03/18   1119   POCGLU  162*  114  131  200*     Blood Sugar Average: Last 72 hrs:  (P) 148 875   · Sugars improved with addition of Lantus  · Restart empagliflozin at discharge         CAD (coronary artery disease)   Assessment & Plan    · Patient without chest pain   · Continue home medical management - ASA, coreg         Paroxysmal atrial fibrillation (HCC)   Assessment & Plan    · Continue Coreg and Eliquis        Chronic diastolic congestive heart failure (HCC)   Assessment & Plan    · Recent echo last month with EF of 60 percent  Due to weight gain his diuretics were increased from 40 milligrams to 80 milligrams of torsemide twice a day for 3 days    · Torsemide restarted at normal dose  · I&Os, daily weights          Discharging Physician / Practitioner: David Birch PA-C  PCP: Murphy Vieira MD  Admission Date:   Admission Orders     Ordered        07/26/18 1208  Inpatient Admission (expected length of stay for this patient is greater than two midnights)  Once             Discharge Date: 08/03/18    Resolved Problems  Date Reviewed: 8/3/2018    None        Consultations During Hospital Stay:  · Urology     Procedures Performed:     · 7/31 cystoscopy with retrograde pyelogram with insertion/exchange of right ureteral stent with lithotripsy and repeat removal of stone    Significant Findings / Test Results:   · Right flank pain   · CT abdomen and pelvis- 1  Status post right nephroureteral system and placement with stable position of 8 mm right mid ureteral calculus  No hydronephrosis  2   Stable changes at the lung bases with pleural effusions and groundglass opacity, nonspecific though possibly pulmonary edema  3   Severe prostatomegaly  · Urine culture growing Enterococcus faecalis and Citrobacter freundii  · Blood cultures negative at 5 days  · DEANA on CKD  · Urinary retention    Incidental Findings:   · None     Test Results Pending at Discharge (will require follow up): · Stone analysis     Outpatient Tests Requested:  · Outpatient follow-up with Urology, PCP    Complications:  None    Reason for Admission:  Right flank pain, DEANA    Hospital Course:     Marcus Dominguez is a 80 y o  male patient with past medical history significant for chronic kidney disease, chronic diastolic heart failure, paroxysmal atrial fibrillation, coronary artery disease, type 2 diabetes mellitus who originally presented to the hospital on 7/26/2018 due to right flank pain  Earlier in July patient was admitted for right-sided 8 mm ureteric stone without hydronephrosis and was found have surrounding stranding at that time  He underwent a cystoscopy with a right-sided stent placement  He was discharged home on oral antibiotics for 2 weeks  He is followed up with Urology and was found to have urinary retention and a Marte catheter was placed  He was then scheduled for a stone lithotripsy and stent exchange in August but because he developed worsening pain, this was moved up to 7/30 1/18 and performed on an inpatient basis    On admission, CT abdomen and pelvis was negative for pyelonephritis despite right flank pain and CVA tenderness   He was started on IV antibiotics and urine culture eventually was polymicrobial   He was found to have DEANA and IV fluids were initiated  He was evaluated by Urology and underwent cystoscopy with pyelography, stent insertion and stone removal on 07/31  His renal function improved  Course was complicated by hematuria postoperatively but this is minimal at this point in time  Marte is to be maintained on discharge and he is due for a voiding trial on Monday which can be done as an outpatient in rehab  He will need outpatient follow-up with Urology for stent removal in the next week or 2  He is otherwise medically stable for discharge home with close outpatient follow-up  Please see above list of diagnoses and related plan for additional information  Condition at Discharge: stable     Discharge Day Visit / Exam:     * Please refer to separate progress note for these details *    Discussion with Family:  Discussed with daughter    Discharge instructions/Information to patient and family:   See after visit summary for information provided to patient and family  Provisions for Follow-Up Care:  See after visit summary for information related to follow-up care and any pertinent home health orders  Disposition:     [id number] (see below)    For Discharges to [address] [facility]:   [address] with the SNF Physician    Planned Readmission: none     Discharge Statement:  I spent 35 minutes discharging the patient  This time was spent on the day of discharge  I had direct contact with the patient on the day of discharge  Greater than 50% of the total time was spent examining patient, answering all patient questions, arranging and discussing plan of care with patient as well as directly providing post-discharge instructions  Additional time then spent on discharge activities  Discharge Medications:  See after visit summary for reconciled discharge medications provided to patient and family        ** Please Note: This note has been constructed using a voice recognition system **

## 2018-08-03 NOTE — ASSESSMENT & PLAN NOTE
· POA, baseline appears to be 1 5-1 7  ·  Estimated Creatinine Clearance: 30 7 mL/min (A) (by C-G formula based on SCr of 1 75 mg/dL (H))  · Possibly 2/2 persistent stone/UTI plus component of increased diuretics by Cardiology due to weight gain recently   · Resolved now   Torsemide and ACEI resumed  · S/p ureteroscopy 7/31  · Monitor creatinine, 1 75 yesterday, follow up AM labs

## 2018-08-03 NOTE — ASSESSMENT & PLAN NOTE
· D/c colace   · Monitor over another 24 hrs, if persists off of stool softeners will check C diff   · Had neg c diff on 7/4/18

## 2018-08-03 NOTE — ASSESSMENT & PLAN NOTE
· R flank likely 2/2 ureteral stone, no evidnce of pyelo on imaging   · Urine culture growing >100,000 cfu Citrobacter and 40,000-49,000 cfu enterococcus- infection vs colonization  Note no fever or white count  · Has received 1 dose of ampicillin, 3 days of rocephin, 1 dose of cipro, and 4 days of bactrim so far  · Citrobacter resistant to Rocephin   D/C Rocephin  · Started on Bactrim - continue for 7 days, dec to 1 tab daily given renal function

## 2018-08-03 NOTE — ASSESSMENT & PLAN NOTE
Lab Results   Component Value Date    HGBA1C 7 4 (H) 05/23/2018     Recent Labs      08/02/18   2109  08/03/18   0202  08/03/18   0645  08/03/18   1119   POCGLU  162*  114  131  200*     Blood Sugar Average: Last 72 hrs:  (P) 148 875   · Sugars improved with addition of Lantus  · Restart empagliflozin at discharge

## 2018-08-03 NOTE — ASSESSMENT & PLAN NOTE
· Cabrera in place until hematuria complete resolved, nursing to irrigate q shift   · Flomax and finasteride  · Urology following, maintain cabrera   · Outpatient urology follow up   · Void trial likely as outpatient vs at rehab

## 2018-08-04 ENCOUNTER — HOSPITAL ENCOUNTER (EMERGENCY)
Facility: HOSPITAL | Age: 83
Discharge: HOME | End: 2018-08-04
Attending: EMERGENCY MEDICINE
Payer: MEDICARE

## 2018-08-04 VITALS
WEIGHT: 188 LBS | RESPIRATION RATE: 22 BRPM | HEIGHT: 69 IN | BODY MASS INDEX: 27.85 KG/M2 | OXYGEN SATURATION: 100 % | TEMPERATURE: 97.9 F | HEART RATE: 71 BPM

## 2018-08-04 DIAGNOSIS — T16.1XXA FOREIGN BODY OF RIGHT EAR, INITIAL ENCOUNTER: Primary | ICD-10-CM

## 2018-08-04 PROCEDURE — 99282 EMERGENCY DEPT VISIT SF MDM: CPT

## 2018-08-04 NOTE — ED PROVIDER NOTES
History  Chief Complaint   Patient presents with    Foreign Body in Ear     Pt indicated that he is not able to take out his hearing aid in the right ear  59-year-old male presents for evaluation of right ear foreign body  Patient reports that approximately  2 weeks ago he was trying to replace the battery for his right hearing aid  Reports that he removed it and assumed that he dropped portion of it onto the ground  He states he has been trying to put his hearing aid in his ears  He reports that for the past few days he has been having pain in his ear along with a pressure-like sensation  States that he is unable to put his hearing aid in  Reports that he had multiple nurses at [facility], where he currently is staying, took a look and they possibly saw something in his ear today  Patient does have a history of cerumen impaction in that ear which prior to having the hearing aid stuck in his ear was placing drops in his ear  He denies any fever, chills, discharge from ear, dizziness, lightheadedness, difficulty breathing or shortness of breath  Prior to Admission Medications   Prescriptions Last Dose Informant Patient Reported? Taking? Artificial Tear Ointment (ARTIFICIAL TEARS)  Outside Facility (Specify) Yes No   Sig: as needed for dry eyes   EASY TOUCH LANCETS 28G MISC   No No   Sig: USE AS DIRECTED     Empagliflozin (JARDIANCE) 10 MG TABS  Outside Facility (Specify) Yes No   Sig: Take 10 mg by mouth every morning   Hypromellose (SYSTANE OVERNIGHT THERAPY) 0 3 % GEL  Outside Facility (Specify) Yes No   Sig: Apply to eye   Multiple Vitamin (TAB-A-ANETA PO)  Outside Facility (Specify) Yes No   Sig: Take 1 tablet by mouth daily   Omega-3 Fatty Acids (FISH OIL) 1,000 mg  Outside Facility (Specify) Yes No   Sig: Take 1,000 mg by mouth daily   OxyCODONE HCl 5 MG TABA   Yes No   Sig: Take 1 tablet by mouth every 4 (four) hours as needed for pain Severe pain   Polyethyl Glycol-Propyl Glycol (SYSTANE) 0 4-0 3 % GEL  Outside Facility (Specify) Yes No   Sig: Apply to eye   acetaminophen (TYLENOL) 325 mg tablet  Outside Facility (Specify) No No   Sig: Take 2 tablets (650 mg total) by mouth every 6 (six) hours as needed for mild pain, headaches or fever   albuterol (5 mg/mL) 0 5 % nebulizer solution  Outside Facility (Specify) Yes No   Sig: Take 2 5 mg by nebulization every 6 (six) hours as needed for wheezing   apixaban (ELIQUIS) 2 5 mg  Outside Facility (Specify) No No   Sig: Take 1 tablet (2 5 mg total) by mouth 2 (two) times a day   aspirin (ECOTRIN LOW STRENGTH) 81 mg EC tablet  Outside Facility (Specify) Yes No   Sig: Take 81 mg by mouth daily   bacitracin topical ointment 500 units/g topical ointment   Yes No   Sig: Apply 1 large application topically daily To 3 small areas on right shin   carvedilol (COREG) 3 125 mg tablet  Outside Facility (Specify) Yes No   Sig: Take 3 125 mg by mouth 2 (two) times a day with meals   chlorhexidine (HIBICLENS) 4 % external liquid   Yes No   Sig: Apply topically daily as needed for wound care   enalapril (VASOTEC) 2 5 mg tablet  Outside Facility (Specify) Yes No   Sig: Take 2 5 mg by mouth daily     ergocalciferol (VITAMIN D2) 50,000 units  Outside Facility (Specify) No No   Sig: TAKE 1 CAPSULE BY MOUTH ONCE WEEKLY   finasteride (PROSCAR) 5 mg tablet  Outside Facility (Specify) No No   Sig: TAKE 1 TABLET BY MOUTH ONCE DAILY     gabapentin (NEURONTIN) 100 mg capsule  Outside Facility (Specify) No No   Sig: Take 1 capsule (100 mg total) by mouth daily   Patient taking differently: Take 100 mg by mouth 2 (two) times a day     gabapentin (NEURONTIN) 300 mg capsule   Yes No   Sig: Take 300 mg by mouth daily at bedtime     glucose blood test strip   Yes No   Si each by Other route daily Use as instructed   ipratropium-albuterol (DUO-NEB) 0 5-2 5 mg/3 mL  Outside Facility (Specify) No No   Sig: Take 3 mL by nebulization every 6 (six) hours as needed for wheezing melatonin 3 mg  Outside Facility (Specify) Yes No   Sig: Take 3 mg by mouth daily at bedtime   nitroglycerin (NITROSTAT) 0 4 mg SL tablet  Outside Facility (Specify) Yes No   Sig: Place 0 4 mg under the tongue every 5 (five) minutes as needed for chest pain   oxyCODONE (ROXICODONE) 5 mg immediate release tablet   No No   Sig: Take 1 tablet (5 mg total) by mouth every 6 (six) hours as needed for moderate pain or severe pain for up to 15 days Max Daily Amount: 20 mg   potassium chloride (K-DUR,KLOR-CON) 20 mEq tablet  Outside Facility (Specify) Yes No   Sig: Take 20 mEq by mouth 2 (two) times a day   tamsulosin (FLOMAX) 0 4 mg  Outside Facility (Specify) No No   Sig: TAKE 1 CAPSULE BY MOUTH WITH EVENING MEAL   torsemide (DEMADEX) 20 mg tablet   No No   Sig: Take 2 tablets (40 mg total) by mouth 2 (two) times a day   Patient taking differently: Take 80 mg by mouth 2 (two) times a day To end 7/27/18, then resume torsemide 40 mg  Twice a day  white petrolatum   Yes No   Sig: Apply 1 application topically as needed Apply topically to buttocks as needed   May keep in room and self administer      Facility-Administered Medications: None       Past Medical History:   Diagnosis Date    [facility]     Anemia     Bladder cancer [facility]     CAD (coronary artery disease)     CHF (congestive heart failure) (HCC)     Chronic kidney failure     Colon cancer ([facility] )     Eye cancer, left ([facility] )     Hypertension     Incontinence     Pacemaker     Prostate cancer ([facility] )     Type 2 diabetes mellitus ([facility] )     Wears hearing aid in both ears        Past Surgical History:   Procedure Laterality Date    CARDIAC PACEMAKER PLACEMENT  2014    CORONARY ANGIOPLASTY WITH STENT PLACEMENT      GALLBLADDER SURGERY      OR CYSTO/URETERO W/LITHOTRIPSY &INDWELL STENT INSRT Right 7/31/2018    Procedure: CYSTOSCOPY URETEROSCOPY WITH LITHOTRIPSY HOLMIUM LASER ,BASKET STONE EXTRACTION  RETROGRADE PYELOGRAM AND exchange STENT URETERAL; Surgeon: Florian Monahan MD;  [facility]: [facility];  Service: Urology    MN CYSTOURETHROSCOPY,URETER CATHETER Right 7/1/2018    Procedure: CYSTOSCOPY RETROGRADE PYELOGRAM WITH INSERTION STENT URETERAL;  Surgeon: Thais Pena MD;  Location: [facility];  Service: Urology       No family history on file  I have reviewed and agree with the history as documented  Social History   Substance Use Topics    Smoking status: Former Smoker    Smokeless tobacco: Never Used    Alcohol use Yes      Comment: jayne cognac 1 oz/night         Review of Systems   Constitutional: Negative for chills and fever  HENT: Positive for ear pain  Negative for congestion and ear discharge  Respiratory: Negative for cough and shortness of breath  Gastrointestinal: Negative for nausea and vomiting  Physical Exam  Physical Exam   Constitutional: He appears well-nourished  No distress  Has O2 mask on at baseline   HENT:   Right Ear: External ear normal  A foreign body is present  Left Ear: External ear normal    Hearing aid in left ear - unable to visualize canal and patient does not want to take it out  Right ear - noted to have a clear plastic portion of a hearing aid in canal  Post removal - clearly visualized ear canal with minimal cerumen, not impacted  Portion of canal visualized is not perforated  Pulmonary/Chest: Effort normal and breath sounds normal    Neurological: He is alert  Skin: He is not diaphoretic  Vitals reviewed        Vital Signs  ED Triage Vitals [08/04/18 1416]   Temperature Pulse Respirations BP SpO2   97 9 °F (36 6 °C) 71 22 -- 100 %      Temp Source Heart Rate Source Patient Position - Orthostatic VS BP Location FiO2 (%)   Oral Monitor -- -- --      Pain Score       No Pain           Vitals:    08/04/18 1416   Pulse: 71       Visual Acuity      ED Medications  Medications - No data to display    Diagnostic Studies  Results Reviewed     None                 No orders to display Procedures  Foreign Body - Orifice  Date/Time: 8/4/2018 2:36 PM  Performed by: Velma Escalera  Authorized by: Velma Escalera     Patient location:  ED  Other Assisting Provider: No    Consent:     Consent obtained:  Verbal    Consent given by:  Patient    Risks discussed:  Bleeding, infection and pain  Universal protocol:     Patient identity confirmed:  Verbally with patient  Location:     Location:  Ear    Ear location:  R ear  Pre-procedure details:     Imaging:  None  Anesthesia (see MAR for exact dosages): Topical anesthetic:  None  Procedure details:     Localization method:  Direct visualization    Removal mechanism:  Alligator forceps    Procedure complexity:  Simple    Foreign bodies recovered:  1    Description:  Portion of hearing aid    Intact foreign body removal: yes    Post-procedure details:     Confirmation:  No additional foreign bodies on visualization    Patient tolerance of procedure: Tolerated well, no immediate complications           Phone Contacts  ED Phone Contact    ED Course           Identification of Seniors at Risk      Most Recent Value   (ISAR) Identification of Seniors at Risk   Before the illness or injury that brought you to the Emergency, did you need someone to help you on a regular basis? 1 Filed at: 08/04/2018 1425   In the last 24 hours, have you needed more help than usual?  0 Filed at: 08/04/2018 1425   Have you been hospitalized for one or more nights during the past 6 months? 1 Filed at: 08/04/2018 1425   In general, do you see well?  0 Filed at: 08/04/2018 1425   In general, do you have serious problems with your memory?    Do you take more than three different medications every day?   1 Filed at: 08/04/2018 1425   ISAR Score  3 Filed at: 08/04/2018 1425                          MDM  Number of Diagnoses or Management Options  Foreign body of right ear, initial encounter:   Diagnosis management comments:  A 9year-old male presents for evaluation of possible foreign body in his right ear  This also removal of portion of hearing aid  Patient is advised to rest and to wait until tomorrow to place hearing aid  CritCare Time    Disposition  Final diagnoses:   Foreign body of right ear, initial encounter     Time reflects when diagnosis was documented in both MDM as applicable and the Disposition within this note     Time User Action Codes Description Comment    8/4/2018  2:37 PM Jeffry, [address]  1XXA] Foreign body of right ear, initial encounter       ED Disposition     ED Disposition Condition Comment    Discharge  Fernando Bunkers discharge to home/self care      Condition at discharge: Good        Follow-up Information     Follow up With Specialties Details Why Contact Info Additional [address] Emergency Department Emergency Medicine  If symptoms worsen [address]  [phone]  ED, [address], [place], [place], [address]          Discharge Medication List as of 8/4/2018  2:42 PM      CONTINUE these medications which have NOT CHANGED    Details   acetaminophen (TYLENOL) 325 mg tablet Take 2 tablets (650 mg total) by mouth every 6 (six) hours as needed for mild pain, headaches or fever, Starting Fri 7/6/2018, No Print      albuterol (5 mg/mL) 0 5 % nebulizer solution Take 2 5 mg by nebulization every 6 (six) hours as needed for wheezing, Historical Med      apixaban (ELIQUIS) 2 5 mg Take 1 tablet (2 5 mg total) by mouth 2 (two) times a day, Starting Fri 5/25/2018, Print      Artificial Tear Ointment (ARTIFICIAL TEARS) as needed for dry eyes, Historical Med      aspirin (ECOTRIN LOW STRENGTH) 81 mg EC tablet Take 81 mg by mouth daily, Historical Med      bacitracin topical ointment 500 units/g topical ointment Apply 1 large application topically daily To 3 small areas on right shin, Historical Med      carvedilol (COREG) 3 125 mg tablet Take 3 125 mg by mouth 2 (two) times a day with meals, Historical Med      chlorhexidine (HIBICLENS) 4 % external liquid Apply topically daily as needed for wound care, Historical Med      EASY TOUCH LANCETS 28G MISC USE AS DIRECTED , Normal      Empagliflozin (JARDIANCE) 10 MG TABS Take 10 mg by mouth every morning, Historical Med      enalapril (VASOTEC) 2 5 mg tablet Take 2 5 mg by mouth daily  , Historical Med      ergocalciferol (VITAMIN D2) 50,000 units TAKE 1 CAPSULE BY MOUTH ONCE WEEKLY, Normal      finasteride (PROSCAR) 5 mg tablet TAKE 1 TABLET BY MOUTH ONCE DAILY , Normal      !! gabapentin (NEURONTIN) 100 mg capsule Take 1 capsule (100 mg total) by mouth daily, Starting Tue 7/10/2018, Normal      !! gabapentin (NEURONTIN) 300 mg capsule Take 300 mg by mouth daily at bedtime  , Historical Med      glucose blood test strip 1 each by Other route daily Use as instructed, Historical Med      Hypromellose (SYSTANE OVERNIGHT THERAPY) 0 3 % GEL Apply to eye, Historical Med      ipratropium-albuterol (DUO-NEB) 0 5-2 5 mg/3 mL Take 3 mL by nebulization every 6 (six) hours as needed for wheezing, Starting Fri 5/25/2018, Print      melatonin 3 mg Take 3 mg by mouth daily at bedtime, Historical Med      Multiple Vitamin (TAB-A-ANETA PO) Take 1 tablet by mouth daily, Historical Med      nitroglycerin (NITROSTAT) 0 4 mg SL tablet Place 0 4 mg under the tongue every 5 (five) minutes as needed for chest pain, Historical Med      Omega-3 Fatty Acids (FISH OIL) 1,000 mg Take 1,000 mg by mouth daily, Historical Med      oxyCODONE (ROXICODONE) 5 mg immediate release tablet Take 1 tablet (5 mg total) by mouth every 6 (six) hours as needed for moderate pain or severe pain for up to 15 days Max Daily Amount: 20 mg, Starting Fri 8/3/2018, Until Sat 8/18/2018, Print      OxyCODONE HCl 5 MG TABA Take 1 tablet by mouth every 4 (four) hours as needed for pain Severe pain, Historical Med      Polyethyl Glycol-Propyl Glycol (SYSTANE) 0 4-0 3 % GEL Apply to eye, Historical Med potassium chloride (K-DUR,KLOR-CON) 20 mEq tablet Take 20 mEq by mouth 2 (two) times a day, Historical Med      tamsulosin (FLOMAX) 0 4 mg TAKE 1 CAPSULE BY MOUTH WITH EVENING MEAL, Normal      torsemide (DEMADEX) 20 mg tablet Take 2 tablets (40 mg total) by mouth 2 (two) times a day, Starting Thu 7/12/2018, No Print      white petrolatum Apply 1 application topically as needed Apply topically to buttocks as needed  May keep in room and self administer, Historical Med       !! - Potential duplicate medications found  Please discuss with provider  No discharge procedures on file      ED Provider  Electronically Signed by           Edna Betancourt PA-C  08/06/18 3840

## 2018-08-04 NOTE — DISCHARGE INSTRUCTIONS
Ear Foreign Body   WHAT YOU NEED TO KNOW:   What is a foreign body in the ear? An ear foreign body is an object that is stuck in your ear  Foreign bodies are usually trapped in the outer ear canal  This is the tube from the opening of your ear to your eardrum  What are some common ear foreign bodies? · Tips of cotton swabs    · Earplugs    · Insects, such as cockroaches    · Food, such as beans or seeds    · Small toys or pieces of toys    · Beads    · Rocks or elio    · Button batteries  What are the signs and symptoms of an ear foreign body? · The feeling that something is in your ear    · Trouble hearing    · Ear pain    · Redness, itching, or bleeding in your ear    · Thick drainage or a foul odor coming from your ear    · Nausea or dizziness  How is an ear foreign body diagnosed? Your healthcare provider will ask about your symptoms and if you have traveled or camped recently  Tell him if you tried to remove the object  Your healthcare provider will look into your ear with an otoscope  This is a tool with a light on it that he holds up to your ear  He will check your eardrum for tears or holes and look for infection  Your healthcare provider may also test your hearing  How is an ear foreign body treated? Treatment will depend on what kind of object is in your ear and how deep it is  You may need any of the following:  · Medicines:      ¨ Local anesthesia: This is medicine to numb your ear before healthcare providers try to remove the object  ¨ Sedative: This medicine is given to help you stay calm and relaxed  ¨ Steroid cream:  This medicine helps decrease redness and swelling  ¨ Antibiotics: This medicine is given to help treat or prevent an infection caused by bacteria  · Removal procedures:      ¨ Irrigation:  A small catheter is inserted into your ear, past the object  Water is squirted into your ear to flush the object out  ¨ Tools:   Tools, such as forceps or a loop, may be used to grasp the object and pull it out  A curved hook may also be used to scoop the object out of your ear  ¨ Suction:  A small catheter is used to suck out the object from your ear  Suction is most often used when the object is round and smooth  ¨ Balloon catheter:  A small rubber tube is inserted into your ear, past the object  The balloon at the end of the tube is filled with air  The balloon is pulled out of your ear and the object comes with it  ¨ Glue:  Glue is applied to a small stick, such as a cotton swab cut at one end  Your healthcare provider will insert the stick into your ear  The glue will stick to the object and your child's healthcare provider can pull the object out  ¨ Chemicals:  Hydrogen peroxide or acetone may be used to remove gum or styrofoam  These chemicals may also be used to melt superglue  ¨ Liquids:  Mineral oil, warm alcohol, or lidocaine may be used if the object is a live insect  These liquids will kill the insect so it can be removed  · Surgery: You may need surgery to remove a deep object or repair ear damage  What are the risks of an ear foreign body? Your ear canal or eardrum may be injured when the object is removed  You may develop an infection  The infection can spread to your inner ear and jaw  A life-threatening infection may develop if bacteria spread to your brain or spinal cord  Without removal, your symptoms may get worse  Your ear may become irritated or infected  Your eardrum may tear  When should I contact my healthcare provider? · You have a fever  · You have trouble hearing, or you hear ringing  · You have questions or concerns about your condition or care  When should I seek immediate care? · You have severe ear pain  · You have pus or blood draining from your ear  CARE AGREEMENT:   You have the right to help plan your care  Learn about your health condition and how it may be treated   Discuss treatment options with your caregivers to decide what care you want to receive  You always have the right to refuse treatment  The above information is an  only  It is not intended as medical advice for individual conditions or treatments  Talk to your doctor, nurse or pharmacist before following any medical regimen to see if it is safe and effective for you  © 2017 [address] [employer] [employer] Information is for End User's use only and may not be sold, redistributed or otherwise used for commercial purposes  All illustrations and images included in CareNotes® are the copyrighted property of A D A M , Inc  or [employer]pe Lacey

## 2018-08-04 NOTE — ED NOTES
NATALIE coming to transport patient home at \[facility]\""  08/04/18 8653

## 2018-08-06 ENCOUNTER — TELEPHONE (OUTPATIENT)
Dept: UROLOGY | Facility: CLINIC | Age: 83
End: 2018-08-06

## 2018-08-06 LAB
CA PHOS MFR STONE: 5 %
CALCIUM OXALATE DIHYDRATE MFR STONE IR: 2 %
COLOR STONE: NORMAL
COM MFR STONE: 93 %
COMMENT-STONE3: NORMAL
COMPOSITION: NORMAL
LABORATORY COMMENT REPORT: NORMAL
NIDUS STONE QL: NORMAL
PHOTO: NORMAL
SIZE STONE: NORMAL MM
STONE ANALYSIS-IMP: NORMAL
WT STONE: 105 MG

## 2018-08-06 NOTE — TELEPHONE ENCOUNTER
I phoned [facility] to see if patient still has stent with string  They stated that he did  OV was scheduled for tomorrow for stent removal   Per Marie, stent was to be removed at the beginning of this week  He was encouraged to drink plenty of water

## 2018-08-07 ENCOUNTER — PROCEDURE VISIT (OUTPATIENT)
Dept: UROLOGY | Facility: AMBULATORY SURGERY CENTER | Age: 83
End: 2018-08-07
Payer: MEDICARE

## 2018-08-07 ENCOUNTER — TELEPHONE (OUTPATIENT)
Dept: UROLOGY | Facility: AMBULATORY SURGERY CENTER | Age: 83
End: 2018-08-07

## 2018-08-07 VITALS
HEART RATE: 64 BPM | SYSTOLIC BLOOD PRESSURE: 118 MMHG | DIASTOLIC BLOOD PRESSURE: 60 MMHG | BODY MASS INDEX: 26.96 KG/M2 | HEIGHT: 69 IN | WEIGHT: 182 LBS

## 2018-08-07 DIAGNOSIS — N20.0 NEPHROLITHIASIS: Primary | ICD-10-CM

## 2018-08-07 PROCEDURE — 99211 OFF/OP EST MAY X REQ PHY/QHP: CPT | Performed by: UROLOGY

## 2018-08-07 NOTE — PROGRESS NOTES
Ivet Sheridan  [id number]  12/21/1930 8/7/2018      Diagnosis  Chief Complaint     Nephrolithiasis          Patient is s/p right ureteroscopy with stone extraction on 7/31/2018 with Dr Jelly Hennessy  · Orders given to nursing home for KUB within the next day or two to ensure that stent has been removed in it's entirety  · AS long as stent is out patient will fu in 8-12 weeks with another KUB to check on stone status  Procedure Stent with String Removal    Attempted to remove stent, on examination no string visible  In discussion with patient he thinks that maybe he removed it himself when toileting but cannot be sure  He resides in a nursing facility and is by himself today in the office  When speaking with nursing facility yesterday they were under the impression stent and string were still present and there is no documentation in the chart that stent was definitely removed  Discussed with Liam KHOURY  Since patient thinks he pulled the stent out and describes it accurately will get a kub now just to confirm  Patient is agreeable to this plan       Vitals:    08/07/18 1352   BP: 118/60   Pulse: 64   Weight: 82 6 kg (182 lb)   Height: 5' 9" (1 753 m)           Claudeen Jude, RN Verlie Rosales, BSN

## 2018-08-07 NOTE — ASSESSMENT & PLAN NOTE
We will plan for right ureteroscopy with laser lithotripsy stone extraction and stent exchange  Risks and benefits were discussed and he was consented for the procedure  Never smoker

## 2018-08-07 NOTE — TELEPHONE ENCOUNTER
Patient came in for his stent removal   He stated that he thinks he removed it himself  I phoned [facility] back and spoke with Rolanda Lal  I explained the situation and she will have him have the KUB as soon as possible  Orders were given

## 2018-08-07 NOTE — TELEPHONE ENCOUNTER
Unfortunately on assessment today, no string present  See NV note  Patient will have kub done in the next 1-2 days to ensure stent is no longer present  He thinks he may have removed it himself but cannot be sure  Order sent to nursing facility after appt today

## 2018-08-08 ENCOUNTER — HOSPITAL ENCOUNTER (OUTPATIENT)
Dept: RADIOLOGY | Facility: HOSPITAL | Age: 83
Discharge: HOME | End: 2018-08-08
Attending: UROLOGY
Payer: COMMERCIAL

## 2018-08-08 ENCOUNTER — TRANSCRIBE ORDERS (OUTPATIENT)
Dept: RADIOLOGY | Facility: HOSPITAL | Age: 83
End: 2018-08-08

## 2018-08-08 DIAGNOSIS — N20.0 NEPHROLITHIASIS: ICD-10-CM

## 2018-08-08 PROCEDURE — 74018 RADEX ABDOMEN 1 VIEW: CPT

## 2018-08-16 ENCOUNTER — IN HOME VISIT (OUTPATIENT)
Dept: GERIATRICS | Age: 83
End: 2018-08-16
Payer: MEDICARE

## 2018-08-16 VITALS — SYSTOLIC BLOOD PRESSURE: 96 MMHG | TEMPERATURE: 98.6 F | DIASTOLIC BLOOD PRESSURE: 48 MMHG | HEART RATE: 81 BPM

## 2018-08-16 DIAGNOSIS — N20.1 RIGHT URETERAL STONE: Primary | ICD-10-CM

## 2018-08-16 DIAGNOSIS — I50.32 CHRONIC DIASTOLIC CONGESTIVE HEART FAILURE (HCC): ICD-10-CM

## 2018-08-16 DIAGNOSIS — Z79.4 TYPE 2 DIABETES MELLITUS WITH HYPERGLYCEMIA, WITH LONG-TERM CURRENT USE OF INSULIN (HCC): Chronic | ICD-10-CM

## 2018-08-16 DIAGNOSIS — I48.0 PAROXYSMAL ATRIAL FIBRILLATION (HCC): Chronic | ICD-10-CM

## 2018-08-16 DIAGNOSIS — E11.65 TYPE 2 DIABETES MELLITUS WITH HYPERGLYCEMIA, WITH LONG-TERM CURRENT USE OF INSULIN (HCC): Chronic | ICD-10-CM

## 2018-08-16 PROCEDURE — 99495 TRANSJ CARE MGMT MOD F2F 14D: CPT | Performed by: NURSE PRACTITIONER

## 2018-08-16 RX ORDER — LISINOPRIL 2.5 MG/1
2.5 TABLET ORAL DAILY
COMMUNITY
End: 2018-08-30 | Stop reason: SDUPTHER

## 2018-08-16 NOTE — PROGRESS NOTES
Assessment/Plan:    No problem-specific Assessment & Plan notes found for this encounter  Diagnoses and all orders for this visit:    Right ureteral stone  Comments:  -removed 7/31/18 and had stent replaced  -is to f/up with urology 10/8/18 and is to have a KUB prior to visit    Paroxysmal atrial fibrillation ([facility] [address] )  Comments:  -Eliquis 2 5mg BID    Chronic diastolic congestive heart failure ([facility] [address] )  Comments:  -daily wts  -torsemide 40mg BID  -lisinopril 2 5mg QD    Type 2 diabetes mellitus with hyperglycemia, with long-term current use of insulin ([facility])  Comments:  -fingerstick bloodsugars daily  -jardiance 10mg QD    Other orders  -     lisinopril (ZESTRIL) 2 5 mg tablet; Take 2 5 mg by mouth daily          Subjective:      Patient ID: George Velásquez is a 80 y o  male  80year old male presents for [address], [address] visit  He was admitted to [facility] 7/26-8/3/18 during which time he had a ureteroscopy with stone removal and stent change  He was then sent to [facility] for therapy due to functional decline post hospitalization  He was there from 8/3 to 8/15/18  He was treated with Bactrim for a UTI during hospitalization  He is to f/up with urology 10/8/18 and is to have a KUB prior to that visit  Pt reports feeling much better  The following portions of the patient's history were reviewed and updated as appropriate: allergies, current medications and problem list     Review of Systems   Constitutional: Negative for chills and fever  HENT: Negative for congestion, ear pain and sore throat  Respiratory: Negative for cough and shortness of breath  Cardiovascular: Negative for chest pain and leg swelling  Gastrointestinal: Negative for abdominal pain, constipation, diarrhea, nausea and vomiting  Genitourinary: Negative for dysuria  Skin: Positive for wound  Negative for color change  Neurological: Negative for dizziness  Psychiatric/Behavioral: Negative for behavioral problems  Objective:      BP (!) 96/48 (BP Location: Right arm, Patient Position: Supine, Cuff Size: Standard)   Pulse 81   Temp 98 6 °F (37 °C)          Physical Exam   Constitutional: He is oriented to person, place, and time  He appears well-developed  No distress  HENT:   Head: Normocephalic and atraumatic  Nose: Nose normal    Eyes: Conjunctivae are normal  Right eye exhibits no discharge  Left eye exhibits no discharge  Neck: No JVD present  No thyromegaly present  Cardiovascular: Normal rate and regular rhythm  Pulmonary/Chest: Effort normal and breath sounds normal  No respiratory distress  He has no wheezes  He has no rales  He exhibits no tenderness  Abdominal: Soft  Bowel sounds are normal  He exhibits no distension  There is no tenderness  There is no guarding  Musculoskeletal: He exhibits no edema or tenderness  Lymphadenopathy:     He has no cervical adenopathy  Neurological: He is alert and oriented to person, place, and time  Skin: Skin is warm and dry  He is not diaphoretic  Psychiatric: He has a normal mood and affect   His behavior is normal

## 2018-08-17 ENCOUNTER — PATIENT OUTREACH (OUTPATIENT)
Dept: GERIATRICS | Age: 83
End: 2018-08-17

## 2018-08-17 ENCOUNTER — TELEPHONE (OUTPATIENT)
Dept: UROLOGY | Facility: AMBULATORY SURGERY CENTER | Age: 83
End: 2018-08-17

## 2018-08-17 DIAGNOSIS — N20.0 NEPHROLITHIASIS: Primary | ICD-10-CM

## 2018-08-17 NOTE — TELEPHONE ENCOUNTER
I spoke with Rnea Back from [facility] where patient was discharged  They are saying patient needs script for KUB faxed over  Their fax number is [phone]

## 2018-08-23 ENCOUNTER — TELEPHONE (OUTPATIENT)
Dept: CARDIOLOGY CLINIC | Facility: CLINIC | Age: 83
End: 2018-08-23

## 2018-08-23 NOTE — TELEPHONE ENCOUNTER
Received call from Clemmie Pallas at [facility]  Pt's weight today 183 6 lbs, weight yesterday 176 lbs  Called and s/w med tech who is unsure if Pt having symptoms  He will assess Pt and call back office

## 2018-08-24 NOTE — TELEPHONE ENCOUNTER
Did not receive call back yesterday  Called this morning and s/w Mamadou Soni, states he is "fine", came out for breakfast  States he ate a piece of toast  Pt was not weighed today, advised that he needs daily weights before breakfast, first thing in the am  She will weight him now and assess him and will call back office  I will f/u w/ facility if I do not receive a call back

## 2018-08-24 NOTE — TELEPHONE ENCOUNTER
Increase torsemide to 80 mg BID for three days  Please have them call back next week with weights  Thank you      Kassy Camargo

## 2018-08-24 NOTE — TELEPHONE ENCOUNTER
S/w Marycruz Race at [facility]  Weight today 182 6 lbs  Denies any SOB  "Noone looked at his legs"  They are very concerned w/ his weight gain  Currently taking Torsemide 40 mg BID and Potassium 20 meq BID  Has o/v w/ Pamela Mcduffie scheduled for 8/13 which was cancelled  O/v w/ you on 10/4    Please advise  C/b # [phone]    Any new orders can be faxed to [phone]  Does not need a call back unless we advised to go to ED

## 2018-08-30 DIAGNOSIS — I48.0 PAROXYSMAL ATRIAL FIBRILLATION (HCC): Chronic | ICD-10-CM

## 2018-08-30 DIAGNOSIS — R20.3 HYPERESTHESIA: ICD-10-CM

## 2018-08-30 DIAGNOSIS — I50.23 ACUTE ON CHRONIC SYSTOLIC HEART FAILURE (HCC): Primary | ICD-10-CM

## 2018-08-30 DIAGNOSIS — E63.9 NUTRITIONAL DEFICIENCY: ICD-10-CM

## 2018-08-30 DIAGNOSIS — E55.9 VITAMIN D DEFICIENCY: ICD-10-CM

## 2018-08-31 ENCOUNTER — TELEPHONE (OUTPATIENT)
Dept: GERIATRICS | Age: 83
End: 2018-08-31

## 2018-08-31 ENCOUNTER — TELEPHONE (OUTPATIENT)
Dept: CARDIOLOGY CLINIC | Facility: CLINIC | Age: 83
End: 2018-08-31

## 2018-08-31 RX ORDER — CARVEDILOL 3.12 MG/1
TABLET ORAL
Qty: 56 TABLET | Refills: 5 | Status: SHIPPED | OUTPATIENT
Start: 2018-08-31 | End: 2019-02-15 | Stop reason: SDUPTHER

## 2018-08-31 RX ORDER — LISINOPRIL 2.5 MG/1
TABLET ORAL
Qty: 28 TABLET | Refills: 5 | Status: SHIPPED | OUTPATIENT
Start: 2018-08-31 | End: 2019-01-08 | Stop reason: HOSPADM

## 2018-08-31 RX ORDER — MULTIVIT/IRON SULF/FOLIC ACID 15MG-0.4MG
TABLET ORAL
Qty: 28 TABLET | Refills: 11 | Status: SHIPPED | OUTPATIENT
Start: 2018-08-31 | End: 2019-06-19 | Stop reason: HOSPADM

## 2018-08-31 RX ORDER — APIXABAN 2.5 MG/1
TABLET, FILM COATED ORAL
Qty: 56 TABLET | Refills: 5 | Status: SHIPPED | OUTPATIENT
Start: 2018-08-31 | End: 2019-02-15 | Stop reason: SDUPTHER

## 2018-08-31 RX ORDER — ERGOCALCIFEROL 1.25 MG/1
CAPSULE ORAL
Qty: 4 CAPSULE | Refills: 5 | Status: SHIPPED | OUTPATIENT
Start: 2018-08-31 | End: 2019-02-15 | Stop reason: SDUPTHER

## 2018-08-31 RX ORDER — GABAPENTIN 100 MG/1
CAPSULE ORAL
Qty: 28 CAPSULE | Refills: 5 | Status: SHIPPED | OUTPATIENT
Start: 2018-08-31 | End: 2019-02-15 | Stop reason: SDUPTHER

## 2018-08-31 RX ORDER — POTASSIUM CHLORIDE 20 MEQ/1
TABLET, EXTENDED RELEASE ORAL
Qty: 56 TABLET | Refills: 5 | Status: SHIPPED | OUTPATIENT
Start: 2018-08-31 | End: 2019-01-15 | Stop reason: SDUPTHER

## 2018-08-31 RX ORDER — CHLORAL HYDRATE 500 MG
CAPSULE ORAL
Qty: 22 CAPSULE | Refills: 0 | Status: SHIPPED | OUTPATIENT
Start: 2018-08-31 | End: 2018-09-25 | Stop reason: SDUPTHER

## 2018-08-31 NOTE — TELEPHONE ENCOUNTER
It appears he is on 40 mg BID of torsemide  Please have him increase to 60 mg BID indefinitely  BMP next Tuesday - thank you      Mickey Huber

## 2018-08-31 NOTE — TELEPHONE ENCOUNTER
Namrata Gomez thinks Janeth Barreto is addicted to the oxy, and is afraid of dementia  Also wants to know about the back pain, afraid it is kidney failure  concerned he is becoming a problem, concerned possible his medication is making him off balance  Advised a call from the doctor may not come until wwed September 5th

## 2018-08-31 NOTE — TELEPHONE ENCOUNTER
Dr Lizbeth Tapia from [facility] called stating patient's weight is up 5lbs since Wed  Please advise

## 2018-09-04 DIAGNOSIS — I50.32 CHRONIC DIASTOLIC CONGESTIVE HEART FAILURE (HCC): ICD-10-CM

## 2018-09-04 DIAGNOSIS — E11.10 UNCONTROLLED TYPE 2 DIABETES MELLITUS WITH KETOACIDOSIS WITHOUT COMA, WITHOUT LONG-TERM CURRENT USE OF INSULIN (HCC): Primary | ICD-10-CM

## 2018-09-04 RX ORDER — TORSEMIDE 20 MG/1
TABLET ORAL
Qty: 173 TABLET | Refills: 0 | Status: SHIPPED | OUTPATIENT
Start: 2018-09-04 | End: 2018-09-25 | Stop reason: SDUPTHER

## 2018-09-10 DIAGNOSIS — R52 PAIN: Primary | ICD-10-CM

## 2018-09-10 RX ORDER — OXYCODONE HYDROCHLORIDE 5 MG/1
5 TABLET ORAL EVERY 6 HOURS PRN
Qty: 60 TABLET | Refills: 0 | Status: SHIPPED | OUTPATIENT
Start: 2018-09-10 | End: 2018-10-04 | Stop reason: ALTCHOICE

## 2018-09-14 DIAGNOSIS — E11.10 UNCONTROLLED TYPE 2 DIABETES MELLITUS WITH KETOACIDOSIS WITHOUT COMA, WITHOUT LONG-TERM CURRENT USE OF INSULIN (HCC): Primary | ICD-10-CM

## 2018-09-14 DIAGNOSIS — E11.65 TYPE 2 DIABETES MELLITUS WITH HYPERGLYCEMIA, WITH LONG-TERM CURRENT USE OF INSULIN (HCC): ICD-10-CM

## 2018-09-14 DIAGNOSIS — Z79.4 TYPE 2 DIABETES MELLITUS WITH HYPERGLYCEMIA, WITH LONG-TERM CURRENT USE OF INSULIN (HCC): ICD-10-CM

## 2018-09-25 ENCOUNTER — LAB REQUISITION (OUTPATIENT)
Dept: LAB | Facility: HOSPITAL | Age: 83
End: 2018-09-25
Payer: MEDICARE

## 2018-09-25 DIAGNOSIS — H60.399 OTHER INFECTIVE OTITIS EXTERNA, UNSPECIFIED EAR: ICD-10-CM

## 2018-09-25 DIAGNOSIS — I50.32 CHRONIC DIASTOLIC CONGESTIVE HEART FAILURE (HCC): ICD-10-CM

## 2018-09-25 DIAGNOSIS — I50.23 ACUTE ON CHRONIC SYSTOLIC HEART FAILURE (HCC): ICD-10-CM

## 2018-09-25 PROCEDURE — 87205 SMEAR GRAM STAIN: CPT | Performed by: PHYSICIAN ASSISTANT

## 2018-09-25 PROCEDURE — 87070 CULTURE OTHR SPECIMN AEROBIC: CPT | Performed by: PHYSICIAN ASSISTANT

## 2018-09-25 RX ORDER — TORSEMIDE 20 MG/1
TABLET ORAL
Qty: 168 TABLET | Refills: 0 | Status: SHIPPED | OUTPATIENT
Start: 2018-09-25 | End: 2018-10-16 | Stop reason: SDUPTHER

## 2018-09-26 ENCOUNTER — TRANSCRIBE ORDERS (OUTPATIENT)
Dept: ADMINISTRATIVE | Facility: HOSPITAL | Age: 83
End: 2018-09-26

## 2018-09-26 ENCOUNTER — HOSPITAL ENCOUNTER (OUTPATIENT)
Dept: RADIOLOGY | Facility: HOSPITAL | Age: 83
Discharge: HOME | End: 2018-09-26
Payer: MEDICARE

## 2018-09-26 DIAGNOSIS — N20.0 NEPHROLITHIASIS: ICD-10-CM

## 2018-09-26 PROCEDURE — 74018 RADEX ABDOMEN 1 VIEW: CPT

## 2018-09-26 RX ORDER — CHLORAL HYDRATE 500 MG
CAPSULE ORAL
Qty: 28 CAPSULE | Refills: 3 | Status: SHIPPED | OUTPATIENT
Start: 2018-09-26 | End: 2019-01-10 | Stop reason: SDUPTHER

## 2018-09-27 LAB
BACTERIA WND AEROBE CULT: NORMAL
GRAM STN SPEC: NORMAL
GRAM STN SPEC: NORMAL

## 2018-10-03 PROBLEM — E78.5 DYSLIPIDEMIA: Status: ACTIVE | Noted: 2018-10-03

## 2018-10-03 PROBLEM — I49.5 SICK SINUS SYNDROME (HCC): Status: ACTIVE | Noted: 2018-10-03

## 2018-10-04 ENCOUNTER — OFFICE VISIT (OUTPATIENT)
Dept: CARDIOLOGY CLINIC | Facility: CLINIC | Age: 83
End: 2018-10-04
Payer: MEDICARE

## 2018-10-04 ENCOUNTER — IN-CLINIC DEVICE VISIT (OUTPATIENT)
Dept: CARDIOLOGY CLINIC | Facility: CLINIC | Age: 83
End: 2018-10-04
Payer: MEDICARE

## 2018-10-04 VITALS
WEIGHT: 192 LBS | SYSTOLIC BLOOD PRESSURE: 114 MMHG | HEIGHT: 69 IN | HEART RATE: 66 BPM | BODY MASS INDEX: 28.44 KG/M2 | DIASTOLIC BLOOD PRESSURE: 52 MMHG

## 2018-10-04 DIAGNOSIS — I25.10 CORONARY ARTERY DISEASE INVOLVING NATIVE CORONARY ARTERY OF NATIVE HEART WITHOUT ANGINA PECTORIS: Primary | Chronic | ICD-10-CM

## 2018-10-04 DIAGNOSIS — I44.2 AV BLOCK, COMPLETE (HCC): Primary | ICD-10-CM

## 2018-10-04 DIAGNOSIS — I49.5 SICK SINUS SYNDROME (HCC): ICD-10-CM

## 2018-10-04 DIAGNOSIS — Z95.0 CARDIAC PACEMAKER IN SITU: ICD-10-CM

## 2018-10-04 DIAGNOSIS — Z95.0 CARDIAC PACEMAKER IN SITU: Chronic | ICD-10-CM

## 2018-10-04 DIAGNOSIS — Z95.5 STATUS POST INSERTION OF DRUG-ELUTING STENT INTO LEFT ANTERIOR DESCENDING (LAD) ARTERY: ICD-10-CM

## 2018-10-04 DIAGNOSIS — I48.0 PAROXYSMAL ATRIAL FIBRILLATION (HCC): Chronic | ICD-10-CM

## 2018-10-04 DIAGNOSIS — E78.5 DYSLIPIDEMIA: ICD-10-CM

## 2018-10-04 DIAGNOSIS — I50.32 CHRONIC DIASTOLIC CONGESTIVE HEART FAILURE (HCC): ICD-10-CM

## 2018-10-04 DIAGNOSIS — Z45.018 ADJUSTMENT AND MANAGEMENT OF CARDIAC PACEMAKER: ICD-10-CM

## 2018-10-04 PROCEDURE — 99214 OFFICE O/P EST MOD 30 MIN: CPT | Performed by: INTERNAL MEDICINE

## 2018-10-04 PROCEDURE — 93279 PRGRMG DEV EVAL PM/LDLS PM: CPT

## 2018-10-04 NOTE — PROGRESS NOTES
Cardiology Follow Up    Tiffanie Walls  12/21/1930  [id number]  [address] [facility]  [address] [place] [address] [address]    1  Coronary artery disease involving native coronary artery of native heart without angina pectoris     2  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 (LAD) artery     3  Chronic diastolic congestive heart failure (HCC)     4  Paroxysmal atrial fibrillation (HCC)     5  Cardiac pacemaker in situ     6  Sick sinus syndrome ([address] )     7  Dyslipidemia         Discussion/Summary:  Mr Faheem Gonzalez  Is a pleasant 66-year-old gentleman who presents to the office today for follow-up  His volume status appears stable on his current diuretic regimen to which no changes were made  His blood pressure is well controlled in the office today  It is unclear why he is not on statin therapy in the setting of his known coronary disease  He is unsure  I will initiate atorvastatin 20 mg daily and reassess his lipids in a few months  Otherwise his device will now be followed through our device clinic  After his last visit he was supposed to have a device check  It does not appear this was ever performed  He will have his device interrogated in the office today  He is maintained on systemic anticoagulation given his history of atrial fibrillation  Follow-up will be arranged in six months or sooner if deemed necessary  Interval History:   Mr Faheem Gonzalez is an 66-year-old gentleman who presents to the office today for follow-up  He continues to report shortness of breath with transfers  He is weighed daily at the facility  He denies utilization of added salt to his diet  With the limited activity he performs he denies any exertional chest pain  He denies paroxysmal nocturnal dyspnea, orthopnea, or increasing abdominal girth    He denies palpitations or symptoms of claudication with the limited activity he performs  He is maintained on Eliquis without any bleeding consequences or falls  Problem List     Lower urinary tract symptoms    Malignant neoplasm of trigone of urinary bladder (HCC)    Prostate cancer (HCC)    Chronic diastolic congestive heart failure (HCC)    CKD (chronic kidney disease) stage 3, GFR 30-59 ml/min (Chronic)    Paroxysmal atrial fibrillation (HCC) (Chronic)    CAD (coronary artery disease) (Chronic)    Type 2 diabetes mellitus (HCC) (Chronic)    Lab Results   Component Value Date    HGBA1C 7 4 (H) 05/23/2018       No results for input(s): POCGLU in the last 72 hours  Blood Sugar Average: Last 72 hrs:          Cardiac pacemaker in situ (Chronic)    Right ureteral stone    Overview Signed 7/1/2018 11:57 AM by Zhanna Patterson MD     Added automatically from request for surgery [id number]         Urinary tract infection with hematuria    Hyponatremia    Other chest pain    Laceration of right lower extremity    Anemia    Benign prostatic hyperplasia    Blindness of left eye    Kidney stone    Peripheral neuropathy        Past Medical History:   Diagnosis Date    A-fib [facility] [address] )     Anemia     Bladder cancer ([facility] [address] )     CAD (coronary artery disease)     CHF (congestive heart failure) ([facility] [address] )     Chronic kidney failure     Colon cancer ([facility] [address] )     Eye cancer, left ([facility] [address] )     Hypertension     Incontinence     Pacemaker     Prostate cancer ([facility] [address] )     Type 2 diabetes mellitus ([facility] [address] )     Wears hearing aid in both ears      Social History     Social History    Marital status:      Spouse name: N/A    Number of children: N/A    Years of education: N/A     Occupational History    Not on file       Social History Main Topics    Smoking status: Former Smoker    Smokeless tobacco: Never Used    Alcohol use Yes      Comment: jayne cognac 1 oz/night     Drug use: No    Sexual activity: Not Currently     Other Topics Concern    Not on file     Social History Narrative    No narrative on file      No family history on file  Past Surgical History:   Procedure Laterality Date    CARDIAC PACEMAKER PLACEMENT  2014    CORONARY ANGIOPLASTY WITH STENT PLACEMENT      GALLBLADDER SURGERY      GA CYSTO/URETERO W/LITHOTRIPSY &INDWELL STENT INSRT Right 7/31/2018    Procedure: CYSTOSCOPY URETEROSCOPY WITH LITHOTRIPSY HOLMIUM LASER ,BASKET STONE EXTRACTION  RETROGRADE PYELOGRAM AND exchange STENT URETERAL;  Surgeon: Maryana Bernal MD;  Location: [facility];  Service: Urology    GA CYSTOURETHROSCOPY,URETER CATHETER Right 7/1/2018    Procedure: CYSTOSCOPY RETROGRADE PYELOGRAM WITH INSERTION STENT URETERAL;  Surgeon: Dickson Heaton MD;  Location: [facility];  Service: Urology       Current Outpatient Prescriptions:     aspirin (ECOTRIN LOW STRENGTH) 81 mg EC tablet, Take 81 mg by mouth daily, Disp: , Rfl:     bacitracin topical ointment 500 units/g topical ointment, Apply 1 large application topically daily To 3 small areas on right shin, Disp: , Rfl:     carvedilol (COREG) 3 125 mg tablet, TAKE 1 TABLET BY MOUTH TWICE DAILY WITH MEALS, Disp: 56 tablet, Rfl: 5    EASY TOUCH LANCETS 28G MISC, USE AS DIRECTED , Disp: 100 each, Rfl: 11    ELIQUIS 2 5 MG, TAKE 1 TABLET BY MOUTH TWICE DAILY  , Disp: 56 tablet, Rfl: 5    ergocalciferol (VITAMIN D2) 50,000 units, TAKE 1 CAPSULE BY MOUTH EVERY WEDNESDAY AT NOON, Disp: 4 capsule, Rfl: 5    finasteride (PROSCAR) 5 mg tablet, TAKE 1 TABLET BY MOUTH ONCE DAILY  , Disp: 28 tablet, Rfl: 5    gabapentin (NEURONTIN) 100 mg capsule, TAKE 1 CAPSULE BY MOUTH ONCE DAILY, Disp: 28 capsule, Rfl: 5    glucose blood test strip, 1 each by Other route daily Use as instructed, Disp: , Rfl:     insulin glargine (LANTUS SOLOSTAR) 100 units/mL injection pen, Inject 10 Units under the skin daily at bedtime, Disp: 5 pen, Rfl: 5    ipratropium-albuterol (DUO-NEB) 0 5-2 5 mg/3 mL, Take 3 mL by nebulization every 6 (six) hours as needed for wheezing, Disp: , Rfl: 0    lisinopril (ZESTRIL) 2 5 mg tablet, TAKE 1 TABLET BY MOUTH ONCE DAILY  , Disp: 28 tablet, Rfl: 5    metFORMIN (GLUCOPHAGE) 500 mg tablet, TAKE 1 TABLET BY MOUTH ONCE DAILY  , Disp: 28 tablet, Rfl: 5    multivitamin with iron (TAB-A-ANETA/IRON), TAKE 1 TABLET BY MOUTH ONCE DAILY  , Disp: 28 tablet, Rfl: 11    nitroglycerin (NITROSTAT) 0 4 mg SL tablet, Place 0 4 mg under the tongue every 5 (five) minutes as needed for chest pain, Disp: , Rfl:     Omega-3 Fatty Acids (FISH OIL) 1,000 mg, TAKE 1 CAPSULE BY MOUTH ONCE DAILY, Disp: 28 capsule, Rfl: 3    potassium chloride (K-DUR,KLOR-CON) 20 mEq tablet, TAKE 1 TABLET BY MOUTH TWICE DAILY  , Disp: 56 tablet, Rfl: 5    tamsulosin (FLOMAX) 0 4 mg, TAKE 1 CAPSULE BY MOUTH WITH EVENING MEAL, Disp: 28 capsule, Rfl: 5    torsemide (DEMADEX) 20 mg tablet, TAKE 3 TABLETS BY MOUTH TWICE DAILY, Disp: 168 tablet, Rfl: 0    white petrolatum, Apply 1 application topically as needed Apply topically to buttocks as needed  May keep in room and self administer, Disp: , Rfl:   Allergies   Allergen Reactions    Iv Contrast [Iodinated Diagnostic Agents]        Labs:     Chemistry        Component Value Date/Time     (L) 08/03/2018 1120    K 4 0 08/03/2018 1120     08/03/2018 1120    CO2 30 08/03/2018 1120    BUN 24 08/03/2018 1120    CREATININE 1 70 (H) 08/03/2018 1120        Component Value Date/Time    CALCIUM 8 8 08/03/2018 1120    ALKPHOS 91 07/26/2018 1018    AST 17 07/26/2018 1018    ALT 15 07/26/2018 1018            No results found for: CHOL  Lab Results   Component Value Date    HDL 33 (L) 07/05/2018     Lab Results   Component Value Date    LDLCALC 79 07/05/2018     Lab Results   Component Value Date    TRIG 75 07/05/2018     No components found for: CHOLHDL    Imaging: Ct Abdomen Pelvis Wo Contrast    Result Date: 7/1/2018  Narrative: CT ABDOMEN AND PELVIS WITHOUT IV CONTRAST INDICATION:   Right lower quadrant abdominal pain  Colon cancer  COMPARISON: None  TECHNIQUE:  CT examination of the abdomen and pelvis was performed without intravenous contrast   Axial, sagittal, and coronal 2D reformatted images were created from the source data and submitted for interpretation  Radiation dose length product (DLP) for this visit:  761 mGy-cm   This examination, like all CT scans performed in the [facility], was performed utilizing techniques to minimize radiation dose exposure, including the use of iterative reconstruction and automated exposure control  Enteric contrast was not administered  FINDINGS: ABDOMEN LOWER CHEST:  Pacer leads are noted in the right heart  Heart is enlarged  There is trace pericardial effusion  There is small moderate loculated effusion in the medial lower left chest   Small freely layering right pleural effusion is noted  Calcified right lower chest granuloma is noted  Emphysematous changes are noted in the lower lungs as are atelectatic changes  LIVER/BILIARY TREE:  Unremarkable  No noncontrast CT evidence of suspicious hepatic mass or biliary dilatation  GALLBLADDER:  Gallbladder is surgically absent  SPLEEN:  Unremarkable  PANCREAS:  Unremarkable  ADRENAL GLANDS:  Low-density thickening of adrenal glands bilaterally without discrete adrenal mass suggest adenomatous hyperplasia  KIDNEYS/URETERS:  Renal vascular calcifications and bilateral renal cysts are noted  There is a proximal right ureteral calculus, 9 mm in size at the approximate upper L4 vertebral endplate level  There is extensive periureteral stranding at the level of the calculus  There is distention of right extrarenal pelvis  Only mild prominence of right intrarenal calyces is noted  The right ureter is prominent in caliber both proximal to and distal to the ureteral calculus  No left ureteral calculi  No left-sided hydroureteronephrosis  STOMACH AND BOWEL:  Anastomotic staple line is noted in the right upper quadrant    There has been resection of the ascending colon  No bowel obstruction  No free intraperitoneal gas  Sigmoid diverticulosis without evidence of acute diverticulitis  APPENDIX:  No findings to suggest appendicitis  ABDOMINOPELVIC CAVITY:  Nonspecific right lower quadrant mesenteric and presacral edema is noted  No abdominal pelvic abscess  No retroperitoneal, mesenteric, or pelvic lymphadenopathy  VESSELS:  Extensive atherosclerotic gastric calcification noted  PELVIS REPRODUCTIVE ORGANS:  Marked prostatomegaly noted  Prostate measures 6 7 x 6 5 cm in transverse dimensions by approximately 7 6 cm in craniocaudal dimension  URINARY BLADDER:  Diffuse thickening of the wall the urinary bladder with minimal perivesical inflammatory edema suggestive of cystitis or the sequela of chronic bladder obstruction  ABDOMINAL WALL/INGUINAL REGIONS:  Small fat-containing umbilical hernia is noted  OSSEOUS STRUCTURES:  No acute fracture or destructive osseous lesion  Advanced lumbar bilateral hip osteoarthritic degenerative changes  Impression: Right mid ureteral calculus, 9 mm in size  Although there is no significant hydronephrosis, there is prominence of right extrarenal pelvis and there is extensive inflammatory stranding surrounding the ureter at the site of the calculus, and these findings suggest that this calculus is the cause of the patient's right lower quadrant pain  Changes of prior ascending colectomy  Colonic diverticulosis without acute diverticulitis  Mild mesenteric and presacral edema is nonspecific  No noncontrast CT evidence of metastatic disease in the abdomen or pelvis  Pleural effusions, somewhat loculated on the left  Cardiomegaly  Workstation performed: [id number]     X-ray Chest 2 Views    Result Date: 7/4/2018  Narrative: CHEST INDICATION:   Chest pain  Trauma  Abrasions and lacerations involving right leg COMPARISON:  July 1, 2018   EXAM PERFORMED/VIEWS:  XR CHEST PA & LATERAL FINDINGS: Dual-lead left chest pacemaker is noted with intact pacer leads seen  Heart shadow is enlarged but unchanged from previous examination  There is small to moderate right pleural effusion  There is density in the posterior right suprahilar chest which appears to represent loculated fluid in the right major fissure  Left lung is clear  No pneumothorax  Glenohumeral and spinal degenerative changes  No acute osseous abnormality  Impression: Cardiomegaly  Right pleural effusion including some loculated fluid in the posterior aspect of the right major fissure  Workstation performed: [id number]     Xr Chest 2 Views    Result Date: 7/1/2018  Narrative: CHEST INDICATION:   Dyspnea  Severe abdominal pain this morning  COMPARISON:  May 22, 2018 EXAM PERFORMED/VIEWS:  XR CHEST PA & LATERAL FINDINGS: The heart mediastinum are stable  Pacing device unchanged in position  There is continued mild cardiomegaly  Small bilateral pleural effusions are again noted  Osseous structures appear within normal limits for patient age  Impression: Stable cardiomegaly  Small bilateral pleural effusions right greater than left  Workstation performed: [id number]     Xr Tibia Fibula 2 Views Right    Result Date: 7/4/2018  Narrative: RIGHT TIBIA AND FIBULA INDICATION:   Trauma  Right leg pain with abrasion and laceration  COMPARISON:  None VIEWS:  XR TIBIA FIBULA 2 VW RIGHT FINDINGS: There is no acute fracture or dislocation  No significant degenerative changes seen  No lytic or blastic lesions are seen  There are atherosclerotic calcifications  Soft tissues are otherwise unremarkable  Impression: No acute osseous abnormality  Workstation performed: [id number]     Fl Retrograde Pyelogram    Result Date: 7/2/2018  Narrative: RIGHT RETROGRADE PYELOGRAM INDICATION:   Right ureteral stone [N20 1]   COMPARISON: CT 7/1/2018 IMAGES:  3 FLUOROSCOPY TIME:   17 seconds CONTRAST: 8 mL of iohexol (OMNIPAQUE) FINDINGS: Fluoroscopic guidance provided for retrograde pyelogram  Contrast administration into the right kidney collecting system demonstrates dilatation  Final images reveal placement of a right ureteral stent Osseous and soft tissue detail limited by technique  Impression: Fluoroscopic guidance provided for right retrograde pyelogram   Placement of right ureteral stent  Please see procedure report for further details  Workstation performed: [id number]       Review of Systems   Cardiovascular: Positive for dyspnea on exertion  Negative for chest pain  Vitals:    10/04/18 1118   BP: 114/52   Pulse: 66     Vitals:    10/04/18 1118   Weight: 87 1 kg (192 lb)     Height: 5' 9" (175 3 cm)   Body mass index is 28 35 kg/m²      Physical Exam:   General appearance:  Appears stated age, alert, well appearing and in no distress  HEENT:  PERRLA, EOMI, no scleral icterus, no conjunctival pallor  NECK: No elevated JVP  HEART:  Regular rate and rhythm, normal S1/S2, no S3/S4, no murmur or rub  LUNGS:  Decreased breath sounds at bases bilaterally   ABDOMEN:  Soft, non-tender, positive bowel sounds, no rebound or guarding, no organomegaly   EXTREMITIES: No edema   VASCULAR:  Normal pedal pulses   SKIN: No lesions or rashes on exposed skin

## 2018-10-04 NOTE — PROGRESS NOTES
Results for orders placed or performed in visit on 10/04/18   Cardiac EP device report    Narrative    BIOT VVI PM  DEVICE INTERROGATED IN THE [place] OFFICE W/ DR Cuca Tamayo APPT  PT NEW TO DEVICE CLINIC  BATTERY VOLTAGE ADEQUATE (5 3 YRS)  -99% (DEPENDENT/CHB)  ALL LEAD PARAMETERS WITHIN NORMAL LIMITS  1 NSVT EPISODE ON 2/4/18 FOR 17 BEATS, AVG CL~330MS  EF-65% (ECHO 5/23/18)  PT ON ELIQUIS, ASA 81MG & CARVEDILOL  NORMAL DEVICE FUNCTION   GV

## 2018-10-05 NOTE — PROGRESS NOTES
10/8/2018    Tiffanie Walls  12/21/1930  [id number]      Assessment  -Nephrolithiasis s/p right ureteroscopy (8/2/18)  -Prostate cancer s/p XRT and ADT  -Neoplasm of trigone of bladder    Discussion/Plan  Soy Babin is a 80 y o  male being managed by Dr Shravan Rosenberg        -We reviewed results of recent KUB which shows no evidence of renal calculi or ureteral stent   -We discussed patient's urinary symptoms of nocturia  Unfortunately, patient states this is consistent with torsemide  Encouraged patient to discuss with PCP at [facility] in regards to adjust of dosage/timing  We reviewed dietary and behavioral modifications including avoiding bladder irritants, and avoiding oral consumption of fluids prior to bed  Patient will continue to take Flomax daily  -He will return in July for yearly PSA  We will also obtain KUB and ultrasound at that time to evaluate for stone burden  Instructions were provided to [facility]  He was instructed to call with any issues    -All questions answered, patient agrees with plan      History of Present Illness  80 y o  male with a history of nephrolithiasis s/p right ureteroscopy (8/2/18), prostate cancer s/p XRT and ADT, and neoplasm of trigone of bladder presents today for follow up  Patient had reported to office that he believed he might have removed ureteral stent with string as there was no evidence of visible string on examination on 08/07/2018  A KUB from 08/08/2018 showed no evidence of ureteral stent  Patient states that he is doing well and denies any lower urinary tract complaints  He denies any episodes of gross hematuria or dysuria  Patient does report increased nocturia  He states this is ongoing and consistent with diuretic  He states he voids up to 12 episodes per night  Patient otherwise doing well postoperatively  Last PSA from 07/24/2018 was stable at 0 04    Patient states his initial prostate cancer diagnosis was by a urologist in [place] almost 6-8 years ago  Review of Systems  Review of Systems   Constitutional: Negative  HENT: Negative  Respiratory: Negative  Cardiovascular: Negative  Gastrointestinal: Negative  Genitourinary: Positive for frequency  Negative for decreased urine volume, difficulty urinating, dysuria, flank pain, hematuria and urgency  Musculoskeletal: Negative  Skin: Negative  Neurological: Negative  Psychiatric/Behavioral: Negative  Past Medical History  Past Medical History:   Diagnosis Date    A-fib ([place] [address] )     Anemia     Bladder cancer ([place] [address] )     CAD (coronary artery disease)     CHF (congestive heart failure) (HCC)     Chronic kidney failure     Colon cancer ([place] [address] )     Eye cancer, left ([place] [address] )     Hypertension     Incontinence     Pacemaker     Prostate cancer ([place] [address] )     Type 2 diabetes mellitus ([place] [address] )     Wears hearing aid in both ears        Past Social History  Past Surgical History:   Procedure Laterality Date    CARDIAC PACEMAKER PLACEMENT  2014    CORONARY ANGIOPLASTY WITH STENT PLACEMENT      GALLBLADDER SURGERY      OH CYSTO/URETERO W/LITHOTRIPSY &INDWELL STENT INSRT Right 7/31/2018    Procedure: CYSTOSCOPY URETEROSCOPY WITH LITHOTRIPSY HOLMIUM LASER ,BASKET STONE EXTRACTION  RETROGRADE PYELOGRAM AND exchange STENT URETERAL;  Surgeon: Kimberli Cedeno MD;  [facility];  Service: Urology    OH CYSTOURETHROSCOPY,URETER CATHETER Right 7/1/2018    Procedure: CYSTOSCOPY RETROGRADE PYELOGRAM WITH INSERTION STENT URETERAL;  Surgeon: Eden Rose MD;  [facility];  Service: Urology       Past Family History  No family history on file  Past Social history  Social History     Social History    Marital status:      Spouse name: N/A    Number of children: N/A    Years of education: N/A     Occupational History    Not on file       Social History Main Topics    Smoking status: Former Smoker    Smokeless tobacco: Never Used    Alcohol use Yes Comment: jayne cognac 1 oz/night     Drug use: No    Sexual activity: Not Currently     Other Topics Concern    Not on file     Social History Narrative    No narrative on file       Current Medications  Current Outpatient Prescriptions   Medication Sig Dispense Refill    aspirin (ECOTRIN LOW STRENGTH) 81 mg EC tablet Take 81 mg by mouth daily      bacitracin topical ointment 500 units/g topical ointment Apply 1 large application topically daily To 3 small areas on right shin      carvedilol (COREG) 3 125 mg tablet TAKE 1 TABLET BY MOUTH TWICE DAILY WITH MEALS 56 tablet 5    EASY TOUCH LANCETS 28G MISC USE AS DIRECTED  100 each 11    ELIQUIS 2 5 MG TAKE 1 TABLET BY MOUTH TWICE DAILY  56 tablet 5    ergocalciferol (VITAMIN D2) 50,000 units TAKE 1 CAPSULE BY MOUTH EVERY WEDNESDAY AT NOON 4 capsule 5    finasteride (PROSCAR) 5 mg tablet TAKE 1 TABLET BY MOUTH ONCE DAILY  28 tablet 5    gabapentin (NEURONTIN) 100 mg capsule TAKE 1 CAPSULE BY MOUTH ONCE DAILY 28 capsule 5    glucose blood test strip 1 each by Other route daily Use as instructed      insulin glargine (LANTUS SOLOSTAR) 100 units/mL injection pen Inject 10 Units under the skin daily at bedtime 5 pen 5    ipratropium-albuterol (DUO-NEB) 0 5-2 5 mg/3 mL Take 3 mL by nebulization every 6 (six) hours as needed for wheezing  0    lisinopril (ZESTRIL) 2 5 mg tablet TAKE 1 TABLET BY MOUTH ONCE DAILY  28 tablet 5    metFORMIN (GLUCOPHAGE) 500 mg tablet TAKE 1 TABLET BY MOUTH ONCE DAILY  28 tablet 5    multivitamin with iron (TAB-A-ANETA/IRON) TAKE 1 TABLET BY MOUTH ONCE DAILY  28 tablet 11    nitroglycerin (NITROSTAT) 0 4 mg SL tablet Place 0 4 mg under the tongue every 5 (five) minutes as needed for chest pain      Omega-3 Fatty Acids (FISH OIL) 1,000 mg TAKE 1 CAPSULE BY MOUTH ONCE DAILY 28 capsule 3    potassium chloride (K-DUR,KLOR-CON) 20 mEq tablet TAKE 1 TABLET BY MOUTH TWICE DAILY   56 tablet 5    tamsulosin (FLOMAX) 0 4 mg TAKE 1 CAPSULE BY MOUTH WITH EVENING MEAL 28 capsule 5    torsemide (DEMADEX) 20 mg tablet TAKE 3 TABLETS BY MOUTH TWICE DAILY 168 tablet 0    white petrolatum Apply 1 application topically as needed Apply topically to buttocks as needed  May keep in room and self administer       No current facility-administered medications for this visit  Allergies  Allergies   Allergen Reactions    Iv Contrast [Iodinated Diagnostic Agents]        Past Medical History, Social History, Family History, medications and allergies were reviewed  Vitals  There were no vitals filed for this visit  Physical Exam  Physical Exam   Constitutional: He is oriented to person, place, and time  He appears well-developed and well-nourished  HENT:   Head: Normocephalic  Hard of hearing  Partially blind   Eyes: Pupils are equal, round, and reactive to light  Neck: Normal range of motion  Cardiovascular: Normal rate and regular rhythm  Pulmonary/Chest: Effort normal    Abdominal: Soft  Normal appearance  He exhibits no distension  There is no tenderness  There is no CVA tenderness  Genitourinary:   Genitourinary Comments: Negative CVA tenderness   Musculoskeletal:   Wheelchair bound   Neurological: He is alert and oriented to person, place, and time  Skin: Skin is warm and dry  Psychiatric: He has a normal mood and affect   His behavior is normal  Judgment and thought content normal        Results    I have personally reviewed all pertinent lab results and reviewed with patient  No results found for: PSA  Lab Results   Component Value Date    CALCIUM 8 8 08/03/2018     (L) 08/03/2018    K 4 0 08/03/2018    CO2 30 08/03/2018     08/03/2018    BUN 24 08/03/2018    CREATININE 1 70 (H) 08/03/2018     Lab Results   Component Value Date    WBC 4 50 08/03/2018    HGB 8 6 (L) 08/03/2018    HCT 28 9 (L) 08/03/2018    MCV 89 08/03/2018     08/03/2018     No results found for this or any previous visit (from the past 1 hour(s))

## 2018-10-08 ENCOUNTER — OFFICE VISIT (OUTPATIENT)
Dept: UROLOGY | Facility: AMBULATORY SURGERY CENTER | Age: 83
End: 2018-10-08
Payer: MEDICARE

## 2018-10-08 VITALS
HEIGHT: 69 IN | HEART RATE: 56 BPM | BODY MASS INDEX: 28.58 KG/M2 | WEIGHT: 193 LBS | SYSTOLIC BLOOD PRESSURE: 150 MMHG | DIASTOLIC BLOOD PRESSURE: 70 MMHG

## 2018-10-08 DIAGNOSIS — R35.1 NOCTURIA: ICD-10-CM

## 2018-10-08 DIAGNOSIS — N20.1 RIGHT URETERAL STONE: ICD-10-CM

## 2018-10-08 DIAGNOSIS — C61 PROSTATE CANCER (HCC): Primary | ICD-10-CM

## 2018-10-08 DIAGNOSIS — N20.0 NEPHROLITHIASIS: ICD-10-CM

## 2018-10-08 DIAGNOSIS — C67.0 MALIGNANT NEOPLASM OF TRIGONE OF URINARY BLADDER (HCC): ICD-10-CM

## 2018-10-08 PROCEDURE — 99213 OFFICE O/P EST LOW 20 MIN: CPT | Performed by: NURSE PRACTITIONER

## 2018-10-11 ENCOUNTER — HOSPITAL ENCOUNTER (OUTPATIENT)
Dept: ULTRASOUND IMAGING | Facility: HOSPITAL | Age: 83
Discharge: HOME | End: 2018-10-11
Payer: MEDICARE

## 2018-10-11 DIAGNOSIS — N20.0 NEPHROLITHIASIS: ICD-10-CM

## 2018-10-11 PROCEDURE — 76770 US EXAM ABDO BACK WALL COMP: CPT

## 2018-10-16 DIAGNOSIS — I50.32 CHRONIC DIASTOLIC CONGESTIVE HEART FAILURE (HCC): ICD-10-CM

## 2018-10-16 RX ORDER — TORSEMIDE 20 MG/1
TABLET ORAL
Qty: 168 TABLET | Refills: 0 | Status: SHIPPED | OUTPATIENT
Start: 2018-10-16 | End: 2018-10-31 | Stop reason: SDUPTHER

## 2018-10-24 ENCOUNTER — TELEPHONE (OUTPATIENT)
Dept: CARDIOLOGY CLINIC | Facility: CLINIC | Age: 83
End: 2018-10-24

## 2018-10-24 NOTE — TELEPHONE ENCOUNTER
Dr Lizet Maya pt who is on vacation:  Received a call from [facility] reporting pt has a 5lb wt gain  Wt today 194 0 Currently taking Torsemide 60mg BID  C/o increased SOB, no edema noted     #[phone]

## 2018-10-24 NOTE — TELEPHONE ENCOUNTER
He is on a lot of torsemide  He needs to be seen as soon as possible    May have to go to emergency room should have a CMP done tomorrow i e  on the 25th of October

## 2018-10-25 NOTE — TELEPHONE ENCOUNTER
Called [phone], s/w Silvestre Broussard who advised wt same 195 0, but was weighed with clothes on  Pt showing no signs of SOB, denies any c/o today, in good spirits  Unable to determine if pt due for BW, or if he had BW completed recently   Stated to contact the wellness center, otherwise will f/u w/any concerns

## 2018-10-26 ENCOUNTER — HOSPITAL ENCOUNTER (EMERGENCY)
Facility: HOSPITAL | Age: 83
Discharge: HOME | End: 2018-10-26
Attending: EMERGENCY MEDICINE
Payer: MEDICARE

## 2018-10-26 ENCOUNTER — APPOINTMENT (EMERGENCY)
Dept: RADIOLOGY | Facility: HOSPITAL | Age: 83
End: 2018-10-26
Payer: MEDICARE

## 2018-10-26 VITALS
BODY MASS INDEX: 28.5 KG/M2 | SYSTOLIC BLOOD PRESSURE: 127 MMHG | DIASTOLIC BLOOD PRESSURE: 58 MMHG | RESPIRATION RATE: 18 BRPM | HEART RATE: 66 BPM | OXYGEN SATURATION: 96 % | TEMPERATURE: 98.2 F | WEIGHT: 193 LBS

## 2018-10-26 DIAGNOSIS — R06.02 SHORTNESS OF BREATH: Primary | ICD-10-CM

## 2018-10-26 DIAGNOSIS — I50.32 CHRONIC DIASTOLIC CONGESTIVE HEART FAILURE (HCC): Primary | ICD-10-CM

## 2018-10-26 LAB
ATRIAL RATE: 441 BPM
BASOPHILS # BLD AUTO: 0.07 THOUSANDS/ΜL (ref 0–0.1)
BASOPHILS NFR BLD AUTO: 2 % (ref 0–1)
EOSINOPHIL # BLD AUTO: 0.18 THOUSAND/ΜL (ref 0–0.61)
EOSINOPHIL NFR BLD AUTO: 4 % (ref 0–6)
ERYTHROCYTE [DISTWIDTH] IN BLOOD BY AUTOMATED COUNT: 19.9 % (ref 11.6–15.1)
HCT VFR BLD AUTO: 29.2 % (ref 36.5–49.3)
HGB BLD-MCNC: 9 G/DL (ref 12–17)
IMM GRANULOCYTES # BLD AUTO: 0.02 THOUSAND/UL (ref 0–0.2)
IMM GRANULOCYTES NFR BLD AUTO: 0 % (ref 0–2)
LYMPHOCYTES # BLD AUTO: 0.65 THOUSANDS/ΜL (ref 0.6–4.47)
LYMPHOCYTES NFR BLD AUTO: 14 % (ref 14–44)
MCH RBC QN AUTO: 28.6 PG (ref 26.8–34.3)
MCHC RBC AUTO-ENTMCNC: 30.8 G/DL (ref 31.4–37.4)
MCV RBC AUTO: 93 FL (ref 82–98)
MONOCYTES # BLD AUTO: 0.5 THOUSAND/ΜL (ref 0.17–1.22)
MONOCYTES NFR BLD AUTO: 11 % (ref 4–12)
NEUTROPHILS # BLD AUTO: 3.29 THOUSANDS/ΜL (ref 1.85–7.62)
NEUTS SEG NFR BLD AUTO: 69 % (ref 43–75)
NRBC BLD AUTO-RTO: 0 /100 WBCS
PLATELET # BLD AUTO: 215 THOUSANDS/UL (ref 149–390)
PMV BLD AUTO: 10.5 FL (ref 8.9–12.7)
QRS AXIS: 263 DEGREES
QRSD INTERVAL: 170 MS
QT INTERVAL: 510 MS
QTC INTERVAL: 546 MS
RBC # BLD AUTO: 3.15 MILLION/UL (ref 3.88–5.62)
T WAVE AXIS: 70 DEGREES
TROPONIN I SERPL-MCNC: 0.07 NG/ML
VENTRICULAR RATE: 69 BPM
WBC # BLD AUTO: 4.71 THOUSAND/UL (ref 4.31–10.16)

## 2018-10-26 PROCEDURE — 36415 COLL VENOUS BLD VENIPUNCTURE: CPT | Performed by: EMERGENCY MEDICINE

## 2018-10-26 PROCEDURE — 93005 ELECTROCARDIOGRAM TRACING: CPT

## 2018-10-26 PROCEDURE — 71046 X-RAY EXAM CHEST 2 VIEWS: CPT

## 2018-10-26 PROCEDURE — 84484 ASSAY OF TROPONIN QUANT: CPT | Performed by: EMERGENCY MEDICINE

## 2018-10-26 PROCEDURE — 93010 ELECTROCARDIOGRAM REPORT: CPT | Performed by: INTERNAL MEDICINE

## 2018-10-26 PROCEDURE — 85025 COMPLETE CBC W/AUTO DIFF WBC: CPT | Performed by: EMERGENCY MEDICINE

## 2018-10-26 PROCEDURE — 99285 EMERGENCY DEPT VISIT HI MDM: CPT

## 2018-10-26 NOTE — ED PROVIDER NOTES
History  Chief Complaint   Patient presents with    Shortness of Breath     per pt i had increased sob tonight      27-year-old male presents via EMS from [facility] for shortness of breath  Past medical history significant for CHF, hypertension, diabetes, implanted pacemaker  Patient states that this evening he started to feel that he was having difficulty breathing and attempted to use breathing exercise that he has been taught in physical therapy  His respirations did not improve after doing so so he alerted the staff that he felt like he could not breathe and was brought into emergency department by EMS  Patient denies currently being in any pain, fever/chills, cough  EMS states that he was saturating well when they picked him up and was at near 100% while being transferred  Denies any headaches, changes in vision, chest pain, abdominal pain, nausea/vomiting, constipation/diarrhea, or change in urinary habits  Prior to Admission Medications   Prescriptions Last Dose Informant Patient Reported? Taking? EASY TOUCH LANCETS 28G MISC  Self No No   Sig: USE AS DIRECTED    ELIQUIS 2 5 MG  Self No No   Sig: TAKE 1 TABLET BY MOUTH TWICE DAILY  Omega-3 Fatty Acids (FISH OIL) 1,000 mg  Self No No   Sig: TAKE 1 CAPSULE BY MOUTH ONCE DAILY   aspirin (ECOTRIN LOW STRENGTH) 81 mg EC tablet  Self Yes No   Sig: Take 81 mg by mouth daily   bacitracin topical ointment 500 units/g topical ointment  Self Yes No   Sig: Apply 1 large application topically daily To 3 small areas on right shin   carvedilol (COREG) 3 125 mg tablet  Self No No   Sig: TAKE 1 TABLET BY MOUTH TWICE DAILY WITH MEALS   ergocalciferol (VITAMIN D2) 50,000 units  Self No No   Sig: TAKE 1 CAPSULE BY MOUTH EVERY WEDNESDAY AT NOON   finasteride (PROSCAR) 5 mg tablet  Self No No   Sig: TAKE 1 TABLET BY MOUTH ONCE DAILY     gabapentin (NEURONTIN) 100 mg capsule  Self No No   Sig: TAKE 1 CAPSULE BY MOUTH ONCE DAILY   glucose blood test strip  Self Yes No   Si each by Other route daily Use as instructed   insulin glargine (LANTUS SOLOSTAR) 100 units/mL injection pen  Self No No   Sig: Inject 10 Units under the skin daily at bedtime   ipratropium-albuterol (DUO-NEB) 0 5-2 5 mg/3 mL  Self No No   Sig: Take 3 mL by nebulization every 6 (six) hours as needed for wheezing   lisinopril (ZESTRIL) 2 5 mg tablet  Self No No   Sig: TAKE 1 TABLET BY MOUTH ONCE DAILY  metFORMIN (GLUCOPHAGE) 500 mg tablet  Self No No   Sig: TAKE 1 TABLET BY MOUTH ONCE DAILY  multivitamin with iron (TAB-A-ANETA/IRON)  Self No No   Sig: TAKE 1 TABLET BY MOUTH ONCE DAILY  nitroglycerin (NITROSTAT) 0 4 mg SL tablet  Self Yes No   Sig: Place 0 4 mg under the tongue every 5 (five) minutes as needed for chest pain   potassium chloride (K-DUR,KLOR-CON) 20 mEq tablet  Self No No   Sig: TAKE 1 TABLET BY MOUTH TWICE DAILY  tamsulosin (FLOMAX) 0 4 mg  Self No No   Sig: TAKE 1 CAPSULE BY MOUTH WITH EVENING MEAL   torsemide (DEMADEX) 20 mg tablet   No No   Sig: TAKE 3 TABLETS BY MOUTH TWICE DAILY   white petrolatum  Self Yes No   Sig: Apply 1 application topically as needed Apply topically to buttocks as needed   May keep in room and self administer      Facility-Administered Medications: None       Past Medical History:   Diagnosis Date    A-fib ([place] [address] )     Anemia     Bladder cancer ([place] [address] )     CAD (coronary artery disease)     CHF (congestive heart failure) (HCC)     Chronic kidney failure     Colon cancer ([place] [address] )     [facility] )     Hypertension     Incontinence     Pacemaker     Prostate cancer ([facility] )     Type 2 diabetes mellitus ([facility] )     Wears hearing aid in both ears        Past Surgical History:   Procedure Laterality Date    CARDIAC PACEMAKER PLACEMENT      CORONARY ANGIOPLASTY WITH STENT PLACEMENT      GALLBLADDER SURGERY      OR CYSTO/URETERO W/LITHOTRIPSY &INDWELL STENT INSRT Right 2018    Procedure: CYSTOSCOPY URETEROSCOPY WITH LITHOTRIPSY HOLMIUM LASER ,BASKET STONE EXTRACTION  RETROGRADE PYELOGRAM AND exchange STENT URETERAL;  Surgeon: Brody Jha MD;  [facility];  Service: Urology    [place] CYSTOURETHROSCOPY,URETER CATHETER Right 7/1/2018    Procedure: CYSTOSCOPY RETROGRADE PYELOGRAM WITH INSERTION STENT URETERAL;  Surgeon: Guillermo Rich MD;  [facility];  Service: Urology       History reviewed  No pertinent family history  I have reviewed and agree with the history as documented  Social History   Substance Use Topics    Smoking status: Former Smoker    Smokeless tobacco: Never Used    Alcohol use Yes      Comment: jayne cognac 1 oz/night         Review of Systems   Constitutional: Negative for activity change, chills, fatigue and fever  Respiratory: Positive for shortness of breath  Negative for cough and chest tightness  Cardiovascular: Negative for chest pain, palpitations and leg swelling  Gastrointestinal: Negative for abdominal distention, abdominal pain, constipation, diarrhea, nausea and vomiting  Genitourinary: Negative for dysuria and hematuria  Musculoskeletal: Negative for arthralgias, myalgias and neck pain  Neurological: Negative for dizziness, syncope, light-headedness and headaches  All other systems reviewed and are negative  Physical Exam  ED Triage Vitals [10/26/18 0250]   Temperature Pulse Respirations Blood Pressure SpO2   98 2 °F (36 8 °C) 65 (!) 28 139/63 98 %      Temp Source Heart Rate Source Patient Position - Orthostatic VS BP Location FiO2 (%)   Oral Monitor -- Right arm --      Pain Score       No Pain           Orthostatic Vital Signs  Vitals:    10/26/18 0250   BP: 139/63   Pulse: 65       Physical Exam   Constitutional: He is oriented to person, place, and time  He appears well-developed and well-nourished  No distress  HENT:   Head: Normocephalic and atraumatic     Right Ear: External ear normal    Left Ear: External ear normal    Nose: Nose normal  Mouth/Throat: Oropharynx is clear and moist    Eyes: EOM are normal  Right eye exhibits no discharge  Left eye exhibits no discharge  Left eye cloudy, 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   Neck: Normal range of motion  Neck supple  No JVD present  No tracheal deviation present  Cardiovascular: Normal rate, regular rhythm, normal heart sounds and intact distal pulses  Exam reveals no gallop and no friction rub  No murmur heard  Pulmonary/Chest: Effort normal and breath sounds normal  No respiratory distress  He has no wheezes  He has no rales  He exhibits no tenderness  Abdominal: Soft  Bowel sounds are normal  He exhibits no distension and no mass  There is no tenderness  There is no guarding  Midline scar present on abdomen, patient states history of colon cancer   Musculoskeletal: Normal range of motion  He exhibits no edema, tenderness or deformity  Neurological: He is alert and oriented to person, place, and time  No cranial nerve deficit or sensory deficit  He exhibits normal muscle tone  Coordination normal    Skin: He is not diaphoretic  Psychiatric: He has a normal mood and affect  His behavior is normal  Judgment and thought content normal    Nursing note and vitals reviewed        ED Medications  Medications - No data to display    Diagnostic Studies  Results Reviewed     Procedure Component Value Units Date/Time    Troponin I [[id number]]  (Abnormal) Collected:  10/26/18 0325    Lab Status:  Final result Specimen:  Blood from Arm, Left Updated:  10/26/18 0407     Troponin I 0 07 (H) ng/mL     CBC and differential [[id number]]  (Abnormal) Collected:  10/26/18 0325    Lab Status:  Final result Specimen:  Blood from Arm, Left Updated:  10/26/18 0336     WBC 4 71 Thousand/uL      RBC 3 15 (L) Million/uL      Hemoglobin 9 0 (L) g/dL      Hematocrit 29 2 (L) %      MCV 93 fL      MCH 28 6 pg      MCHC 30 8 (L) g/dL      RDW 19 9 (H) %      MPV 10 5 fL      Platelets 708 Thousands/uL      nRBC 0 /100 WBCs Neutrophils Relative 69 %      Immat GRANS % 0 %      Lymphocytes Relative 14 %      Monocytes Relative 11 %      Eosinophils Relative 4 %      Basophils Relative 2 (H) %      Neutrophils Absolute 3 29 Thousands/µL      Immature Grans Absolute 0 02 Thousand/uL      Lymphocytes Absolute 0 65 Thousands/µL      Monocytes Absolute 0 50 Thousand/µL      Eosinophils Absolute 0 18 Thousand/µL      Basophils Absolute 0 07 Thousands/µL                  XR chest 2 views    (Results Pending)         Procedures  ECG 12 Lead Documentation  Date/Time: 10/26/2018 5:51 AM  Performed by: Matt Em  Authorized by: Matt Em     ECG reviewed by me, the ED Provider: yes    Patient location:  ED  Previous ECG:     Previous ECG:  Compared to current    Similarity:  No change  Interpretation:     Interpretation: abnormal    Rate:     ECG rate:  69    ECG rate assessment: normal    Rhythm:     Rhythm: paced    Ectopy:     Ectopy: none    QRS:     QRS axis:  Normal    QRS intervals: Wide  Conduction:     Conduction: normal    ST segments:     ST segments:  Normal  T waves:     T waves: normal            Phone Consults  ED Phone Contact    ED Course                               MDM  Number of Diagnoses or Management Options  Shortness of breath:   Diagnosis management comments: Upon evaluation the emergency department the patient is resting comfortably in bed, is saturating at 99% on room air, and non tachypneic  Patient is able to converse with me without decrease in saturation or stopping to take breath  Will evaluate CBC, troponin, EKG, and chest x-ray to ensure no cardiac pathology  Pending these results being normal the patient will be discharged back to his original care facility  Upon re-evaluation the patient stated that he was breathing much better and feels that the supplemental oxygen has helped clear him out    Patient was encouraged to follow up with his primary care provider in regards to securing further treatment and evaluation for shortness of breath  CritCare Time    Disposition  Final diagnoses:   Shortness of breath     Time reflects when diagnosis was documented in both MDM as applicable and the Disposition within this note     Time User Action Codes Description Comment    10/26/2018  4:15 AM Waylon Sanches Add [R06 02] Shortness of breath       ED Disposition     ED Disposition Condition Comment    Discharge  Adam Hunt discharge to home/self care  Condition at discharge: Stable        Follow-up Information     Follow up With Specialties Details Why Contact Info Additional [address] Emergency Department Emergency Medicine  If symptoms worsen [address]  [phone]  ED, [address] [place] [address], [address]    Marlena Birch MD Geriatric Medicine, Family Medicine  For further management of symptoms [address]  [phone]             Patient's Medications   Discharge Prescriptions    No medications on file     No discharge procedures on file  ED Provider  Attending physically available and evaluated Adam Hunt I managed the patient along with the ED Attending      Electronically Signed by         Emmy Castrejon MD  10/26/18 Pearl Lara MD  10/26/18 433 Too Lara MD  10/26/18 0937

## 2018-10-26 NOTE — DISCHARGE INSTRUCTIONS
Shortness of Breath   WHAT YOU NEED TO KNOW:   Shortness of breath is a feeling that you cannot get enough air when you breathe in  You may have this feeling only during activity, or all the time  Your symptoms can range from mild to severe  Shortness of breath may be a sign of a serious health condition that needs immediate care  DISCHARGE INSTRUCTIONS:   Return to the emergency department if:   · Your signs and symptoms are the same or worse within 24 hours of treatment  · The skin over your ribs or on your neck sinks in when you breathe  · You feel confused or dizzy  Contact your healthcare provider if:   · You have new or worsening symptoms  · You have questions or concerns about your condition or care  Medicines:   · Medicines  may be used to treat the cause of your symptoms  You may need medicine to treat a bacterial infection or reduce anxiety  Other medicines may be used to open your airway, reduce swelling, or remove extra fluid  If you have a heart condition, you may need medicine to help your heart beat more strongly or regularly  · Take your medicine as directed  Contact your healthcare provider if you think your medicine is not helping or if you have side effects  Tell him or her if you are allergic to any medicine  Keep a list of the medicines, vitamins, and herbs you take  Include the amounts, and when and why you take them  Bring the list or the pill bottles to follow-up visits  Carry your medicine list with you in case of an emergency  Manage shortness of breath:   · Create an action plan  You and your healthcare provider can work together to create a plan for how to handle shortness of breath  The plan can include daily activities, treatment changes, and what to do if you have severe breathing problems  · Lean forward on your elbows when you sit  This helps your lungs expand and may make it easier to breathe  · Use pursed-lip breathing any time you feel short of breath  Breathe in through your nose and then slowly breathe out through your mouth with your lips slightly puckered  It should take you twice as long to breathe out as it did to breathe in  · Do not smoke  Nicotine and other chemicals in cigarettes and cigars can cause lung damage and make shortness of breath worse  Ask your healthcare provider for information if you currently smoke and need help to quit  E-cigarettes or smokeless tobacco still contain nicotine  Talk to your healthcare provider before you use these products  · Reach or maintain a healthy weight  Your healthcare provider can help you create a safe weight loss plan if you are overweight  · Exercise as directed  Exercise can help your lungs work more easily  Exercise can also help you lose weight if needed  Try to get at least 30 minutes of exercise most days of the week  Follow up with your healthcare provider or specialist as directed:  Write down your questions so you remember to ask them during your visits  © 2017 [address] Information is for End User's use only and may not be sold, redistributed or otherwise used for commercial purposes  All illustrations and images included in CareNotes® are the copyrighted property of [employer]  or [employer]pe Lacey  The above information is an  only  It is not intended as medical advice for individual conditions or treatments  Talk to your doctor, nurse or pharmacist before following any medical regimen to see if it is safe and effective for you

## 2018-10-26 NOTE — ED NOTES
Transport back to [facility] by [employer] at [address] RN  10/26/18 4967

## 2018-10-26 NOTE — ED ATTENDING ATTESTATION
Kayla Red MD, saw and evaluated the patient  I have discussed the patient with the resident/non-physician practitioner and agree with the resident's/non-physician practitioner's findings, Plan of Care, and MDM as documented in the resident's/non-physician practitioner's note, except where noted  All available labs and Radiology studies were reviewed  At this point I agree with the current assessment done in the Emergency Department  I have conducted an independent evaluation of this patient including a focused history of:    Emergency Department Note- Paramjit Conrad 80 y o  male MRN: [id number]    Unit/Bed#: ED 19 Encounter: [id number]    Paramjit Conrad is a 80 y o  male who presents with   Chief Complaint   Patient presents with    Shortness of Breath     per pt i had increased sob tonight          History of Present Illness   HPI:  Paramjit Conrad is a 80 y o  male who presents for evaluation of: Worsening dyspnea this evening  The patient denies associated chest pain and posterior thoracic pain      Review of Systems   Unable to perform ROS: Dementia       Historical Information   Past Medical History:   Diagnosis Date    [facility]fib [facility] [address] [facility]     Anemia     Bladder cancer [facility] [address] [facility]     CAD (coronary artery disease)     CHF (congestive heart failure) (HCC)     Chronic kidney failure     Colon cancer ([facility] [address] [facility]     Eye cancer, left ([facility] [address] [facility]     Hypertension     Incontinence     Pacemaker     Prostate cancer ([facility] [address] [facility]     Type 2 diabetes mellitus ([facility] [address] [facility]     Wears hearing aid in both ears      Past Surgical History:   Procedure Laterality Date    CARDIAC PACEMAKER PLACEMENT  2014    CORONARY ANGIOPLASTY WITH STENT PLACEMENT      GALLBLADDER SURGERY      MO CYSTO/URETERO W/LITHOTRIPSY &INDWELL STENT INSRT Right 7/31/2018    Procedure: CYSTOSCOPY URETEROSCOPY WITH LITHOTRIPSY HOLMIUM LASER ,BASKET STONE EXTRACTION  RETROGRADE PYELOGRAM AND exchange STENT URETERAL;  Surgeon: Lila Hay MD;  Location: [facility]; Service: Urology    SD CYSTOURETHROSCOPY,URETER CATHETER Right 2018    Procedure: CYSTOSCOPY RETROGRADE PYELOGRAM WITH INSERTION STENT URETERAL;  Surgeon: Norman Wilder MD;  Location: [facility];  Service: Urology     Social History   History   Alcohol Use    Yes     Comment: jayne cognac 1 oz/night      History   Drug Use No     History   Smoking Status    Former Smoker   Smokeless Tobacco    Never Used     Family History: non-contributory    Meds/Allergies   all medications and allergies reviewed  Allergies   Allergen Reactions    Iv Contrast [Iodinated Diagnostic Agents]        Objective   First Vitals:        Current Vitals:        No intake or output data in the 24 hours ending 10/26/18 0251    Invasive Devices     Drain            Ureteral Drain/Stent Right ureter 6 Fr  116 days    Urethral Catheter 18 Fr  86 days                Physical Exam   Constitutional: He appears well-nourished  No distress  HENT:   Head: Normocephalic and atraumatic  Pulmonary/Chest: Effort normal and breath sounds normal    Abdominal: Soft  Bowel sounds are normal    Musculoskeletal: Normal range of motion  He exhibits edema (Trace bilateral ankle)  Neurological: He is alert  Coordination normal    The patient is only oriented to name and place but not today   Skin: Skin is warm and dry  Psychiatric: He has a normal mood and affect  His behavior is normal  Judgment and thought content normal    Nursing note and vitals reviewed  Medical Decision Makin  Acute dyspnea:  Plan to obtain chest x-ray to rule out pulmonary edema; plan to obtain ECG and troponin to rule out acute coronary syndrome  No results found for this or any previous visit (from the past 36 hour(s))  No orders to display         Portions of the record may have been created with voice recognition software   Occasional wrong word or "sound a like" substitutions may have occurred due to the inherent limitations of voice recognition software  Read the chart carefully and recognize, using context, where substitutions have occurred

## 2018-10-26 NOTE — TELEPHONE ENCOUNTER
Pt went to ED 2:30am for SOB, but quickly returned after completing chest xray, CBC and troponin     Received a call from [facility]  Pt still c/o increased SOB, SpO2 100% 5lb wt gain, no edema noted  S/W Dr Pat Chacon, n/o-for Metolazone 5mg- one hour prior to receiving Torsemide and 1 extra tab of Potassium 20meq (one time only) and complete BMP  Faxed to [phone]  R/P#[phone]

## 2018-10-30 ENCOUNTER — IN HOME VISIT (OUTPATIENT)
Dept: GERIATRICS | Age: 83
End: 2018-10-30
Payer: MEDICARE

## 2018-10-30 VITALS
SYSTOLIC BLOOD PRESSURE: 106 MMHG | DIASTOLIC BLOOD PRESSURE: 50 MMHG | RESPIRATION RATE: 22 BRPM | TEMPERATURE: 99.5 F | HEART RATE: 77 BPM

## 2018-10-30 DIAGNOSIS — Z72.89 ALCOHOL USE: ICD-10-CM

## 2018-10-30 DIAGNOSIS — Z91.19 MEDICALLY NONCOMPLIANT: ICD-10-CM

## 2018-10-30 DIAGNOSIS — I50.32 CHRONIC DIASTOLIC CONGESTIVE HEART FAILURE (HCC): Primary | ICD-10-CM

## 2018-10-30 PROCEDURE — 99336 PR DOM/R-HOME E/M EST PT MOD HI SEVERITY 40 MINUTES: CPT | Performed by: NURSE PRACTITIONER

## 2018-10-30 NOTE — PROGRESS NOTES
Assessment/Plan:    No problem-specific Assessment & Plan notes found for this encounter  Diagnoses and all orders for this visit:    Chronic diastolic congestive heart failure ([facility] [address] )  Comments:  -torsemide 60mg BID  -wts have been decreasing  -BMP from 10/29/18 reviewed    Medically noncompliant  Comments:  -does not follow fluid intake recommendations  -refusing to acknowledge that he does not need nor qualify for oxygen with current status  noncompliant with diet    Alcohol use  Comments:  -with history of heavy alcohol intake  -has PRN order for 2oz glass of liquor in a 24hr period            Subjective:      Patient ID: Kristine Bob is a 80 y o  male  80year old male being seen for ER follow up  Was sent to [facility] ER on 10/26/18 for SOB  His oxygen saturation levels were reportedly WNL  CXR showed small B/L plueral effusions  10/26/18 ordered to receive 1x dose of metolazone 5mg one hour prior to toresmide dose and 1x dose of potassium chloride 20mEq  He is on torsemide 60mg BID routinely and also receives potassium chloride 20mEq BID  He had a BMP drawn 10/29/18 which was reviewed and compared to 9/7/18  K is 3 9, Na 142, creatinine 1 88 (which was 1 92) and GFR 31 (which was 31)  Weights also reviewed  Weights 10/21 was 188 which went up to 200 on 10/27/18  They have been steadily decreasing since then- 10/, 10/ 8  Pt fixated on needing oxygen  States "I need oxygen  I'm not at 100%  I need oxygen for the rest of my life " Pulse ox today is 92% on RA  Discussed with pt that he does not require oxygen nor would he qualify for it, he gets upset with this writer  Telephone call placed to daughter, Elvia Child, who states he has been saying this about oxygen for "4+ years"  Despite him stating he is willing to pay out of pocket for oxygen, will not order at this time at direction of Nasrin Arnett   She states she has even gone so far as to tell him one tank of oxygen costs $800 and would only last 2 hours and he wanted to pay despite the cost  She states he does not have the funds to pay out of pocket for oxygen she is aware that he becomes belligerent at times when discussing this  The following portions of the patient's history were reviewed and updated as appropriate: allergies, current medications and problem list     Review of Systems   Constitutional: Negative for chills and fever  HENT: Negative for congestion and sore throat  Respiratory: Positive for shortness of breath  Negative for cough  Cardiovascular: Positive for chest pain  Negative for leg swelling  Gastrointestinal: Negative for abdominal pain, constipation, diarrhea, nausea and vomiting  Genitourinary: Negative for dysuria  Skin: Negative for color change  Psychiatric/Behavioral: Positive for behavioral problems  All other systems reviewed and are negative  Objective:      /50 (BP Location: Right arm, Patient Position: Sitting, Cuff Size: Standard)   Pulse 77   Temp 99 5 °F (37 5 °C) Comment: ambient temp is very hot in room  Resp 22          Physical Exam   Constitutional: No distress  No distress while reclined in recliner chair  Pt leaning back in semi-flat position  HENT:   Head: Normocephalic and atraumatic  Nose: Nose normal    Eyes: Right eye exhibits no discharge  Left eye exhibits no discharge  Neck: No JVD present  Cardiovascular: Normal rate and regular rhythm  Pulmonary/Chest: Effort normal  No respiratory distress  He has no wheezes  He has rales  He exhibits no tenderness  Faint rales left base  No cough  Speaks continuously for 5-7 minutes uninterrupted without SOB  However, when making short response to question answered,  speaking, holds chest, and states "now wait, I need to catch my breath"   Musculoskeletal: He exhibits edema  He exhibits no tenderness  Trace pitting edema to LLE  No calf tenderness   Lymphadenopathy:     He has no cervical adenopathy  Neurological: He is alert  Cooperative with exam   Skin: Skin is warm and dry  He is not diaphoretic  Dsg to RLE intact   Psychiatric: His mood appears not anxious  His speech is tangential  He is agitated  He expresses inappropriate judgment     Speech is continuous

## 2018-10-31 DIAGNOSIS — I50.32 CHRONIC DIASTOLIC CONGESTIVE HEART FAILURE (HCC): ICD-10-CM

## 2018-10-31 RX ORDER — TORSEMIDE 20 MG/1
TABLET ORAL
Qty: 168 TABLET | Refills: 0 | Status: ON HOLD | OUTPATIENT
Start: 2018-10-31 | End: 2018-12-11

## 2018-11-04 ENCOUNTER — HOSPITAL ENCOUNTER (EMERGENCY)
Facility: HOSPITAL | Age: 83
Discharge: HOME | End: 2018-11-05
Attending: EMERGENCY MEDICINE
Payer: MEDICARE

## 2018-11-04 ENCOUNTER — APPOINTMENT (EMERGENCY)
Dept: RADIOLOGY | Facility: HOSPITAL | Age: 83
End: 2018-11-04
Payer: MEDICARE

## 2018-11-04 DIAGNOSIS — R06.00 DYSPNEA: Primary | ICD-10-CM

## 2018-11-04 DIAGNOSIS — N18.9 CKD (CHRONIC KIDNEY DISEASE): ICD-10-CM

## 2018-11-04 DIAGNOSIS — R07.9 NONSPECIFIC CHEST PAIN: ICD-10-CM

## 2018-11-04 LAB
ANION GAP SERPL CALCULATED.3IONS-SCNC: 6 MMOL/L (ref 4–13)
BASOPHILS # BLD AUTO: 0.05 THOUSANDS/ΜL (ref 0–0.1)
BASOPHILS NFR BLD AUTO: 1 % (ref 0–1)
BUN SERPL-MCNC: 36 MG/DL (ref 5–25)
CALCIUM SERPL-MCNC: 8.6 MG/DL (ref 8.3–10.1)
CHLORIDE SERPL-SCNC: 99 MMOL/L (ref 100–108)
CO2 SERPL-SCNC: 32 MMOL/L (ref 21–32)
CREAT SERPL-MCNC: 1.92 MG/DL (ref 0.6–1.3)
EOSINOPHIL # BLD AUTO: 0.17 THOUSAND/ΜL (ref 0–0.61)
EOSINOPHIL NFR BLD AUTO: 4 % (ref 0–6)
ERYTHROCYTE [DISTWIDTH] IN BLOOD BY AUTOMATED COUNT: 18.6 % (ref 11.6–15.1)
GFR SERPL CREATININE-BSD FRML MDRD: 31 ML/MIN/1.73SQ M
GLUCOSE SERPL-MCNC: 163 MG/DL (ref 65–140)
HCT VFR BLD AUTO: 30.7 % (ref 36.5–49.3)
HGB BLD-MCNC: 9.5 G/DL (ref 12–17)
IMM GRANULOCYTES # BLD AUTO: 0.02 THOUSAND/UL (ref 0–0.2)
IMM GRANULOCYTES NFR BLD AUTO: 1 % (ref 0–2)
LYMPHOCYTES # BLD AUTO: 0.71 THOUSANDS/ΜL (ref 0.6–4.47)
LYMPHOCYTES NFR BLD AUTO: 17 % (ref 14–44)
MCH RBC QN AUTO: 28.8 PG (ref 26.8–34.3)
MCHC RBC AUTO-ENTMCNC: 30.9 G/DL (ref 31.4–37.4)
MCV RBC AUTO: 93 FL (ref 82–98)
MONOCYTES # BLD AUTO: 0.47 THOUSAND/ΜL (ref 0.17–1.22)
MONOCYTES NFR BLD AUTO: 11 % (ref 4–12)
NEUTROPHILS # BLD AUTO: 2.82 THOUSANDS/ΜL (ref 1.85–7.62)
NEUTS SEG NFR BLD AUTO: 66 % (ref 43–75)
NRBC BLD AUTO-RTO: 0 /100 WBCS
PLATELET # BLD AUTO: 206 THOUSANDS/UL (ref 149–390)
PMV BLD AUTO: 10.3 FL (ref 8.9–12.7)
POTASSIUM SERPL-SCNC: 3.4 MMOL/L (ref 3.5–5.3)
RBC # BLD AUTO: 3.3 MILLION/UL (ref 3.88–5.62)
SODIUM SERPL-SCNC: 137 MMOL/L (ref 136–145)
TROPONIN I SERPL-MCNC: 0.08 NG/ML
WBC # BLD AUTO: 4.24 THOUSAND/UL (ref 4.31–10.16)

## 2018-11-04 PROCEDURE — 99285 EMERGENCY DEPT VISIT HI MDM: CPT

## 2018-11-04 PROCEDURE — 85025 COMPLETE CBC W/AUTO DIFF WBC: CPT | Performed by: EMERGENCY MEDICINE

## 2018-11-04 PROCEDURE — 93005 ELECTROCARDIOGRAM TRACING: CPT

## 2018-11-04 PROCEDURE — 84484 ASSAY OF TROPONIN QUANT: CPT | Performed by: EMERGENCY MEDICINE

## 2018-11-04 PROCEDURE — 80048 BASIC METABOLIC PNL TOTAL CA: CPT | Performed by: EMERGENCY MEDICINE

## 2018-11-04 PROCEDURE — 36415 COLL VENOUS BLD VENIPUNCTURE: CPT | Performed by: EMERGENCY MEDICINE

## 2018-11-04 PROCEDURE — 71046 X-RAY EXAM CHEST 2 VIEWS: CPT

## 2018-11-04 RX ORDER — ATORVASTATIN CALCIUM 20 MG/1
20 TABLET, FILM COATED ORAL DAILY
COMMUNITY
End: 2019-10-01 | Stop reason: SDUPTHER

## 2018-11-04 RX ORDER — POTASSIUM CHLORIDE 20 MEQ/1
40 TABLET, EXTENDED RELEASE ORAL ONCE
Status: COMPLETED | OUTPATIENT
Start: 2018-11-04 | End: 2018-11-04

## 2018-11-04 RX ORDER — GABAPENTIN 300 MG/1
100 CAPSULE ORAL
COMMUNITY

## 2018-11-04 RX ADMIN — POTASSIUM CHLORIDE 40 MEQ: 1500 TABLET, EXTENDED RELEASE ORAL at 23:41

## 2018-11-05 VITALS
RESPIRATION RATE: 20 BRPM | OXYGEN SATURATION: 94 % | SYSTOLIC BLOOD PRESSURE: 129 MMHG | TEMPERATURE: 97.9 F | WEIGHT: 194 LBS | DIASTOLIC BLOOD PRESSURE: 64 MMHG | HEART RATE: 70 BPM | BODY MASS INDEX: 28.65 KG/M2

## 2018-11-05 LAB
ATRIAL RATE: 110 BPM
QRS AXIS: 267 DEGREES
QRSD INTERVAL: 164 MS
QT INTERVAL: 478 MS
QTC INTERVAL: 512 MS
T WAVE AXIS: 75 DEGREES
TROPONIN I SERPL-MCNC: 0.08 NG/ML
VENTRICULAR RATE: 69 BPM

## 2018-11-05 PROCEDURE — 84484 ASSAY OF TROPONIN QUANT: CPT | Performed by: EMERGENCY MEDICINE

## 2018-11-05 PROCEDURE — 36415 COLL VENOUS BLD VENIPUNCTURE: CPT | Performed by: EMERGENCY MEDICINE

## 2018-11-05 PROCEDURE — 93010 ELECTROCARDIOGRAM REPORT: CPT | Performed by: INTERNAL MEDICINE

## 2018-11-05 NOTE — ED NOTES
Transport via [facility] re-routed and unable to complete transport at this time    Awaiting phone call from [facility] for possible re-scheduled transport     Brice Angeles RN  11/05/18 0420

## 2018-11-05 NOTE — DISCHARGE INSTRUCTIONS
Please allow patient to use 2L nasal cannula oxygenation for comfort as needed  Today had negative workup for new cause of chest pain or shortness of breath which was unchanged  Return with any new or worsening symptoms, new chest pain or any other concerning symptoms  Chest Pain, Ambulatory Care   GENERAL INFORMATION:   Chest pain  can be caused by a range of conditions, from not serious to life-threatening  It may be caused by a heart attack or a blood clot in your lungs  Sometimes chest pain or pressure is caused by poor blood flow to your heart (angina)  Infection, inflammation, or a fracture in the bones or cartilage in your chest can cause pain or discomfort  Chest pain can also be a symptom of a digestive problem, such as acid reflux or a stomach ulcer  Common symptoms include the following:   · Fever or sweating     · Nausea or vomiting     · Shortness of breath     · Discomfort or pressure that spreads from your chest to your back, jaw, or arm     · A racing or slow heartbeat     · Feeling weak, tired, or faint  Seek immediate care for the following symptoms:   · Any of the following signs of a heart attack:      ¨ Squeezing, pressure, or pain in your chest that lasts longer than 5 minutes or returns    ¨ Discomfort or pain in your back, neck, jaw, stomach, or arm     ¨ Trouble breathing     ¨ Nausea or vomiting    ¨ Lightheadedness or a sudden cold sweat, especially with trouble breathing         · Chest discomfort that gets worse, even with medicine    · Coughing or vomiting blood    · Black or bloody bowel movements     · Vomiting that does not stop, or pain when you swallow  Treatment for chest pain  may include medicine to treat your symptoms while he determines the cause of your chest pain  You may also need any of the following:  · Antiplatelets , such as aspirin, help prevent blood clots  Take your antiplatelet medicine exactly as directed   These medicines make it more likely for you to bleed or bruise  If you are told to take aspirin, do not take acetaminophen or ibuprofen instead  · Prescription pain medicine  may be given  Ask how to take this medicine safely  Do not smoke: If you smoke, it is never too late to quit  Smoking increases your risk for a heart attack and other heart and lung conditions  Ask your healthcare provider for information about how to stop smoking if you need help  Follow up with your healthcare provider as directed: You may need more tests  You may be referred to a specialist, such as a cardiologist or gastroenterologist  Write down your questions so you remember to ask them during your visits  CARE AGREEMENT:   You have the right to help plan your care  Learn about your health condition and how it may be treated  Discuss treatment options with your caregivers to decide what care you want to receive  You always have the right to refuse treatment  The above information is an  only  It is not intended as medical advice for individual conditions or treatments  Talk to your doctor, nurse or pharmacist before following any medical regimen to see if it is safe and effective for you  © 2014 [address] is for End User's use only and may not be sold, redistributed or otherwise used for commercial purposes  All illustrations and images included in CareNotes® are the copyrighted property of [employer]  or Chepe Lacey

## 2018-11-05 NOTE — ED NOTES
Transport arranged back to facility via [facility] at approx 2:30am     Wilfredo Philippe RN  11/05/18 2388

## 2018-11-05 NOTE — ED ATTENDING ATTESTATION
Kyleigh Greene MD, saw and evaluated the patient  I have discussed the patient with the resident/non-physician practitioner and agree with the resident's/non-physician practitioner's findings, Plan of Care, and MDM as documented in the resident's/non-physician practitioner's note, except where noted  All available labs and Radiology studies were reviewed  At this point I agree with the current assessment done in the Emergency Department  I have conducted an independent evaluation of this patient a history and physical is as follows:    41-year-old man presents with chest pain and shortness of breath  Patient states symptoms are intermittent over the past few weeks and resolved with supplemental oxygen  Patient states his symptoms have completely resolved at this time  Has not been having fever, cough, leg pain or swelling  Vital signs reviewed  On examination the patient is awake and alert, in no acute distress  Head is atraumatic and normocephalic  Pupils equal and reactive bilaterally, normal conjunctiva  Oropharynx clear  Moist mucous membranes  Neck is supple  Heart regular rate and rhythm, no murmurs, rubs or gallops  Lungs clear to auscultation bilaterally with no respiratory distress  Abdomen soft, nontender, nondistended  Extremities with no edema and normal range of motion  Nonfocal neuro  Skin warm, dry, intact  Assessment and plan:  41-year-old man with ongoing history of chest pain and dyspnea which improves with supplemental oxygen  Patient states in general he just needs to be on it for an hour so and then does well for the rest of the day  He is asymptomatic at this time  Plan EKG, labs, chest x-ray, reassessment        Critical Care Time  CritCare Time    Procedures

## 2018-11-05 NOTE — ED NOTES
Patient states "i can't breathe"  Patient's o2 saturation 95% on room air  Patient placed on 2L NC at this time  Patient's o2 saturation 99% at this time       Ghulam Reaves RN  11/04/18 7689

## 2018-11-05 NOTE — ED NOTES
Patient's o2 saturation on room air decreased to 88%  Patient placed on 2L nc at this time  Patient's o2 saturation increased to 96%    Patient tolerating well     Sanjeev Bhatti RN  11/04/18 7605

## 2018-11-05 NOTE — ED PROVIDER NOTES
History  Chief Complaint   Patient presents with    Shortness of Breath     as per ems - patient resides at [facility], trouble breathing for a few weeks, c/o increased SOB and CP today     80year-old male history of hypertension, hyperlipidemia, AFib with a pacemaker, on Eliquis presents for she chest pain shortness of breath  Patient resides suck on [facility] and has had on and off symptoms described as intermittent shortness of breath and chest discomfort most nights for the past several weeks  Tonight was exacerbated by trying to sit up in bed where he had right-sided chest discomfort that was sharp in nature  He reports shortness of breath is resolved with supplemental nasal cannula oxygenation  He does not typically wear home O2  He has had many similar presentations to this in the past most recently 2 weeks ago  He denies any orthopnea, exertional dyspnea, lower extremity swelling  Chest pain last for seconds at a time typically worse with the movement and better with rest   There is no pleuritic nature  No fevers chills or productive cough  No back or flank pain  No abdominal pain nausea vomiting diarrhea constipation  He has been compliant with his diuretic medication  No recent dose changes  Prior to Admission Medications   Prescriptions Last Dose Informant Patient Reported? Taking? EASY TOUCH LANCETS 28G MISC  Self No No   Sig: USE AS DIRECTED    ELIQUIS 2 5 MG  Self No Yes   Sig: TAKE 1 TABLET BY MOUTH TWICE DAILY     Omega-3 Fatty Acids (FISH OIL) 1,000 mg  Self No Yes   Sig: TAKE 1 CAPSULE BY MOUTH ONCE DAILY   aspirin (ECOTRIN LOW STRENGTH) 81 mg EC tablet  Self Yes Yes   Sig: Take 81 mg by mouth daily   atorvastatin (LIPITOR) 20 mg tablet   Yes Yes   Sig: Take 20 mg by mouth daily   bacitracin topical ointment 500 units/g topical ointment  Self Yes No   Sig: Apply 1 large application topically daily To 3 small areas on right shin   carvedilol (COREG) 3 125 mg tablet  Self No Yes Sig: TAKE 1 TABLET BY MOUTH TWICE DAILY WITH MEALS   ergocalciferol (VITAMIN D2) 50,000 units  Self No Yes   Sig: TAKE 1 CAPSULE BY MOUTH EVERY WEDNESDAY AT NOON   finasteride (PROSCAR) 5 mg tablet  Self No Yes   Sig: TAKE 1 TABLET BY MOUTH ONCE DAILY  gabapentin (NEURONTIN) 100 mg capsule  Self No Yes   Sig: TAKE 1 CAPSULE BY MOUTH ONCE DAILY   gabapentin (NEURONTIN) 300 mg capsule   Yes Yes   Sig: Take 300 mg by mouth daily at bedtime   glucose blood test strip  Self Yes No   Si each by Other route daily Use as instructed   insulin glargine (LANTUS SOLOSTAR) 100 units/mL injection pen  Self No Yes   Sig: Inject 10 Units under the skin daily at bedtime   ipratropium-albuterol (DUO-NEB) 0 5-2 5 mg/3 mL  Self No No   Sig: Take 3 mL by nebulization every 6 (six) hours as needed for wheezing   lisinopril (ZESTRIL) 2 5 mg tablet  Self No Yes   Sig: TAKE 1 TABLET BY MOUTH ONCE DAILY  metFORMIN (GLUCOPHAGE) 500 mg tablet  Self No Yes   Sig: TAKE 1 TABLET BY MOUTH ONCE DAILY  multivitamin with iron (TAB-A-ANETA/IRON)  Self No No   Sig: TAKE 1 TABLET BY MOUTH ONCE DAILY  nitroglycerin (NITROSTAT) 0 4 mg SL tablet  Self Yes Yes   Sig: Place 0 4 mg under the tongue every 5 (five) minutes as needed for chest pain   potassium chloride (K-DUR,KLOR-CON) 20 mEq tablet  Self No Yes   Sig: TAKE 1 TABLET BY MOUTH TWICE DAILY  tamsulosin (FLOMAX) 0 4 mg  Self No Yes   Sig: TAKE 1 CAPSULE BY MOUTH WITH EVENING MEAL   torsemide (DEMADEX) 20 mg tablet   No Yes   Sig: TAKE 3 TABLETS BY MOUTH TWICE DAILY   white petrolatum  Self Yes No   Sig: Apply 1 application topically as needed Apply topically to buttocks as needed   May keep in room and self administer      Facility-Administered Medications: None       Past Medical History:   Diagnosis Date    A[facility]fib ([facility] )     Anemia     Bladder cancer ([facility] )     CAD (coronary artery disease)     CHF (congestive heart failure) (HCC)     Chronic kidney failure     Colon cancer ([facility] )  [facility] cancer, left [facility] )     Hypertension     Incontinence     Pacemaker     Prostate cancer ([facility] )     Type 2 diabetes mellitus ([facility] )     Wears hearing aid in both ears        Past Surgical History:   Procedure Laterality Date    CARDIAC PACEMAKER PLACEMENT  2014    CORONARY ANGIOPLASTY WITH STENT PLACEMENT      GALLBLADDER SURGERY      KS CYSTO/URETERO W/LITHOTRIPSY &INDWELL STENT INSRT Right 7/31/2018    Procedure: CYSTOSCOPY URETEROSCOPY WITH LITHOTRIPSY HOLMIUM LASER ,BASKET STONE EXTRACTION  RETROGRADE PYELOGRAM AND exchange STENT URETERAL;  Surgeon: Maryana Bernal MD;  Location: [facility];  Service: Urology    KS CYSTOURETHROSCOPY,URETER CATHETER Right 7/1/2018    Procedure: CYSTOSCOPY RETROGRADE PYELOGRAM WITH INSERTION STENT URETERAL;  Surgeon: Dickson Heaton MD;  Location: [facility];  Service: Urology       History reviewed  No pertinent family history  I have reviewed and agree with the history as documented  Social History   Substance Use Topics    Smoking status: Former Smoker    Smokeless tobacco: Never Used    Alcohol use Yes      Comment: jayne cognac 1 oz/night         Review of Systems   Constitutional: Negative for chills, fatigue and fever  HENT: Negative for congestion, rhinorrhea and sinus pressure  Eyes: Negative for visual disturbance  Respiratory: Positive for shortness of breath  Negative for cough, chest tightness and wheezing  Cardiovascular: Positive for chest pain  Negative for palpitations and leg swelling  Gastrointestinal: Negative for abdominal pain, blood in stool, constipation, diarrhea, nausea and vomiting  Genitourinary: Negative for dysuria and flank pain  Musculoskeletal: Negative for back pain and neck pain  Skin: Negative for rash  Allergic/Immunologic: Negative for immunocompromised state  Neurological: Negative for dizziness, weakness, light-headedness, numbness and headaches  Hematological: Negative for adenopathy  Physical Exam  ED Triage Vitals [11/04/18 2157]   Temperature Pulse Respirations Blood Pressure SpO2   97 9 °F (36 6 °C) 73 20 121/57 96 %      Temp Source Heart Rate Source Patient Position - Orthostatic VS BP Location FiO2 (%)   Oral Monitor -- -- --      Pain Score       No Pain           Orthostatic Vital Signs  Vitals:    11/04/18 2345 11/05/18 0040 11/05/18 0045 11/05/18 0215   BP: 119/68 120/92 125/59 119/58   Pulse: 68 69 68 66       Physical Exam   Constitutional: He is oriented to person, place, and time  He appears well-developed and well-nourished  No distress  HENT:   Head: Normocephalic and atraumatic  Eyes: Pupils are equal, round, and reactive to light  Conjunctivae and EOM are normal    Neck: Normal range of motion  Neck supple  No JVD present  Cardiovascular: Normal rate and regular rhythm  No murmur heard  Pulmonary/Chest: Effort normal and breath sounds normal  No accessory muscle usage  No tachypnea  No respiratory distress  He has no decreased breath sounds  He has no wheezes  He has no rhonchi  He has no rales  He exhibits tenderness  He exhibits no laceration, no crepitus, no edema, no deformity and no swelling  Abdominal: Soft  Bowel sounds are normal  He exhibits no distension  There is no tenderness  Musculoskeletal: He exhibits no edema  Lymphadenopathy:     He has no cervical adenopathy  Neurological: He is alert and oriented to person, place, and time  He has normal strength  He is not disoriented  No cranial nerve deficit or sensory deficit  GCS eye subscore is 4  GCS verbal subscore is 5  GCS motor subscore is 6  Awake, alert, oriented x3  Unremarkable cranial nerve exam  Pupils 4 mm equal round reactive to light  No nystagmus, normal extraocular motion  5 out of 5 upper and lower extremity strength  No subjective sensory deficits to the face, upper or lower extremity  Normal finger-nose and heel shin  Skin: Skin is warm  No rash noted   He is not diaphoretic  Psychiatric: He has a normal mood and affect  Vitals reviewed  ED Medications  Medications   potassium chloride (K-DUR,KLOR-CON) CR tablet 40 mEq (40 mEq Oral Given 11/4/18 2341)       Diagnostic Studies  Results Reviewed     Procedure Component Value Units Date/Time    Troponin I [[id number]]  (Abnormal) Collected:  11/05/18 0055    Lab Status:  Final result Specimen:  Blood from Arm, Left Updated:  11/05/18 0117     Troponin I 0 08 (H) ng/mL     Troponin I [[id number]]  (Abnormal) Collected:  11/04/18 2228    Lab Status:  Final result Specimen:  Blood from Arm, Left Updated:  11/04/18 2259     Troponin I 0 08 (H) ng/mL     Basic metabolic panel [[id number]]  (Abnormal) Collected:  11/04/18 2228    Lab Status:  Final result Specimen:  Blood from Arm, Left Updated:  11/04/18 2256     Sodium 137 mmol/L      Potassium 3 4 (L) mmol/L      Chloride 99 (L) mmol/L      CO2 32 mmol/L      ANION GAP 6 mmol/L      BUN 36 (H) mg/dL      Creatinine 1 92 (H) mg/dL      Glucose 163 (H) mg/dL      Calcium 8 6 mg/dL      eGFR 31 ml/min/1 73sq m     Narrative:         National Kidney Disease Education Program recommendations are as follows:  GFR calculation is accurate only with a steady state creatinine  Chronic Kidney disease less than 60 ml/min/1 73 sq  meters  Kidney failure less than 15 ml/min/1 73 sq  meters      CBC and differential [[id number]]  (Abnormal) Collected:  11/04/18 2228    Lab Status:  Final result Specimen:  Blood from Arm, Left Updated:  11/04/18 2239     WBC 4 24 (L) Thousand/uL      RBC 3 30 (L) Million/uL      Hemoglobin 9 5 (L) g/dL      Hematocrit 30 7 (L) %      MCV 93 fL      MCH 28 8 pg      MCHC 30 9 (L) g/dL      RDW 18 6 (H) %      MPV 10 3 fL      Platelets 812 Thousands/uL      nRBC 0 /100 WBCs      Neutrophils Relative 66 %      Immat GRANS % 1 %      Lymphocytes Relative 17 %      Monocytes Relative 11 %      Eosinophils Relative 4 %      Basophils Relative 1 %      Neutrophils Absolute 2 82 Thousands/µL      Immature Grans Absolute 0 02 Thousand/uL      Lymphocytes Absolute 0 71 Thousands/µL      Monocytes Absolute 0 47 Thousand/µL      Eosinophils Absolute 0 17 Thousand/µL      Basophils Absolute 0 05 Thousands/µL                  XR chest 2 views    (Results Pending)         Procedures  Procedures      Phone Consults  ED Phone Contact    ED Course        EKG:  Paced, rate 69  Right bundle morphology  No ST T wave abnormalities  delta troponin unremarkable patient patient has had no chest pain since arrival   States these are his typical symptoms that he has had for several weeks  Will discharge to nursing home  Discussed return precautions  MDM  CritCare Time    Disposition  Final diagnoses:   Dyspnea   CKD (chronic kidney disease)   Nonspecific chest pain     Time reflects when diagnosis was documented in both MDM as applicable and the Disposition within this note     Time User Action Codes Description Comment    11/5/2018 12:05 AM Brian Dame S Add [J44 1] COPD exacerbation ([facility] )     11/5/2018 12:06 AM Brian Dame S Add [E87 1] Hyponatremia     11/5/2018 12:21 AM Brian Dame S Modify [E87 1] Hyponatremia     11/5/2018 12:21 AM Nevada Rosalio, Verdis Chacon Remove [J44 1] COPD exacerbation ([facility] )     11/5/2018 12:21 AM Nevada Rosalio, Verdis Chacon Remove [E87 1] Hyponatremia     11/5/2018 12:21 AM Nevada Rosalio, Verdis Chacon Add [R06 00] Dyspnea     11/5/2018 12:21 AM Brian Dame S Add [N18 9] CKD (chronic kidney disease)     11/5/2018  1:17 AM Nevada Rosalio, Verdis Chacon Add [R07 9] Nonspecific chest pain       ED Disposition     ED Disposition Condition Comment    Discharge  Douglas Schultz discharge to home/self care      Condition at discharge: Good        Follow-up Information     Follow up With Specialties Details Why Contact Info Additional [address] [facility] Emergency Medicine Go to If symptoms worsen [address] [facility] ED, [address] [place], [place], [address] Kevyn Quan MD Geriatric Medicine, Family Medicine Schedule an appointment as soon as possible for a visit in 2 days As needed [address]  [phone]             Patient's Medications   Discharge Prescriptions    No medications on file     No discharge procedures on file  ED Provider  Attending physically available and evaluated Richi Lee I managed the patient along with the ED Attending      Electronically Signed by         Sheral Mcburney, DO  11/05/18 7031

## 2018-11-06 ENCOUNTER — IN HOME VISIT (OUTPATIENT)
Dept: GERIATRICS | Age: 83
End: 2018-11-06
Payer: MEDICARE

## 2018-11-06 VITALS
TEMPERATURE: 97.5 F | SYSTOLIC BLOOD PRESSURE: 134 MMHG | RESPIRATION RATE: 28 BRPM | HEART RATE: 73 BPM | DIASTOLIC BLOOD PRESSURE: 68 MMHG

## 2018-11-06 DIAGNOSIS — R06.02 SHORTNESS OF BREATH: Primary | ICD-10-CM

## 2018-11-06 PROCEDURE — 99335 PR DOM/R-HOME E/M EST PT LW MOD SEVERITY 25 MINUTES: CPT | Performed by: NURSE PRACTITIONER

## 2018-11-06 NOTE — PROGRESS NOTES
Assessment/Plan:    No problem-specific Assessment & Plan notes found for this encounter  Diagnoses and all orders for this visit:    Shortness of breath  Comments:  -refused ambulatory pulse ox  -discussed situation wih daughter, Lesa Bee, who is agreeable to pulmonology consult  -will schedule visit for Dr Brigitte Watkins to see pt    Other orders  -     Tetrahydrozoline HCl (EYE DROPS OP); Apply 1 drop to eye daily at bedtime And prn  -     Menthol-Zinc Oxide (REMEDY CALAZIME EX); Apply 1 application topically 2 (two) times a day To sacrum  -     Polyvinyl Alcohol-Povidone (REFRESH OP); Apply 1 drop to eye as needed        -55 minutes spent reviewing chart, examining patient, and telephone call with daughter Lesa Bee  Subjective:      Patient ID: Tyler Toscano is a 80 y o  male  80year old male being seen for ER follow up for SOB  Was sent to [facility] ER on 11/4/18 for SOB  ER gave order for oxygen as needed for comfort  Pt continues to have saturation levels well into the 90s on RA  Today, he refuses to participate in ambulatory pulse ox monitoring, stating "I can't walk"  SVM present in room and state he is able to walk  Discussed with daughter, Lesa Bee, attempt to obtain ambulatory pulse ox and pt's refusal  She is requesting to be present for next exam as pt seems to cooperate more when she and her  are present  Relayed this information to our office staff to set up appt  In the meantime, it is likely that patient will request to go to the hospital again for SOB as pt stated "I guess I'll just go to the hospital again tonight " Lesa Bee is agreeable to a pulmonology consult  The following portions of the patient's history were reviewed and updated as appropriate: allergies, current medications and problem list     Review of Systems   Constitutional: Negative for fever  HENT: Negative for congestion and sore throat  Respiratory: Positive for shortness of breath  Negative for cough  Cardiovascular: Negative for chest pain  All other systems reviewed and are negative  Objective:      /68 (BP Location: Right arm, Patient Position: Sitting, Cuff Size: Standard)   Pulse 73   Temp 97 5 °F (36 4 °C)   Resp (!) 28 Comment: resp range from 20 to 28         Physical Exam   Constitutional:   Minimally cooperative with exam  No acute distress  :Laying back in recliner chair   HENT:   Head: Normocephalic and atraumatic  Eyes: Right eye exhibits no discharge  Left eye exhibits no discharge  Neck: No JVD present  Cardiovascular: Normal rate and regular rhythm  Pulmonary/Chest: Breath sounds normal  No respiratory distress  He has no wheezes  He has no rales  He exhibits no tenderness  No cough  Resp rate varies from 20 to 28  Pt declines to participate in ambulatory pulse ox monitoring   Musculoskeletal: He exhibits no edema  Moves all extremities   Neurological: He is alert  Skin: Skin is warm and dry  Psychiatric: His affect is angry  He is agitated     Becomes easily upset through exam

## 2018-11-07 PROBLEM — F10.20 ALCOHOLISM (HCC): Status: ACTIVE | Noted: 2018-11-07

## 2018-11-07 PROBLEM — F01.51 VASCULAR DEMENTIA WITH BEHAVIOR DISTURBANCE (HCC): Status: ACTIVE | Noted: 2018-11-07

## 2018-11-07 NOTE — PROGRESS NOTES
No problem-specific Assessment & Plan notes found for this encounter  Diagnoses and all orders for this visit:    SOB (shortness of breath) on exertion  Comments:  Patient has been abusive to Ms  Jeet Plata who is following Medicare guidelines for O2 supplementation, can't afford unless medicare pays  Casey Polanco Appears that GI origin    Chronic diastolic congestive heart failure (HCC)  Comments:  weight stable and lungs clear with mild edema and good O 2 sats    Coronary artery disease involving native coronary artery of native heart without angina pectoris  Comments:  HAs see Cardiology and is not etiology for SOB    CKD (chronic kidney disease) stage 3, GFR 30-59 ml/min ([facility])    Alcoholism ([facility] )    Vascular dementia with behavior disturbance  Comments:  patient is showing poor judgement and lacks insight into O2 problem and creating friction with family and staff  Addressed his treatment of CRNP    Change in bowel habits  Comments:  possibly lactose intolerance as flat plate negative and CT scan negative  Lactaid 2 tabs each meal at first bite  Orders:  -     lactase (LACTAID) 3,000 units tablet; Take 2 tablets (6,000 Units total) by mouth 3 (three) times a day with meals And PRN ith diary products    Anxiety  -     ALPRAZolam (XANAX) 0 5 mg tablet; Take 1 tablet (0 5 mg total) by mouth 4 (four) times a day as needed for anxiety          Subjective:      Patient ID: Naomi Parrish is a 80 y o  male  Patient has been to ER demanding in room O2 for dyspnea which was not supported by Pulse O2  He has been seen twice in last 10 days by [id number] who has explained several way that he does not quality for Medicare reimbursement and family state he can't afford it from his funds  He ahas been threatening and abusive to her  C/O of SOB when walks to the electric wheel chair and returns to his recliner which is 10 feet  From electric wheelchair  Family is present and he is abusive and in conflict with them   No conflict with me  Discussed that he is distended and having frequent BM  Has increased from normal 2 x day  to 6-10 / day  denies burping but has foul smelling BM and large amounts of  flatus  [employer] Has had gastro-colic reflex, eats lots of ice-cream and yogurt  Has been evaluated by Cardiology and no etiology for SOB  Has had CT of abdomen which is negative for problems Will see pulmonology in future              The following portions of the patient's history were reviewed and updated as appropriate: allergies, current medications, past family history, past medical history, past surgical history and problem list     Review of Systems   Constitutional: Negative for activity change, chills, fatigue, fever and unexpected weight change  HENT: Negative  Respiratory: Positive for chest tightness (after exertion)  Cardiovascular: Positive for leg swelling  Negative for chest pain and palpitations  Gastrointestinal: Positive for abdominal distention, abdominal pain and diarrhea  Negative for anal bleeding, blood in stool, constipation, nausea, rectal pain and vomiting  Endocrine: Negative  Genitourinary: Positive for difficulty urinating, frequency and urgency  Musculoskeletal: Negative for arthralgias, back pain, gait problem, joint swelling, myalgias and neck pain  Neurological: Negative for syncope, facial asymmetry, speech difficulty, light-headedness and headaches  Psychiatric/Behavioral: Negative for agitation, behavioral problems, confusion, decreased concentration, dysphoric mood and sleep disturbance  The patient is not nervous/anxious  Objective:      /85   Pulse 71   Temp 98 2 °F (36 8 °C)   Resp 20          Physical Exam   Constitutional: He is oriented to person, place, and time  He appears cachectic  He is cooperative  HENT:   Head: Normocephalic  Eyes: Pupils are equal, round, and reactive to light  Scleral icterus is present  Neck: No JVD present   No tracheal deviation present  No thyromegaly present  Cardiovascular: Normal rate and regular rhythm  Exam reveals no gallop  No murmur heard  Pulmonary/Chest: He is in respiratory distress (when standing and walks, resolves with lying down)  He has decreased breath sounds in the right lower field and the left lower field  He has no wheezes  He has no rhonchi  He has no rales  He exhibits no tenderness  Pulse O 2 RA 98% after ambulation 10 feet and back 96% and no tachycardia   Abdominal: Soft  Bowel sounds are normal  He exhibits distension  He exhibits no shifting dullness, no pulsatile liver, no fluid wave and no mass  There is tenderness in the epigastric area  There is no rigidity, no rebound, no guarding, no tenderness at McBurney's point and negative Smith's sign  Musculoskeletal: He exhibits no tenderness or deformity  Lymphadenopathy:     He has no cervical adenopathy  Neurological: He is alert and oriented to person, place, and time  Skin: No rash noted

## 2018-11-08 ENCOUNTER — IN HOME VISIT (OUTPATIENT)
Dept: GERIATRICS | Age: 83
End: 2018-11-08
Payer: MEDICARE

## 2018-11-08 VITALS
HEART RATE: 71 BPM | TEMPERATURE: 98.2 F | SYSTOLIC BLOOD PRESSURE: 160 MMHG | RESPIRATION RATE: 20 BRPM | DIASTOLIC BLOOD PRESSURE: 85 MMHG

## 2018-11-08 DIAGNOSIS — F10.20 ALCOHOLISM (HCC): ICD-10-CM

## 2018-11-08 DIAGNOSIS — R06.02 SOB (SHORTNESS OF BREATH) ON EXERTION: Primary | ICD-10-CM

## 2018-11-08 DIAGNOSIS — R19.4 CHANGE IN BOWEL HABITS: ICD-10-CM

## 2018-11-08 DIAGNOSIS — N18.30 CKD (CHRONIC KIDNEY DISEASE) STAGE 3, GFR 30-59 ML/MIN (HCC): Chronic | ICD-10-CM

## 2018-11-08 DIAGNOSIS — F01.51 VASCULAR DEMENTIA WITH BEHAVIOR DISTURBANCE (HCC): ICD-10-CM

## 2018-11-08 DIAGNOSIS — I50.32 CHRONIC DIASTOLIC CONGESTIVE HEART FAILURE (HCC): ICD-10-CM

## 2018-11-08 DIAGNOSIS — I25.10 CORONARY ARTERY DISEASE INVOLVING NATIVE CORONARY ARTERY OF NATIVE HEART WITHOUT ANGINA PECTORIS: Chronic | ICD-10-CM

## 2018-11-08 DIAGNOSIS — F41.9 ANXIETY: ICD-10-CM

## 2018-11-08 PROCEDURE — 99336 PR DOM/R-HOME E/M EST PT MOD HI SEVERITY 40 MINUTES: CPT | Performed by: FAMILY MEDICINE

## 2018-11-08 RX ORDER — CHOLECALCIFEROL (VITAMIN D3) 125 MCG
6000 CAPSULE ORAL
Qty: 100 TABLET | Refills: 5 | Status: SHIPPED | OUTPATIENT
Start: 2018-11-08 | End: 2019-01-10 | Stop reason: SDUPTHER

## 2018-11-08 RX ORDER — ALPRAZOLAM 0.5 MG/1
0.5 TABLET ORAL 4 TIMES DAILY PRN
Qty: 30 TABLET | Refills: 0 | Status: SHIPPED | OUTPATIENT
Start: 2018-11-08

## 2018-11-12 ENCOUNTER — TRANSCRIBE ORDERS (OUTPATIENT)
Dept: ADMINISTRATIVE | Facility: HOSPITAL | Age: 83
End: 2018-11-12

## 2018-11-12 DIAGNOSIS — R06.02 SHORTNESS OF BREATH: Primary | ICD-10-CM

## 2018-11-14 ENCOUNTER — TELEPHONE (OUTPATIENT)
Dept: GERIATRICS | Age: 83
End: 2018-11-14

## 2018-11-14 NOTE — TELEPHONE ENCOUNTER
Says he cannot wear his hearing aids, and cannot wait unitl his appointment with ENT in December  Requested drops for an infection  Also received a fax from staff at [facility], saying similar, and that he is unable to wear hearing aids  RIGHT EAR is problem at this time  Mega Welsh [facility] and called sandhya to let her know his ears needed to be looked at before any thing could be prescribed

## 2018-11-15 ENCOUNTER — IN HOME VISIT (OUTPATIENT)
Dept: GERIATRICS | Age: 83
End: 2018-11-15
Payer: MEDICARE

## 2018-11-15 VITALS
DIASTOLIC BLOOD PRESSURE: 58 MMHG | RESPIRATION RATE: 20 BRPM | HEART RATE: 71 BPM | SYSTOLIC BLOOD PRESSURE: 126 MMHG | TEMPERATURE: 97.5 F

## 2018-11-15 DIAGNOSIS — H60.501 ACUTE OTITIS EXTERNA OF RIGHT EAR, UNSPECIFIED TYPE: Primary | ICD-10-CM

## 2018-11-15 PROCEDURE — 99335 PR DOM/R-HOME E/M EST PT LW MOD SEVERITY 25 MINUTES: CPT | Performed by: NURSE PRACTITIONER

## 2018-11-15 NOTE — PROGRESS NOTES
Assessment/Plan:    No problem-specific Assessment & Plan notes found for this encounter  Diagnoses and all orders for this visit:    Acute otitis externa of right ear, unspecified type  Comments:  -cipro ear drops Q12 hours x 7 days  -instructed not to use QTips to clean inside ear canal  -has appt with ENT scheduled for next month          Subjective:      Patient ID: Prateek Duran is a 80 y o  male  80year old male presents for examination of right ear due to it feeling "clogged"  He denies ear pain, decreased hearing, or drainage from ear  He does wear a hearing aid to his left ear  He is scheduled to be seen by the ENT next month  The following portions of the patient's history were reviewed and updated as appropriate: allergies, current medications and problem list     Review of Systems   Constitutional: Negative for chills and fever  HENT: Positive for hearing loss  Negative for ear discharge, ear pain and sore throat  Right ear "clogged"   All other systems reviewed and are negative  Objective:      /58 (BP Location: Right arm, Patient Position: Supine, Cuff Size: Standard)   Pulse 71   Temp 97 5 °F (36 4 °C)   Resp 20          Physical Exam   Constitutional: No distress  HENT:   Head: Normocephalic and atraumatic  Right Ear: There is drainage  No swelling or tenderness  Decreased hearing is noted  Left Ear: External ear normal  No drainage, swelling or tenderness  Decreased hearing is noted  Left ear canal without cerumen but unable to visualize TM due to shape of ear canal  Right ear canal with whitish/yellowish serosanguienous drainage, unable to visualize TM  No pain with manipulation of right pinna  No tenderness with palpation of surrounding right external ear   Eyes: Right eye exhibits no discharge  Left eye exhibits no discharge  Pulmonary/Chest: Effort normal  No respiratory distress  No cough  NO SOB at rest   Neurological: He is alert  Cooperative with exam   Skin: Skin is warm and dry  He is not diaphoretic  Psychiatric: He has a normal mood and affect   His behavior is normal    Pt apologetic regarding last encounter

## 2018-11-30 ENCOUNTER — HOSPITAL ENCOUNTER (OUTPATIENT)
Dept: PULMONOLOGY | Facility: HOSPITAL | Age: 83
Discharge: HOME | End: 2018-11-30
Payer: MEDICARE

## 2018-11-30 DIAGNOSIS — R06.02 SHORTNESS OF BREATH: ICD-10-CM

## 2018-11-30 PROCEDURE — 94060 EVALUATION OF WHEEZING: CPT | Performed by: INTERNAL MEDICINE

## 2018-11-30 PROCEDURE — 94729 DIFFUSING CAPACITY: CPT

## 2018-11-30 PROCEDURE — 94760 N-INVAS EAR/PLS OXIMETRY 1: CPT

## 2018-11-30 PROCEDURE — 94726 PLETHYSMOGRAPHY LUNG VOLUMES: CPT | Performed by: INTERNAL MEDICINE

## 2018-11-30 PROCEDURE — 94729 DIFFUSING CAPACITY: CPT | Performed by: INTERNAL MEDICINE

## 2018-11-30 PROCEDURE — 94060 EVALUATION OF WHEEZING: CPT

## 2018-11-30 PROCEDURE — 94726 PLETHYSMOGRAPHY LUNG VOLUMES: CPT

## 2018-11-30 RX ORDER — ALBUTEROL SULFATE 2.5 MG/3ML
2.5 SOLUTION RESPIRATORY (INHALATION) ONCE
Status: COMPLETED | OUTPATIENT
Start: 2018-11-30 | End: 2018-11-30

## 2018-11-30 RX ADMIN — ALBUTEROL SULFATE 2.5 MG: 2.5 SOLUTION RESPIRATORY (INHALATION) at 14:15

## 2018-12-07 ENCOUNTER — APPOINTMENT (EMERGENCY)
Dept: RADIOLOGY | Facility: HOSPITAL | Age: 83
DRG: 291 | End: 2018-12-07
Payer: MEDICARE

## 2018-12-07 ENCOUNTER — HOSPITAL ENCOUNTER (INPATIENT)
Facility: HOSPITAL | Age: 83
LOS: 3 days | Discharge: HOME HEALTH | DRG: 291 | End: 2018-12-11
Attending: EMERGENCY MEDICINE | Admitting: HOSPITALIST
Payer: MEDICARE

## 2018-12-07 DIAGNOSIS — J81.1 PULMONARY EDEMA: ICD-10-CM

## 2018-12-07 DIAGNOSIS — R07.89 CHEST PRESSURE: ICD-10-CM

## 2018-12-07 DIAGNOSIS — R06.02 SHORTNESS OF BREATH: Primary | ICD-10-CM

## 2018-12-07 DIAGNOSIS — I50.32 CHRONIC DIASTOLIC CONGESTIVE HEART FAILURE (HCC): ICD-10-CM

## 2018-12-07 DIAGNOSIS — I50.9 CHF EXACERBATION (HCC): ICD-10-CM

## 2018-12-07 DIAGNOSIS — I50.33 ACUTE ON CHRONIC DIASTOLIC (CONGESTIVE) HEART FAILURE (HCC): ICD-10-CM

## 2018-12-07 PROCEDURE — 80048 BASIC METABOLIC PNL TOTAL CA: CPT | Performed by: EMERGENCY MEDICINE

## 2018-12-07 PROCEDURE — 93005 ELECTROCARDIOGRAM TRACING: CPT

## 2018-12-07 PROCEDURE — 84484 ASSAY OF TROPONIN QUANT: CPT | Performed by: EMERGENCY MEDICINE

## 2018-12-07 PROCEDURE — 85025 COMPLETE CBC W/AUTO DIFF WBC: CPT | Performed by: EMERGENCY MEDICINE

## 2018-12-07 PROCEDURE — 36415 COLL VENOUS BLD VENIPUNCTURE: CPT | Performed by: EMERGENCY MEDICINE

## 2018-12-07 PROCEDURE — 99285 EMERGENCY DEPT VISIT HI MDM: CPT

## 2018-12-07 PROCEDURE — 71046 X-RAY EXAM CHEST 2 VIEWS: CPT

## 2018-12-08 ENCOUNTER — APPOINTMENT (EMERGENCY)
Dept: RADIOLOGY | Facility: HOSPITAL | Age: 83
DRG: 291 | End: 2018-12-08
Payer: MEDICARE

## 2018-12-08 PROBLEM — I50.33 ACUTE ON CHRONIC DIASTOLIC (CONGESTIVE) HEART FAILURE (HCC): Status: ACTIVE | Noted: 2018-05-22

## 2018-12-08 PROBLEM — L89.151 DECUBITUS ULCER OF SACRAL REGION, STAGE 1: Status: ACTIVE | Noted: 2018-12-08

## 2018-12-08 LAB
ANION GAP SERPL CALCULATED.3IONS-SCNC: 5 MMOL/L (ref 4–13)
ANION GAP SERPL CALCULATED.3IONS-SCNC: 9 MMOL/L (ref 4–13)
BASOPHILS # BLD AUTO: 0.05 THOUSANDS/ΜL (ref 0–0.1)
BASOPHILS NFR BLD AUTO: 1 % (ref 0–1)
BUN SERPL-MCNC: 36 MG/DL (ref 5–25)
BUN SERPL-MCNC: 37 MG/DL (ref 5–25)
CALCIUM SERPL-MCNC: 8.4 MG/DL (ref 8.3–10.1)
CALCIUM SERPL-MCNC: 9.3 MG/DL (ref 8.3–10.1)
CHLORIDE SERPL-SCNC: 93 MMOL/L (ref 100–108)
CHLORIDE SERPL-SCNC: 94 MMOL/L (ref 100–108)
CO2 SERPL-SCNC: 32 MMOL/L (ref 21–32)
CO2 SERPL-SCNC: 35 MMOL/L (ref 21–32)
CREAT SERPL-MCNC: 1.9 MG/DL (ref 0.6–1.3)
CREAT SERPL-MCNC: 1.93 MG/DL (ref 0.6–1.3)
EOSINOPHIL # BLD AUTO: 0.15 THOUSAND/ΜL (ref 0–0.61)
EOSINOPHIL NFR BLD AUTO: 4 % (ref 0–6)
ERYTHROCYTE [DISTWIDTH] IN BLOOD BY AUTOMATED COUNT: 15.9 % (ref 11.6–15.1)
EST. AVERAGE GLUCOSE BLD GHB EST-MCNC: 169 MG/DL
GFR SERPL CREATININE-BSD FRML MDRD: 30 ML/MIN/1.73SQ M
GFR SERPL CREATININE-BSD FRML MDRD: 31 ML/MIN/1.73SQ M
GLUCOSE P FAST SERPL-MCNC: 121 MG/DL (ref 65–99)
GLUCOSE SERPL-MCNC: 121 MG/DL (ref 65–140)
GLUCOSE SERPL-MCNC: 131 MG/DL (ref 65–140)
GLUCOSE SERPL-MCNC: 135 MG/DL (ref 65–140)
GLUCOSE SERPL-MCNC: 141 MG/DL (ref 65–140)
GLUCOSE SERPL-MCNC: 180 MG/DL (ref 65–140)
GLUCOSE SERPL-MCNC: 206 MG/DL (ref 65–140)
HBA1C MFR BLD: 7.5 % (ref 4.2–6.3)
HCT VFR BLD AUTO: 30.2 % (ref 36.5–49.3)
HGB BLD-MCNC: 9.4 G/DL (ref 12–17)
IMM GRANULOCYTES # BLD AUTO: 0.01 THOUSAND/UL (ref 0–0.2)
IMM GRANULOCYTES NFR BLD AUTO: 0 % (ref 0–2)
LYMPHOCYTES # BLD AUTO: 0.5 THOUSANDS/ΜL (ref 0.6–4.47)
LYMPHOCYTES NFR BLD AUTO: 12 % (ref 14–44)
MCH RBC QN AUTO: 29.7 PG (ref 26.8–34.3)
MCHC RBC AUTO-ENTMCNC: 31.1 G/DL (ref 31.4–37.4)
MCV RBC AUTO: 95 FL (ref 82–98)
MONOCYTES # BLD AUTO: 0.47 THOUSAND/ΜL (ref 0.17–1.22)
MONOCYTES NFR BLD AUTO: 11 % (ref 4–12)
NEUTROPHILS # BLD AUTO: 3.11 THOUSANDS/ΜL (ref 1.85–7.62)
NEUTS SEG NFR BLD AUTO: 72 % (ref 43–75)
NRBC BLD AUTO-RTO: 0 /100 WBCS
PLATELET # BLD AUTO: 192 THOUSANDS/UL (ref 149–390)
PMV BLD AUTO: 10.7 FL (ref 8.9–12.7)
POTASSIUM SERPL-SCNC: 3.1 MMOL/L (ref 3.5–5.3)
POTASSIUM SERPL-SCNC: 3.4 MMOL/L (ref 3.5–5.3)
RBC # BLD AUTO: 3.17 MILLION/UL (ref 3.88–5.62)
SODIUM SERPL-SCNC: 134 MMOL/L (ref 136–145)
SODIUM SERPL-SCNC: 134 MMOL/L (ref 136–145)
TROPONIN I SERPL-MCNC: 0.06 NG/ML
TROPONIN I SERPL-MCNC: 0.07 NG/ML
TROPONIN I SERPL-MCNC: 0.07 NG/ML
WBC # BLD AUTO: 4.29 THOUSAND/UL (ref 4.31–10.16)

## 2018-12-08 PROCEDURE — 99232 SBSQ HOSP IP/OBS MODERATE 35: CPT | Performed by: PHYSICIAN ASSISTANT

## 2018-12-08 PROCEDURE — 36415 COLL VENOUS BLD VENIPUNCTURE: CPT | Performed by: HOSPITALIST

## 2018-12-08 PROCEDURE — 82948 REAGENT STRIP/BLOOD GLUCOSE: CPT

## 2018-12-08 PROCEDURE — 80048 BASIC METABOLIC PNL TOTAL CA: CPT | Performed by: HOSPITALIST

## 2018-12-08 PROCEDURE — 99222 1ST HOSP IP/OBS MODERATE 55: CPT | Performed by: INTERNAL MEDICINE

## 2018-12-08 PROCEDURE — 83036 HEMOGLOBIN GLYCOSYLATED A1C: CPT | Performed by: HOSPITALIST

## 2018-12-08 PROCEDURE — 99223 1ST HOSP IP/OBS HIGH 75: CPT | Performed by: HOSPITALIST

## 2018-12-08 PROCEDURE — 84484 ASSAY OF TROPONIN QUANT: CPT | Performed by: HOSPITALIST

## 2018-12-08 PROCEDURE — 71250 CT THORAX DX C-: CPT

## 2018-12-08 RX ORDER — PETROLATUM,WHITE
1 OINTMENT IN PACKET (GRAM) TOPICAL ONCE
Status: DISCONTINUED | OUTPATIENT
Start: 2018-12-08 | End: 2018-12-09 | Stop reason: RX

## 2018-12-08 RX ORDER — GEL DRESSING
1 GEL (GRAM) TOPICAL AS NEEDED
COMMUNITY
End: 2019-06-19 | Stop reason: HOSPADM

## 2018-12-08 RX ORDER — NITROGLYCERIN 0.4 MG/1
0.4 TABLET SUBLINGUAL
Status: DISCONTINUED | OUTPATIENT
Start: 2018-12-08 | End: 2018-12-11 | Stop reason: HOSPADM

## 2018-12-08 RX ORDER — IPRATROPIUM BROMIDE AND ALBUTEROL SULFATE 2.5; .5 MG/3ML; MG/3ML
3 SOLUTION RESPIRATORY (INHALATION) EVERY 6 HOURS PRN
Status: DISCONTINUED | OUTPATIENT
Start: 2018-12-08 | End: 2018-12-08

## 2018-12-08 RX ORDER — TAMSULOSIN HYDROCHLORIDE 0.4 MG/1
0.4 CAPSULE ORAL
Status: DISCONTINUED | OUTPATIENT
Start: 2018-12-08 | End: 2018-12-11 | Stop reason: HOSPADM

## 2018-12-08 RX ORDER — ONDANSETRON 2 MG/ML
4 INJECTION INTRAMUSCULAR; INTRAVENOUS EVERY 6 HOURS PRN
Status: DISCONTINUED | OUTPATIENT
Start: 2018-12-08 | End: 2018-12-11 | Stop reason: HOSPADM

## 2018-12-08 RX ORDER — ACETAMINOPHEN 325 MG/1
650 TABLET ORAL EVERY 6 HOURS PRN
Status: DISCONTINUED | OUTPATIENT
Start: 2018-12-08 | End: 2018-12-10

## 2018-12-08 RX ORDER — FUROSEMIDE 10 MG/ML
60 INJECTION INTRAMUSCULAR; INTRAVENOUS
Status: DISCONTINUED | OUTPATIENT
Start: 2018-12-08 | End: 2018-12-09

## 2018-12-08 RX ORDER — POTASSIUM CHLORIDE 20 MEQ/1
20 TABLET, EXTENDED RELEASE ORAL ONCE
Status: COMPLETED | OUTPATIENT
Start: 2018-12-08 | End: 2018-12-08

## 2018-12-08 RX ORDER — ALPRAZOLAM 0.5 MG/1
0.5 TABLET ORAL 4 TIMES DAILY PRN
Status: DISCONTINUED | OUTPATIENT
Start: 2018-12-08 | End: 2018-12-11 | Stop reason: HOSPADM

## 2018-12-08 RX ORDER — ALBUTEROL SULFATE 2.5 MG/3ML
2.5 SOLUTION RESPIRATORY (INHALATION) EVERY 4 HOURS PRN
Status: DISCONTINUED | OUTPATIENT
Start: 2018-12-08 | End: 2018-12-11 | Stop reason: HOSPADM

## 2018-12-08 RX ORDER — ASPIRIN 81 MG/1
81 TABLET ORAL DAILY
Status: DISCONTINUED | OUTPATIENT
Start: 2018-12-08 | End: 2018-12-11 | Stop reason: HOSPADM

## 2018-12-08 RX ORDER — POTASSIUM CHLORIDE 20 MEQ/1
40 TABLET, EXTENDED RELEASE ORAL 2 TIMES DAILY WITH MEALS
Status: DISCONTINUED | OUTPATIENT
Start: 2018-12-08 | End: 2018-12-11 | Stop reason: HOSPADM

## 2018-12-08 RX ORDER — FUROSEMIDE 10 MG/ML
20 INJECTION INTRAMUSCULAR; INTRAVENOUS ONCE
Status: COMPLETED | OUTPATIENT
Start: 2018-12-08 | End: 2018-12-08

## 2018-12-08 RX ORDER — FUROSEMIDE 10 MG/ML
20 INJECTION INTRAMUSCULAR; INTRAVENOUS
Status: DISCONTINUED | OUTPATIENT
Start: 2018-12-08 | End: 2018-12-08

## 2018-12-08 RX ORDER — CHOLECALCIFEROL (VITAMIN D3) 125 MCG
6000 CAPSULE ORAL
Status: DISCONTINUED | OUTPATIENT
Start: 2018-12-08 | End: 2018-12-11 | Stop reason: HOSPADM

## 2018-12-08 RX ORDER — POLYVINYL ALCOHOL 14 MG/ML
1 SOLUTION/ DROPS OPHTHALMIC
Status: DISCONTINUED | OUTPATIENT
Start: 2018-12-08 | End: 2018-12-11 | Stop reason: HOSPADM

## 2018-12-08 RX ORDER — GABAPENTIN 100 MG/1
100 CAPSULE ORAL DAILY
Status: DISCONTINUED | OUTPATIENT
Start: 2018-12-08 | End: 2018-12-11 | Stop reason: HOSPADM

## 2018-12-08 RX ORDER — GEL DRESSING
1 GEL (GRAM) TOPICAL AS NEEDED
Status: DISCONTINUED | OUTPATIENT
Start: 2018-12-08 | End: 2018-12-09 | Stop reason: RX

## 2018-12-08 RX ORDER — GABAPENTIN 300 MG/1
300 CAPSULE ORAL
Status: DISCONTINUED | OUTPATIENT
Start: 2018-12-08 | End: 2018-12-11 | Stop reason: HOSPADM

## 2018-12-08 RX ORDER — FINASTERIDE 5 MG/1
5 TABLET, FILM COATED ORAL DAILY
Status: DISCONTINUED | OUTPATIENT
Start: 2018-12-08 | End: 2018-12-11 | Stop reason: HOSPADM

## 2018-12-08 RX ORDER — INSULIN GLARGINE 100 [IU]/ML
10 INJECTION, SOLUTION SUBCUTANEOUS
Status: DISCONTINUED | OUTPATIENT
Start: 2018-12-08 | End: 2018-12-11 | Stop reason: HOSPADM

## 2018-12-08 RX ORDER — GINSENG 100 MG
1 CAPSULE ORAL DAILY
Status: DISCONTINUED | OUTPATIENT
Start: 2018-12-08 | End: 2018-12-11 | Stop reason: HOSPADM

## 2018-12-08 RX ORDER — LISINOPRIL 2.5 MG/1
2.5 TABLET ORAL DAILY
Status: DISCONTINUED | OUTPATIENT
Start: 2018-12-08 | End: 2018-12-11 | Stop reason: HOSPADM

## 2018-12-08 RX ORDER — ATORVASTATIN CALCIUM 20 MG/1
20 TABLET, FILM COATED ORAL
Status: DISCONTINUED | OUTPATIENT
Start: 2018-12-08 | End: 2018-12-11 | Stop reason: HOSPADM

## 2018-12-08 RX ORDER — CARVEDILOL 3.12 MG/1
3.12 TABLET ORAL 2 TIMES DAILY WITH MEALS
Status: DISCONTINUED | OUTPATIENT
Start: 2018-12-08 | End: 2018-12-11 | Stop reason: HOSPADM

## 2018-12-08 RX ORDER — POTASSIUM CHLORIDE 20 MEQ/1
20 TABLET, EXTENDED RELEASE ORAL 2 TIMES DAILY
Status: DISCONTINUED | OUTPATIENT
Start: 2018-12-08 | End: 2018-12-08

## 2018-12-08 RX ORDER — CHLORAL HYDRATE 500 MG
1000 CAPSULE ORAL DAILY
Status: DISCONTINUED | OUTPATIENT
Start: 2018-12-08 | End: 2018-12-11 | Stop reason: HOSPADM

## 2018-12-08 RX ORDER — METOLAZONE 2.5 MG/1
2.5 TABLET ORAL DAILY
COMMUNITY
End: 2018-12-14 | Stop reason: SDUPTHER

## 2018-12-08 RX ORDER — METOLAZONE 5 MG/1
2.5 TABLET ORAL DAILY
Status: DISCONTINUED | OUTPATIENT
Start: 2018-12-08 | End: 2018-12-11 | Stop reason: HOSPADM

## 2018-12-08 RX ADMIN — INSULIN GLARGINE 10 UNITS: 100 INJECTION, SOLUTION SUBCUTANEOUS at 22:10

## 2018-12-08 RX ADMIN — LACTASE TAB 3000 UNIT 6000 UNITS: 3000 TAB at 12:20

## 2018-12-08 RX ADMIN — LISINOPRIL 2.5 MG: 2.5 TABLET ORAL at 10:24

## 2018-12-08 RX ADMIN — FUROSEMIDE 20 MG: 10 INJECTION, SOLUTION INTRAMUSCULAR; INTRAVENOUS at 09:33

## 2018-12-08 RX ADMIN — FUROSEMIDE 60 MG: 10 INJECTION, SOLUTION INTRAMUSCULAR; INTRAVENOUS at 15:44

## 2018-12-08 RX ADMIN — ASPIRIN 81 MG: 81 TABLET, COATED ORAL at 10:23

## 2018-12-08 RX ADMIN — INSULIN LISPRO 1 UNITS: 100 INJECTION, SOLUTION INTRAVENOUS; SUBCUTANEOUS at 22:11

## 2018-12-08 RX ADMIN — POTASSIUM CHLORIDE 40 MEQ: 1500 TABLET, EXTENDED RELEASE ORAL at 17:39

## 2018-12-08 RX ADMIN — APIXABAN 2.5 MG: 2.5 TABLET, FILM COATED ORAL at 17:39

## 2018-12-08 RX ADMIN — ATORVASTATIN CALCIUM 20 MG: 20 TABLET, FILM COATED ORAL at 17:39

## 2018-12-08 RX ADMIN — POTASSIUM CHLORIDE 20 MEQ: 1500 TABLET, EXTENDED RELEASE ORAL at 15:49

## 2018-12-08 RX ADMIN — CARVEDILOL 3.12 MG: 3.12 TABLET, FILM COATED ORAL at 17:39

## 2018-12-08 RX ADMIN — LACTASE TAB 3000 UNIT 6000 UNITS: 3000 TAB at 07:30

## 2018-12-08 RX ADMIN — GABAPENTIN 100 MG: 100 CAPSULE ORAL at 10:25

## 2018-12-08 RX ADMIN — Medication 1 TABLET: at 10:22

## 2018-12-08 RX ADMIN — CARVEDILOL 3.12 MG: 3.12 TABLET, FILM COATED ORAL at 07:31

## 2018-12-08 RX ADMIN — Medication 4 DROP: at 22:15

## 2018-12-08 RX ADMIN — Medication 4 DROP: at 10:27

## 2018-12-08 RX ADMIN — METOLAZONE 2.5 MG: 5 TABLET ORAL at 10:25

## 2018-12-08 RX ADMIN — GABAPENTIN 300 MG: 300 CAPSULE ORAL at 22:10

## 2018-12-08 RX ADMIN — Medication 1000 MG: at 10:24

## 2018-12-08 RX ADMIN — TAMSULOSIN HYDROCHLORIDE 0.4 MG: 0.4 CAPSULE ORAL at 17:38

## 2018-12-08 RX ADMIN — ACETAMINOPHEN 650 MG: 325 TABLET, FILM COATED ORAL at 19:38

## 2018-12-08 RX ADMIN — FUROSEMIDE 20 MG: 10 INJECTION, SOLUTION INTRAMUSCULAR; INTRAVENOUS at 03:58

## 2018-12-08 RX ADMIN — FINASTERIDE 5 MG: 5 TABLET, FILM COATED ORAL at 10:25

## 2018-12-08 RX ADMIN — APIXABAN 2.5 MG: 2.5 TABLET, FILM COATED ORAL at 10:22

## 2018-12-08 RX ADMIN — POTASSIUM CHLORIDE 20 MEQ: 1500 TABLET, EXTENDED RELEASE ORAL at 10:22

## 2018-12-08 NOTE — H&P
History and Physical - [facility] Internal Medicine    Patient Information: Danilo Sams 80 y o  male MRN: [id number]  Unit/Bed#: ED 13 Encounter: [id number]  Admitting Physician: Royal Mendoza MD  PCP: Cherelle Peguero MD  Date of Admission:  12/08/18    Assessment/Plan:    Hospital Problem List:     Principal Problem:    Acute on chronic diastolic (congestive) heart failure ([facility] )  Active Problems:    CKD (chronic kidney disease) stage 3, GFR 30-59 ml/min ([facility])    Paroxysmal atrial fibrillation ([facility] )    Type 2 diabetes mellitus with hyperglycemia, with long-term current use of insulin ([facility] )    Other chest pain    Blindness of left eye    Vascular dementia with behavior disturbance    Shortness of breath    Present on Admission:   Vascular dementia with behavior disturbance   Shortness of breath   Paroxysmal atrial fibrillation ([facility] )   Other chest pain   CKD (chronic kidney disease) stage 3, GFR 30-59 ml/min ([facility])   Blindness of left eye   Acute on chronic diastolic (congestive) heart failure ([facility] )      Plan for the Primary Problem(s):  · Shortness of breath  · Suspect secondary to acute on chronic diastolic congestive heart failure, assigned observation status for further management, given Lasix in the emergency department intravenously, will continue, ins and outs, daily weight, repeat 2D echo, check BMP    Plan for Additional Problems:   · Chest pain:  Check serial cardiac markers, continue antiplatelets, statin and beta-blocker therapy as taken as an outpatient  · History of vascular dementia with behavioral disturbance:  Continue Xanax as needed  · Stage I sacral decubitus:  Frequent turns, wound care  · Stage 3 chronic kidney disease:  Creatinine at baseline at 1 9, monitor renal function closely with diuretics  · Blindness of left eye:  Noted  · Paroxysmal atrial fibrillation:  Continue Eliquis, Coreg, monitor on telemetry  · Type 2 DM with long term insulin use: continue lantus as taking as outpt, monitor BG levels, add correctional insulin, stop metformin given hx DEANA with baseline creatinine of 1 9     VTE Prophylaxis: Apixaban (Eliquis)  / sequential compression device   Code Status: full  POLST: There is no POLST form on file for this patient (pre-hospital)    Anticipated Length of Stay:  Patient will be admitted on an Observation basis with an anticipated length of stay of  Less than 2 midnights  Justification for Hospital Stay: IV diuretics    Total Time for Visit, including Counseling / Coordination of Care: 1 hour  Greater than 50% of this total time spent on direct patient counseling and coordination of care  Chief Complaint:   Chest pressure, SOB    History of Present Illness:    Paramjit Conrad is a 80 y o  male with past medical history of diastolic congestive heart failure, hypertension, hyperlipidemia, diabetes mellitus, BPH presents emergency department with complaint of acute onset of midsternal chest pressure with associated shortness of breath  Patient reports having similar symptoms numerous times that is usually resolved with taking Xanax; however, tonight patient reports that Xanax did not help and came to the emergency department for further treatment  In the ED patient's pulse ox initially read 88 present, probe was reposition the patient was found to be in the mid to high 90s before any medical intervention was given  He had CT scan performed which showed enlarging right pleural effusion, does not appear large enough to tap, his given IV Lasix in being assigned to observation status for further management  Patient denies fever, chills or sweats  He denies productive cough  He reports that his chest pain shortness of breath has resolved at the time of my evaluation  Review of Systems:  No fever, chills or sweats  No productive cough, wheezes  No abdominal pain, nausea or vomiting  No change in urine habits    All systems are reviewed   Positive as per history of presenting illness  Patient answered no to all other questions  Past Medical and Surgical History:     Past Medical History:   Diagnosis Date    [facility]     Anemia     [facility]     CAD (coronary artery disease)     CHF (congestive heart failure) (HCC)     Chronic kidney failure     [facility]     [facility] cancer, left [facility] )     Hypertension     Incontinence     Pacemaker     Prostate cancer [facility] )     Type 2 diabetes mellitus ([facility] )     Wears hearing aid in both ears        Past Surgical History:   Procedure Laterality Date    CARDIAC PACEMAKER PLACEMENT  2014    CORONARY ANGIOPLASTY WITH STENT PLACEMENT      GALLBLADDER SURGERY      [place] CYSTO/URETERO W/LITHOTRIPSY &INDWELL STENT INSRT Right 7/31/2018    Procedure: CYSTOSCOPY URETEROSCOPY WITH LITHOTRIPSY HOLMIUM LASER ,BASKET STONE EXTRACTION  RETROGRADE PYELOGRAM AND exchange STENT URETERAL;  Surgeon: Bisi Oviedo MD;  [facility];  Service: Urology    [place] CYSTOURETHROSCOPY,URETER CATHETER Right 7/1/2018    Procedure: CYSTOSCOPY RETROGRADE PYELOGRAM WITH INSERTION STENT URETERAL;  Surgeon: Elaine Fraser MD;  [facility]: [facility];  Service: Urology       Meds/Allergies:    Prior to Admission medications    Medication Sig Start Date End Date Taking?  Authorizing Provider   ALPDEREKZodorcas Raymond) 0 5 mg tablet Take 1 tablet (0 5 mg total) by mouth 4 (four) times a day as needed for anxiety 11/8/18  Yes Jazmín Witt MD   aspirin (ECOTRIN LOW STRENGTH) 81 mg EC tablet Take 81 mg by mouth daily   Yes Historical Provider, MD   atorvastatin (LIPITOR) 20 mg tablet Take 20 mg by mouth daily   Yes Historical Provider, MD   carbamide peroxide (DEBROX) 6 5 % otic solution Administer 4 drops to the right ear 2 (two) times a day   Yes Historical Provider, MD   carvedilol (COREG) 3 125 mg tablet TAKE 1 TABLET BY MOUTH TWICE DAILY WITH MEALS 8/31/18  Yes Jazmín Witt MD   ELIQUIS 2 5 MG TAKE 1 TABLET BY MOUTH TWICE DAILY  8/31/18  Yes Samanta Landa MD   finasteride (PROSCAR) 5 mg tablet TAKE 1 TABLET BY MOUTH ONCE DAILY  7/11/18  Yes Samanta Landa MD   gabapentin (NEURONTIN) 100 mg capsule TAKE 1 CAPSULE BY MOUTH ONCE DAILY 8/31/18  Yes Samanta Landa MD   gabapentin (NEURONTIN) 300 mg capsule Take 300 mg by mouth daily at bedtime   Yes Historical Provider, MD   insulin glargine (LANTUS SOLOSTAR) 100 units/mL injection pen Inject 10 Units under the skin daily at bedtime 9/14/18  Yes Samanta Landa MD   ipratropium-albuterol (DUO-NEB) 0 5-2 5 mg/3 mL Take 3 mL by nebulization every 6 (six) hours as needed for wheezing 5/25/18  Yes Debi Li MD   lactase (LACTAID) 3,000 units tablet Take 2 tablets (6,000 Units total) by mouth 3 (three) times a day with meals And PRN ith diary products 11/8/18  Yes Samanta Landa MD   lisinopril (ZESTRIL) 2 5 mg tablet TAKE 1 TABLET BY MOUTH ONCE DAILY  8/31/18  Yes Samanta Landa MD   metFORMIN (GLUCOPHAGE) 500 mg tablet TAKE 1 TABLET BY MOUTH ONCE DAILY  9/5/18  Yes Samanta Landa MD   metolazone (ZAROXOLYN) 2 5 mg tablet Take 2 5 mg by mouth daily   Yes Historical Provider, MD   multivitamin with iron (TAB-A-ANETA/IRON) TAKE 1 TABLET BY MOUTH ONCE DAILY  8/31/18  Yes Samanta Landa MD   potassium chloride (K-DUR,KLOR-CON) 20 mEq tablet TAKE 1 TABLET BY MOUTH TWICE DAILY   8/31/18  Yes Samanta Landa MD   tamsulosin (FLOMAX) 0 4 mg TAKE 1 CAPSULE BY MOUTH WITH EVENING MEAL 7/11/18  Yes Samanta Landa MD   torsemide (DEMADEX) 20 mg tablet TAKE 3 TABLETS BY MOUTH TWICE DAILY 10/31/18  Yes Allen Ground, DO   Wound Dressings (ALLANTOIN) gel Apply 1 application topically as needed for wound care   Yes Historical Provider, MD   bacitracin topical ointment 500 units/g topical ointment Apply 1 large application topically daily To 3 small areas on right shin    Historical Provider, MD   [facility] LANCETS 28G MISC USE AS DIRECTED  7/13/18   Billie Ortiz MD   ergocalciferol (VITAMIN D2) 50,000 units TAKE 1 CAPSULE BY MOUTH EVERY WEDNESDAY AT NOON 8/31/18   Billie Ortiz MD   glucose blood test strip 1 each by Other route daily Use as instructed    Historical Provider, MD   Menthol-Zinc Oxide (REMEDY CALAZIME EX) Apply 1 application topically 2 (two) times a day To sacrum    Historical Provider, MD   nitroglycerin (NITROSTAT) 0 4 mg SL tablet Place 0 4 mg under the tongue every 5 (five) minutes as needed for chest pain    Historical Provider, MD   Omega-3 Fatty Acids (FISH OIL) 1,000 mg TAKE 1 CAPSULE BY MOUTH ONCE DAILY 9/26/18   Billie Ortiz MD   Polyvinyl Alcohol-Povidone (REFRESH OP) Apply 1 drop to eye as needed    Historical Provider, MD   Tetrahydrozoline HCl (EYE DROPS OP) Apply 1 drop to eye daily at bedtime And prn    Historical Provider, MD   white petrolatum Apply 1 application topically as needed Apply topically to buttocks as needed  May keep in room and self administer    Historical Provider, MD     I have reviewed home medications using allscripts  Allergies: Allergies   Allergen Reactions    Iv Contrast [Iodinated Diagnostic Agents]        Social History:     Marital Status:     Substance Use History:   History   Alcohol Use    Yes     Comment: jayne cognac 1 oz/night      History   Smoking Status    Former Smoker   Smokeless Tobacco    Never Used     History   Drug Use No       Family History:    non-contributory      Physical Exam:  Vitals:   Blood Pressure: 122/65 (12/08/18 0456)  Pulse: 76 (12/08/18 0456)  Temperature: 97 9 °F (36 6 °C) (12/07/18 2320)  Temp Source: Oral (12/07/18 2320)  Respirations: 18 (12/08/18 0456)  Weight - Scale: 97 5 kg (215 lb) (12/07/18 2320)  SpO2: 96 % (12/08/18 0456)    Vital signs are reviewed as above  No distress  Awake and alert, able to speak in complete sentences  S1, S2, +systolic murmur  Decreased air exchange right base, no wheezes  Soft, +bs, obese  1+ bilateral LE edema  +distal pulses  Calm and cooperative  Hard of hearing, oriented x 3  Moves all extremities  No joint deformity  Stage 1 sacral decub        Additional Data:     Lab Results: I have personally reviewed pertinent reports  Results from last 7 days  Lab Units 12/07/18  2358   WBC Thousand/uL 4 29*   HEMOGLOBIN g/dL 9 4*   HEMATOCRIT % 30 2*   PLATELETS Thousands/uL 192   NEUTROS PCT % 72   LYMPHS PCT % 12*   MONOS PCT % 11   EOS PCT % 4       Results from last 7 days  Lab Units 12/07/18  2358   POTASSIUM mmol/L 3 4*   CHLORIDE mmol/L 93*   CO2 mmol/L 32   BUN mg/dL 37*   CREATININE mg/dL 1 90*   CALCIUM mg/dL 8 4           Imaging: I have personally reviewed pertinent reports  Ct Chest Without Contrast    Result Date: 12/8/2018  Narrative: CT CHEST WITHOUT IV CONTRAST INDICATION:   XR concerning for RML consolidation vs mass  COMPARISON:  None  TECHNIQUE: CT examination of the chest was performed without intravenous contrast   Axial, sagittal, and coronal 2D reformatted images were created from the source data and submitted for interpretation  Radiation dose length product (DLP) for this visit:  674 44 mGy-cm   This examination, like all CT scans performed in the [place], was performed utilizing techniques to minimize radiation dose exposure, including the use of iterative  reconstruction and automated exposure control  FINDINGS: LUNGS:  4 mm calcified granuloma in the right middle lobe     Mild interlobular septal thickening likely is chronic scarring however may reflect mild pulmonary edema  Bilateral areas of atelectatic changes  There is no tracheal or endobronchial lesion  PLEURA:  Small effusions bilaterally with fluid tracking into the minor fissure  [facility] HEART/GREAT VESSELS:  Cardiomegaly  Stable small pericardial effusion  Multipolar ages pacemaker  MEDIASTINUM AND HALIE:  Unremarkable   CHEST WALL AND LOWER NECK:   Unremarkable  VISUALIZED STRUCTURES IN THE UPPER ABDOMEN:  Adenomatous hypertrophy of the adrenal glands bilaterally  [facility] cholecystectomy  OSSEOUS STRUCTURES:  No acute fracture or destructive osseous lesion  Impression: Mild interlobular septal thickening likely is chronic scarring however may reflect mild pulmonary edema  Bilateral areas of atelectatic changes  Small effusions bilaterally with fluid tracking into the minor fissure    No airspace disease/infiltrate or pneumothorax  Abnormality seen on chest x-ray reflects pleural effusion  Workstation performed: [id number]       EKG, Pathology, and Other Studies Reviewed on Admission:   · EKG: paced rhythm    Allscripts Records Reviewed: Yes     ** Please Note: Dragon 360 Dictation voice to text software may have been used in the creation of this document   **

## 2018-12-08 NOTE — ASSESSMENT & PLAN NOTE
· Lasix given in ED, Increase from 20 mg to 60 mg BID   · I&Os and Daily weights  · Cardiology consulted  · Fluid restriction 1500ml daily, Na restriction 2000mg  · Weight at admission 215lbs up from 192lbs in October 2018

## 2018-12-08 NOTE — CONSULTS
Consultation - Cardiology   Shikha Neri 80 y o  male MRN: [id number]  Unit/Bed#: ED 13 Encounter: [id number]    Assessment/Plan     Assessment:    1  Acute on chronic diastolic heart failure  2  Paroxysmal atrial fibrillation-currently in paced ventricular rhythm  3  Coronary artery disease S/P LULU to LAD  4  Sick sinus syndrome S/P permanent pacemaker placement  5  Vascular dementia with behavioral disturbances  6  Chronic kidney disease  7  Insulin-dependent type 2 diabetes mellitus  8  Hyperlipidemia  9   Hypertension    -EKG on admission appears to be in a ventricularly paced rhythm  -troponins slightly elevated at 0 06 and 0 07 appear to be around patient's baseline per previous laboratory studies  -would maintain electrolytes with potassium 4 0, magnesium 2 0  -most recent transthoracic echocardiogram from May of 2018 read as left ventricular systolic function normal with estimated LVEF 65% with mildly dilated left atrium with moderate mitral regurgitation  -patient's weight on admission to the hospital was 215 lb with weight at his last office visit with his cardiologist 192 lb in October 2018  -in the outpatient setting it appears the patient is on torsemide 60 mg twice daily however is only on furosemide 20 mg IV twice daily on the inpatient setting here, would recommend increasing IV diuresis in the setting of slight crackles heard on lung exam as well as significant lower extremity edema  -would maintain fluid restriction to 1500 mL daily, sodium restriction to less than 2000 mg daily, monitor strict I&Os and standing daily weights  -would continue other current medical therapy at this time including Eliquis, aspirin, atorvastatin, carvedilol, lisinopril, metolazone   -monitor renal function and electrolyte status closely      History of Present Illness   Physician Requesting Consult: Di Goins MD  Reason for Consult / Principal Problem:  Acute on chronic diastolic heart failure  HPI: Etta Ortiz Luz Nielsen is a 80y o  year old male past medical history significant for diastolic heart failure, coronary artery disease S/P LULU to LAD, paroxysmal atrial fibrillation, sick sinus syndrome S/P for the pacemaker placement, hypertension, hyperlipidemia, vascular dementia, left eye blindness, insulin-dependent type 2 diabetes mellitus, chronic kidney disease who presents to the emergency department with episode of significant dyspnea  Patient notes significant history with past 4 years of intermittent shortness of breath which she states is usually improved with his twice daily Xanax however last night after eating dinner he states that he began to have some worsening of her shortness of breath the received Xanax which did not improve her shortness of breath at that time he was brought to the hospital for further care and evaluation  Per chart review in the emergency department patient's pulse ox was low at 88% however once probe was repositioned was found to be in the mid to high 90s  CT chest was performed showed large right pleural effusion however per reporting did not appear to be amenable to thoracentesis  The patient was given Lasix IV to assist in diuresis and was monitored overnight with nasal cannula oxygen   -day the patient notes feeling well, denies any shortness of breath, chest pain, palpitations, lightheadedness or dizziness  Upon further questioning he does admit that lower extremities do appear more swollen than baseline however he states that the majority of this morning appears to be in his abdomen on a chronic basis which prohibits him from bending over  Consults    Review of Systems   Constitutional: Negative for chills, fatigue and fever  HENT: Negative for trouble swallowing and voice change  Eyes: Negative for pain  Respiratory: Positive for shortness of breath  Negative for cough and wheezing  Cardiovascular: Positive for leg swelling   Negative for chest pain and palpitations  Gastrointestinal: Negative for abdominal pain, nausea and vomiting  Genitourinary: Negative for dysuria  Musculoskeletal: Negative for neck pain and neck stiffness  Neurological: Negative for dizziness and light-headedness  All other systems reviewed and are negative  Historical Information   Past Medical History:   Diagnosis Date    A-fib ([place] [address] )     Anemia     Bladder cancer ([place] [address] )     CAD (coronary artery disease)     CHF (congestive heart failure) (HCC)     Chronic kidney failure     Colon cancer ([place] [address] )     Eye cancer, left ([place] [address] )     Hypertension     Incontinence     Pacemaker     Prostate cancer ([place] [address] )     Type 2 diabetes mellitus ([place] [address] )     Wears hearing aid in both ears      Past Surgical History:   Procedure Laterality Date    CARDIAC PACEMAKER PLACEMENT  2014    CORONARY ANGIOPLASTY WITH STENT PLACEMENT      GALLBLADDER SURGERY      [place] CYSTO/URETERO W/LITHOTRIPSY &INDWELL STENT INSRT Right 7/31/2018    Procedure: CYSTOSCOPY URETEROSCOPY WITH LITHOTRIPSY HOLMIUM LASER ,BASKET STONE EXTRACTION  RETROGRADE PYELOGRAM AND exchange STENT URETERAL;  Surgeon: Iesha Means MD;  [facility];  Service: Urology    [place] CYSTOURETHROSCOPY,URETER CATHETER Right 7/1/2018    Procedure: CYSTOSCOPY RETROGRADE PYELOGRAM WITH INSERTION STENT URETERAL;  Surgeon: Lester Zurita MD;  [facility];  Service: Urology     History   Alcohol Use    Yes     Comment: jayne cognac 1 oz/night      History   Drug Use No     History   Smoking Status    Former Smoker   Smokeless Tobacco    Never Used     Family History: History reviewed  No pertinent family history  Meds/Allergies   all current active meds have been reviewed  Allergies   Allergen Reactions    Iv Contrast [Iodinated Diagnostic Agents]        Objective   Vitals: Blood pressure 116/57, pulse 78, temperature 97 9 °F (36 6 °C), temperature source Oral, resp   rate 22, weight 97 5 kg (215 lb), SpO2 99 %   Orthostatic Blood Pressures      Most Recent Value   Blood Pressure  116/57 filed at 12/08/2018 7679   Patient Position - Orthostatic VS  Lying filed at 12/08/2018 7675            Intake/Output Summary (Last 24 hours) at 12/08/18 3723  Last data filed at 12/08/18 0908   Gross per 24 hour   Intake                0 ml   Output              900 ml   Net             -900 ml       Invasive Devices     Peripheral Intravenous Line            Peripheral IV 12/08/18 Left Antecubital less than 1 day          Drain            Ureteral Drain/Stent Right ureter 6 Fr  159 days                Physical Exam   Constitutional: He appears distressed  HENT:   Head: Normocephalic and atraumatic  Eyes:   Left eyelid ptosis with left eye blindness   Neck: No tracheal deviation present  Difficult to assess JVD in setting of obese neck   Cardiovascular: Normal rate and regular rhythm  Pulmonary/Chest: No respiratory distress  He has no wheezes  Decreased breath sounds bilaterally with slight crackles heard up left side   Abdominal: Soft  Bowel sounds are normal  There is no tenderness  Musculoskeletal: He exhibits edema  3+ bilateral lower extremity pitting edema   Neurological:   Awake, alert, able answer questions appropriately   Skin: Skin is warm and dry  He is not diaphoretic  Psychiatric:   Pleasant affect       Lab Results: I have personally reviewed pertinent lab results  Imaging: I have personally reviewed pertinent reports      EKG:  Currently in ventricularly paced rhythm on telemetry  VTE Prophylaxis:  Eliquis    Code Status: Level 1 - Full Code  Advance Directive and Living Will:      Power of :    POLST:

## 2018-12-08 NOTE — ED NOTES
Spoke to pharmacy and store room, white petrolatum topical gel is not carried at the hospital  If patient needs the medication application it is to be brought in from home     Eliud Michael [facility]  12/08/18 0016

## 2018-12-08 NOTE — ED ATTENDING ATTESTATION
Sánchez Fernandez DO, saw and evaluated the patient  I have discussed the patient with the resident/non-physician practitioner and agree with the resident's/non-physician practitioner's findings, Plan of Care, and MDM as documented in the resident's/non-physician practitioner's note, except where noted  All available labs and Radiology studies were reviewed  At this point I agree with the current assessment done in the Emergency Department  I have conducted an independent evaluation of this patient including a focused history and a physical exam     ED Note - Tiffanie Walls 80 y o  male MRN: [id number]  Unit/Bed#: ED 13 Encounter: [id number]    History of Present Illness   HPI  Tiffanie Walls is a 80 y o  male who presents for evaluation of transient dyspnea associated with generalized chest pressure  Possible element of anxiety and He states that he was given 2 p r n  doses of Xanax without improvement so he decided come into the emergency department  Patient states that he feels better when he has oxygen on and per medical record review, it appears as though the have been efforts made to try to get the patient home oxygen  NO URI symptoms, no cough  At time of exam, the patient was asymptomatic  REVIEW OF SYSTEMS  See HPI for further details  12 systems reviewed and otherwise negative except as noted  Historical Information     PAST MEDICAL HISTORY  Past Medical History:   Diagnosis Date    [facility]fib [facility] [facility]     Anemia     Bladder cancer ([facility]     CAD (coronary artery disease)     CHF (congestive heart failure) (HCC)     Chronic kidney failure     Colon cancer ([facility]     Eye cancer, left ([facility] [facility]     Hypertension     Incontinence     Pacemaker     Prostate cancer ([facility]     Type 2 diabetes mellitus ([facility] [facility]     Wears hearing aid in both ears        FAMILY HISTORY  History reviewed  No pertinent family history  SOCIAL HISTORY  Social History     Social History    Marital status:       Spouse name: N/A    Number of children: N/A    Years of education: N/A     Social History Main Topics    Smoking status: Former Smoker    Smokeless tobacco: Never Used    Alcohol use Yes      Comment: jayne cognac 1 oz/night     Drug use: No    Sexual activity: Not Currently     Other Topics Concern    None     Social History Narrative    None       SURGICAL HISTORY  Past Surgical History:   Procedure Laterality Date    CARDIAC PACEMAKER PLACEMENT  2014    CORONARY ANGIOPLASTY WITH STENT PLACEMENT      GALLBLADDER SURGERY      DC CYSTO/URETERO W/LITHOTRIPSY &INDWELL STENT INSRT Right 7/31/2018    Procedure: CYSTOSCOPY URETEROSCOPY WITH LITHOTRIPSY HOLMIUM LASER ,BASKET STONE EXTRACTION  RETROGRADE PYELOGRAM AND exchange STENT URETERAL;  Surgeon: Craig Rocha MD;  Location: [facility];  Service: Urology    DC CYSTOURETHROSCOPY,URETER CATHETER Right 7/1/2018    Procedure: CYSTOSCOPY RETROGRADE PYELOGRAM WITH INSERTION STENT URETERAL;  Surgeon: Giancarlo Mclaughlin MD;  Location: [facility];  Service: Urology     Meds/Allergies     CURRENT MEDICATIONS  No current facility-administered medications for this encounter  Current Outpatient Prescriptions:     ALPRAZolam (XANAX) 0 5 mg tablet, Take 1 tablet (0 5 mg total) by mouth 4 (four) times a day as needed for anxiety, Disp: 30 tablet, Rfl: 0    aspirin (ECOTRIN LOW STRENGTH) 81 mg EC tablet, Take 81 mg by mouth daily, Disp: , Rfl:     atorvastatin (LIPITOR) 20 mg tablet, Take 20 mg by mouth daily, Disp: , Rfl:     bacitracin topical ointment 500 units/g topical ointment, Apply 1 large application topically daily To 3 small areas on right shin, Disp: , Rfl:     carvedilol (COREG) 3 125 mg tablet, TAKE 1 TABLET BY MOUTH TWICE DAILY WITH MEALS, Disp: 56 tablet, Rfl: 5    EASY TOUCH LANCETS 28G MISC, USE AS DIRECTED , Disp: 100 each, Rfl: 11    ELIQUIS 2 5 MG, TAKE 1 TABLET BY MOUTH TWICE DAILY  , Disp: 56 tablet, Rfl: 5    ergocalciferol (VITAMIN D2) 50,000 units, TAKE 1 CAPSULE BY MOUTH EVERY WEDNESDAY AT NOON, Disp: 4 capsule, Rfl: 5    finasteride (PROSCAR) 5 mg tablet, TAKE 1 TABLET BY MOUTH ONCE DAILY  , Disp: 28 tablet, Rfl: 5    gabapentin (NEURONTIN) 100 mg capsule, TAKE 1 CAPSULE BY MOUTH ONCE DAILY, Disp: 28 capsule, Rfl: 5    gabapentin (NEURONTIN) 300 mg capsule, Take 300 mg by mouth daily at bedtime, Disp: , Rfl:     glucose blood test strip, 1 each by Other route daily Use as instructed, Disp: , Rfl:     insulin glargine (LANTUS SOLOSTAR) 100 units/mL injection pen, Inject 10 Units under the skin daily at bedtime, Disp: 5 pen, Rfl: 5    ipratropium-albuterol (DUO-NEB) 0 5-2 5 mg/3 mL, Take 3 mL by nebulization every 6 (six) hours as needed for wheezing, Disp: , Rfl: 0    lactase (LACTAID) 3,000 units tablet, Take 2 tablets (6,000 Units total) by mouth 3 (three) times a day with meals And PRN ith diary products, Disp: 100 tablet, Rfl: 5    lisinopril (ZESTRIL) 2 5 mg tablet, TAKE 1 TABLET BY MOUTH ONCE DAILY  , Disp: 28 tablet, Rfl: 5    Menthol-Zinc Oxide (REMEDY CALAZIME EX), Apply 1 application topically 2 (two) times a day To sacrum, Disp: , Rfl:     metFORMIN (GLUCOPHAGE) 500 mg tablet, TAKE 1 TABLET BY MOUTH ONCE DAILY  , Disp: 28 tablet, Rfl: 5    multivitamin with iron (TAB-A-ANETA/IRON), TAKE 1 TABLET BY MOUTH ONCE DAILY  , Disp: 28 tablet, Rfl: 11    nitroglycerin (NITROSTAT) 0 4 mg SL tablet, Place 0 4 mg under the tongue every 5 (five) minutes as needed for chest pain, Disp: , Rfl:     Omega-3 Fatty Acids (FISH OIL) 1,000 mg, TAKE 1 CAPSULE BY MOUTH ONCE DAILY, Disp: 28 capsule, Rfl: 3    Polyvinyl Alcohol-Povidone (REFRESH OP), Apply 1 drop to eye as needed, Disp: , Rfl:     potassium chloride (K-DUR,KLOR-CON) 20 mEq tablet, TAKE 1 TABLET BY MOUTH TWICE DAILY  , Disp: 56 tablet, Rfl: 5    tamsulosin (FLOMAX) 0 4 mg, TAKE 1 CAPSULE BY MOUTH WITH EVENING MEAL, Disp: 28 capsule, Rfl: 5    Tetrahydrozoline HCl (EYE DROPS OP), Apply 1 drop to eye daily at bedtime And prn, Disp: , Rfl:     torsemide (DEMADEX) 20 mg tablet, TAKE 3 TABLETS BY MOUTH TWICE DAILY, Disp: 168 tablet, Rfl: 0    white petrolatum, Apply 1 application topically as needed Apply topically to buttocks as needed  May keep in room and self administer, Disp: , Rfl:     (Not in a hospital admission)    ALLERGIES  Allergies   Allergen Reactions    Iv Contrast [Iodinated Diagnostic Agents]      Objective     PHYSICAL EXAM    VITAL SIGNS: Blood pressure 107/51, pulse 74, temperature 97 9 °F (36 6 °C), temperature source Oral, resp  rate 20, weight 97 5 kg (215 lb), SpO2 (!) 88 %  Constitutional:  Appears well developed and well nourished, no acute distress, non-toxic appearance   Eyes:  PERRL, EOMI, conjunctivae pink, sclerae non-icteric    HENT:  Normocephalic/Atraumatic, no rhinorrhea, mucous membranes moist  Neck: normal range of motion, no tenderness, supple   Respiratory:  No respiratory distress, normal breath sounds, no accessory muscle use, no intercostal retractions, no crackles, no rhonchi, no wheezing, no stridor   Cardiovascular:  Normal rate, normal rhythm, no murmurs, no gallops, no rubs, peripheral pulses intact, no carotid bruits, no JVD  GI:  Soft, non-tender, non-distended, no organomegaly, no mass, no rebound, no guarding   :  No CVAT, no flank ecchymosis  Musculoskeletal:  No swelling or edema, no tenderness, no deformities  Integument:  Pink, warm, dry, Well hydrated, no rash, no erythema, no bullae   Lymphatic:  No cervical/ tonsillar/ submandibular lymphadenopathy noted   Neurologic:  Awake, Alert & oriented x 3, CN 2-12 intact, no focal neurological deficits  Psychiatric:  Speech and behavior appropriate       ED COURSE and MDM:    Assessment/Plan   Assessment:  Aneesh Bennett is a 80 y o  male presents for evaluation of chest pressure and dyspnea  Plan:  Labs, EKG, CXR, Symptom management  Disposition as appropriate        CRITICAL CARE TIME: 0 minutes      Portions of the record may have been created with voice recognition software  Occasional wrong word or "sound a like" substitutions may have occurred due to the inherent limitations of voice recognition software       ED Provider  Electronically Signed by

## 2018-12-08 NOTE — ED PROVIDER NOTES
History  Chief Complaint   Patient presents with    Shortness of Breath     as per ems - patient resides at [facility], patient c/o shortness of breath and per ems "anxiety"     This is an 42-year-old male with a past history of anxiety presenting to the emergency department for evaluation of sudden onset chest pressure and shortness of breath that occurred while he was sitting up earlier this evening  He states that he was given 2 p r n  doses of Xanax without improvement so he decided come into the emergency department  EMS provided him with oxygen which would be to symptoms  On arrival he says he is feeling better, denying chest pressure or shortness of breath  When I 1st entered the room his pulse ox was reading 88%, however after replacing the pulse oximeter his oxygen levels were in the mid to upper 90s on room air  He denies any lightheadedness, palpitations, paresthesias or sharp pains with his symptoms  He denies any worsening swelling in his lower legs or abdomen,, though he does note that he has baseline edema and distended abdomen  He denies any recent fevers or chills, cough or congestion, nausea or vomiting, diarrhea or constipation  He denies any urinary complaints  Prior to Admission Medications   Prescriptions Last Dose Informant Patient Reported? Taking? ALPRAZolam (XANAX) 0 5 mg tablet   No Yes   Sig: Take 1 tablet (0 5 mg total) by mouth 4 (four) times a day as needed for anxiety   EASY TOUCH LANCETS 28G MISC  Self No No   Sig: USE AS DIRECTED    ELIQUIS 2 5 MG  Self No Yes   Sig: TAKE 1 TABLET BY MOUTH TWICE DAILY     Menthol-Zinc Oxide (REMEDY CALAZIME EX)   Yes No   Sig: Apply 1 application topically 2 (two) times a day To sacrum   Omega-3 Fatty Acids (FISH OIL) 1,000 mg  Self No No   Sig: TAKE 1 CAPSULE BY MOUTH ONCE DAILY   Polyvinyl Alcohol-Povidone (REFRESH OP)   Yes No   Sig: Apply 1 drop to eye as needed   Tetrahydrozoline HCl (EYE DROPS OP)   Yes No   Sig: Apply 1 drop to eye daily at bedtime And prn   Wound Dressings (ALLANTOIN) gel  Outside Facility (Specify) Yes Yes   Sig: Apply 1 application topically as needed for wound care   aspirin (ECOTRIN LOW STRENGTH) 81 mg EC tablet  Self Yes Yes   Sig: Take 81 mg by mouth daily   atorvastatin (LIPITOR) 20 mg tablet   Yes Yes   Sig: Take 20 mg by mouth daily   bacitracin topical ointment 500 units/g topical ointment  Self Yes No   Sig: Apply 1 large application topically daily To 3 small areas on right shin   carbamide peroxide (DEBROX) 6 5 % otic solution  Outside Facility (Specify) Yes Yes   Sig: Administer 4 drops to the right ear 2 (two) times a day   carvedilol (COREG) 3 125 mg tablet  Self No Yes   Sig: TAKE 1 TABLET BY MOUTH TWICE DAILY WITH MEALS   ergocalciferol (VITAMIN D2) 50,000 units  Self No No   Sig: TAKE 1 CAPSULE BY MOUTH EVERY WEDNESDAY AT NOON   finasteride (PROSCAR) 5 mg tablet  Self No Yes   Sig: TAKE 1 TABLET BY MOUTH ONCE DAILY  gabapentin (NEURONTIN) 100 mg capsule  Self No Yes   Sig: TAKE 1 CAPSULE BY MOUTH ONCE DAILY   gabapentin (NEURONTIN) 300 mg capsule   Yes Yes   Sig: Take 300 mg by mouth daily at bedtime   glucose blood test strip  Self Yes No   Si each by Other route daily Use as instructed   insulin glargine (LANTUS SOLOSTAR) 100 units/mL injection pen  Self No Yes   Sig: Inject 10 Units under the skin daily at bedtime   ipratropium-albuterol (DUO-NEB) 0 5-2 5 mg/3 mL  Self No Yes   Sig: Take 3 mL by nebulization every 6 (six) hours as needed for wheezing   lactase (LACTAID) 3,000 units tablet   No Yes   Sig: Take 2 tablets (6,000 Units total) by mouth 3 (three) times a day with meals And PRN ith diary products   lisinopril (ZESTRIL) 2 5 mg tablet  Self No Yes   Sig: TAKE 1 TABLET BY MOUTH ONCE DAILY  metFORMIN (GLUCOPHAGE) 500 mg tablet  Self No Yes   Sig: TAKE 1 TABLET BY MOUTH ONCE DAILY     metolazone (ZAROXOLYN) 2 5 mg tablet  Outside Facility (Specify) Yes Yes   Sig: Take 2 5 mg by mouth daily   multivitamin with iron (TAB-A-ANETA/IRON)  Self No Yes   Sig: TAKE 1 TABLET BY MOUTH ONCE DAILY  nitroglycerin (NITROSTAT) 0 4 mg SL tablet  Self Yes No   Sig: Place 0 4 mg under the tongue every 5 (five) minutes as needed for chest pain   potassium chloride (K-DUR,KLOR-CON) 20 mEq tablet  Self No Yes   Sig: TAKE 1 TABLET BY MOUTH TWICE DAILY  tamsulosin (FLOMAX) 0 4 mg  Self No Yes   Sig: TAKE 1 CAPSULE BY MOUTH WITH EVENING MEAL   torsemide (DEMADEX) 20 mg tablet   No Yes   Sig: TAKE 3 TABLETS BY MOUTH TWICE DAILY   white petrolatum  Self Yes No   Sig: Apply 1 application topically as needed Apply topically to buttocks as needed  May keep in room and self administer      Facility-Administered Medications: None       Past Medical History:   Diagnosis Date    A-fib ([place] [address] )     Anemia     Bladder cancer ([place] [address] )     CAD (coronary artery disease)     CHF (congestive heart failure) (HCC)     Chronic kidney failure     Colon cancer ([place] [address] )     Eye cancer, left ([place] [address] )     Hypertension     Incontinence     Pacemaker     Prostate cancer ([place] [address] )     Type 2 diabetes mellitus ([place] [address] )     Wears hearing aid in both ears        Past Surgical History:   Procedure Laterality Date    CARDIAC PACEMAKER PLACEMENT  2014    CORONARY ANGIOPLASTY WITH STENT PLACEMENT      GALLBLADDER SURGERY      NJ CYSTO/URETERO W/LITHOTRIPSY &INDWELL STENT INSRT Right 7/31/2018    Procedure: CYSTOSCOPY URETEROSCOPY WITH LITHOTRIPSY HOLMIUM LASER ,BASKET STONE EXTRACTION  RETROGRADE PYELOGRAM AND exchange STENT URETERAL;  Surgeon: Dhruv Couch MD;  Location: [facility];  Service: Urology    NJ CYSTOURETHROSCOPY,URETER CATHETER Right 7/1/2018    Procedure: CYSTOSCOPY RETROGRADE PYELOGRAM WITH INSERTION STENT URETERAL;  Surgeon: Sharon Torres MD;  Location: [facility];  Service: Urology       History reviewed  No pertinent family history  I have reviewed and agree with the history as documented      Social History   Substance Use Topics    Smoking status: Former Smoker    Smokeless tobacco: Never Used    Alcohol use Yes      Comment: jayne cognac 1 oz/night         Review of Systems   Constitutional: Negative for chills and fever  HENT: Negative for congestion and sore throat  Eyes: Negative for visual disturbance  Respiratory: Positive for shortness of breath  Negative for cough  Cardiovascular: Positive for leg swelling  Negative for chest pain and palpitations  Gastrointestinal: Positive for abdominal distention  Negative for abdominal pain, constipation, diarrhea, nausea and vomiting  Genitourinary: Negative for difficulty urinating and dysuria  Musculoskeletal: Negative for back pain, gait problem, joint swelling and myalgias  Skin: Negative for rash  Neurological: Negative for dizziness, syncope, weakness, light-headedness, numbness and headaches  Physical Exam  ED Triage Vitals   Temperature Pulse Respirations Blood Pressure SpO2   12/07/18 2320 12/07/18 2320 12/07/18 2320 12/07/18 2320 12/07/18 2320   97 9 °F (36 6 °C) 74 20 107/51 (!) 88 %      Temp Source Heart Rate Source Patient Position - Orthostatic VS BP Location FiO2 (%)   12/07/18 2320 12/07/18 2320 12/08/18 0853 12/08/18 0456 --   Oral Monitor Lying Right arm       Pain Score       12/07/18 2320       No Pain           Orthostatic Vital Signs  Vitals:    12/08/18 1024 12/08/18 1030 12/08/18 1330 12/08/18 1707   BP: 129/60 129/60 119/58 113/54   Pulse:  78 76    Patient Position - Orthostatic VS:  Lying Lying        Physical Exam   Constitutional: He is oriented to person, place, and time  He appears well-developed and well-nourished  No distress  HENT:   Head: Normocephalic and atraumatic  Mouth/Throat: Oropharynx is clear and moist    Eyes: Pupils are equal, round, and reactive to light  Conjunctivae and EOM are normal    Neck: Normal range of motion  Neck supple  No JVD present     Cardiovascular: Normal rate, regular rhythm, normal heart sounds and intact distal pulses  Pulmonary/Chest: Effort normal and breath sounds normal  No respiratory distress  He has no wheezes  He has no rales  He exhibits no tenderness  Abdominal: Soft  Bowel sounds are normal  There is no tenderness  Musculoskeletal: Normal range of motion  He exhibits edema (1+ pitting edema)  He exhibits no tenderness or deformity  Neurological: He is alert and oriented to person, place, and time  No cranial nerve deficit  Skin: Skin is warm and dry  Capillary refill takes less than 2 seconds  Nursing note and vitals reviewed        ED Medications  Medications   ALPRAZolam (XANAX) tablet 0 5 mg (not administered)   aspirin (ECOTRIN LOW STRENGTH) EC tablet 81 mg (81 mg Oral Given 12/8/18 1023)   atorvastatin (LIPITOR) tablet 20 mg (not administered)   bacitracin topical ointment 1 small application (0 small application Topical Hold 12/8/18 1016)   carbamide peroxide (DEBROX) 6 5 % otic solution 4 drop (4 drops Right Ear Given 12/8/18 1027)   carvedilol (COREG) tablet 3 125 mg (3 125 mg Oral Given 12/8/18 0731)   apixaban (ELIQUIS) tablet 2 5 mg (2 5 mg Oral Given 12/8/18 1022)   finasteride (PROSCAR) tablet 5 mg (5 mg Oral Given 12/8/18 1025)   gabapentin (NEURONTIN) capsule 100 mg (100 mg Oral Given 12/8/18 1025)   gabapentin (NEURONTIN) capsule 300 mg (not administered)   insulin glargine (LANTUS) subcutaneous injection 10 Units 0 1 mL (not administered)   ipratropium-albuterol (DUO-NEB) 0 5-2 5 mg/3 mL inhalation solution 3 mL (not administered)   lactase (LACTAID) tablet 6,000 Units (6,000 Units Oral Given 12/8/18 1220)   lisinopril (ZESTRIL) tablet 2 5 mg (2 5 mg Oral Given 12/8/18 1024)   menthol-zinc oxide (CALMOSEPTINE) 0 44-20 6 % ointment (0 application Topical Hold 12/8/18 1016)   metolazone (ZAROXOLYN) tablet 2 5 mg (2 5 mg Oral Given 12/8/18 1025)   multivitamin-minerals (CENTRUM) tablet 1 tablet (1 tablet Oral Given 12/8/18 1022)   nitroglycerin (NITROSTAT) SL tablet 0 4 mg (not administered)   fish oil capsule 1,000 mg (1,000 mg Oral Given 12/8/18 1024)   polyvinyl alcohol (LIQUIFILM TEARS) 1 4 % ophthalmic solution 1 drop (not administered)   tamsulosin (FLOMAX) capsule 0 4 mg (not administered)   white petrolatum topical gel 1 application (1 application Topical Not Given 12/8/18 0619)   allantoin gel 1 application (not administered)   ondansetron (ZOFRAN) injection 4 mg (not administered)   acetaminophen (TYLENOL) tablet 650 mg (not administered)   insulin lispro (HumaLOG) 100 units/mL subcutaneous injection 1-5 Units (0 Units Subcutaneous Hold 12/8/18 1222)   insulin lispro (HumaLOG) 100 units/mL subcutaneous injection 1-5 Units (not administered)   furosemide (LASIX) injection 60 mg (60 mg Intravenous Given 12/8/18 1544)   potassium chloride (K-DUR,KLOR-CON) CR tablet 40 mEq (not administered)   furosemide (LASIX) injection 20 mg (20 mg Intravenous Given 12/8/18 0358)   potassium chloride (K-DUR,KLOR-CON) CR tablet 20 mEq (20 mEq Oral Given 12/8/18 1549)       Diagnostic Studies  Results Reviewed     Procedure Component Value Units Date/Time    Fingerstick Glucose (POCT) [[id number]]  (Abnormal) Collected:  12/08/18 1222    Lab Status:  Final result Updated:  12/08/18 1223     POC Glucose 141 (H) mg/dl     Troponin I [[id number]]  (Abnormal) Collected:  12/08/18 0848    Lab Status:  Final result Specimen:  Blood from Arm, Left Updated:  12/08/18 0931     Troponin I 0 07 (H) ng/mL     Fingerstick Glucose (POCT) [[id number]]  (Normal) Collected:  12/08/18 0713    Lab Status:  Final result Updated:  12/08/18 0714     POC Glucose 131 mg/dl     Troponin I [[id number]]  (Abnormal) Collected:  12/08/18 0553    Lab Status:  Final result Specimen:  Blood from Arm, Left Updated:  12/08/18 4638     Troponin I 0 07 (H) ng/mL     Hemoglobin A1c w/EAG Estimation (Orders if not completed within the last 90 days) [[id number]]  (Abnormal) Collected:  12/08/18 0553    Lab Status:  Final result Specimen:  Blood from Arm, Left Updated:  12/08/18 0622     Hemoglobin A1C 7 5 (H) %       mg/dl     Basic metabolic panel [[id number]]  (Abnormal) Collected:  12/08/18 0553    Lab Status:  Final result Specimen:  Blood from Arm, Left Updated:  12/08/18 5053     Sodium 134 (L) mmol/L      Potassium 3 1 (L) mmol/L      Chloride 94 (L) mmol/L      CO2 35 (H) mmol/L      ANION GAP 5 mmol/L      BUN 36 (H) mg/dL      Creatinine 1 93 (H) mg/dL      Glucose 121 mg/dL      Glucose, Fasting 121 (H) mg/dL      Calcium 9 3 mg/dL      eGFR 30 ml/min/1 73sq m     Narrative:         National Kidney Disease Education Program recommendations are as follows:  GFR calculation is accurate only with a steady state creatinine  Chronic Kidney disease less than 60 ml/min/1 73 sq  meters  Kidney failure less than 15 ml/min/1 73 sq  meters  Troponin I [[id number]]  (Abnormal) Collected:  12/07/18 2358    Lab Status:  Final result Specimen:  Blood from Arm, Right Updated:  12/08/18 0024     Troponin I 0 06 (H) ng/mL     Basic metabolic panel [[id number]]  (Abnormal) Collected:  12/07/18 2358    Lab Status:  Final result Specimen:  Blood from Arm, Right Updated:  12/08/18 0020     Sodium 134 (L) mmol/L      Potassium 3 4 (L) mmol/L      Chloride 93 (L) mmol/L      CO2 32 mmol/L      ANION GAP 9 mmol/L      BUN 37 (H) mg/dL      Creatinine 1 90 (H) mg/dL      Glucose 180 (H) mg/dL      Calcium 8 4 mg/dL      eGFR 31 ml/min/1 73sq m     Narrative:         National Kidney Disease Education Program recommendations are as follows:  GFR calculation is accurate only with a steady state creatinine  Chronic Kidney disease less than 60 ml/min/1 73 sq  meters  Kidney failure less than 15 ml/min/1 73 sq  meters      CBC and differential [[id number]]  (Abnormal) Collected:  12/07/18 2358    Lab Status:  Final result Specimen:  Blood from Arm, Right Updated:  12/08/18 0006     WBC 4 29 (L) Thousand/uL      RBC 3 17 (L) Million/uL      Hemoglobin 9 4 (L) g/dL      Hematocrit 30 2 (L) %      MCV 95 fL      MCH 29 7 pg      MCHC 31 1 (L) g/dL      RDW 15 9 (H) %      MPV 10 7 fL      Platelets 806 Thousands/uL      nRBC 0 /100 WBCs      Neutrophils Relative 72 %      Immat GRANS % 0 %      Lymphocytes Relative 12 (L) %      Monocytes Relative 11 %      Eosinophils Relative 4 %      Basophils Relative 1 %      Neutrophils Absolute 3 11 Thousands/µL      Immature Grans Absolute 0 01 Thousand/uL      Lymphocytes Absolute 0 50 (L) Thousands/µL      Monocytes Absolute 0 47 Thousand/µL      Eosinophils Absolute 0 15 Thousand/µL      Basophils Absolute 0 05 Thousands/µL                  XR chest 2 views   ED Interpretation by Leeanna Jones MD (12/08 0045)   Right middle/upper lobe consolidation, concerning for pneumonia versus atelectasis  Final Result by Arsen Vail MD (12/08 7814)   Mild pulmonary edema with right upper lobe patchy opacity could reflect atelectasis versus infiltrate versus edema  Loculated right basilar pleural effusion fluid as before  Also refer to CT chest       Workstation performed: [id number]         CT chest without contrast   Final Result by Norma Goff MD (12/08 0154)      Mild interlobular septal thickening likely is chronic scarring however may reflect mild pulmonary edema  Bilateral areas of atelectatic changes  Small effusions bilaterally with fluid tracking into the minor fissure         No airspace disease/infiltrate or pneumothorax  Abnormality seen on chest x-ray reflects pleural effusion           Workstation performed: [id number]               Procedures  ECG 12 Lead Documentation  Date/Time: 12/8/2018 12:49 AM  Performed by: Marilynn Israel by: Casimiro Mota     Patient location:  ED  Previous ECG:     Previous ECG:  Compared to current    Comparison ECG info:  11/4/18    Similarity:  No change  Interpretation:     Interpretation: non-specific    Rate:     ECG rate:  76    ECG rate assessment: normal    Rhythm:     Rhythm: paced    Pacing:     Capture:  Complete    Type of pacing:  Ventricular  ST segments:     ST segments:  Normal  T waves:     T waves: normal            Phone Consults  ED Phone Contact    ED Course  ED Course as of Dec 08 1731   Sat Dec 08, 2018   0024 Chronically elevated Troponin I: (!) 0 06                               MDM  Number of Diagnoses or Management Options  Chest pressure:   CHF exacerbation [facility]):   Pulmonary edema:   Shortness of breath:   Diagnosis management comments: Chest pain shortness breath  He has a worsening pleural effusion on his chest x-ray but otherwise appearing well  His symptoms improved in the emergency department but appear to be episodic with movement  He did have several desaturations in the emergency department to the low 80s  He was given 20 mg Lasix presumed CHF exacerbation and admitted to hospital for further management  CritCare Time    Disposition  Final diagnoses:   Shortness of breath   Chest pressure   Pulmonary edema   CHF exacerbation ([facility] [address] )     Time reflects when diagnosis was documented in both MDM as applicable and the Disposition within this note     Time User Action Codes Description Comment    12/8/2018  2:40 AM Ioana Leon Add [R06 02] Shortness of breath     12/8/2018  2:41 AM Ioana Josephert Add [R07 89] Chest pressure     12/8/2018  2:41 AM Gwen Meek [J81 1] Pulmonary edema     12/8/2018  2:41 AM Alexander Meek [I50 9] CHF exacerbation [facility])       ED Disposition     ED Disposition Condition Comment    Admit  Case was discussed with RICK and the patient's admission status was agreed to be Admission Status: observation status to the service of Dr Maliha Kendrick           Follow-up Information    None         Current Discharge Medication List      CONTINUE these medications which have NOT CHANGED    Details   ALPRAZolam (XANAX) 0 5 mg tablet Take 1 tablet (0 5 mg total) by mouth 4 (four) times a day as needed for anxiety  Qty: 30 tablet, Refills: 0    Associated Diagnoses: Anxiety      aspirin (ECOTRIN LOW STRENGTH) 81 mg EC tablet Take 81 mg by mouth daily      atorvastatin (LIPITOR) 20 mg tablet Take 20 mg by mouth daily      carbamide peroxide (DEBROX) 6 5 % otic solution Administer 4 drops to the right ear 2 (two) times a day      carvedilol (COREG) 3 125 mg tablet TAKE 1 TABLET BY MOUTH TWICE DAILY WITH MEALS  Qty: 56 tablet, Refills: 5    Associated Diagnoses: Acute on chronic systolic heart failure ([facility])      ELIQUIS 2 5 MG TAKE 1 TABLET BY MOUTH TWICE DAILY  Qty: 56 tablet, Refills: 5    Associated Diagnoses: Paroxysmal atrial fibrillation ([facility])      finasteride (PROSCAR) 5 mg tablet TAKE 1 TABLET BY MOUTH ONCE DAILY  Qty: 28 tablet, Refills: 5    Associated Diagnoses: Benign prostatic hyperplasia with urinary frequency      !! gabapentin (NEURONTIN) 100 mg capsule TAKE 1 CAPSULE BY MOUTH ONCE DAILY  Qty: 28 capsule, Refills: 5    Associated Diagnoses: Hyperesthesia      !! gabapentin (NEURONTIN) 300 mg capsule Take 300 mg by mouth daily at bedtime      insulin glargine (LANTUS SOLOSTAR) 100 units/mL injection pen Inject 10 Units under the skin daily at bedtime  Qty: 5 pen, Refills: 5    Associated Diagnoses: Uncontrolled type 2 diabetes mellitus with ketoacidosis without coma, without long-term current use of insulin ([facility])      ipratropium-albuterol (DUO-NEB) 0 5-2 5 mg/3 mL Take 3 mL by nebulization every 6 (six) hours as needed for wheezing  Refills: 0    Associated Diagnoses: COPD (chronic obstructive pulmonary disease) ([facility])      lactase (LACTAID) 3,000 units tablet Take 2 tablets (6,000 Units total) by mouth 3 (three) times a day with meals And PRN ith diary products  Qty: 100 tablet, Refills: 5    Associated Diagnoses: Change in bowel habits      lisinopril (ZESTRIL) 2 5 mg tablet TAKE 1 TABLET BY MOUTH ONCE DAILY    Qty: 28 tablet, Refills: 5    Associated Diagnoses: Acute on chronic systolic heart failure ([place] [address] )      metFORMIN (GLUCOPHAGE) 500 mg tablet TAKE 1 TABLET BY MOUTH ONCE DAILY  Qty: 28 tablet, Refills: 5    Associated Diagnoses: Uncontrolled type 2 diabetes mellitus with ketoacidosis without coma, without long-term current use of insulin ([facility])      metolazone (ZAROXOLYN) 2 5 mg tablet Take 2 5 mg by mouth daily      multivitamin with iron (TAB-A-ANETA/IRON) TAKE 1 TABLET BY MOUTH ONCE DAILY  Qty: 28 tablet, Refills: 11    Associated Diagnoses: Nutritional deficiency      potassium chloride (K-DUR,KLOR-CON) 20 mEq tablet TAKE 1 TABLET BY MOUTH TWICE DAILY  Qty: 56 tablet, Refills: 5    Associated Diagnoses: Acute on chronic systolic heart failure (HCC)      tamsulosin (FLOMAX) 0 4 mg TAKE 1 CAPSULE BY MOUTH WITH EVENING MEAL  Qty: 28 capsule, Refills: 5    Associated Diagnoses: Benign prostatic hyperplasia with urinary frequency      torsemide (DEMADEX) 20 mg tablet TAKE 3 TABLETS BY MOUTH TWICE DAILY  Qty: 168 tablet, Refills: 0    Associated Diagnoses: Chronic diastolic congestive heart failure ([facility])      Wound Dressings (ALLANTOIN) gel Apply 1 application topically as needed for wound care      bacitracin topical ointment 500 units/g topical ointment Apply 1 large application topically daily To 3 small areas on right shin      EASY TOUCH LANCETS 28G MISC USE AS DIRECTED    Qty: 100 each, Refills: 11    Associated Diagnoses: 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 ([place] [address] )      ergocalciferol (VITAMIN D2) 50,000 units TAKE 1 CAPSULE BY MOUTH EVERY WEDNESDAY AT NOON  Qty: 4 capsule, Refills: 5    Associated Diagnoses: Vitamin D deficiency      glucose blood test strip 1 each by Other route daily Use as instructed      Menthol-Zinc Oxide (REMEDY CALAZIME EX) Apply 1 application topically 2 (two) times a day To sacrum      nitroglycerin (NITROSTAT) 0 4 mg SL tablet Place 0 4 mg under the tongue every 5 (five) minutes as needed for chest pain      Omega-3 Fatty Acids (FISH OIL) 1,000 mg TAKE 1 CAPSULE BY MOUTH ONCE DAILY  Qty: 28 capsule, Refills: 3    Associated Diagnoses: Acute on chronic systolic heart failure (HCC)      Polyvinyl Alcohol-Povidone (REFRESH OP) Apply 1 drop to eye as needed      Tetrahydrozoline HCl (EYE DROPS OP) Apply 1 drop to eye daily at bedtime And prn      white petrolatum Apply 1 application topically as needed Apply topically to buttocks as needed  May keep in room and self administer       !! - Potential duplicate medications found  Please discuss with provider  No discharge procedures on file  ED Provider  Attending physically available and evaluated José Luis Mesa I managed the patient along with the ED Attending      Electronically Signed by         Ivania Mobley MD  12/08/18 1779

## 2018-12-08 NOTE — ASSESSMENT & PLAN NOTE
? Suspect secondary to acute on chronic diastolic congestive heart failure  ? given Lasix in the emergency department intravenously, will increase to 60 mg IV BID   ? ins and outs, daily weight,   ? repeat 2D echo,   ? check BMP  ? Chest CT:Mild interlobular septal thickening likely is chronic scarring however may reflect mild pulmonary edema   Bilateral areas of atelectatic changes     Small effusions bilaterally with fluid tracking into the minor fissure     No airspace disease/infiltrate or pneumothorax  Abnormality seen on chest x-ray reflects pleural effusion

## 2018-12-08 NOTE — ED NOTES
Pt moved to a hold bed at this time  AllevNode[employer] Life Sacrum wound dressing applied to sacral area  Pt propped on side with wedge to relieve sacral pressure        Moises Denver, RN  12/08/18 9459

## 2018-12-08 NOTE — UTILIZATION REVIEW
Initial Clinical Review    Admission: Date/Time/Statement: 12/08/18 @ 0331 -- OBS -- CONVERTED TO INPATIENT 12/08 @ 1419 FOR CONTINUED IV DIURETICS FOR ACUTE CHF    12/08/18 1419  Inpatient Admission Once     Transfer Service: General Medicine    Expected Discharge Date: 12/08/18       Question Answer Comment   Admitting Physician Marco A Angeles    Level of Care Med Surg    Estimated length of stay More than 2 Midnights    Certification I certify that inpatient services are medically necessary for this patient for a duration of greater than two midnights  See H&P and MD Progress Notes for additional information about the patient's course of treatment  12/08/18 1419       12/07/18 2320 Triage Started in ED       ED: Date/Time/Mode of Arrival:   ED Arrival Information     Expected Arrival Acuity Means of Arrival Escorted By Service Admission Type    - 12/7/2018 23:15 Emergent Ambulance [facility]    Arrival Complaint    Anxiety          Chief Complaint:   Chief Complaint   Patient presents with    Shortness of Breath     as per ems - patient resides at [facility], patient c/o shortness of breath and per ems "anxiety"       History of Illness: 80 y o  male with past medical history of diastolic congestive heart failure, hypertension, hyperlipidemia, diabetes mellitus, BPH presents emergency department with complaint of acute onset of midsternal chest pressure with associated shortness of breath  Patient reports having similar symptoms numerous times that is usually resolved with taking Xanax; however, tonight patient reports that Xanax did not help and came to the emergency department for further treatment  In the ED patient's pulse ox initially read 88 present, probe was reposition the patient was found to be in the mid to high 90s before any medical intervention was given    He had CT scan performed which showed enlarging right pleural effusion, does not appear large enough to tap, his given IV Lasix in being assigned to observation status for further management  He reports that his chest pain shortness of breath has resolved at the time of my evaluation  ED Vital Signs:   ED Triage Vitals   Temperature Pulse Respirations Blood Pressure SpO2   12/07/18 2320 12/07/18 2320 12/07/18 2320 12/07/18 2320 12/07/18 2320   97 9 °F (36 6 °C) 74 20 107/51 (!) 88 %      Temp Source Heart Rate Source Patient Position - Orthostatic VS BP Location FiO2 (%)   12/07/18 2320 12/07/18 2320 12/08/18 0853 12/08/18 0456 --   Oral Monitor Lying Right arm       Pain Score       12/07/18 2320       No Pain        Wt Readings from Last 1 Encounters:   12/07/18 97 5 kg (215 lb)       Vital Signs (abnormal): Pox 88-96%    Abnormal Labs/Diagnostic Test Results: WBC 4 29, Hgb 9 4, Hct 30 2, K 3 4, CL 93, BUN 37, Cr 1 90, Trop 0 06    CXR -- Mild pulmonary edema with right upper lobe patchy opacity could reflect atelectasis versus infiltrate versus edema  Loculated right basilar pleural effusion fluid as before  CT chest --  Mild interlobular septal thickening likely is chronic scarring however may reflect mild pulmonary edema  Bilateral areas of atelectatic changes  Small effusions bilaterally with fluid tracking into the minor fissure    No airspace disease/infiltrate or pneumothorax  Abnormality seen on chest x-ray reflects pleural effusion  EKG: paced rhythm    ED Treatment:   Medication Administration from 12/07/2018 2315 to 12/08/2018 1015       Date/Time Order Dose Route Action     12/08/2018 0358 furosemide (LASIX) injection 20 mg 20 mg Intravenous Given     12/08/2018 0731 carvedilol (COREG) tablet 3 125 mg 3 125 mg Oral Given     12/08/2018 0730 lactase (LACTAID) tablet 6,000 Units 6,000 Units Oral Given     12/08/2018 0933 furosemide (LASIX) injection 20 mg 20 mg Intravenous Given       Past Medical/Surgical History:    Active Ambulatory Problems     Diagnosis Date Noted    Lower urinary tract symptoms 05/16/2018    Malignant neoplasm of trigone of urinary bladder ([facility] ) 05/16/2018    Prostate cancer ([facility] ) 05/16/2018    Acute on chronic diastolic (congestive) heart failure ([facility] ) 05/22/2018    CKD (chronic kidney disease) stage 3, GFR 30-59 ml/min ([facility]) 05/22/2018    Paroxysmal atrial fibrillation ([facility] ) 05/22/2018    CAD (coronary artery disease) 05/22/2018    Type 2 diabetes mellitus with hyperglycemia, with long-term current use of insulin ([facility] ) 05/22/2018    Cardiac pacemaker in situ 05/22/2018    Right ureteral stone 07/01/2018    Urinary tract infection with hematuria 07/01/2018    Hyponatremia 07/02/2018    Other chest pain 07/04/2018    Laceration of right lower extremity 07/04/2018    Diarrhea 07/04/2018    Anemia 03/19/2018    Benign prostatic hyperplasia 07/10/2018    Blindness of left eye 03/20/2018    Kidney stones 07/10/2018    Peripheral neuropathy 03/19/2018    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 (LAD) artery 07/11/2018    Ureter, calculus 07/13/2018    Urinary retention 07/17/2018    Right flank pain 07/26/2018    Acute renal failure superimposed on stage 3 chronic kidney disease ([facility] ) 07/26/2018    Urinary tract infection associated with indwelling urethral catheter ([facility] ) 07/27/2018    Dyslipidemia 10/03/2018    Sick sinus syndrome ([facility] ) 10/03/2018    Vascular dementia with behavior disturbance 11/07/2018    Alcoholism ([facility] ) 11/07/2018    Shortness of breath      Past Medical History:   Diagnosis Date    A-fib ([facility] )     Anemia     Bladder cancer ([facility] )     CAD (coronary artery disease)     CHF (congestive heart failure) ([facility])     Chronic kidney failure     Colon cancer ([facility] )     Eye cancer, left ([facility] )     Hypertension     Incontinence     Pacemaker     Prostate cancer ([facility] )     Type 2 diabetes mellitus ([facility] )     Wears hearing aid in both ears        Admitting Diagnosis: Shortness of breath [R06 02]    Age/Sex: 80 y o  male    Assessment/Plan:   Plan for the Primary Problem(s):  · Shortness of breath  ? Suspect secondary to acute on chronic diastolic congestive heart failure, assigned observation status for further management, given Lasix in the emergency department intravenously, will continue, ins and outs, daily weight, repeat 2D echo, check BMP     Plan for Additional Problems:   · Chest pain:  Check serial cardiac markers, continue antiplatelets, statin and beta-blocker therapy as taken as an outpatient  · History of vascular dementia with behavioral disturbance:  Continue Xanax as needed  · Stage I sacral decubitus:  Frequent turns, wound care  · Stage 3 chronic kidney disease:  Creatinine at baseline at 1 9, monitor renal function closely with diuretics  · Blindness of left eye:  Noted  · Paroxysmal atrial fibrillation:  Continue Eliquis, Coreg, monitor on telemetry  · Type 2 DM with long term insulin use: continue lantus as taking as outpt, monitor BG levels, add correctional insulin, stop metformin given hx DEANA with baseline creatinine of 1 9     Anticipated Length of Stay:  Patient will be admitted on an Observation basis with an anticipated length of stay of  Less than 2 midnights     Justification for Hospital Stay: IV diuretics      Admission Orders:  Scheduled Meds:   acetaminophen 650 mg Oral Q6H PRN   allantoin 1 application Topical PRN   ALPRAZolam 0 5 mg Oral 4x Daily PRN   apixaban 2 5 mg Oral BID   aspirin 81 mg Oral Daily   atorvastatin 20 mg Oral After Dinner   bacitracin 1 small application Topical Daily   carbamide peroxide 4 drop Right Ear BID   carvedilol 3 125 mg Oral BID With Meals   finasteride 5 mg Oral Daily   fish oil 1,000 mg Oral Daily   furosemide 20 mg Intravenous BID (diuretic)   gabapentin 100 mg Oral Daily   gabapentin 300 mg Oral HS   insulin glargine 10 Units Subcutaneous HS   insulin lispro 1-5 Units Subcutaneous TID AC   insulin lispro 1-5 Units Subcutaneous HS ipratropium-albuterol 3 mL Nebulization Q6H PRN   lactase 6,000 Units Oral TID With Meals   lisinopril 2 5 mg Oral Daily   menthol-zinc oxide  Topical BID   metolazone 2 5 mg Oral Daily   multivitamin-minerals 1 tablet Oral Daily   nitroglycerin 0 4 mg Sublingual Q5 Min PRN   ondansetron 4 mg Intravenous Q6H PRN   polyvinyl alcohol 1 drop Both Eyes Q3H PRN   potassium chloride 20 mEq Oral BID   tamsulosin 0 4 mg Oral Daily With Dinner   white petrolatum 1 application Topical Once     Telem  Serial trops:  0 07 -- 0 07  EKG  Echo  Cardiac diet  1500 ml/day fluid restriction  PT/OT evals  Consult cardiology  Fingerstick glucose checks ac & hs  I&O's  Daily wts  SCD's      12/8 -- phys note:  Certification Statement: The patient, admitted on an observation basis, will now require > 2 midnight hospital stay due to need for IV diuretics, acute on chronic CHF

## 2018-12-08 NOTE — ASSESSMENT & PLAN NOTE
Lab Results   Component Value Date    HGBA1C 7 5 (H) 12/08/2018       Recent Labs      12/08/18   0713   POCGLU  131       Blood Sugar Average: Last 72 hrs:  (P) 131     · continue lantus as taking as outpt   · monitor BS levels   · add correctional insulin   · stop metformin given hx DEANA with baseline creatinine of 1 9

## 2018-12-08 NOTE — ASSESSMENT & PLAN NOTE
· Troponins elevated at 0 06 and 0 07, appears to be patient's baseline per review of prior labs     · Continue antiplatelets, statin and beta-blocker therapy as taken as an outpatient  · Replete potassium

## 2018-12-08 NOTE — PROGRESS NOTES
Progress Note - Christy Born 12/21/1930, 80 y o  male MRN: [id number]    Unit/Bed#: ED 13 Encounter: [id number]    Primary Care Provider: Antonietta Lopez MD   Date and time admitted to hospital: 12/7/2018 11:17 PM        Shortness of breath   Assessment & Plan    ? Suspect secondary to acute on chronic diastolic congestive heart failure  ? given Lasix in the emergency department intravenously, will increase to 60 mg IV BID   ? ins and outs, daily weight,   ? repeat 2D echo,   ? check BMP  ? Chest CT:Mild interlobular septal thickening likely is chronic scarring however may reflect mild pulmonary edema   Bilateral areas of atelectatic changes     Small effusions bilaterally with fluid tracking into the minor fissure     No airspace disease/infiltrate or pneumothorax  Abnormality seen on chest x-ray reflects pleural effusion  * Acute on chronic diastolic (congestive) heart failure (HCC)   Assessment & Plan    · Lasix given in ED, Increase from 20 mg to 60 mg BID   · I&Os and Daily weights  · Cardiology consulted  · Fluid restriction 1500ml daily, Na restriction 2000mg  · Weight at admission 215lbs up from 192lbs in October 2018       Decubitus ulcer of sacral region, stage 1   Assessment & Plan    · Frequent turns   · Wound care      Other chest pain   Assessment & Plan    · Troponins elevated at 0 06 and 0 07, appears to be patient's baseline per review of prior labs     · Continue antiplatelets, statin and beta-blocker therapy as taken as an outpatient  · Replete potassium      Type 2 diabetes mellitus with hyperglycemia, with long-term current use of insulin [facility])   Assessment & Plan    Lab Results   Component Value Date    HGBA1C 7 5 (H) 12/08/2018       Recent Labs      12/08/18   0713   POCGLU  131       Blood Sugar Average: Last 72 hrs:  (P) 131     · continue lantus as taking as outpt   · monitor BS levels   · add correctional insulin   · stop metformin given hx DEANA with baseline creatinine of 1 9 CKD (chronic kidney disease) stage 3, GFR 30-59 ml/min ([facility])   Assessment & Plan    · Creatinine at baseline at 1 9, monitor renal function closely with diuretics     Vascular dementia with behavior disturbance   Assessment & Plan    · Continue Xanax as needed      Blindness of left eye   Assessment & Plan    · Supportive care     Paroxysmal atrial fibrillation (HCC)   Assessment & Plan    · Continue eliquis, Coreg, monitor on telemetry        VTE Pharmacologic Prophylaxis:   Pharmacologic: Apixaban (Eliquis)  Mechanical: Mechanical VTE prophylaxis in place  Patient Centered Rounds: I have performed bedside rounds with nursing staff today  Discussions with Specialists or Other Care Team Provider: cardiology  Education and Discussions with Family / Patient: Patient questions answered  Time Spent for Care: 30 minutes  More than 50% of total time spent on counseling and coordination of care as described above  Current Length of Stay: 0 day(s)  Current Patient Status: Observation   Certification Statement: The patient, admitted on an observation basis, will now require > 2 midnight hospital stay due to need for IV diuretics, acute on chronic CHF     Discharge Plan: skilled nursing facility when medically stable   Code Status: Level 1 - Full Code    Subjective:   Patient states he feels well this AM  Patient presented due to shortness of breath and chest pressure  Patient reports this happens on and off for the past 4 years  Patient reports the episodes typically respond to oxygen  Patient reports he does not have chest pain or shortness of breath at this time  Patient does report his abdomen is typically "swollen," but that is legs rarely become swollen  Patient admits left leg does appear swollen to him this morning  Patient reports he rarely is able to sleep laying flat due to difficulty breathing  Patient also admits that his stomach often gets so large that he cannot bend down to eat at his dinner table  Objective:   Vitals:   Temp (24hrs), Av 9 °F (36 6 °C), Min:97 9 °F (36 6 °C), Max:97 9 °F (36 6 °C)    Temp:  [97 9 °F (36 6 °C)] 97 9 °F (36 6 °C)  HR:  [74-78] 78  Resp:  [18-22] 22  BP: (107-129)/(51-65) 129/60  SpO2:  [88 %-99 %] 99 %  Body mass index is 31 75 kg/m²  Input and Output Summary (last 24 hours): Intake/Output Summary (Last 24 hours) at 18 1356  Last data filed at 18 1202   Gross per 24 hour   Intake                0 ml   Output             1150 ml   Net            -1150 ml       Physical Exam:     Physical Exam   Constitutional: He is oriented to person, place, and time  He appears well-nourished  No distress  HENT:   Head: Normocephalic and atraumatic  Mouth/Throat: Oropharynx is clear and moist    Eyes: EOM are normal  No scleral icterus  Complete blindness is left eye, eyelid ptosis    Neck: Neck supple  Cardiovascular: Normal rate, regular rhythm and normal heart sounds  No murmur heard  Pulmonary/Chest: Effort normal  No respiratory distress  He has no wheezes  Abdominal: Soft  Bowel sounds are normal  He exhibits distension  There is tenderness  There is no rebound  Musculoskeletal: He exhibits edema (bilateral LE, 3+ )  He exhibits no deformity  Neurological: He is alert and oriented to person, place, and time  Skin: Skin is warm and dry  Bilateral lower leg venous stasis changes  Psychiatric: He has a normal mood and affect  His behavior is normal        Additional Data:   Labs:    Results from last 7 days  Lab Units 18  2358   WBC Thousand/uL 4 29*   HEMOGLOBIN g/dL 9 4*   HEMATOCRIT % 30 2*   PLATELETS Thousands/uL 192   NEUTROS PCT % 72   LYMPHS PCT % 12*   MONOS PCT % 11   EOS PCT % 4       Results from last 7 days  Lab Units 18  0553   POTASSIUM mmol/L 3 1*   CHLORIDE mmol/L 94*   CO2 mmol/L 35*   BUN mg/dL 36*   CREATININE mg/dL 1 93*   CALCIUM mg/dL 9 3           * I Have Reviewed All Lab Data Listed Above    * Additional Pertinent Lab Tests Reviewed: Wendi 66 Admission Reviewed    Imaging:    Imaging Reports Reviewed Today Include: chest CT, chest XR    Cultures:   Blood Culture:   Lab Results   Component Value Date    BLOODCX No Growth After 5 Days  07/26/2018    BLOODCX No Growth After 5 Days   07/26/2018     Urine Culture:   Lab Results   Component Value Date    URINECX >100,000 cfu/ml Enterococcus faecalis (A) 07/31/2018    URINECX >100,000 cfu/ml Citrobacter freundii (A) 07/26/2018    URINECX 40,000-49,000 cfu/ml Enterococcus faecalis (A) 07/26/2018    URINECX <10,000 cfu/ml  07/04/2018    URINECX >100,000 cfu/ml Escherichia coli (A) 07/01/2018     Sputum Culture: No components found for: SPUTUMCX  Wound Culture:   Lab Results   Component Value Date    WOUNDCULT 4+ Growth of Diphtheroids 09/25/2018       Last 24 Hours Medication List:     Current Facility-Administered Medications:  acetaminophen 650 mg Oral Q6H PRN Kwame Schmitz MD   allantoin 1 application Topical PRN Kwame Schmitz MD   ALPRAZolam 0 5 mg Oral 4x Daily PRN Kwame Schmitz MD   apixaban 2 5 mg Oral BID Kwame Schmitz MD   aspirin 81 mg Oral Daily Kwame Schmitz MD   atorvastatin 20 mg Oral After James Cordova MD   bacitracin 1 small application Topical Daily Kwame Schmitz MD   carbamide peroxide 4 drop Right Ear BID Kwame Schmitz MD   carvedilol 3 125 mg Oral BID With Meals Kwame Schmitz MD   finasteride 5 mg Oral Daily Kwame Schmitz MD   fish oil 1,000 mg Oral Daily Kwame Schmitz MD   furosemide 60 mg Intravenous BID (diuretic) Phyllistine MD Dacia   gabapentin 100 mg Oral Daily Kwame Schmitz MD   gabapentin 300 mg Oral HS Kwame Schmitz MD   insulin glargine 10 Units Subcutaneous HS Kwame Schmitz MD   insulin lispro 1-5 Units Subcutaneous TID AC Kwame Schmitz MD   insulin lispro 1-5 Units Subcutaneous HS Kwame Schmitz MD   ipratropium-albuterol 3 mL Nebulization Q6H PRN Tobi Gutiérrez MD   lactase 6,000 Units Oral TID With Meals Tobi Gutiérrez MD   lisinopril 2 5 mg Oral Daily Tobi Gutiérrez MD   menthol-zinc oxide  Topical BID Tobi Gutiérrez MD   metolazone 2 5 mg Oral Daily Tobi Gutiérrez MD   multivitamin-minerals 1 tablet Oral Daily Tobi Gutiérrez MD   nitroglycerin 0 4 mg Sublingual Q5 Min PRN Tobi Gutiérrez MD   ondansetron 4 mg Intravenous Q6H PRN Tobi Gutiérrez MD   polyvinyl alcohol 1 drop Both Eyes Q3H PRN Tobi Gutiérrez MD   potassium chloride 40 mEq Oral BID With Meals Abhay Mejia MD   tamsulosin 0 4 mg Oral Daily With Baldo Harrison MD   white petrolatum 1 application Topical Once Tobi Gutiérrez MD        Today, Patient Was Seen By: Lia Rosenberg PA-C    ** Please Note: Dragon 360 Dictation voice to text software may have been used in the creation of this document   **

## 2018-12-09 PROBLEM — R07.89 OTHER CHEST PAIN: Status: RESOLVED | Noted: 2018-07-04 | Resolved: 2018-12-09

## 2018-12-09 LAB
ANION GAP SERPL CALCULATED.3IONS-SCNC: 7 MMOL/L (ref 4–13)
ATRIAL RATE: 441 BPM
BUN SERPL-MCNC: 35 MG/DL (ref 5–25)
CALCIUM SERPL-MCNC: 8.7 MG/DL (ref 8.3–10.1)
CHLORIDE SERPL-SCNC: 94 MMOL/L (ref 100–108)
CO2 SERPL-SCNC: 35 MMOL/L (ref 21–32)
CREAT SERPL-MCNC: 1.7 MG/DL (ref 0.6–1.3)
GFR SERPL CREATININE-BSD FRML MDRD: 35 ML/MIN/1.73SQ M
GLUCOSE SERPL-MCNC: 132 MG/DL (ref 65–140)
GLUCOSE SERPL-MCNC: 133 MG/DL (ref 65–140)
GLUCOSE SERPL-MCNC: 195 MG/DL (ref 65–140)
GLUCOSE SERPL-MCNC: 204 MG/DL (ref 65–140)
GLUCOSE SERPL-MCNC: 213 MG/DL (ref 65–140)
MAGNESIUM SERPL-MCNC: 2.3 MG/DL (ref 1.6–2.6)
NT-PROBNP SERPL-MCNC: 4999 PG/ML
POTASSIUM SERPL-SCNC: 3.6 MMOL/L (ref 3.5–5.3)
QRS AXIS: 255 DEGREES
QRSD INTERVAL: 188 MS
QT INTERVAL: 476 MS
QTC INTERVAL: 535 MS
SODIUM SERPL-SCNC: 136 MMOL/L (ref 136–145)
T WAVE AXIS: 67 DEGREES
VENTRICULAR RATE: 76 BPM

## 2018-12-09 PROCEDURE — 97162 PT EVAL MOD COMPLEX 30 MIN: CPT

## 2018-12-09 PROCEDURE — 93010 ELECTROCARDIOGRAM REPORT: CPT | Performed by: INTERNAL MEDICINE

## 2018-12-09 PROCEDURE — G8979 MOBILITY GOAL STATUS: HCPCS

## 2018-12-09 PROCEDURE — 99232 SBSQ HOSP IP/OBS MODERATE 35: CPT | Performed by: INTERNAL MEDICINE

## 2018-12-09 PROCEDURE — G8978 MOBILITY CURRENT STATUS: HCPCS

## 2018-12-09 PROCEDURE — 94760 N-INVAS EAR/PLS OXIMETRY 1: CPT

## 2018-12-09 PROCEDURE — 83735 ASSAY OF MAGNESIUM: CPT | Performed by: NURSE PRACTITIONER

## 2018-12-09 PROCEDURE — 82948 REAGENT STRIP/BLOOD GLUCOSE: CPT

## 2018-12-09 PROCEDURE — 80048 BASIC METABOLIC PNL TOTAL CA: CPT | Performed by: INTERNAL MEDICINE

## 2018-12-09 PROCEDURE — 99232 SBSQ HOSP IP/OBS MODERATE 35: CPT | Performed by: HOSPITALIST

## 2018-12-09 PROCEDURE — 83880 ASSAY OF NATRIURETIC PEPTIDE: CPT | Performed by: HOSPITALIST

## 2018-12-09 RX ORDER — FUROSEMIDE 10 MG/ML
80 INJECTION INTRAMUSCULAR; INTRAVENOUS
Status: DISCONTINUED | OUTPATIENT
Start: 2018-12-09 | End: 2018-12-11

## 2018-12-09 RX ADMIN — POTASSIUM CHLORIDE 40 MEQ: 1500 TABLET, EXTENDED RELEASE ORAL at 17:22

## 2018-12-09 RX ADMIN — ANORECTAL OINTMENT: 15.7; .44; 24; 20.6 OINTMENT TOPICAL at 08:52

## 2018-12-09 RX ADMIN — TAMSULOSIN HYDROCHLORIDE 0.4 MG: 0.4 CAPSULE ORAL at 17:22

## 2018-12-09 RX ADMIN — LISINOPRIL 2.5 MG: 2.5 TABLET ORAL at 08:44

## 2018-12-09 RX ADMIN — GABAPENTIN 100 MG: 100 CAPSULE ORAL at 08:44

## 2018-12-09 RX ADMIN — ASPIRIN 81 MG: 81 TABLET, COATED ORAL at 08:44

## 2018-12-09 RX ADMIN — BACITRACIN ZINC 1 SMALL APPLICATION: 500 OINTMENT TOPICAL at 08:45

## 2018-12-09 RX ADMIN — CARVEDILOL 3.12 MG: 3.12 TABLET, FILM COATED ORAL at 08:45

## 2018-12-09 RX ADMIN — INSULIN LISPRO 1 UNITS: 100 INJECTION, SOLUTION INTRAVENOUS; SUBCUTANEOUS at 21:16

## 2018-12-09 RX ADMIN — LACTASE TAB 3000 UNIT 6000 UNITS: 3000 TAB at 08:49

## 2018-12-09 RX ADMIN — Medication 1 TABLET: at 08:44

## 2018-12-09 RX ADMIN — ANORECTAL OINTMENT: 15.7; .44; 24; 20.6 OINTMENT TOPICAL at 17:23

## 2018-12-09 RX ADMIN — FINASTERIDE 5 MG: 5 TABLET, FILM COATED ORAL at 08:44

## 2018-12-09 RX ADMIN — GABAPENTIN 300 MG: 300 CAPSULE ORAL at 21:15

## 2018-12-09 RX ADMIN — FUROSEMIDE 60 MG: 10 INJECTION, SOLUTION INTRAMUSCULAR; INTRAVENOUS at 08:45

## 2018-12-09 RX ADMIN — Medication 1000 MG: at 08:44

## 2018-12-09 RX ADMIN — LACTASE TAB 3000 UNIT 6000 UNITS: 3000 TAB at 14:16

## 2018-12-09 RX ADMIN — APIXABAN 2.5 MG: 2.5 TABLET, FILM COATED ORAL at 08:44

## 2018-12-09 RX ADMIN — INSULIN LISPRO 1 UNITS: 100 INJECTION, SOLUTION INTRAVENOUS; SUBCUTANEOUS at 17:24

## 2018-12-09 RX ADMIN — FUROSEMIDE 80 MG: 10 INJECTION, SOLUTION INTRAMUSCULAR; INTRAVENOUS at 12:02

## 2018-12-09 RX ADMIN — CARVEDILOL 3.12 MG: 3.12 TABLET, FILM COATED ORAL at 17:28

## 2018-12-09 RX ADMIN — Medication 4 DROP: at 08:49

## 2018-12-09 RX ADMIN — Medication 4 DROP: at 17:29

## 2018-12-09 RX ADMIN — POTASSIUM CHLORIDE 40 MEQ: 1500 TABLET, EXTENDED RELEASE ORAL at 08:44

## 2018-12-09 RX ADMIN — INSULIN GLARGINE 10 UNITS: 100 INJECTION, SOLUTION SUBCUTANEOUS at 21:15

## 2018-12-09 RX ADMIN — LACTASE TAB 3000 UNIT 6000 UNITS: 3000 TAB at 17:23

## 2018-12-09 RX ADMIN — METOLAZONE 2.5 MG: 5 TABLET ORAL at 08:54

## 2018-12-09 RX ADMIN — APIXABAN 2.5 MG: 2.5 TABLET, FILM COATED ORAL at 17:22

## 2018-12-09 RX ADMIN — INSULIN LISPRO 1 UNITS: 100 INJECTION, SOLUTION INTRAVENOUS; SUBCUTANEOUS at 12:02

## 2018-12-09 RX ADMIN — ATORVASTATIN CALCIUM 20 MG: 20 TABLET, FILM COATED ORAL at 17:22

## 2018-12-09 RX ADMIN — FUROSEMIDE 80 MG: 10 INJECTION, SOLUTION INTRAMUSCULAR; INTRAVENOUS at 17:22

## 2018-12-09 NOTE — ASSESSMENT & PLAN NOTE
· Creatinine at baseline at 1 9, monitor renal function closely with diuretics  · Creatinine trended down to 1 70-responding well  · Continue monitor

## 2018-12-09 NOTE — SOCIAL WORK
CM met with pt and explained role  Pt is a resident of [facility] with the goal to return there at d/c  Pt ambulates with a RW and motorized scooter  Pt can perform ADLS independently  Pt has previous SNF at facilities in [facility] is his dtr Jordin Rucker ([phone])  CM reviewed d/c planning process including the following: identifying help at home, patient preference for d/c planning needs, Discharge Lounge, [facility] Meds to Bed program, availability of treatment team to discuss questions or concerns patient and/or family may have regarding understanding medications and recognizing signs and symptoms once discharged  CM also encouraged patient to follow up with all recommended appointments after discharge  Patient advised of importance for patient and family to participate in managing patients medical well being

## 2018-12-09 NOTE — PHYSICAL THERAPY NOTE
Physical Therapy Evaluation     Patient's Name: Denice Harrison    Admitting Diagnosis  Shortness of breath [R06 02]  Pulmonary edema [J81 1]  Chest pressure [R07 89]  CHF exacerbation ([address] ) [I50 9]    Problem List  Patient Active Problem List   Diagnosis    Lower urinary tract symptoms    Malignant neoplasm of trigone of urinary bladder (HCC)    Prostate cancer (HCC)    Acute on chronic diastolic (congestive) heart failure (HCC)    CKD (chronic kidney disease) stage 3, GFR 30-59 ml/min ([facility])    Paroxysmal atrial fibrillation ([address] )    CAD (coronary artery disease)    Type 2 diabetes mellitus with hyperglycemia, with long-term current use of insulin ([facility] )    Cardiac pacemaker in situ    Right ureteral stone    Urinary tract infection with hematuria    Hyponatremia    Other chest pain    Laceration of right lower extremity    Diarrhea    Anemia    Benign prostatic hyperplasia    Blindness of left eye    Kidney stones    Peripheral neuropathy    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 (LAD) artery    Ureter, calculus    Urinary retention    Right flank pain    Acute renal failure superimposed on stage 3 chronic kidney disease (HCC)    Urinary tract infection associated with indwelling urethral catheter ([facility])    Dyslipidemia    Sick sinus syndrome ([address] )    Vascular dementia with behavior disturbance    Alcoholism ([address] )    Shortness of breath    Decubitus ulcer of sacral region, stage 1       Past Medical History  Past Medical History:   Diagnosis Date    A-fib ([address] )     Anemia     Bladder cancer ([address] )     CAD (coronary artery disease)     CHF (congestive heart failure) ([facility])     Chronic kidney failure     Colon cancer ([address] )     Eye cancer, left ([address] )     Hypertension     Incontinence     Pacemaker     Prostate cancer ([address] )     Type 2 diabetes mellitus ([address] )     Wears hearing aid in both ears        Past Surgical History  Past Surgical History: Procedure Laterality Date    CARDIAC PACEMAKER PLACEMENT  2014    CORONARY ANGIOPLASTY WITH STENT PLACEMENT      GALLBLADDER SURGERY      OK CYSTO/URETERO W/LITHOTRIPSY &INDWELL STENT INSRT Right 7/31/2018    Procedure: CYSTOSCOPY URETEROSCOPY WITH LITHOTRIPSY HOLMIUM LASER ,BASKET STONE EXTRACTION  RETROGRADE PYELOGRAM AND exchange STENT URETERAL;  Surgeon: Skip Dimas MD;  [facility];  Service: Urology    OK CYSTOURETHROSCOPY,URETER CATHETER Right 7/1/2018    Procedure: CYSTOSCOPY RETROGRADE PYELOGRAM WITH INSERTION STENT URETERAL;  Surgeon: Andrea Perdomo MD;  [facility];  Service: Urology      12/09/18 1222   Note Type   Note type Eval only   Pain Assessment   Pain Assessment No/denies pain   Pain Score No Pain   Home Living   Type of Home Assisted living  [facility])   Bathroom Shower/Tub Walk-in shower   Bathroom Toilet Raised   Bathroom Equipment Shower chair;Grab bars in shower;Grab bars around toilet   [facility]; Wheelchair-electric   Prior Function   Level of [place] Needs assistance with IADLs; Needs assistance with ADLs and functional mobility   Lives With Facility staff   Receives Help From Personal care attendant   ADL Assistance Needs assistance   IADLs Needs assistance   Falls in the last 6 months 0   Vocational Retired   Comments PTA, pt reported ambulating short distances (mainly to the bathroom and back) with the use of a rolling walker and I with use of electric wheel chair for long distances  Reportedly, he was I with showering and received assistance for medications and meals  Pt is a poor historian due to cognitive deficits and perseverated during social history  Restrictions/Precautions   Weight Bearing Precautions Per Order No   Other Precautions Cognitive; Chair Alarm; Bed Alarm; Fall Risk   General   Family/Caregiver Present No   Cognition   Overall Cognitive Status Impaired   Arousal/Participation Alert   Orientation Level Oriented to person;Oriented to place;Oriented to situation;Disoriented to time   Memory Decreased recall of precautions;Decreased recall of recent events   Following Commands Follows one step commands with increased time or repetition   Comments Demonstrated impulsivity with sit to stand and attempted to walk without an AD and with blankets wrapped around his legs   RLE Assessment   RLE Assessment WFL   LLE Assessment   LLE Assessment WFL   Bed Mobility   Additional Comments Seated in bedside chair on arrival   Transfers   Sit to Stand 5  Supervision   Stand to Sit 5  Supervision   Stand pivot 5  Supervision   Ambulation/Elevation   Gait pattern Forward Flexion; Wide YADI; Decreased foot clearance   Gait Assistance 5  Supervision   Additional items Verbal cues; Tactile cues   Assistive Device Rolling walker   Distance 10'   Balance   Static Sitting Fair +   Dynamic Sitting Fair +   Static Standing Fair   Dynamic Standing Fair   Ambulatory Fair -   Endurance Deficit   Endurance Deficit Yes   Endurance Deficit Description SOB with short distances   Activity Tolerance   Activity Tolerance Patient limited by fatigue   Nurse Made Aware yes, RN cleared pt for PT eval and present during session   Assessment   Prognosis Good   Problem List Decreased strength;Decreased endurance; Impaired balance;Decreased mobility; Decreased coordination;Decreased cognition; Impaired judgement;Decreased safety awareness   Assessment Pt is 80 y o  male seen for PT moderate complexity evaluation s/p admit to [facility] on 12/7/2018 w/ Acute on chronic diastolic (congestive) heart failure ([address] )  Pt presented to the ED with complaints of acute onset midsternal chest pressure with associated SOB  CT revealed enlarging right pleural effusion  PT consulted to assess pt's functional mobility and d/c needs  Order placed for PT eval and tx, w/ up w/ A order   Comorbidities affecting pt's physical performance at time of assessment include: diastolic congestive heart failure, hypertension, hyperlipidemia, diabetes mellitus, BPH   PTA, pt was independent w/ all functional mobility w/ rw short distances and electric wheel chair for long distances and resident [facility]  Personal factors affecting pt at time of IE include: inability to ambulate household distances and decreased cognition  Please find objective findings from PT assessment regarding body systems outlined above with impairments and limitations including weakness, impaired balance, decreased endurance, gait deviations, decreased activity tolerance, decreased functional mobility tolerance, fall risk and SOB upon exertion  The following objective measures performed on IE also reveal limitations: Barthel Index: 55/100  Pt to benefit from continued PT tx to address deficits as defined above and maximize level of functional independent mobility and consistency  From PT/mobility standpoint, recommendation at time of d/c would be return to [facility] with HHPT pending progress in order to facilitate return to [facility]  Barriers to Discharge None   Goals   Patient Goals To get physical therapy at [facility] to walk further   STG Expiration Date 12/19/18   Short Term Goal #1 Pt will demonstrate: 1) increased BLE strength >/= 1 grade for improved transfers 2) supine <> sit transfer with mod I 3) sit <> stand transfer with mod I 4) increased dynamic balance >/= 1 grade to decrease risk for falls 5) Amb >/= 50' with mod I using rolling walker    Treatment Day 0   Plan   Treatment/Interventions Functional transfer training;LE strengthening/ROM; Therapeutic exercise; Endurance training;Cognitive reorientation;Patient/family training;Equipment eval/education; Bed mobility;Gait training;Spoke to nursing   PT Frequency (3-5x/wk)   Recommendation   Recommendation Home PT  (return to [place] with [address])   Equipment Recommended Wheelchair;Walker   PT - OK to Discharge Yes Additional Comments When medically stable   Barthel Index   Feeding 10   Bathing 0   Grooming Score 5   Dressing Score 5   Bladder Score 10   Bowels Score 10   Toilet Use Score 5   Transfers (Bed/Chair) Score 10   Mobility (Level Surface) Score 0   Stairs Score 0   Barthel Index Score 55           Asha Mcfadden, PT, DPT

## 2018-12-09 NOTE — PLAN OF CARE
CARDIOVASCULAR - ADULT     Maintains optimal cardiac output and hemodynamic stability Progressing     Absence of cardiac dysrhythmias or at baseline rhythm Progressing        DISCHARGE PLANNING     Discharge to home or other facility with appropriate resources Progressing        INFECTION - ADULT     Absence or prevention of progression during hospitalization Progressing        Knowledge Deficit     Patient/family/caregiver demonstrates understanding of disease process, treatment plan, medications, and discharge instructions Progressing        Nutrition/Hydration-ADULT     Nutrient/Hydration intake appropriate for improving, restoring or maintaining nutritional needs Progressing        PAIN - ADULT     Verbalizes/displays adequate comfort level or baseline comfort level Progressing        Prexisting or High Potential for Compromised Skin Integrity     Skin integrity is maintained or improved Progressing        SAFETY ADULT     Patient will remain free of falls Progressing     Maintain or return to baseline ADL function Progressing     Maintain or return mobility status to optimal level Progressing

## 2018-12-09 NOTE — RESPIRATORY THERAPY NOTE
RT Protocol Note  Christy Abarca 80 y o  male MRN: [id number]  Unit/Bed#: [facility] 521-01 Encounter: [id number]    Assessment    Principal Problem:    Acute on chronic diastolic (congestive) heart failure (HCC)  Active Problems:    CKD (chronic kidney disease) stage 3, GFR 30-59 ml/min (HCC)    Paroxysmal atrial fibrillation (HCC)    Type 2 diabetes mellitus with hyperglycemia, with long-term current use of insulin (HCC)    Other chest pain    Blindness of left eye    Vascular dementia with behavior disturbance    Shortness of breath    Decubitus ulcer of sacral region, stage 1      Home Pulmonary Medications:  duoneb prn       Past Medical History:   Diagnosis Date    [facility]fib ([facility] )     Anemia     Bladder cancer ([facility] )     CAD (coronary artery disease)     [facility]     Chronic kidney failure     Colon cancer ([facility] )     Eye cancer, left ([facility] )     Hypertension     Incontinence     Pacemaker     Prostate cancer ([facility] )     Type 2 diabetes mellitus ([facility] )     Wears hearing aid in both ears      Social History     Social History    Marital status:      Spouse name: N/A    Number of children: N/A    Years of education: N/A     Social History Main Topics    Smoking status: Former Smoker    Smokeless tobacco: Never Used    Alcohol use Yes      Comment: jayne cognac 1 oz/night     Drug use: No    Sexual activity: Not Currently     Other Topics Concern    None     Social History Narrative    None       Subjective         Objective    Physical Exam:   Assessment Type: (P) Assess only  Respiratory Pattern: (P) Symmetrical  Chest Assessment: (P) Chest expansion symmetrical  Bilateral Breath Sounds: (P) Diminished, Clear  R Breath Sounds: (P) Clear, Diminished  L Breath Sounds: (P) Clear, Diminished    Vitals:  Blood pressure 122/66, pulse 84, temperature 98 1 °F (36 7 °C), temperature source Oral, resp  rate 18, height 5' 9" (1 753 m), weight 97 5 kg (215 lb), SpO2 95 %  Imaging and other studies: I have personally reviewed pertinent reports  Plan    Respiratory Plan: (P) Home Bronchodilator Patient pathway        Resp Comments: (P) Pt evaluated per resp protocol  Pt denies sob/distress @ this time  He does use duoneb on a prn basis @ home  Will plan to order pt on albuteral 2 5mg/nss q4prn while he remains inpatient

## 2018-12-09 NOTE — PLAN OF CARE
Problem: PHYSICAL THERAPY ADULT  Goal: Performs mobility at highest level of function for planned discharge setting  See evaluation for individualized goals  Treatment/Interventions: Functional transfer training, LE strengthening/ROM, Therapeutic exercise, Endurance training, Cognitive reorientation, Patient/family training, Equipment eval/education, Bed mobility, Gait training, Spoke to nursing  Equipment Recommended: Wheelchair, Walker       See flowsheet documentation for full assessment, interventions and recommendations  Prognosis: Good  Problem List: Decreased strength, Decreased endurance, Impaired balance, Decreased mobility, Decreased coordination, Decreased cognition, Impaired judgement, Decreased safety awareness  Assessment: Pt is 80 y o  male seen for PT moderate complexity evaluation s/p admit to [facility] on 12/7/2018 w/ Acute on chronic diastolic (congestive) heart failure ([facility] )  Pt presented to the ED with complaints of acute onset midsternal chest pressure with associated SOB  CT revealed enlarging right pleural effusion  PT consulted to assess pt's functional mobility and d/c needs  Order placed for PT eval and tx, w/ up w/ A order  Comorbidities affecting pt's physical performance at time of assessment include: diastolic congestive heart failure, hypertension, hyperlipidemia, diabetes mellitus, BPH   PTA, pt was independent w/ all functional mobility w/ rw short distances and electric wheel chair for long distances and resident of [facility]  Personal factors affecting pt at time of IE include: inability to ambulate household distances and decreased cognition  Please find objective findings from PT assessment regarding body systems outlined above with impairments and limitations including weakness, impaired balance, decreased endurance, gait deviations, decreased activity tolerance, decreased functional mobility tolerance, fall risk and SOB upon exertion   The following objective measures performed on IE also reveal limitations: Barthel Index: 55/100  Pt to benefit from continued PT tx to address deficits as defined above and maximize level of functional independent mobility and consistency  From PT/mobility standpoint, recommendation at time of d/c would be return to [facility] with HHPT pending progress in order to facilitate return to [facility]  Barriers to Discharge: None     Recommendation: Home PT (return to [facility] with HHPT)     PT - OK to Discharge: Yes    See flowsheet documentation for full assessment

## 2018-12-09 NOTE — PROGRESS NOTES
Cardiology Progress Note - Gregorio Conrad 80 y o  male MRN: [id number]    Unit/Bed#: [facility] 530-01 Encounter: [id number]        Subjective:    No significant events overnight  Still dyspneic  Review of Systems   Cardiovascular: Negative for chest pain, leg swelling and palpitations  Respiratory: Positive for shortness of breath  Objective:   Vitals: Blood pressure 121/56, pulse 67, temperature 97 6 °F (36 4 °C), temperature source Oral, resp  rate 18, height 5' 9" (1 753 m), weight 89 7 kg (197 lb 12 oz), SpO2 94 %  , Body mass index is 29 2 kg/m² , Orthostatic Blood Pressures      Most Recent Value   Blood Pressure  121/56 filed at 12/09/2018 2456   Patient Position - Orthostatic VS  Lying filed at 12/08/2018 6463         Systolic (39SEI), KDA:950 , Min:108 , YKP:084     Diastolic (45QTW), NRM:67, Min:53, Max:66      Intake/Output Summary (Last 24 hours) at 12/09/18 0847  Last data filed at 12/09/18 0414   Gross per 24 hour   Intake              243 ml   Output             1603 ml   Net            -1360 ml     Weight (last 2 days)     Date/Time   Weight    12/09/18 0454  89 7 (197 75)    12/07/18 2320  97 5 (215)                  Telemetry Review: Paced    Physical Exam      Laboratory Results:    Results from last 7 days  Lab Units 12/08/18  0848 12/08/18  0553 12/07/18  2358   TROPONIN I ng/mL 0 07* 0 07* 0 06*       CBC with diff:   Results from last 7 days  Lab Units 12/07/18  2358   WBC Thousand/uL 4 29*   HEMOGLOBIN g/dL 9 4*   HEMATOCRIT % 30 2*   MCV fL 95   PLATELETS Thousands/uL 192   MCH pg 29 7   MCHC g/dL 31 1*   RDW % 15 9*   MPV fL 10 7   NRBC AUTO /100 WBCs 0         CMP:  Results from last 7 days  Lab Units 12/09/18  0442 12/08/18  0553 12/07/18  2358   POTASSIUM mmol/L 3 6 3 1* 3 4*   CHLORIDE mmol/L 94* 94* 93*   CO2 mmol/L 35* 35* 32   BUN mg/dL 35* 36* 37*   CREATININE mg/dL 1 70* 1 93* 1 90*   CALCIUM mg/dL 8 7 9 3 8 4   EGFR ml/min/1 73sq m 35 30 31         BMP:  Results from last 7 days  Lab Units 18  0442 18  0553 18  2358   POTASSIUM mmol/L 3 6 3 1* 3 4*   CHLORIDE mmol/L 94* 94* 93*   CO2 mmol/L 35* 35* 32   BUN mg/dL 35* 36* 37*   CREATININE mg/dL 1 70* 1 93* 1 90*   CALCIUM mg/dL 8 7 9 3 8 4       BNP:     Magnesium:   Results from last 7 days  Lab Units 18  0442   MAGNESIUM mg/dL 2 3       Coags:       TSH:       Lipid Profile:             Cardiac testing:   Results for orders placed during the hospital encounter of 18   Echo complete with contrast if indicated    Narrative [address]  [address], [address] [facility]  [phone]    Transthoracic Echocardiogram  2D, M-mode, Doppler, and Color Doppler    Study date:  23-May-2018    Patient: Leny Stoddard  MR number: [id number]  Account number: [de-identified]  : 21-Dec-1930  Age: 80 years  Gender: Male  Status: Inpatient  Location: Bedside  Height: 69 in  Weight: 181 7 lb  BP: 128/ 66 mmHg    Indications: Assess left ventricular function  Diagnoses: I50 9 - Heart failure, unspecified    Sonographer:  Radha Smith RDCS  Primary Physician:  Nyasia Lundy MD  Referring Physician: KEERTHI Platt  Group:  [facility]  Interpreting Physician:  Davide Kelly MD    SUMMARY    LEFT VENTRICLE:  The cavity was small  Systolic function was normal  Ejection fraction was estimated to be 65 %  There were no regional wall motion abnormalities  Wall thickness was mildly to moderately increased  RIGHT VENTRICLE:  The size was normal   Systolic function was normal     LEFT ATRIUM:  The atrium was mildly dilated  MITRAL VALVE:  There was moderate regurgitation  TRICUSPID VALVE:  There was mild regurgitation  Estimated peak PA pressure was 40 mmHg  IVC, HEPATIC VEINS:  The inferior vena cava was mildly dilated  HISTORY: PRIOR HISTORY: CHF, Afib, DM, pacemaker    PROCEDURE: The procedure was performed at the bedside   This was a routine study  The transthoracic approach was used  The study included complete 2D imaging, M-mode, complete spectral Doppler, and color Doppler  The heart rate was 63 bpm,  at the start of the study  Images were obtained from the parasternal, apical, subcostal, and suprasternal notch acoustic windows  Echocardiographic views were limited due to poor patient compliance  This was a technically difficult study  LEFT VENTRICLE: The cavity was small  Systolic function was normal  Ejection fraction was estimated to be 65 %  There were no regional wall motion abnormalities  Wall thickness was mildly to moderately increased  DOPPLER: Transmitral flow  pattern: atrial fibrillation  RIGHT VENTRICLE: The size was normal  Systolic function was normal  Wall thickness was normal  A device wire was present  LEFT ATRIUM: The atrium was mildly dilated  RIGHT ATRIUM: Size was at the upper limits of normal     MITRAL VALVE: Valve structure was normal  There was normal leaflet separation  DOPPLER: The transmitral velocity was within the normal range  There was no evidence for stenosis  There was moderate regurgitation  AORTIC VALVE: The valve was trileaflet  Leaflets exhibited normal thickness and normal cuspal separation  DOPPLER: Transaortic velocity was within the normal range  There was no evidence for stenosis  There was no significant  regurgitation  TRICUSPID VALVE: The valve structure was normal  There was normal leaflet separation  DOPPLER: The transtricuspid velocity was within the normal range  There was no evidence for stenosis  There was mild regurgitation  Pulmonary artery  systolic pressure was mildly increased  Estimated peak PA pressure was 40 mmHg  PULMONIC VALVE: Leaflets exhibited normal thickness, no calcification, and normal cuspal separation  DOPPLER: The transpulmonic velocity was within the normal range  There was no significant regurgitation      PERICARDIUM: There was no pericardial effusion  AORTA: The root exhibited normal size  SYSTEMIC VEINS: IVC: The inferior vena cava was mildly dilated      SYSTEM MEASUREMENT TABLES    2D  %FS: 36 64 %  Ao Diam: 3 04 cm  EDV(Teich): 93 9 ml  EF(Teich): 66 55 %  ESV(Teich): 31 41 ml  IVSd: 1 27 cm  LA Area: 25 67 cm2  LA Diam: 4 12 cm  LVEDV MOD A4C: 76 06 ml  LVEF MOD A4C: 63 76 %  LVESV MOD A4C: 27 56 ml  LVIDd: 4 53 cm  LVIDs: 2 87 cm  LVLd A4C: 7 66 cm  LVLs A4C: 6 06 cm  LVPWd: 1 37 cm  RA Area: 21 45 cm2  RVIDd: 3 53 cm  SV MOD A4C: 48 5 ml  SV(Teich): 62 49 ml    CW  TR Vmax: 2 94 m/s  TR maxP 61 mmHg    MM  TAPSE: 1 76 cm    PW  AVC: 306 63 ms  E': 0 05 m/s  E/E': 21 64  MV A Leonel: 0 39 m/s  MV Dec [place]: 4 4 m/s2  MV DecT: 252 28 ms  MV E Leonel: 1 11 m/s  MV E/A Ratio: 2 83  MV PHT: 73 16 ms  MVA By PHT: 3 01 cm2    [facility] Accredited [facility]    Prepared and electronically signed by    Kristin Lozoya MD  Signed 23-May-2018 17:30:14       Meds/Allergies     Current Facility-Administered Medications:  acetaminophen 650 mg Oral Q6H PRN Sherryle Sanger, MD   albuterol 2 5 mg Nebulization Q4H PRN Sherryle Sanger, MD   allantoin 1 application Topical PRN Sherryle Sanger, MD   ALPRAZolam 0 5 mg Oral 4x Daily PRN Sherryle Sanger, MD   apixaban 2 5 mg Oral BID Sherryle Sanger, MD   aspirin 81 mg Oral Daily Sherryle Sanger, MD   atorvastatin 20 mg Oral After Jenna Thacker MD   bacitracin 1 small application Topical Daily Sherryle Sanger, MD   carbamide peroxide 4 drop Right Ear BID Sherryle Sanger, MD   carvedilol 3 125 mg Oral BID With Meals Sherryle Sanger, MD   finasteride 5 mg Oral Daily Sherryle Sanger, MD   fish oil 1,000 mg Oral Daily Sherryle Sanger, MD   furosemide 60 mg Intravenous BID (diuretic) Donzetta Dubin, MD   gabapentin 100 mg Oral Daily Sherryle Sanger, MD   gabapentin 300 mg Oral HS Sherryle Sanger, MD   insulin glargine 10 Units Subcutaneous HS Hansa Rohit Carter MD   insulin lispro 1-5 Units Subcutaneous TID AC Tamera Em MD   insulin lispro 1-5 Units Subcutaneous HS Tamera Em MD   lactase 6,000 Units Oral TID With Meals Tamera Em MD   lisinopril 2 5 mg Oral Daily Tamera Em MD   menthol-zinc oxide  Topical BID Tamera Em MD   metolazone 2 5 mg Oral Daily Tamera Em MD   multivitamin-minerals 1 tablet Oral Daily Tamera Em MD   nitroglycerin 0 4 mg Sublingual Q5 Min PRN Tamera Em MD   ondansetron 4 mg Intravenous Q6H PRN Tamera Em MD   polyvinyl alcohol 1 drop Both Eyes Q3H PRN Tamera Em MD   potassium chloride 40 mEq Oral BID With Meals Reine Hodgkin, MD   tamsulosin 0 4 mg Oral Daily With Heathermegan Bolton MD   white petrolatum 1 application Topical Once Tamera Em MD        Prescriptions Prior to Admission   Medication    ALPRAZolam (XANAX) 0 5 mg tablet    aspirin (ECOTRIN LOW STRENGTH) 81 mg EC tablet    atorvastatin (LIPITOR) 20 mg tablet    carbamide peroxide (DEBROX) 6 5 % otic solution    carvedilol (COREG) 3 125 mg tablet    ELIQUIS 2 5 MG    finasteride (PROSCAR) 5 mg tablet    gabapentin (NEURONTIN) 100 mg capsule    gabapentin (NEURONTIN) 300 mg capsule    insulin glargine (LANTUS SOLOSTAR) 100 units/mL injection pen    ipratropium-albuterol (DUO-NEB) 0 5-2 5 mg/3 mL    lactase (LACTAID) 3,000 units tablet    lisinopril (ZESTRIL) 2 5 mg tablet    metFORMIN (GLUCOPHAGE) 500 mg tablet    metolazone (ZAROXOLYN) 2 5 mg tablet    multivitamin with iron (TAB-A-ANETA/IRON)    potassium chloride (K-DUR,KLOR-CON) 20 mEq tablet    tamsulosin (FLOMAX) 0 4 mg    torsemide (DEMADEX) 20 mg tablet    Wound Dressings (ALLANTOIN) gel    bacitracin topical ointment 500 units/g topical ointment    EASY TOUCH LANCETS 28G MISC    ergocalciferol (VITAMIN D2) 50,000 units    glucose blood test strip    Menthol-Zinc Oxide (REMEDY CALAZIME EX)    nitroglycerin (NITROSTAT) 0 4 mg SL tablet    Omega-3 Fatty Acids (FISH OIL) 1,000 mg    Polyvinyl Alcohol-Povidone (REFRESH OP)    Tetrahydrozoline HCl (EYE DROPS OP)    white petrolatum       Assessment:  Principal Problem:    Acute on chronic diastolic (congestive) heart failure (HCC)  Active Problems:    CKD (chronic kidney disease) stage 3, GFR 30-59 ml/min ([facility])    Paroxysmal atrial fibrillation ([facility])    Type 2 diabetes mellitus with hyperglycemia, with long-term current use of insulin ([facility])    Other chest pain    Blindness of left eye    Vascular dementia with behavior disturbance    Shortness of breath    Decubitus ulcer of sacral region, stage 1      Impression:  1  Acute on chronic diastolic heart failure - on IV diuretics  Still appears volume overloaded  2  PAF s/p Pacemaker  3  CAD - stable  4  HTN - controlled  Plan:  1  Increase IV diuretics  2  Watch renal function and potassium  3  Continue remainder of medications

## 2018-12-09 NOTE — PROGRESS NOTES
Progress Note - Prateek Mapleton 12/21/1930, 80 y o  male MRN: [id number]    Unit/Bed#: [facility] 530-01 Encounter: [id number]    Primary Care Provider: Cinthya Velasquez MD   Date and time admitted to hospital: 12/7/2018 11:17 PM        * Acute on chronic diastolic (congestive) heart failure ([address] )   Assessment & Plan    · Clinical exam significant for bilateral lower extremity pitting edema as well as ascites, diminished bilateral air entry in chest  · Lasix 80 mg t i d   · I&Os and Daily weights  · Cardiology input noted  · Fluid restriction 1500ml daily, Na restriction 2000mg  · Maintain negative balance  · Follow-up echo     Decubitus ulcer of sacral region, stage 1   Assessment & Plan    · Frequent turns   · Wound care      Vascular dementia with behavior disturbance   Assessment & Plan    · Continue Xanax as needed      Blindness of left eye   Assessment & Plan    · Supportive care     Type 2 diabetes mellitus with hyperglycemia, with long-term current use of insulin [facility])   Assessment & Plan    Lab Results   Component Value Date    HGBA1C 7 5 (H) 12/08/2018       Recent Labs      12/08/18   2045  12/09/18   0626  12/09/18   1031  12/09/18   1557   POCGLU  206*  133  213*  195*       Blood Sugar Average: Last 72 hrs:  (P) 164 [id number]     · continue lantus 10 units q h s   · monitor BS levels   · Sliding scale  · Hold metformin while in hospital     Paroxysmal atrial fibrillation (HCC)   Assessment & Plan    · Continue eliquis, Coreg, monitor on telemetry   · Heart rate is 59 beats per minute, rate controlled     CKD (chronic kidney disease) stage 3, GFR 30-59 ml/min ([facility])   Assessment & Plan    · Creatinine at baseline at 1 9, monitor renal function closely with diuretics  · Creatinine trended down to 1 70-responding well  · Continue monitor                     VTE Pharmacologic Prophylaxis:   Pharmacologic: Apixaban (Eliquis)  Mechanical VTE Prophylaxis in Place: Yes    Patient Centered Rounds: I have performed bedside rounds with nursing staff today  Discussions with Specialists or Other Care Team Provider: yes    Education and Discussions with Family / Patient: yes    Time Spent for Care: 45 minutes  More than 50% of total time spent on counseling and coordination of care as described above  Current Length of Stay: 1 day(s)    Current Patient Status: Inpatient   Certification Statement: The patient will continue to require additional inpatient hospital stay due to Acute decompensated Congestive heart failure    Discharge Plan:  Home or rehab When medically stable    Code Status: Level 1 - Full Code      Subjective:   Continues to have persistent lower extremity swelling as well as abdominal distension  Review of systems negative otherwise    Objective:     Vitals:   Temp (24hrs), Av 9 °F (36 6 °C), Min:97 6 °F (36 4 °C), Max:98 2 °F (36 8 °C)    Temp:  [97 6 °F (36 4 °C)-98 2 °F (36 8 °C)] 98 °F (36 7 °C)  HR:  [65-86] 69  Resp:  [18-20] 18  BP: (108-122)/(50-66) 118/54  SpO2:  [93 %-97 %] 95 %  Body mass index is 29 2 kg/m²  Input and Output Summary (last 24 hours): Intake/Output Summary (Last 24 hours) at 18 1621  Last data filed at 18 1420   Gross per 24 hour   Intake              943 ml   Output             1603 ml   Net             -660 ml       Physical Exam:     Physical Exam   Constitutional: He is oriented to person, place, and time  No distress  HENT:   Head: Normocephalic and atraumatic  Mouth/Throat: Oropharynx is clear and moist    Eyes: EOM are normal    Neck: Normal range of motion  Neck supple  No JVD present  Cardiovascular: Normal rate and normal heart sounds  No murmur heard  Pulmonary/Chest: Effort normal and breath sounds normal  No respiratory distress  He has no wheezes  Abdominal: Soft  Bowel sounds are normal  He exhibits distension  There is no tenderness  There is no rebound  Musculoskeletal: Normal range of motion  He exhibits no edema  Neurological: He is alert and oriented to person, place, and time  No cranial nerve deficit  Skin: Skin is warm  No erythema  Psychiatric: He has a normal mood and affect  Additional Data:     Labs:      Results from last 7 days  Lab Units 12/07/18  2358   WBC Thousand/uL 4 29*   HEMOGLOBIN g/dL 9 4*   HEMATOCRIT % 30 2*   PLATELETS Thousands/uL 192   NEUTROS PCT % 72   LYMPHS PCT % 12*   MONOS PCT % 11   EOS PCT % 4       Results from last 7 days  Lab Units 12/09/18  0442   SODIUM mmol/L 136   POTASSIUM mmol/L 3 6   CHLORIDE mmol/L 94*   CO2 mmol/L 35*   BUN mg/dL 35*   CREATININE mg/dL 1 70*   ANION GAP mmol/L 7   CALCIUM mg/dL 8 7   GLUCOSE RANDOM mg/dL 132           Results from last 7 days  Lab Units 12/09/18  1557 12/09/18  1031 12/09/18  0626 12/08/18  2045 12/08/18  1751 12/08/18  1222 12/08/18  0713   POC GLUCOSE mg/dl 195* 213* 133 206* 135 141* 131       Results from last 7 days  Lab Units 12/08/18  0553   HEMOGLOBIN A1C % 7 5*               * I Have Reviewed All Lab Data Listed Above  * Additional Pertinent Lab Tests Reviewed:  [facility]    Imaging:    Imaging Reports Reviewed Today     Recent Cultures (last 7 days):           Last 24 Hours Medication List:     Current Facility-Administered Medications:  acetaminophen 650 mg Oral Q6H PRN Isidro Guzmán MD   albuterol 2 5 mg Nebulization Q4H PRN Isidro Guzmán MD   ALPRAZolam 0 5 mg Oral 4x Daily PRN Isidro Guzmán MD   apixaban 2 5 mg Oral BID Isidro uGzmán MD   aspirin 81 mg Oral Daily Isidro Guzmán MD   atorvastatin 20 mg Oral After Carole Monet MD   bacitracin 1 small application Topical Daily Isidro Guzmán MD   carbamide peroxide 4 drop Right Ear BID Isidro Guzmán MD   carvedilol 3 125 mg Oral BID With Meals Isidro Guzmán MD   finasteride 5 mg Oral Daily Isidro Guzmán MD   fish oil 1,000 mg Oral Daily Isidro Guzmán MD   furosemide 80 mg Intravenous TID (diuretic) Brigido Stoddard MD   gabapentin 100 mg Oral Daily Michael Lee MD   gabapentin 300 mg Oral HS Michael Lee MD   insulin glargine 10 Units Subcutaneous HS Michael Lee MD   insulin lispro 1-5 Units Subcutaneous TID Estee Melton MD   insulin lispro 1-5 Units Subcutaneous HS Michael Lee MD   lactase 6,000 Units Oral TID With Meals Michael Lee MD   lisinopril 2 5 mg Oral Daily Michael Lee MD   menthol-zinc oxide  Topical BID Michael Lee MD   metolazone 2 5 mg Oral Daily Michael Lee MD   multivitamin-minerals 1 tablet Oral Daily Michael Lee MD   nitroglycerin 0 4 mg Sublingual Q5 Min PRN Michael Lee MD   ondansetron 4 mg Intravenous Q6H PRN Michael Lee MD   polyvinyl alcohol 1 drop Both Eyes Q3H PRN Michael Lee MD   potassium chloride 40 mEq Oral BID With Meals Brigido Stoddard MD   tamsulosin 0 4 mg Oral Daily With Pa Monge MD        Today, Patient Was Seen By: Santos Markham MD    ** Please Note: Dictation voice to text software may have been used in the creation of this document   **

## 2018-12-09 NOTE — PHYSICIAN ADVISOR
Current patient class: Inpatient  The patient is currently on Hospital Day: 2 at [address] [place] [address]      This patient was originally admitted to the hospital under observation status  After admission the patient was reevaluated and determined to require further hospitalization  The patient is now documented to require at least a 2nd midnight in the hospital  As such the patient is now expected to satisfy the 2 midnight benchmark and is therefore eligible for inpatient admission  After review of the relevant documentation, labs, vital signs and test results, the patient is appropriate for INPATIENT ADMISSION  Admission to the hospital as an inpatient is a complex decision making process which requires the practitioner to consider the patients presenting complaint, history and physical examination and all relevant testing  With this in mind, in this case, the patient was deemed appropriate for INPATIENT ADMISSION  After review of the documentation and testing available at the time of the admission I concur with this clinical determination of medical necessity  Rationale is as follows: The patient is a 80 yrs Male who presented to the ED at 12/7/2018 11:17 PM with a chief complaint of Shortness of Breath (as per ems - patient resides at [facility], patient c/o shortness of breath and per ems "anxiety")  LABS showed CKD and elevated troponin - CXR - showed pulmonary edema and pleural effusion he was admitted for acute on chronic CHF - started on IV diuretics - seen in consultation by cardiology service and dose of IV diuretics increased, started on fluid and sodium restriction   Patient weight is up from 192 lbs in October to 215 lbs on admission   He will have serial monitoring of renal function while receiving IV diuretics given his CKD         The patients vitals on arrival were ED Triage Vitals   Temperature Pulse Respirations Blood Pressure SpO2   12/07/18 2320 12/07/18 2320 12/07/18 2320 12/07/18 2320 12/07/18 2320   97 9 °F (36 6 °C) 74 20 107/51 (!) 88 %      Temp Source Heart Rate Source Patient Position - Orthostatic VS BP Location FiO2 (%)   12/07/18 2320 12/07/18 2320 12/08/18 0853 12/08/18 0456 --   Oral Monitor Lying Right arm       Pain Score       12/07/18 2320       No Pain           Past Medical History:   Diagnosis Date    A-fib ([facility] )     Anemia     Bladder cancer (HCC)     CAD (coronary artery disease)     CHF (congestive heart failure) (HCC)     Chronic kidney failure     Colon cancer ([facility] )     Eye cancer, left ([facility] )     Hypertension     Incontinence     Pacemaker     Prostate cancer ([facility] )     Type 2 diabetes mellitus ([facility] )     Wears hearing aid in both ears      Past Surgical History:   Procedure Laterality Date    CARDIAC PACEMAKER PLACEMENT  2014    CORONARY ANGIOPLASTY WITH STENT PLACEMENT      GALLBLADDER SURGERY      WI CYSTO/URETERO W/LITHOTRIPSY &INDWELL STENT INSRT Right 7/31/2018    Procedure: CYSTOSCOPY URETEROSCOPY WITH LITHOTRIPSY HOLMIUM LASER ,BASKET STONE EXTRACTION  RETROGRADE PYELOGRAM AND exchange STENT URETERAL;  Surgeon: Shmuel Weinberg MD;  [facility];  Service: Urology    WI CYSTOURETHROSCOPY,URETER CATHETER Right 7/1/2018    Procedure: CYSTOSCOPY RETROGRADE PYELOGRAM WITH INSERTION STENT URETERAL;  Surgeon: Jose Malik MD;  Location: [facility];  Service: Urology       The patient was admitted to the hospital at [id number] on 12/8/18 for the following diagnosis:  Shortness of breath [R06 02]  Pulmonary edema [J81 1]  Chest pressure [R07 89]  CHF exacerbation ([facility] ) [I50 9]    Consults have been placed to:   IP CONSULT TO CARDIOLOGY    Vitals:    12/08/18 1024 12/08/18 1030 12/08/18 1330 12/08/18 1707   BP: 129/60 129/60 119/58 113/54   BP Location:  Right arm Right arm    Pulse:  78 76 86   Resp:  22 20 20   Temp:    97 8 °F (36 6 °C)   TempSrc:    Oral   SpO2:  99% 95% 93%   Weight:       Height:    5' 9" (1 753 m) Most recent labs:    Recent Labs      12/07/18   2358  12/08/18   0553  12/08/18   0848   WBC  4 29*   --    --    HGB  9 4*   --    --    HCT  30 2*   --    --    PLT  192   --    --    K  3 4*  3 1*   --    CALCIUM  8 4  9 3   --    BUN  37*  36*   --    CREATININE  1 90*  1 93*   --    TROPONINI  0 06*  0 07*  0 07*       Scheduled Meds:  Current Facility-Administered Medications:  acetaminophen 650 mg Oral Q6H PRN Michael Lee MD   allantoin 1 application Topical PRN Michael Lee MD   ALPRAZolam 0 5 mg Oral 4x Daily PRN Michael Lee MD   apixaban 2 5 mg Oral BID Michael Lee MD   aspirin 81 mg Oral Daily Michael Lee MD   atorvastatin 20 mg Oral After Pa Monge MD   bacitracin 1 small application Topical Daily Michael Lee MD   carbamide peroxide 4 drop Right Ear BID Michael Lee MD   carvedilol 3 125 mg Oral BID With Meals Michael Lee MD   finasteride 5 mg Oral Daily Michael Lee MD   fish oil 1,000 mg Oral Daily Michael Lee MD   furosemide 60 mg Intravenous BID (diuretic) Brigido Stoddard MD   gabapentin 100 mg Oral Daily Michael Lee MD   gabapentin 300 mg Oral HS Michael Lee MD   insulin glargine 10 Units Subcutaneous HS iMchael Lee MD   insulin lispro 1-5 Units Subcutaneous TID Delano He MD   insulin lispro 1-5 Units Subcutaneous HS Michael Lee MD   ipratropium-albuterol 3 mL Nebulization Q6H PRN Michael Lee MD   lactase 6,000 Units Oral TID With Meals Michael Lee MD   lisinopril 2 5 mg Oral Daily Michael Lee MD   menthol-zinc oxide  Topical BID Michael Lee MD   metolazone 2 5 mg Oral Daily Michael Lee MD   multivitamin-minerals 1 tablet Oral Daily Michael Lee MD   nitroglycerin 0 4 mg Sublingual Q5 Min PRN Michael Lee MD   ondansetron 4 mg Intravenous Q6H PRN Michael Lee MD   polyvinyl alcohol 1 drop Both Eyes Q3H PRN Hansa Meghna Ramires MD   potassium chloride 40 mEq Oral BID With Meals Tripp Conrad MD   tamsulosin 0 4 mg Oral Daily With Anastacio Carbajal MD   white petrolatum 1 application Topical Once Marisabel Salas MD     Continuous Infusions:   PRN Meds:   acetaminophen    allantoin    ALPRAZolam    ipratropium-albuterol    nitroglycerin    ondansetron    polyvinyl alcohol    Surgical procedures (if appropriate):

## 2018-12-09 NOTE — ASSESSMENT & PLAN NOTE
· Clinical exam significant for bilateral lower extremity pitting edema as well as ascites, diminished bilateral air entry in chest  · Lasix 80 mg t i d   · I&Os and Daily weights  · Cardiology input noted  · Fluid restriction 1500ml daily, Na restriction 2000mg  · Maintain negative balance  · Follow-up echo

## 2018-12-10 ENCOUNTER — APPOINTMENT (INPATIENT)
Dept: NON INVASIVE DIAGNOSTICS | Facility: HOSPITAL | Age: 83
DRG: 291 | End: 2018-12-10
Payer: MEDICARE

## 2018-12-10 PROBLEM — N17.9 ACUTE KIDNEY INJURY SUPERIMPOSED ON CHRONIC KIDNEY DISEASE (HCC): Status: ACTIVE | Noted: 2018-05-22

## 2018-12-10 PROBLEM — N18.9 ACUTE KIDNEY INJURY SUPERIMPOSED ON CHRONIC KIDNEY DISEASE (HCC): Status: ACTIVE | Noted: 2018-05-22

## 2018-12-10 LAB
ALBUMIN SERPL BCP-MCNC: 2.9 G/DL (ref 3.5–5)
ALP SERPL-CCNC: 104 U/L (ref 46–116)
ALT SERPL W P-5'-P-CCNC: 10 U/L (ref 12–78)
ANION GAP SERPL CALCULATED.3IONS-SCNC: 7 MMOL/L (ref 4–13)
AST SERPL W P-5'-P-CCNC: 10 U/L (ref 5–45)
BASOPHILS # BLD AUTO: 0.05 THOUSANDS/ΜL (ref 0–0.1)
BASOPHILS NFR BLD AUTO: 1 % (ref 0–1)
BILIRUB SERPL-MCNC: 0.45 MG/DL (ref 0.2–1)
BUN SERPL-MCNC: 38 MG/DL (ref 5–25)
CALCIUM SERPL-MCNC: 9.1 MG/DL (ref 8.3–10.1)
CHLORIDE SERPL-SCNC: 93 MMOL/L (ref 100–108)
CO2 SERPL-SCNC: 37 MMOL/L (ref 21–32)
CREAT SERPL-MCNC: 1.68 MG/DL (ref 0.6–1.3)
EOSINOPHIL # BLD AUTO: 0.16 THOUSAND/ΜL (ref 0–0.61)
EOSINOPHIL NFR BLD AUTO: 4 % (ref 0–6)
ERYTHROCYTE [DISTWIDTH] IN BLOOD BY AUTOMATED COUNT: 15.7 % (ref 11.6–15.1)
GFR SERPL CREATININE-BSD FRML MDRD: 36 ML/MIN/1.73SQ M
GLUCOSE SERPL-MCNC: 133 MG/DL (ref 65–140)
GLUCOSE SERPL-MCNC: 133 MG/DL (ref 65–140)
GLUCOSE SERPL-MCNC: 155 MG/DL (ref 65–140)
GLUCOSE SERPL-MCNC: 169 MG/DL (ref 65–140)
GLUCOSE SERPL-MCNC: 196 MG/DL (ref 65–140)
HCT VFR BLD AUTO: 32.9 % (ref 36.5–49.3)
HGB BLD-MCNC: 10.3 G/DL (ref 12–17)
IMM GRANULOCYTES # BLD AUTO: 0.01 THOUSAND/UL (ref 0–0.2)
IMM GRANULOCYTES NFR BLD AUTO: 0 % (ref 0–2)
LYMPHOCYTES # BLD AUTO: 0.67 THOUSANDS/ΜL (ref 0.6–4.47)
LYMPHOCYTES NFR BLD AUTO: 16 % (ref 14–44)
MCH RBC QN AUTO: 29.6 PG (ref 26.8–34.3)
MCHC RBC AUTO-ENTMCNC: 31.3 G/DL (ref 31.4–37.4)
MCV RBC AUTO: 95 FL (ref 82–98)
MONOCYTES # BLD AUTO: 0.55 THOUSAND/ΜL (ref 0.17–1.22)
MONOCYTES NFR BLD AUTO: 13 % (ref 4–12)
NEUTROPHILS # BLD AUTO: 2.88 THOUSANDS/ΜL (ref 1.85–7.62)
NEUTS SEG NFR BLD AUTO: 66 % (ref 43–75)
NRBC BLD AUTO-RTO: 0 /100 WBCS
PLATELET # BLD AUTO: 211 THOUSANDS/UL (ref 149–390)
PMV BLD AUTO: 10.4 FL (ref 8.9–12.7)
POTASSIUM SERPL-SCNC: 3.7 MMOL/L (ref 3.5–5.3)
PROT SERPL-MCNC: 7 G/DL (ref 6.4–8.2)
RBC # BLD AUTO: 3.48 MILLION/UL (ref 3.88–5.62)
SODIUM SERPL-SCNC: 137 MMOL/L (ref 136–145)
WBC # BLD AUTO: 4.32 THOUSAND/UL (ref 4.31–10.16)

## 2018-12-10 PROCEDURE — 99232 SBSQ HOSP IP/OBS MODERATE 35: CPT | Performed by: INTERNAL MEDICINE

## 2018-12-10 PROCEDURE — 82948 REAGENT STRIP/BLOOD GLUCOSE: CPT

## 2018-12-10 PROCEDURE — 80053 COMPREHEN METABOLIC PANEL: CPT | Performed by: HOSPITALIST

## 2018-12-10 PROCEDURE — 93306 TTE W/DOPPLER COMPLETE: CPT

## 2018-12-10 PROCEDURE — 94760 N-INVAS EAR/PLS OXIMETRY 1: CPT

## 2018-12-10 PROCEDURE — 85025 COMPLETE CBC W/AUTO DIFF WBC: CPT | Performed by: HOSPITALIST

## 2018-12-10 PROCEDURE — 93306 TTE W/DOPPLER COMPLETE: CPT | Performed by: INTERNAL MEDICINE

## 2018-12-10 RX ORDER — ACETAMINOPHEN 325 MG/1
650 TABLET ORAL EVERY 6 HOURS PRN
Status: DISCONTINUED | OUTPATIENT
Start: 2018-12-10 | End: 2018-12-11 | Stop reason: HOSPADM

## 2018-12-10 RX ADMIN — POTASSIUM CHLORIDE 40 MEQ: 1500 TABLET, EXTENDED RELEASE ORAL at 08:27

## 2018-12-10 RX ADMIN — INSULIN LISPRO 1 UNITS: 100 INJECTION, SOLUTION INTRAVENOUS; SUBCUTANEOUS at 21:34

## 2018-12-10 RX ADMIN — GABAPENTIN 100 MG: 100 CAPSULE ORAL at 08:27

## 2018-12-10 RX ADMIN — Medication 1000 MG: at 08:27

## 2018-12-10 RX ADMIN — TAMSULOSIN HYDROCHLORIDE 0.4 MG: 0.4 CAPSULE ORAL at 16:31

## 2018-12-10 RX ADMIN — LISINOPRIL 2.5 MG: 2.5 TABLET ORAL at 08:27

## 2018-12-10 RX ADMIN — FUROSEMIDE 80 MG: 10 INJECTION, SOLUTION INTRAMUSCULAR; INTRAVENOUS at 17:49

## 2018-12-10 RX ADMIN — INSULIN LISPRO 1 UNITS: 100 INJECTION, SOLUTION INTRAVENOUS; SUBCUTANEOUS at 16:31

## 2018-12-10 RX ADMIN — CARVEDILOL 3.12 MG: 3.12 TABLET, FILM COATED ORAL at 16:31

## 2018-12-10 RX ADMIN — ASPIRIN 81 MG: 81 TABLET, COATED ORAL at 08:27

## 2018-12-10 RX ADMIN — LACTASE TAB 3000 UNIT 6000 UNITS: 3000 TAB at 08:28

## 2018-12-10 RX ADMIN — Medication 4 DROP: at 17:49

## 2018-12-10 RX ADMIN — LACTASE TAB 3000 UNIT 6000 UNITS: 3000 TAB at 11:49

## 2018-12-10 RX ADMIN — FINASTERIDE 5 MG: 5 TABLET, FILM COATED ORAL at 08:27

## 2018-12-10 RX ADMIN — Medication 1 TABLET: at 08:27

## 2018-12-10 RX ADMIN — CARVEDILOL 3.12 MG: 3.12 TABLET, FILM COATED ORAL at 08:27

## 2018-12-10 RX ADMIN — APIXABAN 2.5 MG: 2.5 TABLET, FILM COATED ORAL at 08:27

## 2018-12-10 RX ADMIN — APIXABAN 2.5 MG: 2.5 TABLET, FILM COATED ORAL at 17:49

## 2018-12-10 RX ADMIN — METOLAZONE 2.5 MG: 5 TABLET ORAL at 08:27

## 2018-12-10 RX ADMIN — POTASSIUM CHLORIDE 40 MEQ: 1500 TABLET, EXTENDED RELEASE ORAL at 16:31

## 2018-12-10 RX ADMIN — GABAPENTIN 300 MG: 300 CAPSULE ORAL at 21:33

## 2018-12-10 RX ADMIN — ATORVASTATIN CALCIUM 20 MG: 20 TABLET, FILM COATED ORAL at 17:49

## 2018-12-10 RX ADMIN — ALPRAZOLAM 0.5 MG: 0.5 TABLET ORAL at 21:34

## 2018-12-10 RX ADMIN — FUROSEMIDE 80 MG: 10 INJECTION, SOLUTION INTRAMUSCULAR; INTRAVENOUS at 05:34

## 2018-12-10 RX ADMIN — ANORECTAL OINTMENT: 15.7; .44; 24; 20.6 OINTMENT TOPICAL at 08:35

## 2018-12-10 RX ADMIN — LACTASE TAB 3000 UNIT 6000 UNITS: 3000 TAB at 16:30

## 2018-12-10 RX ADMIN — Medication 4 DROP: at 08:28

## 2018-12-10 RX ADMIN — INSULIN LISPRO 1 UNITS: 100 INJECTION, SOLUTION INTRAVENOUS; SUBCUTANEOUS at 11:10

## 2018-12-10 RX ADMIN — ALPRAZOLAM 0.5 MG: 0.5 TABLET ORAL at 12:57

## 2018-12-10 RX ADMIN — FUROSEMIDE 80 MG: 10 INJECTION, SOLUTION INTRAMUSCULAR; INTRAVENOUS at 11:49

## 2018-12-10 RX ADMIN — INSULIN GLARGINE 10 UNITS: 100 INJECTION, SOLUTION SUBCUTANEOUS at 21:36

## 2018-12-10 NOTE — PHYSICAL THERAPY NOTE
Physical Therapy Cancellation Note      Pt approached for PT treatment, however currently declining all options of mobility due to feeling "wobbly" and anxiety  Continues to decline despite education and multiple options to decrease anxiety/fear of falling  Will continue to follow and treat as appropriate     Crystal Lamb, PT,DPT

## 2018-12-10 NOTE — ASSESSMENT & PLAN NOTE
Lab Results   Component Value Date    HGBA1C 7 5 (H) 12/08/2018       Recent Labs      12/09/18   2102  12/10/18   0716  12/10/18   1052  12/10/18   1612   POCGLU  204*  133  169*  155*       Blood Sugar Average: Last 72 hrs:  (P) 165     · continue lantus 10 units q h s   · monitor BS levels   · Sliding scale  · Hold metformin while in hospital

## 2018-12-10 NOTE — RESTORATIVE TECHNICIAN NOTE
Restorative Specialist Mobility Note       Activity: Bedrest, Dangle, Stand at bedside, Turn, Chair     Assistive Device: Other (Comment) (HHA x 1)     Ambulation Response:  Tolerated fairly well  Repositioned: Sitting, Up in chair, Other (Comment) (chair alarm on)

## 2018-12-10 NOTE — ASSESSMENT & PLAN NOTE
· Clinical exam significant for bilateral lower extremity pitting edema as well as ascites, diminished bilateral air entry in chest  · Lasix 80 mg t i d , -3 9 L during this admission  · I&Os and Daily weights  · Cardiology input noted and appreciated>> possible transition to p o  Diuresis tomorrow  · Fluid restriction 1500ml daily, Na restriction 2000mg  · Maintain negative balance  · Echo results Systolic function was normal  Ejection fraction was estimated to be 55 %  There were no regional wall motion abnormalities  Wall thickness was moderately increased    Doppler parameters were consistent with elevated ventricular end-diastolic filling pressure

## 2018-12-10 NOTE — SOCIAL WORK
Patient identified as HRR per criteria  Call made to DC appointment hotline with information as required for CM support follow up   Referral made to out cm

## 2018-12-10 NOTE — ASSESSMENT & PLAN NOTE
· Baseline creatinine between 1 4-1 7  · Presented with creatinine of 1 point 9 on admission, renal function improving with diuresis  · Monitor strict I&Os and daily weight  · Trend BMP

## 2018-12-10 NOTE — PLAN OF CARE
CARDIOVASCULAR - ADULT     Maintains optimal cardiac output and hemodynamic stability Progressing     Absence of cardiac dysrhythmias or at baseline rhythm Progressing        DISCHARGE PLANNING     Discharge to home or other facility with appropriate resources Progressing        DISCHARGE PLANNING - CARE MANAGEMENT     Discharge to post-acute care or home with appropriate resources Progressing        INFECTION - ADULT     Absence or prevention of progression during hospitalization Progressing        Knowledge Deficit     Patient/family/caregiver demonstrates understanding of disease process, treatment plan, medications, and discharge instructions Progressing        Nutrition/Hydration-ADULT     Nutrient/Hydration intake appropriate for improving, restoring or maintaining nutritional needs Progressing        PAIN - ADULT     Verbalizes/displays adequate comfort level or baseline comfort level Progressing        Prexisting or High Potential for Compromised Skin Integrity     Skin integrity is maintained or improved Progressing        SAFETY ADULT     Patient will remain free of falls Progressing     Maintain or return to baseline ADL function Progressing     Maintain or return mobility status to optimal level Progressing

## 2018-12-10 NOTE — PROGRESS NOTES
Cardiology Progress Note - Tiffanie Walls 80 y o  male MRN: [id number]    Unit/Bed#: [facility] 530-01 Encounter: [id number]        Subjective:    No significant events overnight  Improving  Major complaint is back pain  Review of Systems   Cardiovascular: Negative for chest pain, leg swelling and palpitations  Respiratory: Positive for shortness of breath  Musculoskeletal: Positive for back pain  Objective:   Vitals: Blood pressure 125/68, pulse 82, temperature 98 °F (36 7 °C), resp  rate 18, height 5' 9" (1 753 m), weight 88 2 kg (194 lb 7 1 oz), SpO2 95 %  , Body mass index is 28 71 kg/m² ,   Orthostatic Blood Pressures      Most Recent Value   Blood Pressure  125/68 filed at 12/10/2018 0713   Patient Position - Orthostatic VS  Lying filed at 12/09/2018 1374         Systolic (97LCG), MQI:959 , Min:96 , UTM:497     Diastolic (84PCY), BVJ:98, Min:48, Max:68      Intake/Output Summary (Last 24 hours) at 12/10/18 1021  Last data filed at 12/10/18 1008   Gross per 24 hour   Intake              680 ml   Output             3116 ml   Net            -2436 ml     Weight (last 2 days)     Date/Time   Weight    12/10/18 0531  88 2 (194 45)    12/09/18 0454  89 7 (197 75)                  Telemetry Review: Paced    Physical Exam   Cardiovascular: Normal rate, regular rhythm and normal heart sounds  Exam reveals no gallop and no friction rub  No murmur heard  Pulmonary/Chest: Breath sounds normal  He has no wheezes  He has no rales  Musculoskeletal: He exhibits no edema           Laboratory Results:    Results from last 7 days  Lab Units 12/08/18  0848 12/08/18  0553 12/07/18  2358   TROPONIN I ng/mL 0 07* 0 07* 0 06*       CBC with diff:     Results from last 7 days  Lab Units 12/10/18  0549 12/07/18  2358   WBC Thousand/uL 4 32 4 29*   HEMOGLOBIN g/dL 10 3* 9 4*   HEMATOCRIT % 32 9* 30 2*   MCV fL 95 95   PLATELETS Thousands/uL 211 192   MCH pg 29 6 29 7   MCHC g/dL 31 3* 31 1*   RDW % 15 7* 15 9*   MPV fL 10 4 10 7   NRBC AUTO /100 WBCs 0 0         CMP:    Results from last 7 days  Lab Units 12/10/18  0549 18  0442 18  0553 18  2358   POTASSIUM mmol/L 3 7 3 6 3 1* 3 4*   CHLORIDE mmol/L 93* 94* 94* 93*   CO2 mmol/L 37* 35* 35* 32   BUN mg/dL 38* 35* 36* 37*   CREATININE mg/dL 1 68* 1 70* 1 93* 1 90*   CALCIUM mg/dL 9 1 8 7 9 3 8 4   AST U/L 10  --   --   --    ALT U/L 10*  --   --   --    ALK PHOS U/L 104  --   --   --    EGFR ml/min/1 73sq m 36 35 30 31         BMP:    Results from last 7 days  Lab Units 12/10/18  0549 18  0442 18  0553 18  2358   POTASSIUM mmol/L 3 7 3 6 3 1* 3 4*   CHLORIDE mmol/L 93* 94* 94* 93*   CO2 mmol/L 37* 35* 35* 32   BUN mg/dL 38* 35* 36* 37*   CREATININE mg/dL 1 68* 1 70* 1 93* 1 90*   CALCIUM mg/dL 9 1 8 7 9 3 8 4       BNP:     Magnesium:     Results from last 7 days  Lab Units 18  0442   MAGNESIUM mg/dL 2 3       Coags:       TSH:       Lipid Profile:             Cardiac testing:   Results for orders placed during the hospital encounter of 18   Echo complete with contrast if indicated    [address]  [phone]    Transthoracic Echocardiogram  2D, M-mode, Doppler, and Color Doppler    Study date:  23-May-2018    Patient: Glenis Carmichael  MR number: [id number]  Account number: [de-identified]  : 21-Dec-1930  Age: 80 years  Gender: Male  Status: Inpatient  Location: Bedside  Height: 69 in  Weight: 181 7 lb  BP: 128/ 66 mmHg    Indications: Assess left ventricular function  Diagnoses: I50 9 - Heart failure, unspecified    Sonographer:  Milli Kimbrough RDCS  Primary Physician:  Rosemary Myers MD  Referring Physician: KEERTHI Aparicio  Group:  [facility]  Interpreting Physician:  Glenroy Smiley MD    SUMMARY    LEFT VENTRICLE:  The cavity was small  Systolic function was normal  Ejection fraction was estimated to be 65 %    There were no regional wall motion abnormalities  Wall thickness was mildly to moderately increased  RIGHT VENTRICLE:  The size was normal   Systolic function was normal     LEFT ATRIUM:  The atrium was mildly dilated  MITRAL VALVE:  There was moderate regurgitation  TRICUSPID VALVE:  There was mild regurgitation  Estimated peak PA pressure was 40 mmHg  IVC, HEPATIC VEINS:  The inferior vena cava was mildly dilated  HISTORY: PRIOR HISTORY: CHF, Afib, DM, pacemaker    PROCEDURE: The procedure was performed at the bedside  This was a routine study  The transthoracic approach was used  The study included complete 2D imaging, M-mode, complete spectral Doppler, and color Doppler  The heart rate was 63 bpm,  at the start of the study  Images were obtained from the parasternal, apical, subcostal, and suprasternal notch acoustic windows  Echocardiographic views were limited due to poor patient compliance  This was a technically difficult study  LEFT VENTRICLE: The cavity was small  Systolic function was normal  Ejection fraction was estimated to be 65 %  There were no regional wall motion abnormalities  Wall thickness was mildly to moderately increased  DOPPLER: Transmitral flow  pattern: atrial fibrillation  RIGHT VENTRICLE: The size was normal  Systolic function was normal  Wall thickness was normal  A device wire was present  LEFT ATRIUM: The atrium was mildly dilated  RIGHT ATRIUM: Size was at the upper limits of normal     MITRAL VALVE: Valve structure was normal  There was normal leaflet separation  DOPPLER: The transmitral velocity was within the normal range  There was no evidence for stenosis  There was moderate regurgitation  AORTIC VALVE: The valve was trileaflet  Leaflets exhibited normal thickness and normal cuspal separation  DOPPLER: Transaortic velocity was within the normal range  There was no evidence for stenosis  There was no significant  regurgitation      TRICUSPID VALVE: The valve structure was normal  There was normal leaflet separation  DOPPLER: The transtricuspid velocity was within the normal range  There was no evidence for stenosis  There was mild regurgitation  Pulmonary artery  systolic pressure was mildly increased  Estimated peak PA pressure was 40 mmHg  PULMONIC VALVE: Leaflets exhibited normal thickness, no calcification, and normal cuspal separation  DOPPLER: The transpulmonic velocity was within the normal range  There was no significant regurgitation  PERICARDIUM: There was no pericardial effusion  AORTA: The root exhibited normal size  SYSTEMIC VEINS: IVC: The inferior vena cava was mildly dilated      SYSTEM MEASUREMENT TABLES    2D  %FS: 36 64 %  Ao Diam: 3 04 cm  EDV(Teich): 93 9 ml  EF(Teich): 66 55 %  ESV(Teich): 31 41 ml  IVSd: 1 27 cm  LA Area: 25 67 cm2  LA Diam: 4 12 cm  LVEDV MOD A4C: 76 06 ml  LVEF MOD A4C: 63 76 %  LVESV MOD A4C: 27 56 ml  LVIDd: 4 53 cm  LVIDs: 2 87 cm  LVLd A4C: 7 66 cm  LVLs A4C: 6 06 cm  LVPWd: 1 37 cm  RA Area: 21 45 cm2  RVIDd: 3 53 cm  SV MOD A4C: 48 5 ml  SV(Teich): 62 49 ml    CW  TR Vmax: 2 94 m/s  TR maxP 61 mmHg    MM  TAPSE: 1 76 cm    PW  AVC: 306 63 ms  E': 0 05 m/s  E/E': 21 64  MV A Leonel: 0 39 m/s  MV Dec Wagoner: 4 4 m/s2  MV DecT: 252 28 ms  MV E Leonel: 1 11 m/s  MV E/A Ratio: 2 83  MV PHT: 73 16 ms  MVA By PHT: 3 01 cm2    [employer] Accredited Echocardiography Laboratory    Prepared and electronically signed by    Brigida Clancy MD  Signed 23-May-2018 17:30:14       Meds/Allergies     Current Facility-Administered Medications:  acetaminophen 650 mg Oral Q6H PRN Terrance Hoyos MD   albuterol 2 5 mg Nebulization Q4H PRN Terrance Hoyos MD   ALPRAZolam 0 5 mg Oral 4x Daily PRN Terrance Hoyos MD   apixaban 2 5 mg Oral BID Terrance Hoyos MD   aspirin 81 mg Oral Daily Terrance Hoyos MD   atorvastatin 20 mg Oral After Ephriam Seal, MD   bacitracin 1 small application Topical Daily Ann Melara MD   carbamide peroxide 4 drop Right Ear BID Ann Melara MD   carvedilol 3 125 mg Oral BID With Meals Ann Melara MD   finasteride 5 mg Oral Daily Ann Melara MD   fish oil 1,000 mg Oral Daily Ann Melara MD   furosemide 80 mg Intravenous TID (diuretic) Amber Viera MD   gabapentin 100 mg Oral Daily Ann Melara MD   gabapentin 300 mg Oral HS Ann Melara MD   insulin glargine 10 Units Subcutaneous HS Ann Melara MD   insulin lispro 1-5 Units Subcutaneous TID AC Ann Melara MD   insulin lispro 1-5 Units Subcutaneous HS Ann Melara MD   lactase 6,000 Units Oral TID With Meals Ann Melara MD   lisinopril 2 5 mg Oral Daily Ann Melara MD   menthol-zinc oxide  Topical BID Ann Melara MD   metolazone 2 5 mg Oral Daily Ann Melara MD   multivitamin-minerals 1 tablet Oral Daily Ann Melara MD   nitroglycerin 0 4 mg Sublingual Q5 Min PRN Ann Melara MD   ondansetron 4 mg Intravenous Q6H PRN Ann Melara MD   polyvinyl alcohol 1 drop Both Eyes Q3H PRN Ann Melara MD   potassium chloride 40 mEq Oral BID With Meals Amber Viera MD   tamsulosin 0 4 mg Oral Daily With Corey Kearns MD        Prescriptions Prior to Admission   Medication    ALPRAZolam (XANAX) 0 5 mg tablet    aspirin (ECOTRIN LOW STRENGTH) 81 mg EC tablet    atorvastatin (LIPITOR) 20 mg tablet    carbamide peroxide (DEBROX) 6 5 % otic solution    carvedilol (COREG) 3 125 mg tablet    ELIQUIS 2 5 MG    finasteride (PROSCAR) 5 mg tablet    gabapentin (NEURONTIN) 100 mg capsule    gabapentin (NEURONTIN) 300 mg capsule    insulin glargine (LANTUS SOLOSTAR) 100 units/mL injection pen    ipratropium-albuterol (DUO-NEB) 0 5-2 5 mg/3 mL    lactase (LACTAID) 3,000 units tablet    lisinopril (ZESTRIL) 2 5 mg tablet    metFORMIN (GLUCOPHAGE) 500 mg tablet    metolazone (ZAROXOLYN) 2 5 mg tablet    multivitamin with iron (TAB-A-ANETA/IRON)    potassium chloride (K-DUR,KLOR-CON) 20 mEq tablet    tamsulosin (FLOMAX) 0 4 mg    torsemide (DEMADEX) 20 mg tablet    Wound Dressings (ALLANTOIN) gel    bacitracin topical ointment 500 units/g topical ointment    EASY TOUCH LANCETS 28G MISC    ergocalciferol (VITAMIN D2) 50,000 units    glucose blood test strip    Menthol-Zinc Oxide (REMEDY CALAZIME EX)    nitroglycerin (NITROSTAT) 0 4 mg SL tablet    Omega-3 Fatty Acids (FISH OIL) 1,000 mg    Polyvinyl Alcohol-Povidone (REFRESH OP)    Tetrahydrozoline HCl (EYE DROPS OP)    white petrolatum       Assessment:  Principal Problem:    Acute on chronic diastolic (congestive) heart failure (HCC)  Active Problems:    CKD (chronic kidney disease) stage 3, GFR 30-59 ml/min ([facility])    Paroxysmal atrial fibrillation ([facility])    Type 2 diabetes mellitus with hyperglycemia, with long-term current use of insulin ([facility])    Blindness of left eye    Vascular dementia with behavior disturbance    Decubitus ulcer of sacral region, stage 1      Impression:  1  Acute on chronic diastolic heart failure - on IV diuretics  Still appears volume overloaded  2  PAF s/p Pacemaker  3  CAD - stable  4  HTN - controlled  Plan:  1  Continue current diuretics, and switch to oral diuretics tomorrow  2  Watch renal function and potassium  3  Continue remainder of medications

## 2018-12-10 NOTE — PROGRESS NOTES
Progress Note - Christy Born 12/21/1930, 80 y o  male MRN: [id number]    Unit/Bed#: [facility] 530-01 Encounter: [id number]    Primary Care Provider: Antonietta Lopez MD   Date and time admitted to hospital: 12/7/2018 11:17 PM        * Acute on chronic diastolic (congestive) heart failure ([address] )   Assessment & Plan    · Clinical exam significant for bilateral lower extremity pitting edema as well as ascites, diminished bilateral air entry in chest  · Lasix 80 mg t i d , -3 9 L during this admission  · I&Os and Daily weights  · Cardiology input noted and appreciated>> possible transition to p o  Diuresis tomorrow  · Fluid restriction 1500ml daily, Na restriction 2000mg  · Maintain negative balance  · Echo results Systolic function was normal  Ejection fraction was estimated to be 55 %  There were no regional wall motion abnormalities  Wall thickness was moderately increased    Doppler parameters were consistent with elevated ventricular end-diastolic filling pressure     Paroxysmal atrial fibrillation (HCC)   Assessment & Plan    · Continue eliquis, Coreg, monitor on telemetry   · rate controlled     Decubitus ulcer of sacral region, stage 1   Assessment & Plan    · Frequent repositioning  · Wound care      Vascular dementia with behavior disturbance   Assessment & Plan    · Supportive care  · Continue Xanax as needed for anxiety     Blindness of left eye   Assessment & Plan    · Supportive care     Type 2 diabetes mellitus with hyperglycemia, with long-term current use of insulin [facility])   Assessment & Plan    Lab Results   Component Value Date    HGBA1C 7 5 (H) 12/08/2018       Recent Labs      12/09/18   2102  12/10/18   0716  12/10/18   1052  12/10/18   1612   POCGLU  204*  133  169*  155*       Blood Sugar Average: Last 72 hrs:  (P) 165     · continue lantus 10 units q h s   · monitor BS levels   · Sliding scale  · Hold metformin while in hospital     Acute kidney injury superimposed on chronic kidney disease [facility])   Assessment & Plan    · Baseline creatinine between 1 4-1 7  · Presented with creatinine of 1 point 9 on admission, renal function improving with diuresis  · Monitor strict I&Os and daily weight  · Trend BMP  VTE Pharmacologic Prophylaxis:   Pharmacologic: Apixaban (Eliquis)  Mechanical VTE Prophylaxis in Place: Yes    Patient Centered Rounds: I have performed bedside rounds with nursing staff today  Discussions with Specialists or Other Care Team Provider:      Education and Discussions with Family / Patient:  Discussed plan of care with the patient at bedside    Time Spent for Care: 30 minutes  More than 50% of total time spent on counseling and coordination of care as described above  Current Length of Stay: 2 day(s)    Current Patient Status: Inpatient   Certification Statement: The patient will continue to require additional inpatient hospital stay due to Not medically stable    Discharge Plan:  When medically stable    Code Status: Level 1 - Full Code      Subjective:   Patient says that he reports mild dyspnea on exertion  Reports back pain which is chronic  Objective:     Vitals:   Temp (24hrs), Av 2 °F (36 8 °C), Min:97 8 °F (36 6 °C), Max:98 8 °F (37 1 °C)    Temp:  [97 8 °F (36 6 °C)-98 8 °F (37 1 °C)] 98 8 °F (37 1 °C)  HR:  [62-82] 62  Resp:  [16-18] 16  BP: ()/(48-69) 128/64  SpO2:  [95 %-98 %] 95 %  Body mass index is 28 71 kg/m²  Input and Output Summary (last 24 hours): Intake/Output Summary (Last 24 hours) at 12/10/18 1653  Last data filed at 12/10/18 1409   Gross per 24 hour   Intake              600 ml   Output             2716 ml   Net            -2116 ml       Physical Exam:     Physical Exam   Constitutional: No distress  Eyes: Pupils are equal, round, and reactive to light  Cardiovascular: Normal rate, regular rhythm, normal heart sounds and intact distal pulses  No murmur heard    Pulmonary/Chest: Breath sounds normal  No respiratory distress  He has no wheezes  He has no rales  Abdominal: Soft  Bowel sounds are normal  He exhibits no distension  There is no tenderness  Musculoskeletal: He exhibits edema  Neurological: He is alert  No focal motor deficits   Skin: Skin is warm  Additional Data:     Labs:      Results from last 7 days  Lab Units 12/10/18  0549   WBC Thousand/uL 4 32   HEMOGLOBIN g/dL 10 3*   HEMATOCRIT % 32 9*   PLATELETS Thousands/uL 211   NEUTROS PCT % 66   LYMPHS PCT % 16   MONOS PCT % 13*   EOS PCT % 4       Results from last 7 days  Lab Units 12/10/18  0549   SODIUM mmol/L 137   POTASSIUM mmol/L 3 7   CHLORIDE mmol/L 93*   CO2 mmol/L 37*   BUN mg/dL 38*   CREATININE mg/dL 1 68*   ANION GAP mmol/L 7   CALCIUM mg/dL 9 1   ALBUMIN g/dL 2 9*   TOTAL BILIRUBIN mg/dL 0 45   ALK PHOS U/L 104   ALT U/L 10*   AST U/L 10   GLUCOSE RANDOM mg/dL 133           Results from last 7 days  Lab Units 12/10/18  1612 12/10/18  1052 12/10/18  0716 12/09/18  2102 12/09/18  1557 12/09/18  1031 12/09/18  0626 12/08/18  2045 12/08/18  1751 12/08/18  1222 12/08/18  0713   POC GLUCOSE mg/dl 155* 169* 133 204* 195* 213* 133 206* 135 141* 131       Results from last 7 days  Lab Units 12/08/18  0553   HEMOGLOBIN A1C % 7 5*               * I Have Reviewed All Lab Data Listed Above  * Additional Pertinent Lab Tests Reviewed: All Labs Within Last 24 Hours Reviewed    Imaging:    XR chest 2 views   ED Interpretation by Ryan Tsang MD (12/08 0045)   Right middle/upper lobe consolidation, concerning for pneumonia versus atelectasis  Final Result by Jesus Nur MD (12/08 5629)   Mild pulmonary edema with right upper lobe patchy opacity could reflect atelectasis versus infiltrate versus edema  Loculated right basilar pleural effusion fluid as before    Also refer to CT chest       Workstation performed: [id number]         CT chest without contrast   Final Result by Laurel Gill MD (12/08 4166)      Mild interlobular septal thickening likely is chronic scarring however may reflect mild pulmonary edema  Bilateral areas of atelectatic changes  Small effusions bilaterally with fluid tracking into the minor fissure         No airspace disease/infiltrate or pneumothorax  Abnormality seen on chest x-ray reflects pleural effusion           Workstation performed: [id number]           Recent Cultures (last 7 days):           Last 24 Hours Medication List:     Current Facility-Administered Medications:  acetaminophen 650 mg Oral Q6H PRN Liza Bell MD   albuterol 2 5 mg Nebulization Q4H PRN Liza Bell MD   ALPRAZolam 0 5 mg Oral 4x Daily PRN Liza Bell MD   apixaban 2 5 mg Oral BID Liza Bell MD   aspirin 81 mg Oral Daily Liza Bell MD   atorvastatin 20 mg Oral After Vinnie MD Claudia   bacitracin 1 small application Topical Daily Liza Bell MD   carbamide peroxide 4 drop Right Ear BID Liza Bell MD   carvedilol 3 125 mg Oral BID With Meals Liza Bell MD   finasteride 5 mg Oral Daily Liza Bell MD   fish oil 1,000 mg Oral Daily Liza Bell MD   furosemide 80 mg Intravenous TID (diuretic) Leverette Cooks, MD   gabapentin 100 mg Oral Daily Liza Bell MD   gabapentin 300 mg Oral HS Liza Bell MD   insulin glargine 10 Units Subcutaneous HS Liza Bell MD   insulin lispro 1-5 Units Subcutaneous TID Adrian De La Cruz MD   insulin lispro 1-5 Units Subcutaneous HS Liza Bell MD   lactase 6,000 Units Oral TID With Meals Liza Bell MD   lisinopril 2 5 mg Oral Daily Liza Bell MD   menthol-zinc oxide  Topical BID Liza Bell MD   metolazone 2 5 mg Oral Daily Liza Bell MD   multivitamin-minerals 1 tablet Oral Daily Liza Bell MD   nitroglycerin 0 4 mg Sublingual Q5 Min PRN Liza Bell MD   ondansetron 4 mg Intravenous Q6H PRN Liza Bell MD   polyvinyl alcohol 1 drop Both Eyes Q3H PRN Henry Anguiano MD   potassium chloride 40 mEq Oral BID With Meals Melia Hawkins MD   tamsulosin 0 4 mg Oral Daily With Oma Deal MD        Today, Patient Was Seen By: Laz Ortiz MD    ** Please Note: Dictation voice to text software may have been used in the creation of this document   **

## 2018-12-10 NOTE — PROGRESS NOTES
Patient refusing to get out of bed or "do anything" until after lunch   His back hurts and he is "in a comfortable position after 8 hours of discomfort "

## 2018-12-11 VITALS
TEMPERATURE: 98.4 F | DIASTOLIC BLOOD PRESSURE: 59 MMHG | OXYGEN SATURATION: 96 % | HEART RATE: 66 BPM | WEIGHT: 193.34 LBS | RESPIRATION RATE: 18 BRPM | BODY MASS INDEX: 28.64 KG/M2 | SYSTOLIC BLOOD PRESSURE: 124 MMHG | HEIGHT: 69 IN

## 2018-12-11 DIAGNOSIS — Z71.89 COMPLEX CARE COORDINATION: Primary | ICD-10-CM

## 2018-12-11 LAB
ANION GAP SERPL CALCULATED.3IONS-SCNC: 6 MMOL/L (ref 4–13)
BASOPHILS # BLD AUTO: 0.06 THOUSANDS/ΜL (ref 0–0.1)
BASOPHILS NFR BLD AUTO: 2 % (ref 0–1)
BUN SERPL-MCNC: 40 MG/DL (ref 5–25)
CALCIUM SERPL-MCNC: 9.9 MG/DL (ref 8.3–10.1)
CHLORIDE SERPL-SCNC: 92 MMOL/L (ref 100–108)
CO2 SERPL-SCNC: 35 MMOL/L (ref 21–32)
CREAT SERPL-MCNC: 1.79 MG/DL (ref 0.6–1.3)
EOSINOPHIL # BLD AUTO: 0.15 THOUSAND/ΜL (ref 0–0.61)
EOSINOPHIL NFR BLD AUTO: 4 % (ref 0–6)
ERYTHROCYTE [DISTWIDTH] IN BLOOD BY AUTOMATED COUNT: 15.7 % (ref 11.6–15.1)
GFR SERPL CREATININE-BSD FRML MDRD: 33 ML/MIN/1.73SQ M
GLUCOSE SERPL-MCNC: 124 MG/DL (ref 65–140)
GLUCOSE SERPL-MCNC: 166 MG/DL (ref 65–140)
GLUCOSE SERPL-MCNC: 177 MG/DL (ref 65–140)
HCT VFR BLD AUTO: 33.4 % (ref 36.5–49.3)
HGB BLD-MCNC: 10.3 G/DL (ref 12–17)
IMM GRANULOCYTES # BLD AUTO: 0.01 THOUSAND/UL (ref 0–0.2)
IMM GRANULOCYTES NFR BLD AUTO: 0 % (ref 0–2)
LYMPHOCYTES # BLD AUTO: 0.71 THOUSANDS/ΜL (ref 0.6–4.47)
LYMPHOCYTES NFR BLD AUTO: 20 % (ref 14–44)
MAGNESIUM SERPL-MCNC: 2.3 MG/DL (ref 1.6–2.6)
MCH RBC QN AUTO: 29.3 PG (ref 26.8–34.3)
MCHC RBC AUTO-ENTMCNC: 30.8 G/DL (ref 31.4–37.4)
MCV RBC AUTO: 95 FL (ref 82–98)
MONOCYTES # BLD AUTO: 0.58 THOUSAND/ΜL (ref 0.17–1.22)
MONOCYTES NFR BLD AUTO: 16 % (ref 4–12)
NEUTROPHILS # BLD AUTO: 2.09 THOUSANDS/ΜL (ref 1.85–7.62)
NEUTS SEG NFR BLD AUTO: 58 % (ref 43–75)
NRBC BLD AUTO-RTO: 0 /100 WBCS
PLATELET # BLD AUTO: 197 THOUSANDS/UL (ref 149–390)
PMV BLD AUTO: 10.3 FL (ref 8.9–12.7)
POTASSIUM SERPL-SCNC: 3.6 MMOL/L (ref 3.5–5.3)
RBC # BLD AUTO: 3.52 MILLION/UL (ref 3.88–5.62)
SODIUM SERPL-SCNC: 133 MMOL/L (ref 136–145)
WBC # BLD AUTO: 3.6 THOUSAND/UL (ref 4.31–10.16)

## 2018-12-11 PROCEDURE — 94760 N-INVAS EAR/PLS OXIMETRY 1: CPT

## 2018-12-11 PROCEDURE — 85025 COMPLETE CBC W/AUTO DIFF WBC: CPT | Performed by: INTERNAL MEDICINE

## 2018-12-11 PROCEDURE — 97167 OT EVAL HIGH COMPLEX 60 MIN: CPT

## 2018-12-11 PROCEDURE — G8988 SELF CARE GOAL STATUS: HCPCS

## 2018-12-11 PROCEDURE — 80048 BASIC METABOLIC PNL TOTAL CA: CPT | Performed by: INTERNAL MEDICINE

## 2018-12-11 PROCEDURE — G8987 SELF CARE CURRENT STATUS: HCPCS

## 2018-12-11 PROCEDURE — 82948 REAGENT STRIP/BLOOD GLUCOSE: CPT

## 2018-12-11 PROCEDURE — 99232 SBSQ HOSP IP/OBS MODERATE 35: CPT | Performed by: INTERNAL MEDICINE

## 2018-12-11 PROCEDURE — 99239 HOSP IP/OBS DSCHRG MGMT >30: CPT | Performed by: INTERNAL MEDICINE

## 2018-12-11 PROCEDURE — 83735 ASSAY OF MAGNESIUM: CPT | Performed by: INTERNAL MEDICINE

## 2018-12-11 RX ORDER — TORSEMIDE 20 MG/1
80 TABLET ORAL 2 TIMES DAILY
Qty: 168 TABLET | Refills: 0 | Status: SHIPPED | OUTPATIENT
Start: 2018-12-11 | End: 2018-12-14 | Stop reason: SDUPTHER

## 2018-12-11 RX ORDER — TORSEMIDE 20 MG/1
80 TABLET ORAL
Status: DISCONTINUED | OUTPATIENT
Start: 2018-12-11 | End: 2018-12-11 | Stop reason: HOSPADM

## 2018-12-11 RX ORDER — METOLAZONE 5 MG/1
2.5 TABLET ORAL DAILY
Status: DISCONTINUED | OUTPATIENT
Start: 2018-12-12 | End: 2018-12-11 | Stop reason: HOSPADM

## 2018-12-11 RX ORDER — FUROSEMIDE 10 MG/ML
60 INJECTION INTRAMUSCULAR; INTRAVENOUS
Status: DISCONTINUED | OUTPATIENT
Start: 2018-12-11 | End: 2018-12-11

## 2018-12-11 RX ORDER — ACETAMINOPHEN 325 MG/1
TABLET ORAL
Qty: 30 TABLET | Refills: 0 | Status: SHIPPED | OUTPATIENT
Start: 2018-12-11

## 2018-12-11 RX ADMIN — FUROSEMIDE 80 MG: 10 INJECTION, SOLUTION INTRAMUSCULAR; INTRAVENOUS at 05:47

## 2018-12-11 RX ADMIN — Medication 1 TABLET: at 09:51

## 2018-12-11 RX ADMIN — ASPIRIN 81 MG: 81 TABLET, COATED ORAL at 09:51

## 2018-12-11 RX ADMIN — APIXABAN 2.5 MG: 2.5 TABLET, FILM COATED ORAL at 09:51

## 2018-12-11 RX ADMIN — METOLAZONE 2.5 MG: 5 TABLET ORAL at 09:51

## 2018-12-11 RX ADMIN — Medication 1000 MG: at 09:51

## 2018-12-11 RX ADMIN — CARVEDILOL 3.12 MG: 3.12 TABLET, FILM COATED ORAL at 07:17

## 2018-12-11 RX ADMIN — POTASSIUM CHLORIDE 40 MEQ: 1500 TABLET, EXTENDED RELEASE ORAL at 07:17

## 2018-12-11 RX ADMIN — FINASTERIDE 5 MG: 5 TABLET, FILM COATED ORAL at 09:51

## 2018-12-11 RX ADMIN — LACTASE TAB 3000 UNIT 6000 UNITS: 3000 TAB at 07:17

## 2018-12-11 RX ADMIN — LISINOPRIL 2.5 MG: 2.5 TABLET ORAL at 09:51

## 2018-12-11 RX ADMIN — LACTASE TAB 3000 UNIT 6000 UNITS: 3000 TAB at 11:15

## 2018-12-11 RX ADMIN — INSULIN LISPRO 1 UNITS: 100 INJECTION, SOLUTION INTRAVENOUS; SUBCUTANEOUS at 11:15

## 2018-12-11 RX ADMIN — INSULIN LISPRO 1 UNITS: 100 INJECTION, SOLUTION INTRAVENOUS; SUBCUTANEOUS at 07:16

## 2018-12-11 RX ADMIN — GABAPENTIN 100 MG: 100 CAPSULE ORAL at 09:51

## 2018-12-11 RX ADMIN — Medication 4 DROP: at 09:51

## 2018-12-11 NOTE — PROGRESS NOTES
Was paged by nurse that patient's daughter reports that the patient had left-sided headache  I personally saw the patient with his nurse and daughter at bedside  He was sleeping when we entered the room and awoke easily to voice  He remained alert thereafter  He is very pleasant and cooperative  He currently denies headache but states that he may have had a left-sided headache before, is not sure  He is chronically blind in the left eye  He reports some b/l leg pain but otherwise denies complaint  His speech is clear and unchanged from baseline per patient and daughter  He denies numbness and tingling or one-sided weakness  On exam, his speech is clear  Face is symmetric  His facial strength is equal b/l  He reports equal and intact sensation to light touch on face x3, UE and LE b/l  His shoulder shrug,  strength, elbow flexion and extension, hip flexion, foot dorsiflexion and extension are 5/5 b/l  Will place neurochecks and add headache to indication for PRN headache

## 2018-12-11 NOTE — OCCUPATIONAL THERAPY NOTE
[address]  Evaluation     Patient Name: Tyler Toscano  [id number] Date: 12/11/2018  Problem List  Patient Active Problem List   Diagnosis    Lower urinary tract symptoms    Malignant neoplasm of trigone of urinary bladder (HCC)    Prostate cancer (HCC)    Acute on chronic diastolic (congestive) heart failure (HCC)    Acute kidney injury superimposed on chronic kidney disease (HCC)    Paroxysmal atrial fibrillation ([address] )    CAD (coronary artery disease)    Type 2 diabetes mellitus with hyperglycemia, with long-term current use of insulin ([address] )    Cardiac pacemaker in situ    Right ureteral stone    Urinary tract infection with hematuria    Hyponatremia    Laceration of right lower extremity    Diarrhea    Anemia    Benign prostatic hyperplasia    Blindness of left eye    Kidney stones    Peripheral neuropathy    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 (LAD) artery    Ureter, calculus    Urinary retention    Right flank pain    Acute renal failure superimposed on stage 3 chronic kidney disease (HCC)    Urinary tract infection associated with indwelling urethral catheter (HCC)    Dyslipidemia    Sick sinus syndrome ([address] )    Vascular dementia with behavior disturbance    Alcoholism ([address] )    Decubitus ulcer of sacral region, stage 1     Past Medical History  Past Medical History:   Diagnosis Date    A-fib ([address] )     Anemia     Bladder cancer ([address] )     CAD (coronary artery disease)     CHF (congestive heart failure) (HCC)     Chronic kidney failure     Colon cancer ([address] )     Eye cancer, left ([address] )     Hypertension     Incontinence     Pacemaker     Prostate cancer ([address] )     Type 2 diabetes mellitus ([address] )     Wears hearing aid in both ears      Past Surgical History  Past Surgical History:   Procedure Laterality Date    CARDIAC PACEMAKER PLACEMENT  2014    CORONARY ANGIOPLASTY WITH STENT PLACEMENT      GALLBLADDER SURGERY      MI CYSTO/URETERO W/LITHOTRIPSY &INDWELL STENT INSRT Right 7/31/2018    Procedure: CYSTOSCOPY URETEROSCOPY WITH LITHOTRIPSY HOLMIUM LASER ,BASKET STONE EXTRACTION  RETROGRADE PYELOGRAM AND exchange STENT URETERAL;  Surgeon: Miguel Salcido MD;  [facility];  Service: Urology    [place] CYSTOURETHROSCOPY,URETER CATHETER Right 7/1/2018    Procedure: CYSTOSCOPY RETROGRADE PYELOGRAM WITH INSERTION STENT URETERAL;  Surgeon: Paula Marcelo MD;  [facility];  Service: Urology         12/11/18 7433   Note Type   Note type Eval only   Restrictions/Precautions   Weight Bearing Precautions Per Order No   Other Precautions Chair Alarm; Bed Alarm;Cognitive;Telemetry; Fall Risk   Pain Assessment   Pain Assessment 0-10   Pain Score ("12/10 back pain")   Home Living   Type of Home Assisted living   Home Layout Able to live on main level with bedroom/bathroom; Elevator   Bathroom Shower/Tub Walk-in shower   Bathroom Toilet Raised   Bathroom Equipment Built-in shower seat;Grab bars in shower;Grab bars around toilet   [address] [facility]; Wheelchair-manual;Wheelchair-electric   Additional Comments Pt lives alone in [facility]  Pt primarily uses rw for ambulation, however he also uses the pwc  Pt reports that he used to use his mwc, however it is too difficult for his to propel himself with his L UE  Prior Function   Level of [place] Needs assistance with IADLs; Needs assistance with ADLs and functional mobility  (pt reports I with ADLs, per chart, pt req assist ADLs)   Lives With Alone; Facility staff   Receives Help From (Facility staff)   ADL Assistance Needs assistance   IADLs Needs assistance   Falls in the last 6 months 0   Vocational Retired   Comments Pt reports that he was I with ADLs and functional mobility using rw/pwc and required assistance with IADLs  Per chart, pt required assistance with ADLs       Lifestyle   Autonomy Pt reports that he was I with ADLs and functional mobility using rw/pwc and required assistance with IADLs  Per chart, pt required assistance with showering  Reciprocal Relationships 2 daughters, 1 son, son-in-law, 6 grandkids   Service to Others Pt is a reitred    Intrinsic Gratification Pt enjoys sitting outside with his friends at the [facility] and playing [employer]   Psychosocial   Psychosocial (WDL) WDL   Patient Behaviors/Mood (Verbose)   Subjective   Subjective Pt is a poor historian  Pt verbose throughout eval  Pt reporting that he has been wanting to do therapy at the [facility], but that it is very difficult to get services there   ADL   Eating Assistance 5  Supervision/Setup   Grooming Assistance 5  Supervision/Setup   UB Bathing Assistance 5  Supervision/Setup   LB Bathing Assistance 3  Moderate Assistance    [address] 1  Total Assistance   LB Dressing Deficit Don/doff R sock; Don/doff L sock   Toileting Assistance  4  Minimal Assistance   Bed Mobility   Additional Comments Pt seated in chair upon therapist entry   Transfers   Sit to Stand 4  Minimal assistance   Additional items Assist x 1; Increased time required;Verbal cues;Armrests   Stand to Sit 4  Minimal assistance   Additional items Assist x 1; Increased time required;Verbal cues;Armrests   Toilet transfer 3  Moderate assistance   Additional items Assist x 1; Increased time required;Standard toilet;Verbal cues  (grab bars; pt uses door handle to pull into standing at home)   Functional Mobility   Functional Mobility 4  Minimal assistance   Additional Comments Pt ambulated ~50 yards with rw   Additional items Rolling walker   Balance   Static Sitting Fair +   Dynamic Sitting Fair   Static Standing Fair -   Dynamic Standing Poor +   Ambulatory Poor +   Activity Tolerance   Activity Tolerance Patient limited by fatigue;Patient tolerated treatment well   Medical Staff Made Aware Per RN, pt okay to see for OT   Nurse Made Aware Yes   RUE Assessment RUE Assessment WFL   LUE Assessment   LUE Assessment X   LUE Overall AROM   L Shoulder Flexion can only reach about 10 degrees of L shoulder flexion   Hand Function   Gross Motor Coordination Functional   Fine Motor Coordination Functional   Vision-Basic Assessment   Current Vision (has glasses, but reports that he doesn't wear them)   Visual History (Blind in L eye)   Cognition   Overall Cognitive Status Impaired   Arousal/Participation Alert; Cooperative;Responsive   Attention Attends with cues to redirect   Orientation Level Oriented X4   Memory Decreased short term memory;Decreased recall of recent events   Following Commands Follows one step commands with increased time or repetition   Comments Pt reports that his doctor gave him a "brain test" and it showed that he's "extremely sharp" for his age  Assessment   Limitation Decreased ADL status; Decreased UE strength;Decreased UE ROM; Decreased cognition;Decreased endurance;Decreased self-care trans;Decreased high-level ADLs; Visual deficit   Prognosis Fair   Assessment Pt is an 80year old male seen for OT eval s/p admission to [facility] with complaint of acute onset of mid-sternal chest pressure with associated SOB  Pt dx'd with acute on chronic diastolic congestive heart failure  Comorbidities include a h/o diastolic congestive heart failure, HTN, HLD, and DM  Pt with active OT orders and up with assistance orders  Pt lives alone in an apartment at [facility]  Pt is a poor historian  Pt reports that he was I with ADLs and functional mobility with use of rw/pwc and requires assistance with IADLs  Per chart, pt requires assistance with bathing  Pt is currently demonstrating the following occupational deficits: eating with set-up, grooming with set-up, UB bathing with set-up, LB bathing with modA, UB dressing with Jennifer, LB dressing with totalA, toileting with Jennifer and functional transfers with modA    Impairments that are currently contributing to patient's decline in independence with these occupations include: pain, cognitive deficits, endurance, activity tolerance, balance, strength, L UE ROM and functional mobility  Overall, pt scored 65/100 on the Barthel Index  Recommend STR vs home OT pending progress and availability of services at [facility]  Pt is to continue to benefit from skilled occupational therapy while in the hospital to maximize functioning and independence in daily activities  See below for OT goals  Goals   Patient Goals to go for a walk   Plan   Treatment Interventions ADL retraining;Functional transfer training;UE strengthening/ROM; Endurance training;Cognitive reorientation;Patient/family training;Equipment evaluation/education; Compensatory technique education;Continued evaluation; Energy conservation; Activityengagement   Goal Expiration Date 12/21/18   OT Frequency 3-5x/wk   Recommendation   OT Discharge Recommendation (STR vs home OT pending progress)   OT - OK to Discharge (when medically stable)   Barthel Index   Feeding 10   Bathing 0   Grooming Score 5   Dressing Score 5   Bladder Score 10   Bowels Score 10   Toilet Use Score 5   Transfers (Bed/Chair) Score 10   Mobility (Level Surface) Score 10   Stairs Score 0   Barthel Index Score 65     Goals:  Pt will complete functional transfers with supervision using DME and AD as appropriate  Pt will complete UB ADLs with Danya  Pt will complete LB ADLs with supervision using DME and AD as appropriate  Pt will participate in ongoing cognitive assessment with good participation for safe discharge/planning      EMILY Cabral, OTR/L

## 2018-12-11 NOTE — PROGRESS NOTES
Patient's daughter arrived and notified RN of  L sided heachache when he awoke from his nap  She was concerned because he always complains of left sided pains and he "never has headaches " PENG Lambert notified and came to bedside to assess patient who reported headache had disappeared and assessed patient for a non focal neuro exam  Nursing will continue neuro checks Q 4 hours and notify MD if there are any changes

## 2018-12-11 NOTE — PLAN OF CARE
Problem: OCCUPATIONAL THERAPY ADULT  Goal: Performs self-care activities at highest level of function for planned discharge setting  See evaluation for individualized goals  Treatment Interventions: ADL retraining, Functional transfer training, UE strengthening/ROM, Endurance training, Cognitive reorientation, Patient/family training, Equipment evaluation/education, Compensatory technique education, Continued evaluation, Energy conservation, Activityengagement          See flowsheet documentation for full assessment, interventions and recommendations  Limitation: Decreased ADL status, Decreased UE strength, Decreased UE ROM, Decreased cognition, Decreased endurance, Decreased self-care trans, Decreased high-level ADLs, Visual deficit  Prognosis: Fair  Assessment: Pt is an 80year old male seen for OT eval s/p admission to [facility] with complaint of acute onset of mid-sternal chest pressure with associated SOB  Pt dx'd with acute on chronic diastolic congestive heart failure  Comorbidities include a h/o diastolic congestive heart failure, HTN, HLD, and DM  Pt with active OT orders and up with assistance orders  Pt lives alone in an apartment at [facility]  Pt is a poor historian  Pt reports that he was I with ADLs and functional mobility with use of rw/pwc and requires assistance with IADLs  Per chart, pt requires assistance with bathing  Pt is currently demonstrating the following occupational deficits: eating with set-up, grooming with set-up, UB bathing with set-up, LB bathing with modA, UB dressing with Jennifer, LB dressing with totalA, toileting with Jennifer and functional transfers with modA  Impairments that are currently contributing to patient's decline in independence with these occupations include: pain, cognitive deficits, endurance, activity tolerance, balance, strength, L UE ROM and functional mobility  Overall, pt scored 65/100 on the Barthel Index    Recommend STR vs home OT pending progress and availability of services at [facility]  Pt is to continue to benefit from skilled occupational therapy while in the hospital to maximize functioning and independence in daily activities  See below for OT goals       OT Discharge Recommendation:  (STR vs home OT pending progress)  OT - OK to Discharge:  (when medically stable)      Comments: EMILY Garcia, OTR/L

## 2018-12-11 NOTE — SOCIAL WORK
Discussed patient with Dr Tahira Coleman and he is cleared for discharge back to [facility] today  Called Viktor and informed her of dc and reviewed therapy notes with her  Asked MD for script for PT and OT eval and treat  Met with patient and son-in-law to discuss discharge  Family will transport back to [facility] today at 3:00  Informed RN, MD and [facility] at [facility]

## 2018-12-11 NOTE — PLAN OF CARE
CARDIOVASCULAR - ADULT     Maintains optimal cardiac output and hemodynamic stability Progressing     Absence of cardiac dysrhythmias or at baseline rhythm Progressing        DISCHARGE PLANNING     Discharge to home or other facility with appropriate resources Progressing        DISCHARGE PLANNING - CARE MANAGEMENT     Discharge to post-acute care or home with appropriate resources Progressing        INFECTION - ADULT     Absence or prevention of progression during hospitalization Progressing        Knowledge Deficit     Patient/family/caregiver demonstrates understanding of disease process, treatment plan, medications, and discharge instructions Progressing        Nutrition/Hydration-ADULT     Nutrient/Hydration intake appropriate for improving, restoring or maintaining nutritional needs Progressing        PAIN - ADULT     Verbalizes/displays adequate comfort level or baseline comfort level Progressing        Potential for Falls     Patient will remain free of falls Progressing        Prexisting or High Potential for Compromised Skin Integrity     Skin integrity is maintained or improved Progressing        SAFETY ADULT     Patient will remain free of falls Progressing     Maintain or return to baseline ADL function Progressing     Maintain or return mobility status to optimal level Progressing

## 2018-12-11 NOTE — RESTORATIVE TECHNICIAN NOTE
Restorative Specialist Mobility Note       Activity: Bathroom privileges, Ambulate in room, Chair     Assistive Device: Front wheel walker     Ambulation Response:  Tolerated fairly well  Repositioned: Sitting, Up in chair, Other (Comment) (chair alarm on)

## 2018-12-11 NOTE — DISCHARGE INSTR - AVS FIRST PAGE
· We increased dose of your diuretics which is torsemide, now increased to 80 mg twice a day  Continue to take as advised  · Please have repeat blood work checked in 3-7 days for kidney function and electrolytes (BMP)  · Please maintain fluid restriction to 1500 cc in diet with low-salt diet  · Please weight yourself daily and if your weight increases by 3 lbs, call your cardiologist to discuss regarding possible need of change in diuretics dose

## 2018-12-11 NOTE — PROGRESS NOTES
Cardiology Progress Note   Amanda Mcgill 80 y o  male MRN: [id number]  Unit/Bed#: [facility] 530-01 Encounter: [id number]      Assessment:  Principal Problem:    Acute on chronic diastolic (congestive) heart failure (HCC)  Active Problems:    Acute kidney injury superimposed on chronic kidney disease (HCC)    Paroxysmal atrial fibrillation (HCC)    Type 2 diabetes mellitus with hyperglycemia, with long-term current use of insulin (HCC)    Blindness of left eye    Vascular dementia with behavior disturbance    Decubitus ulcer of sacral region, stage 1      Impression:    Acute on chronic diastolic CHF euvolemic  Acute kidney injury - stable  CKD stage 3  PAF - permanent, anticoagulated  Hypertension  CAD  Permanent pacemaker-paced rhythm    Plan:    Transition to oral diuretics  High risk for recurrent admissions due to high diuretic requirement  Prior to admission was on metolazone and torsemide 60 mg b i d  Would discharge on torsemide 80 mg b i d    and metolazone 2 5 mg daily    Subjective:   Patient seen and examined  No further chest pressure, no further shortness of breath, lying in bed, conversant, no labored speech, appetite okay  Chronically blind in the left eye      Meds/Allergies   Allergies   Allergen Reactions    Iv Contrast [Iodinated Diagnostic Agents]        Current Facility-Administered Medications:     acetaminophen (TYLENOL) tablet 650 mg, 650 mg, Oral, Q6H PRN, Sara Lopez PA-C    albuterol inhalation solution 2 5 mg, 2 5 mg, Nebulization, Q4H PRN, Morteza Bhagat MD    ALPRAZolam Earleen Bump) tablet 0 5 mg, 0 5 mg, Oral, 4x Daily PRN, Morteza Bhagat MD, 0 5 mg at 12/10/18 2134    apixaban (ELIQUIS) tablet 2 5 mg, 2 5 mg, Oral, BID, Morteza Bhagat MD, 2 5 mg at 12/11/18 0951    aspirin (ECOTRIN LOW STRENGTH) EC tablet 81 mg, 81 mg, Oral, Daily, Morteza Bhagat MD, 81 mg at 12/11/18 0951    atorvastatin (LIPITOR) tablet 20 mg, 20 mg, Oral, After Fanta Voss MD, 20 mg at 12/10/18 1749    bacitracin topical ointment 1 small application, 1 small application, Topical, Daily, Smita Murrieta MD, 1 small application at 10/56/93 0845    carbamide peroxide (DEBROX) 6 5 % otic solution 4 drop, 4 drop, Right Ear, BID, Smita Murrieta MD, 4 drop at 12/11/18 0951    carvedilol (COREG) tablet 3 125 mg, 3 125 mg, Oral, BID With Meals, Smita Murrieta MD, 3 125 mg at 12/11/18 5354    finasteride (PROSCAR) tablet 5 mg, 5 mg, Oral, Daily, Smita Murrieta MD, 5 mg at 12/11/18 8047    fish oil capsule 1,000 mg, 1,000 mg, Oral, Daily, Smita Murrieta MD, 1,000 mg at 12/11/18 0951    furosemide (LASIX) injection 60 mg, 60 mg, Intravenous, BID (diuretic), Crys Bee MD    gabapentin (NEURONTIN) capsule 100 mg, 100 mg, Oral, Daily, Smita Murrieta MD, 100 mg at 12/11/18 0951    gabapentin (NEURONTIN) capsule 300 mg, 300 mg, Oral, HS, Smita Murrieta MD, 300 mg at 12/10/18 2133    insulin glargine (LANTUS) subcutaneous injection 10 Units 0 1 mL, 10 Units, Subcutaneous, HS, Smita Murrieta MD, 10 Units at 12/10/18 2136    insulin lispro (HumaLOG) 100 units/mL subcutaneous injection 1-5 Units, 1-5 Units, Subcutaneous, TID AC, 1 Units at 12/11/18 1115 **AND** Fingerstick Glucose (POCT), , , TID AC, Smita Murrieta MD    insulin lispro (HumaLOG) 100 units/mL subcutaneous injection 1-5 Units, 1-5 Units, Subcutaneous, HS, Smita Murrieta MD, 1 Units at 12/10/18 2134    lactase (LACTAID) tablet 6,000 Units, 6,000 Units, Oral, TID With Meals, Smita Murrieta MD, 6,000 Units at 12/11/18 1115    lisinopril (ZESTRIL) tablet 2 5 mg, 2 5 mg, Oral, Daily, Smita Murrieta MD, 2 5 mg at 12/11/18 0951    menthol-zinc oxide (CALMOSEPTINE) 0 44-20 6 % ointment, , Topical, BID, Smita Murrieta MD    metolazone (ZAROXOLYN) tablet 2 5 mg, 2 5 mg, Oral, Daily, Smita Murrieta MD, 2 5 mg at 12/11/18 0951    multivitamin-minerals (CENTRUM) tablet 1 tablet, 1 tablet, Oral, Daily, Henry Anguiano MD, 1 tablet at 12/11/18 0951    nitroglycerin (NITROSTAT) SL tablet 0 4 mg, 0 4 mg, Sublingual, Q5 Min PRN, Henry Anguiano MD    ondansetron [facility]) injection 4 mg, 4 mg, Intravenous, Q6H PRN, Henry Anguiano MD    polyvinyl alcohol (LIQUIFILM TEARS) 1 4 % ophthalmic solution 1 drop, 1 drop, Both Eyes, Q3H PRN, Henry Anguiano MD    potassium chloride (K-DUR,KLOR-CON) CR tablet 40 mEq, 40 mEq, Oral, BID With Meals, Melia Hawkins MD, 40 mEq at 12/11/18 0717    tamsulosin (FLOMAX) capsule 0 4 mg, 0 4 mg, Oral, Daily With Oma Deal MD, 0 4 mg at 12/10/18 1631    Objective   Vitals: Blood pressure 108/54, pulse 68, temperature 98 2 °F (36 8 °C), resp  rate 20, height 5' 9" (1 753 m), weight 87 7 kg (193 lb 5 5 oz), SpO2 94 %  , Body mass index is 28 55 kg/m² , Orthostatic Blood Pressures      Most Recent Value   Blood Pressure  108/54 filed at 12/11/2018 9733   Patient Position - Orthostatic VS  Lying filed at 12/10/2018 1456        Systolic (60QLA), HAY:125 , Min:88 , HXH:718     Diastolic (04YHL), DOA:07, Min:54, Max:64      Intake/Output Summary (Last 24 hours) at 12/11/18 1123  Last data filed at 12/11/18 1122   Gross per 24 hour   Intake             1198 ml   Output             1702 ml   Net             -504 ml     Weight (last 2 days)     Date/Time   Weight    12/11/18 0600  87 7 (193 34)    12/10/18 0531  88 2 (194 45)    12/09/18 0454  89 7 (197 75)              No significant arrhythmias seen on telemetry review       Physical Exam:    GEN: Aneesh Bennett appears well, alert and oriented x 3, pleasant and cooperative   NECK: supple, no carotid bruits, no JVD or HJR  HEART: normal rate, regular rhythm, normal S1 and S2, no murmurs, clicks, gallops or rubs   LUNGS: clear to auscultation bilaterally; no wheezes, rales, or rhonchi   ABDOMEN: normal bowel sounds, soft, no tenderness, no distention  EXTREMITIES: peripheral pulses normal; no clubbing, cyanosis, or edema      Laboratory Results:    Results from last 7 days  Lab Units 12/08/18  0848 12/08/18  0553 12/07/18  2358   TROPONIN I ng/mL 0 07* 0 07* 0 06*       CBC with diff:   Results from last 7 days  Lab Units 12/11/18  0513 12/10/18  0549 12/07/18  2358   WBC Thousand/uL 3 60* 4 32 4 29*   HEMOGLOBIN g/dL 10 3* 10 3* 9 4*   HEMATOCRIT % 33 4* 32 9* 30 2*   MCV fL 95 95 95   PLATELETS Thousands/uL 197 211 192   MCH pg 29 3 29 6 29 7   MCHC g/dL 30 8* 31 3* 31 1*   RDW % 15 7* 15 7* 15 9*   MPV fL 10 3 10 4 10 7   NRBC AUTO /100 WBCs 0 0 0       CMP:  Results from last 7 days  Lab Units 12/11/18  0513 12/10/18  0549 12/09/18  0442 12/08/18  0553 12/07/18  2358   POTASSIUM mmol/L 3 6 3 7 3 6 3 1* 3 4*   CHLORIDE mmol/L 92* 93* 94* 94* 93*   CO2 mmol/L 35* 37* 35* 35* 32   BUN mg/dL 40* 38* 35* 36* 37*   CREATININE mg/dL 1 79* 1 68* 1 70* 1 93* 1 90*   CALCIUM mg/dL 9 9 9 1 8 7 9 3 8 4   AST U/L  --  10  --   --   --    ALT U/L  --  10*  --   --   --    ALK PHOS U/L  --  104  --   --   --    EGFR ml/min/1 73sq m 33 36 35 30 31       BMP:  Results from last 7 days  Lab Units 12/11/18  0513 12/10/18  0549 12/09/18  0442 12/08/18  0553 12/07/18  2358   POTASSIUM mmol/L 3 6 3 7 3 6 3 1* 3 4*   CHLORIDE mmol/L 92* 93* 94* 94* 93*   CO2 mmol/L 35* 37* 35* 35* 32   BUN mg/dL 40* 38* 35* 36* 37*   CREATININE mg/dL 1 79* 1 68* 1 70* 1 93* 1 90*   CALCIUM mg/dL 9 9 9 1 8 7 9 3 8 4       NT-proBNP:   Recent Labs      12/09/18   1656   NTBNP  4,999*        Magnesium:   Results from last 7 days  Lab Units 12/11/18  0513 12/09/18  0442   MAGNESIUM mg/dL 2 3 2 3       Coags:       TSH:        Hemoglobin A1C )  Results from last 7 days  Lab Units 12/08/18  0553   HEMOGLOBIN A1C % 7 5*       Lipid Profile:   No results found for: CHOL  Lab Results   Component Value Date    HDL 33 (L) 07/05/2018     Lab Results   Component Value Date    LDLCALC 79 07/05/2018     No results found for: LDLDIRECT  Lab Results Component Value Date    TRIG 75 2018       Cardiac testing:       Results for orders placed during the hospital encounter of 18   Echo complete with contrast if indicated    Narrative [address]  [facility] [place], [address] [facility]  [phone]    Transthoracic Echocardiogram  2D, M-mode, Doppler, and Color Doppler    Study date:  10-Dec-2018    Patient: Isiah Kasper  MR number: [id number]  Account number: [de-identified]  : 21-Dec-1930  Age: 80 years  Gender: Male  Status: Inpatient  Location: Bedside  Height: 69 in 69 in  Weight: 194 lb 193 6 lb  BP: 120/ 60 mmHg    Indications: Heart Failure    Diagnoses: I50 9 - Heart failure, unspecified    Sonographer:  Shari Storey RDCS  Primary Physician:  Skylar Mckeon  Referring Physician:  Marta Grullon MD  Group:  [facility]  Interpreting Physician:  Octaviano oWodward MD    SUMMARY    LEFT VENTRICLE:  Systolic function was normal  Ejection fraction was estimated to be 55 %  There were no regional wall motion abnormalities  Wall thickness was moderately increased  Doppler parameters were consistent with elevated ventricular end-diastolic filling pressure  VENTRICULAR SEPTUM:  There was moderate dyssynergic motion  These changes are consistent with right ventricular pacing  RIGHT VENTRICLE:  The ventricle was mildly dilated  Systolic function was mildly reduced  Wall thickness was increased  LEFT ATRIUM:  The atrium was mildly dilated  RIGHT ATRIUM:  The atrium was mildly dilated  MITRAL VALVE:  There was mild to moderate annular calcification  There was mild to moderate regurgitation  TRICUSPID VALVE:  There was mild regurgitation  Pulmonary artery systolic pressure was moderately increased  HISTORY: PRIOR HISTORY: CHF, Afib, DM, CKD, Pacemaker    PROCEDURE: The procedure was performed at the bedside  This was a routine study  The transthoracic approach was used   The study included complete 2D imaging, M-mode, complete spectral Doppler, and color Doppler  The heart rate was 51 bpm,  at the start of the study  Images were obtained from the parasternal, apical, subcostal, and suprasternal notch acoustic windows  Image quality was adequate  LEFT VENTRICLE: Size was normal  Systolic function was normal  Ejection fraction was estimated to be 55 %  There were no regional wall motion abnormalities  Wall thickness was moderately increased  DOPPLER: Left ventricular diastolic  function parameters were abnormal  Doppler parameters were consistent with elevated ventricular end-diastolic filling pressure  VENTRICULAR SEPTUM: There was moderate dyssynergic motion  These changes are consistent with right ventricular pacing  RIGHT VENTRICLE: The ventricle was mildly dilated  Systolic function was mildly reduced  Wall thickness was increased  LEFT ATRIUM: The atrium was mildly dilated  RIGHT ATRIUM: The atrium was mildly dilated  MITRAL VALVE: There was mild to moderate annular calcification  Valve structure was normal  There was normal leaflet separation  DOPPLER: The transmitral velocity was within the normal range  There was no evidence for stenosis  There was  mild to moderate regurgitation  AORTIC VALVE: The valve was trileaflet  Leaflets exhibited normal thickness, mild to moderate calcification, and normal cuspal separation  DOPPLER: Transaortic velocity was within the normal range  There was no evidence for stenosis  There  was no significant regurgitation  TRICUSPID VALVE: The valve structure was normal  There was normal leaflet separation  DOPPLER: The transtricuspid velocity was within the normal range  There was no evidence for stenosis  There was mild regurgitation  Pulmonary artery  systolic pressure was moderately increased  PULMONIC VALVE: Not well visualized  DOPPLER: The transpulmonic velocity was within the normal range   There was no significant regurgitation  PERICARDIUM: There was no pericardial effusion  The pericardium was normal in appearance  AORTA: The root exhibited normal size  SYSTEMIC VEINS: IVC: The inferior vena cava was normal in size  PULMONARY VEINS: DOPPLER: There was systolic blunting in the pulmonary vein(s)  SYSTEM MEASUREMENT TABLES    2D mode  Aortic Root Diameter; User chosen value; 2D mode;: 3 cm  LA/Ao (2D): 1 37  Left Atrium Clint-posterior Systolic Dimension; User chosen value; 2D mode;: 4 1 cm  EDV (2D-Cubed): 64 5 cm3  EF (2D-Cubed): 69 1 %  ESV (2D-Cubed): 19 9 cm3  FS (2D-Cubed): 32 4 %  FS (2D-Teich): 32 4 %  IVS/LVPW (2D): 1 25  Interventricular Septum Diastolic Thickness; User chosen value; 2D mode;: 1 81 cm  LVOT Area (2D): 314 mm2  Left Ventricle Internal End Diastolic Dimension; User chosen value; 2D mode;: 4 01 cm  Left Ventricle Internal Systolic Dimension; User chosen value; 2D mode;: 2 71 cm  Left Ventricle Posterior Wall Diastolic Thickness; User chosen value; 2D mode;: 1 45 cm  Left Ventricular Ejection Fraction; Teichholz; 2D mode;: 61 2 %  Left Ventricular End Diastolic Volume; Teichholz; 2D mode;: 70 4 cm3  Left Ventricular End Systolic Volume; Teichholz; 2D mode;: 27 3 cm3  SI (2D-Cubed): 21 9 ml/m2  SV (2D-Cubed): 44 6 cm3  Stroke Index; Teichholz; 2D mode;: 21 1 ml/m2  Stroke Volume; Teichholz; 2D mode;: 43 1 cm3    Apical four chamber  Left Atrium MOD Diam; Most recent value chosen; End Systole; Apical four chamber;: 1 57 cm  Left Atrium MOD Diam; Most recent value chosen; End Systole; Apical four chamber;: 2 14 cm  Left Atrium MOD Diam; Most recent value chosen; End Systole; Apical four chamber;: 2 73 cm  Left Atrium MOD Diam; Most recent value chosen; End Systole; Apical four chamber;: 3 29 cm  Left Atrium MOD Diam; Most recent value chosen; End Systole; Apical four chamber;: 3 62 cm  Left Atrium MOD Diam; Most recent value chosen; End Systole;  Apical four chamber;: 4 3 cm  Left Atrium MOD Diam; Most recent value chosen; End Systole; Apical four chamber;: 4 09 cm  Left Atrium MOD Diam; Most recent value chosen; End Systole; Apical four chamber;: 3 94 cm  Left Atrium MOD Diam; Most recent value chosen; End Systole; Apical four chamber;: 3 9 cm  Left Atrium MOD Diam; Most recent value chosen; End Systole; Apical four chamber;: 4 01 cm  Left Atrium MOD Diam; Most recent value chosen; End Systole; Apical four chamber;: 3 94 cm  Left Atrium MOD Diam; Most recent value chosen; End Systole; Apical four chamber;: 3 94 cm  Left Atrium MOD Diam; Most recent value chosen; End Systole; Apical four chamber;: 3 91 cm  Left Atrium MOD Diam; Most recent value chosen; End Systole; Apical four chamber;: 3 76 cm  Left Atrium MOD Diam; Most recent value chosen; End Systole; Apical four chamber;: 2 23 cm  Left Atrium MOD Diam; Most recent value chosen; End Systole; Apical four chamber;: 2 94 cm  Left Atrium MOD Diam; Most recent value chosen; End Systole; Apical four chamber;: 3 7 cm  Left Atrium MOD Diam; Most recent value chosen; End Systole; Apical four chamber;: 3 41 cm  Left Atrium MOD Diam; Most recent value chosen; End Systole; Apical four chamber;: 3 05 cm  Left Atrium MOD Diam; Most recent value chosen; End Systole; Apical four chamber;: 2 45 cm  Left Atrium Systolic Area; Most recent value chosen; Method of Disks, Single Plane; 2D mode; Apical four chamber;: 2280 mm2  Left Atrium Systolic Volume Index; Method of Disks, Single Plane; 2D mode; Apical four chamber;: 29 9 ml/m2  Left Atrium Systolic Volume; Most recent value chosen; Method of Disks, Single Plane; 2D mode; Apical four chamber;: 61 cm3  Left Atrium systolic major axis; Most recent value chosen; Method of Disks, Single Plane; 2D mode; Apical four chamber;: 6 57 cm  LV MOD Diam; Recent value; End Diastole (A4C): 1 61 cm  LV MOD Diam; Recent value; End Diastole (A4C): 2 85 cm  LV MOD Diam; Recent value;  End Diastole (A4C): 3 52 cm  LV MOD Diam; Recent value; End Diastole (A4C): 4 12 cm  LV MOD Diam; Recent value; End Diastole (A4C): 4 42 cm  LV MOD Diam; Recent value; End Diastole (A4C): 2 01 cm  LV MOD Diam; Recent value; End Diastole (A4C): 4 22 cm  LV MOD Diam; Recent value; End Diastole (A4C): 4 45 cm  LV MOD Diam; Recent value; End Diastole (A4C): 4 62 cm  LV MOD Diam; Recent value; End Diastole (A4C): 4 76 cm  LV MOD Diam; Recent value; End Diastole (A4C): 4 79 cm  LV MOD Diam; Recent value; End Diastole (A4C): 4 72 cm  LV MOD Diam; Recent value; End Diastole (A4C): 4 59 cm  LV MOD Diam; Recent value; End Diastole (A4C): 4 45 cm  LV MOD Diam; Recent value; End Diastole (A4C): 4 42 cm  LV MOD Diam; Recent value; End Diastole (A4C): 4 39 cm  LV MOD Diam; Recent value; End Diastole (A4C): 4 32 cm  LV MOD Diam; Recent value; End Diastole (A4C): 3 85 cm  LV MOD Diam; Recent value; End Diastole (A4C): 3 22 cm  LV MOD Diam; Recent value; End Diastole (A4C): 2 38 cm  LV MOD Diam; Recent value; End Systole (A4C): 0 94 cm  LV MOD Diam; Recent value; End Systole (A4C): 1 57 cm  LV MOD Diam; Recent value; End Systole (A4C): 2 04 cm  LV MOD Diam; Recent value; End Systole (A4C): 2 34 cm  LV MOD Diam; Recent value; End Systole (A4C): 3 58 cm  LV MOD Diam; Recent value; End Systole (A4C): 3 42 cm  LV MOD Diam; Recent value; End Systole (A4C): 3 25 cm  LV MOD Diam; Recent value; End Systole (A4C): 2 98 cm  LV MOD Diam; Recent value; End Systole (A4C): 2 81 cm  LV MOD Diam; Recent value; End Systole (A4C): 2 68 cm  LV MOD Diam; Recent value; End Systole (A4C): 2 55 cm  LV MOD Diam; Recent value; End Systole (A4C): 2 48 cm  LV MOD Diam; Recent value; End Systole (A4C): 2 41 cm  LV MOD Diam; Recent value; End Systole (A4C): 2 51 cm  LV MOD Diam; Recent value; End Systole (A4C): 3 69 cm  LV MOD Diam; Recent value; End Systole (A4C): 3 65 cm  LV MOD Diam; Recent value; End Systole (A4C): 2 21 cm  LV MOD Diam; Recent value;  End Systole (A4C): 1 81 cm  LV MOD Diam; Recent value; End Systole (A4C): 1 47 cm  LV MOD Diam; Recent value; End Systole (A4C): 1 34 cm  LVEF MOD A4C: 58 %  Left Ventricle diastolic major axis; Most recent value chosen; Method of Disks, Single Plane; 2D mode; Apical four chamber;: 7 67 cm  Left Ventricle systolic major axis; Most recent value chosen; Method of Disks, Single Plane; 2D mode; Apical four chamber;: 7 57 cm  Left Ventricular Diastolic Area; Most recent value chosen; Method of Disks, Single Plane; 2D mode; Apical four chamber;: 2990 mm2  Left Ventricular End Diastolic Volume; Most recent value chosen; Method of Disks, Single Plane; 2D mode; Apical four chamber;: 96 cm3  Left Ventricular End Systolic Volume; Most recent value chosen; Method of Disks, Single Plane; 2D mode; Apical four chamber;: 40 cm3  Left Ventricular Systolic Area; Most recent value chosen; Method of Disks, Single Plane; 2D mode; Apical four chamber;: 1870 mm2  SI (A4C): 27 5 ml/m2  SV MOD A4C: 56 cm3    Unspecified Scan Mode  LVOT torsemide CV Orifice Diam; Mean: 2 cm  LVOT Diam: 2 cm  DT; Antegrade Flow: 185 ms  DT; Mean; Antegrade Flow: 185 ms  MV E Leonel: 903 mm/s  MV Peak E Leonel; Mean;  Antegrade Flow: 903 mm/s  Peak Grad; Mean; Regurgitant Flow: 75 mm[Hg]  Vmax; Mean; Regurgitant Flow: 4340 mm/s  Vmax; Regurgitant Flow: 4340 mm/s  Peak Grad; Mean; Regurgitant Flow: 51 mm   Vmax; Mean; Regurgitant Flow: 3580 mm/s  Vmax; Regurgitant Flow: 3540 mm/s  Vmax; Regurgitant Flow: 3620 mm/s  Atrial [place]omack Area;: 2090 mm2  Atrial [place] Area; Mean; Mean value chosen;: 2090 mm2  Atrial [place]omack Distance;: 5 9 cm  Atrial [place]ack Distance; Mean; Mean value chosen;: 5 9 cm  Atrial [place]omack Volume;: 60 8 cm3  Atrial [place]omack Volume; Mean; Mean value chosen;: 60 8 cm3  Distance;: 4 1 cm  Distance; Mean; Mean value chosen;: 4 1 cm  Distance; User chosen value;: 1 2 cm    [facility]    Prepared and electronically signed by    Obie Damon MD  Signed 10-Dec-2018 11:00:53       No results found for this or any previous visit  No results found for this or any previous visit  No results found for this or any previous visit  No results found for this or any previous visit  No results found for this or any previous visit  Luis Dunn MD    Portions of the record may have been created with voice recognition software   Occasional wrong word or "sound a like" substitutions may have occurred due to the inherent limitations of voice recognition software   Read the chart carefully and recognize, using context, where substitutions have occurred

## 2018-12-12 ENCOUNTER — TRANSITIONAL CARE MANAGEMENT (OUTPATIENT)
Dept: GERIATRICS | Age: 83
End: 2018-12-12

## 2018-12-12 NOTE — ASSESSMENT & PLAN NOTE
· Baseline creatinine between 1 4-1 7  · Ирина improving and Creatinine Stable on diuresis  · Monitor strict I&Os and daily weight  · Repeat BMP within 1 week of discharge

## 2018-12-12 NOTE — ASSESSMENT & PLAN NOTE
Sodium low at 133 this morning  Likely related to aggressive diuresis  Repeat BMP within 1 week of discharge

## 2018-12-12 NOTE — ASSESSMENT & PLAN NOTE
· Echo results Systolic function was normal  Ejection fraction was estimated to be 55 %  There were no regional wall motion abnormalities  Wall thickness was moderately increased  Doppler parameters were consistent with elevated ventricular end-diastolic filling pressure    PLAN:  · Aggressively diuresed with IV Lasix during this hospitalization  · Now switched to increased dose of torsemide which is 80 mg b i d  P o  On discharge  , -4 4 L during this admission  Last weight during hospitalization 193 lb down from to 215 lb on admission  · Strictly recommended diet modification with fluid restriction and low-salt diet and daily weights  · Call Cardiology outpatient if noted more than 3 lb weight gain    · Fluid restriction 1500ml daily, Na restriction [facility] Cardiology input

## 2018-12-12 NOTE — DISCHARGE SUMMARY
Discharge- Tiffanie Walls 12/21/1930, 80 y o  male MRN: [id number]    Unit/Bed#: [facility] 530-01 Encounter: [id number]    Primary Care Provider: Vannie Rubinstein, MD   Date and time admitted to hospital: 12/7/2018 11:17 PM        * Acute on chronic diastolic (congestive) heart failure ([facility] 75 )   Assessment & Plan    · Echo results Systolic function was normal  Ejection fraction was estimated to be 55 %  There were no regional wall motion abnormalities  Wall thickness was moderately increased  Doppler parameters were consistent with elevated ventricular end-diastolic filling pressure    PLAN:  · Aggressively diuresed with IV Lasix during this hospitalization  · Now switched to increased dose of torsemide which is 80 mg b i d  P o  On discharge  , -4 4 L during this admission  Last weight during hospitalization 193 lb down from to 215 lb on admission  · Strictly recommended diet modification with fluid restriction and low-salt diet and daily weights  · Call Cardiology outpatient if noted more than 3 lb weight gain  · Fluid restriction 1500ml daily, Na restriction 2000mg  Appreciate Cardiology input     Paroxysmal atrial fibrillation (HCC)   Assessment & Plan    · Continue eliquis, Coreg, monitor on telemetry   · rate controlled     Decubitus ulcer of sacral region, stage 1   Assessment & Plan    · Frequent repositioning  · Wound care      Vascular dementia with behavior disturbance   Assessment & Plan    · Supportive care  · Continue Xanax as needed for anxiety     Blindness of left eye   Assessment & Plan    · Supportive care     Hyponatremia   Assessment & Plan    Sodium low at 133 this morning  Likely related to aggressive diuresis  Repeat BMP within 1 week of discharge  Acute kidney injury superimposed on chronic kidney disease ([facility] 75 )   Assessment & Plan    · Baseline creatinine between 1 4-1 7  · Ирина improving and Creatinine Stable on diuresis  · Monitor strict I&Os and daily weight    · Repeat BMP within 1 week of discharge  Discharge Summary - [facility] Internal Medicine    Patient Information: Amanda Mcgill 80 y o  male MRN: [id number]  Unit/Bed#: [facility] 530-01 Encounter: [id number]    Discharging Physician / Practitioner: Rosa Gleason MD  PCP: Gissel Miguel MD  Admission Date: 12/7/2018  Discharge Date: 12/11/18    Reason for Admission:  Acute on chronic diastolic heart failure    Discharge Diagnoses:     Principal Problem:    Acute on chronic diastolic (congestive) heart failure (HCC)  Active Problems:    Paroxysmal atrial fibrillation ([facility] )    Acute kidney injury superimposed on chronic kidney disease ([facility] )    Type 2 diabetes mellitus with hyperglycemia, with long-term current use of insulin ([facility])    Hyponatremia    Blindness of left eye    Vascular dementia with behavior disturbance    Decubitus ulcer of sacral region, stage 1  Resolved Problems:    Other chest pain    Shortness of breath      Consultations During Hospital Stay:  · Cardiology    Procedures Performed:     · Echocardiogram result as mentioned above    Significant Findings / Test Results:     XR chest 2 views   ED Interpretation by Estelle Mcrae MD (12/08 0045)   Right middle/upper lobe consolidation, concerning for pneumonia versus atelectasis  Final Result by Med Victoria MD (12/08 9944)   Mild pulmonary edema with right upper lobe patchy opacity could reflect atelectasis versus infiltrate versus edema  Loculated right basilar pleural effusion fluid as before  Also refer to CT chest       Workstation performed: [id number]         CT chest without contrast   Final Result by Adele Cooper MD (12/08 015)      Mild interlobular septal thickening likely is chronic scarring however may reflect mild pulmonary edema  Bilateral areas of atelectatic changes  Small effusions bilaterally with fluid tracking into the minor fissure         No airspace disease/infiltrate or pneumothorax        Abnormality seen on chest x-ray reflects pleural effusion  Workstation performed: [id number]         ·     Incidental Findings:   As above     Outpatient Tests Requested:  · Repeat BMP within 1 week      Hospital Course:     Rebeca Vallejo is a 80 y o  male patient who originally presented to the hospital on 12/7/2018 due to acute onset of midsternal chest pressure with shortness of breath, he was noted to be hypoxic initially to 88% which improved after diuresis  Patient was aggressively diuresed with intravenous Lasix and was net -4 5 L prior to discharge  Cardiology evaluated the patient and repeat echocardiogram was done which is mentioned as above  Patient's symptoms improved with intravenous diuresis and he remained stable on room air  Patient's diuretics were switched to p o  Prior to discharge and dose was increased from 60-80 mg torsemide b i d  With metolazone  Patient also had mild DEANA on CKD which initial improved and then remained stable with intravenous Lasix  Please refer to detail assessment and plan above for further details    Patient will need repeat BMP within 1 week of discharge  Condition at Discharge: fair     Discharge Day Visit / Exam:     Subjective:  Denies any active discomfort  Overnight event of headache noted  Patient denies any headache, dizziness this morning  Looks comfortable  Vitals: Blood Pressure: 124/59 (12/11/18 1122)  Pulse: 66 (12/11/18 1122)  Temperature: 98 4 °F (36 9 °C) (12/11/18 1122)  Temp Source: Oral (12/11/18 1122)  Respirations: 18 (12/11/18 1122)  Height: 5' 9" (175 3 cm) (12/09/18 0454)  Weight - Scale: 87 7 kg (193 lb 5 5 oz) (12/11/18 0600)  SpO2: 96 % (12/11/18 1122)  Exam:   Physical Exam   Constitutional: No distress  Eyes: Pupils are equal, round, and reactive to light  Cardiovascular: Normal rate, regular rhythm, normal heart sounds and intact distal pulses  No murmur heard  Pulmonary/Chest: Breath sounds normal  No respiratory distress  He has no wheezes   He has no rales    Abdominal: Soft  Bowel sounds are normal  He exhibits no distension  There is no tenderness  Musculoskeletal: He exhibits edema  Neurological: He is alert  No focal motor deficits   Skin: Skin is warm    Discussion with Family:  Discussed with son in [facility] at bedside    Discharge instructions/Information to patient and family:   See after visit summary for information provided to patient and family  Provisions for Follow-Up Care:  See after visit summary for information related to follow-up care and any pertinent home health orders  Disposition:     Other: Independent living at [facility] [address] [facility]:   · Not Applicable to this Patient - Not Applicable to this Patient    Discharge Statement:  I spent 45 minutes discharging the patient  This time was spent on the day of discharge  I had direct contact with the patient on the day of discharge  Greater than 50% of the total time was spent examining patient, answering all patient questions, arranging and discussing plan of care with patient as well as directly providing post-discharge instructions  Additional time then spent on discharge activities  Discharge Medications:  See after visit summary for reconciled discharge medications provided to patient and family        ** Please Note: This note has been constructed using a voice recognition system **

## 2018-12-13 ENCOUNTER — IN HOME VISIT (OUTPATIENT)
Dept: GERIATRICS | Age: 83
End: 2018-12-13
Payer: MEDICARE

## 2018-12-13 VITALS
SYSTOLIC BLOOD PRESSURE: 122 MMHG | DIASTOLIC BLOOD PRESSURE: 72 MMHG | RESPIRATION RATE: 18 BRPM | HEART RATE: 72 BPM | TEMPERATURE: 99.1 F

## 2018-12-13 DIAGNOSIS — N18.3 ACUTE RENAL FAILURE SUPERIMPOSED ON STAGE 3 CHRONIC KIDNEY DISEASE, UNSPECIFIED ACUTE RENAL FAILURE TYPE: Chronic | ICD-10-CM

## 2018-12-13 DIAGNOSIS — I48.0 PAROXYSMAL ATRIAL FIBRILLATION (HCC): Chronic | ICD-10-CM

## 2018-12-13 DIAGNOSIS — Z95.0 CARDIAC PACEMAKER IN SITU: Chronic | ICD-10-CM

## 2018-12-13 DIAGNOSIS — F01.51 VASCULAR DEMENTIA WITH BEHAVIOR DISTURBANCE (HCC): ICD-10-CM

## 2018-12-13 DIAGNOSIS — E87.1 HYPONATREMIA: ICD-10-CM

## 2018-12-13 DIAGNOSIS — N17.9 ACUTE RENAL FAILURE SUPERIMPOSED ON STAGE 3 CHRONIC KIDNEY DISEASE, UNSPECIFIED ACUTE RENAL FAILURE TYPE: Chronic | ICD-10-CM

## 2018-12-13 DIAGNOSIS — I50.33 ACUTE ON CHRONIC DIASTOLIC (CONGESTIVE) HEART FAILURE (HCC): Primary | ICD-10-CM

## 2018-12-13 PROCEDURE — 99495 TRANSJ CARE MGMT MOD F2F 14D: CPT | Performed by: FAMILY MEDICINE

## 2018-12-13 NOTE — PROGRESS NOTES
Assessment/Plan:    No problem-specific Assessment & Plan notes found for this encounter  Diagnoses and all orders for this visit:    Acute on chronic diastolic (congestive) heart failure (HCC)  Comments:  controlled but on high dose of torsemide and metalozone, has lost 4 lbs, order to contact cardiology if weight goes below 196 LBs, BMP Monday ( 4 days)    Paroxysmal atrial fibrillation ([facility] )  Comments:  currently sinus rhythm without bradycardia or tachycardia    Vascular dementia with behavior disturbance  Comments:  mild , has fair judgement and insight    Acute renal failure superimposed on stage 3 chronic kidney disease, unspecified acute renal failure type ([facility] )  Comments:  resolved on labs  check BMP in 4 days    Cardiac pacemaker in situ    Hyponatremia  Comments:  BMP monday to assure correction          Subjective:      Patient ID: Didi Chen is a 80 y o  male  ANDI admitted from [facility] [place] 12/07 -14/81/94 with diastolic diuresed of 4 4 liter of fluid  Patient has anasarca  Wound care  Gets orthopnea is seeeing him for Pressure sore left ankle and sacral  Having orthopnea when tries to go to sleep lying flat  Weight on admission to [place] [address] was 201 lbs by their scale and now 197 lbs  Son-in-law is present and states the patient is adhering to his diet and fluid restriction  He has no c/o of SOB or and orthopnea at present  He states that his legs are "hardly" swollen today Patient and son-in-law were reeducated on S&S of HF and to monitor weigh and restrict Sodium intake  Patient is on 160 mgs of torsemide daily with metalozone 2 5 daily  The following portions of the patient's history were reviewed and updated as appropriate: allergies, current medications, past medical history and problem list     Review of Systems   Constitutional: Positive for fatigue and unexpected weight change  Negative for activity change, appetite change, diaphoresis and fever     Respiratory: Positive for shortness of breath (orthopnea)  Cardiovascular: Positive for chest pain (when lies back) and leg swelling  Negative for palpitations  Gastrointestinal: Positive for abdominal distention  Neurological: Positive for tremors and headaches (Frontal in evening, no nausea or vomiting, no aura)  Psychiatric/Behavioral: Positive for dysphoric mood  Negative for sleep disturbance  The patient is nervous/anxious  Objective:      /72   Pulse 72   Temp 99 1 °F (37 3 °C)   Resp 18          Physical Exam      Patient alert but not oriented  No distress, loks well no tachypnea or use of accessory muscles  Eyes: sclerosis over cornea on left, marked decrease in vision on right  Mucous membrane dry  No adenopathy  CV : S1 S2 normal no S3 or S4  JVD not elevated RRR@ 72, pulse Ox 97% RA Edema 1+ B/L upper proximal 1/3 of tibia  , pacemaker left upper left chest wall  Chest clear to ausculation but decreased A/E b/l bases  No rales or rhonchi  Abd: Obese, soft, non tender, BS normal, no tenderness or guarding   MS  Weakness of lower proximal muscles, PT is working with him, has Motorized wheelchair currently riding  Psychiatry: Alert, happy, not oriented to time but place and person  Interacts well , no tangential thought, focused on discussion

## 2018-12-14 DIAGNOSIS — I50.32 CHRONIC DIASTOLIC CONGESTIVE HEART FAILURE (HCC): ICD-10-CM

## 2018-12-14 RX ORDER — TORSEMIDE 20 MG/1
TABLET ORAL
Qty: 124 TABLET | Refills: 4 | Status: SHIPPED | OUTPATIENT
Start: 2018-12-14 | End: 2018-12-27 | Stop reason: SDUPTHER

## 2018-12-14 RX ORDER — METOLAZONE 2.5 MG/1
TABLET ORAL
Qty: 15 TABLET | Refills: 4 | Status: SHIPPED | OUTPATIENT
Start: 2018-12-14 | End: 2018-12-18 | Stop reason: SDUPTHER

## 2018-12-17 ENCOUNTER — PATIENT OUTREACH (OUTPATIENT)
Dept: CASE MANAGEMENT | Facility: OTHER | Age: 83
End: 2018-12-17

## 2018-12-18 ENCOUNTER — OFFICE VISIT (OUTPATIENT)
Dept: CARDIOLOGY CLINIC | Facility: CLINIC | Age: 83
End: 2018-12-18
Payer: MEDICARE

## 2018-12-18 VITALS
HEART RATE: 79 BPM | HEIGHT: 69 IN | BODY MASS INDEX: 29.59 KG/M2 | SYSTOLIC BLOOD PRESSURE: 86 MMHG | WEIGHT: 199.8 LBS | DIASTOLIC BLOOD PRESSURE: 40 MMHG | OXYGEN SATURATION: 98 %

## 2018-12-18 DIAGNOSIS — I10 HYPERTENSION, UNSPECIFIED TYPE: ICD-10-CM

## 2018-12-18 DIAGNOSIS — I50.32 CHRONIC DIASTOLIC CONGESTIVE HEART FAILURE (HCC): ICD-10-CM

## 2018-12-18 DIAGNOSIS — N18.30 CKD (CHRONIC KIDNEY DISEASE), STAGE III (HCC): ICD-10-CM

## 2018-12-18 DIAGNOSIS — I50.32 CHRONIC DIASTOLIC HEART FAILURE (HCC): Primary | ICD-10-CM

## 2018-12-18 DIAGNOSIS — I48.0 PAROXYSMAL ATRIAL FIBRILLATION (HCC): Chronic | ICD-10-CM

## 2018-12-18 PROCEDURE — 99215 OFFICE O/P EST HI 40 MIN: CPT | Performed by: NURSE PRACTITIONER

## 2018-12-18 RX ORDER — METOLAZONE 2.5 MG/1
2.5 TABLET ORAL SEE ADMIN INSTRUCTIONS
Qty: 15 TABLET | Refills: 0 | Status: SHIPPED | OUTPATIENT
Start: 2018-12-18 | End: 2018-12-20 | Stop reason: SDUPTHER

## 2018-12-18 NOTE — PATIENT INSTRUCTIONS
Maintain a 2 gram daily sodium diet and 1500 ml daily fluid restriction  Check daily weights  If you gain 3 pounds in one day, 5 pounds in one week, or experience worsening shortness of breath or increasing lower leg swelling  Please call the heart failure office at [phone]    Please bring a  list of your current medications and daily weights to the office visit

## 2018-12-20 ENCOUNTER — TELEPHONE (OUTPATIENT)
Dept: CARDIOLOGY CLINIC | Facility: CLINIC | Age: 83
End: 2018-12-20

## 2018-12-20 DIAGNOSIS — I50.32 CHRONIC DIASTOLIC CONGESTIVE HEART FAILURE (HCC): ICD-10-CM

## 2018-12-20 RX ORDER — METOLAZONE 2.5 MG/1
2.5 TABLET ORAL 2 TIMES WEEKLY
Qty: 15 TABLET | Refills: 0 | Status: SHIPPED | OUTPATIENT
Start: 2018-12-20 | End: 2018-12-27 | Stop reason: SDUPTHER

## 2018-12-20 NOTE — TELEPHONE ENCOUNTER
Received call from Russ Parker at [facility]  Jessica Saul gained 5 lbs overnight  Assessment is unchanged from o/v  No SOB  Lung sounds are clear  LE edema is unchanged  Pt denies any complaints  I had Russ Parker fax over medication list      Kristen House reviewed med list  Metolazone is no longer being given and Torsemide was decreased to 60 mg BID  Per o/v note, Kristen House recommended decreasing Metolazone to twice weekly and keeping Torsemide at 80 mg BID  Ordered recommendations and faxed form to [facility]  Will send order form to DAVID to scan

## 2018-12-27 DIAGNOSIS — I50.32 CHRONIC DIASTOLIC CONGESTIVE HEART FAILURE (HCC): ICD-10-CM

## 2018-12-27 RX ORDER — METOLAZONE 2.5 MG/1
2.5 TABLET ORAL 2 TIMES WEEKLY
Qty: 15 TABLET | Refills: 4 | Status: SHIPPED | OUTPATIENT
Start: 2018-12-27 | End: 2019-01-08 | Stop reason: HOSPADM

## 2018-12-27 RX ORDER — TORSEMIDE 20 MG/1
60 TABLET ORAL 2 TIMES DAILY
Qty: 124 TABLET | Refills: 3 | Status: SHIPPED | OUTPATIENT
Start: 2018-12-27 | End: 2019-01-08 | Stop reason: HOSPADM

## 2018-12-31 ENCOUNTER — TELEPHONE (OUTPATIENT)
Dept: CARDIOLOGY CLINIC | Facility: CLINIC | Age: 83
End: 2018-12-31

## 2018-12-31 NOTE — TELEPHONE ENCOUNTER
SVM called, Pt had 5 lb increase overnight  Pt denies any complaints  Med tech Nicor was just with Patient and she did not see any edema and did not notice any SOB  She is unsure of actual numbers of weight today  Currently taking Metolazone Mondays and Thursdays and Torsemide 80 mg BID, taking 20 meq of Potassium BID as well  Per Nicor, diet and fluid intake has not changed  O/v 1/7    Please advise     C/b # [phone]

## 2019-01-04 ENCOUNTER — HOSPITAL ENCOUNTER (INPATIENT)
Facility: HOSPITAL | Age: 84
LOS: 4 days | Discharge: HOME | DRG: 280 | End: 2019-01-08
Attending: EMERGENCY MEDICINE | Admitting: HOSPITALIST
Payer: MEDICARE

## 2019-01-04 ENCOUNTER — PATIENT OUTREACH (OUTPATIENT)
Dept: CASE MANAGEMENT | Facility: OTHER | Age: 84
End: 2019-01-04

## 2019-01-04 ENCOUNTER — APPOINTMENT (EMERGENCY)
Dept: RADIOLOGY | Facility: HOSPITAL | Age: 84
DRG: 280 | End: 2019-01-04
Payer: MEDICARE

## 2019-01-04 DIAGNOSIS — N17.9 AKI (ACUTE KIDNEY INJURY) (HCC): ICD-10-CM

## 2019-01-04 DIAGNOSIS — R77.8 ELEVATED TROPONIN: ICD-10-CM

## 2019-01-04 DIAGNOSIS — I50.9 CHF (CONGESTIVE HEART FAILURE) (HCC): Primary | ICD-10-CM

## 2019-01-04 DIAGNOSIS — R79.89 ELEVATED BRAIN NATRIURETIC PEPTIDE (BNP) LEVEL: ICD-10-CM

## 2019-01-04 DIAGNOSIS — I50.33 ACUTE ON CHRONIC DIASTOLIC (CONGESTIVE) HEART FAILURE (HCC): ICD-10-CM

## 2019-01-04 LAB
ANION GAP SERPL CALCULATED.3IONS-SCNC: 9 MMOL/L (ref 4–13)
BACTERIA UR QL AUTO: ABNORMAL /HPF
BASOPHILS # BLD AUTO: 0.04 THOUSANDS/ΜL (ref 0–0.1)
BASOPHILS NFR BLD AUTO: 1 % (ref 0–1)
BILIRUB UR QL STRIP: NEGATIVE
BUN SERPL-MCNC: 56 MG/DL (ref 5–25)
CALCIUM SERPL-MCNC: 8.8 MG/DL (ref 8.3–10.1)
CHLORIDE SERPL-SCNC: 95 MMOL/L (ref 100–108)
CLARITY UR: ABNORMAL
CO2 SERPL-SCNC: 29 MMOL/L (ref 21–32)
COLOR UR: YELLOW
CREAT SERPL-MCNC: 1.94 MG/DL (ref 0.6–1.3)
EOSINOPHIL # BLD AUTO: 0.17 THOUSAND/ΜL (ref 0–0.61)
EOSINOPHIL NFR BLD AUTO: 4 % (ref 0–6)
ERYTHROCYTE [DISTWIDTH] IN BLOOD BY AUTOMATED COUNT: 14.1 % (ref 11.6–15.1)
GFR SERPL CREATININE-BSD FRML MDRD: 30 ML/MIN/1.73SQ M
GLUCOSE SERPL-MCNC: 155 MG/DL (ref 65–140)
GLUCOSE UR STRIP-MCNC: NEGATIVE MG/DL
HCT VFR BLD AUTO: 28.8 % (ref 36.5–49.3)
HGB BLD-MCNC: 8.9 G/DL (ref 12–17)
HGB UR QL STRIP.AUTO: ABNORMAL
IMM GRANULOCYTES # BLD AUTO: 0.02 THOUSAND/UL (ref 0–0.2)
IMM GRANULOCYTES NFR BLD AUTO: 0 % (ref 0–2)
KETONES UR STRIP-MCNC: NEGATIVE MG/DL
LEUKOCYTE ESTERASE UR QL STRIP: ABNORMAL
LYMPHOCYTES # BLD AUTO: 0.56 THOUSANDS/ΜL (ref 0.6–4.47)
LYMPHOCYTES NFR BLD AUTO: 12 % (ref 14–44)
MCH RBC QN AUTO: 29.5 PG (ref 26.8–34.3)
MCHC RBC AUTO-ENTMCNC: 30.9 G/DL (ref 31.4–37.4)
MCV RBC AUTO: 95 FL (ref 82–98)
MONOCYTES # BLD AUTO: 0.58 THOUSAND/ΜL (ref 0.17–1.22)
MONOCYTES NFR BLD AUTO: 13 % (ref 4–12)
NEUTROPHILS # BLD AUTO: 3.22 THOUSANDS/ΜL (ref 1.85–7.62)
NEUTS SEG NFR BLD AUTO: 70 % (ref 43–75)
NITRITE UR QL STRIP: POSITIVE
NON-SQ EPI CELLS URNS QL MICRO: ABNORMAL /HPF
NRBC BLD AUTO-RTO: 0 /100 WBCS
NT-PROBNP SERPL-MCNC: 6313 PG/ML
PH UR STRIP.AUTO: 6 [PH] (ref 4.5–8)
PLATELET # BLD AUTO: 168 THOUSANDS/UL (ref 149–390)
PMV BLD AUTO: 10.5 FL (ref 8.9–12.7)
POTASSIUM SERPL-SCNC: 3.7 MMOL/L (ref 3.5–5.3)
PROT UR STRIP-MCNC: ABNORMAL MG/DL
RBC # BLD AUTO: 3.02 MILLION/UL (ref 3.88–5.62)
RBC #/AREA URNS AUTO: ABNORMAL /HPF
SODIUM SERPL-SCNC: 133 MMOL/L (ref 136–145)
SP GR UR STRIP.AUTO: 1.01 (ref 1–1.03)
TROPONIN I SERPL-MCNC: 0.12 NG/ML
UROBILINOGEN UR QL STRIP.AUTO: 0.2 E.U./DL
WBC # BLD AUTO: 4.59 THOUSAND/UL (ref 4.31–10.16)
WBC #/AREA URNS AUTO: ABNORMAL /HPF

## 2019-01-04 PROCEDURE — 81001 URINALYSIS AUTO W/SCOPE: CPT | Performed by: EMERGENCY MEDICINE

## 2019-01-04 PROCEDURE — 85025 COMPLETE CBC W/AUTO DIFF WBC: CPT | Performed by: EMERGENCY MEDICINE

## 2019-01-04 PROCEDURE — 71046 X-RAY EXAM CHEST 2 VIEWS: CPT

## 2019-01-04 PROCEDURE — 93005 ELECTROCARDIOGRAM TRACING: CPT

## 2019-01-04 PROCEDURE — 84484 ASSAY OF TROPONIN QUANT: CPT | Performed by: EMERGENCY MEDICINE

## 2019-01-04 PROCEDURE — 83880 ASSAY OF NATRIURETIC PEPTIDE: CPT | Performed by: EMERGENCY MEDICINE

## 2019-01-04 PROCEDURE — 36415 COLL VENOUS BLD VENIPUNCTURE: CPT | Performed by: EMERGENCY MEDICINE

## 2019-01-04 PROCEDURE — 99285 EMERGENCY DEPT VISIT HI MDM: CPT

## 2019-01-04 PROCEDURE — 80048 BASIC METABOLIC PNL TOTAL CA: CPT | Performed by: EMERGENCY MEDICINE

## 2019-01-04 RX ORDER — GABAPENTIN 100 MG/1
100 CAPSULE ORAL DAILY
Status: DISCONTINUED | OUTPATIENT
Start: 2019-01-05 | End: 2019-01-08 | Stop reason: HOSPADM

## 2019-01-04 RX ORDER — INSULIN GLARGINE 100 [IU]/ML
10 INJECTION, SOLUTION SUBCUTANEOUS
Status: DISCONTINUED | OUTPATIENT
Start: 2019-01-04 | End: 2019-01-08 | Stop reason: HOSPADM

## 2019-01-04 RX ORDER — POTASSIUM CHLORIDE 20 MEQ/1
20 TABLET, EXTENDED RELEASE ORAL 2 TIMES DAILY
Status: DISCONTINUED | OUTPATIENT
Start: 2019-01-04 | End: 2019-01-08 | Stop reason: HOSPADM

## 2019-01-04 RX ORDER — GABAPENTIN 300 MG/1
300 CAPSULE ORAL
Status: DISCONTINUED | OUTPATIENT
Start: 2019-01-04 | End: 2019-01-08 | Stop reason: HOSPADM

## 2019-01-04 RX ORDER — FUROSEMIDE 10 MG/ML
40 INJECTION INTRAMUSCULAR; INTRAVENOUS ONCE
Status: COMPLETED | OUTPATIENT
Start: 2019-01-04 | End: 2019-01-04

## 2019-01-04 RX ORDER — ASPIRIN 81 MG/1
81 TABLET ORAL DAILY
Status: DISCONTINUED | OUTPATIENT
Start: 2019-01-05 | End: 2019-01-08 | Stop reason: HOSPADM

## 2019-01-04 RX ORDER — FINASTERIDE 5 MG/1
5 TABLET, FILM COATED ORAL DAILY
Status: DISCONTINUED | OUTPATIENT
Start: 2019-01-05 | End: 2019-01-08 | Stop reason: HOSPADM

## 2019-01-04 RX ORDER — PETROLATUM,WHITE
1 OINTMENT IN PACKET (GRAM) TOPICAL DAILY
Status: DISCONTINUED | OUTPATIENT
Start: 2019-01-05 | End: 2019-01-05 | Stop reason: RX

## 2019-01-04 RX ORDER — LISINOPRIL 2.5 MG/1
2.5 TABLET ORAL DAILY
Status: DISCONTINUED | OUTPATIENT
Start: 2019-01-05 | End: 2019-01-05

## 2019-01-04 RX ORDER — ATORVASTATIN CALCIUM 20 MG/1
20 TABLET, FILM COATED ORAL
Status: DISCONTINUED | OUTPATIENT
Start: 2019-01-05 | End: 2019-01-08 | Stop reason: HOSPADM

## 2019-01-04 RX ORDER — ALPRAZOLAM 0.5 MG/1
0.5 TABLET ORAL 4 TIMES DAILY PRN
Status: DISCONTINUED | OUTPATIENT
Start: 2019-01-04 | End: 2019-01-08 | Stop reason: HOSPADM

## 2019-01-04 RX ORDER — POLYVINYL ALCOHOL 14 MG/ML
1 SOLUTION/ DROPS OPHTHALMIC
Status: DISCONTINUED | OUTPATIENT
Start: 2019-01-04 | End: 2019-01-08 | Stop reason: HOSPADM

## 2019-01-04 RX ORDER — CHOLECALCIFEROL (VITAMIN D3) 125 MCG
6000 CAPSULE ORAL
Status: DISCONTINUED | OUTPATIENT
Start: 2019-01-05 | End: 2019-01-08 | Stop reason: HOSPADM

## 2019-01-04 RX ORDER — TAMSULOSIN HYDROCHLORIDE 0.4 MG/1
0.4 CAPSULE ORAL
Status: DISCONTINUED | OUTPATIENT
Start: 2019-01-05 | End: 2019-01-08 | Stop reason: HOSPADM

## 2019-01-04 RX ORDER — FUROSEMIDE 10 MG/ML
40 INJECTION INTRAMUSCULAR; INTRAVENOUS
Status: DISCONTINUED | OUTPATIENT
Start: 2019-01-05 | End: 2019-01-07

## 2019-01-04 RX ORDER — CHLORAL HYDRATE 500 MG
1000 CAPSULE ORAL DAILY
Status: DISCONTINUED | OUTPATIENT
Start: 2019-01-05 | End: 2019-01-08 | Stop reason: HOSPADM

## 2019-01-04 RX ORDER — CARVEDILOL 3.12 MG/1
3.12 TABLET ORAL 2 TIMES DAILY WITH MEALS
Status: DISCONTINUED | OUTPATIENT
Start: 2019-01-05 | End: 2019-01-08 | Stop reason: HOSPADM

## 2019-01-04 RX ORDER — ACETAMINOPHEN 325 MG/1
325 TABLET ORAL EVERY 6 HOURS PRN
Status: DISCONTINUED | OUTPATIENT
Start: 2019-01-04 | End: 2019-01-08 | Stop reason: HOSPADM

## 2019-01-04 RX ORDER — METOLAZONE 2.5 MG/1
2.5 TABLET ORAL 2 TIMES WEEKLY
Status: DISCONTINUED | OUTPATIENT
Start: 2019-01-07 | End: 2019-01-07

## 2019-01-04 RX ORDER — IPRATROPIUM BROMIDE AND ALBUTEROL SULFATE 2.5; .5 MG/3ML; MG/3ML
3 SOLUTION RESPIRATORY (INHALATION) EVERY 6 HOURS PRN
Status: DISCONTINUED | OUTPATIENT
Start: 2019-01-04 | End: 2019-01-05

## 2019-01-04 RX ADMIN — FUROSEMIDE 40 MG: 10 INJECTION, SOLUTION INTRAMUSCULAR; INTRAVENOUS at 21:21

## 2019-01-04 RX ADMIN — APIXABAN 2.5 MG: 2.5 TABLET, FILM COATED ORAL at 23:26

## 2019-01-04 RX ADMIN — POTASSIUM CHLORIDE 20 MEQ: 1500 TABLET, EXTENDED RELEASE ORAL at 21:17

## 2019-01-04 RX ADMIN — INSULIN GLARGINE 10 UNITS: 100 INJECTION, SOLUTION SUBCUTANEOUS at 23:27

## 2019-01-04 RX ADMIN — GABAPENTIN 300 MG: 300 CAPSULE ORAL at 23:26

## 2019-01-04 RX ADMIN — ANORECTAL OINTMENT 1 APPLICATION: 15.7; .44; 24; 20.6 OINTMENT TOPICAL at 23:31

## 2019-01-04 NOTE — ED PROVIDER NOTES
History  Chief Complaint   Patient presents with    Chest Pain     Patient states chest pain about 1 hour ago  81 y/o M resident at [facility] with a pmhx of DM, CHF, Pacer, Afib on Eliquis with a recent admission on 12/7/18(28days ago) presentss via EMS for eval of substernal chest pressure and SOB  Pt states that one hour ago he started to have chest pressure pain without radiation and associated SOB  Pt has a significant cards hx  EMS gave 324 ASA in route to the ED, No nitro for Bp of 100 SBP  No active CP, On RA at 96% O2      Pacer interigated on 10/2018, pacer is a VVI(BIOT device)  CKD baseline 1 4-1 7 Cr  Last echo showed EF of 55%    Rhythm Paced    Neg for Sgarbossa criteria, No Discordant STEMI (>5mm), No Concordant STEMI, No Discordant ST depression(V1-3)              Prior to Admission Medications   Prescriptions Last Dose Informant Patient Reported? Taking? ALPRAZolam (XANAX) 0 5 mg tablet  Outside Facility (Specify) No Yes   Sig: Take 1 tablet (0 5 mg total) by mouth 4 (four) times a day as needed for anxiety   EASY TOUCH LANCETS 28G MISC  Outside Facility (Specify) No Yes   Sig: USE AS DIRECTED    ELIQUIS 2 5 MG  Outside Facility (Specify) No Yes   Sig: TAKE 1 TABLET BY MOUTH TWICE DAILY  Menthol-Zinc Oxide (REMEDY CALAZIME EX)  Outside Facility (Specify) Yes Yes   Sig: Apply 1 application topically 2 (two) times a day To sacrum   Omega-3 Fatty Acids (FISH OIL) 1,000 mg  Outside Facility (Specify) No Yes   Sig: TAKE 1 CAPSULE BY MOUTH ONCE DAILY   Polyvinyl Alcohol-Povidone (REFRESH OP)  Outside Facility (Specify) Yes Yes   Sig: Apply 1 drop to eye as needed   Skin Protectants, Misc   (CALAZIME SKIN PROTECTANT EX)   Yes Yes   Sig: Apply topically   Wound Dressings (ALLANTOIN) gel  Outside Facility (Specify) Yes Yes   Sig: Apply 1 application topically as needed for wound care   acetaminophen (TYLENOL) 325 mg tablet  Outside Facility (Specify) No Yes   Sig: Take 1-2  tablet as needed every 8 hours as needed for headache  Do not exceed  More than 3gm/ day  aspirin (ECOTRIN LOW STRENGTH) 81 mg EC tablet  Outside Facility (Specify) Yes Yes   Sig: Take 81 mg by mouth daily   atorvastatin (LIPITOR) 20 mg tablet  Outside Facility (Specify) Yes Yes   Sig: Take 20 mg by mouth daily   carvedilol (COREG) 3 125 mg tablet  Outside Facility (Specify) No Yes   Sig: TAKE 1 TABLET BY MOUTH TWICE DAILY WITH MEALS   ergocalciferol (VITAMIN D2) 50,000 units  Outside Facility (Specify) No Yes   Sig: TAKE 1 CAPSULE BY MOUTH EVERY WEDNESDAY AT NOON   finasteride (PROSCAR) 5 mg tablet  Outside Facility (Specify) No Yes   Sig: TAKE 1 TABLET BY MOUTH ONCE DAILY  gabapentin (NEURONTIN) 100 mg capsule  Outside Facility (Specify) No Yes   Sig: TAKE 1 CAPSULE BY MOUTH ONCE DAILY   gabapentin (NEURONTIN) 300 mg capsule  Outside Facility (Specify) Yes Yes   Sig: Take 300 mg by mouth daily at bedtime   glucose blood test strip  Outside Facility (Specify) Yes Yes   Si each by Other route daily Use as instructed   insulin glargine (LANTUS SOLOSTAR) 100 units/mL injection pen  Outside Facility (Specify) No Yes   Sig: Inject 10 Units under the skin daily at bedtime   ipratropium-albuterol (DUO-NEB) 0 5-2 5 mg/3 mL  Outside Facility (Specify) No Yes   Sig: Take 3 mL by nebulization every 6 (six) hours as needed for wheezing   lactase (LACTAID) 3,000 units tablet  Outside Facility (Specify) No Yes   Sig: Take 2 tablets (6,000 Units total) by mouth 3 (three) times a day with meals And PRN ith diary products   lisinopril (ZESTRIL) 2 5 mg tablet  Outside Facility (Specify) No Yes   Sig: TAKE 1 TABLET BY MOUTH ONCE DAILY  metFORMIN (GLUCOPHAGE) 500 mg tablet  Outside Facility (Specify) No Yes   Sig: TAKE 1 TABLET BY MOUTH ONCE DAILY  metolazone (ZAROXOLYN) 2 5 mg tablet   No Yes   Sig: Take 1 tablet (2 5 mg total) by mouth 2 (two) times a week Twice a week 30 min prior to am dose of Torsemide on Monday and Thursday  multivitamin with iron (TAB-A-ANETA/IRON)  Outside Facility (Specify) No Yes   Sig: TAKE 1 TABLET BY MOUTH ONCE DAILY  nitroglycerin (NITROSTAT) 0 4 mg SL tablet  Outside Facility (Specify) Yes Yes   Sig: Place 0 4 mg under the tongue every 5 (five) minutes as needed for chest pain   potassium chloride (K-DUR,KLOR-CON) 20 mEq tablet  Outside Facility (Specify) No Yes   Sig: TAKE 1 TABLET BY MOUTH TWICE DAILY  tamsulosin (FLOMAX) 0 4 mg  Outside Facility (Specify) No Yes   Sig: TAKE 1 CAPSULE BY MOUTH WITH EVENING MEAL   torsemide (DEMADEX) 20 mg tablet   No Yes   Sig: Take 3 tablets (60 mg total) by mouth 2 (two) times a day   white petrolatum  Outside Facility (Specify) Yes Yes   Sig: Apply 1 application topically as needed Apply topically to buttocks as needed   May keep in room and self administer      Facility-Administered Medications: None       Past Medical History:   Diagnosis Date    A-fib ([facility] )     Anemia     Bladder cancer ([facility] )     CAD (coronary artery disease)     CHF (congestive heart failure) (HCC)     Chronic kidney failure     Colon cancer ([facility] )     Eye cancer, left ([facility] )     Hypertension     Incontinence     Pacemaker     Prostate cancer ([facility] )     Type 2 diabetes mellitus ([facility] )     Wears hearing aid in both ears        Past Surgical History:   Procedure Laterality Date    CARDIAC PACEMAKER PLACEMENT  2014    CORONARY ANGIOPLASTY WITH STENT PLACEMENT      GALLBLADDER SURGERY      SD CYSTO/URETERO W/LITHOTRIPSY &INDWELL STENT INSRT Right 7/31/2018    Procedure: CYSTOSCOPY URETEROSCOPY WITH LITHOTRIPSY HOLMIUM LASER ,BASKET STONE EXTRACTION  RETROGRADE PYELOGRAM AND exchange STENT URETERAL;  Surgeon: Fabiano Collazo MD;  Location: [facility];  Service: Urology    SD CYSTOURETHROSCOPY,URETER CATHETER Right 7/1/2018    Procedure: CYSTOSCOPY RETROGRADE PYELOGRAM WITH INSERTION STENT URETERAL;  Surgeon: Allyson Stevens MD;  Location: [facility];  Service: Urology       History reviewed  No pertinent family history  I have reviewed and agree with the history as documented  Social History   Substance Use Topics    Smoking status: Former Smoker    Smokeless tobacco: Never Used    Alcohol use Yes      Comment: jayne cognac 1 oz/night         Review of Systems   Constitutional: Negative for chills, diaphoresis, fatigue and fever  HENT: Negative for congestion, ear discharge, facial swelling, hearing loss, rhinorrhea, sinus pain, sinus pressure, sneezing, sore throat, tinnitus and trouble swallowing  Eyes: Negative for pain, discharge and redness  Respiratory: Positive for chest tightness and shortness of breath  Negative for cough, choking, wheezing and stridor  Cardiovascular: Positive for chest pain  Negative for palpitations and leg swelling  Gastrointestinal: Negative for abdominal distention, abdominal pain, blood in stool, constipation, diarrhea, nausea and vomiting  Endocrine: Negative for cold intolerance, polydipsia and polyuria  Genitourinary: Negative for difficulty urinating, dysuria, enuresis, flank pain, frequency and hematuria  Musculoskeletal: Negative for arthralgias, back pain, gait problem and neck stiffness  Skin: Negative for rash and wound  Neurological: Negative for dizziness, seizures, syncope, weakness, numbness and headaches  Hematological: Negative for adenopathy  Psychiatric/Behavioral: Negative for agitation, confusion, hallucinations, sleep disturbance and suicidal ideas  All other systems reviewed and are negative        Physical Exam  ED Triage Vitals [01/04/19 1757]   Temperature Pulse Respirations Blood Pressure SpO2   (!) 97 4 °F (36 3 °C) 74 18 99/55 96 %      Temp Source Heart Rate Source Patient Position - Orthostatic VS BP Location FiO2 (%)   Oral Monitor Sitting Left arm --      Pain Score       3           Orthostatic Vital Signs  Vitals:    01/04/19 1800 01/04/19 1830 01/04/19 1900 01/04/19 2009   BP: 99/53 98/52 98/56 128/61   Pulse: 68 74 68 81   Patient Position - Orthostatic VS:           Physical Exam   Constitutional: He is oriented to person, place, and time  He appears well-developed and well-nourished  No distress  HENT:   Head: Normocephalic and atraumatic  Eyes: Pupils are equal, round, and reactive to light  Conjunctivae and EOM are normal        Neck: Normal range of motion  Cardiovascular: Normal rate and regular rhythm  Exam reveals no gallop and no friction rub  No murmur heard  Pulmonary/Chest: Breath sounds normal  No respiratory distress  He has no wheezes  He has no rales  Abdominal: Soft  Normal appearance and bowel sounds are normal  There is no tenderness  There is no rigidity, no rebound, no guarding, no CVA tenderness, no tenderness at McBurney's point and negative Smith's sign  Musculoskeletal: He exhibits edema  He exhibits no tenderness or deformity  Neurological: He is alert and oriented to person, place, and time  No cranial nerve deficit or sensory deficit  GCS eye subscore is 4  GCS verbal subscore is 5  GCS motor subscore is 6  Reflex Scores:       Bicep reflexes are 2+ on the right side and 2+ on the left side  Patellar reflexes are 2+ on the right side and 2+ on the left side  Patient has equal 5/5 strength b/l UE and Le  No focal neuro deficits noted  Skin: Skin is warm and dry  Capillary refill takes less than 2 seconds  He is not diaphoretic  Psychiatric: He has a normal mood and affect  His behavior is normal  Judgment and thought content normal    Nursing note and vitals reviewed        ED Medications  Medications   furosemide (LASIX) injection 40 mg (not administered)       Diagnostic Studies  Results Reviewed     Procedure Component Value Units Date/Time    UA w Reflex to Microscopic [[id number]]  (Abnormal) Collected:  01/04/19 1922    Lab Status:  Final result Specimen:  Urine from Urine, Clean Catch Updated:  01/04/19 1946     Color, UA Yellow Clarity, UA Turbid     Specific [place], UA 1 012     pH, UA 6 0     Leukocytes, UA Large (A)     Nitrite, UA Positive (A)     Protein, UA Trace (A) mg/dl      Glucose, UA Negative mg/dl      Ketones, UA Negative mg/dl      Urobilinogen, UA 0 2 E U /dl      Bilirubin, UA Negative     Blood, UA Moderate (A)    Urine Microscopic [[id number]] Collected:  01/04/19 1922    Lab Status: In process Specimen:  Urine from Urine, Clean Catch Updated:  01/04/19 1588    Basic metabolic panel [[id number]]  (Abnormal) Collected:  01/04/19 1814    Lab Status:  Final result Specimen:  Blood from Arm, Left Updated:  01/04/19 1848     Sodium 133 (L) mmol/L      Potassium 3 7 mmol/L      Chloride 95 (L) mmol/L      CO2 29 mmol/L      ANION GAP 9 mmol/L      BUN 56 (H) mg/dL      Creatinine 1 94 (H) mg/dL      Glucose 155 (H) mg/dL      Calcium 8 8 mg/dL      eGFR 30 ml/min/1 73sq m     Narrative:         National Kidney Disease Education Program recommendations are as follows:  GFR calculation is accurate only with a steady state creatinine  Chronic Kidney disease less than 60 ml/min/1 73 sq  meters  Kidney failure less than 15 ml/min/1 73 sq  meters      Troponin I [[id number]]  (Abnormal) Collected:  01/04/19 1814    Lab Status:  Final result Specimen:  Blood from Arm, Left Updated:  01/04/19 1848     Troponin I 0 12 (H) ng/mL     B-type natriuretic peptide [[id number]]  (Abnormal) Collected:  01/04/19 1814    Lab Status:  Final result Specimen:  Blood from Arm, Left Updated:  01/04/19 1848     NT-proBNP 6,313 (H) pg/mL     CBC and differential [[id number]]  (Abnormal) Collected:  01/04/19 1814    Lab Status:  Final result Specimen:  Blood from Arm, Left Updated:  01/04/19 1829     WBC 4 59 Thousand/uL      RBC 3 02 (L) Million/uL      Hemoglobin 8 9 (L) g/dL      Hematocrit 28 8 (L) %      MCV 95 fL      MCH 29 5 pg      MCHC 30 9 (L) g/dL      RDW 14 1 %      MPV 10 5 fL      Platelets 149 Thousands/uL      nRBC 0 /100 WBCs Neutrophils Relative 70 %      Immat GRANS % 0 %      Lymphocytes Relative 12 (L) %      Monocytes Relative 13 (H) %      Eosinophils Relative 4 %      Basophils Relative 1 %      Neutrophils Absolute 3 22 Thousands/µL      Immature Grans Absolute 0 02 Thousand/uL      Lymphocytes Absolute 0 56 (L) Thousands/µL      Monocytes Absolute 0 58 Thousand/µL      Eosinophils Absolute 0 17 Thousand/µL      Basophils Absolute 0 04 Thousands/µL                  XR chest 2 views   ED Interpretation by Donna Villarreal DO (01/04 2003)   Vascular congestion            Procedures  ECG 12 Lead Documentation  Date/Time: 1/4/2019 7:09 PM  Performed by: Adrián Stafford  Authorized by: Adrián Stafford     Indications / Diagnosis:  Chest pain  Patient location:  ED  Previous ECG:     Previous ECG:  Compared to current    Comparison ECG info:  [id number]    Similarity:  No change    Comparison to cardiac monitor: Yes    Interpretation:     Interpretation: non-specific    Rate:     ECG rate:  74    ECG rate assessment: normal    Rhythm:     Rhythm: paced    Pacing:     Capture:  Complete    Type of pacing:  Ventricular  Ectopy:     Ectopy: none    QRS:     QRS axis:  Indeterminate    QRS intervals:   Wide  Conduction:     Conduction: normal    ST segments:     ST segments:  Normal  T waves:     T waves: normal            Phone Consults  ED Phone Contact    ED Course         HEART Risk Score      Most Recent Value   History  2 Filed at: 01/04/2019 1911   ECG  1 Filed at: 01/04/2019 1911   Age  2 Filed at: 01/04/2019 1911   Risk Factors  2 Filed at: 01/04/2019 1911   Troponin  1 Filed at: 01/04/2019 1911   Heart Score Risk Calculator   History  2 Filed at: 01/04/2019 1911   ECG  1 Filed at: 01/04/2019 1911   Age  2 Filed at: 01/04/2019 1911   Risk Factors  2 Filed at: 01/04/2019 1911   Troponin  1 Filed at: 01/04/2019 1911   HEART Score  8 Filed at: 01/04/2019 1911   HEART Score  8 Filed at: 01/04/2019 1911        Identification of Seniors at Risk      Most Recent Value   (ISAR) Identification of Seniors at Risk   Before the illness or injury that brought you to the Emergency, did you need someone to help you on a regular basis? 1 Filed at: 01/04/2019 1757   In the last 24 hours, have you needed more help than usual?  0 Filed at: 01/04/2019 1757   Have you been hospitalized for one or more nights during the past 6 months? 1 Filed at: 01/04/2019 1757   In general, do you see well? 1 Filed at: 01/04/2019 1757   In general, do you have serious problems with your memory? 1 Filed at: 01/04/2019 1757   Do you take more than three different medications every day? 1 Filed at: 01/04/2019 1757   ISAR Score  5 Filed at: 01/04/2019 1757                          MDM  Number of Diagnoses or Management Options  DEANA (acute kidney injury) [facility]): new and requires workup  CHF (congestive heart failure) ([facility] ): new and requires workup  Elevated brain natriuretic peptide (BNP) level: new and requires workup  Elevated troponin: new and requires workup  Diagnosis management comments: Significant Cardiac Hx, cards eval    AMI vs CHF vs pneumonia vs effusion    1  ACHF  -40mg IV Lasix  -Admit to SLIM    2  DEANA    3  Elevated Trop/BNP 2/2 #1    The patient presented with a condition in which there was a high probability of imminent or life-threatening deterioration, and critical care services (excluding separately billable procedures) totalled 30-74 minutes          Disposition  Final diagnoses:   CHF (congestive heart failure) (HCC)   Elevated troponin   Elevated brain natriuretic peptide (BNP) level   DEANA (acute kidney injury) ([facility] )     Time reflects when diagnosis was documented in both MDM as applicable and the Disposition within this note     Time User Action Codes Description Comment    1/4/2019  8:29 PM Jaime Layne Add [I50 9] CHF (congestive heart failure) ([facility] )     1/4/2019  8:29 PM Jaime Layne Add [R74 8] Elevated troponin     1/4/2019  8:29 PM Mariano Rodriguez [R79 [id number]] Elevated brain natriuretic peptide (BNP) level     1/4/2019  8:29 PM Matthew Layne Led Add [N17 9] DEANA (acute kidney injury) [facility])       ED Disposition     ED Disposition Condition Comment    Admit  Case was discussed with RICK and the patient's admission status was agreed to be Admission Status: inpatient status to the service of Dr Michelle Cotto   Follow-up Information    None         Patient's Medications   Discharge Prescriptions    No medications on file     No discharge procedures on file  ED Provider  Attending physically available and evaluated Dulce Lu I managed the patient along with the ED Attending      Electronically Signed by         Susu Rhoades DO  01/04/19 2039

## 2019-01-04 NOTE — PROGRESS NOTES
CM called [facility], spoke with Amirah Robins, who reported that patient is being weighed daily, and deandre is stable, denied patient having s/s SOB, lower extremity edema  Amirah Robins reported the sacral wound has healed  CM gave Amirah Robins CM;s contact information, encouraged her to call with concerns  CM made her aware CM will call monthly during bundle episode

## 2019-01-04 NOTE — ED ATTENDING ATTESTATION
Tank Segura MD, saw and evaluated the patient  I have discussed the patient with the resident/non-physician practitioner and agree with the resident's/non-physician practitioner's findings, Plan of Care, and MDM as documented in the resident's/non-physician practitioner's note, except where noted  All available labs and Radiology studies were reviewed  At this point I agree with the current assessment done in the Emergency Department  I have conducted an independent evaluation of this patient a history and physical is as follows:      Critical Care Time  CritCare Time    Procedures     81 yo male sent [facility] with cp with sob started few hours ago  Pt given asa  Pt with hx of cad, pacemaker  Afib, ckd, pacemaker, htn  No diaphoresis, no n/v   Pt states he was admitted last month for similar pain and had negative troponin but chf exacerbation  Vss, afebrile, lungs cta, rrr, abdomen soft nontender, pedal edema    Cardiac workup,

## 2019-01-05 PROBLEM — I21.4 NSTEMI (NON-ST ELEVATED MYOCARDIAL INFARCTION) (HCC): Status: ACTIVE | Noted: 2019-01-05

## 2019-01-05 PROBLEM — R07.9 CHEST PAIN: Status: ACTIVE | Noted: 2018-07-04

## 2019-01-05 LAB
ANION GAP SERPL CALCULATED.3IONS-SCNC: 6 MMOL/L (ref 4–13)
ATRIAL RATE: 87 BPM
BUN SERPL-MCNC: 59 MG/DL (ref 5–25)
CALCIUM SERPL-MCNC: 8.8 MG/DL (ref 8.3–10.1)
CHLORIDE SERPL-SCNC: 97 MMOL/L (ref 100–108)
CO2 SERPL-SCNC: 33 MMOL/L (ref 21–32)
CREAT SERPL-MCNC: 1.92 MG/DL (ref 0.6–1.3)
GFR SERPL CREATININE-BSD FRML MDRD: 30 ML/MIN/1.73SQ M
GLUCOSE SERPL-MCNC: 131 MG/DL (ref 65–140)
GLUCOSE SERPL-MCNC: 134 MG/DL (ref 65–140)
GLUCOSE SERPL-MCNC: 151 MG/DL (ref 65–140)
GLUCOSE SERPL-MCNC: 185 MG/DL (ref 65–140)
GLUCOSE SERPL-MCNC: 193 MG/DL (ref 65–140)
MAGNESIUM SERPL-MCNC: 2.3 MG/DL (ref 1.6–2.6)
P AXIS: 84 DEGREES
POTASSIUM SERPL-SCNC: 3.7 MMOL/L (ref 3.5–5.3)
QRS AXIS: 261 DEGREES
QRSD INTERVAL: 156 MS
QT INTERVAL: 460 MS
QTC INTERVAL: 510 MS
SODIUM SERPL-SCNC: 136 MMOL/L (ref 136–145)
T WAVE AXIS: 74 DEGREES
TROPONIN I SERPL-MCNC: 0.12 NG/ML
TROPONIN I SERPL-MCNC: 0.13 NG/ML
TROPONIN I SERPL-MCNC: 0.13 NG/ML
VENTRICULAR RATE: 74 BPM

## 2019-01-05 PROCEDURE — 84484 ASSAY OF TROPONIN QUANT: CPT | Performed by: PHYSICIAN ASSISTANT

## 2019-01-05 PROCEDURE — 82948 REAGENT STRIP/BLOOD GLUCOSE: CPT

## 2019-01-05 PROCEDURE — 99222 1ST HOSP IP/OBS MODERATE 55: CPT | Performed by: INTERNAL MEDICINE

## 2019-01-05 PROCEDURE — 80048 BASIC METABOLIC PNL TOTAL CA: CPT | Performed by: HOSPITALIST

## 2019-01-05 PROCEDURE — 94760 N-INVAS EAR/PLS OXIMETRY 1: CPT

## 2019-01-05 PROCEDURE — 83735 ASSAY OF MAGNESIUM: CPT | Performed by: HOSPITALIST

## 2019-01-05 PROCEDURE — 99223 1ST HOSP IP/OBS HIGH 75: CPT | Performed by: HOSPITALIST

## 2019-01-05 PROCEDURE — 93010 ELECTROCARDIOGRAM REPORT: CPT | Performed by: INTERNAL MEDICINE

## 2019-01-05 RX ADMIN — APIXABAN 2.5 MG: 2.5 TABLET, FILM COATED ORAL at 08:34

## 2019-01-05 RX ADMIN — Medication 1000 MG: at 08:35

## 2019-01-05 RX ADMIN — LISINOPRIL 2.5 MG: 2.5 TABLET ORAL at 08:35

## 2019-01-05 RX ADMIN — FUROSEMIDE 40 MG: 10 INJECTION, SOLUTION INTRAMUSCULAR; INTRAVENOUS at 16:10

## 2019-01-05 RX ADMIN — POTASSIUM CHLORIDE 20 MEQ: 1500 TABLET, EXTENDED RELEASE ORAL at 17:21

## 2019-01-05 RX ADMIN — POTASSIUM CHLORIDE 20 MEQ: 1500 TABLET, EXTENDED RELEASE ORAL at 08:35

## 2019-01-05 RX ADMIN — INSULIN GLARGINE 10 UNITS: 100 INJECTION, SOLUTION SUBCUTANEOUS at 21:23

## 2019-01-05 RX ADMIN — ATORVASTATIN CALCIUM 20 MG: 20 TABLET, FILM COATED ORAL at 17:21

## 2019-01-05 RX ADMIN — LACTASE TAB 3000 UNIT 6000 UNITS: 3000 TAB at 11:36

## 2019-01-05 RX ADMIN — ASPIRIN 81 MG: 81 TABLET, COATED ORAL at 08:34

## 2019-01-05 RX ADMIN — ANORECTAL OINTMENT: 15.7; .44; 24; 20.6 OINTMENT TOPICAL at 17:12

## 2019-01-05 RX ADMIN — ACETAMINOPHEN 325 MG: 325 TABLET, FILM COATED ORAL at 08:34

## 2019-01-05 RX ADMIN — CARVEDILOL 3.12 MG: 3.12 TABLET, FILM COATED ORAL at 08:35

## 2019-01-05 RX ADMIN — TAMSULOSIN HYDROCHLORIDE 0.4 MG: 0.4 CAPSULE ORAL at 17:21

## 2019-01-05 RX ADMIN — FUROSEMIDE 40 MG: 10 INJECTION, SOLUTION INTRAMUSCULAR; INTRAVENOUS at 08:35

## 2019-01-05 RX ADMIN — GABAPENTIN 100 MG: 100 CAPSULE ORAL at 08:35

## 2019-01-05 RX ADMIN — Medication 1 TABLET: at 08:35

## 2019-01-05 RX ADMIN — LACTASE TAB 3000 UNIT 6000 UNITS: 3000 TAB at 08:36

## 2019-01-05 RX ADMIN — ALPRAZOLAM 0.5 MG: 0.5 TABLET ORAL at 14:26

## 2019-01-05 RX ADMIN — GABAPENTIN 300 MG: 300 CAPSULE ORAL at 21:23

## 2019-01-05 RX ADMIN — FINASTERIDE 5 MG: 5 TABLET, FILM COATED ORAL at 08:35

## 2019-01-05 RX ADMIN — LACTASE TAB 3000 UNIT 6000 UNITS: 3000 TAB at 17:21

## 2019-01-05 RX ADMIN — ANORECTAL OINTMENT: 15.7; .44; 24; 20.6 OINTMENT TOPICAL at 08:35

## 2019-01-05 RX ADMIN — CARVEDILOL 3.12 MG: 3.12 TABLET, FILM COATED ORAL at 17:21

## 2019-01-05 RX ADMIN — APIXABAN 2.5 MG: 2.5 TABLET, FILM COATED ORAL at 17:21

## 2019-01-05 NOTE — PROGRESS NOTES
[facility]    Post Admit Follow up    Progress Note - Gloria Richardson 12/21/1930, 80 y o  male MRN: [id number]    Unit/Bed#: [facility] 502-01 Encounter: [id number]    Primary Care Provider: Tyra Canales MD   Date and time admitted to hospital: 1/4/2019  5:53 PM        * Acute on chronic diastolic (congestive) heart failure ([facility] [address] )   Assessment & Plan    · Patient admitted with chest pressure found to be volume overloaded started on IV Lasix  Recent admission for same  · CXR showing venous congestion  BNP elevated  · Cardiology consult is pending  · I/O, daily weights, low NA diet  · Continue with BB  Will hold ACEI  · Monitor volume status and creatinine with diuresis  · Recent echo 12/2018 showing EF 55%, no RWMA  RV, LA, RA dilated  Mild/mold calcification and regurgitation of mitral valve  PAP moderately increased  Chest pain   Assessment & Plan    · Patient admitted with chest pressure, this has improved but not yet resolved  · Troponins are elevated but flat  EKG V paced  · Possibly secondary to volume overload  Appreciate cardiology recommendations  · Continue with ASA, BB and statin  NSTEMI (non-ST elevated myocardial infarction) [facility])   Assessment & Plan    · Likely type 2 in the setting of volume overload/acute heart failure  · Cardiology consult is pending  · No further episodes of chest pain      Paroxysmal atrial fibrillation (HCC)   Assessment & Plan    · Continue BB  · On dose reduced Eliquis        Vascular dementia with behavior disturbance   Assessment & Plan    · Supportive care       Type 2 diabetes mellitus with hyperglycemia, with long-term current use of insulin [facility])   Assessment & Plan    Lab Results   Component Value Date    HGBA1C 7 5 (H) 12/08/2018       Recent Labs      01/05/19   0647  01/05/19   1045   POCGLU  134  193*       Blood Sugar Average: Last 72 hrs:  (P) 163 5     · Continue home dose of Lantus 10 units HS along with SSI while in house   · Hold metformin  · Diabetic diet  · Monitor accuchecks      Hypertension   Assessment & Plan    · BP on soft side  · Continue BB  Hold ACEI for now  · Monitor closely      Acute renal failure superimposed on stage 3 chronic kidney disease ([facility] [address] )   Assessment & Plan    · Baseline varies between -,  92 today  · Monitor with diuresis  · Avoid hypotension, will continue BB but hold ACEI in acute HF  · Monitor      Benign prostatic hyperplasia   Assessment & Plan    · On Finasteride  Pharmacologic: Apixaban (Eliquis)  Mechanical VTE Prophylaxis in Place: Yes    Patient Centered Rounds: I have performed bedside rounds with nursing staff today  Discussions with Specialists or Other Care Team Provider: nursing    Education and Discussions with Family / Patient: patient     Time Spent for Care: 45 minutes  More than 50% of total time spent on counseling and coordination of care as described above  Current Length of Stay: 1 day(s)    Current Patient Status: Inpatient   Certification Statement: The patient will continue to require additional inpatient hospital stay due to IV diuresis, cardiology recommendations,     Discharge Plan / Estimated Discharge Date: Not medically stable       Code Status: Level 1 - Full Code      Subjective:   Patient states that he continues to have chest pressure but that this has improved since admission  Breathing has significantly imprved per patient  Still have LE edema but also improved  Objective:     Vitals:   Temp (24hrs), Av 6 °F (36 4 °C), Min:97 4 °F (36 3 °C), Max:97 8 °F (36 6 °C)    Temp:  [97 4 °F (36 3 °C)-97 8 °F (36 6 °C)] 97 8 °F (36 6 °C)  HR:  [68-81] 81  Resp:  [16-21] 18  BP: ()/(50-61) 104/54  SpO2:  [92 %-98 %] 94 %  Body mass index is 29 43 kg/m²  Input and Output Summary (last 24 hours):        Intake/Output Summary (Last 24 hours) at 19 1234  Last data filed at 19 1201   Gross per 24 hour   Intake 0 ml   Output             1936 ml   Net            -1936 ml       Physical Exam:     Physical Exam   Constitutional: He is oriented to person, place, and time  No distress  HENT:   Head: Normocephalic  Eyes: Pupils are equal, round, and reactive to light  Neck: Normal range of motion  Cardiovascular: Normal rate, regular rhythm and intact distal pulses  Pulmonary/Chest: Effort normal  He has rales (right lung base )  Abdominal: Soft  Bowel sounds are normal    Musculoskeletal: Normal range of motion  He exhibits edema  Neurological: He is alert and oriented to person, place, and time  forgetful   Skin: Skin is warm and dry  Sacral stage 2 POA   Psychiatric: He has a normal mood and affect  Nursing note and vitals reviewed        Additional Data:     Labs:      Results from last 7 days  Lab Units 01/04/19  1814   WBC Thousand/uL 4 59   HEMOGLOBIN g/dL 8 9*   HEMATOCRIT % 28 8*   PLATELETS Thousands/uL 168   NEUTROS PCT % 70   LYMPHS PCT % 12*   MONOS PCT % 13*   EOS PCT % 4       Results from last 7 days  Lab Units 01/05/19  0450   POTASSIUM mmol/L 3 7   CHLORIDE mmol/L 97*   CO2 mmol/L 33*   BUN mg/dL 59*   CREATININE mg/dL 1 92*   CALCIUM mg/dL 8 8             Recent Cultures (last 7 days):           Last 24 Hours Medication List:     Current Facility-Administered Medications:  acetaminophen 325 mg Oral Q6H PRN Isidro Guzmán MD   ALPRAZolam 0 5 mg Oral 4x Daily PRN Isidro Guzmán MD   apixaban 2 5 mg Oral BID Isidro Guzmán MD   aspirin 81 mg Oral Daily Isidro Guzmán MD   atorvastatin 20 mg Oral After Barbra Gottron, MD   carvedilol 3 125 mg Oral BID With Meals Isidro Guzmán MD   finasteride 5 mg Oral Daily Isidro Guzmán MD   fish oil 1,000 mg Oral Daily Isidro Guzmán MD   furosemide 40 mg Intravenous BID (diuretic) Isidro Guzmán MD   gabapentin 100 mg Oral Daily Isidro Guzmán MD   gabapentin 300 mg Oral HS Isidro Guzmán MD   insulin glargine 10 Units Subcutaneous HS Beth Baxter MD   lactase 6,000 Units Oral TID With Meals Beth Baxter MD   menthol-zinc oxide  Topical BID Beth Baxter MD   [START ON 1/7/2019] metolazone 2 5 mg Oral Once per day on Mon Thu Beth Baxter MD   multivitamin-minerals 1 tablet Oral Daily Beth Baxter MD   polyvinyl alcohol 1 drop Both Eyes Q3H PRN Beth Baxter MD   potassium chloride 20 mEq Oral BID Beth Baxter MD   tamsulosin 0 4 mg Oral Daily With Allen Costello MD        Today, Patient Was Seen By: KEERTHI Mota    ** Please Note: Dragon 360 Dictation voice to text software may have been used in the creation of this document   **

## 2019-01-05 NOTE — ASSESSMENT & PLAN NOTE
Lab Results   Component Value Date    HGBA1C 7 5 (H) 12/08/2018       Recent Labs      01/05/19   0647  01/05/19   1045   POCGLU  134  193*       Blood Sugar Average: Last 72 hrs:  (P) 163 5     · Continue home dose of Lantus 10 units HS along with SSI while in house    · Hold metformin  · Diabetic diet  · Monitor accuchecks

## 2019-01-05 NOTE — RESPIRATORY THERAPY NOTE
RT Protocol Note  Naomi Parrish 80 y o  male MRN: [id number]  Unit/Bed#: [facility] 502-01 Encounter: [id number]    Assessment    Principal Problem:    Acute on chronic diastolic (congestive) heart failure (HCC)  Active Problems:    Paroxysmal atrial fibrillation (HCC)    Type 2 diabetes mellitus with hyperglycemia, with long-term current use of insulin (HCC)    Chest pain    Vascular dementia with behavior disturbance    Hypertension    CKD (chronic kidney disease), stage III (HCC)      Home Pulmonary Medications:  na       Past Medical History:   Diagnosis Date    A-fib ([place] [address] )     Anemia     Bladder cancer ([place] [address] )     CAD (coronary artery disease)     [facility]     Chronic kidney failure     Colon cancer ([place] [address] )     Eye cancer, left ([place] [address] )     Hypertension     Incontinence     Pacemaker     Prostate cancer ([place] [address] )     Type 2 diabetes mellitus ([place] [address] )     Wears hearing aid in both ears      Social History     Social History    Marital status:      Spouse name: N/A    Number of children: N/A    Years of education: N/A     Social History Main Topics    Smoking status: Former Smoker    Smokeless tobacco: Never Used    Alcohol use Yes      Comment: jayne cognac 1 oz/night     Drug use: No    Sexual activity: Not Currently     Other Topics Concern    None     Social History Narrative    None       Subjective         Objective    Physical Exam:   Assessment Type: Assess only  General Appearance: Alert, Awake  Respiratory Pattern: Normal  Chest Assessment: Chest expansion symmetrical  Bilateral Breath Sounds: Clear, Diminished  Cough: Non-productive  O2 Device: nasal cannula    Vitals:  Blood pressure 112/55, pulse 77, temperature (!) 97 4 °F (36 3 °C), resp  rate 20, height 5' 9" (1 753 m), weight 90 4 kg (199 lb 4 7 oz), SpO2 96 %  Imaging and other studies: I have personally reviewed pertinent reports        O2 Device: nasal cannula     Plan    Respiratory Plan: No distress/Pulmonary history, Discontinue Protocol        Resp Comments: Pt  evaluated at bedside per Respiratory Protocol  Pt  admitted with CHF at this time  Pt  has no prior pulmonary Hx  and denies taking Pulmonary medications at home which was verified by family at bedside  Pt's breath sounds diminished but clear bilaterally at this time  Will D/C duoneb order per Respiratory Protocol at this time

## 2019-01-05 NOTE — ASSESSMENT & PLAN NOTE
· Likely type 2 in the setting of volume overload/acute heart failure  · Cardiology consult is pending     · No further episodes of chest pain

## 2019-01-05 NOTE — ASSESSMENT & PLAN NOTE
· Baseline varies between 1 5-1 8/9, 1 92 today  · Monitor with diuresis  · Avoid hypotension, will continue BB but hold ACEI in acute HF    · Monitor

## 2019-01-05 NOTE — ASSESSMENT & PLAN NOTE
· Patient admitted with chest pressure, this has improved but not yet resolved  · Troponins are elevated but flat  EKG V paced  · Possibly secondary to volume overload  Appreciate cardiology recommendations  · Continue with ASA, BB and statin

## 2019-01-05 NOTE — ASSESSMENT & PLAN NOTE
· Patient admitted with chest pressure found to be volume overloaded started on IV Lasix  Recent admission for same  · CXR showing venous congestion  BNP elevated  · Cardiology consult is pending  · I/O, daily weights, low NA diet  · Continue with BB  Will hold ACEI  · Monitor volume status and creatinine with diuresis  · Recent echo 12/2018 showing EF 55%, no RWMA  RV, LA, RA dilated  Mild/mold calcification and regurgitation of mitral valve  PAP moderately increased

## 2019-01-05 NOTE — CONSULTS
Consultation - Cardiology   Carlos Temple 80 y o  male MRN: [id number]  Unit/Bed#: [facility] 502-01 Encounter: [id number]    Assessment/Plan     Assessment:    1  Acute on chronic diastolic heart failure  2  NSTEMI potentially type 2 in the setting of acute on chronic diastolic heart failure  3  Coronary artery disease  4  Hypertension  5  Left eye blindness  6   Diabetes mellitus    -troponins remained stable at 0 13  -EKG shows ventricularly paced rhythm  -continue current medical therapy with Eliquis 2 5 mg twice daily, aspirin 81 mg daily, atorvastatin 20 mg daily, however could consider increasing atorvastatin 40 mg daily if patient able to tolerate  -continue carvedilol 3 125 mg twice daily, lisinopril 2 5 mg daily, metolazone at 2 5 mg on Monday and Thursday  -continue IV furosemide 40 mg twice daily today and will re-evaluate patient tomorrow with plan to likely transition patient back to oral therapy in the next 1-2 days  -transthoracic echocardiogram from 12/10/2018 read as left ventricular systolic function normal estimated LVEF 55% with elevated ventricular end-diastolic filling pressures and monitor dyspnea ridge of motion of the septum likely from right ventricular pacing  -will continue to monitor patient clinically at this time  -continue fluid restricted diet, monitor strict I&Os and daily weights        History of Present Illness   Physician Requesting Consult: Lety Chanel MD  Reason for Consult / Principal Problem:  Acute on chronic diastolic heart failure  HPI: Carlos Temple is a 80y o  year old male past medical history significant for diastolic heart failure, coronary disease S/P LULU to LAD, paroxysmal atrial fibrillation, sick sinus syndrome status post permanent pacemaker placement, hypertension, hyperlipidemia, vascular dementia, left eye blindness, insulin-dependent type 2 diabetes mellitus, chronic kidney disease who [facility] episode of significant dyspnea  Patient states that he has had difficulty with significant intermittent shortness of breath of and chest discomfort over the past several years which he states is usually improved with Xanax however yesterday while at his nursing facility began to have worsening shortness of breath and intermittent substernal chest pain which caused him to present to [facility] further care and evaluation  Currently the patient notes add that his chest pressure seems to be radiating up from his abdominal pressure and that he is having low back pain but states that shortness of breath has improved  He denies any nausea or vomiting  Notes abdominal fullness which is appears to radiate to the substernal area  He also notes chronic back pain and left greater than right chronic lower extremity edema  Consults    Review of Systems   Constitutional: Negative for chills and fever  HENT: Negative for trouble swallowing and voice change  Eyes: Negative for pain  Chronic left eye blindness   Respiratory: Positive for shortness of breath  Negative for cough  Cardiovascular: Positive for chest pain and leg swelling  Negative for palpitations  Gastrointestinal: Positive for abdominal pain  Negative for diarrhea and vomiting  Genitourinary: Negative for dysuria  Musculoskeletal: Positive for back pain  Negative for neck pain and neck stiffness  Skin: Negative for rash  Neurological: Negative for dizziness, syncope and headaches  Psychiatric/Behavioral: Negative for agitation  All other systems reviewed and are negative        Historical Information   Past Medical History:   Diagnosis Date    A[facility]fib ([facility] )     Anemia     Bladder cancer ([facility] )     CAD (coronary artery disease)     CHF (congestive heart failure) (HCC)     Chronic kidney failure     Colon cancer ([facility] )     Eye cancer, left ([facility] )     Hypertension     Incontinence     Pacemaker     Prostate cancer ([facility] )     Type 2 diabetes mellitus ([facility] [address] )     Wears hearing aid in both ears      Past Surgical History:   Procedure Laterality Date    CARDIAC PACEMAKER PLACEMENT  2014    CORONARY ANGIOPLASTY WITH STENT PLACEMENT      GALLBLADDER SURGERY      NJ CYSTO/URETERO W/LITHOTRIPSY &INDWELL STENT INSRT Right 7/31/2018    Procedure: CYSTOSCOPY URETEROSCOPY WITH LITHOTRIPSY HOLMIUM LASER ,BASKET STONE EXTRACTION  RETROGRADE PYELOGRAM AND exchange STENT URETERAL;  Surgeon: Dhruv Couch MD;  Location: [facility];  Service: Urology    [place] CYSTOURETHROSCOPY,URETER CATHETER Right 7/1/2018    Procedure: CYSTOSCOPY RETROGRADE PYELOGRAM WITH INSERTION STENT URETERAL;  Surgeon: Sharon Torres MD;  Location: [facility];  Service: Urology     History   Alcohol Use    Yes     Comment: jayne cognac 1 oz/night      History   Drug Use No     History   Smoking Status    Former Smoker   Smokeless Tobacco    Never Used     Family History: History reviewed  No pertinent family history  Meds/Allergies   all current active meds have been reviewed  Allergies   Allergen Reactions    Iv Contrast [Iodinated Diagnostic Agents]        Objective   Vitals: Blood pressure 104/54, pulse 81, temperature 97 8 °F (36 6 °C), temperature source Oral, resp  rate 18, height 5' 9" (1 753 m), weight 90 4 kg (199 lb 4 7 oz), SpO2 94 %    Orthostatic Blood Pressures      Most Recent Value   Blood Pressure  104/54 filed at 01/05/2019 1041   Patient Position - Orthostatic VS  Sitting filed at 01/04/2019 2135            Intake/Output Summary (Last 24 hours) at 01/05/19 1203  Last data filed at 01/05/19 1101   Gross per 24 hour   Intake                0 ml   Output             1736 ml   Net            -1736 ml       Invasive Devices     Peripheral Intravenous Line            Peripheral IV 01/04/19 Left Forearm less than 1 day    Peripheral IV 01/05/19 Left Forearm less than 1 day          Drain            Ureteral Drain/Stent Right ureter 6 Fr  187 days Physical Exam   Constitutional: No distress  HENT:   Head: Normocephalic and atraumatic  Eyes: Conjunctivae are normal    Neck: No tracheal deviation present  Slight elevation in JVD   Cardiovascular: Normal rate and regular rhythm  Pulmonary/Chest:   Decreased breath sounds bilaterally however no wheezing or rhonchi appreciated   Abdominal: Soft  Bowel sounds are normal  There is no tenderness  Musculoskeletal:   2+ pitting edema left lower extremity, 1+ pitting edema right lower extremity   Skin: Skin is warm and dry  He is not diaphoretic  Psychiatric: He has a normal mood and affect  Lab Results: I have personally reviewed pertinent lab results  Imaging: I have personally reviewed pertinent reports      EKG:  Currently ventricularly paced rhythm on telemetry  VTE Prophylaxis:  Eliquis    Code Status: Level 1 - Full Code  Advance Directive and Living Will:      Power of :    POLST:

## 2019-01-05 NOTE — H&P
History and Physical - [facility]    Patient Information: Shikha Neri 80 y o  male MRN: [id number]  Unit/Bed#: J[facility] Encounter: [id number]  Admitting Physician: Di Goins MD  PCP: Khoi Mesa MD  Date of Admission:  01/05/19    Assessment/Plan:    Hospital Problem List:     Principal Problem:    Acute on chronic diastolic (congestive) heart failure ([facility] )  Active Problems:    Paroxysmal atrial fibrillation ([place] [address] )    Type 2 diabetes mellitus with hyperglycemia, with long-term current use of insulin ([facility] )    Chest pain    Vascular dementia with behavior disturbance    Hypertension    CKD (chronic kidney disease), stage III ([place] [address] )    Present on Admission:   Vascular dementia with behavior disturbance   Paroxysmal atrial fibrillation ([place] [address] )   Hypertension   CKD (chronic kidney disease), stage III ([place] [address] )   Chest pain   Acute on chronic diastolic (congestive) heart failure ([facility] )      Plan for the Primary Problem(s):  · Acute on chronic diastolic congestive heart failure  · With recent admission from [facility], will place on IV Lasix, monitor volume status, insulin and, electrolytes, consultation will be placed to Cardiology, fluid restricted salt restriction will be placed on diet    Plan for Additional Problems:   · Chest pain:  Check serial cardiac markers, continue aspirin, statin, beta-blocker therapy as taken as an outpatient, monitor on telemetry, monitor hemodynamics  · Diabetes mellitus with long-term insulin use:  Continue Lantus as taken as an outpatient, placed on correctional insulin, monitor blood sugar levels  · History of vascular dementia:  On aspirin and statin therapy  · Paroxysmal AFib:  On Eliquis 2 5 mg b i d   · History of BPH on finasteride  · Essential hypertension:  Continue lisinopril and carvedilol as taken as an outpatient, monitor hemodynamics    VTE Prophylaxis: Apixaban (Eliquis)  / sequential compression device   Code Status: full  POLST: There is no POLST form on file for this patient (pre-hospital)    Anticipated Length of Stay:  Patient will be admitted on an Inpatient basis with an anticipated length of stay of  Greater than 2 midnights  Justification for Hospital Stay: chf exacerbation, chest pain, cardiology consult    Total Time for Visit, including Counseling / Coordination of Care: 45 minutes  Greater than 50% of this total time spent on direct patient counseling and coordination of care  Chief Complaint:   Chest pain and shortness    History of Present Illness:    Denice Harrison is a 80 y o  male with history of vascular dementia, diastolic congestive heart failure, AFib, hypertension, diabetes mellitus presents emergency department with complaint of substernal chest pain that he describes as pressure, nonradiating with associated shortness of breath that began last night  He denies lightheadedness or palpitations  He states that he reported his symptoms to the nurse that was on duty a nursing home but was sent to emergency department today  He reports that is worse his pain was 20/10, currently it is 2/10  He denies fever, chills or sweats             Review of Systems:  No fever, chills or sweats  No headache, nausea or vomiting  No abdominal pain, acid reflux, change in bowel habits, melena or hematochezia  No change in urine habits    All systems are reviewed  Positive as per history of presenting illness  Patient answered no to all other questions         Past Medical and Surgical History:     Past Medical History:   Diagnosis Date    [facility]fib ([facility] )     Anemia     Bladder cancer ([facility] )     CAD (coronary artery disease)     CHF (congestive heart failure) (HCC)     Chronic kidney failure     [facility] )     Eye cancer, left ([facility] )     Hypertension     Incontinence     Pacemaker     Prostate cancer ([facility] )     Type 2 diabetes mellitus ([facility] )     Wears hearing aid in both ears        Past Surgical History:   Procedure Laterality Date    CARDIAC PACEMAKER PLACEMENT  2014    CORONARY ANGIOPLASTY WITH STENT PLACEMENT      GALLBLADDER SURGERY      PA CYSTO/URETERO W/LITHOTRIPSY &INDWELL STENT INSRT Right 7/31/2018    Procedure: CYSTOSCOPY URETEROSCOPY WITH LITHOTRIPSY HOLMIUM LASER ,BASKET STONE EXTRACTION  RETROGRADE PYELOGRAM AND exchange STENT URETERAL;  Surgeon: Thao Emmanuel MD;  [facility];  Service: Urology    PA CYSTOURETHROSCOPY,URETER CATHETER Right 7/1/2018    Procedure: CYSTOSCOPY RETROGRADE PYELOGRAM WITH INSERTION STENT URETERAL;  Surgeon: Angelina Linares MD;  Location: [facility];  Service: Urology       Meds/Allergies:    Prior to Admission medications    Medication Sig Start Date End Date Taking? Authorizing Provider   acetaminophen (TYLENOL) 325 mg tablet Take 1-2  tablet as needed every 8 hours as needed for headache  Do not exceed  More than 3gm/ day  12/11/18  Yes Paige Doran MD   ALPRAZolam Durel Alt) 0 5 mg tablet Take 1 tablet (0 5 mg total) by mouth 4 (four) times a day as needed for anxiety 11/8/18  Yes Morteza Omalley MD   aspirin (ECOTRIN LOW STRENGTH) 81 mg EC tablet Take 81 mg by mouth daily   Yes Historical Provider, MD   atorvastatin (LIPITOR) 20 mg tablet Take 20 mg by mouth daily   Yes Historical Provider, MD   carvedilol (COREG) 3 125 mg tablet TAKE 1 TABLET BY MOUTH TWICE DAILY WITH MEALS 8/31/18  Yes Morteza Omalley MD   EASY TOUCH LANCETS 28G MISC USE AS DIRECTED  7/13/18  Yes Morteza Omalley MD   ELIQUIS 2 5 MG TAKE 1 TABLET BY MOUTH TWICE DAILY  8/31/18  Yes Morteza Omalley MD   ergocalciferol (VITAMIN D2) 50,000 units TAKE 1 CAPSULE BY MOUTH EVERY [facility] AT NOON 8/31/18  Yes Morteza Omalley MD   finasteride (PROSCAR) 5 mg tablet TAKE 1 TABLET BY MOUTH ONCE DAILY   7/11/18  Yes Morteza Omalley MD   gabapentin (NEURONTIN) 100 mg capsule TAKE 1 CAPSULE BY MOUTH ONCE DAILY 8/31/18  Yes Morteza Omalley MD   gabapentin (NEURONTIN) 300 mg capsule Take 300 mg by mouth daily at bedtime   Yes Historical Provider, MD   glucose blood test strip 1 each by Other route daily Use as instructed   Yes Historical Provider, MD   insulin glargine (LANTUS SOLOSTAR) 100 units/mL injection pen Inject 10 Units under the skin daily at bedtime 9/14/18  Yes Billie Ortiz MD   ipratropium-albuterol (DUO-NEB) 0 5-2 5 mg/3 mL Take 3 mL by nebulization every 6 (six) hours as needed for wheezing 5/25/18  Yes Teodoro Carranza MD   lactase (LACTAID) 3,000 units tablet Take 2 tablets (6,000 Units total) by mouth 3 (three) times a day with meals And PRN ith diary products 11/8/18  Yes Billie Ortiz MD   lisinopril (ZESTRIL) 2 5 mg tablet TAKE 1 TABLET BY MOUTH ONCE DAILY  8/31/18  Yes Billie Ortiz MD   Menthol-Zinc Oxide (REMEDY CALAZIME EX) Apply 1 application topically 2 (two) times a day To sacrum   Yes Historical Provider, MD   metFORMIN (GLUCOPHAGE) 500 mg tablet TAKE 1 TABLET BY MOUTH ONCE DAILY  9/5/18  Yes Billie Ortiz MD   metolazone (ZAROXOLYN) 2 5 mg tablet Take 1 tablet (2 5 mg total) by mouth 2 (two) times a week Twice a week 30 min prior to am dose of Torsemide on Monday and Thursday 12/27/18  Yes Billie Ortiz MD   multivitamin with iron (TAB-A-ANETA/IRON) TAKE 1 TABLET BY MOUTH ONCE DAILY  8/31/18  Yes Billie Ortiz MD   nitroglycerin (NITROSTAT) 0 4 mg SL tablet Place 0 4 mg under the tongue every 5 (five) minutes as needed for chest pain   Yes Historical Provider, MD   Omega-3 Fatty Acids (FISH OIL) 1,000 mg TAKE 1 CAPSULE BY MOUTH ONCE DAILY 9/26/18  Yes Billie Ortiz MD   Polyvinyl Alcohol-Povidone (REFRESH OP) Apply 1 drop to eye as needed   Yes Historical Provider, MD   potassium chloride (K-DUR,KLOR-CON) 20 mEq tablet TAKE 1 TABLET BY MOUTH TWICE DAILY  8/31/18  Yes Billie Ortiz MD   Skin Protectants, Misc   (CALAZIME SKIN PROTECTANT EX) Apply topically   Yes Historical Provider, MD   tamsulosin (FLOMAX) 0 4 mg TAKE 1 CAPSULE BY MOUTH WITH EVENING MEAL 7/11/18  Yes Eder Plasencia MD   torsemide [facility]) 20 mg tablet Take 3 tablets (60 mg total) by mouth 2 (two) times a day 12/27/18  Yes Eder Plasencia MD   white petrolatum Apply 1 application topically as needed Apply topically to buttocks as needed  May keep in room and self administer   Yes Historical Provider, MD   Wound Dressings (ALLANTOIN) gel Apply 1 application topically as needed for wound care   Yes Historical Provider, MD     I have reviewed home medications using allscripts  Allergies: Allergies   Allergen Reactions    Iv Contrast [Iodinated Diagnostic Agents]        Social History:     Marital Status:    Substance Use History:   History   Alcohol Use    Yes     Comment: jayne cognac 1 oz/night      History   Smoking Status    Former Smoker   Smokeless Tobacco    Never Used     History   Drug Use No       Family History:    non-contributory      Physical Exam:  Vitals:   Blood Pressure: 113/58 (01/04/19 2135)  Pulse: 73 (01/04/19 2135)  Temperature: (!) 97 4 °F (36 3 °C) (01/04/19 1757)  Temp Source: Oral (01/04/19 1757)  Respirations: 18 (01/04/19 2135)  Height: 5' 9" (175 3 cm) (01/04/19 1757)  Weight - Scale: 90 6 kg (199 lb 11 8 oz) (01/04/19 1757)  SpO2: 96 % (01/04/19 2135)    Vital signs are reviewed as above  No distress, lying on stretcher  S1, S2  Clear bilaterally, decreased exchange at bases  Soft, positive bowel sounds  Bilateral lower extremity edema, 1+, left greater than right  Distal pulses intact  Excoriations in bilateral lower extremity  Calm and cooperative, following commands  No joint deformity        Additional Data:     Lab Results: I have personally reviewed pertinent reports          Results from last 7 days  Lab Units 01/04/19  1814   WBC Thousand/uL 4 59   HEMOGLOBIN g/dL 8 9*   HEMATOCRIT % 28 8*   PLATELETS Thousands/uL 168   NEUTROS PCT % 70   LYMPHS PCT % 12* MONOS PCT % 13*   EOS PCT % 4       Results from last 7 days  Lab Units 01/04/19  1814   POTASSIUM mmol/L 3 7   CHLORIDE mmol/L 95*   CO2 mmol/L 29   BUN mg/dL 56*   CREATININE mg/dL 1 94*   CALCIUM mg/dL 8 8           Imaging: I have personally reviewed pertinent reports  Xr Chest 2 Views    Result Date: 12/8/2018  Narrative: CHEST INDICATION:   cp, sob  COMPARISON:  11/4/2018 EXAM PERFORMED/VIEWS:  XR CHEST PA & LATERAL FINDINGS: Cardiomediastinal silhouette appears unremarkable  Patchy right upper lobe airspace opacity  Mild central congestion noted  Loculated pleural fluid right lung base laterally similar to prior study  Osseous structures appear within normal limits for patient age  Impression: Mild pulmonary edema with right upper lobe patchy opacity could reflect atelectasis versus infiltrate versus edema  Loculated right basilar pleural effusion fluid as before  Also refer to CT chest  Workstation performed: [id number]     Ct Chest Without Contrast    Result Date: 12/8/2018  Narrative: CT CHEST WITHOUT IV CONTRAST INDICATION:   XR concerning for RML consolidation vs mass  COMPARISON:  None  TECHNIQUE: CT examination of the chest was performed without intravenous contrast   Axial, sagittal, and coronal 2D reformatted images were created from the source data and submitted for interpretation  Radiation dose length product (DLP) for this visit:  674 44 mGy-cm   This examination, like all CT scans performed in the [facility], was performed utilizing techniques to minimize radiation dose exposure, including the use of iterative  reconstruction and automated exposure control  FINDINGS: LUNGS:  4 mm calcified granuloma in the right middle lobe     Mild interlobular septal thickening likely is chronic scarring however may reflect mild pulmonary edema  Bilateral areas of atelectatic changes  There is no tracheal or endobronchial lesion   PLEURA:  Small effusions bilaterally with fluid tracking into the minor fissure  Corina Vogel HEART/GREAT VESSELS:  Cardiomegaly  Stable small pericardial effusion  Multipolar ages pacemaker  MEDIASTINUM AND HALIE:  Unremarkable  CHEST WALL AND LOWER NECK:   Unremarkable  VISUALIZED STRUCTURES IN THE UPPER ABDOMEN:  Adenomatous hypertrophy of the adrenal glands bilaterally  Shelvy Mingle Post cholecystectomy  OSSEOUS STRUCTURES:  No acute fracture or destructive osseous lesion  Impression: Mild interlobular septal thickening likely is chronic scarring however may reflect mild pulmonary edema  Bilateral areas of atelectatic changes  Small effusions bilaterally with fluid tracking into the minor fissure    No airspace disease/infiltrate or pneumothorax  Abnormality seen on chest x-ray reflects pleural effusion  Workstation performed: [id number]       EKG, Pathology, and Other Studies Reviewed on Admission:   · EKG: paced rhythm    Allscripts Records Reviewed: Yes     ** Please Note: Dragon 360 Dictation voice to text software may have been used in the creation of this document   **

## 2019-01-06 PROBLEM — F01.50 VASCULAR DEMENTIA WITHOUT BEHAVIORAL DISTURBANCE (HCC): Status: ACTIVE | Noted: 2018-11-07

## 2019-01-06 LAB
ANION GAP SERPL CALCULATED.3IONS-SCNC: 6 MMOL/L (ref 4–13)
BASOPHILS # BLD AUTO: 0.05 THOUSANDS/ΜL (ref 0–0.1)
BASOPHILS NFR BLD AUTO: 1 % (ref 0–1)
BUN SERPL-MCNC: 54 MG/DL (ref 5–25)
CALCIUM SERPL-MCNC: 8.7 MG/DL (ref 8.3–10.1)
CHLORIDE SERPL-SCNC: 97 MMOL/L (ref 100–108)
CO2 SERPL-SCNC: 32 MMOL/L (ref 21–32)
CREAT SERPL-MCNC: 1.77 MG/DL (ref 0.6–1.3)
EOSINOPHIL # BLD AUTO: 0.17 THOUSAND/ΜL (ref 0–0.61)
EOSINOPHIL NFR BLD AUTO: 4 % (ref 0–6)
ERYTHROCYTE [DISTWIDTH] IN BLOOD BY AUTOMATED COUNT: 14.4 % (ref 11.6–15.1)
GFR SERPL CREATININE-BSD FRML MDRD: 34 ML/MIN/1.73SQ M
GLUCOSE SERPL-MCNC: 129 MG/DL (ref 65–140)
GLUCOSE SERPL-MCNC: 132 MG/DL (ref 65–140)
GLUCOSE SERPL-MCNC: 182 MG/DL (ref 65–140)
GLUCOSE SERPL-MCNC: 189 MG/DL (ref 65–140)
GLUCOSE SERPL-MCNC: 199 MG/DL (ref 65–140)
HCT VFR BLD AUTO: 29.7 % (ref 36.5–49.3)
HGB BLD-MCNC: 9.2 G/DL (ref 12–17)
IMM GRANULOCYTES # BLD AUTO: 0.01 THOUSAND/UL (ref 0–0.2)
IMM GRANULOCYTES NFR BLD AUTO: 0 % (ref 0–2)
LYMPHOCYTES # BLD AUTO: 0.5 THOUSANDS/ΜL (ref 0.6–4.47)
LYMPHOCYTES NFR BLD AUTO: 13 % (ref 14–44)
MCH RBC QN AUTO: 29.6 PG (ref 26.8–34.3)
MCHC RBC AUTO-ENTMCNC: 31 G/DL (ref 31.4–37.4)
MCV RBC AUTO: 96 FL (ref 82–98)
MONOCYTES # BLD AUTO: 0.48 THOUSAND/ΜL (ref 0.17–1.22)
MONOCYTES NFR BLD AUTO: 12 % (ref 4–12)
NEUTROPHILS # BLD AUTO: 2.65 THOUSANDS/ΜL (ref 1.85–7.62)
NEUTS SEG NFR BLD AUTO: 70 % (ref 43–75)
NRBC BLD AUTO-RTO: 0 /100 WBCS
PLATELET # BLD AUTO: 155 THOUSANDS/UL (ref 149–390)
PMV BLD AUTO: 11.3 FL (ref 8.9–12.7)
POTASSIUM SERPL-SCNC: 3.6 MMOL/L (ref 3.5–5.3)
RBC # BLD AUTO: 3.11 MILLION/UL (ref 3.88–5.62)
SODIUM SERPL-SCNC: 135 MMOL/L (ref 136–145)
WBC # BLD AUTO: 3.86 THOUSAND/UL (ref 4.31–10.16)

## 2019-01-06 PROCEDURE — 80048 BASIC METABOLIC PNL TOTAL CA: CPT | Performed by: NURSE PRACTITIONER

## 2019-01-06 PROCEDURE — 85025 COMPLETE CBC W/AUTO DIFF WBC: CPT | Performed by: NURSE PRACTITIONER

## 2019-01-06 PROCEDURE — 99232 SBSQ HOSP IP/OBS MODERATE 35: CPT | Performed by: INTERNAL MEDICINE

## 2019-01-06 PROCEDURE — 99232 SBSQ HOSP IP/OBS MODERATE 35: CPT | Performed by: NURSE PRACTITIONER

## 2019-01-06 PROCEDURE — 82948 REAGENT STRIP/BLOOD GLUCOSE: CPT

## 2019-01-06 RX ADMIN — ANORECTAL OINTMENT: 15.7; .44; 24; 20.6 OINTMENT TOPICAL at 08:19

## 2019-01-06 RX ADMIN — ANORECTAL OINTMENT: 15.7; .44; 24; 20.6 OINTMENT TOPICAL at 17:20

## 2019-01-06 RX ADMIN — GABAPENTIN 300 MG: 300 CAPSULE ORAL at 21:24

## 2019-01-06 RX ADMIN — ALPRAZOLAM 0.5 MG: 0.5 TABLET ORAL at 08:18

## 2019-01-06 RX ADMIN — Medication 1 TABLET: at 08:18

## 2019-01-06 RX ADMIN — FUROSEMIDE 40 MG: 10 INJECTION, SOLUTION INTRAMUSCULAR; INTRAVENOUS at 08:19

## 2019-01-06 RX ADMIN — LACTASE TAB 3000 UNIT 6000 UNITS: 3000 TAB at 12:10

## 2019-01-06 RX ADMIN — CARVEDILOL 3.12 MG: 3.12 TABLET, FILM COATED ORAL at 08:18

## 2019-01-06 RX ADMIN — ATORVASTATIN CALCIUM 20 MG: 20 TABLET, FILM COATED ORAL at 17:21

## 2019-01-06 RX ADMIN — TAMSULOSIN HYDROCHLORIDE 0.4 MG: 0.4 CAPSULE ORAL at 16:41

## 2019-01-06 RX ADMIN — POTASSIUM CHLORIDE 20 MEQ: 1500 TABLET, EXTENDED RELEASE ORAL at 08:18

## 2019-01-06 RX ADMIN — GABAPENTIN 100 MG: 100 CAPSULE ORAL at 08:18

## 2019-01-06 RX ADMIN — APIXABAN 2.5 MG: 2.5 TABLET, FILM COATED ORAL at 08:18

## 2019-01-06 RX ADMIN — POTASSIUM CHLORIDE 20 MEQ: 1500 TABLET, EXTENDED RELEASE ORAL at 17:21

## 2019-01-06 RX ADMIN — APIXABAN 2.5 MG: 2.5 TABLET, FILM COATED ORAL at 17:21

## 2019-01-06 RX ADMIN — FINASTERIDE 5 MG: 5 TABLET, FILM COATED ORAL at 08:18

## 2019-01-06 RX ADMIN — FUROSEMIDE 40 MG: 10 INJECTION, SOLUTION INTRAMUSCULAR; INTRAVENOUS at 16:41

## 2019-01-06 RX ADMIN — INSULIN GLARGINE 10 UNITS: 100 INJECTION, SOLUTION SUBCUTANEOUS at 21:24

## 2019-01-06 RX ADMIN — LACTASE TAB 3000 UNIT 6000 UNITS: 3000 TAB at 16:41

## 2019-01-06 RX ADMIN — ASPIRIN 81 MG: 81 TABLET, COATED ORAL at 08:18

## 2019-01-06 RX ADMIN — CARVEDILOL 3.12 MG: 3.12 TABLET, FILM COATED ORAL at 17:21

## 2019-01-06 RX ADMIN — LACTASE TAB 3000 UNIT 6000 UNITS: 3000 TAB at 08:19

## 2019-01-06 RX ADMIN — Medication 1000 MG: at 08:18

## 2019-01-06 NOTE — ASSESSMENT & PLAN NOTE
· Patient admitted with chest pressure found to be volume overloaded started on IV Lasix  Recent admission for same  · CXR showing venous congestion  BNP elevated  · Cardiology is following, continue with IV diuresis, consider transition to oral diuretics tomorrow  Patient examines almost euvolemic  · I/O, daily weights, low NA diet  · Continue with BB  ACEI on hold currently, can likely resume at discharge  · Monitor volume status and creatinine which is improving with diuresis, 1 77 today  · Recent echo 12/2018 showing EF 55%, no RWMA  RV, LA, RA dilated  Mild/mold calcification and regurgitation of mitral valve  PAP moderately increased

## 2019-01-06 NOTE — PROGRESS NOTES
Rounds with [facility] (Dr Vee Lema) and Nephrology (Dr Alexandre Johns)--- Creat increased, Bumex drip started- for probable Hemo in near future

## 2019-01-06 NOTE — PROGRESS NOTES
[place] with [facility] [place]   Seen by PT/OT in am for eval

## 2019-01-06 NOTE — PROGRESS NOTES
Cardiology Progress Note - Tiffanie Walls 80 y o  male MRN: [id number]    Unit/Bed#: [facility] 502-01 Encounter: [id number]      Assessment/Recommendations:  1  Acute on chronic diastolic heart failure:  Continued on IV diuresis with good response  Continue another day and re-evaluate for potential change to p o  Dosing tomorrow  2  Type 2 NSTEMI:  Likely in the setting of 1   3  CAD:  Continue on medical therapy with beta-blocker, aspirin, statin  4  Hypertension:  Well controlled, continue current regimen  5  Left eye blindness    Paroxysmal atrial fibrillation:  Rate well controlled, continue on beta-blocker and Eliquis  Subjective:   Patient seen and examined  No significant events overnight   stable dyspnea on exertion, lower extremity edema, ; pertinent negatives - chest pain, chest pressure/discomfort, irregular heart beat and palpitations  Objective:     Vitals: Blood pressure 110/75, pulse 78, temperature 98 °F (36 7 °C), temperature source Oral, resp  rate 20, height 5' 9" (1 753 m), weight 89 1 kg (196 lb 6 9 oz), SpO2 96 %  , Body mass index is 29 01 kg/m² , Orthostatic Blood Pressures      Most Recent Value   Blood Pressure  110/75 filed at 01/06/2019 0809   Patient Position - Orthostatic VS  Sitting filed at 01/06/2019 1049            Intake/Output Summary (Last 24 hours) at 01/06/19 1149  Last data filed at 01/06/19 1001   Gross per 24 hour   Intake              940 ml   Output             2325 ml   Net            -1385 ml       TELE:  Occasional PVCs      Physical Exam:    GEN: Tiffanie Walls appears well, alert and oriented x 3, pleasant and cooperative   HEENT: pupils equal, round, and reactive to light; extraocular muscles intact  NECK: supple, no carotid bruits, +JVD   HEART: regular rhythm, normal S1 and S2, +systolic murmur, no clicks, gallops or rubs   LUNGS:  Diminished breath sounds bilaterally, no wheezes, rales, or rhonchi  ABDOMEN: normal bowel sounds, soft, no tenderness, no distention  EXTREMITIES: peripheral pulses normal; no clubbing, cyanosis, +edema  NEURO: no focal findings   SKIN: normal without suspicious lesions on exposed skin    Medications:      Current Facility-Administered Medications:     acetaminophen (TYLENOL) tablet 325 mg, 325 mg, Oral, Q6H PRN, Smita Murrieta MD, 325 mg at 01/05/19 0834    ALPRAZolam Nikolay Bathe) tablet 0 5 mg, 0 5 mg, Oral, 4x Daily PRN, Smita Murrieta MD, 0 5 mg at 01/06/19 0818    apixaban (ELIQUIS) tablet 2 5 mg, 2 5 mg, Oral, BID, Smita Murrieta MD, 2 5 mg at 01/06/19 0818    aspirin (ECOTRIN LOW STRENGTH) EC tablet 81 mg, 81 mg, Oral, Daily, Smita Murrieta MD, 81 mg at 01/06/19 0818    atorvastatin (LIPITOR) tablet 20 mg, 20 mg, Oral, After Kyle Roach MD, 20 mg at 01/05/19 1721    carvedilol (COREG) tablet 3 125 mg, 3 125 mg, Oral, BID With Meals, Smita Murrieta MD, 3 125 mg at 01/06/19 0818    finasteride (PROSCAR) tablet 5 mg, 5 mg, Oral, Daily, Smita Murrieta MD, 5 mg at 01/06/19 0818    fish oil capsule 1,000 mg, 1,000 mg, Oral, Daily, Smita Murrieta MD, 1,000 mg at 01/06/19 0818    furosemide (LASIX) injection 40 mg, 40 mg, Intravenous, BID (diuretic), Smita Murrieta MD, 40 mg at 01/06/19 0819    gabapentin (NEURONTIN) capsule 100 mg, 100 mg, Oral, Daily, Smita Murrieta MD, 100 mg at 01/06/19 0818    gabapentin (NEURONTIN) capsule 300 mg, 300 mg, Oral, HS, Smita Murrieta MD, 300 mg at 01/05/19 2123    insulin glargine (LANTUS) subcutaneous injection 10 Units 0 1 mL, 10 Units, Subcutaneous, HS, Smita Murrieta MD, 10 Units at 01/05/19 2123    lactase (LACTAID) tablet 6,000 Units, 6,000 Units, Oral, TID With Meals, Smita Murrieta MD, 6,000 Units at 01/06/19 0819    menthol-zinc oxide (CALMOSEPTINE) 0 44-20 6 % ointment, , Topical, BID, MD Tereza Mahoney  [START ON 1/7/2019] metolazone (ZAROXOLYN) tablet 2 5 mg, 2 5 mg, Oral, Once per day on Mon Thu, [place] Millicent Hughes MD    multivitamin-minerals (CENTRUM) tablet 1 tablet, 1 tablet, Oral, Daily, Naomi Neumann MD, 1 tablet at 01/06/19 0818    polyvinyl alcohol (LIQUIFILM TEARS) 1 4 % ophthalmic solution 1 drop, 1 drop, Both Eyes, Q3H PRN, Naomi Neumann MD    potassium chloride (K-DUR,KLOR-CON) CR tablet 20 mEq, 20 mEq, Oral, BID, Naomi Neumann MD, 20 mEq at 01/06/19 0818    tamsulosin (FLOMAX) capsule 0 4 mg, 0 4 mg, Oral, Daily With Benoit Mccarty MD, 0 4 mg at 01/05/19 1721     Labs & Results:      Results from last 7 days  Lab Units 01/05/19  0821 01/05/19  0450 01/05/19  0116   TROPONIN I ng/mL 0 12* 0 13* 0 13*       Results from last 7 days  Lab Units 01/06/19  0459 01/04/19  1814   WBC Thousand/uL 3 86* 4 59   HEMOGLOBIN g/dL 9 2* 8 9*   HEMATOCRIT % 29 7* 28 8*   PLATELETS Thousands/uL 155 168           Results from last 7 days  Lab Units 01/06/19  0459 01/05/19  0450 01/04/19  1814   POTASSIUM mmol/L 3 6 3 7 3 7   CHLORIDE mmol/L 97* 97* 95*   CO2 mmol/L 32 33* 29   BUN mg/dL 54* 59* 56*   CREATININE mg/dL 1 77* 1 92* 1 94*   CALCIUM mg/dL 8 7 8 8 8 8           Results from last 7 days  Lab Units 01/05/19  0450   MAGNESIUM mg/dL 2 3       Echo: personally reviewed - EF 55%, increased LV thickness, dilated RV with reduced function, moderate pulmonary hypertension      EKG personally reviewed by Cari Lesches, MD

## 2019-01-06 NOTE — PROGRESS NOTES
[facility]    Progress Note - Sherry Bates 12/21/1930, 80 y o  male MRN: [id number]    Unit/Bed#: [facility] 502-01 Encounter: [id number]    Primary Care Provider: Mj Torres MD   Date and time admitted to hospital: 1/4/2019  5:53 PM    * Acute on chronic diastolic (congestive) heart failure ([address] )   Assessment & Plan    · Patient admitted with chest pressure found to be volume overloaded started on IV Lasix  Recent admission for same  · CXR showing venous congestion  BNP elevated  · Cardiology is following, continue with IV diuresis, consider transition to oral diuretics tomorrow  Patient examines almost euvolemic  · I/O, daily weights, low NA diet  · Continue with BB  ACEI on hold currently, can likely resume at discharge  · Monitor volume status and creatinine which is improving with diuresis, 1 77 today  · Recent echo 12/2018 showing EF 55%, no RWMA  RV, LA, RA dilated  Mild/mold calcification and regurgitation of mitral valve  PAP moderately increased  Chest pain   Assessment & Plan    · Patient admitted with chest pressure, this has now resolved today  · Troponins are elevated but flat  EKG V paced  · Likely 2/2 acute heart failure exacerbation  · Continue with ASA, BB and statin  NSTEMI (non-ST elevated myocardial infarction) [facility])   Assessment & Plan    Type 2 as above  Paroxysmal atrial fibrillation (HCC)   Assessment & Plan    · Continue BB  · On dose reduced Eliquis  Vascular dementia without behavioral disturbance   Assessment & Plan    · Supportive care  Currently at baseline          Type 2 diabetes mellitus with hyperglycemia, with long-term current use of insulin [facility])   Assessment & Plan    Lab Results   Component Value Date    HGBA1C 7 5 (H) 12/08/2018       Recent Labs      01/05/19   1652  01/05/19   2104  01/06/19   0652  01/06/19   1126   POCGLU  131  185*  129  189*       Blood Sugar Average: Last 72 hrs:  (P) 236 [id number] · Continue home dose of Lantus 10 units HS along with SSI while in house  · Hold metformin  · Diabetic diet  · Monitor accuchecks      Hypertension   Assessment & Plan    · BP on soft side  · Continue BB  Hold ACEI for now  · Monitor closely      Acute renal failure superimposed on stage 3 chronic kidney disease ([facility] )   Assessment & Plan    · Baseline varies between 1 5-1 , 1 77 today  · Monitor with diuresis  · Avoid hypotension, will continue BB but hold ACEI in acute HF  · Monitor      Benign prostatic hyperplasia   Assessment & Plan    · On Finasteride  Pharmacologic: Apixaban (Eliquis)  Mechanical VTE Prophylaxis in Place: No    Patient Centered Rounds: I have performed bedside rounds with nursing staff today  Discussions with Specialists or Other Care Team Provider: nursing    Education and Discussions with Family / Patient: patient  Unable to reach daughter Eric Lopez  Patient states she is on vacation  Time Spent for Care: 30 minutes  More than 50% of total time spent on counseling and coordination of care as described above  Current Length of Stay: 2 day(s)    Current Patient Status: Inpatient   Certification Statement: The patient will continue to require additional inpatient hospital stay due to iv diuresis, PT/OT evaluations    Discharge Plan / Estimated Discharge Date: likely with in next 24-48 hours or as per cardiology  Will need PT/OT evaluations prior to discharge  Code Status: Level 1 - Full Code      Subjective:   Patient offers no complaints  Denies any further CP or SOB  Objective:     Vitals:   Temp (24hrs), Av 6 °F (36 4 °C), Min:97 3 °F (36 3 °C), Max:98 °F (36 7 °C)    Temp:  [97 3 °F (36 3 °C)-98 °F (36 7 °C)] 98 °F (36 7 °C)  HR:  [56-80] 78  Resp:  [16-20] 20  BP: ()/(58-75) 110/75  SpO2:  [90 %-100 %] 96 %  Body mass index is 29 01 kg/m²  Input and Output Summary (last 24 hours):        Intake/Output Summary (Last 24 hours) at 19 [address] filed at 01/06/19 1301   Gross per 24 hour   Intake              940 ml   Output             2425 ml   Net            -1485 ml       Physical Exam:     Physical Exam   Constitutional: He is oriented to person, place, and time  No distress  HENT:   Head: Normocephalic  Eyes: Pupils are equal, round, and reactive to light  Neck: Normal range of motion  No JVD present  Cardiovascular: Normal rate, regular rhythm and intact distal pulses  No murmur heard  Pulmonary/Chest: Effort normal  He has decreased breath sounds  He has no rales  Abdominal: Soft  Bowel sounds are normal    Musculoskeletal: Normal range of motion  He exhibits edema (lower extremities, this is improving L>R)  Neurological: He is alert and oriented to person, place, and time  Oriented x 2-3--forgetful  This is baseline    Skin: Skin is warm and dry  Sacral stage 2 POA   Psychiatric: He has a normal mood and affect  Nursing note and vitals reviewed        Additional Data:     Labs:      Results from last 7 days  Lab Units 01/06/19  0459   WBC Thousand/uL 3 86*   HEMOGLOBIN g/dL 9 2*   HEMATOCRIT % 29 7*   PLATELETS Thousands/uL 155   NEUTROS PCT % 70   LYMPHS PCT % 13*   MONOS PCT % 12   EOS PCT % 4       Results from last 7 days  Lab Units 01/06/19  0459   POTASSIUM mmol/L 3 6   CHLORIDE mmol/L 97*   CO2 mmol/L 32   BUN mg/dL 54*   CREATININE mg/dL 1 77*   CALCIUM mg/dL 8 7             Recent Cultures (last 7 days):           Last 24 Hours Medication List:     Current Facility-Administered Medications:  acetaminophen 325 mg Oral Q6H PRN Mariana Nair MD   ALPRAZolam 0 5 mg Oral 4x Daily PRN Mariana Nair MD   apixaban 2 5 mg Oral BID Mariana Nair MD   aspirin 81 mg Oral Daily Mariana Nair MD   atorvastatin 20 mg Oral After Sarah Nelson MD   carvedilol 3 125 mg Oral BID With Meals Mariana Nair MD   finasteride 5 mg Oral Daily Mariana Nair MD   fish oil 1,000 mg Oral Daily Rosa Bhatti MD   furosemide 40 mg Intravenous BID (diuretic) Rosa Bhatti MD   gabapentin 100 mg Oral Daily Rosa Bhatti MD   gabapentin 300 mg Oral HS Rosa Bhatti MD   insulin glargine 10 Units Subcutaneous HS Rosa Bhatti MD   lactase 6,000 Units Oral TID With Meals Rosa Bhatti MD   menthol-zinc oxide  Topical BID Rosa Bhatti MD   [START ON 1/7/2019] metolazone 2 5 mg Oral Once per day on Mon Thu Rosa Bhatti MD   multivitamin-minerals 1 tablet Oral Daily Rosa Bhatti MD   polyvinyl alcohol 1 drop Both Eyes Q3H PRN Rosa Bhatti MD   potassium chloride 20 mEq Oral BID Rosa Bhatti MD   tamsulosin 0 4 mg Oral Daily With Romy Watkins MD        Today, Patient Was Seen By: KEERTHI Dotson    ** Please Note: Dragon 360 Dictation voice to text software may have been used in the creation of this document   **

## 2019-01-06 NOTE — ASSESSMENT & PLAN NOTE
· Patient admitted with chest pressure, this has now resolved today  · Troponins are elevated but flat  EKG V paced  · Likely 2/2 acute heart failure exacerbation  · Continue with ASA, BB and statin

## 2019-01-06 NOTE — ASSESSMENT & PLAN NOTE
Lab Results   Component Value Date    HGBA1C 7 5 (H) 12/08/2018       Recent Labs      01/05/19   1652  01/05/19   2104  01/06/19   0652  01/06/19   1126   POCGLU  131  185*  129  189*       Blood Sugar Average: Last 72 hrs:  (P) 396 [id number]     · Continue home dose of Lantus 10 units HS along with SSI while in house    · Hold metformin  · Diabetic diet  · Monitor accuchecks

## 2019-01-07 LAB
ANION GAP SERPL CALCULATED.3IONS-SCNC: 7 MMOL/L (ref 4–13)
BASOPHILS # BLD AUTO: 0.05 THOUSANDS/ΜL (ref 0–0.1)
BASOPHILS NFR BLD AUTO: 2 % (ref 0–1)
BUN SERPL-MCNC: 52 MG/DL (ref 5–25)
CALCIUM SERPL-MCNC: 9 MG/DL (ref 8.3–10.1)
CHLORIDE SERPL-SCNC: 99 MMOL/L (ref 100–108)
CO2 SERPL-SCNC: 31 MMOL/L (ref 21–32)
CREAT SERPL-MCNC: 1.66 MG/DL (ref 0.6–1.3)
EOSINOPHIL # BLD AUTO: 0.18 THOUSAND/ΜL (ref 0–0.61)
EOSINOPHIL NFR BLD AUTO: 5 % (ref 0–6)
ERYTHROCYTE [DISTWIDTH] IN BLOOD BY AUTOMATED COUNT: 14.4 % (ref 11.6–15.1)
GFR SERPL CREATININE-BSD FRML MDRD: 36 ML/MIN/1.73SQ M
GLUCOSE SERPL-MCNC: 132 MG/DL (ref 65–140)
GLUCOSE SERPL-MCNC: 133 MG/DL (ref 65–140)
GLUCOSE SERPL-MCNC: 136 MG/DL (ref 65–140)
GLUCOSE SERPL-MCNC: 154 MG/DL (ref 65–140)
GLUCOSE SERPL-MCNC: 184 MG/DL (ref 65–140)
HCT VFR BLD AUTO: 29.8 % (ref 36.5–49.3)
HGB BLD-MCNC: 9.4 G/DL (ref 12–17)
IMM GRANULOCYTES # BLD AUTO: 0.01 THOUSAND/UL (ref 0–0.2)
IMM GRANULOCYTES NFR BLD AUTO: 0 % (ref 0–2)
LYMPHOCYTES # BLD AUTO: 0.58 THOUSANDS/ΜL (ref 0.6–4.47)
LYMPHOCYTES NFR BLD AUTO: 17 % (ref 14–44)
MCH RBC QN AUTO: 30 PG (ref 26.8–34.3)
MCHC RBC AUTO-ENTMCNC: 31.5 G/DL (ref 31.4–37.4)
MCV RBC AUTO: 95 FL (ref 82–98)
MONOCYTES # BLD AUTO: 0.55 THOUSAND/ΜL (ref 0.17–1.22)
MONOCYTES NFR BLD AUTO: 16 % (ref 4–12)
NEUTROPHILS # BLD AUTO: 2.05 THOUSANDS/ΜL (ref 1.85–7.62)
NEUTS SEG NFR BLD AUTO: 60 % (ref 43–75)
NRBC BLD AUTO-RTO: 0 /100 WBCS
PLATELET # BLD AUTO: 157 THOUSANDS/UL (ref 149–390)
PMV BLD AUTO: 11 FL (ref 8.9–12.7)
POTASSIUM SERPL-SCNC: 4 MMOL/L (ref 3.5–5.3)
RBC # BLD AUTO: 3.13 MILLION/UL (ref 3.88–5.62)
SODIUM SERPL-SCNC: 137 MMOL/L (ref 136–145)
WBC # BLD AUTO: 3.42 THOUSAND/UL (ref 4.31–10.16)

## 2019-01-07 PROCEDURE — 97166 OT EVAL MOD COMPLEX 45 MIN: CPT

## 2019-01-07 PROCEDURE — G8989 SELF CARE D/C STATUS: HCPCS

## 2019-01-07 PROCEDURE — G8979 MOBILITY GOAL STATUS: HCPCS

## 2019-01-07 PROCEDURE — 99232 SBSQ HOSP IP/OBS MODERATE 35: CPT | Performed by: HOSPITALIST

## 2019-01-07 PROCEDURE — 97162 PT EVAL MOD COMPLEX 30 MIN: CPT

## 2019-01-07 PROCEDURE — G8987 SELF CARE CURRENT STATUS: HCPCS

## 2019-01-07 PROCEDURE — 99233 SBSQ HOSP IP/OBS HIGH 50: CPT | Performed by: INTERNAL MEDICINE

## 2019-01-07 PROCEDURE — 82948 REAGENT STRIP/BLOOD GLUCOSE: CPT

## 2019-01-07 PROCEDURE — 80048 BASIC METABOLIC PNL TOTAL CA: CPT | Performed by: NURSE PRACTITIONER

## 2019-01-07 PROCEDURE — G8988 SELF CARE GOAL STATUS: HCPCS

## 2019-01-07 PROCEDURE — G8980 MOBILITY D/C STATUS: HCPCS

## 2019-01-07 PROCEDURE — G8978 MOBILITY CURRENT STATUS: HCPCS

## 2019-01-07 PROCEDURE — 85025 COMPLETE CBC W/AUTO DIFF WBC: CPT | Performed by: NURSE PRACTITIONER

## 2019-01-07 RX ORDER — METOLAZONE 5 MG/1
2.5 TABLET ORAL ONCE
Status: COMPLETED | OUTPATIENT
Start: 2019-01-07 | End: 2019-01-07

## 2019-01-07 RX ORDER — FUROSEMIDE 10 MG/ML
80 INJECTION INTRAMUSCULAR; INTRAVENOUS
Status: DISCONTINUED | OUTPATIENT
Start: 2019-01-07 | End: 2019-01-08

## 2019-01-07 RX ORDER — METOLAZONE 5 MG/1
2.5 TABLET ORAL
Status: DISCONTINUED | OUTPATIENT
Start: 2019-01-08 | End: 2019-01-08 | Stop reason: HOSPADM

## 2019-01-07 RX ADMIN — FUROSEMIDE 40 MG: 10 INJECTION, SOLUTION INTRAMUSCULAR; INTRAVENOUS at 08:53

## 2019-01-07 RX ADMIN — TAMSULOSIN HYDROCHLORIDE 0.4 MG: 0.4 CAPSULE ORAL at 16:46

## 2019-01-07 RX ADMIN — ASPIRIN 81 MG: 81 TABLET, COATED ORAL at 08:53

## 2019-01-07 RX ADMIN — POTASSIUM CHLORIDE 20 MEQ: 1500 TABLET, EXTENDED RELEASE ORAL at 08:53

## 2019-01-07 RX ADMIN — POTASSIUM CHLORIDE 20 MEQ: 1500 TABLET, EXTENDED RELEASE ORAL at 17:25

## 2019-01-07 RX ADMIN — CARVEDILOL 3.12 MG: 3.12 TABLET, FILM COATED ORAL at 16:46

## 2019-01-07 RX ADMIN — Medication 1 TABLET: at 08:53

## 2019-01-07 RX ADMIN — CARVEDILOL 3.12 MG: 3.12 TABLET, FILM COATED ORAL at 08:53

## 2019-01-07 RX ADMIN — METOLAZONE 2.5 MG: 5 TABLET ORAL at 16:50

## 2019-01-07 RX ADMIN — Medication 1000 MG: at 08:52

## 2019-01-07 RX ADMIN — METOLAZONE 2.5 MG: 2.5 TABLET ORAL at 08:54

## 2019-01-07 RX ADMIN — FINASTERIDE 5 MG: 5 TABLET, FILM COATED ORAL at 08:53

## 2019-01-07 RX ADMIN — APIXABAN 2.5 MG: 2.5 TABLET, FILM COATED ORAL at 17:24

## 2019-01-07 RX ADMIN — ANORECTAL OINTMENT: 15.7; .44; 24; 20.6 OINTMENT TOPICAL at 17:25

## 2019-01-07 RX ADMIN — FUROSEMIDE 80 MG: 10 INJECTION, SOLUTION INTRAMUSCULAR; INTRAVENOUS at 16:46

## 2019-01-07 RX ADMIN — ANORECTAL OINTMENT: 15.7; .44; 24; 20.6 OINTMENT TOPICAL at 08:53

## 2019-01-07 RX ADMIN — LACTASE TAB 3000 UNIT 6000 UNITS: 3000 TAB at 11:45

## 2019-01-07 RX ADMIN — APIXABAN 2.5 MG: 2.5 TABLET, FILM COATED ORAL at 08:52

## 2019-01-07 RX ADMIN — GABAPENTIN 100 MG: 100 CAPSULE ORAL at 08:53

## 2019-01-07 RX ADMIN — ATORVASTATIN CALCIUM 20 MG: 20 TABLET, FILM COATED ORAL at 17:24

## 2019-01-07 RX ADMIN — INSULIN GLARGINE 10 UNITS: 100 INJECTION, SOLUTION SUBCUTANEOUS at 21:05

## 2019-01-07 RX ADMIN — LACTASE TAB 3000 UNIT 6000 UNITS: 3000 TAB at 08:54

## 2019-01-07 RX ADMIN — GABAPENTIN 300 MG: 300 CAPSULE ORAL at 21:04

## 2019-01-07 NOTE — PROGRESS NOTES
Progress Note - Gloria Richardson 12/21/1930, 80 y o  male MRN: [id number]    Unit/Bed#: [facility] 502-01 Encounter: [id number]    Primary Care Provider: Tyra Canales MD   Date and time admitted to hospital: 1/4/2019  5:53 PM        * Acute on chronic diastolic (congestive) heart failure ([facility] )   Assessment & Plan    · Patient admitted with chest pressure found to be volume overloaded started on IV Lasix  Recent admission for same  · CXR showing venous congestion  BNP elevated  · Cardiology is following, continue with IV diuresis - actually plan is to increase lasix today to 80 mg Twice a day due to persistently elevated weights and JVD  Patient examines almost euvolemic  · I/O, daily weights, low NA diet  · Continue with BB  ACEI on hold currently, can likely resume at discharge  · Recent echo 12/2018 showing EF 55%, no RWMA  RV, LA, RA dilated  Mild/mold calcification and regurgitation of mitral valve  PAP moderately increased  · Urine recent admission for same issue, important to make sure he is appropriately euvolemic before considering discharge  Also would need fixed home outpatient heart failure follow-up and 5-7 days upon discharge and possibly every 2 weeks for the initial 1-2 months upon discharge     Acute renal failure superimposed on stage 3 chronic kidney disease ([facility] )   Assessment & Plan    · Baseline varies between 1 5-1 8/9, 1 66 today  · Monitor with diuresis  · Avoid hypotension, will continue BB but hold ACEI while on IV diuresis  · Monitor      NSTEMI (non-ST elevated myocardial infarction) [facility])   Assessment & Plan    Type 2 as above  Hypertension   Assessment & Plan    · BP on soft side  · Continue BB  Hold ACEI for now  · Monitor closely      Vascular dementia without behavioral disturbance   Assessment & Plan    · Supportive care  Currently at baseline  Benign prostatic hyperplasia   Assessment & Plan    · On Finasteride        Chest pain   Assessment & Plan · Patient admitted with chest pressure, this has now resolved today  · Troponins are elevated but flat  EKG V paced  · Likely 2/2 acute heart failure exacerbation  · Continue with ASA, BB and statin  Type 2 diabetes mellitus with hyperglycemia, with long-term current use of insulin [facility])   Assessment & Plan    Lab Results   Component Value Date    HGBA1C 7 5 (H) 12/08/2018       Recent Labs      01/06/19   1659  01/06/19   2127  01/07/19   0635  01/07/19   1042   POCGLU  182*  199*  136  154*       Blood Sugar Average: Last 72 hrs:  (P) 163 2     · Continue home dose of Lantus 10 units HS along with SSI while in house  · Hold metformin  · Diabetic diet  · Monitor accuchecks      Paroxysmal atrial fibrillation (HCC)   Assessment & Plan    · Continue BB  · On dose reduced Eliquis  [address] Internal Medicine Progress Note  Patient: Venkat Butcher 80 y o  male   MRN: [id number]  PCP: Rain Calderon MD  Unit/Bed#: [facility] 502-01 Encounter: [id number]  Date Of Visit: 01/07/19    Assessment:    Principal Problem:    Acute on chronic diastolic (congestive) heart failure (HCC)  Active Problems:    Acute renal failure superimposed on stage 3 chronic kidney disease (HCC)    Paroxysmal atrial fibrillation ([facility] )    Type 2 diabetes mellitus with hyperglycemia, with long-term current use of insulin (HCC)    Chest pain    Benign prostatic hyperplasia    Vascular dementia without behavioral disturbance    Hypertension    CKD (chronic kidney disease), stage III (HCC)    NSTEMI (non-ST elevated myocardial infarction) ([facility] )      VTE Pharmacologic Prophylaxis:   Pharmacologic: Apixaban (Eliquis)  Mechanical VTE Prophylaxis in Place: Yes    Patient Centered Rounds: I have performed bedside rounds with nursing staff today  Discussions with Specialists or Other Care Team Provider: cardio    Education and Discussions with Family / Patient: patient    Time Spent for Care: 30 minutes    More than 50% of total time spent on counseling and coordination of care as described above  Current Length of Stay: 3 day(s)    Current Patient Status: Inpatient   Certification Statement: The patient will continue to require additional inpatient hospital stay due to IV diuresis    Discharge Plan / Estimated Discharge Date: once euvolemic    Code Status: Level 1 - Full Code      Subjective:   Patient states that he knows in the hospital, he states he is feeling very good and wants to go back to [facility]  He states that at [facility] be the give him IV diuretics  However as per case management they do not    Objective:     Vitals:   Temp (24hrs), Av 1 °F (36 7 °C), Min:98 °F (36 7 °C), Max:98 2 °F (36 8 °C)    Temp:  [98 °F (36 7 °C)-98 2 °F (36 8 °C)] 98 1 °F (36 7 °C)  HR:  [60-73] 73  Resp:  [18-20] 18  BP: (101-112)/(48-56) 105/55  SpO2:  [93 %-97 %] 94 %  Body mass index is 29 24 kg/m²  Input and Output Summary (last 24 hours): Intake/Output Summary (Last 24 hours) at 19 1427  Last data filed at 19 1149   Gross per 24 hour   Intake              590 ml   Output             1700 ml   Net            -1110 ml       Physical Exam:     Physical Exam   Constitutional: He appears well-developed and well-nourished  HENT:   Head: Normocephalic and atraumatic  Mouth/Throat: Oropharynx is clear and moist    Eyes: Conjunctivae are normal    Cardiovascular: Normal rate, regular rhythm and normal heart sounds  Exam reveals no gallop  No murmur heard  Pulmonary/Chest: Effort normal and breath sounds normal  No respiratory distress  He has no wheezes  Abdominal: Soft  He exhibits no distension  There is no tenderness  Musculoskeletal: He exhibits edema  Neurological: He is alert  Vitals reviewed        Additional Data:     Labs:      Results from last 7 days  Lab Units 19  0427   WBC Thousand/uL 3 42*   HEMOGLOBIN g/dL 9 4*   HEMATOCRIT % 29 8*   PLATELETS Thousands/uL 157   NEUTROS PCT % 60   LYMPHS PCT % 17   MONOS PCT % 16*   EOS PCT % 5       Results from last 7 days  Lab Units 01/07/19  0427   POTASSIUM mmol/L 4 0   CHLORIDE mmol/L 99*   CO2 mmol/L 31   BUN mg/dL 52*   CREATININE mg/dL 1 66*   CALCIUM mg/dL 9 0           * I Have Reviewed All Lab Data Listed Above  * Additional Pertinent Lab Tests Reviewed: All Labs Within Last 24 Hours Reviewed    Imaging:    Imaging Reports Reviewed Today Include:   Imaging Personally Reviewed by Myself Includes:      Recent Cultures (last 7 days):           Last 24 Hours Medication List:     Current Facility-Administered Medications:  acetaminophen 325 mg Oral Q6H PRN Mariana Nair MD   ALPRAZolam 0 5 mg Oral 4x Daily PRN Mariana Niar MD   apixaban 2 5 mg Oral BID Mariana Nair MD   aspirin 81 mg Oral Daily Mariana Nair MD   atorvastatin 20 mg Oral After Sarah Nelson MD   carvedilol 3 125 mg Oral BID With Meals Mariana Nair MD   finasteride 5 mg Oral Daily Mariana Nair MD   fish oil 1,000 mg Oral Daily Mariana Nair MD   furosemide 80 mg Intravenous BID (diuretic) Salome Platt MD   gabapentin 100 mg Oral Daily Mariana Nair MD   gabapentin 300 mg Oral HS Mariana Nair MD   insulin glargine 10 Units Subcutaneous HS Mariana Nair MD   lactase 6,000 Units Oral TID With Meals Mariana Nair MD   menthol-zinc oxide  Topical BID Mariana Nair MD   [START ON 1/8/2019] metolazone 2 5 mg Oral Once per day on Sun Tue Thu Sat Salome Platt MD   metolazone 2 5 mg Oral Once Salome Platt MD   multivitamin-minerals 1 tablet Oral Daily Mariana Nair MD   polyvinyl alcohol 1 drop Both Eyes Q3H PRN Mariana Nair MD   potassium chloride 20 mEq Oral BID Mariana Nair MD   tamsulosin 0 4 mg Oral Daily With Sarah Nelson MD        Today, Patient Was Seen By: Kasey Leach MD    ** Please Note: This note has been constructed using a voice recognition system   **

## 2019-01-07 NOTE — RESTORATIVE TECHNICIAN NOTE
Restorative Specialist Mobility Note       Pt refused ambulation at this time  Will continue to follow up daily  RN aware             Raquel Velásquez Restorative Technician, BS

## 2019-01-07 NOTE — OCCUPATIONAL THERAPY NOTE
[address] Evaluation     Patient Name: James Trinidad  [id number] Date: 1/7/2019  Problem List  Patient Active Problem List   Diagnosis    Lower urinary tract symptoms    Malignant neoplasm of trigone of urinary bladder (HCC)    Prostate cancer (HCC)    Acute on chronic diastolic (congestive) heart failure (HCC)    Acute kidney injury superimposed on chronic kidney disease (HCC)    Paroxysmal atrial fibrillation ([address] )    CAD (coronary artery disease)    Type 2 diabetes mellitus with hyperglycemia, with long-term current use of insulin ([address] )    Cardiac pacemaker in situ    Right ureteral stone    Urinary tract infection with hematuria    Hyponatremia    Chest pain    Laceration of right lower extremity    Diarrhea    Anemia    Benign prostatic hyperplasia    Blindness of left eye    Kidney stones    Peripheral neuropathy    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 (LAD) artery    Ureter, calculus    Urinary retention    Right flank pain    Acute renal failure superimposed on stage 3 chronic kidney disease (HCC)    Urinary tract infection associated with indwelling urethral catheter (HCC)    Dyslipidemia    Sick sinus syndrome ([address] )    Vascular dementia without behavioral disturbance    Alcoholism ([address] )    Decubitus ulcer of sacral region, stage 1    Hypertension    CKD (chronic kidney disease), stage III ([address] )    NSTEMI (non-ST elevated myocardial infarction) ([address] )     Past Medical History  Past Medical History:   Diagnosis Date    A-fib ([address] )     Anemia     Bladder cancer ([address] )     CAD (coronary artery disease)     CHF (congestive heart failure) (HCC)     Chronic kidney failure     Colon cancer ([address] )     Eye cancer, left ([address] )     Hypertension     Incontinence     Pacemaker     Prostate cancer ([address] )     Type 2 diabetes mellitus ([address] )     Wears hearing aid in both ears      Past Surgical History  Past Surgical History: Procedure Laterality Date    CARDIAC PACEMAKER PLACEMENT  2014    CORONARY ANGIOPLASTY WITH STENT PLACEMENT      GALLBLADDER SURGERY      AK CYSTO/URETERO W/LITHOTRIPSY &INDWELL STENT INSRT Right 7/31/2018    Procedure: CYSTOSCOPY URETEROSCOPY WITH LITHOTRIPSY HOLMIUM LASER ,BASKET STONE EXTRACTION  RETROGRADE PYELOGRAM AND exchange STENT URETERAL;  Surgeon: Kapil Loera MD;  Location: [facility];  Service: Urology    AK CYSTOURETHROSCOPY,URETER CATHETER Right 7/1/2018    Procedure: CYSTOSCOPY RETROGRADE PYELOGRAM WITH INSERTION STENT URETERAL;  Surgeon: Norman Wilder MD;  Location: [facility];  Service: Urology      01/07/19 0847   Note Type   Note type Eval only   Restrictions/Precautions   Weight Bearing Precautions Per Order No   Other Precautions Cognitive; Chair Alarm; Fall Risk;Pain   Pain Assessment   Pain Assessment No/denies pain   Pain Score No Pain   Home Living   Type of Home Assisted living   Home Layout One level   Bathroom Shower/Tub Walk-in shower   Bathroom Toilet Standard   Bathroom Equipment Grab bars in shower;Grab bars around toilet   [address] [facility]; Wheelchair-electric   Additional Comments pt reports using RW for transfers and in room abulation and wheelchair for further ambulation   Prior Function   Level of [place] Needs assistance with IADLs; Needs assistance with ADLs and functional mobility   Lives With Facility staff   Receives Help From (Facility staff)   ADL Assistance Needs assistance   IADLs Needs assistance   Falls in the last 6 months 0   Vocational Retired   Lifestyle   Autonomy pta pt reports I in ADLs, per chart review pt requires assist for ADLs, pt does reports some assist w/ LB ADLs, pt requires assistance for IADLs   Reciprocal Relationships 11 grandkids and 11 great grandkids   Service to Others retired   [address] enjoys dancing but doesn't do it as often   Psychosocial   Psychosocial (WDL) WDL   Subjective   Subjective "I used to walk 4 miles twice a day on the treadmill, my grandkihope called me the Hamster"   ADL   Where Assessed Edge of bed   Eating Assistance 5  Supervision/Setup   Grooming Assistance 5  Supervision/Setup   [address] 2  Maximal [address]  Maximal Assistance   Bed Mobility   Additional Comments pt oob in chair upon arrival   Transfers   Sit to [address]   Additional items Increased time required   Stand to Sit 5  Supervision   Additional items Increased time required   Functional Mobility   Functional Mobility 5  Supervision   Additional items Rolling walker   Balance   Static Sitting Fair +   Dynamic Sitting Fair   Static Standing Fair   Dynamic Standing Fair -   Activity Tolerance   Activity Tolerance Patient limited by fatigue   Medical Staff Made Aware PT guevara present   Nurse Made Aware okay to see per RN   RUE Assessment   RUE Assessment WFL   LUE Assessment   LUE Assessment WFL   Hand Function   Gross Motor Coordination Functional   Fine Motor Coordination Functional   Cognition   Overall Cognitive Status Impaired   Arousal/Participation Alert; Cooperative   Attention Attends with cues to redirect   Orientation Level Oriented X4   Memory Decreased recall of precautions   Following Commands Follows one step commands with increased time or repetition   Comments pt easily distractible, requiring cueing for redirection to task   Assessment   Limitation Decreased ADL status; Decreased Safe judgement during ADL;Decreased endurance;Decreased cognition;Decreased high-level ADLs   Prognosis Good   Assessment Pt is a 79 YO  Male admitted to [facility] on 1/4/18 w/ complaint of substernal chest pain described as pressure  Pt undergoing w/up for NSTEMI potentially type 2 in the setting of acute on chronic disatolic heart failure  Comorbidities include a h/o coronary disease s/p LULU to LAD, DM type 2, CKD, paroxysmal a fib, sick sinus syndrome s/p PPM, HTN, HLD, L eye blindness, and vascular dementia   Pt with active OT orders and up with assistance  orders    Pt resides at [facility] assisted living  Pt required assistance for ADLs and IADLs w/ use of RW or electric wheelchair PTA  Currently pt is supervision level for functional mobility and transfers and Max A for LB ADLs  Pt is limited at this time 2*: endurance, activity tolerance, functional mobility, decreased I w/ ADLS/IADLS, cognitive impairments, decreased safety awareness and decreased insight into deficits  The following Occupational Performance Areas to address include: toilet hygiene, dressing, medication management, functional mobility, community mobility, clothing management and household maintenance  Pt scored overall  65/100 on the Barthel Index  Based on the aforementioned OT evaluation, functional performance deficits, and assessments, pt has been identified as a moderate complexity evaluation  From OT standpoint, anticipate d/c pt return home w/ prior level of assistance  Recommend continued participation in ADLs and ambulation w/ staff  No further acute needs, d/c OT      Goals   Patient Goals go back to [facility]   Recommendation   OT Discharge Recommendation Other (Comment)  (Return to [facility])   OT - OK to Discharge Yes   Barthel Index   Feeding 10   Bathing 0   Grooming Score 5   Dressing Score 5   Bladder Score 10   Bowels Score 10   Toilet Use Score 5   Transfers (Bed/Chair) Score 10   Mobility (Level Surface) Score 10   Stairs Score 0   Barthel Index Score 65   Modified Elen Scale   Modified Elen Scale 3     Cheri Ortiz MS, OTR/L

## 2019-01-07 NOTE — ASSESSMENT & PLAN NOTE
· Patient admitted with chest pressure found to be volume overloaded started on IV Lasix  Recent admission for same  · CXR showing venous congestion  BNP elevated  · Cardiology is following, continue with IV diuresis - actually plan is to increase lasix today to 80 mg Twice a day due to persistently elevated weights and JVD  Patient examines almost euvolemic  · I/O, daily weights, low NA diet  · Continue with BB  ACEI on hold currently, can likely resume at discharge  · Recent echo 12/2018 showing EF 55%, no RWMA  RV, LA, RA dilated  Mild/mold calcification and regurgitation of mitral valve  PAP moderately increased  · Urine recent admission for same issue, important to make sure he is appropriately euvolemic before considering discharge    Also would need fixed home outpatient heart failure follow-up and 5-7 days upon discharge and possibly every 2 weeks for the initial 1-2 months upon discharge

## 2019-01-07 NOTE — UTILIZATION REVIEW
Initial Clinical Review    Admission: Date/Time/Statement: 1/4/19 @ 2030     Orders Placed This Encounter   Procedures    Inpatient Admission (expected length of stay for this patient is greater than two midnights)     Standing Status:   Standing     Number of Occurrences:   1     Order Specific Question:   Admitting Physician     Answer:   Nate Craft [[id number]]     Order Specific Question:   Level of Care     Answer:   Med Surg [[id number]]     Order Specific Question:   Estimated length of stay     Answer:   More than 2 Midnights     Order Specific Question:   Certification     Answer:   I certify that inpatient services are medically necessary for this patient for a duration of greater than two midnights  See H&P and MD Progress Notes for additional information about the patient's course of treatment  ED: Date/Time/Mode of Arrival:   ED Arrival Information     Expected Arrival Acuity Means of Arrival Escorted By Service Admission Type    - 1/4/2019 17:53 Urgent Ambulance [facility] Hospitalist Urgent    Arrival Complaint    chest pain          Chief Complaint:   Chief Complaint   Patient presents with    Chest Pain     Patient states chest pain about 1 hour ago  History of Illness: 80 y o  male with history of vascular dementia, diastolic congestive heart failure, AFib, hypertension, diabetes mellitus presents emergency department with complaint of substernal chest pain that he describes as pressure, nonradiating with associated shortness of breath that began last night  He denies lightheadedness or palpitations  He states that he reported his symptoms to the nurse that was on duty a nursing home but was sent to emergency department today  He reports that is worse his pain was 20/10, currently it is 2/10        ED Vital Signs:   ED Triage Vitals [01/04/19 1757]   Temperature Pulse Respirations Blood Pressure SpO2   (!) 97 4 °F (36 3 °C) 74 18 99/55 96 %      Temp Source Heart Rate Source Patient Position - Orthostatic VS BP Location FiO2 (%)   Oral Monitor Sitting Left arm --      Pain Score       3        Wt Readings from Last 1 Encounters:   01/06/19 89 1 kg (196 lb 6 9 oz)       Vital Signs (abnormal): WNL    Abnormal Labs/Diagnostic Test Results: , CL 95, BUN 56, CR 1 94, GLUCOSE 155  RBC 3 02, HGB 8 9, HCT 28 8  TROP 0 12 -- 0/13 -- 0/13  BNP 6313    CXR -- Mild central congestion and right upper lobe discoid atelectasis versus scarring  ED Treatment:   Medication Administration from 01/04/2019 1753 to 01/05/2019 0006       Date/Time Order Dose Route Action     01/04/2019 2121 furosemide (LASIX) injection 40 mg 40 mg Intravenous Given     01/04/2019 2326 apixaban (ELIQUIS) tablet 2 5 mg 2 5 mg Oral Given     01/04/2019 2326 gabapentin (NEURONTIN) capsule 300 mg 300 mg Oral Given     01/04/2019 2327 insulin glargine (LANTUS) subcutaneous injection 10 Units 0 1 mL 10 Units Subcutaneous Given     01/04/2019 2331 menthol-zinc oxide (CALMOSEPTINE) 0 44-20 6 % ointment 1 application Topical Given     01/04/2019 2117 potassium chloride (K-DUR,KLOR-CON) CR tablet 20 mEq 20 mEq Oral Given       Past Medical/Surgical History:    Active Ambulatory Problems     Diagnosis Date Noted    Lower urinary tract symptoms 05/16/2018    Malignant neoplasm of trigone of urinary bladder ([facility] ) 05/16/2018    Prostate cancer ([facility] ) 05/16/2018    Acute on chronic diastolic (congestive) heart failure ([facility] ) 05/22/2018    Acute kidney injury superimposed on chronic kidney disease ([address] ) 05/22/2018    Paroxysmal atrial fibrillation ([facility] ) 05/22/2018    CAD (coronary artery disease) 05/22/2018    Type 2 diabetes mellitus with hyperglycemia, with long-term current use of insulin ([facility] ) 05/22/2018    Cardiac pacemaker in situ 05/22/2018    Right ureteral stone 07/01/2018    Urinary tract infection with hematuria 07/01/2018    Hyponatremia 07/02/2018    Chest pain 07/04/2018    Laceration of right lower extremity 07/04/2018    Diarrhea 07/04/2018    Anemia 03/19/2018    Benign prostatic hyperplasia 07/10/2018    Blindness of left eye 03/20/2018    Kidney stones 07/10/2018    Peripheral neuropathy 03/19/2018    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 (LAD) artery 07/11/2018    Ureter, calculus 07/13/2018    Urinary retention 07/17/2018    Right flank pain 07/26/2018    Acute renal failure superimposed on stage 3 chronic kidney disease ([facility] ) 07/26/2018    Urinary tract infection associated with indwelling urethral catheter ([facility] ) 07/27/2018    Dyslipidemia 10/03/2018    Sick sinus syndrome ([facility] ) 10/03/2018    Vascular dementia without behavioral disturbance 11/07/2018    Alcoholism ([facility] ) 11/07/2018    Decubitus ulcer of sacral region, stage 1 12/08/2018    Hypertension 12/18/2018    CKD (chronic kidney disease), stage III ([facility] ) 12/18/2018     Resolved Ambulatory Problems     Diagnosis Date Noted    Shortness of breath      Past Medical History:   Diagnosis Date    A-fib ([facility] )     Anemia     Bladder cancer ([facility] )     CAD (coronary artery disease)     CHF (congestive heart failure) (HCC)     Chronic kidney failure     Colon cancer ([facility] )     Eye cancer, left ([facility] )     Hypertension     Incontinence     Pacemaker     Prostate cancer ([facility] )     Type 2 diabetes mellitus ([facility] )     Wears hearing aid in both ears        Admitting Diagnosis: CHF (congestive heart failure) ([facility] ) [I50 9]  Chest pain [R07 9]  Elevated troponin [R74 8]  DEANA (acute kidney injury) ([facility] ) [N17 9]  Elevated brain natriuretic peptide (BNP) level [R79 89]    Age/Sex: 80 y o  male    Assessment/Plan:   Plan for the Primary Problem(s):  · Acute on chronic diastolic congestive heart failure  ?  With recent admission from [facility], will place on IV Lasix, monitor volume status, insulin and, electrolytes, consultation will be placed to Cardiology, fluid restricted salt restriction will be placed on diet     Plan for Additional Problems:   · Chest pain:  Check serial cardiac markers, continue aspirin, statin, beta-blocker therapy as taken as an outpatient, monitor on telemetry, monitor hemodynamics  · Diabetes mellitus with long-term insulin use:  Continue Lantus as taken as an outpatient, placed on correctional insulin, monitor blood sugar levels  · History of vascular dementia:  On aspirin and statin therapy  · Paroxysmal AFib:  On Eliquis 2 5 mg b i d   · History of BPH on finasteride  · Essential hypertension:  Continue lisinopril and carvedilol as taken as an outpatient, monitor hemodynamics    Anticipated Length of Stay:  Patient will be admitted on an Inpatient basis with an anticipated length of stay of  Greater than 2 midnights     Justification for Hospital Stay: chf exacerbation, chest pain, cardiology consult       Admission Orders:  Scheduled Meds:   Current Facility-Administered Medications:  acetaminophen 325 mg Oral Q6H PRN   ALPRAZolam 0 5 mg Oral 4x Daily PRN   apixaban 2 5 mg Oral BID   aspirin 81 mg Oral Daily   atorvastatin 20 mg Oral After Dinner   carvedilol 3 125 mg Oral BID With Meals   finasteride 5 mg Oral Daily   fish oil 1,000 mg Oral Daily   furosemide 40 mg Intravenous BID (diuretic)   gabapentin 100 mg Oral Daily   gabapentin 300 mg Oral HS   insulin glargine 10 Units Subcutaneous HS   lactase 6,000 Units Oral TID With Meals   menthol-zinc oxide  Topical BID   [START ON 1/7/2019] metolazone 2 5 mg Oral Once per day on Mon Thu   multivitamin-minerals 1 tablet Oral Daily   polyvinyl alcohol 1 drop Both Eyes Q3H PRN   potassium chloride 20 mEq Oral BID   tamsulosin 0 4 mg Oral Daily With Dinner     TELEM  O2 2 LPM  I/O  UP WITH ASSIST  SCD'S  CARDIAC DIET  CONSULT CARDIOLOGY  PT/OT LEON

## 2019-01-07 NOTE — ASSESSMENT & PLAN NOTE
Lab Results   Component Value Date    HGBA1C 7 5 (H) 12/08/2018       Recent Labs      01/06/19   1659  01/06/19   2127  01/07/19   0635  01/07/19   1042   POCGLU  182*  199*  136  154*       Blood Sugar Average: Last 72 hrs:  (P) 163 2     · Continue home dose of Lantus 10 units HS along with SSI while in house    · Hold metformin  · Diabetic diet  · Monitor accuchecks

## 2019-01-07 NOTE — PHYSICAL THERAPY NOTE
Physical Therapy Evaluation     Patient's Name: Marco A Shultz    Admitting Diagnosis  CHF (congestive heart failure) ([email or web address] ) [I50 9]  Chest pain [R07 9]  Elevated troponin [R74 8]  DEANA (acute kidney injury) ([email or web address] ) [N17 9]  Elevated brain natriuretic peptide (BNP) level [R79 89]    Problem List  Patient Active Problem List   Diagnosis    Lower urinary tract symptoms    Malignant neoplasm of trigone of urinary bladder (HCC)    Prostate cancer (HCC)    Acute on chronic diastolic (congestive) heart failure (HCC)    Acute kidney injury superimposed on chronic kidney disease (HCC)    Paroxysmal atrial fibrillation ([email or web address]    CAD (coronary artery disease)    Type 2 diabetes mellitus with hyperglycemia, with long-term current use of insulin ([facility] )    Cardiac pacemaker in situ    Right ureteral stone    Urinary tract infection with hematuria    Hyponatremia    Chest pain    Laceration of right lower extremity    Diarrhea    Anemia    Benign prostatic hyperplasia    Blindness of left eye    Kidney stones    Peripheral neuropathy    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 (LAD) artery    Ureter, calculus    Urinary retention    Right flank pain    Acute renal failure superimposed on stage 3 chronic kidney disease (HCC)    Urinary tract infection associated with indwelling urethral catheter (HCC)    Dyslipidemia    Sick sinus syndrome ([email or web address]    Vascular dementia without behavioral disturbance    Alcoholism ([email or web address]  )    Decubitus ulcer of sacral region, stage 1    Hypertension    CKD (chronic kidney disease), stage III ([email or web address]    NSTEMI (non-ST elevated myocardial infarction) [facility])       Past Medical History  Past Medical History:   Diagnosis Date    A-fib ([place] [address] )     Anemia     Bladder cancer ([facility] )     CAD (coronary artery disease)     CHF (congestive heart failure) (HCC)     Chronic kidney failure     Colon cancer ([email or web address]     Eye cancer, left ([email or web address]     Hypertension  Incontinence     Pacemaker     Prostate cancer ([facility] )     Type 2 diabetes mellitus ([facility] )     Wears hearing aid in both ears        Past Surgical History  Past Surgical History:   Procedure Laterality Date    CARDIAC PACEMAKER PLACEMENT  2014    CORONARY ANGIOPLASTY WITH STENT PLACEMENT      GALLBLADDER SURGERY      ND CYSTO/URETERO W/LITHOTRIPSY &INDWELL STENT INSRT Right 7/31/2018    Procedure: CYSTOSCOPY URETEROSCOPY WITH LITHOTRIPSY HOLMIUM LASER ,BASKET STONE EXTRACTION  RETROGRADE PYELOGRAM AND exchange STENT URETERAL;  Surgeon: Delia Cobb MD;  [facility];  Service: Urology    ND CYSTOURETHROSCOPY,URETER CATHETER Right 7/1/2018    Procedure: CYSTOSCOPY RETROGRADE PYELOGRAM WITH INSERTION STENT URETERAL;  Surgeon: Stevie Jarquin MD;  [facility]: [facility];  Service: Urology      01/07/19 0681   Note Type   Note type Eval only   Pain Assessment   Pain Assessment No/denies pain   Pain Score No Pain   Home Living   Type of Home Assisted living   Home Layout One level   Bathroom Shower/Tub Walk-in shower   Bathroom Toilet Standard   Bathroom Equipment Grab bars in shower;Grab bars around toilet   [address] [facility]; Wheelchair-electric   Additional Comments Pt is a poor historian  He reports I with ADLs and A for IADLS  He uses a rolling walker for short distances and and transfers and using an electric wheelchair for community distances  Prior Function   Level of [place] Needs assistance with ADLs and functional mobility; Needs assistance with IADLs   Lives With Facility staff   Receives Help From (facility staff)   ADL Assistance Needs assistance   IADLs Needs assistance   Falls in the last 6 months 0   Vocational Retired   Comments PTA, pt requires some assist with ADLs and A for IADLs from facility staff at [facility]  He reports ambulating short distances daily and using an electric wheelchair for community distances    He enjoys dancing although doesn't do it much anymore  Restrictions/Precautions   Weight Bearing Precautions Per Order No   Other Precautions Cognitive; Chair Alarm; Bed Alarm; Fall Risk   General   Family/Caregiver Present No   Cognition   Overall Cognitive Status Impaired   Arousal/Participation Alert   Orientation Level Oriented X4   Memory Decreased recall of precautions   Following Commands Follows multistep commands with increased time or repetition   RLE Assessment   RLE Assessment WFL   LLE Assessment   LLE Assessment WFL   Bed Mobility   Additional Comments Seated in bedside chair on arrival   Transfers   Sit to Stand 5  Supervision   Stand to Sit 5  Supervision   Stand pivot 5  Supervision   Ambulation/Elevation   Gait pattern Forward Flexion; Wide YADI; Decreased foot clearance;Shuffling; Short stride   Gait Assistance 5  Supervision   Assistive Device Rolling walker   Distance 200'   Balance   Static Sitting Fair +   Dynamic Sitting Fair +   Static Standing Fair   Dynamic Standing Fair -   Ambulatory Fair -   Endurance Deficit   Endurance Deficit Yes   Endurance Deficit Description SOB - able to self manage energy conservation with standing rest breaks during ambulation   Activity Tolerance   Activity Tolerance Patient tolerated treatment well   Nurse Made Aware yes, RN cleared pt for PT eval   Assessment   Prognosis Good   Problem List Decreased strength;Decreased endurance; Impaired balance;Decreased mobility; Decreased cognition; Impaired judgement;Decreased safety awareness   Assessment Pt is 80 y o  male seen for PT evaluation s/p admit to [facility] on 1/4/2019 w/ Acute on chronic diastolic (congestive) heart failure ([address] )  Pt presented from [facility] with complaints of substernal chest pain described as "pressure" and SOB  PT consulted to assess pt's functional mobility and d/c needs  Order placed for PT eval and tx, w/ up w/ A order   Comorbidities affecting pt's physical performance at time of assessment include: dementia, diastolic congestive heart failure, AFib, hypertension, diabetes mellitus, NSTEMI, hx of UTI, prostate cancer, cardiac pacemaker; see problem list for full hx  PTA, pt was independent w/ all functional mobility w/ electric wheelchair and requires A with RW, ambulates household distances and resident of [facility]  Personal factors affecting pt at time of IE include: decreased cognition and inability to live alone  Please find objective findings from PT assessment regarding body systems outlined above with impairments and limitations including weakness, impaired balance, decreased endurance, gait deviations and SOB upon exertion  The following objective measures performed on IE also reveal limitations: Barthel Index: 65/100  Pt's clinical presentation is currently evolving seen in pt's presentation of abnormal labs, telemetry, pain  Pt is performing close to baseline level of function and would not benefit from skilled acute IP PT at this time  From PT/mobility standpoint, recommendation at time of d/c would be return to [facility] without PT needs pending progress in order to facilitate return to [facility]  D/C PT     Barriers to Discharge None   Goals   Patient Goals To go back to [facility] today   Recommendation   Recommendation D/C PT  (Return to assisted living facility without PT needs)   Equipment Recommended Kavitha Vivas   PT - OK to Discharge Yes   Additional Comments When medically stable   Barthel Index   Feeding 10   Bathing 0   Grooming Score 5   Dressing Score 5   Bladder Score 10   Bowels Score 10   Toilet Use Score 5   Transfers (Bed/Chair) Score 10   Mobility (Level Surface) Score 10   Stairs Score 0   Barthel Index Score 65           Ellen Care, PT, DPT

## 2019-01-07 NOTE — PHYSICIAN ADVISOR
Current patient class: Inpatient  The patient is currently on Hospital Day: 4 at [address] [place] [address]      The patient was admitted to the hospital at 2030 on 1/4/19 for the following diagnosis:  CHF (congestive heart failure) ([facility]) [I50 9]  Chest pain [R07 9]  Elevated troponin [R74 8]  DEANA (acute kidney injury) ([facility] ) [N17 9]  Elevated brain natriuretic peptide (BNP) level [R79 89]       There is documentation in the medical record of an expected length of stay of at least 2 midnights  The patient is therefore expected to satisfy the 2 midnight benchmark and given the 2 midnight presumption is appropriate for INPATIENT ADMISSION  Given this expectation of a satisfying stay, CMS instructs us that the patient is most often appropriate for inpatient admission under part A provided medical necessity is documented in the chart  After review of the relevant documentation, labs, vital signs and test results, the patient is appropriate for a RELATED INPATIENT ADMISSION  Admission to the hospital as an inpatient is a complex decision making process which requires the practitioner to consider the patients presenting complaint, history and physical examination and all relevant testing  With this in mind, in this case, the patient was deemed appropriate for INPATIENT ADMISSION  After review of the documentation and testing available at the time of the admission I concur with this clinical determination of medical necessity  Rationale is as follows: The patient is a 80 yrs old Male who presented to the ED at 1/4/2019  5:53 PM with a chief complaint of Chest Pain (Patient states chest pain about 1 hour ago )     Given the need for further hospitalization, and along with the documentation of medical necessity present in the chart, the patient is appropriate for inpatient admission    The patient is expected to satisfy the 2 midnight benchmark, and will require further acute medical care  The patient does have comorbid conditions which increases the risk for significant adverse outcome  Given this the patient is appropriate for inpatient admission        The patients vitals on arrival were ED Triage Vitals [01/04/19 1757]   Temperature Pulse Respirations Blood Pressure SpO2   (!) 97 4 °F (36 3 °C) 74 18 99/55 96 %      Temp Source Heart Rate Source Patient Position - Orthostatic VS BP Location FiO2 (%)   Oral Monitor Sitting Left arm --      Pain Score       3           Past Medical History:   Diagnosis Date    A-fib (HCC)     Anemia     Bladder cancer (HCC)     CAD (coronary artery disease)     CHF (congestive heart failure) (HCC)     Chronic kidney failure     [facility]     Eye cancer, left ([facility] )     Hypertension     Incontinence     Pacemaker     Prostate cancer ([facility] )     [facility]     Wears hearing aid in both ears      Past Surgical History:   Procedure Laterality Date    CARDIAC PACEMAKER PLACEMENT  2014    CORONARY ANGIOPLASTY WITH STENT PLACEMENT      GALLBLADDER SURGERY      CO CYSTO/URETERO W/LITHOTRIPSY &INDWELL STENT INSRT Right 7/31/2018    Procedure: CYSTOSCOPY URETEROSCOPY WITH LITHOTRIPSY HOLMIUM LASER ,BASKET STONE EXTRACTION  RETROGRADE PYELOGRAM AND exchange STENT URETERAL;  Surgeon: Melia Persaud MD;  Location: [facility];  Service: Urology    CO CYSTOURETHROSCOPY,URETER CATHETER Right 7/1/2018    Procedure: CYSTOSCOPY RETROGRADE PYELOGRAM WITH INSERTION STENT URETERAL;  Surgeon: Chele Jain MD;  Location: [facility];  Service: Urology           Consults have been placed to:   IP CONSULT TO CARDIOLOGY  IP CONSULT TO CASE MANAGEMENT    Vitals:    01/06/19 1049 01/06/19 1700 01/06/19 1923 01/07/19 0023   BP:  101/54 (!) 108/48 110/52   BP Location:  Left arm Right arm Left arm   Pulse: 78 61 62 63   Resp: 20 20 20 20   Temp: 98 °F (36 7 °C) 98 °F (36 7 °C) 98 1 °F (36 7 °C) 98 °F (36 7 °C)   TempSrc: Oral Oral Oral Oral SpO2: 96% 96% 96% 96%   Weight:       Height:           Most recent labs:    Recent Labs      01/05/19   0821  01/06/19   0459   WBC   --   3 86*   HGB   --   9 2*   HCT   --   29 7*   PLT   --   155   K   --   3 6   CALCIUM   --   8 7   BUN   --   54*   CREATININE   --   1 77*   TROPONINI  0 12*   --        Scheduled Meds:  Current Facility-Administered Medications:  acetaminophen 325 mg Oral Q6H PRN Brenda Funes MD   ALPRAZolam 0 5 mg Oral 4x Daily PRN Brenda Funes MD   apixaban 2 5 mg Oral BID Brenda Funes MD   aspirin 81 mg Oral Daily Brenda Funes MD   atorvastatin 20 mg Oral After Danita Guerra MD   carvedilol 3 125 mg Oral BID With Meals Brenda Funes MD   finasteride 5 mg Oral Daily Brenda Funes MD   fish oil 1,000 mg Oral Daily Brenda Funes MD   furosemide 40 mg Intravenous BID (diuretic) Brenda Funes MD   gabapentin 100 mg Oral Daily Brenda Funes MD   gabapentin 300 mg Oral HS Brenda Funes MD   insulin glargine 10 Units Subcutaneous HS Brenda Funes MD   lactase 6,000 Units Oral TID With Meals Brenda Funes MD   menthol-zinc oxide  Topical BID Brenda Funes MD   metolazone 2 5 mg Oral Once per day on Mon Thu Brenda Funes MD   multivitamin-minerals 1 tablet Oral Daily Brenda Funes MD   polyvinyl alcohol 1 drop Both Eyes Q3H PRN Brenda Funes MD   potassium chloride 20 mEq Oral BID Brenda Funes MD   tamsulosin 0 4 mg Oral Daily With Danita Guerra MD     Continuous Infusions:   PRN Meds:   acetaminophen    ALPRAZolam    polyvinyl alcohol    Surgical procedures (if appropriate):

## 2019-01-07 NOTE — ASSESSMENT & PLAN NOTE
· Baseline varies between 1 5-1 8/9, 1 66 today     · Monitor with diuresis  · Avoid hypotension, will continue BB but hold ACEI while on IV diuresis  · Monitor

## 2019-01-07 NOTE — PROGRESS NOTES
General Cardiology Progress Note   Rebeca Vallejo 80 y o  male MRN: [id number]  Unit/Bed#: [facility] 36-26 Encounter: [id number]      Assessment:  Principal Problem:    Acute on chronic diastolic (congestive) heart failure (HCC)  Active Problems:    Paroxysmal atrial fibrillation (HCC)    Type 2 diabetes mellitus with hyperglycemia, with long-term current use of insulin (HCC)    Chest pain    Benign prostatic hyperplasia    Acute renal failure superimposed on stage 3 chronic kidney disease (HCC)    Vascular dementia without behavioral disturbance    Hypertension    CKD (chronic kidney disease), stage III (HCC)    NSTEMI (non-ST elevated myocardial infarction) ([facility] [address] )      Impression:    Acute on chronic diastolic CHF  He seems to have gained approximately 3 kg on a torsemide 80 mg b i d  And metolazone 2 5 mg twice weekly regimen  Acute kidney injury post prior hospitalization with creatinine of 2 3  At that point torsemide was kept at 80 mg b i d , metolazone was decreased to twice weekly from daily  Currently improving, but slowly on furosemide 40 mg IV b i d  CKD stage 3  Currently stable with a creatinine 1 66  Type 2 myocardial infarction  Due to CHF    PAF  In sinus rhythm with ventricular paced rhythm    Plan:    Increase furosemide to 80 mg IV b i d  Add metolazone today before p m  Dose  Increase chronic metolazone from twice weekly to 4 times weekly  He is extremely high risk for readmission  Needs an absolute 5-7 day post hospitalization discharge follow-up  Would advocate for outpatient follow-up at least Q 2 weeks for the next 6 weeks  Subjective:   Patient seen and examined  Very chatty, very conversant, feeling well, offers no major complaints, says he like to get out here and get back to [facility] [place] , he feels he is more mobile there  He has an independent wheelchair there  He denies shortness of breath currently, he denies palpitations, lightheadedness, chest pain, syncope  Telemetry shows sinus rhythm with ventricular paced rhythm    Objective   Vitals: Blood pressure 112/56, pulse 69, temperature 98 °F (36 7 °C), temperature source Oral, resp  rate 18, height 5' 9" (1 753 m), weight 89 8 kg (197 lb 15 6 oz), SpO2 93 %  , Body mass index is 29 24 kg/m² , I/O last 3 completed shifts: In: 690 [P O :690]  Out: 2975 [Urine:2975]  I/O this shift:  In: 240 [P O :240]  Out: 150 [Urine:150]  Wt Readings from Last 3 Encounters:   01/07/19 89 8 kg (197 lb 15 6 oz)   12/18/18 90 6 kg (199 lb 12 8 oz)   12/11/18 87 7 kg (193 lb 5 5 oz)       Intake/Output Summary (Last 24 hours) at 01/07/19 1037  Last data filed at 01/07/19 0934   Gross per 24 hour   Intake              690 ml   Output             1900 ml   Net            -1210 ml     I/O last 3 completed shifts:   In: 80 [P O :690]  Out: 2975 [Urine:2975]    Telemetry shows sinus rhythm with ventricular pacing    Physical Exam:    GEN: Christy Born appears well, alert and oriented x 3, pleasant and cooperative   HEENT:  Right eye reactive and round, left eye blind, extraocular muscles intact  NECK: supple, no carotid bruits, positive JVD sitting in a chair, positive hepatojugular reflex to the ear sitting in a chair  HEART: normal rate, regular rhythm, normal S1 and S2, no murmurs, clicks, gallops or rubs   LUNGS:  Minor bibasilar crackles, no wheezing, no rhonchi, otherwise good air movement  ABDOMEN: normal bowel sounds, soft, no tenderness, no distention  EXTREMITIES: peripheral pulses normal; no clubbing, cyanosis, or edema  SKIN: warm and well perfused, no suspicious lesions on exposed skin    Meds/Allergies   Allergies   Allergen Reactions    Iv Contrast [Iodinated Diagnostic Agents]        Current Facility-Administered Medications:     acetaminophen (TYLENOL) tablet 325 mg, 325 mg, Oral, Q6H PRN, Zaheer Cash MD, 325 mg at 01/05/19 0834    ALPRAZolam (XANAX) tablet 0 5 mg, 0 5 mg, Oral, 4x Daily PRN, Zaheer Cash MD, 0 5 mg at 01/06/19 0818    apixaban (ELIQUIS) tablet 2 5 mg, 2 5 mg, Oral, BID, Eyal Nielsen MD, 2 5 mg at 01/07/19 0852    aspirin (ECOTRIN LOW STRENGTH) EC tablet 81 mg, 81 mg, Oral, Daily, Eyal Nielsen MD, 81 mg at 01/07/19 0853    atorvastatin (LIPITOR) tablet 20 mg, 20 mg, Oral, After Fortunato Griffith MD, 20 mg at 01/06/19 1721    carvedilol (COREG) tablet 3 125 mg, 3 125 mg, Oral, BID With Meals, Eyal Nielsen MD, 3 125 mg at 01/07/19 0853    finasteride (PROSCAR) tablet 5 mg, 5 mg, Oral, Daily, Eyal Nielsen MD, 5 mg at 01/07/19 0853    fish oil capsule 1,000 mg, 1,000 mg, Oral, Daily, Eyal Nielsen MD, 1,000 mg at 01/07/19 6081    furosemide (LASIX) injection 80 mg, 80 mg, Intravenous, BID (diuretic), Malik Bowman MD    gabapentin (NEURONTIN) capsule 100 mg, 100 mg, Oral, Daily, Eyal Nielsen MD, 100 mg at 01/07/19 0853    gabapentin (NEURONTIN) capsule 300 mg, 300 mg, Oral, HS, Eyal Nielsen MD, 300 mg at 01/06/19 2124    insulin glargine (LANTUS) subcutaneous injection 10 Units 0 1 mL, 10 Units, Subcutaneous, HS, Eyal Nielsen MD, 10 Units at 01/06/19 2124    lactase (LACTAID) tablet 6,000 Units, 6,000 Units, Oral, TID With Meals, Eyal Nielsen MD, 6,000 Units at 01/07/19 0854    menthol-zinc oxide (CALMOSEPTINE) 0 44-20 6 % ointment, , Topical, BID, Eyal Nielsen MD  Italo Pert  [START ON 1/8/2019] metolazone (ZAROXOLYN) tablet 2 5 mg, 2 5 mg, Oral, Once per day on Sun Tue Thu Sat, Malik Bowman MD    metolazone (ZAROXOLYN) tablet 2 5 mg, 2 5 mg, Oral, Once, Malik Bowman MD    multivitamin-minerals (CENTRUM) tablet 1 tablet, 1 tablet, Oral, Daily, Eyal Nielsen MD, 1 tablet at 01/07/19 0853    polyvinyl alcohol (LIQUIFILM TEARS) 1 4 % ophthalmic solution 1 drop, 1 drop, Both Eyes, Q3H PRN, Eyal Nielsen MD    potassium chloride (K-DUR,KLOR-CON) CR tablet 20 mEq, 20 mEq, Oral, BID, Eyal Nielsen MD, 20 mEq at 01/07/19 0853   tamsulosin (FLOMAX) capsule 0 4 mg, 0 4 mg, Oral, Daily With Spenser Arzate MD, 0 4 mg at 01/06/19 1641    Laboratory Results:    Results from last 7 days  Lab Units 01/05/19  0821 01/05/19  0450 01/05/19  0116   TROPONIN I ng/mL 0 12* 0 13* 0 13*       CBC with diff:   Results from last 7 days  Lab Units 01/07/19  0427 01/06/19  0459 01/04/19  1814   WBC Thousand/uL 3 42* 3 86* 4 59   HEMOGLOBIN g/dL 9 4* 9 2* 8 9*   HEMATOCRIT % 29 8* 29 7* 28 8*   MCV fL 95 96 95   PLATELETS Thousands/uL 157 155 168   MCH pg 30 0 29 6 29 5   MCHC g/dL 31 5 31 0* 30 9*   RDW % 14 4 14 4 14 1   MPV fL 11 0 11 3 10 5   NRBC AUTO /100 WBCs 0 0 0       CMP:  Results from last 7 days  Lab Units 01/07/19  0427 01/06/19  0459 01/05/19  0450 01/04/19  1814   POTASSIUM mmol/L 4 0 3 6 3 7 3 7   CHLORIDE mmol/L 99* 97* 97* 95*   CO2 mmol/L 31 32 33* 29   BUN mg/dL 52* 54* 59* 56*   CREATININE mg/dL 1 66* 1 77* 1 92* 1 94*   CALCIUM mg/dL 9 0 8 7 8 8 8 8   EGFR ml/min/1 73sq m 36 34 30 30       BMP:  Results from last 7 days  Lab Units 01/07/19  0427 01/06/19  0459 01/05/19  0450 01/04/19  1814   POTASSIUM mmol/L 4 0 3 6 3 7 3 7   CHLORIDE mmol/L 99* 97* 97* 95*   CO2 mmol/L 31 32 33* 29   BUN mg/dL 52* 54* 59* 56*   CREATININE mg/dL 1 66* 1 77* 1 92* 1 94*   CALCIUM mg/dL 9 0 8 7 8 8 8 8       NT-proBNP:   Recent Labs      01/04/19   1814   NTBNP  6,313*        Magnesium:   Results from last 7 days  Lab Units 01/05/19  0450   MAGNESIUM mg/dL 2 3         Lipid Profile:   No results found for: CHOL  Lab Results   Component Value Date    HDL 33 (L) 07/05/2018     Lab Results   Component Value Date    LDLCALC 79 07/05/2018     No results found for: LDLDIRECT  Lab Results   Component Value Date    TRIG 75 07/05/2018       Cardiac testing:       Results for orders placed during the hospital encounter of 12/07/18   Echo complete with contrast if indicated    Narrative [address] [place] [address]  [phone]    Transthoracic Echocardiogram  2D, M-mode, Doppler, and Color Doppler    Study date:  10-Dec-2018    Patient: Russ Fiore  MR number: [id number]  Account number: [de-identified]  : 21-Dec-1930  Age: 80 years  Gender: Male  Status: Inpatient  Location: Bedside  Height: 69 in 69 in  Weight: 194 lb 193 6 lb  BP: 120/ 60 mmHg    Indications: Heart Failure    Diagnoses: I50 9 - Heart failure, unspecified    Sonographer:  Chaim Sweeney RDCS  Primary Physician:  George Valderrama  Referring Physician:  Zaheer Land MD  Group:  [id number]  Interpreting Physician:  Pradeep Eason MD    SUMMARY    LEFT VENTRICLE:  Systolic function was normal  Ejection fraction was estimated to be 55 %  There were no regional wall motion abnormalities  Wall thickness was moderately increased  Doppler parameters were consistent with elevated ventricular end-diastolic filling pressure  VENTRICULAR SEPTUM:  There was moderate dyssynergic motion  These changes are consistent with right ventricular pacing  RIGHT VENTRICLE:  The ventricle was mildly dilated  Systolic function was mildly reduced  Wall thickness was increased  LEFT ATRIUM:  The atrium was mildly dilated  RIGHT ATRIUM:  The atrium was mildly dilated  MITRAL VALVE:  There was mild to moderate annular calcification  There was mild to moderate regurgitation  TRICUSPID VALVE:  There was mild regurgitation  Pulmonary artery systolic pressure was moderately increased  HISTORY: PRIOR HISTORY: CHF, Afib, DM, CKD, Pacemaker    PROCEDURE: The procedure was performed at the bedside  This was a routine study  The transthoracic approach was used  The study included complete 2D imaging, M-mode, complete spectral Doppler, and color Doppler  The heart rate was 51 bpm,  at the start of the study  Images were obtained from the parasternal, apical, subcostal, and suprasternal notch acoustic windows   Image quality was adequate  LEFT VENTRICLE: Size was normal  Systolic function was normal  Ejection fraction was estimated to be 55 %  There were no regional wall motion abnormalities  Wall thickness was moderately increased  DOPPLER: Left ventricular diastolic  function parameters were abnormal  Doppler parameters were consistent with elevated ventricular end-diastolic filling pressure  VENTRICULAR SEPTUM: There was moderate dyssynergic motion  These changes are consistent with right ventricular pacing  RIGHT VENTRICLE: The ventricle was mildly dilated  Systolic function was mildly reduced  Wall thickness was increased  LEFT ATRIUM: The atrium was mildly dilated  RIGHT ATRIUM: The atrium was mildly dilated  MITRAL VALVE: There was mild to moderate annular calcification  Valve structure was normal  There was normal leaflet separation  DOPPLER: The transmitral velocity was within the normal range  There was no evidence for stenosis  There was  mild to moderate regurgitation  AORTIC VALVE: The valve was trileaflet  Leaflets exhibited normal thickness, mild to moderate calcification, and normal cuspal separation  DOPPLER: Transaortic velocity was within the normal range  There was no evidence for stenosis  There  was no significant regurgitation  TRICUSPID VALVE: The valve structure was normal  There was normal leaflet separation  DOPPLER: The transtricuspid velocity was within the normal range  There was no evidence for stenosis  There was mild regurgitation  Pulmonary artery  systolic pressure was moderately increased  PULMONIC VALVE: Not well visualized  DOPPLER: The transpulmonic velocity was within the normal range  There was no significant regurgitation  PERICARDIUM: There was no pericardial effusion  The pericardium was normal in appearance  AORTA: The root exhibited normal size  SYSTEMIC VEINS: IVC: The inferior vena cava was normal in size      PULMONARY VEINS: DOPPLER: There was systolic blunting in the pulmonary vein(s)  SYSTEM MEASUREMENT TABLES    2D mode  Aortic Root Diameter; User chosen value; 2D mode;: 3 cm  LA/Ao (2D): 1 37  Left Atrium Clint-posterior Systolic Dimension; User chosen value; 2D mode;: 4 1 cm  EDV (2D-Cubed): 64 5 cm3  EF (2D-Cubed): 69 1 %  ESV (2D-Cubed): 19 9 cm3  FS (2D-Cubed): 32 4 %  FS (2D-Teich): 32 4 %  IVS/LVPW (2D): 1 25  Interventricular Septum Diastolic Thickness; User chosen value; 2D mode;: 1 81 cm  LVOT Area (2D): 314 mm2  Left Ventricle Internal End Diastolic Dimension; User chosen value; 2D mode;: 4 01 cm  Left Ventricle Internal Systolic Dimension; User chosen value; 2D mode;: 2 71 cm  Left Ventricle Posterior Wall Diastolic Thickness; User chosen value; 2D mode;: 1 45 cm  Left Ventricular Ejection Fraction; Teichholz; 2D mode;: 61 2 %  Left Ventricular End Diastolic Volume; Teichholz; 2D mode;: 70 4 cm3  Left Ventricular End Systolic Volume; Teichholz; 2D mode;: 27 3 cm3  SI (2D-Cubed): 21 9 ml/m2  SV (2D-Cubed): 44 6 cm3  Stroke Index; Teichholz; 2D mode;: 21 1 ml/m2  Stroke Volume; Teichholz; 2D mode;: 43 1 cm3    Apical four chamber  Left Atrium MOD Diam; Most recent value chosen; End Systole; Apical four chamber;: 1 57 cm  Left Atrium MOD Diam; Most recent value chosen; End Systole; Apical four chamber;: 2 14 cm  Left Atrium MOD Diam; Most recent value chosen; End Systole; Apical four chamber;: 2 73 cm  Left Atrium MOD Diam; Most recent value chosen; End Systole; Apical four chamber;: 3 29 cm  Left Atrium MOD Diam; Most recent value chosen; End Systole; Apical four chamber;: 3 62 cm  Left Atrium MOD Diam; Most recent value chosen; End Systole; Apical four chamber;: 4 3 cm  Left Atrium MOD Diam; Most recent value chosen; End Systole; Apical four chamber;: 4 09 cm  Left Atrium MOD Diam; Most recent value chosen; End Systole; Apical four chamber;: 3 94 cm  Left Atrium MOD Diam; Most recent value chosen; End Systole;  Apical four chamber;: 3 9 cm  Left Atrium MOD Diam; Most recent value chosen; End Systole; Apical four chamber;: 4 01 cm  Left Atrium MOD Diam; Most recent value chosen; End Systole; Apical four chamber;: 3 94 cm  Left Atrium MOD Diam; Most recent value chosen; End Systole; Apical four chamber;: 3 94 cm  Left Atrium MOD Diam; Most recent value chosen; End Systole; Apical four chamber;: 3 91 cm  Left Atrium MOD Diam; Most recent value chosen; End Systole; Apical four chamber;: 3 76 cm  Left Atrium MOD Diam; Most recent value chosen; End Systole; Apical four chamber;: 2 23 cm  Left Atrium MOD Diam; Most recent value chosen; End Systole; Apical four chamber;: 2 94 cm  Left Atrium MOD Diam; Most recent value chosen; End Systole; Apical four chamber;: 3 7 cm  Left Atrium MOD Diam; Most recent value chosen; End Systole; Apical four chamber;: 3 41 cm  Left Atrium MOD Diam; Most recent value chosen; End Systole; Apical four chamber;: 3 05 cm  Left Atrium MOD Diam; Most recent value chosen; End Systole; Apical four chamber;: 2 45 cm  Left Atrium Systolic Area; Most recent value chosen; Method of Disks, Single Plane; 2D mode; Apical four chamber;: 2280 mm2  Left Atrium Systolic Volume Index; Method of Disks, Single Plane; 2D mode; Apical four chamber;: 29 9 ml/m2  Left Atrium Systolic Volume; Most recent value chosen; Method of Disks, Single Plane; 2D mode; Apical four chamber;: 61 cm3  Left Atrium systolic major axis; Most recent value chosen; Method of Disks, Single Plane; 2D mode; Apical four chamber;: 6 57 cm  LV MOD Diam; Recent value; End Diastole (A4C): 1 61 cm  LV MOD Diam; Recent value; End Diastole (A4C): 2 85 cm  LV MOD Diam; Recent value; End Diastole (A4C): 3 52 cm  LV MOD Diam; Recent value; End Diastole (A4C): 4 12 cm  LV MOD Diam; Recent value; End Diastole (A4C): 4 42 cm  LV MOD Diam; Recent value; End Diastole (A4C): 2 01 cm  LV MOD Diam; Recent value; End Diastole (A4C): 4 22 cm  LV MOD Diam; Recent value;  End Diastole (A4C): 4 45 cm  LV MOD Diam; Recent value; End Diastole (A4C): 4 62 cm  LV MOD Diam; Recent value; End Diastole (A4C): 4 76 cm  LV MOD Diam; Recent value; End Diastole (A4C): 4 79 cm  LV MOD Diam; Recent value; End Diastole (A4C): 4 72 cm  LV MOD Diam; Recent value; End Diastole (A4C): 4 59 cm  LV MOD Diam; Recent value; End Diastole (A4C): 4 45 cm  LV MOD Diam; Recent value; End Diastole (A4C): 4 42 cm  LV MOD Diam; Recent value; End Diastole (A4C): 4 39 cm  LV MOD Diam; Recent value; End Diastole (A4C): 4 32 cm  LV MOD Diam; Recent value; End Diastole (A4C): 3 85 cm  LV MOD Diam; Recent value; End Diastole (A4C): 3 22 cm  LV MOD Diam; Recent value; End Diastole (A4C): 2 38 cm  LV MOD Diam; Recent value; End Systole (A4C): 0 94 cm  LV MOD Diam; Recent value; End Systole (A4C): 1 57 cm  LV MOD Diam; Recent value; End Systole (A4C): 2 04 cm  LV MOD Diam; Recent value; End Systole (A4C): 2 34 cm  LV MOD Diam; Recent value; End Systole (A4C): 3 58 cm  LV MOD Diam; Recent value; End Systole (A4C): 3 42 cm  LV MOD Diam; Recent value; End Systole (A4C): 3 25 cm  LV MOD Diam; Recent value; End Systole (A4C): 2 98 cm  LV MOD Diam; Recent value; End Systole (A4C): 2 81 cm  LV MOD Diam; Recent value; End Systole (A4C): 2 68 cm  LV MOD Diam; Recent value; End Systole (A4C): 2 55 cm  LV MOD Diam; Recent value; End Systole (A4C): 2 48 cm  LV MOD Diam; Recent value; End Systole (A4C): 2 41 cm  LV MOD Diam; Recent value; End Systole (A4C): 2 51 cm  LV MOD Diam; Recent value; End Systole (A4C): 3 69 cm  LV MOD Diam; Recent value; End Systole (A4C): 3 65 cm  LV MOD Diam; Recent value; End Systole (A4C): 2 21 cm  LV MOD Diam; Recent value; End Systole (A4C): 1 81 cm  LV MOD Diam; Recent value; End Systole (A4C): 1 47 cm  LV MOD Diam; Recent value; End Systole (A4C): 1 34 cm  LVEF MOD A4C: 58 %  Left Ventricle diastolic major axis; Most recent value chosen; Method of Disks, Single Plane; 2D mode;  Apical four chamber;: 7 67 cm  Left Ventricle systolic major axis; Most recent value chosen; Method of Disks, Single Plane; 2D mode; Apical four chamber;: 7 57 cm  Left Ventricular Diastolic Area; Most recent value chosen; Method of Disks, Single Plane; 2D mode; Apical four chamber;: 2990 mm2  Left Ventricular End Diastolic Volume; Most recent value chosen; Method of Disks, Single Plane; 2D mode; Apical four chamber;: 96 cm3  Left Ventricular End Systolic Volume; Most recent value chosen; Method of Disks, Single Plane; 2D mode; Apical four chamber;: 40 cm3  Left Ventricular Systolic Area; Most recent value chosen; Method of Disks, Single Plane; 2D mode; Apical four chamber;: 1870 mm2  SI (A4C): 27 5 ml/m2  SV MOD A4C: 56 cm3    Unspecified Scan Mode  LVOT CV Orifice Diam; Mean: 2 cm  LVOT Diam: 2 cm  DT; Antegrade Flow: 185 ms  DT; Mean; Antegrade Flow: 185 ms  MV E Leonel: 903 mm/s  MV Peak E Leonel; Mean; Antegrade Flow: 903 mm/s  Peak Grad; Mean; Regurgitant Flow: 75 mm[Hg]  Vmax; Mean; Regurgitant Flow: 4340 mm/s  Vmax; Regurgitant Flow: 4340 mm/s  Peak Grad; Mean; Regurgitant Flow: 51 mm[Hg]  Vmax; Mean; Regurgitant Flow: 3580 mm/s  Vmax; Regurgitant Flow: 3540 mm/s  Vmax; Regurgitant Flow: 3620 mm/s  Atrial Womack Area;: 2090 mm2  Atrial [place] Area; Mean; Mean value chosen;: 2090 mm2  Atrial [place]omack Distance;: 5 9 cm  Atrial [place]omack Distance; Mean; Mean value chosen;: 5 9 cm  Atrial Womack Volume;: 60 8 cm3  Atrial Womack Volume; Mean; Mean value chosen;: 60 8 cm3  Distance;: 4 1 cm  Distance; Mean; Mean value chosen;: 4 1 cm  Distance; User chosen value;: 1 2 cm    [facility]    Prepared and electronically signed by    Rachel Alvarez MD  Signed 10-Dec-2018 11:00:53       No results found for this or any previous visit  No results found for this or any previous visit  No results found for this or any previous visit  No results found for this or any previous visit    No results found for this or any previous visit  Amberly Meyers MD    Portions of the record may have been created with voice recognition software   Occasional wrong word or "sound a like" substitutions may have occurred due to the inherent limitations of voice recognition software   Read the chart carefully and recognize, using context, where substitutions have occurred

## 2019-01-07 NOTE — SOCIAL WORK
Pt is a 30D readmission for CHF  CM met with pt to discuss DCP and cm role  Pt lives at [facility]  Prior to admission pt states hew as independent ith transfers and used a RW with ambulation but has a motorized w/c that he primarily uses  Pt's medications are administered by staff at [facility]  Cm spoke with Horace Melton in wellness at [facility] and made aware pt still needs diuresing tent d/c Wednesday  CM reviewed d/c planning process including the following: identifying help at home, patient preference for d/c planning needs, Discharge Lounge, [facility] Meds to Bed program, availability of treatment team to discuss questions or concerns patient and/or family may have regarding understanding medications and recognizing signs and symptoms once discharged  CM also encouraged patient to follow up with all recommended appointments after discharge  Patient advised of importance for patient and family to participate in managing patients medical well being

## 2019-01-08 VITALS
BODY MASS INDEX: 28.64 KG/M2 | OXYGEN SATURATION: 96 % | DIASTOLIC BLOOD PRESSURE: 60 MMHG | RESPIRATION RATE: 18 BRPM | HEIGHT: 69 IN | TEMPERATURE: 97.6 F | HEART RATE: 65 BPM | WEIGHT: 193.34 LBS | SYSTOLIC BLOOD PRESSURE: 128 MMHG

## 2019-01-08 DIAGNOSIS — Z71.89 COMPLEX CARE COORDINATION: Primary | ICD-10-CM

## 2019-01-08 PROBLEM — R07.9 CHEST PAIN: Status: RESOLVED | Noted: 2018-07-04 | Resolved: 2019-01-08

## 2019-01-08 LAB
ANION GAP SERPL CALCULATED.3IONS-SCNC: 8 MMOL/L (ref 4–13)
BUN SERPL-MCNC: 51 MG/DL (ref 5–25)
CALCIUM SERPL-MCNC: 9.1 MG/DL (ref 8.3–10.1)
CHLORIDE SERPL-SCNC: 96 MMOL/L (ref 100–108)
CO2 SERPL-SCNC: 32 MMOL/L (ref 21–32)
CREAT SERPL-MCNC: 1.7 MG/DL (ref 0.6–1.3)
GFR SERPL CREATININE-BSD FRML MDRD: 35 ML/MIN/1.73SQ M
GLUCOSE SERPL-MCNC: 124 MG/DL (ref 65–140)
GLUCOSE SERPL-MCNC: 132 MG/DL (ref 65–140)
MAGNESIUM SERPL-MCNC: 2.5 MG/DL (ref 1.6–2.6)
POTASSIUM SERPL-SCNC: 3.7 MMOL/L (ref 3.5–5.3)
SODIUM SERPL-SCNC: 136 MMOL/L (ref 136–145)

## 2019-01-08 PROCEDURE — 80048 BASIC METABOLIC PNL TOTAL CA: CPT | Performed by: HOSPITALIST

## 2019-01-08 PROCEDURE — 99232 SBSQ HOSP IP/OBS MODERATE 35: CPT | Performed by: INTERNAL MEDICINE

## 2019-01-08 PROCEDURE — 99239 HOSP IP/OBS DSCHRG MGMT >30: CPT | Performed by: INTERNAL MEDICINE

## 2019-01-08 PROCEDURE — 83735 ASSAY OF MAGNESIUM: CPT | Performed by: HOSPITALIST

## 2019-01-08 PROCEDURE — 82948 REAGENT STRIP/BLOOD GLUCOSE: CPT

## 2019-01-08 RX ORDER — TORSEMIDE 20 MG/1
80 TABLET ORAL
Refills: 0
Start: 2019-01-08 | End: 2019-01-10 | Stop reason: SDUPTHER

## 2019-01-08 RX ORDER — METOLAZONE 2.5 MG/1
2.5 TABLET ORAL
Refills: 0
Start: 2019-01-10 | End: 2019-02-27 | Stop reason: SDUPTHER

## 2019-01-08 RX ORDER — TORSEMIDE 20 MG/1
80 TABLET ORAL
Status: DISCONTINUED | OUTPATIENT
Start: 2019-01-08 | End: 2019-01-08 | Stop reason: HOSPADM

## 2019-01-08 RX ADMIN — POTASSIUM CHLORIDE 20 MEQ: 1500 TABLET, EXTENDED RELEASE ORAL at 08:00

## 2019-01-08 RX ADMIN — GABAPENTIN 100 MG: 100 CAPSULE ORAL at 08:00

## 2019-01-08 RX ADMIN — FUROSEMIDE 80 MG: 10 INJECTION, SOLUTION INTRAMUSCULAR; INTRAVENOUS at 07:52

## 2019-01-08 RX ADMIN — Medication 1 TABLET: at 08:00

## 2019-01-08 RX ADMIN — CARVEDILOL 3.12 MG: 3.12 TABLET, FILM COATED ORAL at 07:50

## 2019-01-08 RX ADMIN — LACTASE TAB 3000 UNIT 6000 UNITS: 3000 TAB at 07:50

## 2019-01-08 RX ADMIN — APIXABAN 2.5 MG: 2.5 TABLET, FILM COATED ORAL at 08:00

## 2019-01-08 RX ADMIN — ASPIRIN 81 MG: 81 TABLET, COATED ORAL at 08:00

## 2019-01-08 RX ADMIN — ANORECTAL OINTMENT: 15.7; .44; 24; 20.6 OINTMENT TOPICAL at 08:15

## 2019-01-08 RX ADMIN — Medication 1000 MG: at 08:00

## 2019-01-08 RX ADMIN — FINASTERIDE 5 MG: 5 TABLET, FILM COATED ORAL at 08:00

## 2019-01-08 RX ADMIN — METOLAZONE 2.5 MG: 5 TABLET ORAL at 08:14

## 2019-01-08 NOTE — PROGRESS NOTES
Progress Note - Cardiology   Brigido Shen 80 y o  male MRN: [id number]  Unit/Bed#: [facility] 502-01 Encounter: [id number]        Principal Problem:    Acute on chronic diastolic (congestive) heart failure (HCC)  Active Problems:    Paroxysmal atrial fibrillation (HCC)    Type 2 diabetes mellitus with hyperglycemia, with long-term current use of insulin (HCC)    Chest pain    Benign prostatic hyperplasia    Acute renal failure superimposed on stage 3 chronic kidney disease ([facility] 75 )    Vascular dementia without behavioral disturbance    Hypertension    CKD (chronic kidney disease), stage III (HCC)    NSTEMI (non-ST elevated myocardial infarction) ([facility] )      Assessment/Plan    1  Acute on chronic diastolic heart failure  Lasix increased to 80 mg IV b i d  Yesterday  Metolazone given last evening before p m  Dose  Patient takes metolazone twice a week which we will be increasing 4 times a week for discharge  Home loop diuretic was Demadex 80 mg b i d   Is anxious for discharge  Just received his morning Lasix  He feels that is breathing is at baseline  Looks like his weight is at dry weight which is 193 lb  Few crackles right base  No hypoxia  Can probably transition to oral diuretics  Patient high risk for readmission  Will set up appointment  First available Jan 18th  Will also notify our HF nurse  Overall -5 L  Document negative 414 last 24 hr  On carvedilol 3 125 b i d  And Zestril 2 5 mg daily    2  CKD 3- stable with diuresis  3  NSTEMI type 2-the setting of heart failure   Patient with known CAD  On aspirin 81, beta-blocker, statin  4  PAF- in sinus rhythm  On Eliquis 2 5 b i d  Subjective/Objective   Chief Complaint/subjective  Patient complained of chest pain  States his breathing is at baseline  His weight is also at his dry weight          Vitals: /59   Pulse 69   Temp 97 9 °F (36 6 °C) (Oral)   Resp 18   Ht 5' 9" (1 753 m)   Wt 87 7 kg (193 lb 5 5 oz) Comment: standing scale SpO2 98%   BMI 28 55 kg/m²     Vitals:    01/07/19 0600 01/08/19 0500   Weight: 89 8 kg (197 lb 15 6 oz) 87 7 kg (193 lb 5 5 oz)     Orthostatic Blood Pressures      Most Recent Value   Blood Pressure  106/59 filed at 01/08/2019 0645   Patient Position - Orthostatic VS  Lying filed at 01/08/2019 0300            Intake/Output Summary (Last 24 hours) at 01/08/19 1042  Last data filed at 01/08/19 0758   Gross per 24 hour   Intake             1936 ml   Output             2350 ml   Net             -414 ml       Invasive Devices     Peripheral Intravenous Line            Peripheral IV 01/05/19 Left Forearm 3 days          Drain            Ureteral Drain/Stent Right ureter 6 Fr  190 days                Current Facility-Administered Medications   Medication Dose Route Frequency    acetaminophen (TYLENOL) tablet 325 mg  325 mg Oral Q6H PRN    ALPRAZolam (XANAX) tablet 0 5 mg  0 5 mg Oral 4x Daily PRN    apixaban (ELIQUIS) tablet 2 5 mg  2 5 mg Oral BID    aspirin (ECOTRIN LOW STRENGTH) EC tablet 81 mg  81 mg Oral Daily    atorvastatin (LIPITOR) tablet 20 mg  20 mg Oral After Dinner    carvedilol (COREG) tablet 3 125 mg  3 125 mg Oral BID With Meals    finasteride (PROSCAR) tablet 5 mg  5 mg Oral Daily    fish oil capsule 1,000 mg  1,000 mg Oral Daily    furosemide (LASIX) injection 80 mg  80 mg Intravenous BID (diuretic)    gabapentin (NEURONTIN) capsule 100 mg  100 mg Oral Daily    gabapentin (NEURONTIN) capsule 300 mg  300 mg Oral HS    insulin glargine (LANTUS) subcutaneous injection 10 Units 0 1 mL  10 Units Subcutaneous HS    lactase (LACTAID) tablet 6,000 Units  6,000 Units Oral TID With Meals    menthol-zinc oxide (CALMOSEPTINE) 0 44-20 6 % ointment   Topical BID    metolazone (ZAROXOLYN) tablet 2 5 mg  2 5 mg Oral Once per day on Sun Tue Thu Sat    multivitamin-minerals (CENTRUM) tablet 1 tablet  1 tablet Oral Daily    polyvinyl alcohol (LIQUIFILM TEARS) 1 4 % ophthalmic solution 1 drop  1 drop Both Eyes Q3H PRN    potassium chloride (K-DUR,KLOR-CON) CR tablet 20 mEq  20 mEq Oral BID    tamsulosin (FLOMAX) capsule 0 4 mg  0 4 mg Oral Daily With Dinner         Physical Exam: /59   Pulse 69   Temp 97 9 °F (36 6 °C) (Oral)   Resp 18   Ht 5' 9" (1 753 m)   Wt 87 7 kg (193 lb 5 5 oz) Comment: standing scale  SpO2 98%   BMI 28 55 kg/m²     General Appearance:    Alert, cooperative, no distress, appears stated age   Head:    Normocephalic, no scleral icterus   Eyes:    PERRL   Nose:   Nares normal, septum midline, no drainage    Throat:   Lips, mucosa, and tongue normal   Neck:   Supple, symmetrical, trachea midline,              Lungs:     Clear except few crackles at the right base    to auscultation bilaterally, respirations unlabored at rest on room air   Chest Wall:    No tenderness or deformity    Heart:    Regular rate and rhythm, S1 and S2 normal, no murmur, rub   or gallop   Abdomen:     Soft, non-tender, bowel sounds active all four quadrants,     no masses, no organomegaly   Extremities:   Extremities normal, atraumatic, no cyanosis, trace edema   Pulses:   2+ and symmetric all extremities   Skin:   Skin warm   Neurologic:   Alert and oriented to person place and time, no focal deficits                 Lab Results:   Recent Results (from the past 72 hour(s))   Fingerstick Glucose (POCT)    Collection Time: 01/05/19 10:45 AM   Result Value Ref Range    POC Glucose 193 (H) 65 - 140 mg/dl   Fingerstick Glucose (POCT)    Collection Time: 01/05/19  4:52 PM   Result Value Ref Range    POC Glucose 131 65 - 140 mg/dl   Fingerstick Glucose (POCT)    Collection Time: 01/05/19  9:04 PM   Result Value Ref Range    POC Glucose 185 (H) 65 - 140 mg/dl   Basic metabolic panel    Collection Time: 01/06/19  4:59 AM   Result Value Ref Range    Sodium 135 (L) 136 - 145 mmol/L    Potassium 3 6 3 5 - 5 3 mmol/L    Chloride 97 (L) 100 - 108 mmol/L    CO2 32 21 - 32 mmol/L    ANION GAP 6 4 - 13 mmol/L    BUN 54 (H) 5 - 25 mg/dL    Creatinine 1 77 (H) 0 60 - 1 30 mg/dL    Glucose 132 65 - 140 mg/dL    Calcium 8 7 8 3 - 10 1 mg/dL    eGFR 34 ml/min/1 73sq m   CBC and differential    Collection Time: 01/06/19  4:59 AM   Result Value Ref Range    WBC 3 86 (L) 4 31 - 10 16 Thousand/uL    RBC 3 11 (L) 3 88 - 5 62 Million/uL    Hemoglobin 9 2 (L) 12 0 - 17 0 g/dL    Hematocrit 29 7 (L) 36 5 - 49 3 %    MCV 96 82 - 98 fL    MCH 29 6 26 8 - 34 3 pg    MCHC 31 0 (L) 31 4 - 37 4 g/dL    RDW 14 4 11 6 - 15 1 %    MPV 11 3 8 9 - 12 7 fL    Platelets 788 140 - 397 Thousands/uL    nRBC 0 /100 WBCs    Neutrophils Relative 70 43 - 75 %    Immat GRANS % 0 0 - 2 %    Lymphocytes Relative 13 (L) 14 - 44 %    Monocytes Relative 12 4 - 12 %    Eosinophils Relative 4 0 - 6 %    Basophils Relative 1 0 - 1 %    Neutrophils Absolute 2 65 1 85 - 7 62 Thousands/µL    Immature Grans Absolute 0 01 0 00 - 0 20 Thousand/uL    Lymphocytes Absolute 0 50 (L) 0 60 - 4 47 Thousands/µL    Monocytes Absolute 0 48 0 17 - 1 22 Thousand/µL    Eosinophils Absolute 0 17 0 00 - 0 61 Thousand/µL    Basophils Absolute 0 05 0 00 - 0 10 Thousands/µL   Fingerstick Glucose (POCT)    Collection Time: 01/06/19  6:52 AM   Result Value Ref Range    POC Glucose 129 65 - 140 mg/dl   Fingerstick Glucose (POCT)    Collection Time: 01/06/19 11:26 AM   Result Value Ref Range    POC Glucose 189 (H) 65 - 140 mg/dl   Fingerstick Glucose (POCT)    Collection Time: 01/06/19  4:59 PM   Result Value Ref Range    POC Glucose 182 (H) 65 - 140 mg/dl   Fingerstick Glucose (POCT)    Collection Time: 01/06/19  9:27 PM   Result Value Ref Range    POC Glucose 199 (H) 65 - 140 mg/dl   Basic metabolic panel    Collection Time: 01/07/19  4:27 AM   Result Value Ref Range    Sodium 137 136 - 145 mmol/L    Potassium 4 0 3 5 - 5 3 mmol/L    Chloride 99 (L) 100 - 108 mmol/L    CO2 31 21 - 32 mmol/L    ANION GAP 7 4 - 13 mmol/L    BUN 52 (H) 5 - 25 mg/dL    Creatinine 1 66 (H) 0 60 - 1 30 mg/dL    Glucose 132 65 - 140 mg/dL    Calcium 9 0 8 3 - 10 1 mg/dL    eGFR 36 ml/min/1 73sq m   CBC and differential    Collection Time: 01/07/19  4:27 AM   Result Value Ref Range    WBC 3 42 (L) 4 31 - 10 16 Thousand/uL    RBC 3 13 (L) 3 88 - 5 62 Million/uL    Hemoglobin 9 4 (L) 12 0 - 17 0 g/dL    Hematocrit 29 8 (L) 36 5 - 49 3 %    MCV 95 82 - 98 fL    MCH 30 0 26 8 - 34 3 pg    MCHC 31 5 31 4 - 37 4 g/dL    RDW 14 4 11 6 - 15 1 %    MPV 11 0 8 9 - 12 7 fL    Platelets 276 061 - 708 Thousands/uL    nRBC 0 /100 WBCs    Neutrophils Relative 60 43 - 75 %    Immat GRANS % 0 0 - 2 %    Lymphocytes Relative 17 14 - 44 %    Monocytes Relative 16 (H) 4 - 12 %    Eosinophils Relative 5 0 - 6 %    Basophils Relative 2 (H) 0 - 1 %    Neutrophils Absolute 2 05 1 85 - 7 62 Thousands/µL    Immature Grans Absolute 0 01 0 00 - 0 20 Thousand/uL    Lymphocytes Absolute 0 58 (L) 0 60 - 4 47 Thousands/µL    Monocytes Absolute 0 55 0 17 - 1 22 Thousand/µL    Eosinophils Absolute 0 18 0 00 - 0 61 Thousand/µL    Basophils Absolute 0 05 0 00 - 0 10 Thousands/µL   Fingerstick Glucose (POCT)    Collection Time: 01/07/19  6:35 AM   Result Value Ref Range    POC Glucose 136 65 - 140 mg/dl   Fingerstick Glucose (POCT)    Collection Time: 01/07/19 10:42 AM   Result Value Ref Range    POC Glucose 154 (H) 65 - 140 mg/dl   Fingerstick Glucose (POCT)    Collection Time: 01/07/19  4:12 PM   Result Value Ref Range    POC Glucose 133 65 - 140 mg/dl   Fingerstick Glucose (POCT)    Collection Time: 01/07/19  8:58 PM   Result Value Ref Range    POC Glucose 184 (H) 65 - 140 mg/dl   Basic metabolic panel    Collection Time: 01/08/19  5:03 AM   Result Value Ref Range    Sodium 136 136 - 145 mmol/L    Potassium 3 7 3 5 - 5 3 mmol/L    Chloride 96 (L) 100 - 108 mmol/L    CO2 32 21 - 32 mmol/L    ANION GAP 8 4 - 13 mmol/L    BUN 51 (H) 5 - 25 mg/dL    Creatinine 1 70 (H) 0 60 - 1 30 mg/dL    Glucose 132 65 - 140 mg/dL    Calcium 9 1 8 3 - 10 1 mg/dL    eGFR 35 ml/min/1 73sq m   Magnesium    Collection Time: 01/08/19  5:03 AM   Result Value Ref Range    Magnesium 2 5 1 6 - 2 6 mg/dL   Fingerstick Glucose (POCT)    Collection Time: 01/08/19  6:20 AM   Result Value Ref Range    POC Glucose 124 65 - 140 mg/dl     Imaging: I have personally reviewed pertinent reports  VTE Pharmacologic Prophylaxis: eliquis  VTE Mechanical Prophylaxis: sequential compression device    Counseling / Coordination of Care  Total time spent today 25 minutes  Greater than 50% of total time was spent with the patient and / or family counseling and / or coordination of care

## 2019-01-08 NOTE — DISCHARGE SUMMARY
Transition of Care Discharge Summary - [facility]    Patient Information: Kristine Bob 80 y o  male MRN: [id number]  Unit/Bed#: [facility] 502-01 Encounter: [id number]    Discharging Physician / Practitioner: Winston Butt MD  PCP: Goergina Kay MD  Admission Date: 1/4/2019  Discharge Date: 01/08/19    Disposition:      Short Term Rehab or SNF at [address] [place] [address] SNF:   · Not Applicable to this Patient - Not Applicable to this Patient    Reason for Admission: shortness of breath    Discharge Diagnoses:     Principal Problem:    Acute on chronic diastolic (congestive) heart failure ([facility])  Active Problems:    Paroxysmal atrial fibrillation ([facility] )    Type 2 diabetes mellitus with hyperglycemia, with long-term current use of insulin ([facility])    Benign prostatic hyperplasia    Acute renal failure superimposed on stage 3 chronic kidney disease ([facility] )    Vascular dementia without behavioral disturbance    Hypertension    CKD (chronic kidney disease), stage III ([facility] )    NSTEMI (non-ST elevated myocardial infarction) [facility])  Resolved Problems:    Chest pain      Consultations During Hospital Stay:  · cardiology    Procedures Performed:     · None    Medication Adjustments and Discharge Medications:  · Summary of Medication Adjustments made as a result of this hospitalization:  Zaroxolyn increase to 4 times weekly, torsemide increased to 80 mg twice daily  Lisinopril discontinue  · Medication Dosing Tapers - Please refer to Discharge Medication List for details on any medication dosing tapers (if applicable to patient)  · Medications being temporarily held (include recommended restart time):  None  · Discharge Medication List: See after visit summary for reconciled discharge medications  Wound Care Recommendations:  When applicable, please see wound care section of After Visit Summary      Diet Recommendations at Discharge:  Diet -        Diet Orders Start     Ordered    01/04/19 2044  Diet Cardiovascular; Sodium 2 GM; Fluid Restriction 1500 ML, Consistent Carbohydrate Diet Level 2 (5 carb servings/75 grams CHO/meal)  Diet effective now     Question Answer Comment   Diet Type Cardiovascular    Cardiac Sodium 2 GM    Other Restriction(s): Fluid Restriction 1500 ML    Other Restriction(s): Consistent Carbohydrate Diet Level 2 (5 carb servings/75 grams CHO/meal)    RD to adjust diet per protocol? Yes        01/04/19 2043        Fluid Restriction - Continue Fluid Restriction as Listed Above at Discharge  Instructions for any Catheters / Lines Present at Discharge (including removal date, if applicable):  None    Significant Findings / Test Results:     · ProBNP 6313    Incidental Findings:   · None     Test Results Pending at Discharge (will require follow up): · None     Outpatient Tests Requested:  · None    Complications:  None    Hospital Course:     Naomi Parrish is a 80 y o  male patient who originally presented to the hospital on 1/4/2019 due to shortness of breath andchest pain  In the ED he was found to have acute on chronic congestive heart failure exacerbation and was admitted for IV diuretics  Hospital course he was successfully diuresed with significant improvement to shortness of breath  Troponins were elevated but without PT and were considered secondary to his acute congestive heart failure exacerbation  Prior to discharge his torsemide and Zaroxolyn were increased, please see medication reconciliation       Condition at Discharge: stable     Discharge Day Visit / Exam:     Subjective:  "My breathing is better then normal"  Vitals: Blood Pressure: 128/60 (01/08/19 1145)  Pulse: 65 (01/08/19 1145)  Temperature: 97 6 °F (36 4 °C) (01/08/19 1145)  Temp Source: Oral (01/08/19 1145)  Respirations: 18 (01/08/19 1145)  Height: 5' 9" (175 3 cm) (01/05/19 0015)  Weight - Scale: 87 7 kg (193 lb 5 5 oz) (standing scale) (01/08/19 0500)  SpO2: 96 % (01/08/19 1145)  Exam:   Physical Exam   Constitutional: He is oriented to person, place, and time  He appears well-developed and well-nourished  HENT:   Head: Normocephalic and atraumatic  Eyes: Pupils are equal, round, and reactive to light  EOM are normal  No scleral icterus  Neck: Neck supple  No JVD present  Cardiovascular: Normal rate and regular rhythm  Pulmonary/Chest: Effort normal  He has no wheezes  He has no rales  Abdominal: Soft  Musculoskeletal: He exhibits no edema  Neurological: He is alert and oriented to person, place, and time  Skin: Skin is warm and dry  Goals of Care Discussions:  · Code Status at Discharge: Level 1 - Full Code  · Were there any Goals of Care Discussions during Hospitalization?: No  · Results of any General Goals of Care Discussions: N/A   · POLST Completed: Yes   · If POLST Completed, Summary of POLST Agreement Provided Here: N/A   · OK to Rehospitalize if Needed? Yes    Discharge instructions/Information to patient and family:   See after visit summary section titled Discharge Instructions for information provided to patient and family  Planned Readmission: None      Discharge Statement:  I spent 45 minutes discharging the patient  This time was spent on the day of discharge  I had direct contact with the patient on the day of discharge  Greater than 50% of the total time was spent examining patient, answering all patient questions, arranging and discussing plan of care with patient as well as directly providing post-discharge instructions  Additional time then spent on discharge activities      ** Please Note: This note has been constructed using a voice recognition system **

## 2019-01-08 NOTE — DISCHARGE INSTRUCTIONS
Take your medications as directed, and keep your follow up appointments  Adhere to a heart healthy lifestyle, maintaining a low sodium diet  Daily weight and record  If your weight increases 2-3 lbs in one day, or 5 lbs in 2 days, you are short of breath or have lower extremity swelling, please call the heart failure team at  [facility] at [phone]

## 2019-01-08 NOTE — CONSULTS
Patient was counseled on 1/8/19 at 1340 on the following cardiac medications (as well as all discharge medications):       Aspirin 81 mg daily  Atorvastatin 20 mg daily  Torsemide 80 mg BID  Apixiban 2 5 mg BID    Metolazone 2 5 mg 4 times per week  Carvedilol 3 125 mg BID with meals     Patient was educated on the importance of taking these medications to prevent further hospitalizations and improve mortality  Common side effects, what to do when a dose is missed, easy ways to remember to take the medications, and when to contact the doctor and/or pharmacist regarding the medications, were discussed  The patient seemed understanding of the education and did not have any questions  Santos Valerio, Ramya  Pharmacy Resident     Thank you for involving me in this patient's care and please do not hesitate to contact me with any questions or concerns

## 2019-01-08 NOTE — RESTORATIVE TECHNICIAN NOTE
Restorative Specialist Mobility Note       Activity: Bedrest, Dangle, Stand at bedside, Turn, Chair     Assistive Device: None     Ambulation Response:  Tolerated fairly well  Repositioned: Sitting, Up in chair

## 2019-01-09 ENCOUNTER — TELEPHONE (OUTPATIENT)
Dept: GERIATRICS | Age: 84
End: 2019-01-09

## 2019-01-09 NOTE — TELEPHONE ENCOUNTER
Dr Carol Herring,    These were in Patients AVS, Im not sure who put these in  [facility] is unable to do this  They do not have a bladder scanner  If you want BMP on day #9 then they need another order, which I can provide  There is no nursing home physician in house  Bladder Scan  Day #2 after discharge, if bladder scan is greater than 250ml call nursing home physician      Follow up bloodwork  BMP to be performed day #3 and day #9 after hospital dischargeCall nursing home MD if creatinine increases > 0 3 from discharge creatinine, refer to After Visit Summary for the latest serum creatinine  Patient has an appt w/ Dr Jeovany Gomez on 1/18    Please advise

## 2019-01-09 NOTE — TELEPHONE ENCOUNTER
Complicated orders on discharge, Discharging provider must not have known [facility] in NOT a NH or SNF  Facility has no nursing staff  No bladder scans and needs a different order for each lab requested  Facility has no NH MD as noted in discharge instructions  [place] in [place] [address] says she tried to get in touch with someone in Hospital, but no corrected or changed orders have been received by them  [address] scheduled visit to see Khloe Red 1/10/19 in home at [facility] restarting all eye drops as previous to hospitalization on verbal from this office

## 2019-01-09 NOTE — TELEPHONE ENCOUNTER
Dr Lorraine Sawyer, disregard this message  Discharging physician needs to sort this out not our group   I s/w Shailesh

## 2019-01-10 ENCOUNTER — IN HOME VISIT (OUTPATIENT)
Dept: GERIATRICS | Age: 84
End: 2019-01-10
Payer: MEDICARE

## 2019-01-10 VITALS
DIASTOLIC BLOOD PRESSURE: 74 MMHG | HEART RATE: 79 BPM | RESPIRATION RATE: 18 BRPM | SYSTOLIC BLOOD PRESSURE: 120 MMHG | TEMPERATURE: 97.5 F

## 2019-01-10 DIAGNOSIS — I50.23 ACUTE ON CHRONIC SYSTOLIC HEART FAILURE (HCC): ICD-10-CM

## 2019-01-10 DIAGNOSIS — R35.0 BENIGN PROSTATIC HYPERPLASIA WITH URINARY FREQUENCY: ICD-10-CM

## 2019-01-10 DIAGNOSIS — I50.33 ACUTE ON CHRONIC DIASTOLIC (CONGESTIVE) HEART FAILURE (HCC): ICD-10-CM

## 2019-01-10 DIAGNOSIS — I48.0 PAROXYSMAL ATRIAL FIBRILLATION (HCC): Chronic | ICD-10-CM

## 2019-01-10 DIAGNOSIS — Z79.4 TYPE 2 DIABETES MELLITUS WITH HYPERGLYCEMIA, WITH LONG-TERM CURRENT USE OF INSULIN (HCC): Chronic | ICD-10-CM

## 2019-01-10 DIAGNOSIS — I50.33 ACUTE ON CHRONIC DIASTOLIC (CONGESTIVE) HEART FAILURE (HCC): Primary | ICD-10-CM

## 2019-01-10 DIAGNOSIS — I10 ESSENTIAL HYPERTENSION: ICD-10-CM

## 2019-01-10 DIAGNOSIS — E11.65 TYPE 2 DIABETES MELLITUS WITH HYPERGLYCEMIA, WITH LONG-TERM CURRENT USE OF INSULIN (HCC): Chronic | ICD-10-CM

## 2019-01-10 DIAGNOSIS — N17.9 ACUTE RENAL FAILURE SUPERIMPOSED ON STAGE 3 CHRONIC KIDNEY DISEASE, UNSPECIFIED ACUTE RENAL FAILURE TYPE: Chronic | ICD-10-CM

## 2019-01-10 DIAGNOSIS — N18.3 ACUTE RENAL FAILURE SUPERIMPOSED ON STAGE 3 CHRONIC KIDNEY DISEASE, UNSPECIFIED ACUTE RENAL FAILURE TYPE: Chronic | ICD-10-CM

## 2019-01-10 DIAGNOSIS — N40.1 BENIGN PROSTATIC HYPERPLASIA WITH URINARY FREQUENCY: ICD-10-CM

## 2019-01-10 DIAGNOSIS — R19.4 CHANGE IN BOWEL HABITS: ICD-10-CM

## 2019-01-10 DIAGNOSIS — F01.50 VASCULAR DEMENTIA WITHOUT BEHAVIORAL DISTURBANCE (HCC): ICD-10-CM

## 2019-01-10 PROCEDURE — 99337 PR DOM/R-HOME E/M EST PT SIGNIF NEW PROB 60 MINUTES: CPT | Performed by: NURSE PRACTITIONER

## 2019-01-10 NOTE — PROGRESS NOTES
Assessment/Plan:    No problem-specific Assessment & Plan notes found for this encounter  Diagnoses and all orders for this visit:    Acute on chronic diastolic (congestive) heart failure (HCC)  Comments:  -improved   -now on torsemide 80mg BId and zaroxolyn 2 5mg 4x/wk  -daily wts with reports to CHF office if wt gain>3lbs/day or 5lb/wk or worsening SOBorswelling    Essential hypertension  Comments:  -lisinopril d/c'd in hosp  -monitor BP, notify MD lund SBP <100 or >160  Fax results to MDs office weekly  -continues carvedilol 3 125mg BID  -on lg amts diuretics    Acute renal failure superimposed on stage 3 chronic kidney disease, unspecified acute renal failure type ([facility] [address] )  Comments:  -check BMP tomorrow and next week  -time spent with patient, discussing with staff, and reviewing chart/charting=60 minutes    Vascular dementia without behavioral disturbance  Comments:  -today he is alert & oriented x 3, pleasant and cooperative with exam    Paroxysmal atrial fibrillation ([facility] [address] )  Comments:  -eliquis 2 5mg BID    Type 2 diabetes mellitus with hyperglycemia, with long-term current use of insulin ([facility] )  Comments:  -2 blood sugar results since return= 225 at HS on 1/8/19 and 1/9/19 FBS was 160, monitor BID  -continues Lantus 10 units QD at HS and metformin 500mg QD          Subjective:      Patient ID: Sherry Bates is a 80 y o  male  80year old male being seen for hospital follow up  He was sent to [facility] on 1/4/19 for SOB and chest pain  He was admitted from 1/4-1/8/19 for acute on chronic diastolic heart failure  Troponins were elevated but believed to be secondary to CHF exacerbation  He received IV diuretics and SOB and chest pain improved  Cardio was consulted during hospitalization  He had torsemide increased to 80mg BID, zaroxolyn increased to 2 5mg 4x/wk, and had lisinopril d/c'd  He does have orders to notify heart failure office for specified wt gain, SOB, or swelling    He returned with many orders which PC/AL setting is unable to accomodate such as bladder scanning (they don't have one), notify nursing home MD for decreased oral intake (this is not a nursing home nor is there a "house MD"), BMP orders will be adjusted, will adjust SBP parameters as well as there is no "house MD"  Will review med list as pt was d/c'd from hospital without chronic eye drops ordered  [facility] staff report pt was able to shower himself this morning without assistance  Pt reports feeling much better  He denies SOB or chest pain  States he always showers himself  Denies any urinary retention or abdominal pain  Also denies nausea or vomiting  The following portions of the patient's history were reviewed and updated as appropriate: allergies, current medications and problem list     Review of Systems   Constitutional: Negative for chills and fever  HENT: Negative for congestion, ear pain and sore throat  Respiratory: Negative for cough and shortness of breath  Cardiovascular: Negative for chest pain and leg swelling  Gastrointestinal: Negative for abdominal pain, constipation, diarrhea, nausea and vomiting  Genitourinary: Positive for frequency  Negative for decreased urine volume, difficulty urinating and dysuria  Denies urinary retention   Musculoskeletal: Negative for arthralgias  Skin: Negative for color change  Neurological: Negative for dizziness  Psychiatric/Behavioral: Negative for behavioral problems  All other systems reviewed and are negative  Objective:      /74 (BP Location: Right arm, Patient Position: Sitting, Cuff Size: Standard)   Pulse 79   Temp 97 5 °F (36 4 °C)   Resp 18          Physical Exam   Constitutional: He is oriented to person, place, and time  No distress  Appears comfortable sitting in recliner   HENT:   Head: Normocephalic and atraumatic  Nose: Nose normal    Eyes: Right eye exhibits no discharge  Left eye exhibits no discharge  Neck: No JVD present  No thyromegaly present  Cardiovascular: Normal rate and regular rhythm  Pulmonary/Chest: Effort normal and breath sounds normal  No respiratory distress  He has no wheezes  He has no rales  He exhibits no tenderness  No cough  No SOB at rest   Abdominal: Soft  Bowel sounds are normal  He exhibits no distension  There is no tenderness  There is no guarding  Genitourinary:   Genitourinary Comments: No suprapubic tenderness   Musculoskeletal: He exhibits no edema or tenderness  Lymphadenopathy:     He has no cervical adenopathy  Neurological: He is alert and oriented to person, place, and time  Pleasant and cooperative with exam   Skin: Skin is warm and dry  He is not diaphoretic  Psychiatric: He has a normal mood and affect   His behavior is normal

## 2019-01-11 RX ORDER — TORSEMIDE 20 MG/1
TABLET ORAL
Qty: 32 TABLET | Refills: 4 | Status: SHIPPED | OUTPATIENT
Start: 2019-01-11 | End: 2019-06-19 | Stop reason: HOSPADM

## 2019-01-11 RX ORDER — CHLORAL HYDRATE 500 MG
CAPSULE ORAL
Qty: 28 CAPSULE | Refills: 4 | Status: SHIPPED | OUTPATIENT
Start: 2019-01-11 | End: 2019-04-19

## 2019-01-11 RX ORDER — FINASTERIDE 5 MG/1
TABLET, FILM COATED ORAL
Qty: 28 TABLET | Refills: 4 | Status: SHIPPED | OUTPATIENT
Start: 2019-01-11 | End: 2019-06-19 | Stop reason: HOSPADM

## 2019-01-11 RX ORDER — B-COMPLEX WITH VITAMIN C
CAPSULE ORAL
Qty: 168 TABLET | Refills: 4 | Status: SHIPPED | OUTPATIENT
Start: 2019-01-11 | End: 2019-06-19 | Stop reason: HOSPADM

## 2019-01-11 RX ORDER — TAMSULOSIN HYDROCHLORIDE 0.4 MG/1
CAPSULE ORAL
Qty: 28 CAPSULE | Refills: 4 | Status: SHIPPED | OUTPATIENT
Start: 2019-01-11

## 2019-01-15 DIAGNOSIS — R20.3 HYPERESTHESIA: ICD-10-CM

## 2019-01-15 DIAGNOSIS — I50.23 ACUTE ON CHRONIC SYSTOLIC HEART FAILURE (HCC): ICD-10-CM

## 2019-01-17 RX ORDER — POTASSIUM CHLORIDE 20 MEQ/1
TABLET, EXTENDED RELEASE ORAL
Qty: 19 TABLET | Refills: 10 | Status: SHIPPED | OUTPATIENT
Start: 2019-01-17 | End: 2019-01-30 | Stop reason: SDUPTHER

## 2019-01-17 RX ORDER — GABAPENTIN 300 MG/1
CAPSULE ORAL
Qty: 28 CAPSULE | Refills: 10 | Status: SHIPPED | OUTPATIENT
Start: 2019-01-17 | End: 2019-01-18 | Stop reason: SDUPTHER

## 2019-01-18 ENCOUNTER — OFFICE VISIT (OUTPATIENT)
Dept: CARDIOLOGY CLINIC | Facility: CLINIC | Age: 84
End: 2019-01-18
Payer: MEDICARE

## 2019-01-18 VITALS
BODY MASS INDEX: 28.55 KG/M2 | HEART RATE: 80 BPM | DIASTOLIC BLOOD PRESSURE: 58 MMHG | HEIGHT: 69 IN | SYSTOLIC BLOOD PRESSURE: 102 MMHG

## 2019-01-18 DIAGNOSIS — Z95.5 STATUS POST INSERTION OF DRUG-ELUTING STENT INTO LEFT ANTERIOR DESCENDING (LAD) ARTERY: ICD-10-CM

## 2019-01-18 DIAGNOSIS — I49.5 SICK SINUS SYNDROME (HCC): ICD-10-CM

## 2019-01-18 DIAGNOSIS — E78.5 DYSLIPIDEMIA: ICD-10-CM

## 2019-01-18 DIAGNOSIS — I48.0 PAROXYSMAL ATRIAL FIBRILLATION (HCC): Chronic | ICD-10-CM

## 2019-01-18 DIAGNOSIS — Z95.0 CARDIAC PACEMAKER IN SITU: Chronic | ICD-10-CM

## 2019-01-18 DIAGNOSIS — I25.10 CORONARY ARTERY DISEASE INVOLVING NATIVE CORONARY ARTERY OF NATIVE HEART WITHOUT ANGINA PECTORIS: Chronic | ICD-10-CM

## 2019-01-18 DIAGNOSIS — I50.32 CHRONIC DIASTOLIC (CONGESTIVE) HEART FAILURE (HCC): Primary | ICD-10-CM

## 2019-01-18 DIAGNOSIS — I10 BENIGN ESSENTIAL HYPERTENSION: ICD-10-CM

## 2019-01-18 PROCEDURE — 99214 OFFICE O/P EST MOD 30 MIN: CPT | Performed by: INTERNAL MEDICINE

## 2019-01-18 NOTE — PROGRESS NOTES
Cardiology Follow Up    Migue Tran  12/21/1930  [id number]  [address] [facility]  [address] [place] [address] [address]    1  Chronic diastolic (congestive) heart failure ([address] )     2  Coronary artery disease involving native coronary artery of native heart without angina pectoris     3  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 (LAD) artery     4  Paroxysmal atrial fibrillation (HCC)     5  Sick sinus syndrome (HCC)     6  Cardiac pacemaker in situ     7  Benign essential hypertension     8  Dyslipidemia         Discussion/Summary:  Mr Selam Tracey is a pleasant 66-year-old gentleman who presents to the office today for follow-up  His volume status appears stable on his current diuretic regimen to which no changes were made  His creatinine is slightly above baseline and this will be necessary to keep him euvolemic  He is hypokalemic today and I have increased his potassium supplementation and have requested a BMP in one week  His blood pressure is well controlled in the office today  His most recent set of lipids from earlier this month were reviewed and are acceptable on current statin therapy  His device was recently interrogated in the office without events  He is maintained on systemic anticoagulation given his paroxysmal atrial fibrillation  He continues to maintain normal sinus rhythm  Follow-up will be arranged in one month or sooner if deemed necessary  Interval History:   Mr Selam Tracey is an 66-year-old gentleman who presents to the office today for follow-up  His last visit with me he was re hospitalized with acute on chronic congestive heart failure, with his most recent admission being in early January  He was diuresed with IV diuretics and his oral diuretic regimen was adjusted to torsemide 80 mg twice daily and Zaroxolyn increased to four times weekly    He was discharged at a weight of 193 lb  He continues to report shortness of breath with transfers  He is weighed daily at the facility with a weight of 185 pounds  He denies utilization of added salt to his diet  With the limited activity he performs he denies any exertional chest pain  He denies paroxysmal nocturnal dyspnea, orthopnea, or increasing abdominal girth  He denies palpitations or symptoms of claudication with the limited activity he performs  He is maintained on Eliquis without any bleeding consequences or falls  Problem List     Lower urinary tract symptoms    Malignant neoplasm of trigone of urinary bladder (HCC)    Prostate cancer (HCC)    Chronic diastolic congestive heart failure (HCC)    CKD (chronic kidney disease) stage 3, GFR 30-59 ml/min (Chronic)    Paroxysmal atrial fibrillation (HCC) (Chronic)    CAD (coronary artery disease) (Chronic)    Type 2 diabetes mellitus (HCC) (Chronic)    Lab Results   Component Value Date    HGBA1C 7 5 (H) 12/08/2018       No results for input(s): POCGLU in the last 72 hours      Blood Sugar Average: Last 72 hrs:          Cardiac pacemaker in situ (Chronic)    Right ureteral stone    Overview Signed 7/1/2018 11:57 AM by Latanya Bowman MD     Added automatically from request for surgery [id number]         Urinary tract infection with hematuria    Hyponatremia    Other chest pain    Laceration of right lower extremity    Anemia    Benign prostatic hyperplasia    Blindness of left eye    Kidney stone    Peripheral neuropathy        Past Medical History:   Diagnosis Date    A-fib ([facility] )     Anemia     Bladder cancer ([facility] )     CAD (coronary artery disease)     CHF (congestive heart failure) ([address] )     Chronic kidney failure     Colon cancer ([facility] [facility] )     Eye cancer, left ([address] )     Hypertension     Incontinence     Pacemaker     Prostate cancer ([address] )     Type 2 diabetes mellitus ([address] )     Wears hearing aid in both ears      Social History     Social History  Marital status:      Spouse name: N/A    Number of children: N/A    Years of education: N/A     Occupational History    Not on file  Social History Main Topics    Smoking status: Former Smoker    Smokeless tobacco: Never Used    Alcohol use Yes      Comment: jayne cognac 1 oz/night     Drug use: No    Sexual activity: Not Currently     Other Topics Concern    Not on file     Social History Narrative    No narrative on file      No family history on file  Past Surgical History:   Procedure Laterality Date    CARDIAC PACEMAKER PLACEMENT  2014    CORONARY ANGIOPLASTY WITH STENT PLACEMENT      GALLBLADDER SURGERY      CA CYSTO/URETERO W/LITHOTRIPSY &INDWELL STENT INSRT Right 7/31/2018    Procedure: CYSTOSCOPY URETEROSCOPY WITH LITHOTRIPSY HOLMIUM LASER ,BASKET STONE EXTRACTION  RETROGRADE PYELOGRAM AND exchange STENT URETERAL;  Surgeon: Fabiano Collazo MD;  Location: [facility];  Service: Urology    CA CYSTOURETHROSCOPY,URETER CATHETER Right 7/1/2018    Procedure: CYSTOSCOPY RETROGRADE PYELOGRAM WITH INSERTION STENT URETERAL;  Surgeon: Allyson Stevens MD;  Location: [facility];  Service: Urology       Current Outpatient Prescriptions:     ALPRAZolam (XANAX) 0 5 mg tablet, Take 1 tablet (0 5 mg total) by mouth 4 (four) times a day as needed for anxiety, Disp: 30 tablet, Rfl: 0    aspirin (ECOTRIN LOW STRENGTH) 81 mg EC tablet, Take 81 mg by mouth daily, Disp: , Rfl:     atorvastatin (LIPITOR) 20 mg tablet, Take 20 mg by mouth daily, Disp: , Rfl:     carvedilol (COREG) 3 125 mg tablet, TAKE 1 TABLET BY MOUTH TWICE DAILY WITH MEALS, Disp: 56 tablet, Rfl: 5    EASY TOUCH LANCETS 28G MISC, USE AS DIRECTED , Disp: 100 each, Rfl: 11    ELIQUIS 2 5 MG, TAKE 1 TABLET BY MOUTH TWICE DAILY  , Disp: 56 tablet, Rfl: 5    ergocalciferol (VITAMIN D2) 50,000 units, TAKE 1 CAPSULE BY MOUTH EVERY WEDNESDAY AT NOON, Disp: 4 capsule, Rfl: 5    finasteride (PROSCAR) 5 mg tablet, TAKE 1 TABLET BY MOUTH ONCE DAILY  , Disp: 28 tablet, Rfl: 4    gabapentin (NEURONTIN) 100 mg capsule, TAKE 1 CAPSULE BY MOUTH ONCE DAILY, Disp: 28 capsule, Rfl: 5    gabapentin (NEURONTIN) 300 mg capsule, Take 300 mg by mouth daily at bedtime, Disp: , Rfl:     glucose blood test strip, 1 each by Other route daily Use as instructed, Disp: , Rfl:     insulin glargine (LANTUS SOLOSTAR) 100 units/mL injection pen, Inject 10 Units under the skin daily at bedtime, Disp: 5 pen, Rfl: 5    ipratropium-albuterol (DUO-NEB) 0 5-2 5 mg/3 mL, Take 3 mL by nebulization every 6 (six) hours as needed for wheezing, Disp: , Rfl: 0    LAC-DOSE 3000 units tablet, TAKE 2 TABLETS BY MOUTH THREE TIMES DAILY WITH MEALS, Disp: 168 tablet, Rfl: 4    Menthol-Zinc Oxide (REMEDY CALAZIME EX), Apply 1 application topically 2 (two) times a day To sacrum, Disp: , Rfl:     metFORMIN (GLUCOPHAGE) 500 mg tablet, TAKE 1 TABLET BY MOUTH ONCE DAILY  , Disp: 28 tablet, Rfl: 5    metolazone (ZAROXOLYN) 2 5 mg tablet, Take 1 tablet (2 5 mg total) by mouth 4 (four) times a week, Disp: , Rfl: 0    multivitamin with iron (TAB-A-ANETA/IRON), TAKE 1 TABLET BY MOUTH ONCE DAILY  , Disp: 28 tablet, Rfl: 11    nitroglycerin (NITROSTAT) 0 4 mg SL tablet, Place 0 4 mg under the tongue every 5 (five) minutes as needed for chest pain, Disp: , Rfl:     Omega-3 Fatty Acids (FISH OIL) 1,000 mg, TAKE 1 CAPSULE BY MOUTH ONCE DAILY, Disp: 28 capsule, Rfl: 4    Polyvinyl Alcohol-Povidone (REFRESH OP), Apply 1 drop to eye as needed, Disp: , Rfl:     potassium chloride (K-DUR,KLOR-CON) 20 mEq tablet, TAKE 1 TABLET BY MOUTH FOUR TIMES DAILY, Disp: 19 tablet, Rfl: 10    tamsulosin (FLOMAX) 0 4 mg, TAKE 1 CAPSULE BY MOUTH WITH EVENING MEAL, Disp: 28 capsule, Rfl: 4    torsemide (DEMADEX) 20 mg tablet, TAKE 4 TABLETS BY MOUTH TWICE DAILY, Disp: 32 tablet, Rfl: 4    white petrolatum, Apply 1 application topically as needed Apply topically to buttocks as needed   May keep in room and self administer, Disp: , Rfl:     Wound Dressings (ALLANTOIN) gel, Apply 1 application topically as needed for wound care, Disp: , Rfl:     acetaminophen (TYLENOL) 325 mg tablet, Take 1-2  tablet as needed every 8 hours as needed for headache  Do not exceed  More than 3gm/ day  (Patient not taking: Reported on 1/18/2019 ), Disp: 30 tablet, Rfl: 0  Allergies   Allergen Reactions    Iv Contrast [Iodinated Diagnostic Agents]        Labs:     Chemistry        Component Value Date/Time    K 3 7 01/08/2019 0503    CL 96 (L) 01/08/2019 0503    CO2 32 01/08/2019 0503    BUN 51 (H) 01/08/2019 0503    CREATININE 1 70 (H) 01/08/2019 0503        Component Value Date/Time    CALCIUM 9 1 01/08/2019 0503    ALKPHOS 104 12/10/2018 0549    AST 10 12/10/2018 0549    ALT 10 (L) 12/10/2018 0549            No results found for: CHOL  Lab Results   Component Value Date    HDL 33 (L) 07/05/2018     Lab Results   Component Value Date    LDLCALC 79 07/05/2018     Lab Results   Component Value Date    TRIG 75 07/05/2018     No results found for: CHOLHDL    Imaging: Ct Abdomen Pelvis Wo Contrast    Result Date: 7/1/2018  Narrative: CT ABDOMEN AND PELVIS WITHOUT IV CONTRAST INDICATION:   Right lower quadrant abdominal pain  Colon cancer  COMPARISON: None  TECHNIQUE:  CT examination of the abdomen and pelvis was performed without intravenous contrast   Axial, sagittal, and coronal 2D reformatted images were created from the source data and submitted for interpretation  Radiation dose length product (DLP) for this visit:  761 mGy-cm   This examination, like all CT scans performed in the [facility], was performed utilizing techniques to minimize radiation dose exposure, including the use of iterative reconstruction and automated exposure control  Enteric contrast was not administered  FINDINGS: ABDOMEN LOWER CHEST:  Pacer leads are noted in the right heart  Heart is enlarged  There is trace pericardial effusion    There is small moderate loculated effusion in the medial lower left chest   Small freely layering right pleural effusion is noted  Calcified right lower chest granuloma is noted  Emphysematous changes are noted in the lower lungs as are atelectatic changes  LIVER/BILIARY TREE:  Unremarkable  No noncontrast CT evidence of suspicious hepatic mass or biliary dilatation  GALLBLADDER:  Gallbladder is surgically absent  SPLEEN:  Unremarkable  PANCREAS:  Unremarkable  ADRENAL GLANDS:  Low-density thickening of adrenal glands bilaterally without discrete adrenal mass suggest adenomatous hyperplasia  KIDNEYS/URETERS:  Renal vascular calcifications and bilateral renal cysts are noted  There is a proximal right ureteral calculus, 9 mm in size at the approximate upper L4 vertebral endplate level  There is extensive periureteral stranding at the level of the calculus  There is distention of right extrarenal pelvis  Only mild prominence of right intrarenal calyces is noted  The right ureter is prominent in caliber both proximal to and distal to the ureteral calculus  No left ureteral calculi  No left-sided hydroureteronephrosis  STOMACH AND BOWEL:  Anastomotic staple line is noted in the right upper quadrant  There has been resection of the ascending colon  No bowel obstruction  No free intraperitoneal gas  Sigmoid diverticulosis without evidence of acute diverticulitis  APPENDIX:  No findings to suggest appendicitis  ABDOMINOPELVIC CAVITY:  Nonspecific right lower quadrant mesenteric and presacral edema is noted  No abdominal pelvic abscess  No retroperitoneal, mesenteric, or pelvic lymphadenopathy  VESSELS:  Extensive atherosclerotic gastric calcification noted  PELVIS REPRODUCTIVE ORGANS:  Marked prostatomegaly noted  Prostate measures 6 7 x 6 5 cm in transverse dimensions by approximately 7 6 cm in craniocaudal dimension   URINARY BLADDER:  Diffuse thickening of the wall the urinary bladder with minimal perivesical inflammatory edema suggestive of cystitis or the sequela of chronic bladder obstruction  ABDOMINAL WALL/INGUINAL REGIONS:  Small fat-containing umbilical hernia is noted  OSSEOUS STRUCTURES:  No acute fracture or destructive osseous lesion  Advanced lumbar bilateral hip osteoarthritic degenerative changes  Impression: Right mid ureteral calculus, 9 mm in size  Although there is no significant hydronephrosis, there is prominence of right extrarenal pelvis and there is extensive inflammatory stranding surrounding the ureter at the site of the calculus, and these findings suggest that this calculus is the cause of the patient's right lower quadrant pain  Changes of prior ascending colectomy  Colonic diverticulosis without acute diverticulitis  Mild mesenteric and presacral edema is nonspecific  No noncontrast CT evidence of metastatic disease in the abdomen or pelvis  Pleural effusions, somewhat loculated on the left  Cardiomegaly  Workstation performed: [id number]     X-ray Chest 2 Views    Result Date: 7/4/2018  Narrative: CHEST INDICATION:   Chest pain  Trauma  Abrasions and lacerations involving right leg COMPARISON:  July 1, 2018  EXAM PERFORMED/VIEWS:  XR CHEST PA & LATERAL FINDINGS: Dual-lead left chest pacemaker is noted with intact pacer leads seen  Heart shadow is enlarged but unchanged from previous examination  There is small to moderate right pleural effusion  There is density in the posterior right suprahilar chest which appears to represent loculated fluid in the right major fissure  Left lung is clear  No pneumothorax  Glenohumeral and spinal degenerative changes  No acute osseous abnormality  Impression: Cardiomegaly  Right pleural effusion including some loculated fluid in the posterior aspect of the right major fissure  Workstation performed: [id number]     Xr Chest 2 Views    Result Date: 7/1/2018  Narrative: CHEST INDICATION:   Dyspnea  Severe abdominal pain this morning  COMPARISON:  May 22, 2018 EXAM PERFORMED/VIEWS:  XR CHEST PA & LATERAL FINDINGS: The heart mediastinum are stable  Pacing device unchanged in position  There is continued mild cardiomegaly  Small bilateral pleural effusions are again noted  Osseous structures appear within normal limits for patient age  Impression: Stable cardiomegaly  Small bilateral pleural effusions right greater than left  Workstation performed: [id number]     Xr Tibia Fibula 2 Views Right    Result Date: 7/4/2018  Narrative: RIGHT TIBIA AND FIBULA INDICATION:   Trauma  Right leg pain with abrasion and laceration  COMPARISON:  None VIEWS:  XR TIBIA FIBULA 2 VW RIGHT FINDINGS: There is no acute fracture or dislocation  No significant degenerative changes seen  No lytic or blastic lesions are seen  There are atherosclerotic calcifications  Soft tissues are otherwise unremarkable  Impression: No acute osseous abnormality  Workstation performed: [id number]     Fl Retrograde Pyelogram    Result Date: 7/2/2018  Narrative: RIGHT RETROGRADE PYELOGRAM INDICATION:   Right ureteral stone [N20 1]  COMPARISON: CT 7/1/2018 IMAGES:  3 FLUOROSCOPY TIME:   17 seconds CONTRAST: 8 mL of iohexol (OMNIPAQUE) FINDINGS: Fluoroscopic guidance provided for retrograde pyelogram  Contrast administration into the right kidney collecting system demonstrates dilatation  Final images reveal placement of a right ureteral stent Osseous and soft tissue detail limited by technique  Impression: Fluoroscopic guidance provided for right retrograde pyelogram   Placement of right ureteral stent  Please see procedure report for further details  Workstation performed: [id number]       Review of Systems   Cardiovascular: Positive for dyspnea on exertion  Negative for chest pain  Vitals:    01/18/19 0806   BP: 102/58   Pulse: 80     Vitals:     Height: 5' 9" (175 3 cm)   Body mass index is 28 55 kg/m²      Physical Exam:   General appearance:  Appears stated age, alert, well appearing and in no distress  HEENT:  PERRLA, EOMI, no scleral icterus, no conjunctival pallor  NECK: No elevated JVP  HEART:  Regular rate and rhythm, normal S1/S2, no S3/S4, no murmur or rub  LUNGS:  Decreased breath sounds at bases bilaterally   ABDOMEN:  Soft, non-tender, positive bowel sounds, no rebound or guarding, no organomegaly   EXTREMITIES: No edema   VASCULAR:  Normal pedal pulses   SKIN: No lesions or rashes on exposed skin

## 2019-01-28 DIAGNOSIS — E87.6 HYPOKALEMIA: Primary | ICD-10-CM

## 2019-01-30 ENCOUNTER — TELEPHONE (OUTPATIENT)
Dept: CARDIOLOGY CLINIC | Facility: CLINIC | Age: 84
End: 2019-01-30

## 2019-01-30 DIAGNOSIS — I50.23 ACUTE ON CHRONIC SYSTOLIC HEART FAILURE (HCC): ICD-10-CM

## 2019-01-30 RX ORDER — POTASSIUM CHLORIDE 20 MEQ/1
80 TABLET, EXTENDED RELEASE ORAL 3 TIMES DAILY
Qty: 360 TABLET | Refills: 10 | Status: SHIPPED | OUTPATIENT
Start: 2019-01-30 | End: 2019-06-19 | Stop reason: HOSPADM

## 2019-01-30 NOTE — TELEPHONE ENCOUNTER
Wolfgang, Spoke with Maria Antonia Her again and she said he was increased to  K 80 meq BID on 1/18  Did you want him to stay on the same dose?       Please advise

## 2019-01-30 NOTE — TELEPHONE ENCOUNTER
Rebeca Moyer supposedly sent instructions which I wrote on a prescription  It is 80 mEq BID with a BMP in one week      Radha Hensley

## 2019-01-30 NOTE — TELEPHONE ENCOUNTER
[facility] called   They state someone called them yesterday to have pt take 80 meq of potassium daily, which he has been taking since 1/18  K was 3 2 on 1/25/19  Do you want to continue him on 80 meq daily of Potassium  I do see a new BMP lab in [place]        Please advise

## 2019-01-30 NOTE — TELEPHONE ENCOUNTER
I am confused- Angel Luis iSngletary spoke to someone yesterday who informed us that he was on 80 mEq once daily      Stanley Haughton

## 2019-01-30 NOTE — TELEPHONE ENCOUNTER
Got the physicians order of [facility] sheet from 1/18 with Kdur dose of 80 meq po BID and BMP ordered for 1/24  It is signed with your name

## 2019-02-05 DIAGNOSIS — E87.6 HYPOKALEMIA: Primary | ICD-10-CM

## 2019-02-05 NOTE — PROGRESS NOTES
2/4/19-received case from Katelynn Wagner  Will continue to outreach every 3 weeks for care management throughout bundle episode

## 2019-02-06 ENCOUNTER — TELEPHONE (OUTPATIENT)
Dept: UROLOGY | Facility: AMBULATORY SURGERY CENTER | Age: 84
End: 2019-02-06

## 2019-02-06 DIAGNOSIS — R35.1 NOCTURIA: Primary | ICD-10-CM

## 2019-02-07 NOTE — TELEPHONE ENCOUNTER
Anayeli Mckinnon from patients nursing home called wanted to know if there was a medication that could help his nocturia [phone]
Discussed at last office visit, that nocturia is likely contributed to use of diuretics  Would not recommend an anticholinergic due to patient's advanced age  Can recommend prescribing Myrbetriq 25mg, however medication may not be cost effective 
I had spoken with Ye Molina and she stated that patient would like to try the Myrbetriq and see if it works 
LM that Myrbetriq was sent to [facility]
Please advise
Prescription for Myrbetriq electronically sent to [facility] pharmacy 
Normal rate, regular rhythm.  Heart sounds S1, S2.  No murmurs, rubs or gallops.

## 2019-02-08 ENCOUNTER — PATIENT OUTREACH (OUTPATIENT)
Dept: CASE MANAGEMENT | Facility: OTHER | Age: 84
End: 2019-02-08

## 2019-02-08 NOTE — PROGRESS NOTES
2/8/19-spoke with Ade Mann [facility], who reports patient is being followed by Cardiologist and PCP for CHF at this time  Pt was recently admitted to the hospital for CHF exacerbation  Discussed preventative measure of CHF exacerbation  States pt  is on daily weights, fluid restriction, and low sodium diet  However, states pt is non-compliant with fluid restriction and diet recommendations  NAYELI made Zuleika aware of OP follow up with bundle program until 3/11/19, and CM will call every 3 weeks for updates throughout the bundle episode    NAYELI gave Zuleika contact information

## 2019-02-11 ENCOUNTER — TELEPHONE (OUTPATIENT)
Dept: CARDIOLOGY CLINIC | Facility: CLINIC | Age: 84
End: 2019-02-11

## 2019-02-11 NOTE — TELEPHONE ENCOUNTER
Pt seeing Esperanza Barr on 2/18  Received call from [facility] reporting a 4 lb wt gain overnight  Yesterday's weight was 185, today is 189  Not sob  A little LE edema, but not increased  Same as it has been  Currently taking : Torsemide 80 mg bid  Metolazone 2 5 mg 2 x week on Tuesday and Friday  Kdur 80 meq TID    Please advise

## 2019-02-11 NOTE — TELEPHONE ENCOUNTER
Tried calling Rigoberto back at [facility]  Mailbox full  Could not leave messsage

## 2019-02-11 NOTE — TELEPHONE ENCOUNTER
Takes the metolazone on Tues and Friday  Do you want him to take today and an extra dose tomorrow? Or just additional dose today and normal dose tomorrow, Tues ?

## 2019-02-15 DIAGNOSIS — E55.9 VITAMIN D DEFICIENCY: ICD-10-CM

## 2019-02-15 DIAGNOSIS — I50.23 ACUTE ON CHRONIC SYSTOLIC HEART FAILURE (HCC): ICD-10-CM

## 2019-02-15 DIAGNOSIS — I48.0 PAROXYSMAL ATRIAL FIBRILLATION (HCC): Chronic | ICD-10-CM

## 2019-02-15 DIAGNOSIS — E11.10 UNCONTROLLED TYPE 2 DIABETES MELLITUS WITH KETOACIDOSIS WITHOUT COMA, WITHOUT LONG-TERM CURRENT USE OF INSULIN (HCC): ICD-10-CM

## 2019-02-15 DIAGNOSIS — R20.3 HYPERESTHESIA: ICD-10-CM

## 2019-02-18 ENCOUNTER — OFFICE VISIT (OUTPATIENT)
Dept: CARDIOLOGY CLINIC | Facility: CLINIC | Age: 84
End: 2019-02-18
Payer: MEDICARE

## 2019-02-18 VITALS
HEART RATE: 78 BPM | WEIGHT: 192.8 LBS | OXYGEN SATURATION: 95 % | SYSTOLIC BLOOD PRESSURE: 118 MMHG | HEIGHT: 69 IN | BODY MASS INDEX: 28.56 KG/M2 | DIASTOLIC BLOOD PRESSURE: 50 MMHG

## 2019-02-18 DIAGNOSIS — I50.32 CHRONIC DIASTOLIC (CONGESTIVE) HEART FAILURE (HCC): Primary | ICD-10-CM

## 2019-02-18 DIAGNOSIS — I10 ESSENTIAL HYPERTENSION: ICD-10-CM

## 2019-02-18 DIAGNOSIS — I48.0 PAROXYSMAL ATRIAL FIBRILLATION (HCC): Chronic | ICD-10-CM

## 2019-02-18 DIAGNOSIS — N18.30 STAGE 3 CHRONIC KIDNEY DISEASE (HCC): ICD-10-CM

## 2019-02-18 PROCEDURE — 99215 OFFICE O/P EST HI 40 MIN: CPT | Performed by: NURSE PRACTITIONER

## 2019-02-18 RX ORDER — GABAPENTIN 100 MG/1
CAPSULE ORAL
Qty: 28 CAPSULE | Refills: 10 | Status: SHIPPED | OUTPATIENT
Start: 2019-02-18

## 2019-02-18 RX ORDER — CARVEDILOL 3.12 MG/1
TABLET ORAL
Qty: 56 TABLET | Refills: 10 | Status: SHIPPED | OUTPATIENT
Start: 2019-02-18

## 2019-02-18 RX ORDER — APIXABAN 2.5 MG/1
TABLET, FILM COATED ORAL
Qty: 56 TABLET | Refills: 10 | Status: SHIPPED | OUTPATIENT
Start: 2019-02-18 | End: 2019-06-19 | Stop reason: HOSPADM

## 2019-02-18 RX ORDER — ERGOCALCIFEROL 1.25 MG/1
CAPSULE ORAL
Qty: 4 CAPSULE | Refills: 10 | Status: SHIPPED | OUTPATIENT
Start: 2019-02-18 | End: 2019-06-19 | Stop reason: HOSPADM

## 2019-02-18 NOTE — PATIENT INSTRUCTIONS
Maintain a 2 gram daily sodium diet and 1500 ml daily fluid restriction  Check daily weights  If you gained 3 pounds in one day, 5 pounds in one week, or experience worsening shortness of breath or increasing lower leg swelling  Please call the heart failure office at [phone]    Please bring a  list of your current medications and daily weights to the office visit    Refusing to fill out POLST or receive a 5 Wishes

## 2019-02-18 NOTE — PROGRESS NOTES
Heart Failure Office Visit   Sherry Bates 80 y o  male MRN: [id number]    HPI  Mr Sherry Bates is an 80year old male with a known past medical history of   CKD III baseline creat 1 4-1 7  LVEF 55%  Mild to mod MR  Mild TR  CAD sp LULU to LAD  PAF  SSS PPM in situ  DM2 Hgb A1C 7 5   Patient Active Problem List   Diagnosis    Lower urinary tract symptoms    Malignant neoplasm of trigone of urinary bladder (HCC)    Prostate cancer (HCC)    Chronic diastolic (congestive) heart failure (HCC)    Acute kidney injury superimposed on chronic kidney disease (HCC)    Paroxysmal atrial fibrillation ([address] )    CAD (coronary artery disease)    Type 2 diabetes mellitus with hyperglycemia, with long-term current use of insulin ([address] )    Cardiac pacemaker in situ    Right ureteral stone    Urinary tract infection with hematuria    Hyponatremia    Laceration of right lower extremity    Diarrhea    Anemia    Benign prostatic hyperplasia    Blindness of left eye    Kidney stones    Peripheral neuropathy    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 (LAD) artery    Ureter, calculus    Urinary retention    Right flank pain    Acute renal failure superimposed on stage 3 chronic kidney disease (HCC)    Urinary tract infection associated with indwelling urethral catheter (HCC)    Dyslipidemia    Sick sinus syndrome ([address] )    Vascular dementia without behavioral disturbance    Alcoholism ([address] )    Decubitus ulcer of sacral region, stage 1    Benign essential hypertension    CKD (chronic kidney disease), stage III ([address] )    NSTEMI (non-ST elevated myocardial infarction) [facility])     Mr Todd Edwards was seen by Dr Sandy Haney on 1/18/19  He was eu volemic, creat slightly elevated  Potassium increased due to hypokalemia, K+ 3 7  BMP in one week  Today Mr Todd Edwards presents to our office for a follow up visit  Resides at [facility]  Weight running 185-191    His weight in the office is 192 pounds  Nader Cuello admits to dyspnea with rest after sitting for a long time and dyspnea with minimal exertion  According to Nader Cuello this is his baseline  He denies CP, palpitaitons, lightheadedness or dizziness  Lab studies reviewed sodium   Potassium  BUN  Creat       Review of Systems   Cardiovascular: Positive for dyspnea on exertion  Respiratory: Positive for shortness of breath  Objective:   Vitals:   Vitals:    02/18/19 1327   BP: 118/50   BP Location: Left arm   Patient Position: Sitting   Cuff Size: Standard   Pulse: 78   SpO2: 95%   Weight: 87 5 kg (192 lb 12 8 oz)   Height: 5' 9" (1 753 m)     Body mass index is 28 47 kg/m²  Wt Readings from Last 3 Encounters:   01/08/19 87 7 kg (193 lb 5 5 oz)   12/18/18 90 6 kg (199 lb 12 8 oz)   12/11/18 87 7 kg (193 lb 5 5 oz)         Physical Exam:  GEN: Prateek Duran appears well, sitting in a wheelchair, alert and oriented x 3, pleasant and cooperative   HEENT: pupils equal, round, and reactive to light; extraocular muscles intact  NECK: supple, no carotid bruits   HEART: regular rhythm, normal S1 and S2, no murmurs, clicks, gallops or rubs, JVP is  flat  LUNGS: clear to auscultation bilaterally; no wheezes, rales, or rhonchi   ABDOMEN: normal bowel sounds, soft, no tenderness, no distention  EXTREMITIES: peripheral pulses normal; no clubbing, cyanosis, or edema  NEURO: no focal findings   SKIN: normal without suspicious lesions on exposed skin      Current Outpatient Medications:     aspirin (ECOTRIN LOW STRENGTH) 81 mg EC tablet, Take 81 mg by mouth daily, Disp: , Rfl:     atorvastatin (LIPITOR) 20 mg tablet, Take 20 mg by mouth daily, Disp: , Rfl:     carvedilol (COREG) 3 125 mg tablet, TAKE 1 TABLET BY MOUTH TWICE DAILY WITH MEALS, Disp: 56 tablet, Rfl: 10    ELIQUIS 2 5 MG, TAKE 1 TABLET BY MOUTH TWICE DAILY  , Disp: 56 tablet, Rfl: 10    finasteride (PROSCAR) 5 mg tablet, TAKE 1 TABLET BY MOUTH ONCE DAILY  , Disp: 28 tablet, Rfl: 4   gabapentin (NEURONTIN) 100 mg capsule, TAKE 1 CAPSULE BY MOUTH ONCE DAILY, Disp: 28 capsule, Rfl: 10    gabapentin (NEURONTIN) 300 mg capsule, Take 300 mg by mouth daily at bedtime, Disp: , Rfl:     acetaminophen (TYLENOL) 325 mg tablet, Take 1-2  tablet as needed every 8 hours as needed for headache  Do not exceed  More than 3gm/ day  (Patient not taking: Reported on 1/18/2019 ), Disp: 30 tablet, Rfl: 0    ALPRAZolam (XANAX) 0 5 mg tablet, Take 1 tablet (0 5 mg total) by mouth 4 (four) times a day as needed for anxiety, Disp: 30 tablet, Rfl: 0    EASY TOUCH LANCETS 28G MISC, USE AS DIRECTED , Disp: 100 each, Rfl: 11    ergocalciferol (VITAMIN D2) 50,000 units, TAKE 1 CAPSULE BY MOUTH EVERY WEDNESDAY AT NOON, Disp: 4 capsule, Rfl: 10    glucose blood test strip, 1 each by Other route daily Use as instructed, Disp: , Rfl:     insulin glargine (LANTUS SOLOSTAR) 100 units/mL injection pen, Inject 10 Units under the skin daily at bedtime, Disp: 5 pen, Rfl: 5    ipratropium-albuterol (DUO-NEB) 0 5-2 5 mg/3 mL, Take 3 mL by nebulization every 6 (six) hours as needed for wheezing, Disp: , Rfl: 0    LAC-DOSE 3000 units tablet, TAKE 2 TABLETS BY MOUTH THREE TIMES DAILY WITH MEALS, Disp: 168 tablet, Rfl: 4    Menthol-Zinc Oxide (REMEDY CALAZIME EX), Apply 1 application topically 2 (two) times a day To sacrum, Disp: , Rfl:     metFORMIN (GLUCOPHAGE) 500 mg tablet, TAKE 1 TABLET BY MOUTH ONCE DAILY  , Disp: 28 tablet, Rfl: 10    metolazone (ZAROXOLYN) 2 5 mg tablet, Take 1 tablet (2 5 mg total) by mouth 4 (four) times a week, Disp: , Rfl: 0    Mirabegron ER 25 MG TB24, Take 25 mg by mouth daily, Disp: 30 tablet, Rfl: 5    multivitamin with iron (TAB-A-ANETA/IRON), TAKE 1 TABLET BY MOUTH ONCE DAILY  , Disp: 28 tablet, Rfl: 11    nitroglycerin (NITROSTAT) 0 4 mg SL tablet, Place 0 4 mg under the tongue every 5 (five) minutes as needed for chest pain, Disp: , Rfl:     Omega-3 Fatty Acids (FISH OIL) 1,000 mg, TAKE 1 CAPSULE BY MOUTH ONCE DAILY, Disp: 28 capsule, Rfl: 4    Polyvinyl Alcohol-Povidone (REFRESH OP), Apply 1 drop to eye as needed, Disp: , Rfl:     potassium chloride (K-DUR,KLOR-CON) 20 mEq tablet, Take 4 tablets (80 mEq total) by mouth 3 (three) times a day, Disp: 360 tablet, Rfl: 10    tamsulosin (FLOMAX) 0 4 mg, TAKE 1 CAPSULE BY MOUTH WITH EVENING MEAL, Disp: 28 capsule, Rfl: 4    torsemide (DEMADEX) 20 mg tablet, TAKE 4 TABLETS BY MOUTH TWICE DAILY, Disp: 32 tablet, Rfl: 4    white petrolatum, Apply 1 application topically as needed Apply topically to buttocks as needed  May keep in room and self administer, Disp: , Rfl:     Wound Dressings (ALLANTOIN) gel, Apply 1 application topically as needed for wound care, Disp: , Rfl:       Labs & Results:                    Invalid input(s): LABALBU            Assessment/Plan:  1  Chronic diastolic heart failure NYHA class III stage C- On PE appears eu volemic and compensated, weight in the office today 192 pounds  HR and BP controlled on current medical regimen  Continue on Coreg 3 125mg BID,  Torsemide 80mg BID,  Metolazone  2 5mg 4 times a week,   2gm sodium diet 1500 cc fluid restriction           Daily weights   If you gain 3 pounds in one day, 5 pounds in one week, experience worsening shortness of breath or lower extremity swelling   Please call the heart failure office at [phone]  2  Paroxysmal atrial fibrillation - RRR,  continue on BB and Eliquis 2 5mg BID     3  CKD III baseline creat 1 4-1 7 -BUN/Creat BUN 33, creat 1 47, Sodium 138, potassium 4 1     4   HTN- controlled on Coreg 3 125mg BID    5  Mild to Mod MR  Follow up with Dr Jeovany Gomez in 3 months  Refusing a POLST or [address], [address]  2/18/2019  1:21 PM

## 2019-02-19 ENCOUNTER — TELEPHONE (OUTPATIENT)
Dept: CARDIOLOGY CLINIC | Facility: CLINIC | Age: 84
End: 2019-02-19

## 2019-02-19 DIAGNOSIS — I50.33 ACUTE ON CHRONIC DIASTOLIC (CONGESTIVE) HEART FAILURE (HCC): ICD-10-CM

## 2019-02-19 RX ORDER — METOLAZONE 2.5 MG/1
TABLET ORAL
Qty: 8 TABLET | Refills: 0 | OUTPATIENT
Start: 2019-02-19

## 2019-02-19 NOTE — TELEPHONE ENCOUNTER
Pt gained 5 lb in one day  You saw pt yesterday  Taking:  Torsemide 80 mg BID                Metolazone 2 5 mg 2x's week tue/ fri                k dur  80 mg TID        Please advise

## 2019-02-19 NOTE — TELEPHONE ENCOUNTER
Called [facility] pt already had metolazone this am  Advised to call me tomorrow with the wt  Verbally understood

## 2019-02-19 NOTE — TELEPHONE ENCOUNTER
When did he take metolazone last   If not today please ask him to take a dose this afternoon 30 min prior to afternoon torsemide    Please review diet

## 2019-02-25 ENCOUNTER — OFFICE VISIT (OUTPATIENT)
Dept: PULMONOLOGY | Facility: CLINIC | Age: 84
End: 2019-02-25
Payer: MEDICARE

## 2019-02-25 VITALS
SYSTOLIC BLOOD PRESSURE: 114 MMHG | WEIGHT: 192 LBS | RESPIRATION RATE: 16 BRPM | BODY MASS INDEX: 29.1 KG/M2 | HEART RATE: 71 BPM | OXYGEN SATURATION: 98 % | DIASTOLIC BLOOD PRESSURE: 68 MMHG | TEMPERATURE: 96.8 F | HEIGHT: 68 IN

## 2019-02-25 DIAGNOSIS — I50.32 CHRONIC DIASTOLIC CONGESTIVE HEART FAILURE (HCC): Primary | ICD-10-CM

## 2019-02-25 PROCEDURE — 99215 OFFICE O/P EST HI 40 MIN: CPT | Performed by: INTERNAL MEDICINE

## 2019-02-25 PROCEDURE — 94618 PULMONARY STRESS TESTING: CPT | Performed by: INTERNAL MEDICINE

## 2019-02-25 NOTE — PROGRESS NOTES
Pulmonary outpatient note   Amanda Mcgill 80 y o  male MRN: [id number]  2/25/2019      Assessment and plan:  Amanda Mcgill has the following medical problems:  1  Congestive heart failure  The patient has very severe clinical congestive heart failure based on clinical picture and imaging  His very short of breath even at rest   However, his saturation on room air are normal  We try to do a 6 minutes walk test today in the office and he walked only 12 m and developed significant shortness of breath  He desaturate down to 82% and when placed on oxygen 2 liters/minute increased to 94%  The patient does not have primary lung disease  I prescribed oxygen to use at home with ambulation 2 liters/minute  2   Right lung mass  Most probably round atelectasis  I discussed with the radiologist who read this chest CT and he thought it is and atelectasis and fluid in the fissure  Given the patient age and clinical status is not a candidate for invasive procedures or oncologic treatments  The patient does not need a follow up with us, he needs to see his cardiologist frequently  I told him that we will qualify him for oxygen and he can see us as needed  I gave him my card  Amna Pinon MD/PhD,  [facility]      Return if symptoms worsen or fail to improve  History of Present Illness  This is 80y o  year old male with previous medical history of severe diastolic heart failure, on a wheelchair, coronary artery disease, paroxysmal atrial fibrillation, diabetes mellitus, hypertension, who presents today due to severe shortness of breath for the last years  He cannot walk than a few meters without developing significant shortness of breath  He is saying that when he is using oxygen at his nursing home he feels much better but was never qualified for oxygen before  He has a history of 30 pack year of smoking and stopped 50 years ago    He was never diagnosed with a primary pulmonary disease  Social history:   30 pack years, stopped smoking 50 years ago, does not drink alcohol  Occupational history:   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  Review of Systems   Constitutional: Positive for fatigue  HENT: Negative  Eyes: Negative  Respiratory: Positive for shortness of breath  Cardiovascular: Positive for leg swelling  Gastrointestinal: Negative  Endocrine: Negative  Genitourinary: Negative  Musculoskeletal: Negative  Skin: Negative  Allergic/Immunologic: Negative  Neurological: Negative  Hematological: Negative  Psychiatric/Behavioral: Negative  Historical Information   Past Medical History:   Diagnosis Date    A-fib ([place] [address] )     Anemia     Bladder cancer ([place] [address] )     CAD (coronary artery disease)     CHF (congestive heart failure) (HCC)     Chronic kidney failure     Colon cancer ([place] [address] )     Eye cancer, left ([place] [address] )     Hypertension     Incontinence     Pacemaker     Prostate cancer ([place] [address] )     Type 2 diabetes mellitus ([place] [address] )     Wears hearing aid in both ears      Past Surgical History:   Procedure Laterality Date    CARDIAC PACEMAKER PLACEMENT  2014    CORONARY ANGIOPLASTY WITH STENT PLACEMENT      GALLBLADDER SURGERY      AR CYSTO/URETERO W/LITHOTRIPSY &INDWELL STENT INSRT Right 7/31/2018    Procedure: CYSTOSCOPY URETEROSCOPY WITH LITHOTRIPSY HOLMIUM LASER ,BASKET STONE EXTRACTION  RETROGRADE PYELOGRAM AND exchange STENT URETERAL;  Surgeon: Roly Soto MD;  Location: [facility];  Service: Urology    AR CYSTOURETHROSCOPY,URETER CATHETER Right 7/1/2018    Procedure: CYSTOSCOPY RETROGRADE PYELOGRAM WITH INSERTION STENT URETERAL;  Surgeon: Willem Soria MD;  Location: [facility];  Service: Urology     No family history on file  Meds/Allergies     Current Outpatient Medications:     acetaminophen (TYLENOL) 325 mg tablet, Take 1-2  tablet as needed every 8 hours as needed for headache   Do not exceed  More than 3gm/ day , Disp: 30 tablet, Rfl: 0    ALPRAZolam (XANAX) 0 5 mg tablet, Take 1 tablet (0 5 mg total) by mouth 4 (four) times a day as needed for anxiety, Disp: 30 tablet, Rfl: 0    aspirin (ECOTRIN LOW STRENGTH) 81 mg EC tablet, Take 81 mg by mouth daily, Disp: , Rfl:     atorvastatin (LIPITOR) 20 mg tablet, Take 20 mg by mouth daily, Disp: , Rfl:     carvedilol (COREG) 3 125 mg tablet, TAKE 1 TABLET BY MOUTH TWICE DAILY WITH MEALS, Disp: 56 tablet, Rfl: 10    EASY TOUCH LANCETS 28G MISC, USE AS DIRECTED , Disp: 100 each, Rfl: 11    ELIQUIS 2 5 MG, TAKE 1 TABLET BY MOUTH TWICE DAILY  , Disp: 56 tablet, Rfl: 10    ergocalciferol (VITAMIN D2) 50,000 units, TAKE 1 CAPSULE BY MOUTH EVERY WEDNESDAY AT NOON, Disp: 4 capsule, Rfl: 10    finasteride (PROSCAR) 5 mg tablet, TAKE 1 TABLET BY MOUTH ONCE DAILY  , Disp: 28 tablet, Rfl: 4    gabapentin (NEURONTIN) 100 mg capsule, TAKE 1 CAPSULE BY MOUTH ONCE DAILY, Disp: 28 capsule, Rfl: 10    gabapentin (NEURONTIN) 300 mg capsule, Take 300 mg by mouth daily at bedtime, Disp: , Rfl:     glucose blood test strip, 1 each by Other route daily Use as instructed, Disp: , Rfl:     insulin glargine (LANTUS SOLOSTAR) 100 units/mL injection pen, Inject 10 Units under the skin daily at bedtime, Disp: 5 pen, Rfl: 5    ipratropium-albuterol (DUO-NEB) 0 5-2 5 mg/3 mL, Take 3 mL by nebulization every 6 (six) hours as needed for wheezing, Disp: , Rfl: 0    LAC-DOSE 3000 units tablet, TAKE 2 TABLETS BY MOUTH THREE TIMES DAILY WITH MEALS, Disp: 168 tablet, Rfl: 4    metFORMIN (GLUCOPHAGE) 500 mg tablet, TAKE 1 TABLET BY MOUTH ONCE DAILY  , Disp: 28 tablet, Rfl: 10    metolazone (ZAROXOLYN) 2 5 mg tablet, Take 1 tablet (2 5 mg total) by mouth 4 (four) times a week (Patient taking differently: Take 2 5 mg by mouth 2 (two) times a week ), Disp: , Rfl: 0    Mirabegron ER 25 MG TB24, Take 25 mg by mouth daily, Disp: 30 tablet, Rfl: 5    multivitamin with iron (TAB-A-ANETA/IRON), TAKE 1 TABLET BY MOUTH ONCE DAILY  , Disp: 28 tablet, Rfl: 11    nitroglycerin (NITROSTAT) 0 4 mg SL tablet, Place 0 4 mg under the tongue every 5 (five) minutes as needed for chest pain, Disp: , Rfl:     Omega-3 Fatty Acids (FISH OIL) 1,000 mg, TAKE 1 CAPSULE BY MOUTH ONCE DAILY, Disp: 28 capsule, Rfl: 4    Polyvinyl Alcohol-Povidone (REFRESH OP), Apply 1 drop to eye as needed, Disp: , Rfl:     potassium chloride (K-DUR,KLOR-CON) 20 mEq tablet, Take 4 tablets (80 mEq total) by mouth 3 (three) times a day, Disp: 360 tablet, Rfl: 10    tamsulosin (FLOMAX) 0 4 mg, TAKE 1 CAPSULE BY MOUTH WITH EVENING MEAL, Disp: 28 capsule, Rfl: 4    torsemide (DEMADEX) 20 mg tablet, TAKE 4 TABLETS BY MOUTH TWICE DAILY, Disp: 32 tablet, Rfl: 4    white petrolatum, Apply 1 application topically as needed Apply topically to buttocks as needed  May keep in room and self administer, Disp: , Rfl:     Wound Dressings (ALLANTOIN) gel, Apply 1 application topically as needed for wound care, Disp: , Rfl:   Allergies   Allergen Reactions    Iv Contrast [Iodinated Diagnostic Agents]        Vitals: Blood pressure 114/68, pulse 71, temperature (!) 96 8 °F (36 °C), temperature source Tympanic, resp  rate 16, height 5' 8" (1 727 m), weight 87 1 kg (192 lb), SpO2 98 %  Body mass index is 29 19 kg/m²  Oxygen Therapy  SpO2: 98 %(ROOM AIR)    Physical Exam  Physical Exam   Constitutional: He is oriented to person, place, and time  He appears well-developed  On a wheelchair   HENT:   Head: Normocephalic and atraumatic  Eyes: Pupils are equal, round, and reactive to light  EOM are normal    Neck: Normal range of motion  Neck supple  JVD present  Cardiovascular: Normal rate and regular rhythm  Pulmonary/Chest: He is in respiratory distress  He has no wheezes  He has rales  Abdominal: Soft  He exhibits no distension  Musculoskeletal: Normal range of motion  He exhibits no edema or deformity     Neurological: He is alert and oriented to person, place, and time  Skin: Skin is warm  Psychiatric: He has a normal mood and affect  Labs: I have personally reviewed pertinent lab results  Lab Results   Component Value Date    WBC 3 42 (L) 01/07/2019    HGB 9 4 (L) 01/07/2019    HCT 29 8 (L) 01/07/2019    MCV 95 01/07/2019     01/07/2019     Lab Results   Component Value Date    CALCIUM 9 1 01/08/2019    K 3 7 01/08/2019    CO2 32 01/08/2019    CL 96 (L) 01/08/2019    BUN 51 (H) 01/08/2019    CREATININE 1 70 (H) 01/08/2019     No results found for: IGE  Lab Results   Component Value Date    ALT 10 (L) 12/10/2018    AST 10 12/10/2018    ALKPHOS 104 12/10/2018       Imaging and other studies:   I have personally reviewed pertinent imaging studies in PACS  The patient had a chest CT on December 2018 that showed ground-glass opacities and bilateral pleural effusions suspected to be secondary to heart failure  He also has around mass in his right lung which might be round atelectasis versus loculated pleural effusion versus lung mass  Pulmonary function testing: The patient had breathing test on December 2018 that showed severe restriction with TLC 52% of predicted with very severe diffusion capacity 29% of predicted      Fidelina Butterfield MD/PhD,  [facility]

## 2019-02-25 NOTE — LETTER
February 25, 2019     María Barrios, [address] [facility] [place]    Patient: Jay Soliz   YOB: 1930   Date of Visit: 2/25/2019       Dear Dr Lalitha Hernandez:    Thank you for referring Jay Soliz to me for evaluation  Below are my notes for this consultation  If you have questions, please do not hesitate to call me  I look forward to following your patient along with you  Sincerely,        Arsen Eason MD        CC: Maximus Loo MD  2/25/2019  9:53 AM  Sign at close encounter  Pulmonary outpatient note   Jay Soliz 80 y o  male MRN: [id number]  2/25/2019      Assessment and plan:  Jay Soliz has the following medical problems:  1  Congestive heart failure  The patient has very severe clinical congestive heart failure based on clinical picture and imaging  His very short of breath even at rest   However, his saturation on room air are normal  We try to do a 6 minutes walk test today in the office and he walked only 12 m and developed significant shortness of breath  He desaturate down to 82% and when placed on oxygen 2 liters/minute increased to 94%  The patient does not have primary lung disease  I prescribed oxygen to use at home with ambulation 2 liters/minute  2   Right lung mass  Most probably round atelectasis  I discussed with the radiologist who read this chest CT and he thought it is and atelectasis and fluid in the fissure  Given the patient age and clinical status is not a candidate for invasive procedures or oncologic treatments  The patient does not need a follow up with us, he needs to see his cardiologist frequently  I told him that we will qualify him for oxygen and he can see us as needed  I gave him my card  Arsen Eason MD/PhD,  [facility]      Return if symptoms worsen or fail to improve      History of Present Illness  This is 80y o  year old male with previous medical history of severe diastolic heart failure, on a wheelchair, coronary artery disease, paroxysmal atrial fibrillation, diabetes mellitus, hypertension, who presents today due to severe shortness of breath for the last years  He cannot walk than a few meters without developing significant shortness of breath  He is saying that when he is using oxygen at his nursing home he feels much better but was never qualified for oxygen before  He has a history of 30 pack year of smoking and stopped 50 years ago  He was never diagnosed with a primary pulmonary disease  Social history:   30 pack years, stopped smoking 50 years ago, does not drink alcohol  Occupational history:   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  Review of Systems   Constitutional: Positive for fatigue  HENT: Negative  Eyes: Negative  Respiratory: Positive for shortness of breath  Cardiovascular: Positive for leg swelling  Gastrointestinal: Negative  Endocrine: Negative  Genitourinary: Negative  Musculoskeletal: Negative  Skin: Negative  Allergic/Immunologic: Negative  Neurological: Negative  Hematological: Negative  Psychiatric/Behavioral: Negative          Historical Information   Past Medical History:   Diagnosis Date    A-fib ([place] [address] )     Anemia     Bladder cancer ([place] [address] )     CAD (coronary artery disease)     CHF (congestive heart failure) (HCC)     Chronic kidney failure     [facility]     [facility]     Hypertension     Incontinence     Pacemaker     [facility] )     Type 2 diabetes mellitus ([facility] )     Wears hearing aid in both ears      Past Surgical History:   Procedure Laterality Date    CARDIAC PACEMAKER PLACEMENT  2014    CORONARY ANGIOPLASTY WITH STENT PLACEMENT      GALLBLADDER SURGERY      GA CYSTO/URETERO W/LITHOTRIPSY &INDWELL STENT INSRT Right 7/31/2018    Procedure: CYSTOSCOPY URETEROSCOPY WITH LITHOTRIPSY HOLMIUM LASER ,BASKET STONE EXTRACTION  RETROGRADE PYELOGRAM AND exchange STENT URETERAL;  Surgeon: Craig Rocha MD;  [facility];  Service: Urology    NM CYSTOURETHROSCOPY,URETER CATHETER Right 7/1/2018    Procedure: CYSTOSCOPY RETROGRADE PYELOGRAM WITH INSERTION STENT URETERAL;  Surgeon: Giancarlo Mclaughlin MD;  [facility];  Service: Urology     No family history on file  Meds/Allergies     Current Outpatient Medications:     acetaminophen (TYLENOL) 325 mg tablet, Take 1-2  tablet as needed every 8 hours as needed for headache  Do not exceed  More than 3gm/ day , Disp: 30 tablet, Rfl: 0    ALPRAZolam (XANAX) 0 5 mg tablet, Take 1 tablet (0 5 mg total) by mouth 4 (four) times a day as needed for anxiety, Disp: 30 tablet, Rfl: 0    aspirin (ECOTRIN LOW STRENGTH) 81 mg EC tablet, Take 81 mg by mouth daily, Disp: , Rfl:     atorvastatin (LIPITOR) 20 mg tablet, Take 20 mg by mouth daily, Disp: , Rfl:     carvedilol (COREG) 3 125 mg tablet, TAKE 1 TABLET BY MOUTH TWICE DAILY WITH MEALS, Disp: 56 tablet, Rfl: 10    EASY TOUCH LANCETS 28G MISC, USE AS DIRECTED , Disp: 100 each, Rfl: 11    ELIQUIS 2 5 MG, TAKE 1 TABLET BY MOUTH TWICE DAILY  , Disp: 56 tablet, Rfl: 10    ergocalciferol (VITAMIN D2) 50,000 units, TAKE 1 CAPSULE BY MOUTH EVERY WEDNESDAY AT NOON, Disp: 4 capsule, Rfl: 10    finasteride (PROSCAR) 5 mg tablet, TAKE 1 TABLET BY MOUTH ONCE DAILY  , Disp: 28 tablet, Rfl: 4    gabapentin (NEURONTIN) 100 mg capsule, TAKE 1 CAPSULE BY MOUTH ONCE DAILY, Disp: 28 capsule, Rfl: 10    gabapentin (NEURONTIN) 300 mg capsule, Take 300 mg by mouth daily at bedtime, Disp: , Rfl:     glucose blood test strip, 1 each by Other route daily Use as instructed, Disp: , Rfl:     insulin glargine (LANTUS SOLOSTAR) 100 units/mL injection pen, Inject 10 Units under the skin daily at bedtime, Disp: 5 pen, Rfl: 5    ipratropium-albuterol (DUO-NEB) 0 5-2 5 mg/3 mL, Take 3 mL by nebulization every 6 (six) hours as needed for wheezing, Disp: , Rfl: 0    LAC-DOSE 3000 units tablet, TAKE 2 TABLETS BY MOUTH THREE TIMES DAILY WITH MEALS, Disp: 168 tablet, Rfl: 4    metFORMIN (GLUCOPHAGE) 500 mg tablet, TAKE 1 TABLET BY MOUTH ONCE DAILY  , Disp: 28 tablet, Rfl: 10    metolazone (ZAROXOLYN) 2 5 mg tablet, Take 1 tablet (2 5 mg total) by mouth 4 (four) times a week (Patient taking differently: Take 2 5 mg by mouth 2 (two) times a week ), Disp: , Rfl: 0    Mirabegron ER 25 MG TB24, Take 25 mg by mouth daily, Disp: 30 tablet, Rfl: 5    multivitamin with iron (TAB-A-ANETA/IRON), TAKE 1 TABLET BY MOUTH ONCE DAILY  , Disp: 28 tablet, Rfl: 11    nitroglycerin (NITROSTAT) 0 4 mg SL tablet, Place 0 4 mg under the tongue every 5 (five) minutes as needed for chest pain, Disp: , Rfl:     Omega-3 Fatty Acids (FISH OIL) 1,000 mg, TAKE 1 CAPSULE BY MOUTH ONCE DAILY, Disp: 28 capsule, Rfl: 4    Polyvinyl Alcohol-Povidone (REFRESH OP), Apply 1 drop to eye as needed, Disp: , Rfl:     potassium chloride (K-DUR,KLOR-CON) 20 mEq tablet, Take 4 tablets (80 mEq total) by mouth 3 (three) times a day, Disp: 360 tablet, Rfl: 10    tamsulosin (FLOMAX) 0 4 mg, TAKE 1 CAPSULE BY MOUTH WITH EVENING MEAL, Disp: 28 capsule, Rfl: 4    torsemide (DEMADEX) 20 mg tablet, TAKE 4 TABLETS BY MOUTH TWICE DAILY, Disp: 32 tablet, Rfl: 4    white petrolatum, Apply 1 application topically as needed Apply topically to buttocks as needed  May keep in room and self administer, Disp: , Rfl:     Wound Dressings (ALLANTOIN) gel, Apply 1 application topically as needed for wound care, Disp: , Rfl:   Allergies   Allergen Reactions    Iv Contrast [Iodinated Diagnostic Agents]        Vitals: Blood pressure 114/68, pulse 71, temperature (!) 96 8 °F (36 °C), temperature source Tympanic, resp  rate 16, height 5' 8" (1 727 m), weight 87 1 kg (192 lb), SpO2 98 %  Body mass index is 29 19 kg/m²   Oxygen Therapy  SpO2: 98 %(ROOM AIR)    Physical Exam  Physical Exam   Constitutional: He is oriented to person, place, and time  He appears well-developed  On a wheelchair   HENT:   Head: Normocephalic and atraumatic  Eyes: Pupils are equal, round, and reactive to light  EOM are normal    Neck: Normal range of motion  Neck supple  JVD present  Cardiovascular: Normal rate and regular rhythm  Pulmonary/Chest: He is in respiratory distress  He has no wheezes  He has rales  Abdominal: Soft  He exhibits no distension  Musculoskeletal: Normal range of motion  He exhibits no edema or deformity  Neurological: He is alert and oriented to person, place, and time  Skin: Skin is warm  Psychiatric: He has a normal mood and affect  Labs: I have personally reviewed pertinent lab results  Lab Results   Component Value Date    WBC 3 42 (L) 01/07/2019    HGB 9 4 (L) 01/07/2019    HCT 29 8 (L) 01/07/2019    MCV 95 01/07/2019     01/07/2019     Lab Results   Component Value Date    CALCIUM 9 1 01/08/2019    K 3 7 01/08/2019    CO2 32 01/08/2019    CL 96 (L) 01/08/2019    BUN 51 (H) 01/08/2019    CREATININE 1 70 (H) 01/08/2019     No results found for: IGE  Lab Results   Component Value Date    ALT 10 (L) 12/10/2018    AST 10 12/10/2018    ALKPHOS 104 12/10/2018       Imaging and other studies:   I have personally reviewed pertinent imaging studies in PACS  The patient had a chest CT on December 2018 that showed ground-glass opacities and bilateral pleural effusions suspected to be secondary to heart failure  He also has around mass in his right lung which might be round atelectasis versus loculated pleural effusion versus lung mass  Pulmonary function testing: The patient had breathing test on December 2018 that showed severe restriction with TLC 52% of predicted with very severe diffusion capacity 29% of predicted      Shon Velez MD/PhD,  [facility]

## 2019-02-26 ENCOUNTER — TELEPHONE (OUTPATIENT)
Dept: CARDIOLOGY CLINIC | Facility: CLINIC | Age: 84
End: 2019-02-26

## 2019-02-26 NOTE — TELEPHONE ENCOUNTER
Rigoberto escobar From [facility]  Pt 's wt was 189 on 2/18 and presently 195lb today  It has only gone down to 193 this past week  He is now on 2 L with ambulation O2 for significant SOB with ambulation and o2 Sat 82 %on RA per pulmonary yesterday        Taking : Metalozone 2 5 mg Tues and Fri (took dose this am                Torsemide 80 mg bid                K-dur 80 mg bid      Please advise

## 2019-02-27 DIAGNOSIS — I50.32 CHRONIC DIASTOLIC CONGESTIVE HEART FAILURE (HCC): Primary | ICD-10-CM

## 2019-02-27 DIAGNOSIS — I50.33 ACUTE ON CHRONIC DIASTOLIC (CONGESTIVE) HEART FAILURE (HCC): ICD-10-CM

## 2019-03-04 DIAGNOSIS — I50.33 ACUTE ON CHRONIC DIASTOLIC (CONGESTIVE) HEART FAILURE (HCC): ICD-10-CM

## 2019-03-04 RX ORDER — METOLAZONE 2.5 MG/1
2.5 TABLET ORAL DAILY
Qty: 2 TABLET | Refills: 0 | Status: SHIPPED | OUTPATIENT
Start: 2019-03-04 | End: 2019-03-04 | Stop reason: SDUPTHER

## 2019-03-04 RX ORDER — METOLAZONE 2.5 MG/1
2.5 TABLET ORAL DAILY
Qty: 2 TABLET | Refills: 6 | Status: SHIPPED | OUTPATIENT
Start: 2019-03-04 | End: 2019-03-14 | Stop reason: SDUPTHER

## 2019-03-07 ENCOUNTER — TELEPHONE (OUTPATIENT)
Dept: PULMONOLOGY | Facility: CLINIC | Age: 84
End: 2019-03-07

## 2019-03-08 DIAGNOSIS — I50.9 CONGESTIVE HEART FAILURE, UNSPECIFIED HF CHRONICITY, UNSPECIFIED HEART FAILURE TYPE (HCC): Primary | ICD-10-CM

## 2019-03-12 DIAGNOSIS — I50.33 ACUTE ON CHRONIC DIASTOLIC (CONGESTIVE) HEART FAILURE (HCC): ICD-10-CM

## 2019-03-13 ENCOUNTER — PATIENT OUTREACH (OUTPATIENT)
Dept: CASE MANAGEMENT | Facility: OTHER | Age: 84
End: 2019-03-13

## 2019-03-13 RX ORDER — METOLAZONE 2.5 MG/1
TABLET ORAL
Qty: 8 TABLET | Refills: 10 | OUTPATIENT
Start: 2019-03-13

## 2019-03-13 NOTE — PROGRESS NOTES
3/13/19-attempted to call [facility]  Unable to reach [place], Barbara Murcia  Left message that pt bundle is closed as of 3/11/19

## 2019-03-14 DIAGNOSIS — I50.33 ACUTE ON CHRONIC DIASTOLIC (CONGESTIVE) HEART FAILURE (HCC): ICD-10-CM

## 2019-03-15 RX ORDER — METOLAZONE 2.5 MG/1
TABLET ORAL
Qty: 8 TABLET | Refills: 2 | Status: SHIPPED | OUTPATIENT
Start: 2019-03-15 | End: 2019-06-07 | Stop reason: SDUPTHER

## 2019-04-19 ENCOUNTER — APPOINTMENT (EMERGENCY)
Dept: RADIOLOGY | Facility: HOSPITAL | Age: 84
DRG: 280 | End: 2019-04-19
Payer: MEDICARE

## 2019-04-19 ENCOUNTER — HOSPITAL ENCOUNTER (INPATIENT)
Facility: HOSPITAL | Age: 84
LOS: 2 days | Discharge: NURSING, REHAB OR LONG-TERM CARE | DRG: 280 | End: 2019-04-22
Attending: EMERGENCY MEDICINE | Admitting: INTERNAL MEDICINE
Payer: MEDICARE

## 2019-04-19 DIAGNOSIS — R06.02 SHORTNESS OF BREATH: ICD-10-CM

## 2019-04-19 DIAGNOSIS — R77.8 ELEVATED TROPONIN: Primary | ICD-10-CM

## 2019-04-19 DIAGNOSIS — I50.32 CHRONIC DIASTOLIC (CONGESTIVE) HEART FAILURE (HCC): ICD-10-CM

## 2019-04-19 PROBLEM — R82.90 ABNORMAL URINALYSIS: Status: ACTIVE | Noted: 2019-04-19

## 2019-04-19 LAB
ALBUMIN SERPL BCP-MCNC: 2.9 G/DL (ref 3.5–5)
ALP SERPL-CCNC: 107 U/L (ref 46–116)
ALT SERPL W P-5'-P-CCNC: 14 U/L (ref 12–78)
ANION GAP SERPL CALCULATED.3IONS-SCNC: 4 MMOL/L (ref 4–13)
AST SERPL W P-5'-P-CCNC: 18 U/L (ref 5–45)
BACTERIA UR QL AUTO: ABNORMAL /HPF
BASOPHILS # BLD AUTO: 0.09 THOUSANDS/ΜL (ref 0–0.1)
BASOPHILS NFR BLD AUTO: 1 % (ref 0–1)
BILIRUB SERPL-MCNC: 0.67 MG/DL (ref 0.2–1)
BILIRUB UR QL STRIP: NEGATIVE
BUN SERPL-MCNC: 29 MG/DL (ref 5–25)
CALCIUM SERPL-MCNC: 8.5 MG/DL (ref 8.3–10.1)
CHLORIDE SERPL-SCNC: 99 MMOL/L (ref 100–108)
CLARITY UR: CLEAR
CO2 SERPL-SCNC: 28 MMOL/L (ref 21–32)
COLOR UR: YELLOW
COLOR, POC: YELLOW
CREAT SERPL-MCNC: 1.69 MG/DL (ref 0.6–1.3)
EOSINOPHIL # BLD AUTO: 0.2 THOUSAND/ΜL (ref 0–0.61)
EOSINOPHIL NFR BLD AUTO: 3 % (ref 0–6)
ERYTHROCYTE [DISTWIDTH] IN BLOOD BY AUTOMATED COUNT: 16 % (ref 11.6–15.1)
GFR SERPL CREATININE-BSD FRML MDRD: 35 ML/MIN/1.73SQ M
GLUCOSE SERPL-MCNC: 176 MG/DL (ref 65–140)
GLUCOSE SERPL-MCNC: 248 MG/DL (ref 65–140)
GLUCOSE UR STRIP-MCNC: NEGATIVE MG/DL
HCT VFR BLD AUTO: 33.8 % (ref 36.5–49.3)
HGB BLD-MCNC: 10.7 G/DL (ref 12–17)
HGB UR QL STRIP.AUTO: ABNORMAL
HYALINE CASTS #/AREA URNS LPF: ABNORMAL /LPF
IMM GRANULOCYTES # BLD AUTO: 0.03 THOUSAND/UL (ref 0–0.2)
IMM GRANULOCYTES NFR BLD AUTO: 0 % (ref 0–2)
KETONES UR STRIP-MCNC: NEGATIVE MG/DL
LEUKOCYTE ESTERASE UR QL STRIP: ABNORMAL
LIPASE SERPL-CCNC: 165 U/L (ref 73–393)
LYMPHOCYTES # BLD AUTO: 0.72 THOUSANDS/ΜL (ref 0.6–4.47)
LYMPHOCYTES NFR BLD AUTO: 11 % (ref 14–44)
MAGNESIUM SERPL-MCNC: 2 MG/DL (ref 1.6–2.6)
MCH RBC QN AUTO: 31.5 PG (ref 26.8–34.3)
MCHC RBC AUTO-ENTMCNC: 31.7 G/DL (ref 31.4–37.4)
MCV RBC AUTO: 99 FL (ref 82–98)
MONOCYTES # BLD AUTO: 0.65 THOUSAND/ΜL (ref 0.17–1.22)
MONOCYTES NFR BLD AUTO: 10 % (ref 4–12)
NEUTROPHILS # BLD AUTO: 5.03 THOUSANDS/ΜL (ref 1.85–7.62)
NEUTS SEG NFR BLD AUTO: 75 % (ref 43–75)
NITRITE UR QL STRIP: NEGATIVE
NON-SQ EPI CELLS URNS QL MICRO: ABNORMAL /HPF
NRBC BLD AUTO-RTO: 0 /100 WBCS
NT-PROBNP SERPL-MCNC: 4752 PG/ML
PH UR STRIP.AUTO: 6.5 [PH] (ref 4.5–8)
PLATELET # BLD AUTO: 227 THOUSANDS/UL (ref 149–390)
PMV BLD AUTO: 10.9 FL (ref 8.9–12.7)
POTASSIUM SERPL-SCNC: 4.8 MMOL/L (ref 3.5–5.3)
PROT SERPL-MCNC: 7.3 G/DL (ref 6.4–8.2)
PROT UR STRIP-MCNC: NEGATIVE MG/DL
RBC # BLD AUTO: 3.4 MILLION/UL (ref 3.88–5.62)
RBC #/AREA URNS AUTO: ABNORMAL /HPF
SODIUM SERPL-SCNC: 131 MMOL/L (ref 136–145)
SP GR UR STRIP.AUTO: 1.01 (ref 1–1.03)
TROPONIN I SERPL-MCNC: 0.09 NG/ML
TROPONIN I SERPL-MCNC: 0.09 NG/ML
TROPONIN I SERPL-MCNC: 0.1 NG/ML
UROBILINOGEN UR QL STRIP.AUTO: 0.2 E.U./DL
WBC # BLD AUTO: 6.72 THOUSAND/UL (ref 4.31–10.16)
WBC #/AREA URNS AUTO: ABNORMAL /HPF

## 2019-04-19 PROCEDURE — 83735 ASSAY OF MAGNESIUM: CPT | Performed by: STUDENT IN AN ORGANIZED HEALTH CARE EDUCATION/TRAINING PROGRAM

## 2019-04-19 PROCEDURE — 99220 PR INITIAL OBSERVATION CARE/DAY 70 MINUTES: CPT | Performed by: INTERNAL MEDICINE

## 2019-04-19 PROCEDURE — 84484 ASSAY OF TROPONIN QUANT: CPT | Performed by: PHYSICIAN ASSISTANT

## 2019-04-19 PROCEDURE — 80053 COMPREHEN METABOLIC PANEL: CPT | Performed by: EMERGENCY MEDICINE

## 2019-04-19 PROCEDURE — 94760 N-INVAS EAR/PLS OXIMETRY 1: CPT

## 2019-04-19 PROCEDURE — 84484 ASSAY OF TROPONIN QUANT: CPT | Performed by: EMERGENCY MEDICINE

## 2019-04-19 PROCEDURE — 87086 URINE CULTURE/COLONY COUNT: CPT

## 2019-04-19 PROCEDURE — 99285 EMERGENCY DEPT VISIT HI MDM: CPT

## 2019-04-19 PROCEDURE — 82948 REAGENT STRIP/BLOOD GLUCOSE: CPT

## 2019-04-19 PROCEDURE — 36415 COLL VENOUS BLD VENIPUNCTURE: CPT

## 2019-04-19 PROCEDURE — 74176 CT ABD & PELVIS W/O CONTRAST: CPT

## 2019-04-19 PROCEDURE — 93005 ELECTROCARDIOGRAM TRACING: CPT

## 2019-04-19 PROCEDURE — 83690 ASSAY OF LIPASE: CPT | Performed by: STUDENT IN AN ORGANIZED HEALTH CARE EDUCATION/TRAINING PROGRAM

## 2019-04-19 PROCEDURE — 85025 COMPLETE CBC W/AUTO DIFF WBC: CPT | Performed by: EMERGENCY MEDICINE

## 2019-04-19 PROCEDURE — 81001 URINALYSIS AUTO W/SCOPE: CPT

## 2019-04-19 PROCEDURE — 83880 ASSAY OF NATRIURETIC PEPTIDE: CPT | Performed by: STUDENT IN AN ORGANIZED HEALTH CARE EDUCATION/TRAINING PROGRAM

## 2019-04-19 PROCEDURE — 87186 SC STD MICRODIL/AGAR DIL: CPT

## 2019-04-19 PROCEDURE — 84484 ASSAY OF TROPONIN QUANT: CPT | Performed by: STUDENT IN AN ORGANIZED HEALTH CARE EDUCATION/TRAINING PROGRAM

## 2019-04-19 PROCEDURE — 87077 CULTURE AEROBIC IDENTIFY: CPT

## 2019-04-19 PROCEDURE — 99285 EMERGENCY DEPT VISIT HI MDM: CPT | Performed by: EMERGENCY MEDICINE

## 2019-04-19 PROCEDURE — 71046 X-RAY EXAM CHEST 2 VIEWS: CPT

## 2019-04-19 RX ORDER — CALCIUM CARBONATE 200(500)MG
1000 TABLET,CHEWABLE ORAL DAILY PRN
Status: DISCONTINUED | OUTPATIENT
Start: 2019-04-19 | End: 2019-04-22 | Stop reason: HOSPADM

## 2019-04-19 RX ORDER — ASPIRIN 81 MG/1
81 TABLET ORAL DAILY
Status: DISCONTINUED | OUTPATIENT
Start: 2019-04-20 | End: 2019-04-22 | Stop reason: HOSPADM

## 2019-04-19 RX ORDER — ACETAMINOPHEN 325 MG/1
650 TABLET ORAL EVERY 6 HOURS PRN
Status: DISCONTINUED | OUTPATIENT
Start: 2019-04-19 | End: 2019-04-22 | Stop reason: HOSPADM

## 2019-04-19 RX ORDER — CHLORAL HYDRATE 500 MG
1000 CAPSULE ORAL DAILY
COMMUNITY

## 2019-04-19 RX ORDER — LIDOCAINE 50 MG/G
1 PATCH TOPICAL DAILY
Status: DISCONTINUED | OUTPATIENT
Start: 2019-04-20 | End: 2019-04-22 | Stop reason: HOSPADM

## 2019-04-19 RX ORDER — OXYBUTYNIN CHLORIDE 5 MG/1
5 TABLET, EXTENDED RELEASE ORAL DAILY
Status: DISCONTINUED | OUTPATIENT
Start: 2019-04-20 | End: 2019-04-22 | Stop reason: HOSPADM

## 2019-04-19 RX ORDER — ONDANSETRON 2 MG/ML
4 INJECTION INTRAMUSCULAR; INTRAVENOUS EVERY 6 HOURS PRN
Status: DISCONTINUED | OUTPATIENT
Start: 2019-04-19 | End: 2019-04-22 | Stop reason: HOSPADM

## 2019-04-19 RX ORDER — FINASTERIDE 5 MG/1
5 TABLET, FILM COATED ORAL DAILY
Status: DISCONTINUED | OUTPATIENT
Start: 2019-04-20 | End: 2019-04-22 | Stop reason: HOSPADM

## 2019-04-19 RX ORDER — ATORVASTATIN CALCIUM 20 MG/1
20 TABLET, FILM COATED ORAL
Status: DISCONTINUED | OUTPATIENT
Start: 2019-04-20 | End: 2019-04-22 | Stop reason: HOSPADM

## 2019-04-19 RX ORDER — METOLAZONE 2.5 MG/1
2.5 TABLET ORAL
Status: DISCONTINUED | OUTPATIENT
Start: 2019-04-23 | End: 2019-04-22 | Stop reason: HOSPADM

## 2019-04-19 RX ORDER — HEPARIN SODIUM 5000 [USP'U]/ML
5000 INJECTION, SOLUTION INTRAVENOUS; SUBCUTANEOUS EVERY 8 HOURS SCHEDULED
Status: DISCONTINUED | OUTPATIENT
Start: 2019-04-19 | End: 2019-04-19

## 2019-04-19 RX ORDER — CHOLECALCIFEROL (VITAMIN D3) 125 MCG
3000 CAPSULE ORAL
Status: DISCONTINUED | OUTPATIENT
Start: 2019-04-20 | End: 2019-04-22 | Stop reason: HOSPADM

## 2019-04-19 RX ORDER — TAMSULOSIN HYDROCHLORIDE 0.4 MG/1
0.4 CAPSULE ORAL
Status: DISCONTINUED | OUTPATIENT
Start: 2019-04-20 | End: 2019-04-22 | Stop reason: HOSPADM

## 2019-04-19 RX ORDER — TRAMADOL HYDROCHLORIDE 50 MG/1
50 TABLET ORAL ONCE
Status: COMPLETED | OUTPATIENT
Start: 2019-04-19 | End: 2019-04-19

## 2019-04-19 RX ORDER — NITROGLYCERIN 0.4 MG/1
0.4 TABLET SUBLINGUAL
Status: DISCONTINUED | OUTPATIENT
Start: 2019-04-19 | End: 2019-04-22 | Stop reason: HOSPADM

## 2019-04-19 RX ORDER — MUSCLE RUB CREAM 100; 150 MG/G; MG/G
CREAM TOPICAL 4 TIMES DAILY PRN
Status: DISCONTINUED | OUTPATIENT
Start: 2019-04-19 | End: 2019-04-22 | Stop reason: HOSPADM

## 2019-04-19 RX ORDER — GABAPENTIN 300 MG/1
300 CAPSULE ORAL
Status: DISCONTINUED | OUTPATIENT
Start: 2019-04-19 | End: 2019-04-22 | Stop reason: HOSPADM

## 2019-04-19 RX ORDER — INSULIN GLARGINE 100 [IU]/ML
12 INJECTION, SOLUTION SUBCUTANEOUS
Status: DISCONTINUED | OUTPATIENT
Start: 2019-04-19 | End: 2019-04-22 | Stop reason: HOSPADM

## 2019-04-19 RX ORDER — POTASSIUM CHLORIDE 20 MEQ/1
40 TABLET, EXTENDED RELEASE ORAL 3 TIMES DAILY
Status: DISCONTINUED | OUTPATIENT
Start: 2019-04-19 | End: 2019-04-22

## 2019-04-19 RX ORDER — ALPRAZOLAM 0.5 MG/1
0.5 TABLET ORAL 4 TIMES DAILY PRN
Status: DISCONTINUED | OUTPATIENT
Start: 2019-04-19 | End: 2019-04-22 | Stop reason: HOSPADM

## 2019-04-19 RX ORDER — POLYETHYLENE GLYCOL 3350 17 G/17G
17 POWDER, FOR SOLUTION ORAL DAILY PRN
Status: DISCONTINUED | OUTPATIENT
Start: 2019-04-19 | End: 2019-04-22 | Stop reason: HOSPADM

## 2019-04-19 RX ORDER — CARVEDILOL 3.12 MG/1
3.12 TABLET ORAL 2 TIMES DAILY WITH MEALS
Status: DISCONTINUED | OUTPATIENT
Start: 2019-04-20 | End: 2019-04-22 | Stop reason: HOSPADM

## 2019-04-19 RX ORDER — GABAPENTIN 100 MG/1
100 CAPSULE ORAL DAILY
Status: DISCONTINUED | OUTPATIENT
Start: 2019-04-20 | End: 2019-04-22 | Stop reason: HOSPADM

## 2019-04-19 RX ORDER — FUROSEMIDE 10 MG/ML
80 INJECTION INTRAMUSCULAR; INTRAVENOUS
Status: DISCONTINUED | OUTPATIENT
Start: 2019-04-19 | End: 2019-04-20

## 2019-04-19 RX ORDER — IPRATROPIUM BROMIDE AND ALBUTEROL SULFATE 2.5; .5 MG/3ML; MG/3ML
3 SOLUTION RESPIRATORY (INHALATION) EVERY 6 HOURS PRN
Status: DISCONTINUED | OUTPATIENT
Start: 2019-04-19 | End: 2019-04-20

## 2019-04-19 RX ORDER — FUROSEMIDE 10 MG/ML
40 INJECTION INTRAMUSCULAR; INTRAVENOUS
Status: DISCONTINUED | OUTPATIENT
Start: 2019-04-19 | End: 2019-04-19

## 2019-04-19 RX ADMIN — FUROSEMIDE 80 MG: 10 INJECTION, SOLUTION INTRAMUSCULAR; INTRAVENOUS at 22:07

## 2019-04-19 RX ADMIN — POTASSIUM CHLORIDE 40 MEQ: 1500 TABLET, EXTENDED RELEASE ORAL at 22:07

## 2019-04-19 RX ADMIN — APIXABAN 2.5 MG: 2.5 TABLET, FILM COATED ORAL at 22:11

## 2019-04-19 RX ADMIN — CEFEPIME HYDROCHLORIDE 1000 MG: 1 INJECTION, POWDER, FOR SOLUTION INTRAMUSCULAR; INTRAVENOUS at 22:07

## 2019-04-19 RX ADMIN — INSULIN LISPRO 1 UNITS: 100 INJECTION, SOLUTION INTRAVENOUS; SUBCUTANEOUS at 23:05

## 2019-04-19 RX ADMIN — TRAMADOL HYDROCHLORIDE 50 MG: 50 TABLET, COATED ORAL at 22:55

## 2019-04-19 RX ADMIN — INSULIN GLARGINE 12 UNITS: 100 INJECTION, SOLUTION SUBCUTANEOUS at 22:06

## 2019-04-19 RX ADMIN — GABAPENTIN 300 MG: 300 CAPSULE ORAL at 22:07

## 2019-04-20 LAB
ANION GAP SERPL CALCULATED.3IONS-SCNC: 7 MMOL/L (ref 4–13)
ATRIAL RATE: 214 BPM
ATRIAL RATE: 300 BPM
ATRIAL RATE: 394 BPM
ATRIAL RATE: 61 BPM
BUN SERPL-MCNC: 29 MG/DL (ref 5–25)
CALCIUM SERPL-MCNC: 8.6 MG/DL (ref 8.3–10.1)
CHLORIDE SERPL-SCNC: 95 MMOL/L (ref 100–108)
CHOLEST SERPL-MCNC: 120 MG/DL (ref 50–200)
CO2 SERPL-SCNC: 35 MMOL/L (ref 21–32)
CREAT SERPL-MCNC: 1.6 MG/DL (ref 0.6–1.3)
ERYTHROCYTE [DISTWIDTH] IN BLOOD BY AUTOMATED COUNT: 15.9 % (ref 11.6–15.1)
GFR SERPL CREATININE-BSD FRML MDRD: 38 ML/MIN/1.73SQ M
GLUCOSE SERPL-MCNC: 174 MG/DL (ref 65–140)
GLUCOSE SERPL-MCNC: 187 MG/DL (ref 65–140)
GLUCOSE SERPL-MCNC: 190 MG/DL (ref 65–140)
GLUCOSE SERPL-MCNC: 214 MG/DL (ref 65–140)
GLUCOSE SERPL-MCNC: 278 MG/DL (ref 65–140)
HCT VFR BLD AUTO: 34.5 % (ref 36.5–49.3)
HDLC SERPL-MCNC: 43 MG/DL (ref 40–60)
HGB BLD-MCNC: 10.8 G/DL (ref 12–17)
LDLC SERPL CALC-MCNC: 61 MG/DL (ref 0–100)
MCH RBC QN AUTO: 30.9 PG (ref 26.8–34.3)
MCHC RBC AUTO-ENTMCNC: 31.3 G/DL (ref 31.4–37.4)
MCV RBC AUTO: 99 FL (ref 82–98)
PLATELET # BLD AUTO: 208 THOUSANDS/UL (ref 149–390)
PMV BLD AUTO: 10.3 FL (ref 8.9–12.7)
POTASSIUM SERPL-SCNC: 3.3 MMOL/L (ref 3.5–5.3)
QRS AXIS: -88 DEGREES
QRS AXIS: 267 DEGREES
QRS AXIS: 270 DEGREES
QRS AXIS: 270 DEGREES
QRSD INTERVAL: 158 MS
QRSD INTERVAL: 164 MS
QRSD INTERVAL: 168 MS
QRSD INTERVAL: 170 MS
QT INTERVAL: 456 MS
QT INTERVAL: 468 MS
QT INTERVAL: 482 MS
QT INTERVAL: 488 MS
QTC INTERVAL: 501 MS
QTC INTERVAL: 502 MS
QTC INTERVAL: 516 MS
QTC INTERVAL: 518 MS
RBC # BLD AUTO: 3.49 MILLION/UL (ref 3.88–5.62)
SODIUM SERPL-SCNC: 137 MMOL/L (ref 136–145)
T WAVE AXIS: 88 DEGREES
T WAVE AXIS: 93 DEGREES
T WAVE AXIS: 94 DEGREES
T WAVE AXIS: 94 DEGREES
TRIGL SERPL-MCNC: 78 MG/DL
TROPONIN I SERPL-MCNC: 0.12 NG/ML
VENTRICULAR RATE: 68 BPM
VENTRICULAR RATE: 69 BPM
VENTRICULAR RATE: 69 BPM
VENTRICULAR RATE: 73 BPM
WBC # BLD AUTO: 5.7 THOUSAND/UL (ref 4.31–10.16)

## 2019-04-20 PROCEDURE — 80061 LIPID PANEL: CPT | Performed by: PHYSICIAN ASSISTANT

## 2019-04-20 PROCEDURE — 99232 SBSQ HOSP IP/OBS MODERATE 35: CPT | Performed by: PHYSICIAN ASSISTANT

## 2019-04-20 PROCEDURE — 80048 BASIC METABOLIC PNL TOTAL CA: CPT | Performed by: PHYSICIAN ASSISTANT

## 2019-04-20 PROCEDURE — 82948 REAGENT STRIP/BLOOD GLUCOSE: CPT

## 2019-04-20 PROCEDURE — 85027 COMPLETE CBC AUTOMATED: CPT | Performed by: PHYSICIAN ASSISTANT

## 2019-04-20 PROCEDURE — 99222 1ST HOSP IP/OBS MODERATE 55: CPT | Performed by: INTERNAL MEDICINE

## 2019-04-20 PROCEDURE — 93010 ELECTROCARDIOGRAM REPORT: CPT | Performed by: INTERNAL MEDICINE

## 2019-04-20 PROCEDURE — 84484 ASSAY OF TROPONIN QUANT: CPT | Performed by: INTERNAL MEDICINE

## 2019-04-20 RX ORDER — POTASSIUM CHLORIDE 20 MEQ/1
40 TABLET, EXTENDED RELEASE ORAL ONCE
Status: COMPLETED | OUTPATIENT
Start: 2019-04-20 | End: 2019-04-20

## 2019-04-20 RX ORDER — BUMETANIDE 0.25 MG/ML
2 INJECTION, SOLUTION INTRAMUSCULAR; INTRAVENOUS 2 TIMES DAILY
Status: DISCONTINUED | OUTPATIENT
Start: 2019-04-20 | End: 2019-04-22

## 2019-04-20 RX ORDER — METOLAZONE 2.5 MG/1
2.5 TABLET ORAL ONCE
Status: COMPLETED | OUTPATIENT
Start: 2019-04-20 | End: 2019-04-20

## 2019-04-20 RX ORDER — BUMETANIDE 0.25 MG/ML
3 INJECTION, SOLUTION INTRAMUSCULAR; INTRAVENOUS 2 TIMES DAILY
Status: DISCONTINUED | OUTPATIENT
Start: 2019-04-20 | End: 2019-04-20

## 2019-04-20 RX ADMIN — POTASSIUM CHLORIDE 40 MEQ: 1500 TABLET, EXTENDED RELEASE ORAL at 11:47

## 2019-04-20 RX ADMIN — METOLAZONE 2.5 MG: 2.5 TABLET ORAL at 13:43

## 2019-04-20 RX ADMIN — FINASTERIDE 5 MG: 5 TABLET, FILM COATED ORAL at 09:32

## 2019-04-20 RX ADMIN — LIDOCAINE 1 PATCH: 50 PATCH CUTANEOUS at 09:31

## 2019-04-20 RX ADMIN — LACTASE TAB 3000 UNIT 3000 UNITS: 3000 TAB at 15:37

## 2019-04-20 RX ADMIN — ASPIRIN 81 MG: 81 TABLET, COATED ORAL at 09:28

## 2019-04-20 RX ADMIN — OXYBUTYNIN CHLORIDE 5 MG: 5 TABLET, EXTENDED RELEASE ORAL at 09:28

## 2019-04-20 RX ADMIN — POTASSIUM CHLORIDE 40 MEQ: 1500 TABLET, EXTENDED RELEASE ORAL at 09:28

## 2019-04-20 RX ADMIN — LACTASE TAB 3000 UNIT 3000 UNITS: 3000 TAB at 11:48

## 2019-04-20 RX ADMIN — CARVEDILOL 3.12 MG: 3.12 TABLET, FILM COATED ORAL at 15:34

## 2019-04-20 RX ADMIN — INSULIN LISPRO 1 UNITS: 100 INJECTION, SOLUTION INTRAVENOUS; SUBCUTANEOUS at 22:11

## 2019-04-20 RX ADMIN — CEFEPIME HYDROCHLORIDE 1000 MG: 1 INJECTION, POWDER, FOR SOLUTION INTRAMUSCULAR; INTRAVENOUS at 22:10

## 2019-04-20 RX ADMIN — LACTASE TAB 3000 UNIT 3000 UNITS: 3000 TAB at 07:53

## 2019-04-20 RX ADMIN — APIXABAN 2.5 MG: 2.5 TABLET, FILM COATED ORAL at 09:28

## 2019-04-20 RX ADMIN — POTASSIUM CHLORIDE 40 MEQ: 1500 TABLET, EXTENDED RELEASE ORAL at 22:10

## 2019-04-20 RX ADMIN — BUMETANIDE 3 MG: 0.25 INJECTION INTRAMUSCULAR; INTRAVENOUS at 13:43

## 2019-04-20 RX ADMIN — GABAPENTIN 300 MG: 300 CAPSULE ORAL at 22:10

## 2019-04-20 RX ADMIN — TAMSULOSIN HYDROCHLORIDE 0.4 MG: 0.4 CAPSULE ORAL at 15:34

## 2019-04-20 RX ADMIN — ATORVASTATIN CALCIUM 20 MG: 20 TABLET, FILM COATED ORAL at 15:35

## 2019-04-20 RX ADMIN — FUROSEMIDE 80 MG: 10 INJECTION, SOLUTION INTRAMUSCULAR; INTRAVENOUS at 07:48

## 2019-04-20 RX ADMIN — INSULIN LISPRO 1 UNITS: 100 INJECTION, SOLUTION INTRAVENOUS; SUBCUTANEOUS at 15:38

## 2019-04-20 RX ADMIN — POTASSIUM CHLORIDE 40 MEQ: 1500 TABLET, EXTENDED RELEASE ORAL at 15:34

## 2019-04-20 RX ADMIN — INSULIN GLARGINE 12 UNITS: 100 INJECTION, SOLUTION SUBCUTANEOUS at 22:10

## 2019-04-20 RX ADMIN — INSULIN LISPRO 2 UNITS: 100 INJECTION, SOLUTION INTRAVENOUS; SUBCUTANEOUS at 11:48

## 2019-04-20 RX ADMIN — CARVEDILOL 3.12 MG: 3.12 TABLET, FILM COATED ORAL at 07:52

## 2019-04-20 RX ADMIN — INSULIN LISPRO 1 UNITS: 100 INJECTION, SOLUTION INTRAVENOUS; SUBCUTANEOUS at 07:47

## 2019-04-20 RX ADMIN — APIXABAN 2.5 MG: 2.5 TABLET, FILM COATED ORAL at 17:19

## 2019-04-20 RX ADMIN — BUMETANIDE 2 MG: 0.25 INJECTION INTRAMUSCULAR; INTRAVENOUS at 17:20

## 2019-04-20 RX ADMIN — GABAPENTIN 100 MG: 100 CAPSULE ORAL at 09:28

## 2019-04-21 ENCOUNTER — APPOINTMENT (INPATIENT)
Dept: NON INVASIVE DIAGNOSTICS | Facility: HOSPITAL | Age: 84
DRG: 280 | End: 2019-04-21
Payer: MEDICARE

## 2019-04-21 LAB
ANION GAP SERPL CALCULATED.3IONS-SCNC: 5 MMOL/L (ref 4–13)
BACTERIA UR CULT: ABNORMAL
BUN SERPL-MCNC: 29 MG/DL (ref 5–25)
CALCIUM SERPL-MCNC: 9.1 MG/DL (ref 8.3–10.1)
CHLORIDE SERPL-SCNC: 93 MMOL/L (ref 100–108)
CO2 SERPL-SCNC: 38 MMOL/L (ref 21–32)
CREAT SERPL-MCNC: 1.64 MG/DL (ref 0.6–1.3)
GFR SERPL CREATININE-BSD FRML MDRD: 37 ML/MIN/1.73SQ M
GLUCOSE SERPL-MCNC: 142 MG/DL (ref 65–140)
GLUCOSE SERPL-MCNC: 150 MG/DL (ref 65–140)
GLUCOSE SERPL-MCNC: 170 MG/DL (ref 65–140)
GLUCOSE SERPL-MCNC: 193 MG/DL (ref 65–140)
GLUCOSE SERPL-MCNC: 291 MG/DL (ref 65–140)
POTASSIUM SERPL-SCNC: 4.1 MMOL/L (ref 3.5–5.3)
SODIUM SERPL-SCNC: 136 MMOL/L (ref 136–145)

## 2019-04-21 PROCEDURE — 93308 TTE F-UP OR LMTD: CPT

## 2019-04-21 PROCEDURE — 80048 BASIC METABOLIC PNL TOTAL CA: CPT | Performed by: PHYSICIAN ASSISTANT

## 2019-04-21 PROCEDURE — 99232 SBSQ HOSP IP/OBS MODERATE 35: CPT | Performed by: PHYSICIAN ASSISTANT

## 2019-04-21 PROCEDURE — 93325 DOPPLER ECHO COLOR FLOW MAPG: CPT | Performed by: INTERNAL MEDICINE

## 2019-04-21 PROCEDURE — 99232 SBSQ HOSP IP/OBS MODERATE 35: CPT | Performed by: INTERNAL MEDICINE

## 2019-04-21 PROCEDURE — 93308 TTE F-UP OR LMTD: CPT | Performed by: INTERNAL MEDICINE

## 2019-04-21 PROCEDURE — 82948 REAGENT STRIP/BLOOD GLUCOSE: CPT

## 2019-04-21 PROCEDURE — 93321 DOPPLER ECHO F-UP/LMTD STD: CPT | Performed by: INTERNAL MEDICINE

## 2019-04-21 RX ADMIN — ACETAMINOPHEN 650 MG: 325 TABLET, FILM COATED ORAL at 19:37

## 2019-04-21 RX ADMIN — BUMETANIDE 2 MG: 0.25 INJECTION INTRAMUSCULAR; INTRAVENOUS at 08:02

## 2019-04-21 RX ADMIN — INSULIN LISPRO 2 UNITS: 100 INJECTION, SOLUTION INTRAVENOUS; SUBCUTANEOUS at 12:01

## 2019-04-21 RX ADMIN — GABAPENTIN 100 MG: 100 CAPSULE ORAL at 08:07

## 2019-04-21 RX ADMIN — POTASSIUM CHLORIDE 40 MEQ: 1500 TABLET, EXTENDED RELEASE ORAL at 21:58

## 2019-04-21 RX ADMIN — GABAPENTIN 300 MG: 300 CAPSULE ORAL at 21:58

## 2019-04-21 RX ADMIN — OXYBUTYNIN CHLORIDE 5 MG: 5 TABLET, EXTENDED RELEASE ORAL at 08:07

## 2019-04-21 RX ADMIN — CARVEDILOL 3.12 MG: 3.12 TABLET, FILM COATED ORAL at 08:07

## 2019-04-21 RX ADMIN — ATORVASTATIN CALCIUM 20 MG: 20 TABLET, FILM COATED ORAL at 15:54

## 2019-04-21 RX ADMIN — BUMETANIDE 2 MG: 0.25 INJECTION INTRAMUSCULAR; INTRAVENOUS at 18:08

## 2019-04-21 RX ADMIN — POTASSIUM CHLORIDE 40 MEQ: 1500 TABLET, EXTENDED RELEASE ORAL at 08:07

## 2019-04-21 RX ADMIN — PERFLUTREN 0.8 ML/MIN: 6.52 INJECTION, SUSPENSION INTRAVENOUS at 12:57

## 2019-04-21 RX ADMIN — TAMSULOSIN HYDROCHLORIDE 0.4 MG: 0.4 CAPSULE ORAL at 15:55

## 2019-04-21 RX ADMIN — ASPIRIN 81 MG: 81 TABLET, COATED ORAL at 08:07

## 2019-04-21 RX ADMIN — LIDOCAINE 1 PATCH: 50 PATCH CUTANEOUS at 08:09

## 2019-04-21 RX ADMIN — INSULIN LISPRO 1 UNITS: 100 INJECTION, SOLUTION INTRAVENOUS; SUBCUTANEOUS at 21:58

## 2019-04-21 RX ADMIN — CARVEDILOL 3.12 MG: 3.12 TABLET, FILM COATED ORAL at 15:55

## 2019-04-21 RX ADMIN — CEFEPIME HYDROCHLORIDE 1000 MG: 1 INJECTION, POWDER, FOR SOLUTION INTRAMUSCULAR; INTRAVENOUS at 21:59

## 2019-04-21 RX ADMIN — INSULIN LISPRO 1 UNITS: 100 INJECTION, SOLUTION INTRAVENOUS; SUBCUTANEOUS at 07:59

## 2019-04-21 RX ADMIN — FINASTERIDE 5 MG: 5 TABLET, FILM COATED ORAL at 08:06

## 2019-04-21 RX ADMIN — APIXABAN 2.5 MG: 2.5 TABLET, FILM COATED ORAL at 08:07

## 2019-04-21 RX ADMIN — LACTASE TAB 3000 UNIT 3000 UNITS: 3000 TAB at 12:01

## 2019-04-21 RX ADMIN — LACTASE TAB 3000 UNIT 3000 UNITS: 3000 TAB at 08:08

## 2019-04-21 RX ADMIN — POTASSIUM CHLORIDE 40 MEQ: 1500 TABLET, EXTENDED RELEASE ORAL at 15:54

## 2019-04-21 RX ADMIN — APIXABAN 2.5 MG: 2.5 TABLET, FILM COATED ORAL at 18:07

## 2019-04-21 RX ADMIN — INSULIN GLARGINE 12 UNITS: 100 INJECTION, SOLUTION SUBCUTANEOUS at 21:58

## 2019-04-21 RX ADMIN — INSULIN LISPRO 1 UNITS: 100 INJECTION, SOLUTION INTRAVENOUS; SUBCUTANEOUS at 15:56

## 2019-04-21 RX ADMIN — LACTASE TAB 3000 UNIT 3000 UNITS: 3000 TAB at 15:56

## 2019-04-22 ENCOUNTER — EPISODE CHANGES (OUTPATIENT)
Dept: CASE MANAGEMENT | Facility: HOSPITAL | Age: 84
End: 2019-04-22

## 2019-04-22 VITALS
RESPIRATION RATE: 20 BRPM | BODY MASS INDEX: 27.96 KG/M2 | HEIGHT: 69 IN | SYSTOLIC BLOOD PRESSURE: 124 MMHG | OXYGEN SATURATION: 99 % | WEIGHT: 188.8 LBS | DIASTOLIC BLOOD PRESSURE: 60 MMHG | TEMPERATURE: 97.5 F | HEART RATE: 76 BPM

## 2019-04-22 LAB
ANION GAP SERPL CALCULATED.3IONS-SCNC: 0 MMOL/L (ref 4–13)
BUN SERPL-MCNC: 35 MG/DL (ref 5–25)
CALCIUM SERPL-MCNC: 9.1 MG/DL (ref 8.3–10.1)
CHLORIDE SERPL-SCNC: 93 MMOL/L (ref 100–108)
CO2 SERPL-SCNC: 39 MMOL/L (ref 21–32)
CREAT SERPL-MCNC: 1.74 MG/DL (ref 0.6–1.3)
GFR SERPL CREATININE-BSD FRML MDRD: 34 ML/MIN/1.73SQ M
GLUCOSE SERPL-MCNC: 145 MG/DL (ref 65–140)
GLUCOSE SERPL-MCNC: 147 MG/DL (ref 65–140)
POTASSIUM SERPL-SCNC: 3.9 MMOL/L (ref 3.5–5.3)
SODIUM SERPL-SCNC: 132 MMOL/L (ref 136–145)

## 2019-04-22 PROCEDURE — G8979 MOBILITY GOAL STATUS: HCPCS

## 2019-04-22 PROCEDURE — 82948 REAGENT STRIP/BLOOD GLUCOSE: CPT

## 2019-04-22 PROCEDURE — 80048 BASIC METABOLIC PNL TOTAL CA: CPT | Performed by: PHYSICIAN ASSISTANT

## 2019-04-22 PROCEDURE — 99232 SBSQ HOSP IP/OBS MODERATE 35: CPT | Performed by: INTERNAL MEDICINE

## 2019-04-22 PROCEDURE — G8978 MOBILITY CURRENT STATUS: HCPCS

## 2019-04-22 PROCEDURE — 97162 PT EVAL MOD COMPLEX 30 MIN: CPT

## 2019-04-22 PROCEDURE — G8988 SELF CARE GOAL STATUS: HCPCS

## 2019-04-22 PROCEDURE — 97167 OT EVAL HIGH COMPLEX 60 MIN: CPT

## 2019-04-22 PROCEDURE — G8987 SELF CARE CURRENT STATUS: HCPCS

## 2019-04-22 PROCEDURE — G8980 MOBILITY D/C STATUS: HCPCS

## 2019-04-22 RX ORDER — TORSEMIDE 20 MG/1
40 TABLET ORAL 2 TIMES DAILY
Status: DISCONTINUED | OUTPATIENT
Start: 2019-04-22 | End: 2019-04-22 | Stop reason: HOSPADM

## 2019-04-22 RX ORDER — POTASSIUM CHLORIDE 20 MEQ/1
40 TABLET, EXTENDED RELEASE ORAL 2 TIMES DAILY WITH MEALS
Status: DISCONTINUED | OUTPATIENT
Start: 2019-04-22 | End: 2019-04-22 | Stop reason: HOSPADM

## 2019-04-22 RX ORDER — LANOLIN ALCOHOL/MO/W.PET/CERES
3 CREAM (GRAM) TOPICAL
Status: DISCONTINUED | OUTPATIENT
Start: 2019-04-22 | End: 2019-04-22 | Stop reason: HOSPADM

## 2019-04-22 RX ORDER — LANOLIN ALCOHOL/MO/W.PET/CERES
3 CREAM (GRAM) TOPICAL
Qty: 15 TABLET | Refills: 0 | Status: SHIPPED | OUTPATIENT
Start: 2019-04-22

## 2019-04-22 RX ADMIN — CARVEDILOL 3.12 MG: 3.12 TABLET, FILM COATED ORAL at 06:36

## 2019-04-22 RX ADMIN — LACTASE TAB 3000 UNIT 3000 UNITS: 3000 TAB at 06:36

## 2019-04-22 RX ADMIN — APIXABAN 2.5 MG: 2.5 TABLET, FILM COATED ORAL at 08:53

## 2019-04-22 RX ADMIN — OXYBUTYNIN CHLORIDE 5 MG: 5 TABLET, EXTENDED RELEASE ORAL at 08:54

## 2019-04-22 RX ADMIN — POTASSIUM CHLORIDE 40 MEQ: 1500 TABLET, EXTENDED RELEASE ORAL at 08:53

## 2019-04-22 RX ADMIN — ASPIRIN 81 MG: 81 TABLET, COATED ORAL at 08:53

## 2019-04-22 RX ADMIN — FINASTERIDE 5 MG: 5 TABLET, FILM COATED ORAL at 08:53

## 2019-04-22 RX ADMIN — GABAPENTIN 100 MG: 100 CAPSULE ORAL at 08:53

## 2019-04-22 RX ADMIN — TORSEMIDE 40 MG: 20 TABLET ORAL at 08:53

## 2019-04-22 RX ADMIN — LIDOCAINE 1 PATCH: 50 PATCH CUTANEOUS at 08:53

## 2019-04-23 ENCOUNTER — TELEPHONE (OUTPATIENT)
Dept: CARDIOLOGY CLINIC | Facility: CLINIC | Age: 84
End: 2019-04-23

## 2019-04-26 ENCOUNTER — PATIENT OUTREACH (OUTPATIENT)
Dept: CASE MANAGEMENT | Facility: OTHER | Age: 84
End: 2019-04-26

## 2019-05-02 ENCOUNTER — OFFICE VISIT (OUTPATIENT)
Dept: CARDIOLOGY CLINIC | Facility: CLINIC | Age: 84
End: 2019-05-02
Payer: MEDICARE

## 2019-05-02 VITALS
BODY MASS INDEX: 28.39 KG/M2 | HEART RATE: 73 BPM | SYSTOLIC BLOOD PRESSURE: 102 MMHG | HEIGHT: 69 IN | WEIGHT: 191.7 LBS | OXYGEN SATURATION: 99 % | DIASTOLIC BLOOD PRESSURE: 54 MMHG

## 2019-05-02 DIAGNOSIS — I48.0 PAROXYSMAL ATRIAL FIBRILLATION (HCC): ICD-10-CM

## 2019-05-02 DIAGNOSIS — N18.30 STAGE 3 CHRONIC KIDNEY DISEASE (HCC): ICD-10-CM

## 2019-05-02 DIAGNOSIS — I50.32 CHRONIC DIASTOLIC CONGESTIVE HEART FAILURE (HCC): Primary | ICD-10-CM

## 2019-05-02 DIAGNOSIS — I10 HYPERTENSION, UNSPECIFIED TYPE: ICD-10-CM

## 2019-05-02 DIAGNOSIS — R07.9 CHEST PAIN, UNSPECIFIED TYPE: ICD-10-CM

## 2019-05-02 DIAGNOSIS — I34.0 MITRAL VALVE INSUFFICIENCY, UNSPECIFIED ETIOLOGY: ICD-10-CM

## 2019-05-02 PROCEDURE — 99215 OFFICE O/P EST HI 40 MIN: CPT | Performed by: NURSE PRACTITIONER

## 2019-05-02 RX ORDER — ISOSORBIDE MONONITRATE 30 MG/1
30 TABLET, EXTENDED RELEASE ORAL DAILY
Qty: 30 TABLET | Refills: 3 | Status: SHIPPED | OUTPATIENT
Start: 2019-05-02 | End: 2019-06-19 | Stop reason: HOSPADM

## 2019-05-10 ENCOUNTER — TELEPHONE (OUTPATIENT)
Dept: CARDIOLOGY CLINIC | Facility: CLINIC | Age: 84
End: 2019-05-10

## 2019-05-16 ENCOUNTER — OFFICE VISIT (OUTPATIENT)
Dept: CARDIOLOGY CLINIC | Facility: CLINIC | Age: 84
End: 2019-05-16
Payer: MEDICARE

## 2019-05-16 ENCOUNTER — IN-CLINIC DEVICE VISIT (OUTPATIENT)
Dept: CARDIOLOGY CLINIC | Facility: CLINIC | Age: 84
End: 2019-05-16
Payer: MEDICARE

## 2019-05-16 VITALS
HEART RATE: 66 BPM | WEIGHT: 192 LBS | DIASTOLIC BLOOD PRESSURE: 64 MMHG | SYSTOLIC BLOOD PRESSURE: 120 MMHG | BODY MASS INDEX: 28.44 KG/M2 | HEIGHT: 69 IN

## 2019-05-16 DIAGNOSIS — Z95.0 CARDIAC PACEMAKER IN SITU: Chronic | ICD-10-CM

## 2019-05-16 DIAGNOSIS — E78.5 DYSLIPIDEMIA: ICD-10-CM

## 2019-05-16 DIAGNOSIS — I25.10 CORONARY ARTERY DISEASE INVOLVING NATIVE CORONARY ARTERY OF NATIVE HEART WITHOUT ANGINA PECTORIS: Chronic | ICD-10-CM

## 2019-05-16 DIAGNOSIS — Z95.0 CARDIAC PACEMAKER IN SITU: ICD-10-CM

## 2019-05-16 DIAGNOSIS — Z95.5 STATUS POST INSERTION OF DRUG-ELUTING STENT INTO LEFT ANTERIOR DESCENDING (LAD) ARTERY: ICD-10-CM

## 2019-05-16 DIAGNOSIS — I49.5 SICK SINUS SYNDROME (HCC): ICD-10-CM

## 2019-05-16 DIAGNOSIS — I48.0 PAROXYSMAL ATRIAL FIBRILLATION (HCC): Chronic | ICD-10-CM

## 2019-05-16 DIAGNOSIS — Z45.018 ADJUSTMENT AND MANAGEMENT OF CARDIAC PACEMAKER: ICD-10-CM

## 2019-05-16 DIAGNOSIS — I50.32 CHRONIC DIASTOLIC (CONGESTIVE) HEART FAILURE (HCC): Primary | ICD-10-CM

## 2019-05-16 DIAGNOSIS — I44.2 AV BLOCK, COMPLETE (HCC): Primary | ICD-10-CM

## 2019-05-16 DIAGNOSIS — I10 BENIGN ESSENTIAL HYPERTENSION: ICD-10-CM

## 2019-05-16 PROCEDURE — 93279 PRGRMG DEV EVAL PM/LDLS PM: CPT | Performed by: INTERNAL MEDICINE

## 2019-05-16 PROCEDURE — 99214 OFFICE O/P EST MOD 30 MIN: CPT | Performed by: INTERNAL MEDICINE

## 2019-05-21 ENCOUNTER — TELEPHONE (OUTPATIENT)
Dept: CARDIOLOGY CLINIC | Facility: CLINIC | Age: 84
End: 2019-05-21

## 2019-06-04 DIAGNOSIS — I50.33 ACUTE ON CHRONIC DIASTOLIC (CONGESTIVE) HEART FAILURE (HCC): ICD-10-CM

## 2019-06-05 RX ORDER — TORSEMIDE 20 MG/1
TABLET ORAL
Qty: 224 TABLET | Refills: 4 | OUTPATIENT
Start: 2019-06-05

## 2019-06-06 ENCOUNTER — TELEPHONE (OUTPATIENT)
Dept: CARDIOLOGY CLINIC | Facility: CLINIC | Age: 84
End: 2019-06-06

## 2019-06-07 ENCOUNTER — TELEPHONE (OUTPATIENT)
Dept: CARDIOLOGY CLINIC | Facility: CLINIC | Age: 84
End: 2019-06-07

## 2019-06-07 DIAGNOSIS — I50.33 ACUTE ON CHRONIC DIASTOLIC (CONGESTIVE) HEART FAILURE (HCC): ICD-10-CM

## 2019-06-07 RX ORDER — METOLAZONE 2.5 MG/1
2.5 TABLET ORAL SEE ADMIN INSTRUCTIONS
Qty: 5 TABLET | Refills: 0 | Status: ON HOLD | OUTPATIENT
Start: 2019-06-07 | End: 2019-06-19 | Stop reason: SDUPTHER

## 2019-06-13 ENCOUNTER — HOSPITAL ENCOUNTER (INPATIENT)
Facility: HOSPITAL | Age: 84
LOS: 6 days | Discharge: HOME HEALTH | DRG: 291 | End: 2019-06-19
Attending: EMERGENCY MEDICINE | Admitting: HOSPITALIST
Payer: MEDICARE

## 2019-06-13 ENCOUNTER — APPOINTMENT (EMERGENCY)
Dept: RADIOLOGY | Facility: HOSPITAL | Age: 84
DRG: 291 | End: 2019-06-13
Payer: MEDICARE

## 2019-06-13 DIAGNOSIS — I50.33 ACUTE ON CHRONIC DIASTOLIC CHF (CONGESTIVE HEART FAILURE) (HCC): Primary | ICD-10-CM

## 2019-06-13 DIAGNOSIS — R06.00 DYSPNEA: ICD-10-CM

## 2019-06-13 DIAGNOSIS — R77.8 ELEVATED TROPONIN: ICD-10-CM

## 2019-06-13 DIAGNOSIS — I50.33 ACUTE ON CHRONIC DIASTOLIC CHF (CONGESTIVE HEART FAILURE) (HCC): ICD-10-CM

## 2019-06-13 DIAGNOSIS — I50.33 ACUTE ON CHRONIC DIASTOLIC (CONGESTIVE) HEART FAILURE (HCC): ICD-10-CM

## 2019-06-13 DIAGNOSIS — R07.9 CHEST PAIN, UNSPECIFIED TYPE: ICD-10-CM

## 2019-06-13 PROBLEM — N40.0 BENIGN PROSTATIC HYPERPLASIA: Chronic | Status: ACTIVE | Noted: 2018-07-10

## 2019-06-13 PROBLEM — R06.03 ACUTE RESPIRATORY DISTRESS: Status: ACTIVE | Noted: 2019-06-13

## 2019-06-13 PROBLEM — I49.5 SICK SINUS SYNDROME (HCC): Chronic | Status: ACTIVE | Noted: 2018-10-03

## 2019-06-13 PROBLEM — Z79.01 CHRONIC ANTICOAGULATION: Status: ACTIVE | Noted: 2019-06-13

## 2019-06-13 PROBLEM — Z79.01 CHRONIC ANTICOAGULATION: Chronic | Status: ACTIVE | Noted: 2019-06-13

## 2019-06-13 PROBLEM — F01.50 VASCULAR DEMENTIA WITHOUT BEHAVIORAL DISTURBANCE (HCC): Chronic | Status: ACTIVE | Noted: 2018-11-07

## 2019-06-13 LAB
ALBUMIN SERPL BCP-MCNC: 3.2 G/DL (ref 3.5–5)
ALP SERPL-CCNC: 117 U/L (ref 46–116)
ALT SERPL W P-5'-P-CCNC: 16 U/L (ref 12–78)
ANION GAP SERPL CALCULATED.3IONS-SCNC: 5 MMOL/L (ref 4–13)
AST SERPL W P-5'-P-CCNC: 13 U/L (ref 5–45)
BASOPHILS # BLD AUTO: 0.09 THOUSANDS/ΜL (ref 0–0.1)
BASOPHILS NFR BLD AUTO: 1 % (ref 0–1)
BILIRUB SERPL-MCNC: 0.61 MG/DL (ref 0.2–1)
BUN SERPL-MCNC: 34 MG/DL (ref 5–25)
CALCIUM SERPL-MCNC: 9 MG/DL (ref 8.3–10.1)
CHLORIDE SERPL-SCNC: 94 MMOL/L (ref 100–108)
CO2 SERPL-SCNC: 34 MMOL/L (ref 21–32)
CREAT SERPL-MCNC: 1.68 MG/DL (ref 0.6–1.3)
EOSINOPHIL # BLD AUTO: 0.23 THOUSAND/ΜL (ref 0–0.61)
EOSINOPHIL NFR BLD AUTO: 3 % (ref 0–6)
ERYTHROCYTE [DISTWIDTH] IN BLOOD BY AUTOMATED COUNT: 14.9 % (ref 11.6–15.1)
GFR SERPL CREATININE-BSD FRML MDRD: 36 ML/MIN/1.73SQ M
GLUCOSE SERPL-MCNC: 215 MG/DL (ref 65–140)
GLUCOSE SERPL-MCNC: 241 MG/DL (ref 65–140)
HCT VFR BLD AUTO: 35.8 % (ref 36.5–49.3)
HGB BLD-MCNC: 11.3 G/DL (ref 12–17)
IMM GRANULOCYTES # BLD AUTO: 0.04 THOUSAND/UL (ref 0–0.2)
IMM GRANULOCYTES NFR BLD AUTO: 1 % (ref 0–2)
LYMPHOCYTES # BLD AUTO: 0.67 THOUSANDS/ΜL (ref 0.6–4.47)
LYMPHOCYTES NFR BLD AUTO: 10 % (ref 14–44)
MCH RBC QN AUTO: 31.5 PG (ref 26.8–34.3)
MCHC RBC AUTO-ENTMCNC: 31.6 G/DL (ref 31.4–37.4)
MCV RBC AUTO: 100 FL (ref 82–98)
MONOCYTES # BLD AUTO: 0.71 THOUSAND/ΜL (ref 0.17–1.22)
MONOCYTES NFR BLD AUTO: 10 % (ref 4–12)
NEUTROPHILS # BLD AUTO: 5.06 THOUSANDS/ΜL (ref 1.85–7.62)
NEUTS SEG NFR BLD AUTO: 75 % (ref 43–75)
NRBC BLD AUTO-RTO: 0 /100 WBCS
NT-PROBNP SERPL-MCNC: 6294 PG/ML
PLATELET # BLD AUTO: 219 THOUSANDS/UL (ref 149–390)
PMV BLD AUTO: 10.7 FL (ref 8.9–12.7)
POTASSIUM SERPL-SCNC: 5.1 MMOL/L (ref 3.5–5.3)
PROT SERPL-MCNC: 7.6 G/DL (ref 6.4–8.2)
RBC # BLD AUTO: 3.59 MILLION/UL (ref 3.88–5.62)
SODIUM SERPL-SCNC: 133 MMOL/L (ref 136–145)
TROPONIN I SERPL-MCNC: 0.09 NG/ML
WBC # BLD AUTO: 6.8 THOUSAND/UL (ref 4.31–10.16)

## 2019-06-13 PROCEDURE — 96374 THER/PROPH/DIAG INJ IV PUSH: CPT

## 2019-06-13 PROCEDURE — 93005 ELECTROCARDIOGRAM TRACING: CPT

## 2019-06-13 PROCEDURE — 94640 AIRWAY INHALATION TREATMENT: CPT

## 2019-06-13 PROCEDURE — 85025 COMPLETE CBC W/AUTO DIFF WBC: CPT | Performed by: EMERGENCY MEDICINE

## 2019-06-13 PROCEDURE — 80053 COMPREHEN METABOLIC PANEL: CPT | Performed by: EMERGENCY MEDICINE

## 2019-06-13 PROCEDURE — 36415 COLL VENOUS BLD VENIPUNCTURE: CPT

## 2019-06-13 PROCEDURE — 82948 REAGENT STRIP/BLOOD GLUCOSE: CPT

## 2019-06-13 PROCEDURE — 71046 X-RAY EXAM CHEST 2 VIEWS: CPT

## 2019-06-13 PROCEDURE — 84484 ASSAY OF TROPONIN QUANT: CPT | Performed by: EMERGENCY MEDICINE

## 2019-06-13 PROCEDURE — 99285 EMERGENCY DEPT VISIT HI MDM: CPT | Performed by: EMERGENCY MEDICINE

## 2019-06-13 PROCEDURE — 83880 ASSAY OF NATRIURETIC PEPTIDE: CPT | Performed by: EMERGENCY MEDICINE

## 2019-06-13 PROCEDURE — 99223 1ST HOSP IP/OBS HIGH 75: CPT | Performed by: HOSPITALIST

## 2019-06-13 PROCEDURE — 99285 EMERGENCY DEPT VISIT HI MDM: CPT

## 2019-06-13 RX ORDER — CARVEDILOL 3.12 MG/1
3.12 TABLET ORAL 2 TIMES DAILY WITH MEALS
Status: DISCONTINUED | OUTPATIENT
Start: 2019-06-14 | End: 2019-06-19 | Stop reason: HOSPADM

## 2019-06-13 RX ORDER — IPRATROPIUM BROMIDE AND ALBUTEROL SULFATE .5; 3 MG/3ML; MG/3ML
1 SOLUTION RESPIRATORY (INHALATION) ONCE
Status: COMPLETED | OUTPATIENT
Start: 2019-06-13 | End: 2019-06-13

## 2019-06-13 RX ORDER — INSULIN GLARGINE 100 [IU]/ML
10 INJECTION, SOLUTION SUBCUTANEOUS
Status: DISCONTINUED | OUTPATIENT
Start: 2019-06-13 | End: 2019-06-19 | Stop reason: HOSPADM

## 2019-06-13 RX ORDER — SENNOSIDES 8.6 MG
1 TABLET ORAL DAILY
Status: DISCONTINUED | OUTPATIENT
Start: 2019-06-14 | End: 2019-06-19 | Stop reason: HOSPADM

## 2019-06-13 RX ORDER — TAMSULOSIN HYDROCHLORIDE 0.4 MG/1
0.4 CAPSULE ORAL
Status: DISCONTINUED | OUTPATIENT
Start: 2019-06-14 | End: 2019-06-19 | Stop reason: HOSPADM

## 2019-06-13 RX ORDER — CHLORAL HYDRATE 500 MG
1000 CAPSULE ORAL DAILY
Status: DISCONTINUED | OUTPATIENT
Start: 2019-06-14 | End: 2019-06-19 | Stop reason: HOSPADM

## 2019-06-13 RX ORDER — LANOLIN ALCOHOL/MO/W.PET/CERES
3 CREAM (GRAM) TOPICAL
Status: DISCONTINUED | OUTPATIENT
Start: 2019-06-13 | End: 2019-06-19 | Stop reason: HOSPADM

## 2019-06-13 RX ORDER — FUROSEMIDE 10 MG/ML
20 INJECTION INTRAMUSCULAR; INTRAVENOUS ONCE
Status: COMPLETED | OUTPATIENT
Start: 2019-06-13 | End: 2019-06-13

## 2019-06-13 RX ORDER — FUROSEMIDE 10 MG/ML
60 INJECTION INTRAMUSCULAR; INTRAVENOUS 2 TIMES DAILY
Status: COMPLETED | OUTPATIENT
Start: 2019-06-13 | End: 2019-06-14

## 2019-06-13 RX ORDER — ASPIRIN 81 MG/1
324 TABLET, CHEWABLE ORAL ONCE
Status: COMPLETED | OUTPATIENT
Start: 2019-06-13 | End: 2019-06-13

## 2019-06-13 RX ORDER — ALBUTEROL SULFATE 2.5 MG/3ML
2.5 SOLUTION RESPIRATORY (INHALATION) EVERY 6 HOURS PRN
Status: DISCONTINUED | OUTPATIENT
Start: 2019-06-13 | End: 2019-06-16

## 2019-06-13 RX ORDER — ALBUTEROL SULFATE 2.5 MG/3ML
5 SOLUTION RESPIRATORY (INHALATION) ONCE
Status: COMPLETED | OUTPATIENT
Start: 2019-06-13 | End: 2019-06-13

## 2019-06-13 RX ORDER — OXYBUTYNIN CHLORIDE 5 MG/1
5 TABLET, EXTENDED RELEASE ORAL DAILY
Status: DISCONTINUED | OUTPATIENT
Start: 2019-06-14 | End: 2019-06-19 | Stop reason: HOSPADM

## 2019-06-13 RX ORDER — POLYETHYLENE GLYCOL 3350 17 G/17G
17 POWDER, FOR SOLUTION ORAL DAILY PRN
Status: DISCONTINUED | OUTPATIENT
Start: 2019-06-13 | End: 2019-06-19 | Stop reason: HOSPADM

## 2019-06-13 RX ORDER — ALPRAZOLAM 0.25 MG/1
0.5 TABLET ORAL 3 TIMES DAILY PRN
Status: DISCONTINUED | OUTPATIENT
Start: 2019-06-13 | End: 2019-06-19 | Stop reason: HOSPADM

## 2019-06-13 RX ORDER — ISOSORBIDE MONONITRATE 30 MG/1
30 TABLET, EXTENDED RELEASE ORAL DAILY
Status: DISCONTINUED | OUTPATIENT
Start: 2019-06-14 | End: 2019-06-16

## 2019-06-13 RX ORDER — ONDANSETRON 2 MG/ML
4 INJECTION INTRAMUSCULAR; INTRAVENOUS EVERY 6 HOURS PRN
Status: DISCONTINUED | OUTPATIENT
Start: 2019-06-13 | End: 2019-06-19 | Stop reason: HOSPADM

## 2019-06-13 RX ORDER — GABAPENTIN 100 MG/1
100 CAPSULE ORAL DAILY
Status: DISCONTINUED | OUTPATIENT
Start: 2019-06-14 | End: 2019-06-19 | Stop reason: HOSPADM

## 2019-06-13 RX ORDER — FINASTERIDE 5 MG/1
5 TABLET, FILM COATED ORAL DAILY
Status: DISCONTINUED | OUTPATIENT
Start: 2019-06-14 | End: 2019-06-19 | Stop reason: HOSPADM

## 2019-06-13 RX ORDER — GABAPENTIN 100 MG/1
100 CAPSULE ORAL
Status: DISCONTINUED | OUTPATIENT
Start: 2019-06-13 | End: 2019-06-19 | Stop reason: HOSPADM

## 2019-06-13 RX ORDER — ACETAMINOPHEN 325 MG/1
650 TABLET ORAL EVERY 6 HOURS PRN
Status: DISCONTINUED | OUTPATIENT
Start: 2019-06-13 | End: 2019-06-19 | Stop reason: HOSPADM

## 2019-06-13 RX ORDER — NITROGLYCERIN 0.4 MG/1
0.4 TABLET SUBLINGUAL
Status: DISCONTINUED | OUTPATIENT
Start: 2019-06-13 | End: 2019-06-19 | Stop reason: HOSPADM

## 2019-06-13 RX ORDER — ATORVASTATIN CALCIUM 20 MG/1
20 TABLET, FILM COATED ORAL DAILY
Status: DISCONTINUED | OUTPATIENT
Start: 2019-06-14 | End: 2019-06-19 | Stop reason: HOSPADM

## 2019-06-13 RX ORDER — ASPIRIN 81 MG/1
81 TABLET ORAL DAILY
Status: DISCONTINUED | OUTPATIENT
Start: 2019-06-14 | End: 2019-06-19 | Stop reason: HOSPADM

## 2019-06-13 RX ORDER — MINERAL OIL AND PETROLATUM 150; 830 MG/G; MG/G
OINTMENT OPHTHALMIC
Status: DISCONTINUED | OUTPATIENT
Start: 2019-06-13 | End: 2019-06-19 | Stop reason: HOSPADM

## 2019-06-13 RX ORDER — DOCUSATE SODIUM 100 MG/1
100 CAPSULE, LIQUID FILLED ORAL 2 TIMES DAILY
Status: DISCONTINUED | OUTPATIENT
Start: 2019-06-13 | End: 2019-06-19 | Stop reason: HOSPADM

## 2019-06-13 RX ADMIN — DOCUSATE SODIUM 100 MG: 100 CAPSULE, LIQUID FILLED ORAL at 21:39

## 2019-06-13 RX ADMIN — GABAPENTIN 100 MG: 100 CAPSULE ORAL at 21:38

## 2019-06-13 RX ADMIN — FUROSEMIDE 20 MG: 10 INJECTION, SOLUTION INTRAMUSCULAR; INTRAVENOUS at 20:33

## 2019-06-13 RX ADMIN — ALBUTEROL SULFATE 5 MG: 2.5 SOLUTION RESPIRATORY (INHALATION) at 19:13

## 2019-06-13 RX ADMIN — IPRATROPIUM BROMIDE 0.5 MG: 0.5 SOLUTION RESPIRATORY (INHALATION) at 19:13

## 2019-06-13 RX ADMIN — MELATONIN 3 MG: at 21:38

## 2019-06-13 RX ADMIN — FUROSEMIDE 60 MG: 10 INJECTION, SOLUTION INTRAMUSCULAR; INTRAVENOUS at 21:39

## 2019-06-13 RX ADMIN — INSULIN LISPRO 3 UNITS: 100 INJECTION, SOLUTION INTRAVENOUS; SUBCUTANEOUS at 21:54

## 2019-06-13 RX ADMIN — APIXABAN 2.5 MG: 2.5 TABLET, FILM COATED ORAL at 21:39

## 2019-06-13 RX ADMIN — INSULIN GLARGINE 10 UNITS: 100 INJECTION, SOLUTION SUBCUTANEOUS at 21:54

## 2019-06-14 ENCOUNTER — EPISODE CHANGES (OUTPATIENT)
Dept: CASE MANAGEMENT | Facility: OTHER | Age: 84
End: 2019-06-14

## 2019-06-14 PROBLEM — IMO0001 INSULIN DEPENDENT DIABETES MELLITUS: Chronic | Status: ACTIVE | Noted: 2018-05-22

## 2019-06-14 PROBLEM — J96.01 ACUTE RESPIRATORY FAILURE WITH HYPOXIA (HCC): Status: ACTIVE | Noted: 2019-06-13

## 2019-06-14 LAB
ANION GAP SERPL CALCULATED.3IONS-SCNC: 4 MMOL/L (ref 4–13)
ATRIAL RATE: 170 BPM
BUN SERPL-MCNC: 35 MG/DL (ref 5–25)
CALCIUM SERPL-MCNC: 8.9 MG/DL (ref 8.3–10.1)
CHLORIDE SERPL-SCNC: 98 MMOL/L (ref 100–108)
CO2 SERPL-SCNC: 35 MMOL/L (ref 21–32)
CREAT SERPL-MCNC: 1.51 MG/DL (ref 0.6–1.3)
EST. AVERAGE GLUCOSE BLD GHB EST-MCNC: 180 MG/DL
GFR SERPL CREATININE-BSD FRML MDRD: 41 ML/MIN/1.73SQ M
GLUCOSE SERPL-MCNC: 165 MG/DL (ref 65–140)
GLUCOSE SERPL-MCNC: 167 MG/DL (ref 65–140)
GLUCOSE SERPL-MCNC: 178 MG/DL (ref 65–140)
GLUCOSE SERPL-MCNC: 215 MG/DL (ref 65–140)
GLUCOSE SERPL-MCNC: 248 MG/DL (ref 65–140)
HBA1C MFR BLD: 7.9 % (ref 4.2–6.3)
INR PPP: 1.35 (ref 0.86–1.17)
MAGNESIUM SERPL-MCNC: 2 MG/DL (ref 1.6–2.6)
POTASSIUM SERPL-SCNC: 3.6 MMOL/L (ref 3.5–5.3)
PROTHROMBIN TIME: 16.8 SECONDS (ref 11.8–14.2)
QRS AXIS: 268 DEGREES
QRSD INTERVAL: 156 MS
QT INTERVAL: 428 MS
QTC INTERVAL: 468 MS
SODIUM SERPL-SCNC: 137 MMOL/L (ref 136–145)
T WAVE AXIS: 88 DEGREES
TROPONIN I SERPL-MCNC: 0.1 NG/ML
TROPONIN I SERPL-MCNC: 0.1 NG/ML
VENTRICULAR RATE: 72 BPM

## 2019-06-14 PROCEDURE — 82948 REAGENT STRIP/BLOOD GLUCOSE: CPT

## 2019-06-14 PROCEDURE — 84484 ASSAY OF TROPONIN QUANT: CPT | Performed by: HOSPITALIST

## 2019-06-14 PROCEDURE — 93010 ELECTROCARDIOGRAM REPORT: CPT | Performed by: INTERNAL MEDICINE

## 2019-06-14 PROCEDURE — 99232 SBSQ HOSP IP/OBS MODERATE 35: CPT | Performed by: INTERNAL MEDICINE

## 2019-06-14 PROCEDURE — 83735 ASSAY OF MAGNESIUM: CPT | Performed by: HOSPITALIST

## 2019-06-14 PROCEDURE — 83036 HEMOGLOBIN GLYCOSYLATED A1C: CPT | Performed by: HOSPITALIST

## 2019-06-14 PROCEDURE — 85610 PROTHROMBIN TIME: CPT | Performed by: HOSPITALIST

## 2019-06-14 PROCEDURE — 99222 1ST HOSP IP/OBS MODERATE 55: CPT | Performed by: INTERNAL MEDICINE

## 2019-06-14 PROCEDURE — 80048 BASIC METABOLIC PNL TOTAL CA: CPT | Performed by: HOSPITALIST

## 2019-06-14 RX ORDER — POTASSIUM CHLORIDE 20 MEQ/1
20 TABLET, EXTENDED RELEASE ORAL 2 TIMES DAILY
Status: DISCONTINUED | OUTPATIENT
Start: 2019-06-14 | End: 2019-06-16

## 2019-06-14 RX ORDER — POTASSIUM CHLORIDE 20 MEQ/1
20 TABLET, EXTENDED RELEASE ORAL DAILY
Status: DISCONTINUED | OUTPATIENT
Start: 2019-06-14 | End: 2019-06-14

## 2019-06-14 RX ORDER — BUMETANIDE 0.25 MG/ML
2 INJECTION, SOLUTION INTRAMUSCULAR; INTRAVENOUS
Status: DISCONTINUED | OUTPATIENT
Start: 2019-06-14 | End: 2019-06-18

## 2019-06-14 RX ORDER — METOLAZONE 5 MG/1
2.5 TABLET ORAL ONCE
Status: COMPLETED | OUTPATIENT
Start: 2019-06-14 | End: 2019-06-14

## 2019-06-14 RX ADMIN — METOLAZONE 2.5 MG: 5 TABLET ORAL at 16:08

## 2019-06-14 RX ADMIN — CARVEDILOL 3.12 MG: 3.12 TABLET, FILM COATED ORAL at 16:36

## 2019-06-14 RX ADMIN — Medication 1000 MG: at 08:07

## 2019-06-14 RX ADMIN — APIXABAN 2.5 MG: 2.5 TABLET, FILM COATED ORAL at 08:07

## 2019-06-14 RX ADMIN — FUROSEMIDE 60 MG: 10 INJECTION, SOLUTION INTRAMUSCULAR; INTRAVENOUS at 08:07

## 2019-06-14 RX ADMIN — DOCUSATE SODIUM 100 MG: 100 CAPSULE, LIQUID FILLED ORAL at 17:57

## 2019-06-14 RX ADMIN — MELATONIN 3 MG: at 22:01

## 2019-06-14 RX ADMIN — INSULIN LISPRO 1 UNITS: 100 INJECTION, SOLUTION INTRAVENOUS; SUBCUTANEOUS at 08:08

## 2019-06-14 RX ADMIN — INSULIN LISPRO 2 UNITS: 100 INJECTION, SOLUTION INTRAVENOUS; SUBCUTANEOUS at 22:01

## 2019-06-14 RX ADMIN — GABAPENTIN 100 MG: 100 CAPSULE ORAL at 22:01

## 2019-06-14 RX ADMIN — ISOSORBIDE MONONITRATE 30 MG: 30 TABLET, EXTENDED RELEASE ORAL at 08:06

## 2019-06-14 RX ADMIN — BUMETANIDE 2 MG: 0.25 INJECTION INTRAMUSCULAR; INTRAVENOUS at 16:37

## 2019-06-14 RX ADMIN — INSULIN LISPRO 3 UNITS: 100 INJECTION, SOLUTION INTRAVENOUS; SUBCUTANEOUS at 12:09

## 2019-06-14 RX ADMIN — GABAPENTIN 100 MG: 100 CAPSULE ORAL at 08:07

## 2019-06-14 RX ADMIN — TAMSULOSIN HYDROCHLORIDE 0.4 MG: 0.4 CAPSULE ORAL at 16:36

## 2019-06-14 RX ADMIN — POTASSIUM CHLORIDE 20 MEQ: 1500 TABLET, EXTENDED RELEASE ORAL at 08:07

## 2019-06-14 RX ADMIN — FINASTERIDE 5 MG: 5 TABLET, FILM COATED ORAL at 08:07

## 2019-06-14 RX ADMIN — ATORVASTATIN CALCIUM 20 MG: 20 TABLET, FILM COATED ORAL at 08:07

## 2019-06-14 RX ADMIN — ASPIRIN 81 MG: 81 TABLET, COATED ORAL at 08:07

## 2019-06-14 RX ADMIN — POTASSIUM CHLORIDE 20 MEQ: 1500 TABLET, EXTENDED RELEASE ORAL at 17:57

## 2019-06-14 RX ADMIN — INSULIN GLARGINE 10 UNITS: 100 INJECTION, SOLUTION SUBCUTANEOUS at 22:02

## 2019-06-14 RX ADMIN — OXYBUTYNIN CHLORIDE 5 MG: 5 TABLET, EXTENDED RELEASE ORAL at 08:07

## 2019-06-14 RX ADMIN — APIXABAN 2.5 MG: 2.5 TABLET, FILM COATED ORAL at 17:57

## 2019-06-14 RX ADMIN — CARVEDILOL 3.12 MG: 3.12 TABLET, FILM COATED ORAL at 08:07

## 2019-06-14 RX ADMIN — SENNOSIDES 8.6 MG: 8.6 TABLET, FILM COATED ORAL at 08:07

## 2019-06-14 RX ADMIN — DOCUSATE SODIUM 100 MG: 100 CAPSULE, LIQUID FILLED ORAL at 08:07

## 2019-06-15 LAB
ANION GAP SERPL CALCULATED.3IONS-SCNC: 4 MMOL/L (ref 4–13)
BASOPHILS # BLD AUTO: 0.08 THOUSANDS/ΜL (ref 0–0.1)
BASOPHILS NFR BLD AUTO: 2 % (ref 0–1)
BUN SERPL-MCNC: 30 MG/DL (ref 5–25)
CALCIUM SERPL-MCNC: 8.9 MG/DL (ref 8.3–10.1)
CHLORIDE SERPL-SCNC: 91 MMOL/L (ref 100–108)
CO2 SERPL-SCNC: 38 MMOL/L (ref 21–32)
CREAT SERPL-MCNC: 1.36 MG/DL (ref 0.6–1.3)
EOSINOPHIL # BLD AUTO: 0.23 THOUSAND/ΜL (ref 0–0.61)
EOSINOPHIL NFR BLD AUTO: 5 % (ref 0–6)
ERYTHROCYTE [DISTWIDTH] IN BLOOD BY AUTOMATED COUNT: 14.8 % (ref 11.6–15.1)
GFR SERPL CREATININE-BSD FRML MDRD: 46 ML/MIN/1.73SQ M
GLUCOSE SERPL-MCNC: 121 MG/DL (ref 65–140)
GLUCOSE SERPL-MCNC: 133 MG/DL (ref 65–140)
GLUCOSE SERPL-MCNC: 200 MG/DL (ref 65–140)
GLUCOSE SERPL-MCNC: 208 MG/DL (ref 65–140)
GLUCOSE SERPL-MCNC: 269 MG/DL (ref 65–140)
HCT VFR BLD AUTO: 33 % (ref 36.5–49.3)
HGB BLD-MCNC: 10.5 G/DL (ref 12–17)
IMM GRANULOCYTES # BLD AUTO: 0.02 THOUSAND/UL (ref 0–0.2)
IMM GRANULOCYTES NFR BLD AUTO: 0 % (ref 0–2)
LYMPHOCYTES # BLD AUTO: 0.58 THOUSANDS/ΜL (ref 0.6–4.47)
LYMPHOCYTES NFR BLD AUTO: 12 % (ref 14–44)
MAGNESIUM SERPL-MCNC: 2.1 MG/DL (ref 1.6–2.6)
MCH RBC QN AUTO: 31.3 PG (ref 26.8–34.3)
MCHC RBC AUTO-ENTMCNC: 31.8 G/DL (ref 31.4–37.4)
MCV RBC AUTO: 99 FL (ref 82–98)
MONOCYTES # BLD AUTO: 0.62 THOUSAND/ΜL (ref 0.17–1.22)
MONOCYTES NFR BLD AUTO: 12 % (ref 4–12)
NEUTROPHILS # BLD AUTO: 3.49 THOUSANDS/ΜL (ref 1.85–7.62)
NEUTS SEG NFR BLD AUTO: 69 % (ref 43–75)
NRBC BLD AUTO-RTO: 0 /100 WBCS
PHOSPHATE SERPL-MCNC: 4.3 MG/DL (ref 2.3–4.1)
PLATELET # BLD AUTO: 191 THOUSANDS/UL (ref 149–390)
PMV BLD AUTO: 10.7 FL (ref 8.9–12.7)
POTASSIUM SERPL-SCNC: 2.6 MMOL/L (ref 3.5–5.3)
RBC # BLD AUTO: 3.35 MILLION/UL (ref 3.88–5.62)
SODIUM SERPL-SCNC: 133 MMOL/L (ref 136–145)
WBC # BLD AUTO: 5.02 THOUSAND/UL (ref 4.31–10.16)

## 2019-06-15 PROCEDURE — 99232 SBSQ HOSP IP/OBS MODERATE 35: CPT | Performed by: INTERNAL MEDICINE

## 2019-06-15 PROCEDURE — 82948 REAGENT STRIP/BLOOD GLUCOSE: CPT

## 2019-06-15 PROCEDURE — 80048 BASIC METABOLIC PNL TOTAL CA: CPT | Performed by: INTERNAL MEDICINE

## 2019-06-15 PROCEDURE — 83735 ASSAY OF MAGNESIUM: CPT | Performed by: INTERNAL MEDICINE

## 2019-06-15 PROCEDURE — 85025 COMPLETE CBC W/AUTO DIFF WBC: CPT | Performed by: INTERNAL MEDICINE

## 2019-06-15 PROCEDURE — 84100 ASSAY OF PHOSPHORUS: CPT | Performed by: INTERNAL MEDICINE

## 2019-06-15 RX ORDER — POTASSIUM CHLORIDE 20 MEQ/1
20 TABLET, EXTENDED RELEASE ORAL 2 TIMES DAILY
Status: DISCONTINUED | OUTPATIENT
Start: 2019-06-15 | End: 2019-06-16

## 2019-06-15 RX ORDER — POTASSIUM CHLORIDE 29.8 MG/ML
40 INJECTION INTRAVENOUS ONCE
Status: COMPLETED | OUTPATIENT
Start: 2019-06-15 | End: 2019-06-15

## 2019-06-15 RX ADMIN — POTASSIUM CHLORIDE 20 MEQ: 1500 TABLET, EXTENDED RELEASE ORAL at 10:34

## 2019-06-15 RX ADMIN — GABAPENTIN 100 MG: 100 CAPSULE ORAL at 22:31

## 2019-06-15 RX ADMIN — POTASSIUM CHLORIDE 40 MEQ: 400 INJECTION, SOLUTION INTRAVENOUS at 11:01

## 2019-06-15 RX ADMIN — DOCUSATE SODIUM 100 MG: 100 CAPSULE, LIQUID FILLED ORAL at 10:34

## 2019-06-15 RX ADMIN — CARVEDILOL 3.12 MG: 3.12 TABLET, FILM COATED ORAL at 17:44

## 2019-06-15 RX ADMIN — POTASSIUM CHLORIDE 20 MEQ: 1500 TABLET, EXTENDED RELEASE ORAL at 17:44

## 2019-06-15 RX ADMIN — FINASTERIDE 5 MG: 5 TABLET, FILM COATED ORAL at 10:32

## 2019-06-15 RX ADMIN — ASPIRIN 81 MG: 81 TABLET, COATED ORAL at 10:33

## 2019-06-15 RX ADMIN — TAMSULOSIN HYDROCHLORIDE 0.4 MG: 0.4 CAPSULE ORAL at 17:43

## 2019-06-15 RX ADMIN — INSULIN LISPRO 2 UNITS: 100 INJECTION, SOLUTION INTRAVENOUS; SUBCUTANEOUS at 22:30

## 2019-06-15 RX ADMIN — OXYBUTYNIN CHLORIDE 5 MG: 5 TABLET, EXTENDED RELEASE ORAL at 10:34

## 2019-06-15 RX ADMIN — BUMETANIDE 2 MG: 0.25 INJECTION INTRAMUSCULAR; INTRAVENOUS at 10:35

## 2019-06-15 RX ADMIN — ISOSORBIDE MONONITRATE 30 MG: 30 TABLET, EXTENDED RELEASE ORAL at 10:33

## 2019-06-15 RX ADMIN — DOCUSATE SODIUM 100 MG: 100 CAPSULE, LIQUID FILLED ORAL at 17:43

## 2019-06-15 RX ADMIN — APIXABAN 2.5 MG: 2.5 TABLET, FILM COATED ORAL at 17:43

## 2019-06-15 RX ADMIN — ATORVASTATIN CALCIUM 20 MG: 20 TABLET, FILM COATED ORAL at 10:32

## 2019-06-15 RX ADMIN — SENNOSIDES 8.6 MG: 8.6 TABLET, FILM COATED ORAL at 10:34

## 2019-06-15 RX ADMIN — GABAPENTIN 100 MG: 100 CAPSULE ORAL at 10:32

## 2019-06-15 RX ADMIN — APIXABAN 2.5 MG: 2.5 TABLET, FILM COATED ORAL at 10:32

## 2019-06-15 RX ADMIN — BUMETANIDE 2 MG: 0.25 INJECTION INTRAMUSCULAR; INTRAVENOUS at 17:44

## 2019-06-15 RX ADMIN — MELATONIN 3 MG: at 22:31

## 2019-06-15 RX ADMIN — INSULIN GLARGINE 10 UNITS: 100 INJECTION, SOLUTION SUBCUTANEOUS at 22:30

## 2019-06-15 RX ADMIN — Medication 1000 MG: at 10:32

## 2019-06-15 RX ADMIN — CARVEDILOL 3.12 MG: 3.12 TABLET, FILM COATED ORAL at 10:35

## 2019-06-16 LAB
ANION GAP SERPL CALCULATED.3IONS-SCNC: 5 MMOL/L (ref 4–13)
BASOPHILS # BLD AUTO: 0.06 THOUSANDS/ΜL (ref 0–0.1)
BASOPHILS NFR BLD AUTO: 1 % (ref 0–1)
BUN SERPL-MCNC: 31 MG/DL (ref 5–25)
CALCIUM SERPL-MCNC: 9.1 MG/DL (ref 8.3–10.1)
CHLORIDE SERPL-SCNC: 93 MMOL/L (ref 100–108)
CO2 SERPL-SCNC: 37 MMOL/L (ref 21–32)
CREAT SERPL-MCNC: 1.34 MG/DL (ref 0.6–1.3)
EOSINOPHIL # BLD AUTO: 0.19 THOUSAND/ΜL (ref 0–0.61)
EOSINOPHIL NFR BLD AUTO: 4 % (ref 0–6)
ERYTHROCYTE [DISTWIDTH] IN BLOOD BY AUTOMATED COUNT: 14.5 % (ref 11.6–15.1)
GFR SERPL CREATININE-BSD FRML MDRD: 47 ML/MIN/1.73SQ M
GLUCOSE SERPL-MCNC: 116 MG/DL (ref 65–140)
GLUCOSE SERPL-MCNC: 143 MG/DL (ref 65–140)
GLUCOSE SERPL-MCNC: 210 MG/DL (ref 65–140)
GLUCOSE SERPL-MCNC: 219 MG/DL (ref 65–140)
GLUCOSE SERPL-MCNC: 227 MG/DL (ref 65–140)
HCT VFR BLD AUTO: 34.4 % (ref 36.5–49.3)
HGB BLD-MCNC: 10.7 G/DL (ref 12–17)
IMM GRANULOCYTES # BLD AUTO: 0.01 THOUSAND/UL (ref 0–0.2)
IMM GRANULOCYTES NFR BLD AUTO: 0 % (ref 0–2)
LYMPHOCYTES # BLD AUTO: 0.64 THOUSANDS/ΜL (ref 0.6–4.47)
LYMPHOCYTES NFR BLD AUTO: 14 % (ref 14–44)
MAGNESIUM SERPL-MCNC: 2.4 MG/DL (ref 1.6–2.6)
MCH RBC QN AUTO: 30.4 PG (ref 26.8–34.3)
MCHC RBC AUTO-ENTMCNC: 31.1 G/DL (ref 31.4–37.4)
MCV RBC AUTO: 98 FL (ref 82–98)
MONOCYTES # BLD AUTO: 0.62 THOUSAND/ΜL (ref 0.17–1.22)
MONOCYTES NFR BLD AUTO: 13 % (ref 4–12)
NEUTROPHILS # BLD AUTO: 3.13 THOUSANDS/ΜL (ref 1.85–7.62)
NEUTS SEG NFR BLD AUTO: 68 % (ref 43–75)
NRBC BLD AUTO-RTO: 0 /100 WBCS
PHOSPHATE SERPL-MCNC: 3.4 MG/DL (ref 2.3–4.1)
PLATELET # BLD AUTO: 205 THOUSANDS/UL (ref 149–390)
PMV BLD AUTO: 10.7 FL (ref 8.9–12.7)
POTASSIUM SERPL-SCNC: 3.1 MMOL/L (ref 3.5–5.3)
RBC # BLD AUTO: 3.52 MILLION/UL (ref 3.88–5.62)
SODIUM SERPL-SCNC: 135 MMOL/L (ref 136–145)
WBC # BLD AUTO: 4.65 THOUSAND/UL (ref 4.31–10.16)

## 2019-06-16 PROCEDURE — NC001 PR NO CHARGE: Performed by: INTERNAL MEDICINE

## 2019-06-16 PROCEDURE — 83735 ASSAY OF MAGNESIUM: CPT | Performed by: INTERNAL MEDICINE

## 2019-06-16 PROCEDURE — 82948 REAGENT STRIP/BLOOD GLUCOSE: CPT

## 2019-06-16 PROCEDURE — 80048 BASIC METABOLIC PNL TOTAL CA: CPT | Performed by: INTERNAL MEDICINE

## 2019-06-16 PROCEDURE — 99232 SBSQ HOSP IP/OBS MODERATE 35: CPT | Performed by: INTERNAL MEDICINE

## 2019-06-16 PROCEDURE — 84100 ASSAY OF PHOSPHORUS: CPT | Performed by: INTERNAL MEDICINE

## 2019-06-16 PROCEDURE — 85025 COMPLETE CBC W/AUTO DIFF WBC: CPT | Performed by: INTERNAL MEDICINE

## 2019-06-16 RX ORDER — POTASSIUM CHLORIDE 20 MEQ/1
20 TABLET, EXTENDED RELEASE ORAL
Status: DISCONTINUED | OUTPATIENT
Start: 2019-06-16 | End: 2019-06-19 | Stop reason: HOSPADM

## 2019-06-16 RX ORDER — ISOSORBIDE MONONITRATE 30 MG/1
30 TABLET, EXTENDED RELEASE ORAL ONCE
Status: COMPLETED | OUTPATIENT
Start: 2019-06-16 | End: 2019-06-16

## 2019-06-16 RX ORDER — POTASSIUM CHLORIDE 14.9 MG/ML
20 INJECTION INTRAVENOUS
Status: COMPLETED | OUTPATIENT
Start: 2019-06-16 | End: 2019-06-16

## 2019-06-16 RX ORDER — ISOSORBIDE MONONITRATE 60 MG/1
60 TABLET, EXTENDED RELEASE ORAL DAILY
Status: DISCONTINUED | OUTPATIENT
Start: 2019-06-17 | End: 2019-06-19 | Stop reason: HOSPADM

## 2019-06-16 RX ADMIN — ASPIRIN 81 MG: 81 TABLET, COATED ORAL at 08:43

## 2019-06-16 RX ADMIN — ISOSORBIDE MONONITRATE 30 MG: 30 TABLET, EXTENDED RELEASE ORAL at 12:36

## 2019-06-16 RX ADMIN — DOCUSATE SODIUM 100 MG: 100 CAPSULE, LIQUID FILLED ORAL at 08:43

## 2019-06-16 RX ADMIN — GABAPENTIN 100 MG: 100 CAPSULE ORAL at 08:42

## 2019-06-16 RX ADMIN — POTASSIUM CHLORIDE 20 MEQ: 1500 TABLET, EXTENDED RELEASE ORAL at 08:43

## 2019-06-16 RX ADMIN — CARVEDILOL 3.12 MG: 3.12 TABLET, FILM COATED ORAL at 08:43

## 2019-06-16 RX ADMIN — INSULIN GLARGINE 10 UNITS: 100 INJECTION, SOLUTION SUBCUTANEOUS at 21:39

## 2019-06-16 RX ADMIN — POTASSIUM CHLORIDE 20 MEQ: 1500 TABLET, EXTENDED RELEASE ORAL at 12:36

## 2019-06-16 RX ADMIN — MELATONIN 3 MG: at 21:39

## 2019-06-16 RX ADMIN — ATORVASTATIN CALCIUM 20 MG: 20 TABLET, FILM COATED ORAL at 08:43

## 2019-06-16 RX ADMIN — FINASTERIDE 5 MG: 5 TABLET, FILM COATED ORAL at 08:43

## 2019-06-16 RX ADMIN — SENNOSIDES 8.6 MG: 8.6 TABLET, FILM COATED ORAL at 08:43

## 2019-06-16 RX ADMIN — TAMSULOSIN HYDROCHLORIDE 0.4 MG: 0.4 CAPSULE ORAL at 17:19

## 2019-06-16 RX ADMIN — CARVEDILOL 3.12 MG: 3.12 TABLET, FILM COATED ORAL at 17:20

## 2019-06-16 RX ADMIN — ISOSORBIDE MONONITRATE 30 MG: 30 TABLET, EXTENDED RELEASE ORAL at 08:43

## 2019-06-16 RX ADMIN — POTASSIUM CHLORIDE 20 MEQ: 1500 TABLET, EXTENDED RELEASE ORAL at 17:19

## 2019-06-16 RX ADMIN — OXYBUTYNIN CHLORIDE 5 MG: 5 TABLET, EXTENDED RELEASE ORAL at 08:42

## 2019-06-16 RX ADMIN — GABAPENTIN 100 MG: 100 CAPSULE ORAL at 21:39

## 2019-06-16 RX ADMIN — APIXABAN 2.5 MG: 2.5 TABLET, FILM COATED ORAL at 08:43

## 2019-06-16 RX ADMIN — POTASSIUM CHLORIDE 20 MEQ: 200 INJECTION, SOLUTION INTRAVENOUS at 15:31

## 2019-06-16 RX ADMIN — BUMETANIDE 2 MG: 0.25 INJECTION INTRAMUSCULAR; INTRAVENOUS at 08:44

## 2019-06-16 RX ADMIN — Medication 1000 MG: at 08:43

## 2019-06-16 RX ADMIN — INSULIN LISPRO 2 UNITS: 100 INJECTION, SOLUTION INTRAVENOUS; SUBCUTANEOUS at 19:43

## 2019-06-16 RX ADMIN — INSULIN LISPRO 2 UNITS: 100 INJECTION, SOLUTION INTRAVENOUS; SUBCUTANEOUS at 21:39

## 2019-06-16 RX ADMIN — APIXABAN 2.5 MG: 2.5 TABLET, FILM COATED ORAL at 17:19

## 2019-06-16 RX ADMIN — ACETAMINOPHEN 650 MG: 325 TABLET, FILM COATED ORAL at 13:59

## 2019-06-16 RX ADMIN — POTASSIUM CHLORIDE 20 MEQ: 200 INJECTION, SOLUTION INTRAVENOUS at 12:38

## 2019-06-16 RX ADMIN — BUMETANIDE 2 MG: 0.25 INJECTION INTRAMUSCULAR; INTRAVENOUS at 17:21

## 2019-06-17 LAB
ANION GAP SERPL CALCULATED.3IONS-SCNC: 3 MMOL/L (ref 4–13)
BASOPHILS # BLD AUTO: 0.06 THOUSANDS/ΜL (ref 0–0.1)
BASOPHILS NFR BLD AUTO: 1 % (ref 0–1)
BUN SERPL-MCNC: 29 MG/DL (ref 5–25)
CALCIUM SERPL-MCNC: 9 MG/DL (ref 8.3–10.1)
CHLORIDE SERPL-SCNC: 91 MMOL/L (ref 100–108)
CO2 SERPL-SCNC: 37 MMOL/L (ref 21–32)
CREAT SERPL-MCNC: 1.4 MG/DL (ref 0.6–1.3)
EOSINOPHIL # BLD AUTO: 0.23 THOUSAND/ΜL (ref 0–0.61)
EOSINOPHIL NFR BLD AUTO: 5 % (ref 0–6)
ERYTHROCYTE [DISTWIDTH] IN BLOOD BY AUTOMATED COUNT: 14.2 % (ref 11.6–15.1)
GFR SERPL CREATININE-BSD FRML MDRD: 45 ML/MIN/1.73SQ M
GLUCOSE SERPL-MCNC: 136 MG/DL (ref 65–140)
GLUCOSE SERPL-MCNC: 140 MG/DL (ref 65–140)
GLUCOSE SERPL-MCNC: 150 MG/DL (ref 65–140)
GLUCOSE SERPL-MCNC: 206 MG/DL (ref 65–140)
GLUCOSE SERPL-MCNC: 247 MG/DL (ref 65–140)
HCT VFR BLD AUTO: 34.9 % (ref 36.5–49.3)
HGB BLD-MCNC: 11 G/DL (ref 12–17)
IMM GRANULOCYTES # BLD AUTO: 0.01 THOUSAND/UL (ref 0–0.2)
IMM GRANULOCYTES NFR BLD AUTO: 0 % (ref 0–2)
LYMPHOCYTES # BLD AUTO: 0.56 THOUSANDS/ΜL (ref 0.6–4.47)
LYMPHOCYTES NFR BLD AUTO: 12 % (ref 14–44)
MAGNESIUM SERPL-MCNC: 2.4 MG/DL (ref 1.6–2.6)
MCH RBC QN AUTO: 30.9 PG (ref 26.8–34.3)
MCHC RBC AUTO-ENTMCNC: 31.5 G/DL (ref 31.4–37.4)
MCV RBC AUTO: 98 FL (ref 82–98)
MONOCYTES # BLD AUTO: 0.56 THOUSAND/ΜL (ref 0.17–1.22)
MONOCYTES NFR BLD AUTO: 12 % (ref 4–12)
NEUTROPHILS # BLD AUTO: 3.38 THOUSANDS/ΜL (ref 1.85–7.62)
NEUTS SEG NFR BLD AUTO: 70 % (ref 43–75)
NRBC BLD AUTO-RTO: 0 /100 WBCS
PHOSPHATE SERPL-MCNC: 2.7 MG/DL (ref 2.3–4.1)
PLATELET # BLD AUTO: 211 THOUSANDS/UL (ref 149–390)
PMV BLD AUTO: 10.8 FL (ref 8.9–12.7)
POTASSIUM SERPL-SCNC: 4.3 MMOL/L (ref 3.5–5.3)
RBC # BLD AUTO: 3.56 MILLION/UL (ref 3.88–5.62)
SODIUM SERPL-SCNC: 131 MMOL/L (ref 136–145)
WBC # BLD AUTO: 4.8 THOUSAND/UL (ref 4.31–10.16)

## 2019-06-17 PROCEDURE — G8987 SELF CARE CURRENT STATUS: HCPCS

## 2019-06-17 PROCEDURE — 84100 ASSAY OF PHOSPHORUS: CPT | Performed by: INTERNAL MEDICINE

## 2019-06-17 PROCEDURE — G8988 SELF CARE GOAL STATUS: HCPCS

## 2019-06-17 PROCEDURE — 97166 OT EVAL MOD COMPLEX 45 MIN: CPT

## 2019-06-17 PROCEDURE — 85025 COMPLETE CBC W/AUTO DIFF WBC: CPT | Performed by: INTERNAL MEDICINE

## 2019-06-17 PROCEDURE — 82948 REAGENT STRIP/BLOOD GLUCOSE: CPT

## 2019-06-17 PROCEDURE — 83735 ASSAY OF MAGNESIUM: CPT | Performed by: INTERNAL MEDICINE

## 2019-06-17 PROCEDURE — 80048 BASIC METABOLIC PNL TOTAL CA: CPT | Performed by: INTERNAL MEDICINE

## 2019-06-17 PROCEDURE — 99232 SBSQ HOSP IP/OBS MODERATE 35: CPT | Performed by: INTERNAL MEDICINE

## 2019-06-17 PROCEDURE — G8989 SELF CARE D/C STATUS: HCPCS

## 2019-06-17 RX ORDER — METOLAZONE 5 MG/1
5 TABLET ORAL ONCE
Status: COMPLETED | OUTPATIENT
Start: 2019-06-17 | End: 2019-06-17

## 2019-06-17 RX ADMIN — TAMSULOSIN HYDROCHLORIDE 0.4 MG: 0.4 CAPSULE ORAL at 17:03

## 2019-06-17 RX ADMIN — BUMETANIDE 2 MG: 0.25 INJECTION INTRAMUSCULAR; INTRAVENOUS at 17:02

## 2019-06-17 RX ADMIN — CARVEDILOL 3.12 MG: 3.12 TABLET, FILM COATED ORAL at 17:03

## 2019-06-17 RX ADMIN — ONDANSETRON 4 MG: 2 INJECTION INTRAMUSCULAR; INTRAVENOUS at 00:52

## 2019-06-17 RX ADMIN — ATORVASTATIN CALCIUM 20 MG: 20 TABLET, FILM COATED ORAL at 08:08

## 2019-06-17 RX ADMIN — ACETAMINOPHEN 650 MG: 325 TABLET, FILM COATED ORAL at 08:22

## 2019-06-17 RX ADMIN — Medication 1000 MG: at 08:08

## 2019-06-17 RX ADMIN — BUMETANIDE 2 MG: 0.25 INJECTION INTRAMUSCULAR; INTRAVENOUS at 08:09

## 2019-06-17 RX ADMIN — ASPIRIN 81 MG: 81 TABLET, COATED ORAL at 08:09

## 2019-06-17 RX ADMIN — DOCUSATE SODIUM 100 MG: 100 CAPSULE, LIQUID FILLED ORAL at 17:03

## 2019-06-17 RX ADMIN — CARVEDILOL 3.12 MG: 3.12 TABLET, FILM COATED ORAL at 08:08

## 2019-06-17 RX ADMIN — POTASSIUM CHLORIDE 20 MEQ: 1500 TABLET, EXTENDED RELEASE ORAL at 08:10

## 2019-06-17 RX ADMIN — APIXABAN 2.5 MG: 2.5 TABLET, FILM COATED ORAL at 17:03

## 2019-06-17 RX ADMIN — DOCUSATE SODIUM 100 MG: 100 CAPSULE, LIQUID FILLED ORAL at 08:08

## 2019-06-17 RX ADMIN — FINASTERIDE 5 MG: 5 TABLET, FILM COATED ORAL at 08:08

## 2019-06-17 RX ADMIN — METOLAZONE 5 MG: 5 TABLET ORAL at 12:13

## 2019-06-17 RX ADMIN — OXYBUTYNIN CHLORIDE 5 MG: 5 TABLET, EXTENDED RELEASE ORAL at 08:08

## 2019-06-17 RX ADMIN — POTASSIUM CHLORIDE 20 MEQ: 1500 TABLET, EXTENDED RELEASE ORAL at 12:13

## 2019-06-17 RX ADMIN — INSULIN LISPRO 2 UNITS: 100 INJECTION, SOLUTION INTRAVENOUS; SUBCUTANEOUS at 17:03

## 2019-06-17 RX ADMIN — INSULIN LISPRO 2 UNITS: 100 INJECTION, SOLUTION INTRAVENOUS; SUBCUTANEOUS at 08:09

## 2019-06-17 RX ADMIN — INSULIN GLARGINE 10 UNITS: 100 INJECTION, SOLUTION SUBCUTANEOUS at 21:32

## 2019-06-17 RX ADMIN — GABAPENTIN 100 MG: 100 CAPSULE ORAL at 08:08

## 2019-06-17 RX ADMIN — MELATONIN 3 MG: at 21:31

## 2019-06-17 RX ADMIN — GABAPENTIN 100 MG: 100 CAPSULE ORAL at 21:31

## 2019-06-17 RX ADMIN — ISOSORBIDE MONONITRATE 60 MG: 60 TABLET, EXTENDED RELEASE ORAL at 08:08

## 2019-06-17 RX ADMIN — INSULIN LISPRO 3 UNITS: 100 INJECTION, SOLUTION INTRAVENOUS; SUBCUTANEOUS at 21:32

## 2019-06-17 RX ADMIN — INSULIN LISPRO 2 UNITS: 100 INJECTION, SOLUTION INTRAVENOUS; SUBCUTANEOUS at 12:13

## 2019-06-17 RX ADMIN — SENNOSIDES 8.6 MG: 8.6 TABLET, FILM COATED ORAL at 08:08

## 2019-06-17 RX ADMIN — POTASSIUM CHLORIDE 20 MEQ: 1500 TABLET, EXTENDED RELEASE ORAL at 17:02

## 2019-06-17 RX ADMIN — APIXABAN 2.5 MG: 2.5 TABLET, FILM COATED ORAL at 08:08

## 2019-06-18 LAB
ANION GAP SERPL CALCULATED.3IONS-SCNC: 4 MMOL/L (ref 4–13)
BASOPHILS # BLD AUTO: 0.08 THOUSANDS/ΜL (ref 0–0.1)
BASOPHILS NFR BLD AUTO: 2 % (ref 0–1)
BUN SERPL-MCNC: 30 MG/DL (ref 5–25)
CALCIUM SERPL-MCNC: 9.2 MG/DL (ref 8.3–10.1)
CHLORIDE SERPL-SCNC: 91 MMOL/L (ref 100–108)
CO2 SERPL-SCNC: 38 MMOL/L (ref 21–32)
CREAT SERPL-MCNC: 1.74 MG/DL (ref 0.6–1.3)
EOSINOPHIL # BLD AUTO: 0.23 THOUSAND/ΜL (ref 0–0.61)
EOSINOPHIL NFR BLD AUTO: 4 % (ref 0–6)
ERYTHROCYTE [DISTWIDTH] IN BLOOD BY AUTOMATED COUNT: 14.1 % (ref 11.6–15.1)
GFR SERPL CREATININE-BSD FRML MDRD: 34 ML/MIN/1.73SQ M
GLUCOSE SERPL-MCNC: 124 MG/DL (ref 65–140)
GLUCOSE SERPL-MCNC: 143 MG/DL (ref 65–140)
GLUCOSE SERPL-MCNC: 184 MG/DL (ref 65–140)
GLUCOSE SERPL-MCNC: 209 MG/DL (ref 65–140)
GLUCOSE SERPL-MCNC: 226 MG/DL (ref 65–140)
HCT VFR BLD AUTO: 34.3 % (ref 36.5–49.3)
HGB BLD-MCNC: 10.6 G/DL (ref 12–17)
IMM GRANULOCYTES # BLD AUTO: 0.02 THOUSAND/UL (ref 0–0.2)
IMM GRANULOCYTES NFR BLD AUTO: 0 % (ref 0–2)
LYMPHOCYTES # BLD AUTO: 0.68 THOUSANDS/ΜL (ref 0.6–4.47)
LYMPHOCYTES NFR BLD AUTO: 13 % (ref 14–44)
MAGNESIUM SERPL-MCNC: 2.3 MG/DL (ref 1.6–2.6)
MCH RBC QN AUTO: 30.3 PG (ref 26.8–34.3)
MCHC RBC AUTO-ENTMCNC: 30.9 G/DL (ref 31.4–37.4)
MCV RBC AUTO: 98 FL (ref 82–98)
MONOCYTES # BLD AUTO: 0.74 THOUSAND/ΜL (ref 0.17–1.22)
MONOCYTES NFR BLD AUTO: 14 % (ref 4–12)
NEUTROPHILS # BLD AUTO: 3.64 THOUSANDS/ΜL (ref 1.85–7.62)
NEUTS SEG NFR BLD AUTO: 67 % (ref 43–75)
NRBC BLD AUTO-RTO: 0 /100 WBCS
PHOSPHATE SERPL-MCNC: 3.6 MG/DL (ref 2.3–4.1)
PLATELET # BLD AUTO: 211 THOUSANDS/UL (ref 149–390)
PMV BLD AUTO: 10.6 FL (ref 8.9–12.7)
POTASSIUM SERPL-SCNC: 3.5 MMOL/L (ref 3.5–5.3)
RBC # BLD AUTO: 3.5 MILLION/UL (ref 3.88–5.62)
SODIUM SERPL-SCNC: 133 MMOL/L (ref 136–145)
WBC # BLD AUTO: 5.39 THOUSAND/UL (ref 4.31–10.16)

## 2019-06-18 PROCEDURE — G8979 MOBILITY GOAL STATUS: HCPCS

## 2019-06-18 PROCEDURE — 97110 THERAPEUTIC EXERCISES: CPT

## 2019-06-18 PROCEDURE — 99231 SBSQ HOSP IP/OBS SF/LOW 25: CPT | Performed by: INTERNAL MEDICINE

## 2019-06-18 PROCEDURE — 99232 SBSQ HOSP IP/OBS MODERATE 35: CPT | Performed by: INTERNAL MEDICINE

## 2019-06-18 PROCEDURE — 83735 ASSAY OF MAGNESIUM: CPT | Performed by: INTERNAL MEDICINE

## 2019-06-18 PROCEDURE — 97163 PT EVAL HIGH COMPLEX 45 MIN: CPT

## 2019-06-18 PROCEDURE — G8978 MOBILITY CURRENT STATUS: HCPCS

## 2019-06-18 PROCEDURE — 80048 BASIC METABOLIC PNL TOTAL CA: CPT | Performed by: INTERNAL MEDICINE

## 2019-06-18 PROCEDURE — 84100 ASSAY OF PHOSPHORUS: CPT | Performed by: INTERNAL MEDICINE

## 2019-06-18 PROCEDURE — 85025 COMPLETE CBC W/AUTO DIFF WBC: CPT | Performed by: INTERNAL MEDICINE

## 2019-06-18 PROCEDURE — 82948 REAGENT STRIP/BLOOD GLUCOSE: CPT

## 2019-06-18 RX ADMIN — GABAPENTIN 100 MG: 100 CAPSULE ORAL at 21:31

## 2019-06-18 RX ADMIN — MELATONIN 3 MG: at 21:31

## 2019-06-18 RX ADMIN — CARVEDILOL 3.12 MG: 3.12 TABLET, FILM COATED ORAL at 06:38

## 2019-06-18 RX ADMIN — GABAPENTIN 100 MG: 100 CAPSULE ORAL at 09:40

## 2019-06-18 RX ADMIN — INSULIN GLARGINE 10 UNITS: 100 INJECTION, SOLUTION SUBCUTANEOUS at 21:31

## 2019-06-18 RX ADMIN — Medication 1000 MG: at 09:38

## 2019-06-18 RX ADMIN — DOCUSATE SODIUM 100 MG: 100 CAPSULE, LIQUID FILLED ORAL at 17:12

## 2019-06-18 RX ADMIN — POTASSIUM CHLORIDE 20 MEQ: 1500 TABLET, EXTENDED RELEASE ORAL at 11:24

## 2019-06-18 RX ADMIN — SENNOSIDES 8.6 MG: 8.6 TABLET, FILM COATED ORAL at 09:39

## 2019-06-18 RX ADMIN — APIXABAN 2.5 MG: 2.5 TABLET, FILM COATED ORAL at 17:12

## 2019-06-18 RX ADMIN — ACETAMINOPHEN 650 MG: 325 TABLET, FILM COATED ORAL at 06:38

## 2019-06-18 RX ADMIN — ASPIRIN 81 MG: 81 TABLET, COATED ORAL at 09:40

## 2019-06-18 RX ADMIN — FINASTERIDE 5 MG: 5 TABLET, FILM COATED ORAL at 09:40

## 2019-06-18 RX ADMIN — TAMSULOSIN HYDROCHLORIDE 0.4 MG: 0.4 CAPSULE ORAL at 17:12

## 2019-06-18 RX ADMIN — INSULIN LISPRO 2 UNITS: 100 INJECTION, SOLUTION INTRAVENOUS; SUBCUTANEOUS at 21:31

## 2019-06-18 RX ADMIN — INSULIN LISPRO 2 UNITS: 100 INJECTION, SOLUTION INTRAVENOUS; SUBCUTANEOUS at 17:13

## 2019-06-18 RX ADMIN — ATORVASTATIN CALCIUM 20 MG: 20 TABLET, FILM COATED ORAL at 09:40

## 2019-06-18 RX ADMIN — POTASSIUM CHLORIDE 20 MEQ: 1500 TABLET, EXTENDED RELEASE ORAL at 17:12

## 2019-06-18 RX ADMIN — DOCUSATE SODIUM 100 MG: 100 CAPSULE, LIQUID FILLED ORAL at 09:39

## 2019-06-18 RX ADMIN — CARVEDILOL 3.12 MG: 3.12 TABLET, FILM COATED ORAL at 17:12

## 2019-06-18 RX ADMIN — INSULIN LISPRO 2 UNITS: 100 INJECTION, SOLUTION INTRAVENOUS; SUBCUTANEOUS at 09:46

## 2019-06-18 RX ADMIN — POTASSIUM CHLORIDE 20 MEQ: 1500 TABLET, EXTENDED RELEASE ORAL at 06:38

## 2019-06-18 RX ADMIN — INSULIN LISPRO 2 UNITS: 100 INJECTION, SOLUTION INTRAVENOUS; SUBCUTANEOUS at 11:38

## 2019-06-18 RX ADMIN — OXYBUTYNIN CHLORIDE 5 MG: 5 TABLET, EXTENDED RELEASE ORAL at 09:40

## 2019-06-18 RX ADMIN — APIXABAN 2.5 MG: 2.5 TABLET, FILM COATED ORAL at 09:40

## 2019-06-18 RX ADMIN — ISOSORBIDE MONONITRATE 60 MG: 60 TABLET, EXTENDED RELEASE ORAL at 09:40

## 2019-06-19 VITALS
SYSTOLIC BLOOD PRESSURE: 128 MMHG | TEMPERATURE: 97.7 F | WEIGHT: 194 LBS | DIASTOLIC BLOOD PRESSURE: 68 MMHG | RESPIRATION RATE: 20 BRPM | HEIGHT: 67 IN | HEART RATE: 75 BPM | OXYGEN SATURATION: 99 % | BODY MASS INDEX: 30.45 KG/M2

## 2019-06-19 LAB
ANION GAP SERPL CALCULATED.3IONS-SCNC: 3 MMOL/L (ref 4–13)
BUN SERPL-MCNC: 28 MG/DL (ref 5–25)
CALCIUM SERPL-MCNC: 9 MG/DL (ref 8.3–10.1)
CHLORIDE SERPL-SCNC: 95 MMOL/L (ref 100–108)
CO2 SERPL-SCNC: 35 MMOL/L (ref 21–32)
CREAT SERPL-MCNC: 1.35 MG/DL (ref 0.6–1.3)
GFR SERPL CREATININE-BSD FRML MDRD: 47 ML/MIN/1.73SQ M
GLUCOSE SERPL-MCNC: 144 MG/DL (ref 65–140)
GLUCOSE SERPL-MCNC: 157 MG/DL (ref 65–140)
POTASSIUM SERPL-SCNC: 3.5 MMOL/L (ref 3.5–5.3)
SODIUM SERPL-SCNC: 133 MMOL/L (ref 136–145)

## 2019-06-19 PROCEDURE — 80048 BASIC METABOLIC PNL TOTAL CA: CPT | Performed by: INTERNAL MEDICINE

## 2019-06-19 PROCEDURE — 82948 REAGENT STRIP/BLOOD GLUCOSE: CPT

## 2019-06-19 PROCEDURE — 99231 SBSQ HOSP IP/OBS SF/LOW 25: CPT | Performed by: INTERNAL MEDICINE

## 2019-06-19 RX ORDER — BUMETANIDE 2 MG/1
2 TABLET ORAL 2 TIMES DAILY
Qty: 60 TABLET | Refills: 0 | Status: SHIPPED | OUTPATIENT
Start: 2019-06-19

## 2019-06-19 RX ORDER — ISOSORBIDE MONONITRATE 60 MG/1
60 TABLET, EXTENDED RELEASE ORAL DAILY
Qty: 60 TABLET | Refills: 0 | Status: SHIPPED | OUTPATIENT
Start: 2019-06-20 | End: 2019-07-03 | Stop reason: HOSPADM

## 2019-06-19 RX ORDER — NITROGLYCERIN 0.4 MG/1
0.4 TABLET SUBLINGUAL
Qty: 30 TABLET | Refills: 0 | Status: SHIPPED | OUTPATIENT
Start: 2019-06-19

## 2019-06-19 RX ORDER — POTASSIUM CHLORIDE 20 MEQ/1
20 TABLET, EXTENDED RELEASE ORAL
Qty: 90 TABLET | Refills: 0 | Status: ON HOLD | OUTPATIENT
Start: 2019-06-19 | End: 2019-07-03 | Stop reason: SDUPTHER

## 2019-06-19 RX ORDER — BUMETANIDE 2 MG/1
2 TABLET ORAL 2 TIMES DAILY
Status: DISCONTINUED | OUTPATIENT
Start: 2019-06-19 | End: 2019-06-19 | Stop reason: HOSPADM

## 2019-06-19 RX ORDER — METOLAZONE 2.5 MG/1
2.5 TABLET ORAL SEE ADMIN INSTRUCTIONS
Qty: 5 TABLET | Refills: 0 | Status: SHIPPED | OUTPATIENT
Start: 2019-06-19 | End: 2019-07-29 | Stop reason: SDUPTHER

## 2019-06-19 RX ADMIN — OXYBUTYNIN CHLORIDE 5 MG: 5 TABLET, EXTENDED RELEASE ORAL at 08:59

## 2019-06-19 RX ADMIN — ISOSORBIDE MONONITRATE 60 MG: 60 TABLET, EXTENDED RELEASE ORAL at 08:59

## 2019-06-19 RX ADMIN — ATORVASTATIN CALCIUM 20 MG: 20 TABLET, FILM COATED ORAL at 08:59

## 2019-06-19 RX ADMIN — INSULIN LISPRO 2 UNITS: 100 INJECTION, SOLUTION INTRAVENOUS; SUBCUTANEOUS at 09:00

## 2019-06-19 RX ADMIN — CARVEDILOL 3.12 MG: 3.12 TABLET, FILM COATED ORAL at 08:59

## 2019-06-19 RX ADMIN — GABAPENTIN 100 MG: 100 CAPSULE ORAL at 08:59

## 2019-06-19 RX ADMIN — APIXABAN 2.5 MG: 2.5 TABLET, FILM COATED ORAL at 08:59

## 2019-06-19 RX ADMIN — FINASTERIDE 5 MG: 5 TABLET, FILM COATED ORAL at 08:59

## 2019-06-19 RX ADMIN — Medication 1000 MG: at 08:58

## 2019-06-19 RX ADMIN — ASPIRIN 81 MG: 81 TABLET, COATED ORAL at 08:59

## 2019-06-19 RX ADMIN — BUMETANIDE 2 MG: 2 TABLET ORAL at 10:18

## 2019-06-19 RX ADMIN — POTASSIUM CHLORIDE 20 MEQ: 1500 TABLET, EXTENDED RELEASE ORAL at 08:59

## 2019-06-19 RX ADMIN — SENNOSIDES 8.6 MG: 8.6 TABLET, FILM COATED ORAL at 08:59

## 2019-06-19 RX ADMIN — DOCUSATE SODIUM 100 MG: 100 CAPSULE, LIQUID FILLED ORAL at 08:59

## 2019-06-20 ENCOUNTER — EPISODE CHANGES (OUTPATIENT)
Dept: CASE MANAGEMENT | Facility: HOSPITAL | Age: 84
End: 2019-06-20

## 2019-06-21 ENCOUNTER — PATIENT OUTREACH (OUTPATIENT)
Dept: CASE MANAGEMENT | Facility: OTHER | Age: 84
End: 2019-06-21

## 2019-06-24 ENCOUNTER — OFFICE VISIT (OUTPATIENT)
Dept: CARDIOLOGY CLINIC | Facility: CLINIC | Age: 84
End: 2019-06-24
Payer: MEDICARE

## 2019-06-24 VITALS
WEIGHT: 198.9 LBS | HEART RATE: 73 BPM | OXYGEN SATURATION: 94 % | BODY MASS INDEX: 31.22 KG/M2 | SYSTOLIC BLOOD PRESSURE: 104 MMHG | HEIGHT: 67 IN | DIASTOLIC BLOOD PRESSURE: 50 MMHG

## 2019-06-24 DIAGNOSIS — I50.32 CHRONIC DIASTOLIC HEART FAILURE (HCC): Primary | ICD-10-CM

## 2019-06-24 DIAGNOSIS — I10 HYPERTENSION, UNSPECIFIED TYPE: ICD-10-CM

## 2019-06-24 DIAGNOSIS — N18.30 CKD (CHRONIC KIDNEY DISEASE), STAGE III (HCC): ICD-10-CM

## 2019-06-24 DIAGNOSIS — I48.0 PAROXYSMAL ATRIAL FIBRILLATION (HCC): ICD-10-CM

## 2019-06-24 PROCEDURE — 99214 OFFICE O/P EST MOD 30 MIN: CPT | Performed by: NURSE PRACTITIONER

## 2019-06-26 DIAGNOSIS — E87.6 HYPOKALEMIA: Primary | ICD-10-CM

## 2019-06-27 ENCOUNTER — TELEPHONE (OUTPATIENT)
Dept: CARDIOLOGY CLINIC | Facility: CLINIC | Age: 84
End: 2019-06-27

## 2019-07-01 ENCOUNTER — TELEPHONE (OUTPATIENT)
Dept: CARDIOLOGY CLINIC | Facility: CLINIC | Age: 84
End: 2019-07-01

## 2019-07-01 ENCOUNTER — HOSPITAL ENCOUNTER (INPATIENT)
Facility: HOSPITAL | Age: 84
LOS: 2 days | Discharge: HOME HEALTH | DRG: 698 | End: 2019-07-03
Attending: EMERGENCY MEDICINE | Admitting: INTERNAL MEDICINE
Payer: MEDICARE

## 2019-07-01 ENCOUNTER — APPOINTMENT (EMERGENCY)
Dept: RADIOLOGY | Facility: HOSPITAL | Age: 84
DRG: 698 | End: 2019-07-01
Payer: MEDICARE

## 2019-07-01 DIAGNOSIS — I50.33 ACUTE ON CHRONIC DIASTOLIC CHF (CONGESTIVE HEART FAILURE) (HCC): ICD-10-CM

## 2019-07-01 DIAGNOSIS — N18.30 TYPE 2 DIABETES MELLITUS WITH STAGE 3 CHRONIC KIDNEY DISEASE, WITHOUT LONG-TERM CURRENT USE OF INSULIN (HCC): Chronic | ICD-10-CM

## 2019-07-01 DIAGNOSIS — M10.9 ACUTE GOUT: ICD-10-CM

## 2019-07-01 DIAGNOSIS — M79.674 PAIN OF RIGHT GREAT TOE: ICD-10-CM

## 2019-07-01 DIAGNOSIS — N18.30 CKD (CHRONIC KIDNEY DISEASE), STAGE III (HCC): ICD-10-CM

## 2019-07-01 DIAGNOSIS — I50.32 CHRONIC DIASTOLIC CHF (CONGESTIVE HEART FAILURE) (HCC): Primary | ICD-10-CM

## 2019-07-01 DIAGNOSIS — E11.22 TYPE 2 DIABETES MELLITUS WITH STAGE 3 CHRONIC KIDNEY DISEASE, WITHOUT LONG-TERM CURRENT USE OF INSULIN (HCC): Chronic | ICD-10-CM

## 2019-07-01 DIAGNOSIS — I50.9 CHF (CONGESTIVE HEART FAILURE) (HCC): ICD-10-CM

## 2019-07-01 PROBLEM — M10.371 ACUTE GOUT DUE TO RENAL IMPAIRMENT INVOLVING RIGHT FOOT: Status: ACTIVE | Noted: 2019-07-01

## 2019-07-01 LAB
ALBUMIN SERPL BCP-MCNC: 2.5 G/DL (ref 3.5–5)
ALP SERPL-CCNC: 103 U/L (ref 46–116)
ALT SERPL W P-5'-P-CCNC: 12 U/L (ref 12–78)
ANION GAP SERPL CALCULATED.3IONS-SCNC: 3 MMOL/L (ref 4–13)
APTT PPP: 42 SECONDS (ref 23–37)
AST SERPL W P-5'-P-CCNC: 11 U/L (ref 5–45)
BASOPHILS # BLD AUTO: 0.05 THOUSANDS/ΜL (ref 0–0.1)
BASOPHILS NFR BLD AUTO: 1 % (ref 0–1)
BILIRUB SERPL-MCNC: 0.57 MG/DL (ref 0.2–1)
BUN SERPL-MCNC: 29 MG/DL (ref 5–25)
CALCIUM SERPL-MCNC: 8.4 MG/DL (ref 8.3–10.1)
CHLORIDE SERPL-SCNC: 89 MMOL/L (ref 100–108)
CO2 SERPL-SCNC: 38 MMOL/L (ref 21–32)
CREAT SERPL-MCNC: 1.57 MG/DL (ref 0.6–1.3)
EOSINOPHIL # BLD AUTO: 0.21 THOUSAND/ΜL (ref 0–0.61)
EOSINOPHIL NFR BLD AUTO: 4 % (ref 0–6)
ERYTHROCYTE [DISTWIDTH] IN BLOOD BY AUTOMATED COUNT: 14.2 % (ref 11.6–15.1)
GFR SERPL CREATININE-BSD FRML MDRD: 39 ML/MIN/1.73SQ M
GLUCOSE SERPL-MCNC: 175 MG/DL (ref 65–140)
GLUCOSE SERPL-MCNC: 204 MG/DL (ref 65–140)
HCT VFR BLD AUTO: 30 % (ref 36.5–49.3)
HGB BLD-MCNC: 9.5 G/DL (ref 12–17)
IMM GRANULOCYTES # BLD AUTO: 0.02 THOUSAND/UL (ref 0–0.2)
IMM GRANULOCYTES NFR BLD AUTO: 0 % (ref 0–2)
INR PPP: 1.66 (ref 0.84–1.19)
LYMPHOCYTES # BLD AUTO: 0.57 THOUSANDS/ΜL (ref 0.6–4.47)
LYMPHOCYTES NFR BLD AUTO: 11 % (ref 14–44)
MAGNESIUM SERPL-MCNC: 1.8 MG/DL (ref 1.6–2.6)
MCH RBC QN AUTO: 30.7 PG (ref 26.8–34.3)
MCHC RBC AUTO-ENTMCNC: 31.7 G/DL (ref 31.4–37.4)
MCV RBC AUTO: 97 FL (ref 82–98)
MONOCYTES # BLD AUTO: 0.56 THOUSAND/ΜL (ref 0.17–1.22)
MONOCYTES NFR BLD AUTO: 11 % (ref 4–12)
NEUTROPHILS # BLD AUTO: 3.9 THOUSANDS/ΜL (ref 1.85–7.62)
NEUTS SEG NFR BLD AUTO: 73 % (ref 43–75)
NRBC BLD AUTO-RTO: 0 /100 WBCS
NT-PROBNP SERPL-MCNC: 7257 PG/ML
PLATELET # BLD AUTO: 207 THOUSANDS/UL (ref 149–390)
PMV BLD AUTO: 10.6 FL (ref 8.9–12.7)
POTASSIUM SERPL-SCNC: 2.9 MMOL/L (ref 3.5–5.3)
PROT SERPL-MCNC: 6.7 G/DL (ref 6.4–8.2)
PROTHROMBIN TIME: 19.1 SECONDS (ref 11.6–14.5)
RBC # BLD AUTO: 3.09 MILLION/UL (ref 3.88–5.62)
SODIUM SERPL-SCNC: 130 MMOL/L (ref 136–145)
TROPONIN I SERPL-MCNC: 0.16 NG/ML
TSH SERPL DL<=0.05 MIU/L-ACNC: 1.28 UIU/ML (ref 0.36–3.74)
WBC # BLD AUTO: 5.31 THOUSAND/UL (ref 4.31–10.16)

## 2019-07-01 PROCEDURE — 84443 ASSAY THYROID STIM HORMONE: CPT | Performed by: EMERGENCY MEDICINE

## 2019-07-01 PROCEDURE — 85730 THROMBOPLASTIN TIME PARTIAL: CPT | Performed by: EMERGENCY MEDICINE

## 2019-07-01 PROCEDURE — 99285 EMERGENCY DEPT VISIT HI MDM: CPT | Performed by: EMERGENCY MEDICINE

## 2019-07-01 PROCEDURE — 73660 X-RAY EXAM OF TOE(S): CPT

## 2019-07-01 PROCEDURE — 71046 X-RAY EXAM CHEST 2 VIEWS: CPT

## 2019-07-01 PROCEDURE — 99223 1ST HOSP IP/OBS HIGH 75: CPT | Performed by: INTERNAL MEDICINE

## 2019-07-01 PROCEDURE — 84484 ASSAY OF TROPONIN QUANT: CPT | Performed by: EMERGENCY MEDICINE

## 2019-07-01 PROCEDURE — 99291 CRITICAL CARE FIRST HOUR: CPT

## 2019-07-01 PROCEDURE — 85025 COMPLETE CBC W/AUTO DIFF WBC: CPT | Performed by: EMERGENCY MEDICINE

## 2019-07-01 PROCEDURE — 80053 COMPREHEN METABOLIC PANEL: CPT | Performed by: EMERGENCY MEDICINE

## 2019-07-01 PROCEDURE — 83735 ASSAY OF MAGNESIUM: CPT | Performed by: EMERGENCY MEDICINE

## 2019-07-01 PROCEDURE — 83880 ASSAY OF NATRIURETIC PEPTIDE: CPT | Performed by: EMERGENCY MEDICINE

## 2019-07-01 PROCEDURE — 82948 REAGENT STRIP/BLOOD GLUCOSE: CPT

## 2019-07-01 PROCEDURE — 85610 PROTHROMBIN TIME: CPT | Performed by: EMERGENCY MEDICINE

## 2019-07-01 PROCEDURE — 93005 ELECTROCARDIOGRAM TRACING: CPT

## 2019-07-01 PROCEDURE — 36415 COLL VENOUS BLD VENIPUNCTURE: CPT | Performed by: EMERGENCY MEDICINE

## 2019-07-01 PROCEDURE — 96374 THER/PROPH/DIAG INJ IV PUSH: CPT

## 2019-07-01 PROCEDURE — 94640 AIRWAY INHALATION TREATMENT: CPT

## 2019-07-01 RX ORDER — NITROGLYCERIN 0.4 MG/1
0.4 TABLET SUBLINGUAL ONCE
Status: DISCONTINUED | OUTPATIENT
Start: 2019-07-01 | End: 2019-07-01

## 2019-07-01 RX ORDER — LANOLIN ALCOHOL/MO/W.PET/CERES
3 CREAM (GRAM) TOPICAL
Status: DISCONTINUED | OUTPATIENT
Start: 2019-07-01 | End: 2019-07-03 | Stop reason: HOSPADM

## 2019-07-01 RX ORDER — ACETAMINOPHEN 325 MG/1
325 TABLET ORAL EVERY 6 HOURS PRN
Status: DISCONTINUED | OUTPATIENT
Start: 2019-07-01 | End: 2019-07-03 | Stop reason: HOSPADM

## 2019-07-01 RX ORDER — ALPRAZOLAM 0.5 MG/1
0.5 TABLET ORAL 4 TIMES DAILY PRN
Status: DISCONTINUED | OUTPATIENT
Start: 2019-07-01 | End: 2019-07-03 | Stop reason: HOSPADM

## 2019-07-01 RX ORDER — OXYCODONE HYDROCHLORIDE 5 MG/1
5 TABLET ORAL ONCE
Status: COMPLETED | OUTPATIENT
Start: 2019-07-01 | End: 2019-07-01

## 2019-07-01 RX ORDER — INSULIN GLARGINE 100 [IU]/ML
10 INJECTION, SOLUTION SUBCUTANEOUS
Status: DISCONTINUED | OUTPATIENT
Start: 2019-07-01 | End: 2019-07-02

## 2019-07-01 RX ORDER — BUMETANIDE 2 MG/1
2 TABLET ORAL 2 TIMES DAILY
Status: DISCONTINUED | OUTPATIENT
Start: 2019-07-02 | End: 2019-07-02

## 2019-07-01 RX ORDER — GABAPENTIN 300 MG/1
300 CAPSULE ORAL
Status: DISCONTINUED | OUTPATIENT
Start: 2019-07-01 | End: 2019-07-03 | Stop reason: HOSPADM

## 2019-07-01 RX ORDER — METHYLPREDNISOLONE SODIUM SUCCINATE 40 MG/ML
40 INJECTION, POWDER, LYOPHILIZED, FOR SOLUTION INTRAMUSCULAR; INTRAVENOUS ONCE
Status: COMPLETED | OUTPATIENT
Start: 2019-07-01 | End: 2019-07-01

## 2019-07-01 RX ORDER — TAMSULOSIN HYDROCHLORIDE 0.4 MG/1
0.4 CAPSULE ORAL
Status: DISCONTINUED | OUTPATIENT
Start: 2019-07-02 | End: 2019-07-03 | Stop reason: HOSPADM

## 2019-07-01 RX ORDER — OXYBUTYNIN CHLORIDE 5 MG/1
5 TABLET, EXTENDED RELEASE ORAL DAILY
Status: DISCONTINUED | OUTPATIENT
Start: 2019-07-02 | End: 2019-07-03 | Stop reason: HOSPADM

## 2019-07-01 RX ORDER — OXYCODONE HYDROCHLORIDE 5 MG/1
5 TABLET ORAL EVERY 4 HOURS PRN
Status: DISCONTINUED | OUTPATIENT
Start: 2019-07-01 | End: 2019-07-03 | Stop reason: HOSPADM

## 2019-07-01 RX ORDER — ONDANSETRON 2 MG/ML
4 INJECTION INTRAMUSCULAR; INTRAVENOUS EVERY 6 HOURS PRN
Status: DISCONTINUED | OUTPATIENT
Start: 2019-07-01 | End: 2019-07-03 | Stop reason: HOSPADM

## 2019-07-01 RX ORDER — POTASSIUM CHLORIDE 20 MEQ/1
20 TABLET, EXTENDED RELEASE ORAL
Status: DISCONTINUED | OUTPATIENT
Start: 2019-07-02 | End: 2019-07-03 | Stop reason: HOSPADM

## 2019-07-01 RX ORDER — ASPIRIN 81 MG/1
81 TABLET ORAL DAILY
Status: DISCONTINUED | OUTPATIENT
Start: 2019-07-02 | End: 2019-07-03 | Stop reason: HOSPADM

## 2019-07-01 RX ORDER — NITROGLYCERIN 0.4 MG/1
0.4 TABLET SUBLINGUAL ONCE
Status: DISCONTINUED | OUTPATIENT
Start: 2019-07-01 | End: 2019-07-03 | Stop reason: HOSPADM

## 2019-07-01 RX ORDER — METOLAZONE 5 MG/1
2.5 TABLET ORAL
Status: DISCONTINUED | OUTPATIENT
Start: 2019-07-03 | End: 2019-07-02

## 2019-07-01 RX ORDER — ATORVASTATIN CALCIUM 20 MG/1
20 TABLET, FILM COATED ORAL
Status: DISCONTINUED | OUTPATIENT
Start: 2019-07-02 | End: 2019-07-03 | Stop reason: HOSPADM

## 2019-07-01 RX ORDER — NITROGLYCERIN 0.4 MG/1
0.4 TABLET SUBLINGUAL
Status: DISCONTINUED | OUTPATIENT
Start: 2019-07-01 | End: 2019-07-03 | Stop reason: HOSPADM

## 2019-07-01 RX ORDER — GABAPENTIN 100 MG/1
100 CAPSULE ORAL DAILY
Status: DISCONTINUED | OUTPATIENT
Start: 2019-07-02 | End: 2019-07-03 | Stop reason: HOSPADM

## 2019-07-01 RX ORDER — MAGNESIUM HYDROXIDE/ALUMINUM HYDROXICE/SIMETHICONE 120; 1200; 1200 MG/30ML; MG/30ML; MG/30ML
5 SUSPENSION ORAL EVERY 6 HOURS PRN
Status: DISCONTINUED | OUTPATIENT
Start: 2019-07-01 | End: 2019-07-03 | Stop reason: HOSPADM

## 2019-07-01 RX ORDER — ISOSORBIDE MONONITRATE 60 MG/1
60 TABLET, EXTENDED RELEASE ORAL DAILY
Status: DISCONTINUED | OUTPATIENT
Start: 2019-07-02 | End: 2019-07-02

## 2019-07-01 RX ORDER — PREDNISONE 20 MG/1
20 TABLET ORAL DAILY
Status: DISCONTINUED | OUTPATIENT
Start: 2019-07-02 | End: 2019-07-03 | Stop reason: HOSPADM

## 2019-07-01 RX ORDER — ASPIRIN 325 MG
325 TABLET ORAL ONCE
Status: COMPLETED | OUTPATIENT
Start: 2019-07-01 | End: 2019-07-01

## 2019-07-01 RX ORDER — IPRATROPIUM BROMIDE AND ALBUTEROL SULFATE 2.5; .5 MG/3ML; MG/3ML
3 SOLUTION RESPIRATORY (INHALATION)
Status: DISCONTINUED | OUTPATIENT
Start: 2019-07-01 | End: 2019-07-02

## 2019-07-01 RX ORDER — CARVEDILOL 3.12 MG/1
3.12 TABLET ORAL 2 TIMES DAILY WITH MEALS
Status: DISCONTINUED | OUTPATIENT
Start: 2019-07-02 | End: 2019-07-02

## 2019-07-01 RX ORDER — CHLORAL HYDRATE 500 MG
1000 CAPSULE ORAL DAILY
Status: DISCONTINUED | OUTPATIENT
Start: 2019-07-02 | End: 2019-07-03 | Stop reason: HOSPADM

## 2019-07-01 RX ORDER — FUROSEMIDE 10 MG/ML
20 INJECTION INTRAMUSCULAR; INTRAVENOUS ONCE
Status: COMPLETED | OUTPATIENT
Start: 2019-07-01 | End: 2019-07-01

## 2019-07-01 RX ORDER — METOLAZONE 5 MG/1
2.5 TABLET ORAL SEE ADMIN INSTRUCTIONS
Status: DISCONTINUED | OUTPATIENT
Start: 2019-07-01 | End: 2019-07-01

## 2019-07-01 RX ADMIN — MELATONIN 3 MG: 3 TAB ORAL at 23:23

## 2019-07-01 RX ADMIN — INSULIN GLARGINE 10 UNITS: 100 INJECTION, SOLUTION SUBCUTANEOUS at 23:23

## 2019-07-01 RX ADMIN — ASPIRIN 325 MG ORAL TABLET 325 MG: 325 PILL ORAL at 21:22

## 2019-07-01 RX ADMIN — IPRATROPIUM BROMIDE AND ALBUTEROL SULFATE 3 ML: 2.5; .5 SOLUTION RESPIRATORY (INHALATION) at 19:52

## 2019-07-01 RX ADMIN — OXYCODONE HYDROCHLORIDE 5 MG: 5 TABLET ORAL at 23:23

## 2019-07-01 RX ADMIN — FUROSEMIDE 20 MG: 10 INJECTION, SOLUTION INTRAMUSCULAR; INTRAVENOUS at 21:42

## 2019-07-01 RX ADMIN — METHYLPREDNISOLONE SODIUM SUCCINATE 40 MG: 40 INJECTION, POWDER, FOR SOLUTION INTRAMUSCULAR; INTRAVENOUS at 23:23

## 2019-07-01 RX ADMIN — GABAPENTIN 300 MG: 300 CAPSULE ORAL at 23:24

## 2019-07-01 NOTE — ED ATTENDING ATTESTATION
Balwinder Bell MD, saw and evaluated the patient  I have discussed the patient with the resident/non-physician practitioner and agree with the resident's/non-physician practitioner's findings, Plan of Care, and MDM as documented in the resident's/non-physician practitioner's note, except where noted  All available labs and Radiology studies were reviewed  I was present for key portions of any procedure(s) performed by the resident/non-physician practitioner and I was immediately available to provide assistance  At this point I agree with the current assessment done in the Emergency Department  I have conducted an independent evaluation of this patient a history and physical is as follows:    OA:  This is an evaluation of an 51-year-old male comes from nursing home who presents with episodes of chest pain, shortness of breath and right great toe pain  Patient has significant past medical history notable for CAD, CHF as well as paroxysmal atrial fibrillation status post pacemaker placement  Patient states that he always has episodes of chest pain with shortness of breath with any type of exertion or increased amount of talking  He states that he noticed over the past day that he has been slightly more short of breath with conversation and movement  Today he developed right great toe pain which exacerbated his chest pain and shortness of breath  He was given nitroglycerin and increase in his normal medications earlier today which did help  He was then sent into the ED for evaluation  Upon arrival here patient's blood pressure initially difficult to obtain and appear to be hypertensive or repeat blood pressure is within normal limits  He states that he currently denies any chest pain or pressure  When he does experience it is nonradiating and is centrally located similar to previous episodes over the past 6 years    He is short of breath with increased conversation or movement but if he sits still he denies any shortness of breath  He denies any associated diaphoresis lightheadedness nausea or vomiting  He has no focal numbness weakness or tingling  He does also complain of right great toe pain that began earlier today  It is sharp intermittent and radiates up his leg  He does have a history of venous stasis disease with baseline wraps to his lower extremities  Of note on review of records he has had increased weight gain over the past few days  On physical exam patient is currently in no acute distress with stable vital signs  HEENT is normocephalic and atraumatic with moist mucous membranes  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  No appreciable bruit  Heart is regular rate  Lungs have scattered bilateral expiratory wheeze  Patient has no tachypnea or accessory muscle use  He is able to speak in complete sentences  Abdomen is soft nontender nondistended with positive bowel sounds no rebound or guarding or palpable abdominal masses  Patient has dopplerable bilateral lower extremity pulses  He has baseline venous stasis changes  He has no calf tenderness to palpation and +1 to 2 pitting edema bilaterally  Patient also has mildly swollen right great toe that is tender to palpation  Awake alert oriented and appropriate  Assessment and plan chest pain shortness of breath and right great toe pain  Concern for anginal equivalent  Will do cardiac evaluation  Will also do BMP with concern for possible CHF exacerbation  Patient also with possible COPD exacerbation  He states that over the past few days he has been given breathing treatments to help with his shortness of breath  Former smoker  Will also eval for gout  Will obtain imaging blood work EKG  Will monitor on telemetry  Will treat according will require admission for further treatment and evaluation  Portions of the record may have been created with voice recognition software    Occasional wrong word or sound-a-like" substitutions may have occurred due to the inherent limitations of voice recognition software  Review chart carefully and recognize, using context, where substitutions have occurred    Critical Care Time  Procedures

## 2019-07-01 NOTE — ED PROVIDER NOTES
History  Chief Complaint   Patient presents with    Foot Pain     Patient presents to the E R  with right foot pain  Both feet are wrapped in Ace Bandages  80year old male with significant past medical history including CHF, A-Fib status post pacemaker placement, diabetes, and HTN who presented to the emergency department via ambulance for multiple complaints including chest pressure associated with shortness of breath and right great toe pain  Patient states that his chest discomfort and shortness of breath is similar to previous events; however, his symptoms worsened due to the great toe pain which started today  His shortness of breath is worsened with prolonged conversation  The pain is over the lateral and anterior aspects of the right great toe and is described as a 10/10 burning, non-radiating pain  Patient denied any nausea, vomiting, lightheadedness, headaches, or fevers  Patient was admitted to the hospital in June for similar complaints  Prior to Admission Medications   Prescriptions Last Dose Informant Patient Reported? Taking? ALPRAZolam (XANAX) 0 5 mg tablet Past Week at Unknown time Other (Specify) No Yes   Sig: Take 1 tablet (0 5 mg total) by mouth 4 (four) times a day as needed for anxiety   EASY TOUCH LANCETS 28G MISC  Other (Specify) No No   Sig: USE AS DIRECTED  Mirabegron ER 25 MG TB24 7/1/2019 at Unknown time Other (Specify) No Yes   Sig: Take 25 mg by mouth daily   Omega-3 Fatty Acids (FISH OIL) 1,000 mg 7/1/2019 at Unknown time Other (Specify) Yes Yes   Sig: Take 1,000 mg by mouth daily   acetaminophen (TYLENOL) 325 mg tablet Past Week at Unknown time Other (Specify) No Yes   Sig: Take 1-2  tablet as needed every 8 hours as needed for headache  Do not exceed  More than 3gm/ day     apixaban (ELIQUIS) 5 mg 7/1/2019 at Unknown time Other (Specify) No Yes   Sig: Take 1 tablet (5 mg total) by mouth 2 (two) times a day   aspirin (ECOTRIN LOW STRENGTH) 81 mg EC tablet 2019 at Unknown time Other (Specify) Yes Yes   Sig: Take 81 mg by mouth daily   atorvastatin (LIPITOR) 20 mg tablet 2019 at Unknown time Other (Specify) Yes Yes   Sig: Take 20 mg by mouth daily   bumetanide (BUMEX) 2 mg tablet 2019 at Unknown time Other (Specify) No Yes   Sig: Take 1 tablet (2 mg total) by mouth 2 (two) times a day   carbamide peroxide (DEBROX) 6 5 % otic solution 2019 at Unknown time Other (Specify) Yes Yes   Si drops daily   carvedilol (COREG) 3 125 mg tablet 2019 at Unknown time Other (Specify) No Yes   Sig: TAKE 1 TABLET BY MOUTH TWICE DAILY WITH MEALS   gabapentin (NEURONTIN) 100 mg capsule  Other (Specify) No No   Sig: TAKE 1 CAPSULE BY MOUTH ONCE DAILY   gabapentin (NEURONTIN) 300 mg capsule 2019 at Unknown time Other (Specify) Yes Yes   Sig: Take 100 mg by mouth daily at bedtime    insulin glargine (LANTUS SOLOSTAR) 100 units/mL injection pen 2019 at Unknown time Other (Specify) No Yes   Sig: Inject 10 Units under the skin daily at bedtime   isosorbide mononitrate (IMDUR) 60 mg 24 hr tablet  Other (Specify) No No   Sig: Take 1 tablet (60 mg total) by mouth daily   melatonin 3 mg 2019 at Unknown time Other (Specify) No Yes   Sig: Take 1 tablet (3 mg total) by mouth daily at bedtime   metFORMIN (GLUCOPHAGE) 500 mg tablet 2019 at Unknown time Other (Specify) No Yes   Sig: TAKE 1 TABLET BY MOUTH ONCE DAILY     metolazone (ZAROXOLYN) 2 5 mg tablet 2019 at Unknown time Other (Specify) No Yes   Sig: Take 1 tablet (2 5 mg total) by mouth see administration instructions Take 2 5 mg metolazone on  and Friday   nitroglycerin (NITROSTAT) 0 4 mg SL tablet Past Month at Unknown time Other (Specify) No Yes   Sig: Place 1 tablet (0 4 mg total) under the tongue every 5 (five) minutes as needed for chest pain   potassium chloride (K-DUR,KLOR-CON) 20 mEq tablet 2019 at Unknown time Other (Specify) No Yes   Sig: Take 1 tablet (20 mEq total) by mouth 3 (three) times a day with meals   tamsulosin (FLOMAX) 0 4 mg 2019 at Unknown time Other (Specify) No Yes   Sig: TAKE 1 CAPSULE BY MOUTH WITH EVENING MEAL      Facility-Administered Medications: None       Past Medical History:   Diagnosis Date    A-fib ([place] [address] )     Anemia     Bladder cancer ([place] [address] )     CAD (coronary artery disease)     CHF (congestive heart failure) (HCC)     Chronic kidney failure     Colon cancer ([place] [address] )     Eye cancer, left ([place] [address] )     Hypertension     Incontinence     Pacemaker     Prostate cancer ([place] [address] )     Type 2 diabetes mellitus ([place] [address] )     Wears hearing aid in both ears        Past Surgical History:   Procedure Laterality Date    CARDIAC PACEMAKER PLACEMENT      CORONARY ANGIOPLASTY WITH STENT PLACEMENT      GALLBLADDER SURGERY      RI CYSTO/URETERO W/LITHOTRIPSY &INDWELL STENT INSRT Right 2018    Procedure: CYSTOSCOPY URETEROSCOPY WITH LITHOTRIPSY HOLMIUM LASER ,BASKET STONE EXTRACTION  RETROGRADE PYELOGRAM AND exchange STENT URETERAL;  Surgeon: Milind Valenzuela MD;  [facility];  Service: Urology    RI CYSTOURETHROSCOPY,URETER CATHETER Right 2018    Procedure: CYSTOSCOPY RETROGRADE PYELOGRAM WITH INSERTION STENT URETERAL;  Surgeon: Zhang Long MD;  [facility];  Service: Urology       History reviewed  No pertinent family history  I have reviewed and agree with the history as documented  Social History     Tobacco Use    Smoking status: Former Smoker     Packs/day: 1 00     Years: 16 00     Pack years: 16 00     Types: Cigarettes     Start date:      Last attempt to quit:      Years since quittin 5    Smokeless tobacco: Never Used   Substance Use Topics    Alcohol use: Yes     Comment: jayne cognac 1 oz/night     Drug use: No        Review of Systems   Constitutional: Negative for fever  HENT: Negative for congestion, hearing loss, rhinorrhea, sinus pain and sore throat      Respiratory: Positive for chest tightness, shortness of breath and wheezing  Cardiovascular: Positive for leg swelling  Negative for chest pain and palpitations  Gastrointestinal: Positive for abdominal distention  Negative for abdominal pain, constipation, diarrhea and nausea  Genitourinary: Negative for dysuria  Allergic/Immunologic: Negative for environmental allergies  Neurological: Negative for dizziness, light-headedness and headaches  Physical Exam  ED Triage Vitals [07/01/19 1827]   Temperature Pulse Respirations Blood Pressure SpO2   97 7 °F (36 5 °C) 65 19 (!) 221/154 93 %      Temp Source Heart Rate Source Patient Position - Orthostatic VS BP Location FiO2 (%)   Oral Monitor Lying Right arm --      Pain Score       Worst Possible Pain             Orthostatic Vital Signs  Vitals:    07/01/19 1827   BP: (!) 221/154   Pulse: 65   Patient Position - Orthostatic VS: Lying       Physical Exam   HENT:   Head: Normocephalic and atraumatic  Neck: Normal range of motion  Cardiovascular: Normal rate, regular rhythm and normal heart sounds  Pulmonary/Chest: Tachypnea noted  He has wheezes in the right upper field and the left upper field  He exhibits no tenderness  Abdominal: Soft  Bowel sounds are normal    Musculoskeletal: He exhibits edema (Bilateral 2+ pitting edema) and tenderness (TTP over right great toe)  Feet:   Right Foot:   Skin Integrity: Positive for dry skin  Neurological: He is alert  Skin: He is not diaphoretic  Psychiatric: He has a normal mood and affect         ED Medications  Medications - No data to display    Diagnostic Studies  Results Reviewed     Procedure Component Value Units Date/Time    CBC and differential [[id number]]     Lab Status:  No result Specimen:  Blood     Protime-INR [[id number]]     Lab Status:  No result Specimen:  Blood     APTT [[id number]]     Lab Status:  No result Specimen:  Blood     Comprehensive metabolic panel [[id number]]     Lab Status:  No result Specimen: Blood     Magnesium [[id number]]     Lab Status:  No result Specimen:  Blood     Troponin I [[id number]]     Lab Status:  No result Specimen:  Blood     B-type natriuretic peptide [[id number]]     Lab Status:  No result Specimen:  Blood     TSH [[id number]]     Lab Status:  No result Specimen:  Blood                  XR chest 2 views    (Results Pending)         Procedures  Procedures        ED Course  ED Course as of Jul 01 2241 Mon Jul 01, 2019   1849 SpO2: 93 %   2132 Phone conversation with cardiology fellow Danika Xiong) whom recommended lasix  Followed recs        2135 Re-evaluated patient, patient states he is still have chest discomfort and tightness  Discussed lab results and chest x-ray with patient      2210 Patient refused BiPap after benefits were discussed              80year old male with significant past medical history of CHF who presented with chest discomfort and shortness of breath  The patient appeared volume overloaded and was found to have an elevated troponin and BNP  He was given duonebs, aspirin, and lasix in the ED with mild improvement in symptoms  He will admitted for CHF exacerbation  MDM    Disposition  Final diagnoses:   None     ED Disposition     None      Follow-up Information    None         Patient's Medications   Discharge Prescriptions    No medications on file     No discharge procedures on file  ED Provider  Attending physically available and evaluated Toño Godfrey I managed the patient along with the ED Attending      Electronically Signed by         Meme Watkins MD  07/01/19 6505

## 2019-07-01 NOTE — TELEPHONE ENCOUNTER
Received a call from [address]  She called to report 2 6lb weight increase in 5 days  /68 HR 74 O2 @99% RA  Pt c/o chest pain she administed 1 NTG given with relief, no edema noted  Pt comfortable at this time  Per Vijaya PENDLETON recommendation, give pt 1 extra Bumex 2mg(6mg) today        Current medication    Bumex 2 mg BID  Metalozone 2 5mg M,W,F  Potassium 20Meq TID  imdur 60mg daily  Carvedilol 3 125mg daily

## 2019-07-02 PROBLEM — M10.9 ACUTE GOUT: Status: ACTIVE | Noted: 2019-07-02

## 2019-07-02 LAB
ALBUMIN SERPL BCP-MCNC: 2.8 G/DL (ref 3.5–5)
ALP SERPL-CCNC: 103 U/L (ref 46–116)
ALT SERPL W P-5'-P-CCNC: 12 U/L (ref 12–78)
ANION GAP SERPL CALCULATED.3IONS-SCNC: 6 MMOL/L (ref 4–13)
ANION GAP SERPL CALCULATED.3IONS-SCNC: 6 MMOL/L (ref 4–13)
ANISOCYTOSIS BLD QL SMEAR: PRESENT
AST SERPL W P-5'-P-CCNC: 11 U/L (ref 5–45)
ATRIAL RATE: 119 BPM
BASOPHILS # BLD MANUAL: 0.06 THOUSAND/UL (ref 0–0.1)
BASOPHILS NFR MAR MANUAL: 1 % (ref 0–1)
BILIRUB SERPL-MCNC: 0.69 MG/DL (ref 0.2–1)
BUN SERPL-MCNC: 31 MG/DL (ref 5–25)
BUN SERPL-MCNC: 32 MG/DL (ref 5–25)
CALCIUM SERPL-MCNC: 8.7 MG/DL (ref 8.3–10.1)
CALCIUM SERPL-MCNC: 8.8 MG/DL (ref 8.3–10.1)
CHLORIDE SERPL-SCNC: 89 MMOL/L (ref 100–108)
CHLORIDE SERPL-SCNC: 92 MMOL/L (ref 100–108)
CHOLEST SERPL-MCNC: 111 MG/DL (ref 50–200)
CO2 SERPL-SCNC: 34 MMOL/L (ref 21–32)
CO2 SERPL-SCNC: 38 MMOL/L (ref 21–32)
CREAT SERPL-MCNC: 1.62 MG/DL (ref 0.6–1.3)
CREAT SERPL-MCNC: 1.71 MG/DL (ref 0.6–1.3)
EOSINOPHIL # BLD MANUAL: 0.12 THOUSAND/UL (ref 0–0.4)
EOSINOPHIL NFR BLD MANUAL: 2 % (ref 0–6)
ERYTHROCYTE [DISTWIDTH] IN BLOOD BY AUTOMATED COUNT: 14.4 % (ref 11.6–15.1)
EST. AVERAGE GLUCOSE BLD GHB EST-MCNC: 189 MG/DL
GFR SERPL CREATININE-BSD FRML MDRD: 35 ML/MIN/1.73SQ M
GFR SERPL CREATININE-BSD FRML MDRD: 37 ML/MIN/1.73SQ M
GLUCOSE SERPL-MCNC: 193 MG/DL (ref 65–140)
GLUCOSE SERPL-MCNC: 226 MG/DL (ref 65–140)
GLUCOSE SERPL-MCNC: 239 MG/DL (ref 65–140)
GLUCOSE SERPL-MCNC: 308 MG/DL (ref 65–140)
GLUCOSE SERPL-MCNC: 317 MG/DL (ref 65–140)
GLUCOSE SERPL-MCNC: 334 MG/DL (ref 65–140)
GLUCOSE SERPL-MCNC: 456 MG/DL (ref 65–140)
GLUCOSE SERPL-MCNC: 490 MG/DL (ref 65–140)
GLUCOSE SERPL-MCNC: >500 MG/DL (ref 65–140)
HBA1C MFR BLD: 8.2 % (ref 4.2–6.3)
HCT VFR BLD AUTO: 31.6 % (ref 36.5–49.3)
HDLC SERPL-MCNC: 40 MG/DL (ref 40–60)
HGB BLD-MCNC: 10.2 G/DL (ref 12–17)
LDLC SERPL CALC-MCNC: 58 MG/DL (ref 0–100)
LYMPHOCYTES # BLD AUTO: 0.06 THOUSAND/UL (ref 0.6–4.47)
LYMPHOCYTES # BLD AUTO: 1 % (ref 14–44)
MAGNESIUM SERPL-MCNC: 2 MG/DL (ref 1.6–2.6)
MCH RBC QN AUTO: 31.4 PG (ref 26.8–34.3)
MCHC RBC AUTO-ENTMCNC: 32.3 G/DL (ref 31.4–37.4)
MCV RBC AUTO: 97 FL (ref 82–98)
MONOCYTES # BLD AUTO: 0.12 THOUSAND/UL (ref 0–1.22)
MONOCYTES NFR BLD: 2 % (ref 4–12)
NEUTROPHILS # BLD MANUAL: 5.53 THOUSAND/UL (ref 1.85–7.62)
NEUTS BAND NFR BLD MANUAL: 1 % (ref 0–8)
NEUTS SEG NFR BLD AUTO: 92 % (ref 43–75)
NRBC BLD AUTO-RTO: 0 /100 WBCS
PLATELET # BLD AUTO: 200 THOUSANDS/UL (ref 149–390)
PLATELET BLD QL SMEAR: ADEQUATE
PMV BLD AUTO: 11.2 FL (ref 8.9–12.7)
POLYCHROMASIA BLD QL SMEAR: PRESENT
POTASSIUM SERPL-SCNC: 2.8 MMOL/L (ref 3.5–5.3)
POTASSIUM SERPL-SCNC: 3.8 MMOL/L (ref 3.5–5.3)
PREALB SERPL-MCNC: 15.1 MG/DL (ref 18–40)
PROT SERPL-MCNC: 6.5 G/DL (ref 6.4–8.2)
QRS AXIS: 263 DEGREES
QRSD INTERVAL: 172 MS
QT INTERVAL: 514 MS
QTC INTERVAL: 573 MS
RBC # BLD AUTO: 3.25 MILLION/UL (ref 3.88–5.62)
RBC MORPH BLD: PRESENT
SODIUM SERPL-SCNC: 129 MMOL/L (ref 136–145)
SODIUM SERPL-SCNC: 136 MMOL/L (ref 136–145)
T WAVE AXIS: 83 DEGREES
TRIGL SERPL-MCNC: 67 MG/DL
TROPONIN I SERPL-MCNC: 0.12 NG/ML
TROPONIN I SERPL-MCNC: 0.15 NG/ML
TROPONIN I SERPL-MCNC: 0.17 NG/ML
TSH SERPL DL<=0.05 MIU/L-ACNC: 1.01 UIU/ML (ref 0.36–3.74)
URATE SERPL-MCNC: 11.2 MG/DL (ref 4.2–8)
VARIANT LYMPHS # BLD AUTO: 1 %
VENTRICULAR RATE: 75 BPM
WBC # BLD AUTO: 5.95 THOUSAND/UL (ref 4.31–10.16)

## 2019-07-02 PROCEDURE — 85027 COMPLETE CBC AUTOMATED: CPT | Performed by: INTERNAL MEDICINE

## 2019-07-02 PROCEDURE — 80048 BASIC METABOLIC PNL TOTAL CA: CPT | Performed by: PHYSICIAN ASSISTANT

## 2019-07-02 PROCEDURE — 82948 REAGENT STRIP/BLOOD GLUCOSE: CPT

## 2019-07-02 PROCEDURE — 84134 ASSAY OF PREALBUMIN: CPT | Performed by: INTERNAL MEDICINE

## 2019-07-02 PROCEDURE — 94640 AIRWAY INHALATION TREATMENT: CPT

## 2019-07-02 PROCEDURE — 84443 ASSAY THYROID STIM HORMONE: CPT | Performed by: INTERNAL MEDICINE

## 2019-07-02 PROCEDURE — 84550 ASSAY OF BLOOD/URIC ACID: CPT | Performed by: INTERNAL MEDICINE

## 2019-07-02 PROCEDURE — 85007 BL SMEAR W/DIFF WBC COUNT: CPT | Performed by: INTERNAL MEDICINE

## 2019-07-02 PROCEDURE — 99232 SBSQ HOSP IP/OBS MODERATE 35: CPT | Performed by: INTERNAL MEDICINE

## 2019-07-02 PROCEDURE — 94760 N-INVAS EAR/PLS OXIMETRY 1: CPT

## 2019-07-02 PROCEDURE — 80053 COMPREHEN METABOLIC PANEL: CPT | Performed by: INTERNAL MEDICINE

## 2019-07-02 PROCEDURE — 80061 LIPID PANEL: CPT | Performed by: INTERNAL MEDICINE

## 2019-07-02 PROCEDURE — 83036 HEMOGLOBIN GLYCOSYLATED A1C: CPT | Performed by: INTERNAL MEDICINE

## 2019-07-02 PROCEDURE — 99222 1ST HOSP IP/OBS MODERATE 55: CPT | Performed by: INTERNAL MEDICINE

## 2019-07-02 PROCEDURE — 83735 ASSAY OF MAGNESIUM: CPT | Performed by: INTERNAL MEDICINE

## 2019-07-02 PROCEDURE — 84484 ASSAY OF TROPONIN QUANT: CPT | Performed by: INTERNAL MEDICINE

## 2019-07-02 PROCEDURE — 93010 ELECTROCARDIOGRAM REPORT: CPT | Performed by: INTERNAL MEDICINE

## 2019-07-02 RX ORDER — SPIRONOLACTONE 25 MG/1
25 TABLET ORAL DAILY
Status: DISCONTINUED | OUTPATIENT
Start: 2019-07-02 | End: 2019-07-03 | Stop reason: HOSPADM

## 2019-07-02 RX ORDER — METOLAZONE 5 MG/1
2.5 TABLET ORAL
Status: DISCONTINUED | OUTPATIENT
Start: 2019-07-03 | End: 2019-07-03 | Stop reason: HOSPADM

## 2019-07-02 RX ORDER — CARVEDILOL 3.12 MG/1
3.12 TABLET ORAL 2 TIMES DAILY WITH MEALS
Status: DISCONTINUED | OUTPATIENT
Start: 2019-07-02 | End: 2019-07-03 | Stop reason: HOSPADM

## 2019-07-02 RX ORDER — INSULIN GLARGINE 100 [IU]/ML
15 INJECTION, SOLUTION SUBCUTANEOUS
Status: DISCONTINUED | OUTPATIENT
Start: 2019-07-02 | End: 2019-07-03 | Stop reason: HOSPADM

## 2019-07-02 RX ORDER — BUMETANIDE 0.25 MG/ML
3 INJECTION, SOLUTION INTRAMUSCULAR; INTRAVENOUS ONCE
Status: COMPLETED | OUTPATIENT
Start: 2019-07-02 | End: 2019-07-02

## 2019-07-02 RX ORDER — ALBUTEROL SULFATE 2.5 MG/3ML
2.5 SOLUTION RESPIRATORY (INHALATION) EVERY 6 HOURS PRN
Status: DISCONTINUED | OUTPATIENT
Start: 2019-07-02 | End: 2019-07-03 | Stop reason: HOSPADM

## 2019-07-02 RX ORDER — POTASSIUM CHLORIDE 20 MEQ/1
40 TABLET, EXTENDED RELEASE ORAL
Status: COMPLETED | OUTPATIENT
Start: 2019-07-02 | End: 2019-07-02

## 2019-07-02 RX ADMIN — INSULIN LISPRO 8 UNITS: 100 INJECTION, SOLUTION INTRAVENOUS; SUBCUTANEOUS at 17:34

## 2019-07-02 RX ADMIN — CARVEDILOL 3.12 MG: 3.12 TABLET, FILM COATED ORAL at 07:21

## 2019-07-02 RX ADMIN — ISOSORBIDE MONONITRATE 60 MG: 60 TABLET, EXTENDED RELEASE ORAL at 08:58

## 2019-07-02 RX ADMIN — TAMSULOSIN HYDROCHLORIDE 0.4 MG: 0.4 CAPSULE ORAL at 17:36

## 2019-07-02 RX ADMIN — INSULIN LISPRO 8 UNITS: 100 INJECTION, SOLUTION INTRAVENOUS; SUBCUTANEOUS at 07:17

## 2019-07-02 RX ADMIN — CARVEDILOL 3.12 MG: 3.12 TABLET, FILM COATED ORAL at 17:39

## 2019-07-02 RX ADMIN — CARBAMIDE PEROXIDE - 6.5% 5 DROP: 65 SOLUTION/ DROPS TOPICAL at 08:58

## 2019-07-02 RX ADMIN — ASPIRIN 81 MG: 81 TABLET, COATED ORAL at 08:57

## 2019-07-02 RX ADMIN — POTASSIUM CHLORIDE 40 MEQ: 1500 TABLET, EXTENDED RELEASE ORAL at 04:02

## 2019-07-02 RX ADMIN — ATORVASTATIN CALCIUM 20 MG: 20 TABLET, FILM COATED ORAL at 17:36

## 2019-07-02 RX ADMIN — POTASSIUM CHLORIDE 40 MEQ: 1500 TABLET, EXTENDED RELEASE ORAL at 05:16

## 2019-07-02 RX ADMIN — GABAPENTIN 300 MG: 300 CAPSULE ORAL at 21:23

## 2019-07-02 RX ADMIN — APIXABAN 2.5 MG: 2.5 TABLET, FILM COATED ORAL at 08:57

## 2019-07-02 RX ADMIN — GABAPENTIN 100 MG: 100 CAPSULE ORAL at 08:57

## 2019-07-02 RX ADMIN — PREDNISONE 20 MG: 20 TABLET ORAL at 07:17

## 2019-07-02 RX ADMIN — APIXABAN 2.5 MG: 2.5 TABLET, FILM COATED ORAL at 17:35

## 2019-07-02 RX ADMIN — POTASSIUM CHLORIDE 20 MEQ: 1500 TABLET, EXTENDED RELEASE ORAL at 07:17

## 2019-07-02 RX ADMIN — BUMETANIDE 3 MG: 0.25 INJECTION INTRAMUSCULAR; INTRAVENOUS at 13:49

## 2019-07-02 RX ADMIN — IPRATROPIUM BROMIDE AND ALBUTEROL SULFATE 3 ML: 2.5; .5 SOLUTION RESPIRATORY (INHALATION) at 00:26

## 2019-07-02 RX ADMIN — MELATONIN 3 MG: 3 TAB ORAL at 21:23

## 2019-07-02 RX ADMIN — OXYBUTYNIN CHLORIDE 5 MG: 5 TABLET, EXTENDED RELEASE ORAL at 08:56

## 2019-07-02 RX ADMIN — INSULIN LISPRO 12 UNITS: 100 INJECTION, SOLUTION INTRAVENOUS; SUBCUTANEOUS at 11:38

## 2019-07-02 RX ADMIN — INSULIN LISPRO 2 UNITS: 100 INJECTION, SOLUTION INTRAVENOUS; SUBCUTANEOUS at 21:22

## 2019-07-02 RX ADMIN — SPIRONOLACTONE 25 MG: 25 TABLET, FILM COATED ORAL at 11:40

## 2019-07-02 RX ADMIN — POTASSIUM CHLORIDE 20 MEQ: 1500 TABLET, EXTENDED RELEASE ORAL at 17:35

## 2019-07-02 RX ADMIN — INSULIN GLARGINE 15 UNITS: 100 INJECTION, SOLUTION SUBCUTANEOUS at 21:23

## 2019-07-02 RX ADMIN — INSULIN LISPRO 10 UNITS: 100 INJECTION, SOLUTION INTRAVENOUS; SUBCUTANEOUS at 17:35

## 2019-07-02 RX ADMIN — INSULIN LISPRO 2 UNITS: 100 INJECTION, SOLUTION INTRAVENOUS; SUBCUTANEOUS at 00:57

## 2019-07-02 RX ADMIN — BUMETANIDE 2 MG: 2 TABLET ORAL at 08:57

## 2019-07-02 RX ADMIN — Medication 1000 MG: at 08:56

## 2019-07-02 RX ADMIN — INSULIN LISPRO 10 UNITS: 100 INJECTION, SOLUTION INTRAVENOUS; SUBCUTANEOUS at 11:40

## 2019-07-02 RX ADMIN — POTASSIUM CHLORIDE 20 MEQ: 1500 TABLET, EXTENDED RELEASE ORAL at 11:40

## 2019-07-02 NOTE — ASSESSMENT & PLAN NOTE
Although patient has elevated troponin at 0 16, patient's troponin has generally been elevated it these levels  Patient denies any chest discomfort  Will trend troponin every 3 hours  Cardiology consult  Patient on isosorbide mononitrate with Coreg

## 2019-07-02 NOTE — ASSESSMENT & PLAN NOTE
With mild active CHF, would need diuresis  Patient was given a dose of intravenous diuretic here in the emergency room  Will however continue with his usual oral diuretic  He is on both Bumex 2 milligrams twice daily as well as Zaroxolyn 2 5 milligrams on Monday Wednesday Friday  Recheck metabolic profile  Patient would be placed in telemetry due to mild CHF  Will consult Cardiology  Note is that echocardiogram is generally with good ejection fraction and good RV motion  However he has asymmetrical hypertrophy of the left ventricle

## 2019-07-02 NOTE — ASSESSMENT & PLAN NOTE
Wt Readings from Last 3 Encounters:   07/02/19 91 5 kg (201 lb 11 2 oz)   06/24/19 90 2 kg (198 lb 14 4 oz)   06/20/19 88 kg (194 lb)     Chronic volume overload due to dietary noncompliance  Evaluated by heart failure  Started on Aldactone  Imdur dose was increased  Continue diuretics

## 2019-07-02 NOTE — PROGRESS NOTES
Progress Note - Jairo Reach 12/21/1930, 80 y o  male MRN: [id number]    Unit/Bed#: [facility] 530-01 Encounter: [id number]    Primary Care Provider: KEERTHI Lemos   Date and time admitted to hospital: 7/1/2019  6:16 PM        * Acute gout  Assessment & Plan  Acute podagra   Elevated uric acid level  Continue with prednisone taper    Type 2 diabetes mellitus [facility])  Assessment & Plan  Lab Results   Component Value Date    HGBA1C 8 2 (H) 07/02/2019       Recent Labs     07/02/19  0057 07/02/19  0633 07/02/19  1109 07/02/19  1119   POCGLU 239* 317* >500* 456*       Blood Sugar Average: Last 72 hrs:  (P) 237 4     Hyperglycemia secondary to steroids  Continue Lantus, add Humalog with meals  Monitor     Chronic diastolic CHF (congestive heart failure) (HCC)  Assessment & Plan  Wt Readings from Last 3 Encounters:   07/02/19 91 5 kg (201 lb 11 2 oz)   06/24/19 90 2 kg (198 lb 14 4 oz)   06/20/19 88 kg (194 lb)     Chronic volume overload due to dietary noncompliance  Evaluated by heart failure  Started on Aldactone  Imdur dose was increased  Continue diuretics      CKD (chronic kidney disease), stage III (HCC)  Assessment & Plan  Monitor    Hyponatremia  Assessment & Plan  Likely secondary to chronic volume overload  Hypokalemia  Replace potassium  Monitor    Cardiac pacemaker in situ  Assessment & Plan  Monitor off tele    CAD (coronary artery disease)  Assessment & Plan  CAD status post LULU to LAD  Chronic stable angina  Imdur dose was increased    Paroxysmal atrial fibrillation - Sick sinus syndrome  Assessment & Plan  Continue Coreg and Eliquis         VTE Pharmacologic Prophylaxis:   Pharmacologic: Apixaban (Eliquis)  Mechanical VTE Prophylaxis in Place: Yes    Patient Centered Rounds: I have performed bedside rounds with nursing staff today  Discussions with Specialists or Other Care Team Provider:  Cardiology    Education and Discussions with Family / Patient:  Patient    Time Spent for Care: 30 minutes  More than 50% of total time spent on counseling and coordination of care as described above  Current Length of Stay: 1 day(s)    Current Patient Status: Inpatient   Certification Statement: The patient will continue to require additional inpatient hospital stay due to Management of acute gout    Discharge Plan / Estimated Discharge Date: not ready yet    Code Status: Level 1 - Full Code      Subjective:   Patient seen and examined  Comfortable sitting in chair eating lunch  Right foot pain is improving  Chronic short of breath  No chest pain    Objective:     Vitals:   Temp (24hrs), Av 7 °F (36 5 °C), Min:97 7 °F (36 5 °C), Max:97 8 °F (36 6 °C)    Temp:  [97 7 °F (36 5 °C)-97 8 °F (36 6 °C)] 97 8 °F (36 6 °C)  HR:  [65-83] 83  Resp:  [14-20] 14  BP: (100-221)/() 136/62  SpO2:  [93 %-98 %] 98 %  Body mass index is 29 79 kg/m²  Input and Output Summary (last 24 hours):        Intake/Output Summary (Last 24 hours) at 2019 1212  Last data filed at 2019 1138  Gross per 24 hour   Intake 360 ml   Output 500 ml   Net -140 ml       Physical Exam:     Physical Exam  Patient is awake alert oriented in no acute distress  Lung with decreased breath sounds bilateral  Heart positive S1-S2 no murmur  Abdomen soft nontender  Lower extremities no edema  Right hallux with trace erythema    Additional Data:     Labs:    Results from last 7 days   Lab Units 19  0356 19  1947   WBC Thousand/uL 5 95 5 31   HEMOGLOBIN g/dL 10 2* 9 5*   HEMATOCRIT % 31 6* 30 0*   PLATELETS Thousands/uL 200 207   NEUTROS PCT %  --  73   LYMPHS PCT %  --  11*   LYMPHO PCT % 1*  --    MONOS PCT %  --  11   MONO PCT % 2*  --    EOS PCT % 2 4     Results from last 7 days   Lab Units 19  0731 19  0030   POTASSIUM mmol/L 3 8 2 8*   CHLORIDE mmol/L 89* 92*   CO2 mmol/L 34* 38*   BUN mg/dL 32* 31*   CREATININE mg/dL 1 71* 1 62*   CALCIUM mg/dL 8 8 8 7   ALK PHOS U/L  --  103   ALT U/L  --  12   AST U/L  --  11 Results from last 7 days   Lab Units 07/01/19  1947   INR  1 66*       * I Have Reviewed All Lab Data Listed Above  * Additional Pertinent Lab Tests Reviewed:  [address] Admission Reviewed    Imaging:    Imaging Reports Reviewed Today Include:   Imaging Personally Reviewed by Myself Includes:     Recent Cultures (last 7 days):           Last 24 Hours Medication List:     Current Facility-Administered Medications:  acetaminophen 325 mg Oral Q6H PRN Flora Ponce MD   albuterol 2 5 mg Nebulization Q6H PRN Flora Ponce MD   ALPRAZolam 0 5 mg Oral 4x Daily PRN Flora Ponce MD   aluminum-magnesium hydroxide-simethicone 5 mL Oral Q6H PRN Flora Ponce MD   apixaban 2 5 mg Oral BID Flora Ponce MD   aspirin 81 mg Oral Daily Flora Ponce MD   atorvastatin 20 mg Oral After Kuldip Brock MD   bumetanide 3 mg Intravenous Once [place] KEERTHI Simons   carbamide peroxide 5 drop Both Ears Daily Flora Ponce MD   carvedilol 3 125 mg Oral BID With Meals KEERTHI Stern   fish oil 1,000 mg Oral Daily Flora Ponce MD   gabapentin 100 mg Oral Daily Flora Ponce MD   gabapentin 300 mg Oral HS Flora Ponce MD   insulin glargine 15 Units Subcutaneous HS Mayte Monet,    insulin lispro 1-5 Units Subcutaneous HS Flora Ponce MD   insulin lispro 10 Units Subcutaneous TID With Meals Mayte Monet DO   insulin lispro 2-12 Units Subcutaneous TID AC Flora Ponce MD   [START ON 7/3/2019] isosorbide mononitrate 90 mg Oral Daily KEERTHI Stern   melatonin 3 mg Oral HS Flora Ponce MD   [START ON 7/3/2019] metolazone 2 5 mg Oral Once per day on Mon Wed Fri KEERTHI Stern   morphine injection 2 mg Intravenous Q1H PRN Flora Ponce MD   nitroglycerin 0 4 mg Sublingual Once Flora Ponce MD   nitroglycerin 0 4 mg Sublingual Q5 Min PRN Flora Ponce MD   ondansetron 4 mg Intravenous Q6H PRN Flora Ponce MD   oxybutynin 5 mg Oral Daily Caleb Jessica MD   oxyCODONE 5 mg Oral Q4H PRN Caleb Jessica MD   potassium chloride 20 mEq Oral TID With Meals Caleb Jessica MD   predniSONE 20 mg Oral Daily Caleb Jessica MD   spironolactone 25 mg Oral Daily KEERTHI Stern   tamsulosin 0 4 mg Oral Daily With Susi Colby MD        Today, Patient Was Seen By: Emi Francois DO    ** Please Note: This note has been constructed using a voice recognition system   **

## 2019-07-02 NOTE — PHYSICIAN ADVISOR
Current patient class: Inpatient  The patient is currently on Hospital Day: 2 at [address] [place] [address]      The patient was admitted to the hospital at 2152 on 7/1/19 for the following diagnosis:  Foot pain [M79 673]  CHF (congestive heart failure) ([address] ) [I50 9]  Pain of right great toe [M79 674]       There is documentation in the medical record of an expected length of stay of at least 2 midnights  The patient is therefore expected to satisfy the 2 midnight benchmark and given the 2 midnight presumption is appropriate for INPATIENT ADMISSION  Given this expectation of a satisfying stay, CMS instructs us that the patient is most often appropriate for inpatient admission under part A provided medical necessity is documented in the chart  After review of the relevant documentation, labs, vital signs and test results, the patient is appropriate for INPATIENT ADMISSION  Admission to the hospital as an inpatient is a complex decision making process which requires the practitioner to consider the patients presenting complaint, history and physical examination and all relevant testing  With this in mind, in this case, the patient was deemed appropriate for INPATIENT ADMISSION  After review of the documentation and testing available at the time of the admission I concur with this clinical determination of medical necessity  Rationale is as follows: The patient is a 80 yrs old Male who presented to the ED at 7/1/2019  6:16 PM with a chief complaint of Foot Pain (Patient presents to the E R  with right foot pain  Both feet are wrapped in Ace Bandages   )     Patient admitted with a report of right foot pain associated with redness and warmth  He is also having breathing difficulties when sitting upright and was using abdominal breathing in the ED  He also complains of frequent chest pain  He has a history of CHF, CAD, CKD and gout    Abnormal labs include a sodium of 130, potassium of 2 9, creatinine of 1 57 BNP of 7,257 and elevated troponins  He was diagnosed with acute gout and acute on chronic heart failure  He was evaluated by Cardiology and they agree with treatment for his CHF and gout  They believe his is not a good candidate for a cardiac cath due to his renal status  Ongoing care is noted for this elderly patient and a two night admission class to the hospital would be considered appropriate          The patients vitals on arrival were ED Triage Vitals [07/01/19 1827]   Temperature Pulse Respirations Blood Pressure SpO2   97 7 °F (36 5 °C) 65 19 (!) 221/154 93 %      Temp Source Heart Rate Source Patient Position - Orthostatic VS BP Location FiO2 (%)   Oral Monitor Lying Right arm --      Pain Score       Worst Possible Pain           Past Medical History:   Diagnosis Date    A-fib (HCC)     Anemia     Bladder cancer (HCC)     CAD (coronary artery disease)     CHF (congestive heart failure) (HCC)     Chronic kidney failure     [facility]     Eye cancer, left (HCC)     Hypertension     Incontinence     Pacemaker     Prostate cancer [facility] )     Type 2 diabetes mellitus ([facility] )     Wears hearing aid in both ears      Past Surgical History:   Procedure Laterality Date    CARDIAC PACEMAKER PLACEMENT  2014    CORONARY ANGIOPLASTY WITH STENT PLACEMENT      GALLBLADDER SURGERY      DC CYSTO/URETERO W/LITHOTRIPSY &INDWELL STENT INSRT Right 7/31/2018    Procedure: CYSTOSCOPY URETEROSCOPY WITH LITHOTRIPSY HOLMIUM LASER ,BASKET STONE EXTRACTION  RETROGRADE PYELOGRAM AND exchange STENT URETERAL;  Surgeon: Shelli Love MD;  [facility]: [facility];  Service: Urology    DC CYSTOURETHROSCOPY,URETER CATHETER Right 7/1/2018    Procedure: CYSTOSCOPY RETROGRADE PYELOGRAM WITH INSERTION STENT URETERAL;  Surgeon: Paula Norris MD;  Location: [facility];  Service: Urology           Consults have been placed to:   IP CONSULT TO CARDIOLOGY  IP CONSULT TO PODIATRY    Vitals:    07/02/19 0721 07/02/19 0746 07/02/19 0803 07/02/19 1102   BP: 124/58   136/62   BP Location:    Right arm   Pulse: 80   83   Resp:  18  14   Temp:       TempSrc:       SpO2:  95%  98%   Weight:   91 5 kg (201 lb 11 2 oz)    Height:           Most recent labs:    Recent Labs     07/01/19  1947 07/02/19  0030 07/02/19  0356 07/02/19  0638 07/02/19  0731   WBC 5 31  --  5 95  --   --    HGB 9 5*  --  10 2*  --   --    HCT 30 0*  --  31 6*  --   --      --  200  --   --    K 2 9* 2 8*  --   --  3 8   CALCIUM 8 4 8 7  --   --  8 8   BUN 29* 31*  --   --  32*   CREATININE 1 57* 1 62*  --   --  1 71*   INR 1 66*  --   --   --   --    TROPONINI 0 16* 0 17* 0 15* 0 12*  --    AST 11 11  --   --   --    ALT 12 12  --   --   --    ALKPHOS 103 103  --   --   --        Scheduled Meds:  Current Facility-Administered Medications:  acetaminophen 325 mg Oral Q6H PRN Jory Mars MD   albuterol 2 5 mg Nebulization Q6H PRN Jroy Mars MD   ALPRAZolam 0 5 mg Oral 4x Daily PRN Jory Mars MD   aluminum-magnesium hydroxide-simethicone 5 mL Oral Q6H PRN Jory Mars MD   apixaban 2 5 mg Oral BID Jory Mars MD   aspirin 81 mg Oral Daily Jory Mars MD   atorvastatin 20 mg Oral After Familia Armenta MD   bumetanide 3 mg Intravenous Once Katie KEERTHI Alcala   carbamide peroxide 5 drop Both Ears Daily Jory Mars MD   carvedilol 3 125 mg Oral BID With Meals KEERTHI Stern   fish oil 1,000 mg Oral Daily Jory Mars MD   gabapentin 100 mg Oral Daily Jory Mars MD   gabapentin 300 mg Oral HS Jory Mars MD   insulin glargine 15 Units Subcutaneous HS Lowell Angelito, DO   insulin lispro 1-5 Units Subcutaneous HS Jory Mars MD   insulin lispro 10 Units Subcutaneous TID With Meals Tasneem Eaton, DO   insulin lispro 2-12 Units Subcutaneous TID AC Jory Mars MD   [START ON 7/3/2019] isosorbide mononitrate 90 mg Oral Daily Safia Olivarez, ANUNP   melatonin 3 mg Oral HS Patti Nick MD   [START ON 7/3/2019] metolazone 2 5 mg Oral Once per day on Mon Wed Fri Manoj KEERTHI Slaughter   morphine injection 2 mg Intravenous Q1H PRN Patti Nick MD   nitroglycerin 0 4 mg Sublingual Once Patti Nick MD   nitroglycerin 0 4 mg Sublingual Q5 Min PRN Patti Nick MD   ondansetron 4 mg Intravenous Q6H PRN Patti Nick MD   oxybutynin 5 mg Oral Daily Patti Nick MD   oxyCODONE 5 mg Oral Q4H PRN Patti Nick MD   potassium chloride 20 mEq Oral TID With Meals Patti Nick MD   predniSONE 20 mg Oral Daily Patti Nick MD   spironolactone 25 mg Oral Daily KEERTHI Stern   tamsulosin 0 4 mg Oral Daily With Velma Cevallos MD     Continuous Infusions:   PRN Meds:   acetaminophen    albuterol    ALPRAZolam    aluminum-magnesium hydroxide-simethicone    morphine injection    nitroglycerin    ondansetron    oxyCODONE    Surgical procedures (if appropriate):

## 2019-07-02 NOTE — ASSESSMENT & PLAN NOTE
This is patient's primary complaint when he came over to the hospital   Noted is that patient has acute podagra which is with hyper esthesia  Will give a dose of methylprednisolone 40 milligrams intravenously while here in the emergency room  Will continue with prednisone 40 milligrams tomorrow  As patient is diabetic would carefully watch sugars  Will also check uric acid for tomorrow

## 2019-07-02 NOTE — CONSULTS
Consult - Podiatry   Skylar Jeffrey 80 y o  male MRN: [id number]  Unit/Bed#: [facility] 530-01 Encounter: [id number]    Assessment/Plan     Assessment:  81yo male PMH of chronic CHF, CAD, CKD, DM2 presents with acute gout of right foot and venous stasis wounds on right anterior leg    Plan:  - XR reviewed, severe DJD noted to 1st MTPJ of right foot, no signs of gouty tophi  - likely acute gout flare, continue with steroid taper, pain is much improved down to nil, follow-up outpatient for possible long-term gout therapy  - anterior leg wounds are stable without sign of infection  - WBAT   - thank you for the consultation    History of Present Illness     HPI:  Skylar Jeffrey is a 80 y o  male with PMH significant for CHF, CAD, CKD, DM2 presents with right great toe pain and shortness of breath  Podiatry is consulted for evaluation of right great toe pain for possible acute gout flare  States this has never happened before  Denies eating red meat or drinking alcohol  Patient is a resident at [facility] assisted living  He states he started feeling pain to his right great toe yesterday at 10/10 and the facility gave him different medications however did not help at all therefore was sent to [facility] ED for further evaluation  He was started on prednisone 40mg by admitting physician and today on exam he is not complaining of anymore pain  He states he is ambulatory however very limited to only a few steps  Inpatient consult to Podiatry     Performed by  Sheron Quintanilla DPM     Authorized by Emi Francois DO            Review of Systems   Constitutional: Negative  HENT: Negative  Eyes: Negative  Respiratory: Negative  Cardiovascular: Negative  Gastrointestinal: Negative  Musculoskeletal: negative   Skin:negative   Neurological: Negative  Psych: negative         Historical Information   Past Medical History:   Diagnosis Date    [facility]     Anemia     [facility]     CAD (coronary artery disease)     CHF (congestive heart failure) (HCC)     Chronic kidney failure     [facility]     [facility] cancer[facility] left [facility]     Hypertension     Incontinence     Pacemaker     Prostate cancer [facility]     Type 2 diabetes mellitus ([facility] )     Wears hearing aid in both ears      Past Surgical History:   Procedure Laterality Date    CARDIAC PACEMAKER PLACEMENT      CORONARY ANGIOPLASTY WITH STENT PLACEMENT      GALLBLADDER SURGERY      WY CYSTO/URETERO W/LITHOTRIPSY &INDWELL STENT INSRT Right 2018    Procedure: CYSTOSCOPY URETEROSCOPY WITH LITHOTRIPSY HOLMIUM LASER ,BASKET STONE EXTRACTION  RETROGRADE PYELOGRAM AND exchange STENT URETERAL;  Surgeon: Marisela Velázquez MD;  Location: [facility];  Service: Urology    W[place] CYSTOURETHROSCOPY,URETER CATHETER Right 2018    Procedure: CYSTOSCOPY RETROGRADE PYELOGRAM WITH INSERTION STENT URETERAL;  Surgeon: Navneet Crawford MD;  Location: [facility];  Service: Urology     Social History   Social History     Substance and Sexual Activity   Alcohol Use Yes    Comment: jayne cognac 1 oz/night      Social History     Substance and Sexual Activity   Drug Use No     Social History     Tobacco Use   Smoking Status Former Smoker    Packs/day: 1 00    Years: 16 00    Pack years: 16 00    Types: Cigarettes    Start date: 5    Last attempt to quit: Zi Garcia Years since quittin 5   Smokeless Tobacco Never Used     Family History: History reviewed  No pertinent family history      Meds/Allergies   Medications Prior to Admission   Medication    acetaminophen (TYLENOL) 325 mg tablet    ALPRAZolam (XANAX) 0 5 mg tablet    apixaban (ELIQUIS) 5 mg    aspirin (ECOTRIN LOW STRENGTH) 81 mg EC tablet    atorvastatin (LIPITOR) 20 mg tablet    bumetanide (BUMEX) 2 mg tablet    carbamide peroxide (DEBROX) 6 5 % otic solution    carvedilol (COREG) 3 125 mg tablet    gabapentin (NEURONTIN) 300 mg capsule    insulin glargine (LANTUS SOLOSTAR) 100 units/mL injection pen    melatonin 3 mg    metFORMIN (GLUCOPHAGE) 500 mg tablet    metolazone (ZAROXOLYN) 2 5 mg tablet    Mirabegron ER 25 MG TB24    nitroglycerin (NITROSTAT) 0 4 mg SL tablet    Omega-3 Fatty Acids (FISH OIL) 1,000 mg    potassium chloride (K-DUR,KLOR-CON) 20 mEq tablet    tamsulosin (FLOMAX) 0 4 mg    EASY TOUCH LANCETS 28G MISC    gabapentin (NEURONTIN) 100 mg capsule    isosorbide mononitrate (IMDUR) 60 mg 24 hr tablet     Allergies   Allergen Reactions    Iv Contrast [Iodinated Diagnostic Agents]        Objective   First Vitals:   Blood Pressure: (!) 221/154 (07/01/19 1827)  Pulse: 65 (07/01/19 1827)  Temperature: 97 7 °F (36 5 °C) (07/01/19 1827)  Temp Source: Oral (07/01/19 1827)  Respirations: 19 (07/01/19 1827)  Height: 5' 7" (170 2 cm) (07/01/19 1827)  Weight - Scale: 90 2 kg (198 lb 13 7 oz) (07/01/19 1827)  SpO2: 93 % (07/01/19 1827)    Current Vitals:   Blood Pressure: 136/62 (07/02/19 1102)  Pulse: 83 (07/02/19 1102)  Temperature: 97 8 °F (36 6 °C) (07/02/19 0246)  Temp Source: Oral (07/02/19 0246)  Respirations: 14 (07/02/19 1102)  Height: 5' 9" (175 3 cm) (07/01/19 2315)  Weight - Scale: 91 5 kg (201 lb 11 2 oz) (07/02/19 0803)  SpO2: 98 % (07/02/19 1102)        /62 (BP Location: Right arm)   Pulse 83   Temp 97 8 °F (36 6 °C) (Oral)   Resp 14   Ht 5' 9" (1 753 m)   Wt 91 5 kg (201 lb 11 2 oz)   SpO2 98%   BMI 29 79 kg/m²      General Appearance:    Alert, cooperative, no distress   Head:    Normocephalic, without obvious abnormality, atraumatic   Eyes:    PERRL, conjunctiva/corneas clear, EOM's intact        Nose:   Moist mucous membranes   Neck:   Supple, symmetrical, trachea midline   Back:     Symmetric   Lungs:     Respirations unlabored, clear to ascultations    Heart:    Regular rate and rhythm, S1 and S2 normal, no murmur, rub   or gallop   Abdomen:     Soft, non-tender     Lower Extremities     Dermatologic:    Vascular:   Skin is dry and atrophic, small venous stasis wounds noted to anterior right leg with no signs of acute infection    Pedal pulses mildly palpable   Musculoskeletal:   MSK function WNL   Neurologic:   Gross sensation is intact  Protective sensation is intact             Lab Results:   Admission on 07/01/2019   Component Date Value    WBC 07/01/2019 5 31     RBC 07/01/2019 3 09*    Hemoglobin 07/01/2019 9 5*    Hematocrit 07/01/2019 30 0*    MCV 07/01/2019 97     MCH 07/01/2019 30 7     MCHC 07/01/2019 31 7     RDW 07/01/2019 14 2     MPV 07/01/2019 10 6     Platelets 35/46/6456 207     nRBC 07/01/2019 0     Neutrophils Relative 07/01/2019 73     Immat GRANS % 07/01/2019 0     Lymphocytes Relative 07/01/2019 11*    Monocytes Relative 07/01/2019 11     Eosinophils Relative 07/01/2019 4     Basophils Relative 07/01/2019 1     Neutrophils Absolute 07/01/2019 3 90     Immature Grans Absolute 07/01/2019 0 02     Lymphocytes Absolute 07/01/2019 0 57*    Monocytes Absolute 07/01/2019 0 56     Eosinophils Absolute 07/01/2019 0 21     Basophils Absolute 07/01/2019 0 05     Protime 07/01/2019 19 1*    INR 07/01/2019 1 66*    PTT 07/01/2019 42*    Sodium 07/01/2019 130*    Potassium 07/01/2019 2 9*    Chloride 07/01/2019 89*    CO2 07/01/2019 38*    ANION GAP 07/01/2019 3*    BUN 07/01/2019 29*    Creatinine 07/01/2019 1 57*    Glucose 07/01/2019 204*    Calcium 07/01/2019 8 4     AST 07/01/2019 11     ALT 07/01/2019 12     Alkaline Phosphatase 07/01/2019 103     Total Protein 07/01/2019 6 7     Albumin 07/01/2019 2 5*    Total Bilirubin 07/01/2019 0 57     eGFR 07/01/2019 39     Magnesium 07/01/2019 1 8     Troponin I 07/01/2019 0 16*    NT-proBNP 07/01/2019 7,257*    TSH 3RD GENERATON 07/01/2019 1 280     Hemoglobin A1C 07/02/2019 8 2*    EAG 07/02/2019 189     Troponin I 07/02/2019 0 17*    Sodium 07/02/2019 136     Potassium 07/02/2019 2 8*    Chloride 07/02/2019 92*    CO2 07/02/2019 38*    ANION GAP 07/02/2019 6     BUN 07/02/2019 31*    Creatinine 07/02/2019 1 62*    Glucose 07/02/2019 193*    Calcium 07/02/2019 8 7     AST 07/02/2019 11     ALT 07/02/2019 12     Alkaline Phosphatase 07/02/2019 103     Total Protein 07/02/2019 6 5     Albumin 07/02/2019 2 8*    Total Bilirubin 07/02/2019 0 69     eGFR 07/02/2019 37     Magnesium 07/02/2019 2 0     Prealbumin 07/02/2019 15 1*    POC Glucose 07/01/2019 175*    Troponin I 07/02/2019 0 15*    POC Glucose 07/02/2019 239*    Troponin I 07/02/2019 0 12*    WBC 07/02/2019 5 95     RBC 07/02/2019 3 25*    Hemoglobin 07/02/2019 10 2*    Hematocrit 07/02/2019 31 6*    MCV 07/02/2019 97     MCH 07/02/2019 31 4     MCHC 07/02/2019 32 3     RDW 07/02/2019 14 4     MPV 07/02/2019 11 2     Platelets 01/69/7635 200     nRBC 07/02/2019 0     Cholesterol 07/02/2019 111     Triglycerides 07/02/2019 67     HDL, Direct 07/02/2019 40     LDL Calculated 07/02/2019 58     TSH 3RD GENERATON 07/02/2019 1 010     Uric Acid 07/02/2019 11 2*    Segmented % 07/02/2019 92*    Bands % 07/02/2019 1     Lymphocytes % 07/02/2019 1*    Monocytes % 07/02/2019 2*    Eosinophils, % 07/02/2019 2     Basophils % 07/02/2019 1     Atypical Lymphocytes % 07/02/2019 1*    Absolute Neutrophils 07/02/2019 5 53     Lymphocytes Absolute 07/02/2019 0 06*    Monocytes Absolute 07/02/2019 0 12     Eosinophils Absolute 07/02/2019 0 12     Basophils Absolute 07/02/2019 0 06     RBC Morphology 07/02/2019 Present     Anisocytosis 07/02/2019 Present     Polychromasia 07/02/2019 Present     Platelet Estimate 01/30/0687 Adequate     POC Glucose 07/02/2019 317*    Sodium 07/02/2019 129*    Potassium 07/02/2019 3 8     Chloride 07/02/2019 89*    CO2 07/02/2019 34*    ANION GAP 07/02/2019 6     BUN 07/02/2019 32*    Creatinine 07/02/2019 1 71*    Glucose 07/02/2019 308*    Calcium 07/02/2019 8 8     eGFR 07/02/2019 35     POC Glucose 07/02/2019 >500*    POC Glucose 07/02/2019 456*    POC Glucose 07/02/2019 490*             Imaging: I have personally reviewed pertinent films in PACS  EKG, Pathology, and Other Studies: I have personally reviewed pertinent reports  Code Status: Level 1 - Full Code      Portions of the record may have been created with voice recognition software  Occasional wrong word or "sound a like" substitutions may have occurred due to the inherent limitations of voice recognition software  Read the chart carefully and recognize, using context, where substitutions have occurred

## 2019-07-02 NOTE — RESPIRATORY THERAPY NOTE
RT Protocol Note  Ana Daniel 80 y o  male MRN: [id number]  Unit/Bed#: [facility] 530-01 Encounter: [id number]    Assessment    Principal Problem:    Acute gout due to renal impairment involving right foot  Active Problems:    Acute on chronic diastolic CHF     CAD (coronary artery disease)    Acute renal failure superimposed on stage 3 chronic kidney disease (HCC)    Acute respiratory failure with hypoxia     Elevated troponin      Home Pulmonary Medications:         Past Medical History:   Diagnosis Date    A-fib ([place] [address] )     Anemia     Bladder cancer ([place] [address] )     CAD (coronary artery disease)     CHF (congestive heart failure) (HCC)     Chronic kidney failure     Colon cancer ([place] [address] )     Eye cancer, left ([place] [address] )     Hypertension     Incontinence     Pacemaker     Prostate cancer ([place] [address] )     Type 2 diabetes mellitus ([place] [address] )     Wears hearing aid in both ears      Social History     Socioeconomic History    Marital status:       Spouse name: None    Number of children: None    Years of education: None    Highest education level: None   Occupational History    None   Social Needs    Financial resource strain: None    Food insecurity:     Worry: None     Inability: None    Transportation needs:     Medical: None     Non-medical: None   Tobacco Use    Smoking status: Former Smoker     Packs/day: 1 00     Years: 16 00     Pack years: 16 00     Types: Cigarettes     Start date:      Last attempt to quit:      Years since quittin 5    Smokeless tobacco: Never Used   Substance and Sexual Activity    Alcohol use: Yes     Comment: jayne cognac 1 oz/night     Drug use: No    Sexual activity: Not Currently   Lifestyle    Physical activity:     Days per week: None     Minutes per session: None    Stress: None   Relationships    Social connections:     Talks on phone: None     Gets together: None     Attends Bahai service: None     Active member of club or organization: None     Attends meetings of clubs or organizations: None     Relationship status: None    Intimate partner violence:     Fear of current or ex partner: None     Emotionally abused: None     Physically abused: None     Forced sexual activity: None   Other Topics Concern    None   Social History Narrative    None       Subjective         Objective    Physical Exam:   Assessment Type: (P) Assess only  Bilateral Breath Sounds: (P) Clear  Cough: (P) None    Vitals:  Blood pressure 100/62, pulse 70, temperature 97 8 °F (36 6 °C), temperature source Oral, resp  rate 16, height 5' 9" (1 753 m), weight 93 5 kg (206 lb 2 1 oz), SpO2 98 %  Imaging and other studies: I have personally reviewed pertinent reports  Plan    Respiratory Plan: (P) No distress/Pulmonary history        Resp Comments: (P) Pt  evaluated for respiratory protocol  BS= clear and well aerated  Pt  in no distress on 2LPM O2  SpO2=98%  Pt  does not use UDN txs at home and there is no indication for them at this time  UDN txs changed to PRN for now  Will continue to monitor and titrate care accordingly

## 2019-07-02 NOTE — SOCIAL WORK
Pt is a resident of [facility] senior care where is uses a roller walker, motorized wheelchair and requires min assist with some of his ADLs  Pt reported it is his wish to return to [facility] upon d/c and will require assist with transportation  CM reviewed d/c planning process including the following: identifying help at home, patient preference for d/c planning needs, Discharge Lounge, [facility] Meds to Bed program, availability of treatment team to discuss questions or concerns patient and/or family may have regarding understanding medications and recognizing signs and symptoms once discharged  CM also encouraged patient to follow up with all recommended appointments after discharge  Patient advised of importance for patient and family to participate in managing patients medical well being

## 2019-07-02 NOTE — ASSESSMENT & PLAN NOTE
Wt Readings from Last 3 Encounters:   07/01/19 90 2 kg (198 lb 13 7 oz)   06/24/19 90 2 kg (198 lb 14 4 oz)   06/20/19 88 kg (194 lb)       Continue daily weights and inputs outputs  Patient received a dose of furosemide 20 milligrams intravenously  We are hesitant in placing patient on constant IV diuretics as his BUN creatinine is already elevated from previous  Likewise, patient refuses to sit upright saying that it is more difficult for him to breathe in that position  At this point, although patient demonstrates abdominal breathing, he is not with conversational distress

## 2019-07-02 NOTE — PROGRESS NOTES
[facility] Internal Medicine Progress Note  Patient: Greg Garber 80 y o  male   MRN: [id number]  PCP: KEERTHI Dejesus  [facility] Encounter: [id number]  Date Of Visit: 19    Assessment/Plan:  1  Acute gout (right foot podagra) secondary to renal impairment  - Significant improvement of pain per patient report  - XR right great toe (): no acute abnormalities  - Uric acid: 11 2  - S/p Solumedrol 40 mg IV  - Continue prednisone 20 mg daily (1 of 5 days)  - Continue pain control with acetaminophen 325 mg q6h PRN (x 0 doses / 24 hours)  - Follow up podiatry consult / recs    2  Coronary artery disease (s/p stent x 1, 1 5 years ago)  - Currently reporting chest pressure with talking (improved with SL nitroglycerin)  - Elevated troponin: peak at 0 17, subsequent downtrend to 0 12   - Noted elevations to 0 12 - 0 13 since 2019 (possibly secondary to renal impairment)  - Cardiology not recommending catheterization at this time due to poor renal function  - Start spironolactone 25 mg daily   - Continue medications:   - Aspirin 81 mg daily   - Carvedilol 3 125 mg BID   - Isosorbide mononitrate 90 mg daily (dose increased per cardiology)   - Atorvastatin 20 mg daily     3  Acute on chronic diastolic CHF  - ECHO (): EF 60-65%, no regional wall abnormalities, increased wall thickness, severe asymmetric septal hypertrophy  - NT-proBNP (): 7257 (previously 6294 on )  - S/p Lasix 20 mg IV in ED  - Continue home PO diuretics:   - Bumex 2 mg BID   - Metolazone 2 5 mg daily   - Continue daily weight, I/Os, cardiac diet  - Suspected diet non-compliance -> nutrition consult placed for CHF diet teaching    4  Acute renal failure (superimposed on CKD Stage III)  - Baseline Cr: 1 4 - 1 7 (1 71 this AM)  - Continue to monitor BMP in setting of diuresis    5   Hypokalemia  - K: 2 9 on admission (s/p K-Dur 40 mEq)   - Re-check this AM: 3 8   - M 0  - Continue supplementation with K-Dur 20 mEq TID  - Continue monitor BMP    6  Acute respiratory failure with hypoxia  - Patient with SOB with talking, exertion  - Maintaining SpO2 > 93% (currently on 2 L NC)    7  Type 2 Diabetes Mellitus  - Hyperglycemia secondary to steroid treatment (prednisone taper)  - HbA1c (): 8 2  - Continue Humalog 10 U TID with meals and Lantus 15 U QHS    VTE Pharmacologic Prophylaxis:   Pharmacologic: Apixaban (Eliquis)  Mechanical VTE Prophylaxis in Place: Yes    Patient Centered Rounds: I have performed bedside rounds with nursing staff today  Discussions with Specialists or Other Care Team Provider: assessment/plan discussed with Dr Fernando Stone    Education and Discussions with Family / Patient: HPI, current symptoms reviewed with patient    Time Spent for Care: 30 minutes  More than 50% of total time spent on counseling and coordination of care as described above  Current Length of Stay: 1 day(s)    Current Patient Status: Inpatient   Certification Statement: The patient will continue to require additional inpatient hospital stay due to continued monitoring for acute gout, acute on chronic diastolic CHF monitoring    Discharge Plan / Estimated Discharge Date: pending clinical course, CHF/CAD medical therapy optimization     Code Status: Level 1 - Full Code      Subjective: The patient had no acute events overnight  He reports his right great toe pain has significantly improved overnight (now currently 0-2/10)  He states he continues to experience chest pressure and shortness of breath with talking and exertion  He currently denies orthopnea, nausea, vomiting, diarrhea, headache, dizziness, and edema  Objective:     Vitals:   Temp (24hrs), Av 7 °F (36 5 °C), Min:97 7 °F (36 5 °C), Max:97 8 °F (36 6 °C)    Temp:  [97 7 °F (36 5 °C)-97 8 °F (36 6 °C)] 97 8 °F (36 6 °C)  HR:  [65-80] 80  Resp:  [16-20] 18  BP: (100-221)/() 124/58  SpO2:  [93 %-98 %] 95 %  Body mass index is 29 79 kg/m²       Input and Output Summary (last 24 hours): Intake/Output Summary (Last 24 hours) at 7/2/2019 1049  Last data filed at 7/2/2019 0800  Gross per 24 hour   Intake 360 ml   Output 300 ml   Net 60 ml       Physical Exam:     Physical Exam   Constitutional: He is oriented to person, place, and time  He appears well-developed and well-nourished  No distress  HENT:   Head: Normocephalic and atraumatic  Mouth/Throat: Oropharynx is clear and moist  No oropharyngeal exudate  Eyes: Pupils are equal, round, and reactive to light  EOM are normal  Right eye exhibits no discharge  Left eye exhibits no discharge  Neck: Normal range of motion  Neck supple  JVD present  Cardiovascular: Normal rate, regular rhythm, normal heart sounds and intact distal pulses  No murmur heard  Pulmonary/Chest: Effort normal  No respiratory distress  Abdominal: Soft  Bowel sounds are normal  He exhibits no distension  There is no tenderness  Musculoskeletal: Normal range of motion  He exhibits tenderness and deformity (noted at 1st MTP of right great toe)  He exhibits no edema  Lymphadenopathy:     He has no cervical adenopathy  Neurological: He is alert and oriented to person, place, and time  Skin: Skin is warm and dry  There is erythema (noted at 1st MTP of right great toe)  Psychiatric: He has a normal mood and affect   His behavior is normal        Additional Data:     Labs:    Results from last 7 days   Lab Units 07/02/19  0356 07/01/19  1947   WBC Thousand/uL 5 95 5 31   HEMOGLOBIN g/dL 10 2* 9 5*   HEMATOCRIT % 31 6* 30 0*   PLATELETS Thousands/uL 200 207   NEUTROS PCT %  --  73   LYMPHS PCT %  --  11*   LYMPHO PCT % 1*  --    MONOS PCT %  --  11   MONO PCT % 2*  --    EOS PCT % 2 4     Results from last 7 days   Lab Units 07/02/19  0731 07/02/19  0030   POTASSIUM mmol/L 3 8 2 8*   CHLORIDE mmol/L 89* 92*   CO2 mmol/L 34* 38*   BUN mg/dL 32* 31*   CREATININE mg/dL 1 71* 1 62*   CALCIUM mg/dL 8 8 8 7   ALK PHOS U/L  --  103   ALT U/L  --  12 AST U/L  --  11     Results from last 7 days   Lab Units 07/01/19  1947   INR  1 66*       * I Have Reviewed All Lab Data Listed Above  * Additional Pertinent Lab Tests Reviewed:  Wendi 66 Admission Reviewed    Imaging:    Imaging Reports Reviewed Today Include: -   Imaging Personally Reviewed by Myself Includes:  XR right great toe (7/1)    Recent Cultures (last 7 days):           Last 24 Hours Medication List:     Current Facility-Administered Medications:  acetaminophen 325 mg Oral Q6H PRN Eduardo Oreilly MD   albuterol 2 5 mg Nebulization Q6H PRN Eduardo Oreilly MD   ALPRAZolam 0 5 mg Oral 4x Daily PRN Eduardo Oreilly MD   aluminum-magnesium hydroxide-simethicone 5 mL Oral Q6H PRN Eduardo Oreilly MD   apixaban 2 5 mg Oral BID dEuardo Oreilly MD   aspirin 81 mg Oral Daily Eudardo Oreilly MD   atorvastatin 20 mg Oral After Baldemar Leventhal, MD   bumetanide 2 mg Oral BID Eduardo Oreilly MD   carbamide peroxide 5 drop Both Ears Daily Eduardo Oreilly MD   carvedilol 3 125 mg Oral BID With Meals Eduardo Oreilly MD   fish oil 1,000 mg Oral Daily Eduardo Oreilly MD   gabapentin 100 mg Oral Daily Eduardo Oreilly MD   gabapentin 300 mg Oral HS Eduardo Oreilly MD   insulin glargine 10 Units Subcutaneous HS Eduardo Oreilly MD   insulin lispro 1-5 Units Subcutaneous HS Eduardo Oreilly MD   insulin lispro 2-12 Units Subcutaneous TID AC Eduardo Oreilly MD   isosorbide mononitrate 60 mg Oral Daily Eduardo Oreilly MD   melatonin 3 mg Oral HS Eduardo Oreilly MD   [START ON 7/3/2019] metolazone 2 5 mg Oral Once per day on Mon Wed Fri Eduardo Oreilly MD   morphine injection 2 mg Intravenous Q1H PRN Eduardo Oreilly MD   nitroglycerin 0 4 mg Sublingual Once Eduardo Oreilly MD   nitroglycerin 0 4 mg Sublingual Q5 Min PRN Eduardo Oreilly MD   ondansetron 4 mg Intravenous Q6H PRN Eduardo Oreilly MD   oxybutynin 5 mg Oral Daily Eduardo Oreilly MD   oxyCODONE 5 mg Oral Q4H PRN Juan Jose Talitha Burkitt, MD   potassium chloride 20 mEq Oral TID With Meals Melvin Guerrero MD   predniSONE 20 mg Oral Daily Melvin Guerrero MD   tamsulosin 0 4 mg Oral Daily With Dave Sanchez MD        Today, Patient Was Seen By: Levert Rubinstein    ** Please Note: This note has been constructed using a voice recognition system   **

## 2019-07-02 NOTE — ASSESSMENT & PLAN NOTE
Lab Results   Component Value Date    HGBA1C 8 2 (H) 07/02/2019       Recent Labs     07/02/19  0057 07/02/19  0633 07/02/19  1109 07/02/19  1119   POCGLU 239* 317* >500* 456*       Blood Sugar Average: Last 72 hrs:  (P) 237 4     Hyperglycemia secondary to steroids  Continue Lantus, add Humalog with meals  Monitor

## 2019-07-02 NOTE — H&P
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  Assessment & Plan  This is patient's primary complaint when he came over to the hospital   Noted is that patient has acute podagra which is with hyper esthesia  Will give a dose of methylprednisolone 40 milligrams intravenously while here in the emergency room  Will continue with prednisone 40 milligrams tomorrow  As patient is diabetic would carefully watch sugars  Will also check uric acid for tomorrow  Acute on chronic diastolic CHF   Assessment & Plan  Wt Readings from Last 3 Encounters:   07/01/19 90 2 kg (198 lb 13 7 oz)   06/24/19 90 2 kg (198 lb 14 4 oz)   06/20/19 88 kg (194 lb)       Continue daily weights and inputs outputs  Patient received a dose of furosemide 20 milligrams intravenously  We are hesitant in placing patient on constant IV diuretics as his BUN creatinine is already elevated from previous  Likewise, patient refuses to sit upright saying that it is more difficult for him to breathe in that position  At this point, although patient demonstrates abdominal breathing, he is not with conversational distress  Elevated troponin  Assessment & Plan  Elevated troponin might be of no significance and possibly secondary to patient's renal insufficiency  Will trend  Acute respiratory failure with hypoxia   Assessment & Plan  Continue respiratory protocol and oxygen supplementation  Acute renal failure superimposed on stage 3 chronic kidney disease ([facility] [address] )  Assessment & Plan  With mild active CHF, would need diuresis  Patient was given a dose of intravenous diuretic here in the emergency room  Will however continue with his usual oral diuretic    He is on both Bumex 2 milligrams twice daily as well as Zaroxolyn 2 5 milligrams on Monday Wednesday Friday  Recheck metabolic profile  Patient would be placed in telemetry due to mild CHF  Will consult Cardiology  Note is that echocardiogram is generally with good ejection fraction and good RV motion  However he has asymmetrical hypertrophy of the left ventricle  CAD (coronary artery disease)  Assessment & Plan  Although patient has elevated troponin at 0 16, patient's troponin has generally been elevated it these levels  Patient denies any chest discomfort  Will trend troponin every 3 hours  Cardiology consult  Patient on isosorbide mononitrate with Coreg  VTE Prophylaxis: Apixaban (Eliquis)  / sequential compression device only on left side  Code Status: Prior full code  POLST: There is no POLST form on file for this patient (pre-hospital)    Anticipated Length of Stay:  Patient will be admitted on an Inpatient basis with an anticipated length of stay of  greater than 2 midnights  Justification for Hospital Stay: Please see detailed plans noted above  Chief Complaint:     Right foot pain  History of Present Illness:  Ronald Simpson is a 80 y o  male who has a past medical history significant for CAD with on and off diastolic heart failure  Noted is that echocardiogram done in April of 2019 showed good ejection fraction of 65 percent and good are V activity  However noted patient has asymmetrical concentric hypertrophy of the left ventricle  Patient claims no change in diet  However he comes here with right foot pain most notable at the right big toe  Noted is that patient also has hallux valgus with note of redness and warmth  Hyper esthesia  No fever or cough or cold  However patient noted abdominal breathing  He says that he is okay as far as breathing is concerned  He also denies any chest pain  Patient claims that he is unable to breathe whenever he is seated upright and therefore refuses to do so      Currently, patient is generally comfortable  However patient claims more so of the right toe warmth and hyper esthesia even with light touch of the blanket or finger  Review of Systems:    Constitutional:  Denies fever or chills   Eyes:  Denies change in visual acuity   HENT:  Denies nasal congestion or sore throat   Respiratory:  Denies cough or shortness of breath   Cardiovascular:  Denies chest pain or edema   GI:  Denies abdominal pain, nausea, vomiting, bloody stools or diarrhea   :  Denies dysuria   Musculoskeletal:  Denies back pain or joint pain   Integument:  Denies rash with note of redness of the right hallux valgus    Neurologic:  Denies headache, focal weakness or sensory changes   Endocrine:  Denies polyuria or polydipsia   Lymphatic:  Denies swollen glands   Psychiatric:  Denies depression or anxiety     Past Medical and Surgical History:   Past Medical History:   Diagnosis Date    A-fib ([place] [address] )     Anemia     Bladder cancer ([place] [address] )     CAD (coronary artery disease)     CHF (congestive heart failure) (HCC)     Chronic kidney failure     Colon cancer ([place] [address] )     Eye cancer, left ([place] [address] )     Hypertension     Incontinence     Pacemaker     Prostate cancer ([place] [address] )     Type 2 diabetes mellitus ([place] [address] )     Wears hearing aid in both ears      Past Surgical History:   Procedure Laterality Date    CARDIAC PACEMAKER PLACEMENT  2014    CORONARY ANGIOPLASTY WITH STENT PLACEMENT      GALLBLADDER SURGERY      NJ CYSTO/URETERO W/LITHOTRIPSY &INDWELL STENT INSRT Right 7/31/2018    Procedure: CYSTOSCOPY URETEROSCOPY WITH LITHOTRIPSY HOLMIUM LASER ,BASKET STONE EXTRACTION  RETROGRADE PYELOGRAM AND exchange STENT URETERAL;  Surgeon: Sabiha Madison MD;  [facility]: [facility];  Service: Urology    NJ CYSTOURETHROSCOPY,URETER CATHETER Right 7/1/2018    Procedure: CYSTOSCOPY RETROGRADE PYELOGRAM WITH INSERTION STENT URETERAL;  Surgeon: Yancy Patricio MD;  Location: [facility];  Service: Urology       Meds/Allergies:  Medications Prior to Admission   Medication    acetaminophen (TYLENOL) 325 mg tablet    ALPRAZolam (XANAX) 0 5 mg tablet    apixaban (ELIQUIS) 5 mg    aspirin (ECOTRIN LOW STRENGTH) 81 mg EC tablet    atorvastatin (LIPITOR) 20 mg tablet    bumetanide (BUMEX) 2 mg tablet    carbamide peroxide (DEBROX) 6 5 % otic solution    carvedilol (COREG) 3 125 mg tablet    gabapentin (NEURONTIN) 300 mg capsule    insulin glargine (LANTUS SOLOSTAR) 100 units/mL injection pen    melatonin 3 mg    metFORMIN (GLUCOPHAGE) 500 mg tablet    metolazone (ZAROXOLYN) 2 5 mg tablet    Mirabegron ER 25 MG TB24    nitroglycerin (NITROSTAT) 0 4 mg SL tablet    Omega-3 Fatty Acids (FISH OIL) 1,000 mg    potassium chloride (K-DUR,KLOR-CON) 20 mEq tablet    tamsulosin (FLOMAX) 0 4 mg    EASY TOUCH LANCETS 28G MISC    gabapentin (NEURONTIN) 100 mg capsule    isosorbide mononitrate (IMDUR) 60 mg 24 hr tablet       Allergies: Allergies   Allergen Reactions    Iv Contrast [Iodinated Diagnostic Agents]      History:  Marital Status:    Occupation:  Retired  Patient Pre-hospital Living Situation:  Lives at home and patient claims to refuse any skilled nursing facility placement (see warnings in epic)  Patient Pre-hospital Level of Mobility:  Generally needs assistance  Patient Pre-hospital Diet Restrictions:  Cardiac  Substance Use History:   Social History     Substance and Sexual Activity   Alcohol Use Yes    Comment: jayne cognac 1 oz/night      Social History     Tobacco Use   Smoking Status Former Smoker    Packs/day: 1 00    Years: 16 00    Pack years: 16 00    Types: Cigarettes    Start date: 5    Last attempt to quit: Worthy Pac Years since quittin 5   Smokeless Tobacco Never Used     Social History     Substance and Sexual Activity   Drug Use No       Family History:  History reviewed  No pertinent family history      Physical Exam:     Vitals:   Blood Pressure: 119/70 (19 2115)  Pulse: 70 (19 2115)  Temperature: 97 7 °F (36 5 °C) (07/01/19 1827)  Temp Source: Oral (07/01/19 1827)  Respirations: 20 (07/01/19 2115)  Height: 5' 7" (170 2 cm) (07/01/19 1827)  Weight - Scale: 90 2 kg (198 lb 13 7 oz) (07/01/19 1827)  SpO2: 98 % (07/01/19 2115)    Constitutional:  Well developed, well nourished, mild abdominal breathing however patient is able to speak in long sentences  Eyes:  PERRL, conjunctiva normal   HENT:  Atraumatic, external ears normal, nose normal, oropharynx moist, no pharyngeal exudates  Neck- normal range of motion, no tenderness, supple   Respiratory:  No respiratory distress, bronchial breath sounds, generally clear anteriorly  Cardiovascular:  Normal rate, normal rhythm, no murmurs, no gallops, no rubs   GI:  Soft, nondistended, normal bowel sounds, nontender, no organomegaly, no mass, no rebound, no guarding   :  No costovertebral angle tenderness   Musculoskeletal:  No edema, no tenderness, no deformities  Back- no tenderness  Integument:  Well hydrated, noted redness and warmth of the right hallux valgus  Hyperesthetic upon light touch and even touching the blanket  Lymphatic:  No lymphadenopathy noted   Neurologic:  Alert &awake, communicative, CN 2-12 normal, normal motor function, normal sensory function, no focal deficits noted   Psychiatric:  Speech and behavior appropriate for age but generally in patient  Lab Results: I have personally reviewed pertinent reports        Results from last 7 days   Lab Units 07/01/19 1947   WBC Thousand/uL 5 31   HEMOGLOBIN g/dL 9 5*   HEMATOCRIT % 30 0*   PLATELETS Thousands/uL 207   NEUTROS PCT % 73   LYMPHS PCT % 11*   MONOS PCT % 11   EOS PCT % 4     Results from last 7 days   Lab Units 07/01/19 1947   POTASSIUM mmol/L 2 9*   CHLORIDE mmol/L 89*   CO2 mmol/L 38*   BUN mg/dL 29*   CREATININE mg/dL 1 57*   CALCIUM mg/dL 8 4   ALK PHOS U/L 103   ALT U/L 12   AST U/L 11     Results from last 7 days   Lab Units 07/01/19 1947   INR  1 66* Imaging: I have personally reviewed pertinent reports  Today's chest x-ray shows bilateral pulmonary congestion  Xr Chest 2 Views    Result Date: 6/14/2019  Narrative: CHEST INDICATION:   Chest Pain  COMPARISON:  None EXAM PERFORMED/VIEWS:  XR CHEST PA & LATERAL FINDINGS:  Left chest wall pacemaker is present  Cardiomediastinal silhouette appears unremarkable  Small bilateral pleural effusions are noted  Lungs are clear  Osseous structures appear within normal limits for patient age  Impression: Small bilateral pleural effusions  Workstation performed: [id number]         ** Please Note: Dragon 360 Dictation voice to text software was used in the creation of this document   **

## 2019-07-02 NOTE — ASSESSMENT & PLAN NOTE
Elevated troponin might be of no significance and possibly secondary to patient's renal insufficiency  Will trend

## 2019-07-02 NOTE — CONSULTS
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  Active Problems:    Acute on chronic diastolic CHF     CAD (coronary artery disease)    Acute renal failure superimposed on stage 3 chronic kidney disease (HCC)    Acute respiratory failure with hypoxia     Elevated troponin      Assessment/Plan:  1  Acute on chronic diastolic congestive heart failure,HFpEF  Suspect this is related to dietary indiscretions at the nursing home where he resides     NT BNP just over 7 K  Elevated from prior admissions  Given 1 time dose of 20 mg IV Lasix, with transition to oral diuretic  However still mildly volume overloaded and over dry weight  Intake output is unclear  Would switch back to IV at least for today  Continue close monitoring of I&Os, daily weights daily BMP  He has had multiple readmissions with same presentation over the past year and most recently last month  --Nutrition consult for CHF diet teaching  2 CAD  S/P LULU to the LAD at outside hospital about a year and a half ago  Reports chest pain episodes a few times per week, relieved with SL NTG however patient is a poor historian and a bit unclear on details of these episodes   This may represent progression of his CAD versus, atypical CP, versus symptoms related to volume overload  Given his poor renal function, he is likely not a candidate for cardiac cath at this point but could be considered to re-eval his anatomy  Would continue BB, ASA, statin  Consider Ranexa prior to discharge for symptomatic relief  3  PAF  On BB and Eliquis    4  SSS  S/P [employer] VVI PPM on 5/16/19  Interrogation 5/16/19: 99% Vpaced, no high rate episodes with normal device function     5  DM II  Per primary team    6  HTN  Elevated on admit, now controlled  7 CKD   Baseline creat 1 4-1 7  Currently 1 7  Will likely improve with diuresis  HPI:  26-year-old male, with past medical history as described above, from [facility], who presented to the ED yesterday evening with complaints of chest pressure associated with shortness of breath at rest   Patient has had multiple admissions over the past year with the same presentation and diagnosed with acute on chronic congestive heart failure  His most recent admission was from 06/13/2019 through 06/19/2019  Apparently his metolazone dose was increased during that admission  Weight at discharge 194 lb  He was then seen by our nurse practitioner in the outpatient clinic on 06/24/2019  At that time he reported occasional shortness of breath on exertion with midsternal chest pain while he is lying in bed at night  Apparently he had been taking sublingual nitroglycerin and getting symptomatic relief  He weighed 198 lb in the office and appeared well compensated on exam   He was to continue his Bumex 2 mg twice daily along with metolazone 5 mg on Mondays Wednesdays and Fridays  On admission yesterday patient was noted to have elevated NT proBNP of just over 7 K, which is higher than previous admissions  It is sodium of 130, with potassium of 2 9, BUN 29, creatinine 1 6  Troponin was mildly elevated at 0 16 and now downtrending  Chest x-ray demonstrated central pulmonary venous distension with probable bilateral pleural effusions  It appears that his weight at present is around 206 lb  His blood pressure has improved dramatically overnight  It appears he was given a 1 time dose of Lasix 20 mg IV on admit and has already been transitioned to his usual oral regimen      Past Medical History:   Diagnosis Date    [facility] ([facility] )     Anemia     Bladder cancer ([facility] )     CAD (coronary artery disease)     CHF (congestive heart failure) (HCC)     Chronic kidney failure     [facility]     Eye cancer, left ([facility] )  Hypertension     Incontinence     Pacemaker     Prostate cancer ([facility] [address] )     Type 2 diabetes mellitus ([facility] [address] )     Wears hearing aid in both ears      Twelve point review of systems negative other than that stated in HPI    Allergies   Allergen Reactions    Iv Contrast [Iodinated Diagnostic Agents]          Current Facility-Administered Medications:     acetaminophen (TYLENOL) tablet 325 mg, 325 mg, Oral, Q6H PRN, Ely Ring MD    albuterol inhalation solution 2 5 mg, 2 5 mg, Nebulization, Q6H PRN, Ely Ring MD    ALPRAZolam Sammi Broom) tablet 0 5 mg, 0 5 mg, Oral, 4x Daily PRN, Ely Ring MD    aluminum-magnesium hydroxide-simethicone (MYLANTA) 200-200-20 mg/5 mL oral suspension 5 mL, 5 mL, Oral, Q6H PRN, Ely Ring MD    apixaban [facility] [place]) tablet 2 5 mg, 2 5 mg, Oral, BID, Ely Ring MD, 2 5 mg at 07/02/19 0857    aspirin (ECOTRIN LOW STRENGTH) EC tablet 81 mg, 81 mg, Oral, Daily, Ely Ring MD, 81 mg at 07/02/19 0857    atorvastatin (LIPITOR) tablet 20 mg, 20 mg, Oral, After Dinner, Ely Ring MD    [facility]) tablet 2 mg, 2 mg, Oral, BID, Ely Ring MD, 2 mg at 07/02/19 0857    carbamide peroxide (DEBROX) 6 5 % otic solution 5 drop, 5 drop, Both Ears, Daily, Ely Ring MD, 5 drop at 07/02/19 0858    carvedilol (COREG) tablet 3 125 mg, 3 125 mg, Oral, BID With Meals, Ely Ring MD, 3 125 mg at 07/02/19 4195    fish oil capsule 1,000 mg, 1,000 mg, Oral, Daily, Ely Ring MD, 1,000 mg at 07/02/19 0856    gabapentin (NEURONTIN) capsule 100 mg, 100 mg, Oral, Daily, Ely Ring MD, 100 mg at 07/02/19 0857    gabapentin (NEURONTIN) capsule 300 mg, 300 mg, Oral, HS, Ely Ring MD, 300 mg at 07/01/19 1384    insulin glargine (LANTUS) subcutaneous injection 10 Units 0 1 mL, 10 Units, Subcutaneous, HS, Ely Ring MD, 10 Units at 07/01/19 1637    insulin lispro (HumaLOG) 100 units/mL subcutaneous injection 1-5 Units, 1-5 Units, Subcutaneous, HS, Melvin Guerrero MD, 2 Units at 07/02/19 0057    insulin lispro (HumaLOG) 100 units/mL subcutaneous injection 2-12 Units, 2-12 Units, Subcutaneous, TID AC, 8 Units at 07/02/19 0717 **AND** Fingerstick Glucose (POCT), , , TID AC, Melvin Guerrero MD    isosorbide mononitrate (IMDUR) 24 hr tablet 60 mg, 60 mg, Oral, Daily, Melvin Guerrero MD, 60 mg at 07/02/19 0858    melatonin tablet 3 mg, 3 mg, Oral, HS, Melvin Guerrero MD, 3 mg at 07/01/19 2323    [START ON 7/3/2019] metolazone (ZAROXOLYN) tablet 2 5 mg, 2 5 mg, Oral, Once per day on Mon Wed Fri, Melvin Guerrero MD    morphine injection 2 mg, 2 mg, Intravenous, Q1H PRN, Melvin Guerrero MD    nitroglycerin (NITROSTAT) SL tablet 0 4 mg, 0 4 mg, Sublingual, Once, Melvin Guerrero MD, Stopped at 07/01/19 2126    nitroglycerin (NITROSTAT) SL tablet 0 4 mg, 0 4 mg, Sublingual, Q5 Min PRN, Melvin Guerrero MD    ondansetron [facility]) injection 4 mg, 4 mg, Intravenous, Q6H PRN, Melvin Guerrero MD    oxybutynin (DITROPAN-XL) 24 hr tablet 5 mg, 5 mg, Oral, Daily, Melvin Guerrero MD, 5 mg at 07/02/19 0856    oxyCODONE (ROXICODONE) IR tablet 5 mg, 5 mg, Oral, Q4H PRN, Melvin Guerrero MD    potassium chloride (K-DUR,KLOR-CON) CR tablet 20 mEq, 20 mEq, Oral, TID With Meals, Melvin Guerrero MD, 20 mEq at 07/02/19 0717    predniSONE tablet 20 mg, 20 mg, Oral, Daily, Melvin Guerrero MD, 20 mg at 07/02/19 0717    tamsulosin (FLOMAX) capsule 0 4 mg, 0 4 mg, Oral, Daily With Dinner, Melvin Guerrero MD    Social History     Socioeconomic History    Marital status:       Spouse name: Not on file    Number of children: Not on file    Years of education: Not on file    Highest education level: Not on file   Occupational History    Not on file   Social Needs    Financial resource strain: Not on file    Food insecurity:     Worry: Not on file     Inability: Not on file    Transportation needs:     Medical: Not on file Non-medical: Not on file   Tobacco Use    Smoking status: Former Smoker     Packs/day: 1 00     Years: 16 00     Pack years: 16 00     Types: Cigarettes     Start date: 5     Last attempt to quit: 1959     Years since quittin 5    Smokeless tobacco: Never Used   Substance and Sexual Activity    Alcohol use: Yes     Comment: jayne cognac 1 oz/night     Drug use: No    Sexual activity: Not Currently   Lifestyle    Physical activity:     Days per week: Not on file     Minutes per session: Not on file    Stress: Not on file   Relationships    Social connections:     Talks on phone: Not on file     Gets together: Not on file     Attends Church service: Not on file     Active member of club or organization: Not on file     Attends meetings of clubs or organizations: Not on file     Relationship status: Not on file    Intimate partner violence:     Fear of current or ex partner: Not on file     Emotionally abused: Not on file     Physically abused: Not on file     Forced sexual activity: Not on file   Other Topics Concern    Not on file   Social History Narrative    Not on file       History reviewed  No pertinent family history  Physical Exam:  Central Line (day, reason): Marte catheter (day, reason):    Vitals: Blood pressure 124/58, pulse 80, temperature 97 8 °F (36 6 °C), temperature source Oral, resp  rate 18, height 5' 9" (1 753 m), weight 91 5 kg (201 lb 11 2 oz), SpO2 95 %  , Body mass index is 29 79 kg/m² , I/O last 3 completed shifts:  In: -   Out: 300 [Urine:300]  No intake/output data recorded    Wt Readings from Last 3 Encounters:   19 91 5 kg (201 lb 11 2 oz)   19 90 2 kg (198 lb 14 4 oz)   19 88 kg (194 lb)       Intake/Output Summary (Last 24 hours) at 2019 0910  Last data filed at 2019 0540  Gross per 24 hour   Intake    Output 300 ml   Net -300 ml     I/O last 3 completed shifts:  In: -   Out: 300 [Urine:300]    No significant arrhythmias seen on telemetry review  Vitals:    07/02/19 0246 07/02/19 0721 07/02/19 0746 07/02/19 0803   BP: 100/62 124/58     BP Location: Left arm      Pulse: 70 80     Resp: 16  18    Temp: 97 8 °F (36 6 °C)      TempSrc: Oral      SpO2: 98%  95%    Weight:    91 5 kg (201 lb 11 2 oz)   Height:           GEN: Marcus Dominguez appears well, alert and oriented x 3, pleasant and cooperative   HEENT: pupils equal, round, and reactive to light; extraocular muscles intact  NECK: supple, no carotid bruits   HEART: regular rhythm, normal S1 and S2, no murmurs, clicks, gallops or rubs, JVP is    LUNGS: clear to auscultation bilaterally; no wheezes, rales, or rhonchi   ABDOMEN: normal bowel sounds, soft, no tenderness, no distention  EXTREMITIES: peripheral pulses normal; no clubbing, cyanosis, or edema  NEURO: no focal findings   SKIN: normal without suspicious lesions on exposed skin    Labs & Results:    Results from last 7 days   Lab Units 07/02/19  0638 07/02/19  0356 07/02/19  0030   TROPONIN I ng/mL 0 12* 0 15* 0 17*     Results from last 7 days   Lab Units 07/02/19  0356 07/01/19 1947   WBC Thousand/uL 5 95 5 31   HEMOGLOBIN g/dL 10 2* 9 5*   HEMATOCRIT % 31 6* 30 0*   PLATELETS Thousands/uL 200 207         Results from last 7 days   Lab Units 07/02/19  0731 07/02/19  0030 07/01/19 1947   POTASSIUM mmol/L 3 8 2 8* 2 9*   CHLORIDE mmol/L 89* 92* 89*   CO2 mmol/L 34* 38* 38*   BUN mg/dL 32* 31* 29*   CREATININE mg/dL 1 71* 1 62* 1 57*   CALCIUM mg/dL 8 8 8 7 8 4   ALK PHOS U/L  --  103 103   ALT U/L  --  12 12   AST U/L  --  11 11     Results from last 7 days   Lab Units 07/01/19 1947   INR  1 66*       Chest X-Ray is obtained; result - reviewed  EKG personally reviewed by KEERTHI Arevalo  Counseling / Coordination of Care  Total floor / unit time spent today 40 minutes  Greater than 50% of total time was spent with the patient and / or family counseling and / or coordination of care    A description of the counseling / coordination of care: 20  Thank you for the opportunity to participate in the care of this patient  Safia Boudreaux MD, PhD   Pattie De Guzman

## 2019-07-02 NOTE — UTILIZATION REVIEW
Initial Clinical Review    Admission: Date/Time/Statement: 7/1/19 @ 2152     Orders Placed This Encounter   Procedures    Inpatient Admission     Standing Status:   Standing     Number of Occurrences:   1     Order Specific Question:   Admitting Physician     Answer:   Verito Underwood [[id number]]     Order Specific Question:   Level of Care     Answer:   Med Surg [[id number]]     Order Specific Question:   Estimated length of stay     Answer:   More than 2 Midnights     Order Specific Question:   Certification     Answer:   I certify that inpatient services are medically necessary for this patient for a duration of greater than two midnights  See H&P and MD Progress Notes for additional information about the patient's course of treatment   Inpatient Admission     Standing Status:   Standing     Number of Occurrences:   1     Order Specific Question:   Admitting Physician     Answer:   Verito Underwood [[id number]]     Order Specific Question:   Level of Care     Answer:   Med Surg [[id number]]     Order Specific Question:   Estimated length of stay     Answer:   More than 2 Midnights     Order Specific Question:   Certification     Answer:   I certify that inpatient services are medically necessary for this patient for a duration of greater than two midnights  See H&P and MD Progress Notes for additional information about the patient's course of treatment  ED Arrival Information     Expected Arrival Acuity Means of Arrival Escorted By Service Admission Type    - 7/1/2019 18:15 Urgent Ambulance [facility] Hospitalist Urgent    Arrival Complaint    -        Chief Complaint   Patient presents with    Foot Pain     Patient presents to the E R  with right foot pain  Both feet are wrapped in Ace Bandages  Assessment/Plan:    80-year-old male comes from nursing home who presents with episodes of chest pain, shortness of breath and right great toe pain    Patient has significant past medical history notable for CAD, CHF as well as paroxysmal atrial fibrillation status post pacemaker placement  Today he developed right great toe pain which exacerbated his chest pain and shortness of breath  He was given nitroglycerin and increase in his normal medications earlier today which did help  He was then sent into the ED for evaluation  He is short of breath with increased conversation or movement but if he sits still he denies any shortness of breath  He exhibits edema (Bilateral 2+ pitting edema) and tenderness (TTP over right great toe)  Tachypnea noted  He has wheezes in the right upper field and the left upper field  Treated with aspirin, duoneb and iv lasix in ed  Remains short of breath requiring supplemental oxygen  Admit to inpatient for chf exacerbation and acute gout  Acute gout due to renal impairment involving right foot  Assessment & Plan  This is patient's primary complaint when he came over to the hospital   Noted is that patient has acute podagra which is with hyper esthesia  Will give a dose of methylprednisolone 40 milligrams intravenously while here in the emergency room  Will continue with prednisone 40 milligrams tomorrow  As patient is diabetic would carefully watch sugars  Will also check uric acid for tomorrow      Acute on chronic diastolic CHF   Assessment & Plan      Wt Readings from Last 3 Encounters:   07/01/19 90 2 kg (198 lb 13 7 oz)   06/24/19 90 2 kg (198 lb 14 4 oz)   06/20/19 88 kg (194 lb)         Continue daily weights and inputs outputs  Patient received a dose of furosemide 20 milligrams intravenously  We are hesitant in placing patient on constant IV diuretics as his BUN creatinine is already elevated from previous  Likewise, patient refuses to sit upright saying that it is more difficult for him to breathe in that position    At this point, although patient demonstrates abdominal breathing, he is not with conversational distress          ED Triage Vitals [07/01/19 1827]   97 7 °F (36 5 °C) 65 19 (!) 221/154 93 %      Worst Possible Pain       07/02/19 91 5 kg (201 lb 11 2 oz)     Additional Vital Signs:     Date/Time  Temp  Pulse  Resp  BP  SpO2  O2 Device   07/02/19 1102    83  14  136/62  98 %  Nasal cannula   07/02/19 0900            Nasal cannula   07/02/19 0746      18    95 %     07/02/19 0721    80    124/58       07/02/19 0400            Nasal cannula   07/02/19 0246  97 8 °F (36 6 °C)  70  16  100/62  98 %  Nasal cannula   07/02/19 0030    76      98 %     07/01/19 2315  97 7 °F (36 5 °C)  70  20  103/57  98 %  Nasal cannula   07/01/19 2115    70  20  119/70  98 %  Nasal cannula   07/01/19 2100    74  20  118/62  95 %  Nasal cannula   07/01/19 1955    70  18  120/67  95 %  Nasal cannula      07/01 1827 07/01 1955 07/01 2100 07/01 2115 07/01 2315 07/02 0246 07/02 0400 07/02 0900   O2 Device None (R  Nasal c  Nasal c  Nasal c  Nasal c  Nasal c  Nasal c  Nasal c     O2 Flow Rate (L/min) (L/min)  2 2 2 2 2 2 2               Pertinent Labs/Diagnostic Test Results:   Results from last 7 days   Lab Units 07/02/19  0356 07/01/19 1947   WBC Thousand/uL 5 95 5 31   HEMOGLOBIN g/dL 10 2* 9 5*   HEMATOCRIT % 31 6* 30 0*   PLATELETS Thousands/uL 200 207   NEUTROS ABS Thousands/µL  --  3 90   BANDS PCT % 1  --          Results from last 7 days   Lab Units 07/02/19  0731 07/02/19  0030 07/01/19 1947   SODIUM mmol/L 129* 136 130*   POTASSIUM mmol/L 3 8 2 8* 2 9*   CHLORIDE mmol/L 89* 92* 89*   CO2 mmol/L 34* 38* 38*   ANION GAP mmol/L 6 6 3*   BUN mg/dL 32* 31* 29*   CREATININE mg/dL 1 71* 1 62* 1 57*   EGFR ml/min/1 73sq m 35 37 39   CALCIUM mg/dL 8 8 8 7 8 4   MAGNESIUM mg/dL  --  2 0 1 8     Results from last 7 days   Lab Units 07/02/19  0030 07/01/19  1947   AST U/L 11 11   ALT U/L 12 12   ALK PHOS U/L 103 103   TOTAL PROTEIN g/dL 6 5 6 7   ALBUMIN g/dL 2 8* 2 5*   TOTAL BILIRUBIN mg/dL 0 69 0 57     Results from last 7 days   Lab Units 07/02/19  1119 07/02/19  1109 07/02/19  0633 07/02/19  0057 07/01/19  2318   POC GLUCOSE mg/dl 456* >500* 317* 239* 175*     Results from last 7 days   Lab Units 07/02/19  0731 07/02/19  0030 07/01/19  1947   GLUCOSE RANDOM mg/dL 308* 193* 204*     Results from last 7 days   Lab Units 07/02/19  0030   HEMOGLOBIN A1C % 8 2*   EAG mg/dl 189     Results from last 7 days   Lab Units 07/02/19  0638 07/02/19  0356 07/02/19  0030 07/01/19  1947   TROPONIN I ng/mL 0 12* 0 15* 0 17* 0 16*         Results from last 7 days   Lab Units 07/01/19  1947   PROTIME seconds 19 1*   INR  1 66*   PTT seconds 42*     Results from last 7 days   Lab Units 07/01/19  1947   NT-PRO BNP pg/mL 7,257*     ED Treatment:   Medication Administration from 07/01/2019 1815 to 07/01/2019 2303       Date/Time Order Dose Route     07/01/2019 1952 ipratropium-albuterol (DUO-NEB) 0 5-2 5 mg/3 mL inhalation solution 3 mL 3 mL Nebulization     07/01/2019 2122 aspirin tablet 325 mg 325 mg Oral     07/01/2019 2142 furosemide (LASIX) injection 20 mg 20 mg Intravenous        Past Medical History:   Diagnosis    [facility]fib ([facility])    Anemia    [facility]    CAD (coronary artery disease)    CHF (congestive heart failure) ([facility])    Chronic kidney failure    Colon cancer ([facility] )    Eye cancer, left ([facility] )    Hypertension    Incontinence    Pacemaker    Prostate cancer ([facility] )    Type 2 diabetes mellitus ([facility] )    Wears hearing aid in both ears     Present on Admission:   Acute renal failure superimposed on stage 3 chronic kidney disease ([facility] )   Acute respiratory failure with hypoxia    Elevated troponin   CAD (coronary artery disease)   Paroxysmal atrial fibrillation - Sick sinus syndrome   CKD (chronic kidney disease), stage III ([facility])   Hyponatremia      Admitting Diagnosis:     Foot pain [M79 673]  CHF (congestive heart failure) ([facility]) [I50 9]  Pain of right great toe [M79 674]      Age/Sex: 80 y o  male     Admission Orders:    Current Facility-Administered Medications:  acetaminophen 325 mg Oral Q6H PRN   albuterol 2 5 mg Nebulization Q6H PRN   ALPRAZolam 0 5 mg Oral 4x Daily PRN   aluminum-magnesium hydroxide-simethicone 5 mL Oral Q6H PRN   apixaban 2 5 mg Oral BID   aspirin 81 mg Oral Daily   atorvastatin 20 mg Oral After Dinner   bumetanide 3 mg Intravenous Once   carbamide peroxide 5 drop Both Ears Daily   carvedilol 3 125 mg Oral BID With Meals   fish oil 1,000 mg Oral Daily   gabapentin 100 mg Oral Daily   gabapentin 300 mg Oral HS   insulin glargine 10 Units Subcutaneous HS   insulin lispro 1-5 Units Subcutaneous HS   insulin lispro 10 Units Subcutaneous TID With Meals   insulin lispro 2-12 Units Subcutaneous TID AC   [START ON 7/3/2019] isosorbide mononitrate 90 mg Oral Daily   melatonin 3 mg Oral HS   [START ON 7/3/2019] metolazone 2 5 mg Oral Once per day on Mon Wed Fri   morphine injection 2 mg Intravenous Q1H PRN   nitroglycerin 0 4 mg Sublingual Once   nitroglycerin 0 4 mg Sublingual Q5 Min PRN   ondansetron 4 mg Intravenous Q6H PRN   oxybutynin 5 mg Oral Daily   oxyCODONE 5 mg Oral Q4H PRN   potassium chloride 20 mEq Oral TID With Meals   predniSONE 20 mg Oral Daily   spironolactone 25 mg Oral Daily   tamsulosin 0 4 mg Oral Daily With [facility] Utilization Review Department  Phone: [phone]; Fax [phone]  [email or web address]  ATTENTION: Please call with any questions or concerns to [phone]  and carefully listen to the prompts so that you are directed to the right person  Send all requests for admission clinical reviews, approved or denied determinations and any other requests to fax [phone]   All voicemails are confidential

## 2019-07-02 NOTE — PLAN OF CARE
Problem: Potential for Falls  Goal: Patient will remain free of falls  Description  INTERVENTIONS:  - Assess patient frequently for physical needs  -  Identify cognitive and physical deficits and behaviors that affect risk of falls  -  [place] fall precautions as indicated by assessment   - Educate patient/family on patient safety including physical limitations  - Instruct patient to call for assistance with activity based on assessment  - Modify environment to reduce risk of injury  - Consider OT/PT consult to assist with strengthening/mobility  Outcome: Progressing     Problem: Prexisting or High Potential for Compromised Skin Integrity  Goal: Skin integrity is maintained or improved  Description  INTERVENTIONS:  - Identify patients at risk for skin breakdown  - Assess and monitor skin integrity  - Assess and monitor nutrition and hydration status  - Monitor labs (i e  albumin)  - Assess for incontinence   - Turn and reposition patient  - Assist with mobility/ambulation  - Relieve pressure over bony prominences  - Avoid friction and shearing  - Provide appropriate hygiene as needed including keeping skin clean and dry  - Evaluate need for skin moisturizer/barrier cream  - Collaborate with interdisciplinary team (i e  Nutrition, Rehabilitation, etc )   - Patient/family teaching  Outcome: Progressing     Problem: Knowledge Deficit  Goal: Patient/family/caregiver demonstrates understanding of disease process, treatment plan, medications, and discharge instructions  Description  Complete learning assessment and assess knowledge base    Interventions:  - Provide teaching at level of understanding  - Provide teaching via preferred learning methods  Outcome: Progressing     Problem: CARDIOVASCULAR - ADULT  Goal: Maintains optimal cardiac output and hemodynamic stability  Description  INTERVENTIONS:  - Monitor I/O, vital signs and rhythm  - Monitor for S/S and trends of decreased cardiac output i e  bleeding, hypotension  - Administer and titrate ordered vasoactive medications to optimize hemodynamic stability  - Assess quality of pulses, skin color and temperature  - Assess for signs of decreased coronary artery perfusion - ex   Angina  - Instruct patient to report change in severity of symptoms  Outcome: Progressing  Goal: Absence of cardiac dysrhythmias or at baseline rhythm  Description  INTERVENTIONS:  - Continuous cardiac monitoring, monitor vital signs, obtain 12 lead EKG if indicated  - Administer antiarrhythmic and heart rate control medications as ordered  - Monitor electrolytes and administer replacement therapy as ordered  Outcome: Progressing     Problem: RESPIRATORY - ADULT  Goal: Achieves optimal ventilation and oxygenation  Description  INTERVENTIONS:  - Assess for changes in respiratory status  - Assess for changes in mentation and behavior  - Position to facilitate oxygenation and minimize respiratory effort  - Oxygen administration by appropriate delivery method based on oxygen saturation (per order) or ABGs  - Initiate smoking cessation education as indicated  - Encourage broncho-pulmonary hygiene including cough, deep breathe, Incentive Spirometry  - Assess the need for suctioning and aspirate as needed  - Assess and instruct to report SOB or any respiratory difficulty  - Respiratory Therapy support as indicated  Outcome: Progressing     Problem: METABOLIC, FLUID AND ELECTROLYTES - ADULT  Goal: Electrolytes maintained within normal limits  Description  INTERVENTIONS:  - Monitor labs and assess patient for signs and symptoms of electrolyte imbalances  - Administer electrolyte replacement as ordered  - Monitor response to electrolyte replacements, including repeat lab results as appropriate  - Instruct patient on fluid and nutrition as appropriate  Outcome: Progressing  Goal: Fluid balance maintained  Description  INTERVENTIONS:  - Monitor labs and assess for signs and symptoms of volume excess or deficit  - Monitor I/O and WT  - Instruct patient on fluid and nutrition as appropriate  Outcome: Progressing  Goal: Glucose maintained within target range  Description  INTERVENTIONS:  - Monitor Blood Glucose as ordered  - Assess for signs and symptoms of hyperglycemia and hypoglycemia  - Administer ordered medications to maintain glucose within target range  - Assess nutritional intake and initiate nutrition service referral as needed  Outcome: Progressing     Problem: SKIN/TISSUE INTEGRITY - ADULT  Goal: Skin integrity remains intact  Description  INTERVENTIONS  - Identify patients at risk for skin breakdown  - Assess and monitor skin integrity  - Assess and monitor nutrition and hydration status  - Monitor labs (i e  albumin)  - Assess for incontinence   - Turn and reposition patient  - Assist with mobility/ambulation  - Relieve pressure over bony prominences  - Avoid friction and shearing  - Provide appropriate hygiene as needed including keeping skin clean and dry  - Evaluate need for skin moisturizer/barrier cream  - Collaborate with interdisciplinary team (i e  Nutrition, Rehabilitation, etc )   - Patient/family teaching  Outcome: Progressing     Problem: MUSCULOSKELETAL - ADULT  Goal: Maintain or return mobility to safest level of function  Description  INTERVENTIONS:  - Assess patient's ability to carry out ADLs; assess patient's baseline for ADL function and identify physical deficits which impact ability to perform ADLs (bathing, care of mouth/teeth, toileting, grooming, dressing, etc )  - Assess/evaluate cause of self-care deficits   - Assess range of motion  - Assess patient's mobility; develop plan if impaired  - Assess patient's need for assistive devices and provide as appropriate  - Encourage maximum independence but intervene and supervise when necessary  - Involve family in performance of ADLs  - Assess for home care needs following discharge   - Request OT consult to assist with ADL evaluation and planning for discharge  - Provide patient education as appropriate  Outcome: Progressing

## 2019-07-03 VITALS
BODY MASS INDEX: 30.02 KG/M2 | SYSTOLIC BLOOD PRESSURE: 115 MMHG | WEIGHT: 202.7 LBS | HEART RATE: 65 BPM | TEMPERATURE: 97.5 F | DIASTOLIC BLOOD PRESSURE: 59 MMHG | HEIGHT: 69 IN | OXYGEN SATURATION: 99 % | RESPIRATION RATE: 18 BRPM

## 2019-07-03 PROBLEM — J96.01 ACUTE RESPIRATORY FAILURE WITH HYPOXIA (HCC): Status: RESOLVED | Noted: 2019-06-13 | Resolved: 2019-07-03

## 2019-07-03 LAB
ANION GAP SERPL CALCULATED.3IONS-SCNC: 3 MMOL/L (ref 4–13)
BUN SERPL-MCNC: 45 MG/DL (ref 5–25)
CALCIUM SERPL-MCNC: 9.4 MG/DL (ref 8.3–10.1)
CHLORIDE SERPL-SCNC: 92 MMOL/L (ref 100–108)
CO2 SERPL-SCNC: 35 MMOL/L (ref 21–32)
CREAT SERPL-MCNC: 1.88 MG/DL (ref 0.6–1.3)
GFR SERPL CREATININE-BSD FRML MDRD: 31 ML/MIN/1.73SQ M
GLUCOSE SERPL-MCNC: 167 MG/DL (ref 65–140)
GLUCOSE SERPL-MCNC: 186 MG/DL (ref 65–140)
GLUCOSE SERPL-MCNC: 199 MG/DL (ref 65–140)
MAGNESIUM SERPL-MCNC: 2.3 MG/DL (ref 1.6–2.6)
POTASSIUM SERPL-SCNC: 3.6 MMOL/L (ref 3.5–5.3)
SODIUM SERPL-SCNC: 130 MMOL/L (ref 136–145)

## 2019-07-03 PROCEDURE — 99232 SBSQ HOSP IP/OBS MODERATE 35: CPT | Performed by: INTERNAL MEDICINE

## 2019-07-03 PROCEDURE — 80048 BASIC METABOLIC PNL TOTAL CA: CPT | Performed by: INTERNAL MEDICINE

## 2019-07-03 PROCEDURE — G8980 MOBILITY D/C STATUS: HCPCS | Performed by: PHYSICAL THERAPIST

## 2019-07-03 PROCEDURE — G8987 SELF CARE CURRENT STATUS: HCPCS

## 2019-07-03 PROCEDURE — G8988 SELF CARE GOAL STATUS: HCPCS

## 2019-07-03 PROCEDURE — G8979 MOBILITY GOAL STATUS: HCPCS | Performed by: PHYSICAL THERAPIST

## 2019-07-03 PROCEDURE — 97167 OT EVAL HIGH COMPLEX 60 MIN: CPT

## 2019-07-03 PROCEDURE — G8989 SELF CARE D/C STATUS: HCPCS

## 2019-07-03 PROCEDURE — 97163 PT EVAL HIGH COMPLEX 45 MIN: CPT | Performed by: PHYSICAL THERAPIST

## 2019-07-03 PROCEDURE — 82948 REAGENT STRIP/BLOOD GLUCOSE: CPT

## 2019-07-03 PROCEDURE — G8978 MOBILITY CURRENT STATUS: HCPCS | Performed by: PHYSICAL THERAPIST

## 2019-07-03 PROCEDURE — 99239 HOSP IP/OBS DSCHRG MGMT >30: CPT | Performed by: INTERNAL MEDICINE

## 2019-07-03 PROCEDURE — 94760 N-INVAS EAR/PLS OXIMETRY 1: CPT

## 2019-07-03 PROCEDURE — 83735 ASSAY OF MAGNESIUM: CPT | Performed by: INTERNAL MEDICINE

## 2019-07-03 RX ORDER — PREDNISONE 20 MG/1
20 TABLET ORAL DAILY
Qty: 3 TABLET | Refills: 0 | Status: SHIPPED | OUTPATIENT
Start: 2019-07-04 | End: 2019-07-07

## 2019-07-03 RX ORDER — SPIRONOLACTONE 25 MG/1
25 TABLET ORAL DAILY
Qty: 30 TABLET | Refills: 0 | Status: SHIPPED | OUTPATIENT
Start: 2019-07-04

## 2019-07-03 RX ORDER — BUMETANIDE 2 MG/1
2 TABLET ORAL 2 TIMES DAILY
Status: DISCONTINUED | OUTPATIENT
Start: 2019-07-03 | End: 2019-07-03 | Stop reason: HOSPADM

## 2019-07-03 RX ORDER — INSULIN GLARGINE 100 [IU]/ML
15 INJECTION, SOLUTION SUBCUTANEOUS
Refills: 0 | Status: SHIPPED | OUTPATIENT
Start: 2019-07-03

## 2019-07-03 RX ORDER — ISOSORBIDE MONONITRATE 30 MG/1
90 TABLET, EXTENDED RELEASE ORAL DAILY
Qty: 30 TABLET | Refills: 0 | Status: SHIPPED | OUTPATIENT
Start: 2019-07-03

## 2019-07-03 RX ORDER — POTASSIUM CHLORIDE 20 MEQ/1
20 TABLET, EXTENDED RELEASE ORAL 2 TIMES DAILY
Qty: 90 TABLET | Refills: 0 | Status: SHIPPED | OUTPATIENT
Start: 2019-07-03 | End: 2019-07-16 | Stop reason: SDUPTHER

## 2019-07-03 RX ADMIN — APIXABAN 2.5 MG: 2.5 TABLET, FILM COATED ORAL at 10:03

## 2019-07-03 RX ADMIN — INSULIN LISPRO 2 UNITS: 100 INJECTION, SOLUTION INTRAVENOUS; SUBCUTANEOUS at 11:36

## 2019-07-03 RX ADMIN — SPIRONOLACTONE 25 MG: 25 TABLET, FILM COATED ORAL at 10:02

## 2019-07-03 RX ADMIN — CARVEDILOL 3.12 MG: 3.12 TABLET, FILM COATED ORAL at 07:48

## 2019-07-03 RX ADMIN — ASPIRIN 81 MG: 81 TABLET, COATED ORAL at 10:03

## 2019-07-03 RX ADMIN — BUMETANIDE 2 MG: 2 TABLET ORAL at 11:34

## 2019-07-03 RX ADMIN — POTASSIUM CHLORIDE 20 MEQ: 1500 TABLET, EXTENDED RELEASE ORAL at 07:48

## 2019-07-03 RX ADMIN — ISOSORBIDE MONONITRATE 90 MG: 60 TABLET, EXTENDED RELEASE ORAL at 10:08

## 2019-07-03 RX ADMIN — METOLAZONE 2.5 MG: 5 TABLET ORAL at 10:08

## 2019-07-03 RX ADMIN — POTASSIUM CHLORIDE 20 MEQ: 1500 TABLET, EXTENDED RELEASE ORAL at 11:33

## 2019-07-03 RX ADMIN — Medication 1000 MG: at 10:02

## 2019-07-03 RX ADMIN — INSULIN LISPRO 2 UNITS: 100 INJECTION, SOLUTION INTRAVENOUS; SUBCUTANEOUS at 07:49

## 2019-07-03 RX ADMIN — CARBAMIDE PEROXIDE - 6.5% 5 DROP: 65 SOLUTION/ DROPS TOPICAL at 10:04

## 2019-07-03 RX ADMIN — INSULIN LISPRO 10 UNITS: 100 INJECTION, SOLUTION INTRAVENOUS; SUBCUTANEOUS at 07:48

## 2019-07-03 RX ADMIN — GABAPENTIN 100 MG: 100 CAPSULE ORAL at 10:03

## 2019-07-03 RX ADMIN — PREDNISONE 20 MG: 20 TABLET ORAL at 10:02

## 2019-07-03 RX ADMIN — OXYBUTYNIN CHLORIDE 5 MG: 5 TABLET, EXTENDED RELEASE ORAL at 10:02

## 2019-07-03 RX ADMIN — INSULIN LISPRO 10 UNITS: 100 INJECTION, SOLUTION INTRAVENOUS; SUBCUTANEOUS at 11:36

## 2019-07-03 NOTE — DISCHARGE SUMMARY
Discharge Summary - [facility] Internal Medicine    Patient Information: Marcus Dominguez 80 y o  male MRN: [id number]  Unit/Bed#: PPHP 530-01 Encounter: [id number]    Discharging Physician / Practitioner: Candice Wang DO  PCP: Alok Hitchcock  Admission Date: 7/1/2019  Discharge Date: 07/03/19    Disposition:     Other: [facility]    Reason for Admission:   Acute gout attack    Discharge Diagnoses:     Principal Problem:    Acute gout  Active Problems:    Type 2 diabetes mellitus ([facility] )    Chronic diastolic CHF (congestive heart failure) ([facility])    Paroxysmal atrial fibrillation - Sick sinus syndrome    CAD (coronary artery disease)    Cardiac pacemaker in situ    Hyponatremia    Acute renal failure superimposed on stage 3 chronic kidney disease ([facility] )    CKD (chronic kidney disease), stage III ([facility])    Elevated troponin    Acute gout due to renal impairment involving right foot  Resolved Problems:    Acute respiratory failure with hypoxia       Consultations During Hospital Stay:  · Cardiology  · Podiatry    Procedures Performed:     · S x-ray with cardiomegaly and vascular congestion  · Right foot x-ray   Moderate hallux valgus deformity  No acute abnormality identified  Significant Findings / Test Results:     · As above    Incidental Findings:   · Non    Test Results Pending at Discharge (will require follow up):    · Non     Outpatient Tests Requested:  · BPM In 3-4 days    Complications:  None    Hospital Course:     Marcus Dominguez is a 80 y o  male patient who originally presented to the hospital on 7/1/2019 due to right great toe pain  Patient with underlying chronic diastolic CHF and chronic volume overload due to dietary noncompliance  He was admitted the hospital with diagnosis of acute podagra, uric acid was elevated, he was treated with steroid course, evaluated by Podiatry, recommendation for outpatient follow-up     He was evaluated by Heart failure service, given his chronic volume overload, he received multiple doses of IV diuretics, Imdur were dose was increased and he was started on Aldactone  Per Cardiology no acute CHF exacerbation    Currently he is in stable condition, he has no further pain in his foot, he will be discharged home on 3 more days of prednisone  Recommendation to repeat BMP in 3-4 days if BUN and creatinine are not improving then stop Aldactone, and recheck BMP in 2 days    Lantus dose was increased due to hyperglycemia secondary to steroids    Patient instructed to follow with his family doctor in 1 week    Condition at Discharge: stable     Discharge Day Visit / Exam:     Subjective:    Patient seen and examined earlier today  Comfortable in bed  Right great toe pain has resolved  No event overnight  Vitals: Blood Pressure: 115/59 (07/03/19 0748)  Pulse: 65 (07/03/19 0748)  Temperature: 97 5 °F (36 4 °C) (07/03/19 0748)  Temp Source: Oral (07/03/19 0748)  Respirations: 18 (07/03/19 0748)  Height: 5' 9" (175 3 cm) (07/02/19 1536)  Weight - Scale: 91 9 kg (202 lb 11 2 oz) (07/03/19 0600)  SpO2: 99 % (07/03/19 0748)  Exam:   Physical Exam  Patient is awake alert oriented in no acute distress  Lung with decreased breath sounds bilateral  Heart positive S1-S2 no murmur  Abdomen soft nontender  Lower extremities no edema  Mild erythema and deformity at the 1st MTP of right great toe     Discussion with Family:  Discussed with patient's daughter    Discharge instructions/Information to patient and family:   See after visit summary for information provided to patient and family  Provisions for Follow-Up Care:  See after visit summary for information related to follow-up care and any pertinent home health orders  Planned Readmission: no     Discharge Statement:  I spent 45  minutes discharging the patient  This time was spent on the day of discharge  I had direct contact with the patient on the day of discharge   Greater than 50% of the total time was spent examining patient, answering all patient questions, arranging and discussing plan of care with patient as well as directly providing post-discharge instructions  Additional time then spent on discharge activities  Discharge Medications:  See after visit summary for reconciled discharge medications provided to patient and family        ** Please Note: This note has been constructed using a voice recognition system **

## 2019-07-03 NOTE — PHYSICAL THERAPY NOTE
Physical Therapy Evaluation      Patient Active Problem List   Diagnosis    Lower urinary tract symptoms    Malignant neoplasm of trigone of urinary bladder (HCC)    Prostate cancer (HCC)    Chronic diastolic CHF (congestive heart failure) (HCC)    Acute kidney injury superimposed on chronic kidney disease (HCC)    Paroxysmal atrial fibrillation - Sick sinus syndrome    CAD (coronary artery disease)    Type 2 diabetes mellitus ([facility] )    Cardiac pacemaker in situ    Right ureteral stone    Urinary tract infection with hematuria    Hyponatremia    Chest pain    Laceration of right lower extremity    Diarrhea    Anemia    Benign prostatic hyperplasia    Blindness of left eye    Kidney stones    Peripheral neuropathy    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 (LAD) artery    Ureter, calculus    Urinary retention    Right flank pain    Acute renal failure superimposed on stage 3 chronic kidney disease (HCC)    Urinary tract infection associated with indwelling urethral catheter (HCC)    Dyslipidemia    Sick sinus syndrome ([facility] )    Vascular dementia without behavioral disturbance    Alcoholism ([facility] )    Decubitus ulcer of sacral region, stage 1    Benign essential hypertension    CKD (chronic kidney disease), stage III (HCC)    NSTEMI (non-ST elevated myocardial infarction) (HCC)    Chronic kidney disease stage 3    Abnormal urinalysis    Elevated troponin    Chronic anticoagulation    Acute gout due to renal impairment involving right foot    Acute gout       Past Medical History:   Diagnosis Date    A-fib ([facility] )     Anemia     Bladder cancer ([facility] )     CAD (coronary artery disease)     CHF (congestive heart failure) (HCC)     Chronic kidney failure     Colon cancer ([facility]     Eye cancer, left ([facility] )     Hypertension     Incontinence     Pacemaker     Prostate cancer ([facility] )     Type 2 diabetes mellitus ([facility] )     Wears hearing aid in both ears        Past Surgical History:   Procedure Laterality Date    CARDIAC PACEMAKER PLACEMENT  2014    CORONARY ANGIOPLASTY WITH STENT PLACEMENT      GALLBLADDER SURGERY      NV CYSTO/URETERO W/LITHOTRIPSY &INDWELL STENT INSRT Right 7/31/2018    Procedure: CYSTOSCOPY URETEROSCOPY WITH LITHOTRIPSY HOLMIUM LASER ,BASKET STONE EXTRACTION  RETROGRADE PYELOGRAM AND exchange STENT URETERAL;  Surgeon: Richie Tapia MD;  [facility];  Service: Urology    NV CYSTOURETHROSCOPY,URETER CATHETER Right 7/1/2018    Procedure: CYSTOSCOPY RETROGRADE PYELOGRAM WITH INSERTION STENT URETERAL;  Surgeon: Boy Tracy MD;  [facility];  Service: Urology      07/03/19 1021   Note Type   Note type Eval only   Pain Assessment   Pain Assessment 0-10   Pain Score 5   Pain Type Acute pain   Pain Location Foot   Pain Orientation Right   Hospital Pain Intervention(s) Repositioned; Ambulation/increased activity; Elevated   Response to Interventions tolerated well    Home Living   Type of Home Assisted living  (svm)   Home Layout One level   Bathroom Equipment Grab bars in shower; Shower chair;Grab bars around toilet   [address] [facility]; Wheelchair-electric   Additional Comments pt reports usi rw for short amb  and elec w/c for longer distances   does not have home O2   Prior Function   Level of [place] Needs assistance with IADLs; Needs assistance with ADLs and functional mobility   Lives With Facility staff   Receives Help From Personal care attendant   ADL Assistance Needs assistance   IADLs Needs assistance   Falls in the last 6 months 1 to 4   Restrictions/Precautions   Weight Bearing Precautions Per Order Yes   RLE Weight Bearing Per Order WBAT   Other Precautions Contact/isolation;Agitated;Cognitive; Chair Alarm; Fall Risk;Pain;Hard of hearing   General   Family/Caregiver Present No   Cognition   Overall Cognitive Status Impaired   Orientation Level Oriented X4   RUE Assessment   RUE Assessment WFL   LUE Assessment   LUE Assessment WFL   RLE Assessment   RLE Assessment X  (strength 4/5)   LLE Assessment   LLE Assessment X  (strength 4/5)   Coordination   Movements are Fluid and Coordinated 1   Sensation X   Light Touch   RLE Light Touch Impaired   RLE Light Touch Comments foot   LLE Light Touch Impaired   LLE Light Touch Comments foot   Transfers   Sit to Stand 4  Minimal assistance   Additional items Assist x 1; Increased time required;Verbal cues; Impulsive   Stand to Sit 4  Minimal assistance   Additional items Verbal cues; Increased time required   Ambulation/Elevation   Gait pattern WNL  (slightly propulsive, )   Assistive Device Rolling walker   Distance 160'   Balance   Static Sitting Fair +   Dynamic Sitting Fair -   Static Standing Poor +   Dynamic Standing Poor +   Ambulatory Poor +   Endurance Deficit   Endurance Deficit Yes   Endurance Deficit Description spO2 98% on 2L, amb on RA 90% at 80' and 80% after sitting on RA  O2 replaced   Activity Tolerance   Activity Tolerance Patient tolerated treatment well;Patient limited by fatigue;Treatment limited secondary to medical complications (Comment)   Nurse Made Aware yes   Assessment   Assessment pt admitted with complaint of foot pain, dx with gout, pt referred to PT   pt lives in [address] [place] [address], uses rw for amb short distances and elec w/c for longer distances  not using home O2  pt demonstrated slight decrease in basline mobility due to drop in spO2 while amb  pt has prior and current deficits in strength, balance, joint integrity, cognition, pulmonary functoin but will be able to return to [facility] , can receive PT there  pt can mobilize with nursing or restorative aide at this time  no further skilled IPPT needs      Barriers to Discharge None   Recommendation   Recommendation   (return to facility with PT)   Equipment Recommended Walker   PT - OK to Discharge Yes   Modified Achille Scale   Modified Achille Scale 4   Barthel Index   Feeding 10   Bathing 0 Grooming Score 0   Dressing Score 5   Bladder Score 5   Bowels Score 10   Toilet Use Score 5   Transfers (Bed/Chair) Score 10   Mobility (Level Surface) Score 10   Stairs Score 0   Barthel Index Score 55   History: co - morbidities, fall risk, use of assistive device, assist for adl's, cognition,   Exam: impairments in locomotion, musculoskeletal, balance, joint integrity,  pulmonary, cognition   Clinical: unstable/unpredictable  Complexity:high      Quoc Daphne, PT

## 2019-07-03 NOTE — DISCHARGE INSTRUCTIONS
BMP in 3-4 days, if BUN and creatinine above 45/1 88 discontinue spironolactone and recheck BMP in 3 days

## 2019-07-03 NOTE — NURSING NOTE
[facility] report called,scripts and discharge summary faxed over to facility  Patient transported via stretcher with transport team back to facility

## 2019-07-03 NOTE — PROGRESS NOTES
Prior to patient being discharged back to [facility]  Patient stated he had money in his pocket  Patient claimed it was about $100 00  Patient stated "I didn't tell anyone I had it, I put it in my gown pocket and I don't know where it is  "Searched through the med box, security and with the daughter Rocael Morfin  No money was found

## 2019-07-03 NOTE — TRANSPORTATION MEDICAL NECESSITY
Section I - General Information    Name of Patient: Ivet Sheridan                 : 1930    Medicare #: [id number]  Transport Date: 19 (PCS is valid for round trips on this date and for all repetitive trips in the 60-day range as noted below )  Origin: [facility]                                                         Destination: [facility]  Is the pt's stay covered under Medicare Part A (PPS/DRG)   [x]     Closest appropriate facility? If no, why is transport to more distant facility required? Yes  If hospice pt, is this transport related to pt's terminal illness? No       Section II - Medical Necessity Questionnaire  Ambulance transportation is medically necessary only if other means of transport are contraindicated or would be potentially harmful to the patient  To meet this requirement, the patient must either be "bed confined" or suffer from a condition such that transport by means other than ambulance is contraindicated by the patient's condition  The following questions must be answered by the medical professional signing below for this form to be valid:    1)  Describe the MEDICAL CONDITION (physical and/or mental) of this patient AT [address] [place] [address] that requires the patient to be transported in an ambulance and why transport by other means is contraindicated by the patient's condition: Acute gout, high fall risk, assist x1 and 2 liter of o2, blindness and pressure ulcers    2) Is the patient "bed confined" as defined below? No  To be "be confined" the patient must satisfy all three of the following conditions: (1) unable to get up from bed without Assistance; AND (2) unable to ambulate; AND (3) unable to sit in a chair or wheelchair  3) Can this patient safely be transported by car or wheelchair van (i e , seated during transport without a medical attendant or monitoring)?    No    4) In addition to completing questions 1-3 above, please check any of the following conditions that apply*:   *Note: supporting documentation for any boxes checked must be maintained in the patient's medical records  If hosp-hosp transfer, describe services needed at 2nd facility not available at 1st facility? Moderate/severe pain on movement   Requires oxygen-unable to self administer  Special handling/isolation/infection control precautions required   Unable to sit in a chair or wheelchair due to decubitus ulcers or other wounds   Other(specify) blindness       Section III - Signature of Physician or Healthcare Professional  I certify that the above information is true and correct based on my evaluation of this patient, and represent that the patient requires transport by ambulance and that other forms of transport are contraindicated  I understand that this information will be used by the Centers for Medicare and Medicaid Services (CMS) to support the determination of medical necessity for ambulance services, and I represent that I have personal knowledge of the patient's condition at time of transport  []  If this box is checked, I also certify that the patient is physically or mentally incapable of signing the ambulance service's claim and that the institution with which I am affiliated has furnished care, services, or assistance to the patient  My signature below is made on behalf of the patient pursuant to 42 CFR §424 36(b)(4)   In accordance with 42 CFR §424 37, the specific reason(s) that the patient is physically or mentally incapable of signing the claim form is as follows:       Signature of Physician* or Healthcare Professional______________________Movel Carter________________________________________  Signature Date 07/03/19 (For scheduled repetitive transports, this form is not valid for transports performed more than 60 days after this date)    Printed Name & Credentials of Physician or Healthcare Professional (MD, DO, RN, etc )____Movel Carter____________________________  *Form must be signed by patient's attending physician for scheduled, repetitive transports   For non-repetitive, unscheduled ambulance transports, if unable to obtain the signature of the attending physician, any of the following may sign (choose appropriate option below)  [] Physician Assistant []  Clinical Nurse Specialist []  Registered Nurse  []  Nurse Practitioner  [x] Discharge Planner

## 2019-07-03 NOTE — SOCIAL WORK
Pt for d/c back to [facility], to be transported by [facility] at 12:30PM via bls  CM completed facility transfer and cmn form  CM notified Pt, daughter Namrata Gomez RN and facility d/c arrangements

## 2019-07-03 NOTE — OCCUPATIONAL THERAPY NOTE
Occupational Therapy Evaluation      Sharlene Key    7/3/2019    Patient Active Problem List   Diagnosis    Lower urinary tract symptoms    Malignant neoplasm of trigone of urinary bladder (HCC)    Prostate cancer (HCC)    Chronic diastolic CHF (congestive heart failure) (HCC)    Acute kidney injury superimposed on chronic kidney disease (HCC)    Paroxysmal atrial fibrillation - Sick sinus syndrome    CAD (coronary artery disease)    Type 2 diabetes mellitus ([address] )    Cardiac pacemaker in situ    Right ureteral stone    Urinary tract infection with hematuria    Hyponatremia    Chest pain    Laceration of right lower extremity    Diarrhea    Anemia    Benign prostatic hyperplasia    Blindness of left eye    Kidney stones    Peripheral neuropathy    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 (LAD) artery    Ureter, calculus    Urinary retention    Right flank pain    Acute renal failure superimposed on stage 3 chronic kidney disease (HCC)    Urinary tract infection associated with indwelling urethral catheter (HCC)    Dyslipidemia    Sick sinus syndrome ([address] )    Vascular dementia without behavioral disturbance    Alcoholism ([address]    Decubitus ulcer of sacral region, stage 1    Benign essential hypertension    CKD (chronic kidney disease), stage III (HCC)    NSTEMI (non-ST elevated myocardial infarction) (HCC)    Chronic kidney disease stage 3    Abnormal urinalysis    Elevated troponin    Chronic anticoagulation    Acute gout due to renal impairment involving right foot    Acute gout       Past Medical History:   Diagnosis Date    A-fib ([address]     Anemia     Bladder cancer ([address] )     CAD (coronary artery disease)     CHF (congestive heart failure) (HCC)     Chronic kidney failure     Colon cancer ([address]     Eye cancer, left (HCC)     Hypertension     Incontinence     Pacemaker     Prostate cancer ([address] )     Type 2 diabetes mellitus ([address] )  Wears hearing aid in both ears        Past Surgical History:   Procedure Laterality Date    CARDIAC PACEMAKER PLACEMENT  2014    CORONARY ANGIOPLASTY WITH STENT PLACEMENT      GALLBLADDER SURGERY      MD CYSTO/URETERO W/LITHOTRIPSY &INDWELL STENT INSRT Right 7/31/2018    Procedure: CYSTOSCOPY URETEROSCOPY WITH LITHOTRIPSY HOLMIUM LASER ,BASKET STONE EXTRACTION  RETROGRADE PYELOGRAM AND exchange STENT URETERAL;  Surgeon: Lindell Pallas, MD;  [facility];  Service: Urology    MD CYSTOURETHROSCOPY,URETER CATHETER Right 7/1/2018    Procedure: CYSTOSCOPY RETROGRADE PYELOGRAM WITH INSERTION STENT URETERAL;  Surgeon: Jordi Lucas MD;  [facility];  Service: Urology            07/03/19 1020   Note Type   Note type Eval only   Restrictions/Precautions   Weight Bearing Precautions Per Order Yes   RLE Weight Bearing Per Order WBAT  (per podiatry)   Other Precautions Contact/isolation;Cognitive; Chair Alarm; Bed Alarm;Multiple lines;Telemetry;O2;Fall Risk;Pain;Hard of hearing;Visual impairment  (L eye blind)   Pain Assessment   Pain Assessment 0-10   Pain Score 5   Pain Type Acute pain   Pain Location Foot   Pain Orientation Right   Patient's Stated Pain Goal No pain   Hospital Pain Intervention(s) Repositioned; Ambulation/increased activity; Distraction; Emotional support   Response to Interventions Tolerated   Home Living   Type of Home Assisted living  [facility])   Home Layout One level;Performs ADLs on one level   Bathroom Shower/Tub Tub/shower unit   Bathroom Toilet Standard   Bathroom Equipment Grab bars in shower; Shower chair;Grab bars around toilet   [address]; Wheelchair-electric   Additional Comments Pt reports use of RW for short household mobility distances and motorized W/C for long distance mobility PTA   Prior Function   Level of [place] Needs assistance with ADLs and functional mobility; Needs assistance with IADLs   Lives With Facility staff Receives Help From Personal care attendant   ADL Assistance Needs assistance   IADLs Needs assistance   Falls in the last 6 months 1 to 4  (at least 1, per Pt)   Vocational Retired   Lifestyle   Autonomy Pt reports requiring S level A for ADLS and requires A from facility staff w/ all IADLS PTA  (-) drives PTA   Reciprocal Relationships Pt has support of facility staff upon D/C    Service to Others Pt is retired   Intrinsic Gratification Pt reports enjoying socializing   Psychosocial   Psychosocial (WDL) X   Patient Behaviors/Mood Labile; Uncooperative; Cooperative   Subjective   Subjective "I don't need to work with therapy before I go home"   ADL   Where Assessed Chair   Eating Assistance 5  Supervision/Setup   Grooming Assistance 5  Supervision/Setup   UB [address] 5  Supervision/Setup   LB [address] 4  Minimal Assistance   [address] 5  Supervision/Setup    [facility] 4  [address] [place]   4  [address] 4  Minimal Assistance   Bed Mobility   Supine to Sit Unable to assess   Sit to Supine Unable to assess   Additional Comments Pt seated OOB in chair upon OT arrival  Pt seated in chair w/ all needs in reach s/p OT session  Transfers   Sit to Stand 4  Minimal assistance   Additional items Assist x 1; Increased time required;Verbal cues;Armrests   Stand to Sit 4  Minimal assistance   Additional items Assist x 1; Increased time required;Verbal cues;Armrests   Additional Comments Transfers completed w/ RW  VC and TC required for safe/proper hand placement  Functional Mobility   Functional Mobility 4  Minimal assistance   Additional Comments Pt demonstrated short distance household mobility in hallway w/ RW at [address] A level w/o SpO2  Pt desat to upper 80s and low 90s, however recovered to mid 90s s/p mobility      Additional items Rolling walker   Balance   Static Sitting Fair +   Dynamic Sitting Fair   Static Standing Fair -   Dynamic Standing Poor +   Ambulatory Poor +   Activity Tolerance   Activity Tolerance Patient limited by fatigue;Patient limited by pain   Medical Staff Made Aware PT Araseli Garcia   Nurse Made Aware RN cleared Pt for OT eval   RUE Assessment   RUE Assessment WFL   LUE Assessment   LUE Assessment WFL   Hand Function   Gross Motor Coordination Functional   Fine Motor Coordination Functional   Sensation   Light Touch No apparent deficits   Cognition   Overall Cognitive Status Impaired   Arousal/Participation Alert; Uncooperative;Lethargic;Cooperative  (Labile in mood)   Attention Attends with cues to redirect   Orientation Level Oriented X4   Memory Decreased recall of recent events;Decreased recall of precautions   Following Commands Follows one step commands with increased time or repetition   Comments Pt presents lethargic, irritable, and at first agitated  Pt cooperative to participate in therapy w/ increased education and encouragemnent  Presents labile in mood/behaviors  Pt required frequent redirection to task t/o session and required VC for safety awareness  Assessment   Limitation Decreased ADL status; Decreased UE strength;Decreased Safe judgement during ADL;Decreased cognition;Decreased endurance;Decreased high-level ADLs;Mood limitation   Prognosis Fair   Assessment Pt is a 79 yo male seen for OT eval s/p adm to [facility] w/ R foot pain most notable at the R big toe dx'd w/ acute gout  Comorbidities include a h/o chronic diastolic CHF, A-fib, SSS, DM 2, cardiac pacemaker, hyponatremia, ARF, CKD stage 3, elevated troponin, prostate CA, chest pain, diarrhea, anemia, BPH, peripheral neuropathy, UTI, dyslipidemia, alcoholism, NSTEMI  Pt with active OT orders and up and OOB as tolerated orders  Pt is retired and resides at [address] [place] [address]  Pt was required S level for ADLS and required A w/ all IADLS, does not drive, & required use of DME PTA, including RW for short distance mobility and motorized W/C for long distance   Pt is currently demonstrating the following occupational deficits: S UB bathing/dressing, Min A LB bathing/dressing, Min A toileting, Min A transfers and mobility w/ RW  These deficits that are impacting pt's baseline areas of occupation are a result of the following impairments: pain, endurance, activity tolerance, functional mobility and decreased I w/ ADLS/IADLS  Pt scored overall 45/100 on the Barthel Index  Based on the aforementioned OT evaluation, functional performance deficits, and assessments, pt has been identified as a high complexity evaluation  Pt appears to be functioning close to baseline levels  Recommend return to [facility] nursing home w/ home OT upon D/C  No further skilled acute care OT needs at this time  Will D/C OT     Goals   Patient Goals To return home   LTG Time Frame 7-10   Long Term Goal #1 Refer to goals below   Recommendation   OT Discharge Recommendation Home OT  (return to [facility] w/ OT services )   OT - OK to Discharge Yes  (when medically cleared)   Barthel Index   Feeding 5   Bathing 0   Grooming Score 0   Dressing Score 5   Bladder Score 10   Bowels Score 10   Toilet Use Score 5   Transfers (Bed/Chair) Score 10   Mobility (Level Surface) Score 0   Stairs Score 0   Barthel Index Score 45   Modified [place] Scale   Modified [place] Scale 4       Norma Hutchinson MS, OTR/L

## 2019-07-03 NOTE — PROGRESS NOTES
[facility] Internal Medicine Progress Note  Patient: Phylicia Franco 80 y o  male   MRN: [id number]  PCP: KEERTHI Kelly  Unit/Bed#: [facility] 530-01 Encounter: [id number]  Date Of Visit: 19    Assessment/Plan:  1  Acute gout (right foot podagra) secondary to renal impairment  - Pain resolved per patient  - XR right great toe (): no acute abnormalities, moderate hallux valgus deformity  - Uric acid: 11 2  - S/p Solumedrol 40 mg IV  - Continue prednisone 20 mg daily (continue taper)  - Continue pain control with acetaminophen 325 mg q6h PRN (x 0 doses over last 24 hours)  - Outpatient follow-up with podiatry for long-term gout therapy     2  Coronary artery disease (s/p stent x 1, 1 5 years ago)  - Currently reporting chest pressure with talking (improved with SL nitroglycerin)  - Elevated troponin: peak at 0 17, subsequent downtrend to 0 12   - Note: elevations to 0 12 - 0 13 since 2019 (possibly secondary to renal impairment)  - Cardiology not recommending catheterization at this time due to poor renal function  - Continue medications:   - Aspirin 81 mg daily   - Carvedilol 3 125 mg BID   - Isosorbide mononitrate 90 mg daily   - Atorvastatin 20 mg daily    - Spironolactone 25 mg daily    3  Acute on chronic diastolic CHF  - ECHO (): EF 60-65%, no regional wall abnormalities, increased wall thickness, severe asymmetric septal hypertrophy  - NT-proBNP (): 7257 (previously 6294 on )  - S/p Lasix 20 mg IV in ED  - Continue home PO diuretic:   - Metolazone 2 5 mg daily    - Bumetanide 2 mg BID   - Continue daily weight, I/Os, cardiac diet  - Suspected diet non-compliance -> nutrition consult placed for CHF diet teaching    4  Acute renal failure (superimposed on CKD Stage III)  - Baseline Cr: 1 4 - 1 7 (1 88 this AM)  - Continue to monitor BMP in setting of diuresis    5   Hypokalemia  - K: 2 9 on admission (s/p K-Dur 40 mEq)   - Re-check this AM: 3 6   - M 3 this AM  - Continue supplementation with K-Dur 20 mEq TID  - Continue monitor BMP    6  Acute respiratory failure with hypoxia  - Patient with SOB with talking, exertion (chronic)  - Maintaining SpO2 > 95% (currently on 2 L NC)    7  Type 2 Diabetes Mellitus  - Hyperglycemia secondary to steroid treatment (prednisone taper)  - HbA1c (): 8 2  - Continue Humalog 10 U TID with meals, 1-5 U QHS and Lantus 15 U QHS    VTE Pharmacologic Prophylaxis:   Pharmacologic: Apixaban (Eliquis)  Mechanical VTE Prophylaxis in Place: Yes    Patient Centered Rounds: I have performed bedside rounds with nursing staff today  Discussions with Specialists or Other Care Team Provider: assessment/plan discussed with Dr Josseline Almaguer    Education and Discussions with Family / Patient: HPI, current symptoms reviewed with patient    Time Spent for Care: 30 minutes  More than 50% of total time spent on counseling and coordination of care as described above  Current Length of Stay: 2 day(s)    Current Patient Status: Inpatient     Discharge Plan / Estimated Discharge Date: plan for discharge today    Code Status: Level 1 - Full Code      Subjective: The patient had no acute events overnight  He reports his right great toe pain has resolved  He reports continued chest pressure and shortness of breath with talking and exertion, but this is a chronic issue  He currently denies orthopnea, nausea, vomiting, diarrhea, headache, dizziness, and lower extremity swelling  Objective:     Vitals:   Temp (24hrs), Av 6 °F (36 4 °C), Min:97 5 °F (36 4 °C), Max:97 8 °F (36 6 °C)    Temp:  [97 5 °F (36 4 °C)-97 8 °F (36 6 °C)] 97 5 °F (36 4 °C)  HR:  [64-83] 65  Resp:  [14-18] 18  BP: (115-139)/(59-65) 115/59  SpO2:  [98 %-99 %] 99 %  Body mass index is 29 93 kg/m²  Input and Output Summary (last 24 hours):        Intake/Output Summary (Last 24 hours) at 7/3/2019 0847  Last data filed at 7/3/2019 0827  Gross per 24 hour   Intake 1470 ml   Output 1300 ml   Net 170 ml       Physical Exam:     Physical Exam   Constitutional: He is oriented to person, place, and time  He appears well-developed and well-nourished  No distress  HENT:   Head: Normocephalic and atraumatic  Mouth/Throat: Oropharynx is clear and moist  No oropharyngeal exudate  Eyes: Pupils are equal, round, and reactive to light  EOM are normal  Right eye exhibits no discharge  Left eye exhibits no discharge  Neck: Normal range of motion  Neck supple  JVD present  Cardiovascular: Normal rate, regular rhythm, normal heart sounds and intact distal pulses  No murmur heard  Pulmonary/Chest: Effort normal  No respiratory distress  Abdominal: Soft  Bowel sounds are normal  He exhibits no distension  There is no tenderness  Musculoskeletal: Normal range of motion  He exhibits deformity (noted at 1st MTP of right great toe)  He exhibits no edema or tenderness  Lymphadenopathy:     He has no cervical adenopathy  Neurological: He is alert and oriented to person, place, and time  Skin: Skin is warm and dry  There is erythema (noted at 1st MTP of right great toe)  Psychiatric: He has a normal mood and affect  His behavior is normal        Additional Data:     Labs:    Results from last 7 days   Lab Units 07/02/19  0356 07/01/19 1947   WBC Thousand/uL 5 95 5 31   HEMOGLOBIN g/dL 10 2* 9 5*   HEMATOCRIT % 31 6* 30 0*   PLATELETS Thousands/uL 200 207   NEUTROS PCT %  --  73   LYMPHS PCT %  --  11*   LYMPHO PCT % 1*  --    MONOS PCT %  --  11   MONO PCT % 2*  --    EOS PCT % 2 4     Results from last 7 days   Lab Units 07/03/19  0509  07/02/19  0030   POTASSIUM mmol/L 3 6   < > 2 8*   CHLORIDE mmol/L 92*   < > 92*   CO2 mmol/L 35*   < > 38*   BUN mg/dL 45*   < > 31*   CREATININE mg/dL 1 88*   < > 1 62*   CALCIUM mg/dL 9 4   < > 8 7   ALK PHOS U/L  --   --  103   ALT U/L  --   --  12   AST U/L  --   --  11    < > = values in this interval not displayed       Results from last 7 days   Lab Units 07/01/19 1947   INR  1 66* * I Have Reviewed All Lab Data Listed Above  * Additional Pertinent Lab Tests Reviewed:  W[facility] 66 Admission Reviewed    Imaging:    Imaging Reports Reviewed Today Include: -   Imaging Personally Reviewed by Myself Includes:  -    Recent Cultures (last 7 days):           Last 24 Hours Medication List:     Current Facility-Administered Medications:  acetaminophen 325 mg Oral Q6H PRN Bren Abreu, MD   albuterol 2 5 mg Nebulization Q6H PRN Bren Abreu, MD   ALPRAZolam 0 5 mg Oral 4x Daily PRN Bren Abreu, MD   aluminum-magnesium hydroxide-simethicone 5 mL Oral Q6H PRN Bren Abreu, MD   apixaban 2 5 mg Oral BID Bren Abreu, MD   aspirin 81 mg Oral Daily Bren Abreu, MD   atorvastatin 20 mg Oral After Carly Evans MD   carbamide peroxide 5 drop Both Ears Daily Bren Abreu, MD   carvedilol 3 125 mg Oral BID With Meals KEERTHI Stern   fish oil 1,000 mg Oral Daily Bren Abreu, MD   gabapentin 100 mg Oral Daily Bren Abreu, MD   gabapentin 300 mg Oral HS Bren Abreu, MD   insulin glargine 15 Units Subcutaneous HS Towana Lacer, DO   insulin lispro 1-5 Units Subcutaneous HS Bren Abreu, MD   insulin lispro 10 Units Subcutaneous TID With Meals Towana Lacer, DO   insulin lispro 2-12 Units Subcutaneous TID AC Bren Abreu, MD   isosorbide mononitrate 90 mg Oral Daily KEERTHI Stern   melatonin 3 mg Oral HS Bren Abreu, MD   metolazone 2 5 mg Oral Once per day on Mon Wed Fri KEERTHI Ortega   morphine injection 2 mg Intravenous Q1H PRN Bren Abreu, MD   nitroglycerin 0 4 mg Sublingual Once Bren Abreu, MD   nitroglycerin 0 4 mg Sublingual Q5 Min PRN Bren Abreu, MD   ondansetron 4 mg Intravenous Q6H PRN Bren Abreu, MD   oxybutynin 5 mg Oral Daily Bren Abreu, MD   oxyCODONE 5 mg Oral Q4H PRN Bren Abreu, MD   potassium chloride 20 mEq Oral TID With Meals Bren Abreu MD   predniSONE 20 mg Oral Daily Antonella Waddell MD   spironolactone 25 mg Oral Daily KEERTHI Stern   tamsulosin 0 4 mg Oral Daily With Baron Ang MD        Today, Patient Was Seen By: Cecille Padilla    ** Please Note: This note has been constructed using a voice recognition system   **

## 2019-07-03 NOTE — PROGRESS NOTES
Advanced Heart Failure / Pulmonary Hypertension Progress Note    Shanell Duenas 80 y o  male   MRN: [id number]  Unit/Bed#: [facility] 530-01; Encounter: [id number]    Assessment:  Principal Problem:    Acute gout  Active Problems:    Chronic diastolic CHF (congestive heart failure) (HCC)    Paroxysmal atrial fibrillation - Sick sinus syndrome    CAD (coronary artery disease)    Type 2 diabetes mellitus ([facility] [address] )    Cardiac pacemaker in situ    Hyponatremia    Acute renal failure superimposed on stage 3 chronic kidney disease (HCC)    CKD (chronic kidney disease), stage III (HCC)    Elevated troponin    Acute gout due to renal impairment involving right foot    Subjective:   Shanell Duenas is an 72-year-old male with a PMH of chronic HFpEF, PAF (AC on Eliquis), CAD (s/p LULU to LAD), SSS (Biotronick PPM in situ), DM2, CKD, and gout who presented on 07/01/2019 to [facility] complaining of right foot pain, SOB at rest, and chest pressure  In ED was noted to have NT-proBNP of 7257  He was noted to have warm and red right hallux valgus and was admitted for acute gout  Patient seen and examined  No significant events overnight  Patient sleeping in chair with ear plugs in  Remains on 2L NC, mouth breather while sleeping  Objective: Intake/ Output: 1290 mL / 1300 mL (net negative -10 mL)  Weight: 202 lbs (up from 201 lbs on 07/02)  Telemetry: Not on telemetry  Plan:  Acute gout   Management per primary team      Chronic HFpEF, LVEF 60-65%; NYHA III; ACC/AHA Stage C   Chronically volume overloaded in outpatient setting due to dietary noncompliance  Continue on Coreg 3 125 mg BID and spironolactone 25 mg daily  Received one time dose of 3 mg IV Bumex on 07/02  Will add back on home dose of PO Bumex 2 mg BID  Potassium of 3 6 and creatinine of 1 88 this AM  Potassium 20 mEq TID ordered  Inpatient nutrition consult ordered  BMP to be completed this Friday, if creatinine worsens, discontinue spironolactone  Will schedule hospital f/u appointment with HF team      Paroxysmal atrial fibrillation   [id number] = 6 (age, CHF, HTN, MI, DM)  Anticoagulation on Eliquis (renal dose)  Continue on Coreg as above  S/p Biotronik dual chamber PPM on 05/16/2019 for SSS  Coronary artery disease   S/p LULU to LAD  Chronic angina; continue on Imdur 90 mg  Continue aspirin and statin  Chronic kidney disease   Baseline creatinine of 1 4-1 7  Today, creatinine of 1 88 (up from 1 71)  Neurohormonal Blockade:  --Beta Blocker: Coreg 3 125 mg BID    --ACEi, ARB or ARNi: N/A  --Aldosterone Receptor Blocker: spironolactone 25 mg daily  --Diuretic: PO Bumex 2 mg BID with metolazone 2 5 mg MW (as outpatient)  Sudden Cardiac Death Risk Reduction:  --LVEF 60%  Electrical Resynchronization:  --QRS of 172 ms on EKG from 07/2019  Advanced Therapies (if appropriate): Will continue to monitor  Diagnostic Testing:  CXR from 07/01/2019: "Cardiomegaly, central pulmonary venous distention  Increasing bibasilar and right parahilar infiltrates "    Echocardiogram (limited) from 04/21/2019:  LVEF: 60-65%; NRWA  LVIDd:   RV: Normal size and function  MR:   Other:    Vitals:   Blood pressure 115/59, pulse 65, temperature 97 5 °F (36 4 °C), temperature source Oral, resp  rate 18, height 5' 9" (1 753 m), weight 91 9 kg (202 lb 11 2 oz), SpO2 99 %  Body mass index is 29 93 kg/m²  I/O last 3 completed shifts: In: 1410 [P O :1410]  Out: 1600 [Urine:1600]  I/O this shift:   In: 420 [P O :420]  Out: 400 [Urine:400]  Wt Readings from Last 3 Encounters:   07/03/19 91 9 kg (202 lb 11 2 oz)   06/24/19 90 2 kg (198 lb 14 4 oz)   06/20/19 88 kg (194 lb)     Vitals:    07/03/19 0000 07/03/19 0600 07/03/19 0735 07/03/19 0748   BP: 139/65   115/59   BP Location: Left arm   Right arm   Pulse: 64  66 65   Resp: 18  18 18   Temp: 97 8 °F (36 6 °C)   97 5 °F (36 4 °C)   TempSrc: Oral   Oral   SpO2: 99%  98% 99%   Weight:  91 9 kg (202 lb 11 2 oz)     Height:         Physical Exam   Constitutional: He is oriented to person, place, and time  He appears well-developed and well-nourished  Nasal cannula in place  HENT:   Head: Normocephalic and atraumatic  Eyes: EOM are normal    Neck: Neck supple  No tracheal deviation present  Cardiovascular: Normal rate, regular rhythm and intact distal pulses  Pulmonary/Chest: Effort normal  No respiratory distress  He has decreased breath sounds in the right lower field and the left lower field  He has no wheezes  Abdominal: Soft  Bowel sounds are normal  He exhibits distension  Musculoskeletal: He exhibits edema (1-2+ LE)  He exhibits no tenderness  Neurological: He is alert and oriented to person, place, and time  Skin: Skin is warm and dry  Psychiatric: He has a normal mood and affect   His behavior is normal      Central Line (day, reason): N/A  Marte catheter (day, reason): N/A    Current Facility-Administered Medications:     acetaminophen (TYLENOL) tablet 325 mg, 325 mg, Oral, Q6H PRN, Patti Nick MD    albuterol inhalation solution 2 5 mg, 2 5 mg, Nebulization, Q6H PRN, Patti Nick MD    ALPRAZolam [place]) tablet 0 5 mg, 0 5 mg, Oral, 4x Daily PRN, Patti Nick MD    aluminum-magnesium hydroxide-simethicone (MYLANTA) 200-200-20 mg/5 mL oral suspension 5 mL, 5 mL, Oral, Q6H PRN, Patti Nick MD    apixaban Evaline Rosalva) tablet 2 5 mg, 2 5 mg, Oral, BID, Patti Nick MD, 2 5 mg at 07/03/19 1003    aspirin (ECOTRIN LOW STRENGTH) EC tablet 81 mg, 81 mg, Oral, Daily, Patti Nick MD, 81 mg at 07/03/19 1003    atorvastatin (LIPITOR) tablet 20 mg, 20 mg, Oral, After Isabel Gil MD, 20 mg at 07/02/19 1736    bumetanide (BUMEX) tablet 2 mg, 2 mg, Oral, BID, Daisy Fuentes PA-C    carbamide peroxide (DEBROX) 6 5 % otic solution 5 drop, 5 drop, Both Ears, Daily, Patti Nick MD, 5 drop at 07/03/19 1004    carvedilol (COREG) tablet 3 125 mg, 3 125 mg, Oral, BID With Meals, KEERTHI Miranda, 3 125 mg at 07/03/19 2164    fish oil capsule 1,000 mg, 1,000 mg, Oral, Daily, Milvia Dacosta MD, 1,000 mg at 07/03/19 1002    gabapentin (NEURONTIN) capsule 100 mg, 100 mg, Oral, Daily, Milvia Dacosta MD, 100 mg at 07/03/19 1003    gabapentin (NEURONTIN) capsule 300 mg, 300 mg, Oral, HS, Milvia Dacosta MD, 300 mg at 07/02/19 2123    insulin glargine (LANTUS) subcutaneous injection 15 Units 0 15 mL, 15 Units, Subcutaneous, HS, Alee Euceda DO, 15 Units at 07/02/19 2123    insulin lispro (HumaLOG) 100 units/mL subcutaneous injection 1-5 Units, 1-5 Units, Subcutaneous, HS, Milvia Dacosta MD, 2 Units at 07/02/19 2122    insulin lispro (HumaLOG) 100 units/mL subcutaneous injection 10 Units, 10 Units, Subcutaneous, TID With Meals, Alee Euceda DO, 10 Units at 07/03/19 0748    insulin lispro (HumaLOG) 100 units/mL subcutaneous injection 2-12 Units, 2-12 Units, Subcutaneous, TID AC, 2 Units at 07/03/19 0749 **AND** Fingerstick Glucose (POCT), , , TID AC, Milvia Dacosta MD    isosorbide mononitrate (IMDUR) 24 hr tablet 90 mg, 90 mg, Oral, Daily, KEERTHI Stern, 90 mg at 07/03/19 1008    melatonin tablet 3 mg, 3 mg, Oral, HS, Milvia Dacosta MD, 3 mg at 07/02/19 2123    metolazone (ZAROXOLYN) tablet 2 5 mg, 2 5 mg, Oral, Once per day on Mon Wed Fri, KEERTHI Stern, 2 5 mg at 07/03/19 1008    morphine injection 2 mg, 2 mg, Intravenous, Q1H PRN, Milvia Dacosta MD    nitroglycerin (NITROSTAT) SL tablet 0 4 mg, 0 4 mg, Sublingual, Once, Milvia Dacosta MD, Stopped at 07/01/19 2126    nitroglycerin (NITROSTAT) SL tablet 0 4 mg, 0 4 mg, Sublingual, Q5 Min PRN, Milvia Dacosta MD    ondansetron [facility]) injection 4 mg, 4 mg, Intravenous, Q6H PRN, Milvia Dacosta MD    oxybutynin (DITROPAN-XL) 24 hr tablet 5 mg, 5 mg, Oral, Daily, Milvia Dacosta MD, 5 mg at 07/03/19 1002    oxyCODONE (ROXICODONE) IR tablet 5 mg, 5 mg, Oral, Q4H PRN, Ita Dan MD    potassium chloride (K-DUR,KLOR-CON) CR tablet 20 mEq, 20 mEq, Oral, TID With Meals, Ita Dan MD, 20 mEq at 07/03/19 0748    predniSONE tablet 20 mg, 20 mg, Oral, Daily, Ita Dan MD, 20 mg at 07/03/19 1002    spironolactone (ALDACTONE) tablet 25 mg, 25 mg, Oral, Daily, KEERTHI Stern, 25 mg at 07/03/19 1002    tamsulosin (FLOMAX) capsule 0 4 mg, 0 4 mg, Oral, Daily With Dinner, Ita Dan MD, 0 4 mg at 07/02/19 1736    Labs & Results:  Results from last 7 days   Lab Units 07/02/19  0638 07/02/19  0356 07/02/19  0030   TROPONIN I ng/mL 0 12* 0 15* 0 17*     Results from last 7 days   Lab Units 07/02/19  0356 07/01/19  1947   WBC Thousand/uL 5 95 5 31   HEMOGLOBIN g/dL 10 2* 9 5*   HEMATOCRIT % 31 6* 30 0*   PLATELETS Thousands/uL 200 207         Results from last 7 days   Lab Units 07/03/19  0509 07/02/19  0731 07/02/19  0030 07/01/19 1947   POTASSIUM mmol/L 3 6 3 8 2 8* 2 9*   CHLORIDE mmol/L 92* 89* 92* 89*   CO2 mmol/L 35* 34* 38* 38*   BUN mg/dL 45* 32* 31* 29*   CREATININE mg/dL 1 88* 1 71* 1 62* 1 57*   CALCIUM mg/dL 9 4 8 8 8 7 8 4   ALK PHOS U/L  --   --  103 103   ALT U/L  --   --  12 12   AST U/L  --   --  11 11     Results from last 7 days   Lab Units 07/01/19 1947   INR  1 66*     Counseling / Coordination of Care: Total floor / unit time spent today 20 minutes  Greater than 50% of total time was spent with the patient and / or family counseling and / or coordination of care  A description of the counseling / coordination of care: 20  Thank you for the opportunity to participate in the care of this patient      Jhonatan Wu PA-C

## 2019-07-09 ENCOUNTER — OFFICE VISIT (OUTPATIENT)
Dept: CARDIOLOGY CLINIC | Facility: CLINIC | Age: 84
End: 2019-07-09
Payer: MEDICARE

## 2019-07-09 ENCOUNTER — PATIENT OUTREACH (OUTPATIENT)
Dept: CASE MANAGEMENT | Facility: OTHER | Age: 84
End: 2019-07-09

## 2019-07-09 VITALS
BODY MASS INDEX: 30.61 KG/M2 | HEART RATE: 73 BPM | DIASTOLIC BLOOD PRESSURE: 52 MMHG | SYSTOLIC BLOOD PRESSURE: 98 MMHG | OXYGEN SATURATION: 97 % | WEIGHT: 195 LBS | HEIGHT: 67 IN

## 2019-07-09 DIAGNOSIS — I10 HYPERTENSION, UNSPECIFIED TYPE: ICD-10-CM

## 2019-07-09 DIAGNOSIS — N18.30 CKD (CHRONIC KIDNEY DISEASE), STAGE III (HCC): ICD-10-CM

## 2019-07-09 DIAGNOSIS — I48.0 PAROXYSMAL ATRIAL FIBRILLATION (HCC): ICD-10-CM

## 2019-07-09 DIAGNOSIS — I50.32 CHRONIC DIASTOLIC HEART FAILURE (HCC): Primary | ICD-10-CM

## 2019-07-09 DIAGNOSIS — I20.8 CHRONIC STABLE ANGINA (HCC): ICD-10-CM

## 2019-07-09 PROCEDURE — 99215 OFFICE O/P EST HI 40 MIN: CPT | Performed by: NURSE PRACTITIONER

## 2019-07-09 NOTE — PATIENT INSTRUCTIONS
Maintain a 2 gram daily sodium diet and 1500 ml daily fluid restriction  Check daily weights  If you gained 3 pounds in one day, 5 pounds in one week, or experience worsening shortness of breath or increasing lower leg swelling  Please call the heart failure office at [phone]    Please bring a  list of your current medications and daily weights to the office visit    K dur 20meq extra dose today  Decrease Eliquis 2 5mg BID   Tylenol 650mg every 12 hours  BMP and mag in one week  Follow up in one week

## 2019-07-09 NOTE — PROGRESS NOTES
Per [facility], patient's daily weights are monitored by staff  They also ensure he follows a low sodium diet and they manage his meds  They will make sure he sees his PCP  No questions or needs at this time

## 2019-07-09 NOTE — PROGRESS NOTES
Cardiology Follow Up    George Velásquez  12/21/1930  [id number]  [address] [facility] Emigdio Wilson [facility] [address]  [phone]    Hospital Follow up     Interval History:    Mr George Velásquez was admitted to [facility] on 6/13 - 6/19/19 with acute respiratory failure with hypoxia  He presented from [facility] for evaluation of chest tightness and shortness of breath  On presentation he experience increasing shortness of breath he was treated with IV Lasix and transition to oral diuretics  He was felt to have acute respiratory failure secondary to acute on chronic diastolic heart failure  Zaroxolyn dose was increased  Discharge weight 194 pounds  Mr Whit Ramirez was admitted to [facility] on 7/01 - 7/03/19 with acute gout  He presented with right foot and great toe pain  He is also found to have volume overload secondary to dietary non compliance  NT pro BNP 7257  He was treated with a course of steroids and evaluated by Podiatry  They recommended follow-up as an outpatient  He received multiple doses of IV Lasix Imdur was increased and he was started on Aldactone  He was seen by the heart failure team and was not found to be in acute on chronic heart failure  Imdur was increased to 90mg daily  Discharge weight 202 lb  Mr Whit Ramirez presents our office for recent hospitalization follow-up visit  He admits to dyspnea with moving his right arm  He admits to mid sternal chest pain occurring sporadically and when moving right arm  According to Mr Whit Ramirez he is taking a pill with relief  He is not forthcoming to what he is eating and drinking  Weight in the office is 195 pounds  7/05/19 sodium 135, potassium 3 4, BUN 52, creat 1 72          COPD  CKD III baseline creatinine 1 4 - 1 7  LVEF 55%  Mild-to-moderate MR  Mild TR  CAD sp LULU to LAD  Paroxysmal atrial fibrillation on Eliquis  SSS sp PPM 5/16/19  99% no new significant high rate episodes normal device function  DM2 Hgb A1C 7 5  Patient Active Problem List   Diagnosis    Lower urinary tract symptoms    Malignant neoplasm of trigone of urinary bladder (HCC)    Prostate cancer (HCC)    Chronic diastolic CHF (congestive heart failure) (HCC)    Acute kidney injury superimposed on chronic kidney disease (HCC)    Paroxysmal atrial fibrillation - Sick sinus syndrome    CAD (coronary artery disease)    Type 2 diabetes mellitus ([facility] )    Cardiac pacemaker in situ    Right ureteral stone    Urinary tract infection with hematuria    Hyponatremia    Chest pain    Laceration of right lower extremity    Diarrhea    Anemia    Benign prostatic hyperplasia    Blindness of left eye    Kidney stones    Peripheral neuropathy    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 (LAD) artery    Ureter, calculus    Urinary retention    Right flank pain    Acute renal failure superimposed on stage 3 chronic kidney disease ([facility])    Urinary tract infection associated with indwelling urethral catheter ([facility])    Dyslipidemia    Sick sinus syndrome ([facility] )    Vascular dementia without behavioral disturbance    Alcoholism ([facility]    Decubitus ulcer of sacral region, stage 1    Benign essential hypertension    CKD (chronic kidney disease), stage III (HCC)    NSTEMI (non-ST elevated myocardial infarction) ([facility])    Chronic kidney disease stage 3    Abnormal urinalysis    Elevated troponin    Chronic anticoagulation    Acute gout due to renal impairment involving right foot    Acute gout     Past Medical History:   Diagnosis Date    A-fib ([facility]     Anemia     Bladder cancer ([facility]     CAD (coronary artery disease)     CHF (congestive heart failure) ([facility])     Chronic kidney failure     Colon cancer ([facility] )     Eye cancer, left ([facility] )     Hypertension     Incontinence     Pacemaker     Prostate cancer ([facility] )     Type 2 diabetes mellitus ([facility] [address] )     Wears hearing aid in both ears      Social History     Socioeconomic History    Marital status:      Spouse name: Not on file    Number of children: Not on file    Years of education: Not on file    Highest education level: Not on file   Occupational History    Not on file   Social Needs    Financial resource strain: Not on file    Food insecurity:     Worry: Not on file     Inability: Not on file    Transportation needs:     Medical: Not on file     Non-medical: Not on file   Tobacco Use    Smoking status: Former Smoker     Packs/day: 1 00     Years: 16 00     Pack years: 16 00     Types: Cigarettes     Start date: 5     Last attempt to quit:      Years since quittin 5    Smokeless tobacco: Never Used   Substance and Sexual Activity    Alcohol use: Yes     Comment: jayne cognac 1 oz/night     Drug use: No    Sexual activity: Not Currently   Lifestyle    Physical activity:     Days per week: Not on file     Minutes per session: Not on file    Stress: Not on file   Relationships    Social connections:     Talks on phone: Not on file     Gets together: Not on file     Attends Latter day service: Not on file     Active member of club or organization: Not on file     Attends meetings of clubs or organizations: Not on file     Relationship status: Not on file    Intimate partner violence:     Fear of current or ex partner: Not on file     Emotionally abused: Not on file     Physically abused: Not on file     Forced sexual activity: Not on file   Other Topics Concern    Not on file   Social History Narrative    Not on file      No family history on file    Past Surgical History:   Procedure Laterality Date    CARDIAC PACEMAKER PLACEMENT      CORONARY ANGIOPLASTY WITH STENT PLACEMENT      GALLBLADDER SURGERY      WA CYSTO/URETERO W/LITHOTRIPSY &INDWELL STENT INSRT Right 2018    Procedure: CYSTOSCOPY URETEROSCOPY WITH LITHOTRIPSY HOLMIUM LASER ,BASKET STONE EXTRACTION  RETROGRADE PYELOGRAM AND exchange STENT URETERAL;  Surgeon: Yusuf Dacosta MD;  Location: [facility];  Service: Urology    WA CYSTOURETHROSCOPY,URETER CATHETER Right 7/1/2018    Procedure: CYSTOSCOPY RETROGRADE PYELOGRAM WITH INSERTION STENT URETERAL;  Surgeon: Arielle Muñoz MD;  Location: [facility];  Service: Urology       Current Outpatient Medications:     acetaminophen (TYLENOL) 325 mg tablet, Take 1-2  tablet as needed every 8 hours as needed for headache   Do not exceed  More than 3gm/ day , Disp: 30 tablet, Rfl: 0    ALPRAZolam (XANAX) 0 5 mg tablet, Take 1 tablet (0 5 mg total) by mouth 4 (four) times a day as needed for anxiety, Disp: 30 tablet, Rfl: 0    apixaban (ELIQUIS) 5 mg, Take 1 tablet (5 mg total) by mouth 2 (two) times a day, Disp: 60 tablet, Rfl: 0    aspirin (ECOTRIN LOW STRENGTH) 81 mg EC tablet, Take 81 mg by mouth daily, Disp: , Rfl:     atorvastatin (LIPITOR) 20 mg tablet, Take 20 mg by mouth daily, Disp: , Rfl:     bumetanide (BUMEX) 2 mg tablet, Take 1 tablet (2 mg total) by mouth 2 (two) times a day, Disp: 60 tablet, Rfl: 0    carbamide peroxide (DEBROX) 6 5 % otic solution, 5 drops daily, Disp: , Rfl:     carvedilol (COREG) 3 125 mg tablet, TAKE 1 TABLET BY MOUTH TWICE DAILY WITH MEALS, Disp: 56 tablet, Rfl: 10    EASY TOUCH LANCETS 28G MISC, USE AS DIRECTED , Disp: 100 each, Rfl: 11    gabapentin (NEURONTIN) 100 mg capsule, TAKE 1 CAPSULE BY MOUTH ONCE DAILY, Disp: 28 capsule, Rfl: 10    gabapentin (NEURONTIN) 300 mg capsule, Take 100 mg by mouth daily at bedtime , Disp: , Rfl:     insulin glargine (LANTUS) 100 units/mL subcutaneous injection, Inject 15 Units under the skin daily at bedtime, Disp: , Rfl: 0    isosorbide mononitrate (IMDUR) 30 mg 24 hr tablet, Take 3 tablets (90 mg total) by mouth daily, Disp: 30 tablet, Rfl: 0    melatonin 3 mg, Take 1 tablet (3 mg total) by mouth daily at bedtime, Disp: 15 tablet, Rfl: 0    metFORMIN (GLUCOPHAGE) 500 mg tablet, TAKE 1 TABLET BY MOUTH ONCE DAILY  , Disp: 28 tablet, Rfl: 10    metolazone (ZAROXOLYN) 2 5 mg tablet, Take 1 tablet (2 5 mg total) by mouth see administration instructions Take 2 5 mg metolazone on Monday Wednesday and Friday, Disp: 5 tablet, Rfl: 0    Mirabegron ER 25 MG TB24, Take 25 mg by mouth daily, Disp: 30 tablet, Rfl: 5    nitroglycerin (NITROSTAT) 0 4 mg SL tablet, Place 1 tablet (0 4 mg total) under the tongue every 5 (five) minutes as needed for chest pain, Disp: 30 tablet, Rfl: 0    Omega-3 Fatty Acids (FISH OIL) 1,000 mg, Take 1,000 mg by mouth daily, Disp: , Rfl:     potassium chloride (K-DUR,KLOR-CON) 20 mEq tablet, Take 1 tablet (20 mEq total) by mouth 2 (two) times a day, Disp: 90 tablet, Rfl: 0    spironolactone (ALDACTONE) 25 mg tablet, Take 1 tablet (25 mg total) by mouth daily, Disp: 30 tablet, Rfl: 0    tamsulosin (FLOMAX) 0 4 mg, TAKE 1 CAPSULE BY MOUTH WITH EVENING MEAL, Disp: 28 capsule, Rfl: 4  Allergies   Allergen Reactions    Iv Contrast [Iodinated Diagnostic Agents]        Labs:  Admission on 07/01/2019, Discharged on 07/03/2019   Component Date Value    WBC 07/01/2019 5 31     RBC 07/01/2019 3 09*    Hemoglobin 07/01/2019 9 5*    Hematocrit 07/01/2019 30 0*    MCV 07/01/2019 97     MCH 07/01/2019 30 7     MCHC 07/01/2019 31 7     RDW 07/01/2019 14 2     MPV 07/01/2019 10 6     Platelets 64/82/8389 207     nRBC 07/01/2019 0     Neutrophils Relative 07/01/2019 73     Immat GRANS % 07/01/2019 0     Lymphocytes Relative 07/01/2019 11*    Monocytes Relative 07/01/2019 11     Eosinophils Relative 07/01/2019 4     Basophils Relative 07/01/2019 1     Neutrophils Absolute 07/01/2019 3 90     Immature Grans Absolute 07/01/2019 0 02     Lymphocytes Absolute 07/01/2019 0 57*    Monocytes Absolute 07/01/2019 0 56     Eosinophils Absolute 07/01/2019 0 21     Basophils Absolute 07/01/2019 0 05     Protime 07/01/2019 19 1*    INR 07/01/2019 1 66*    PTT 07/01/2019 42*    Sodium 07/01/2019 130*    Potassium 07/01/2019 2 9*    Chloride 07/01/2019 89*    CO2 07/01/2019 38*    ANION GAP 07/01/2019 3*    BUN 07/01/2019 29*    Creatinine 07/01/2019 1 57*    Glucose 07/01/2019 204*    Calcium 07/01/2019 8 4     AST 07/01/2019 11     ALT 07/01/2019 12     Alkaline Phosphatase 07/01/2019 103     Total Protein 07/01/2019 6 7     Albumin 07/01/2019 2 5*    Total Bilirubin 07/01/2019 0 57     eGFR 07/01/2019 39     Magnesium 07/01/2019 1 8     Troponin I 07/01/2019 0 16*    NT-proBNP 07/01/2019 7,257*    TSH 3RD GENERATON 07/01/2019 1 280     Hemoglobin A1C 07/02/2019 8 2*    EAG 07/02/2019 189     Troponin I 07/02/2019 0 17*    Sodium 07/02/2019 136     Potassium 07/02/2019 2 8*    Chloride 07/02/2019 92*    CO2 07/02/2019 38*    ANION GAP 07/02/2019 6     BUN 07/02/2019 31*    Creatinine 07/02/2019 1 62*    Glucose 07/02/2019 193*    Calcium 07/02/2019 8 7     AST 07/02/2019 11     ALT 07/02/2019 12     Alkaline Phosphatase 07/02/2019 103     Total Protein 07/02/2019 6 5     Albumin 07/02/2019 2 8*    Total Bilirubin 07/02/2019 0 69     eGFR 07/02/2019 37     Magnesium 07/02/2019 2 0     Prealbumin 07/02/2019 15 1*    POC Glucose 07/01/2019 175*    Troponin I 07/02/2019 0 15*    POC Glucose 07/02/2019 239*    Troponin I 07/02/2019 0 12*    WBC 07/02/2019 5 95     RBC 07/02/2019 3 25*    Hemoglobin 07/02/2019 10 2*    Hematocrit 07/02/2019 31 6*    MCV 07/02/2019 97     MCH 07/02/2019 31 4     MCHC 07/02/2019 32 3     RDW 07/02/2019 14 4     MPV 07/02/2019 11 2     Platelets 14/20/5624 200     nRBC 07/02/2019 0     Cholesterol 07/02/2019 111     Triglycerides 07/02/2019 67     HDL, Direct 07/02/2019 40     LDL Calculated 07/02/2019 58     TSH 3RD GENERATON 07/02/2019 1 010     Uric Acid 07/02/2019 11 2*    Segmented % 07/02/2019 92*    Bands % 07/02/2019 1     Lymphocytes % 07/02/2019 1*    Monocytes % 07/02/2019 2*    Eosinophils, % 07/02/2019 2     Basophils % 07/02/2019 1     Atypical Lymphocytes % 07/02/2019 1*    Absolute Neutrophils 07/02/2019 5 53     Lymphocytes Absolute 07/02/2019 0 06*    Monocytes Absolute 07/02/2019 0 12     Eosinophils Absolute 07/02/2019 0 12     Basophils Absolute 07/02/2019 0 06     RBC Morphology 07/02/2019 Present     Anisocytosis 07/02/2019 Present     Polychromasia 07/02/2019 Present     Platelet Estimate 38/36/7136 Adequate     POC Glucose 07/02/2019 317*    Sodium 07/02/2019 129*    Potassium 07/02/2019 3 8     Chloride 07/02/2019 89*    CO2 07/02/2019 34*    ANION GAP 07/02/2019 6     BUN 07/02/2019 32*    Creatinine 07/02/2019 1 71*    Glucose 07/02/2019 308*    Calcium 07/02/2019 8 8     eGFR 07/02/2019 35     POC Glucose 07/02/2019 >500*    POC Glucose 07/02/2019 456*    POC Glucose 07/02/2019 490*    Ventricular Rate 07/01/2019 75     Atrial Rate 07/01/2019 119     QRSD Interval 07/01/2019 172     QT Interval 07/01/2019 514     QTC Interval 07/01/2019 573     QRS Axis 07/01/2019 263     T Wave Axis 07/01/2019 83     POC Glucose 07/02/2019 334*    POC Glucose 07/02/2019 226*    Sodium 07/03/2019 130*    Potassium 07/03/2019 3 6     Chloride 07/03/2019 92*    CO2 07/03/2019 35*    ANION GAP 07/03/2019 3*    BUN 07/03/2019 45*    Creatinine 07/03/2019 1 88*    Glucose 07/03/2019 186*    Calcium 07/03/2019 9 4     eGFR 07/03/2019 31     Magnesium 07/03/2019 2 3     POC Glucose 07/03/2019 199*    POC Glucose 07/03/2019 167*   No results displayed because visit has over 200 results  Imaging: Xr Chest 2 Views    Result Date: 6/14/2019  Narrative: CHEST INDICATION:   Chest Pain  COMPARISON:  None EXAM PERFORMED/VIEWS:  XR CHEST PA & LATERAL FINDINGS:  Left chest wall pacemaker is present  Cardiomediastinal silhouette appears unremarkable  Small bilateral pleural effusions are noted  Lungs are clear   Osseous structures appear within normal limits for patient age  Impression: Small bilateral pleural effusions  Workstation performed: [id number]       Review of Systems:  Review of Systems   Constitutional: Negative  HENT: Positive for hearing loss  Eyes: Positive for visual disturbance  Respiratory: Positive for shortness of breath  Cardiovascular: Positive for chest pain and leg swelling  Gastrointestinal: Negative  Genitourinary: Negative  Musculoskeletal: Positive for arthralgias  Skin: Negative  Neurological: Negative  Psychiatric/Behavioral: Negative  Physical Exam:  Physical Exam   Constitutional: He appears well-developed  HENT:   Head: Normocephalic  Eyes: Pupils are equal, round, and reactive to light  Conjunctivae are normal    Neck: Normal range of motion  Cardiovascular: Normal rate and regular rhythm  Pulmonary/Chest: Effort normal and breath sounds normal    Abdominal: Soft  Bowel sounds are normal    distended   Musculoskeletal: Normal range of motion  He exhibits edema  1+ tibial edema  Mid sternal chest pain reproduced with palpation    Neurological: He is alert  Skin: Skin is warm and dry  Capillary refill takes less than 2 seconds  Psychiatric: He has a normal mood and affect  Discussion/Summary:  1  Chronic diastolic heart failure NYHA class 3 stage C- On PE appears slightly decompensated  Shabbir LE multifactorial, Low albumin and Dietary indiscretion  He is not on Metolazone 2 5mg on M-W-F  He will receive Metolazone in am   I have ordered K dur 20meq one extra dose today for K+ 34  BMP in one week  HR and BP controlled, Weight in the office 195 lb  Continue Bumex 2 mg b i d ,  Coreg 3 125 mg b i d , K-Dur 20 mEq BID  Maintain a 2 gram daily sodium diet and 1500 ml daily fluid restriction  Check daily weights    If you gained 3 pounds in one day, 5 pounds in one week, or experience worsening shortness of breath or increasing lower leg swelling  Please call the heart failure office at [phone]  Please bring a  list of your current medications and daily weights to the office visit  BMP mag to be done in a week   2  Paroxysmal atrial fibrillation- continue on Coreg 3 125 mg b i d , Decrease  Eliquis from 5 mg b i d  to 2 5mg BID due to age and Creat  3  CKD III- baseline creatinine 1 5 - 1 7-  BMP in one week   4  HTN- controlled    5  Chronic angina-  ,musculoskeletal in origin, tylenol 650mg BID, evaluae with next office visit  continue aspirin 81 mg daily, Coreg 3 125 mg b i d , Imdur 90 mg daily, SL NTG PRN  6  Mild to Mod MR - continue to monitor   7  SSS sp PPM no new significant high rate episodes normal device function  8  Gout- if Experiences a re occurrence suggest placing on Allopurinol

## 2019-07-15 ENCOUNTER — DOCUMENTATION (OUTPATIENT)
Dept: CARDIOLOGY CLINIC | Facility: CLINIC | Age: 84
End: 2019-07-15

## 2019-07-15 ENCOUNTER — TELEPHONE (OUTPATIENT)
Dept: UROLOGY | Facility: AMBULATORY SURGERY CENTER | Age: 84
End: 2019-07-15

## 2019-07-15 DIAGNOSIS — N20.0 KIDNEY STONE: Primary | ICD-10-CM

## 2019-07-15 NOTE — TELEPHONE ENCOUNTER
Left message with [facility] to advise that testing has not yet been completed for patient's appointment tomorrow  Asked for them to call office back  Patient was to have had PSA, u/s and KUB  If they call back, can r/s appointment with AP  Advise testing must be done prior to appointment

## 2019-07-16 ENCOUNTER — TELEPHONE (OUTPATIENT)
Dept: CARDIOLOGY CLINIC | Facility: CLINIC | Age: 84
End: 2019-07-16

## 2019-07-16 ENCOUNTER — PATIENT OUTREACH (OUTPATIENT)
Dept: CASE MANAGEMENT | Facility: OTHER | Age: 84
End: 2019-07-16

## 2019-07-16 DIAGNOSIS — I50.33 ACUTE ON CHRONIC DIASTOLIC CHF (CONGESTIVE HEART FAILURE) (HCC): ICD-10-CM

## 2019-07-16 DIAGNOSIS — E87.6 HYPOKALEMIA: Primary | ICD-10-CM

## 2019-07-16 RX ORDER — POTASSIUM CHLORIDE 20 MEQ/1
20 TABLET, EXTENDED RELEASE ORAL 2 TIMES DAILY
Qty: 90 TABLET | Refills: 0 | Status: CANCELLED | OUTPATIENT
Start: 2019-07-16

## 2019-07-16 RX ORDER — POTASSIUM CHLORIDE 20 MEQ/1
20 TABLET, EXTENDED RELEASE ORAL 2 TIMES DAILY
Qty: 90 TABLET | Refills: 3 | Status: SHIPPED | OUTPATIENT
Start: 2019-07-16 | End: 2019-07-17 | Stop reason: SDUPTHER

## 2019-07-16 NOTE — TELEPHONE ENCOUNTER
Called again and spoke with Asuncion Wright at [facility]  Advised her that was have left multiple messages about patient not having had his testing done  Today's appt rescheduled  Orders for PSA, KUB and u/s faxed to her at [phone]

## 2019-07-16 NOTE — TELEPHONE ENCOUNTER
Pt had BMP today, Lab called and Potassium was 2 6 down from 3 4 on 7/5  Wt today 194 lbs, since 7/9 has been label 191-195 lbs  Did have extra 20 meq potassium on 7/10        Taking Aldactone 25 mg qd              Potassium 20 meq bid              Bumex 2 mg bid              Metolazone 2 5 mg M/W/F        Please advise

## 2019-07-17 DIAGNOSIS — I50.33 ACUTE ON CHRONIC DIASTOLIC CHF (CONGESTIVE HEART FAILURE) (HCC): ICD-10-CM

## 2019-07-17 RX ORDER — POTASSIUM CHLORIDE 20 MEQ/1
20 TABLET, EXTENDED RELEASE ORAL 2 TIMES DAILY
Qty: 90 TABLET | Refills: 3
Start: 2019-07-17 | End: 2019-07-18 | Stop reason: SDUPTHER

## 2019-07-17 NOTE — PROGRESS NOTES
7/16/19 K result 2 6, on K dur 20meq BID  K dur 40meq BID extra doses today  Fauzia Luciano is on Metolazone 2 5mg On Monday, Wednesday and Friday  Increase K dur on Monday, Wednesday and Friday to K dur 40meq BID  On Tuesday, Thursday, Saturday and Sunday K dur 20meq BID, BMP on Monday 7 22 19  If K continues to be low increase Aldactone

## 2019-07-18 ENCOUNTER — OFFICE VISIT (OUTPATIENT)
Dept: CARDIOLOGY CLINIC | Facility: CLINIC | Age: 84
End: 2019-07-18
Payer: MEDICARE

## 2019-07-18 VITALS
HEART RATE: 65 BPM | WEIGHT: 194.4 LBS | DIASTOLIC BLOOD PRESSURE: 46 MMHG | BODY MASS INDEX: 30.51 KG/M2 | SYSTOLIC BLOOD PRESSURE: 90 MMHG | OXYGEN SATURATION: 98 % | HEIGHT: 67 IN

## 2019-07-18 DIAGNOSIS — I50.32 CHRONIC DIASTOLIC HEART FAILURE (HCC): Primary | ICD-10-CM

## 2019-07-18 DIAGNOSIS — I48.0 PAROXYSMAL ATRIAL FIBRILLATION (HCC): ICD-10-CM

## 2019-07-18 DIAGNOSIS — I10 HYPERTENSION, UNSPECIFIED TYPE: ICD-10-CM

## 2019-07-18 DIAGNOSIS — N18.30 CKD (CHRONIC KIDNEY DISEASE), STAGE III (HCC): ICD-10-CM

## 2019-07-18 DIAGNOSIS — I20.8 CHRONIC STABLE ANGINA (HCC): ICD-10-CM

## 2019-07-18 PROCEDURE — 99215 OFFICE O/P EST HI 40 MIN: CPT | Performed by: NURSE PRACTITIONER

## 2019-07-18 RX ORDER — POTASSIUM CHLORIDE 20 MEQ/1
20 TABLET, EXTENDED RELEASE ORAL 2 TIMES DAILY
Qty: 90 TABLET | Refills: 3
Start: 2019-07-18 | End: 2019-08-26 | Stop reason: SDUPTHER

## 2019-07-18 NOTE — PROGRESS NOTES
Cardiology Follow Up    Basil Rviera  12/21/1930  [id number]  [facility] Ovidio Ledbetter  [facility] [address]  [phone]    Hospital Follow up     Interval History:    Mr Basil Rivera was admitted to [facility] on 6/13 - 6/19/19 with acute respiratory failure with hypoxia  He presented from [facility] for evaluation of chest tightness and shortness of breath  On presentation he experience increasing shortness of breath he was treated with IV Lasix and transition to oral diuretics  He was felt to have acute respiratory failure secondary to acute on chronic diastolic heart failure  Zaroxolyn dose was increased  Discharge weight 194 pounds  Mr Ayleen Aponte was admitted to [facility] on 7/01 - 7/03/19 with acute gout  He presented with right foot and great toe pain  He is also found to have volume overload secondary to dietary non compliance  NT pro BNP 7257  He was treated with a course of steroids and evaluated by Podiatry  They recommended follow-up as an outpatient  He received multiple doses of IV Lasix Imdur was increased and he was started on Aldactone  He was seen by the heart failure team and was not found to be in acute on chronic heart failure  Imdur was increased to 90mg daily  Discharge weight 202 lb  Mr Ayleen Aponte presents our office for a follow-up visit  He is short of breath with minimal exertion  He continues with muscluloskeletal pain not relieved with tylenol  He admits to constant mid sternal chest pain occurring sporadically and when moving right arm  According to Mr Ayleen Aponte he is taking  Tylenol  His pain is not always relieved  Weight in the office is 194 pounds  7/16/19 sodium 133, potassium 2 6, BUN 44, creat 1 69          COPD  CKD III baseline creatinine 1 4 - 1 7  LVEF 55%  Mild-to-moderate MR  Mild TR  CAD sp LULU to LAD  Paroxysmal atrial fibrillation on Eliquis  SSS sp PPM 5/16/19  99% no new significant high rate episodes normal device function  DM2 Hgb A1C 7 5  Patient Active Problem List   Diagnosis    Lower urinary tract symptoms    Malignant neoplasm of trigone of urinary bladder (HCC)    Prostate cancer (HCC)    Chronic diastolic CHF (congestive heart failure) (HCC)    Acute kidney injury superimposed on chronic kidney disease ([facility])    Paroxysmal atrial fibrillation - Sick sinus syndrome    CAD (coronary artery disease)    Type 2 diabetes mellitus ([facility] [facility]    Cardiac pacemaker in situ    Right ureteral stone    Urinary tract infection with hematuria    Hyponatremia    Chest pain    Laceration of right lower extremity    Diarrhea    Anemia    Benign prostatic hyperplasia    Blindness of left eye    Kidney stones    Peripheral neuropathy    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 (LAD) artery    Ureter, calculus    Urinary retention    Right flank pain    Acute renal failure superimposed on stage 3 chronic kidney disease (HCC)    Urinary tract infection associated with indwelling urethral catheter ([facility])    Dyslipidemia    Sick sinus syndrome ([facility]    Vascular dementia without behavioral disturbance    Alcoholism ([facility]    Decubitus ulcer of sacral region, stage 1    Benign essential hypertension    CKD (chronic kidney disease), stage III (HCC)    NSTEMI (non-ST elevated myocardial infarction) ([facility])    Chronic kidney disease stage 3    Abnormal urinalysis    Elevated troponin    Chronic anticoagulation    Acute gout due to renal impairment involving right foot    Acute gout     Past Medical History:   Diagnosis Date    A-fib ([facility]     Anemia     Bladder cancer ([facility]     CAD (coronary artery disease)     CHF (congestive heart failure) ([facility])     Chronic kidney failure     Colon cancer ([facility]     Eye cancer, left ([facility] [facility]     Hypertension     Incontinence     Pacemaker     Prostate cancer ([facility] )     Type 2 diabetes mellitus ([facility] )     Wears hearing aid in both ears      Social History     Socioeconomic History    Marital status:      Spouse name: Not on file    Number of children: Not on file    Years of education: Not on file    Highest education level: Not on file   Occupational History    Not on file   Social Needs    Financial resource strain: Not on file    Food insecurity:     Worry: Not on file     Inability: Not on file    Transportation needs:     Medical: Not on file     Non-medical: Not on file   Tobacco Use    Smoking status: Former Smoker     Packs/day: 1 00     Years: 16 00     Pack years: 16 00     Types: Cigarettes     Start date: 5     Last attempt to quit:      Years since quittin 5    Smokeless tobacco: Never Used   Substance and Sexual Activity    Alcohol use: Yes     Comment: jayne cognac 1 oz/night     Drug use: No    Sexual activity: Not Currently   Lifestyle    Physical activity:     Days per week: Not on file     Minutes per session: Not on file    Stress: Not on file   Relationships    Social connections:     Talks on phone: Not on file     Gets together: Not on file     Attends Rastafari service: Not on file     Active member of club or organization: Not on file     Attends meetings of clubs or organizations: Not on file     Relationship status: Not on file    Intimate partner violence:     Fear of current or ex partner: Not on file     Emotionally abused: Not on file     Physically abused: Not on file     Forced sexual activity: Not on file   Other Topics Concern    Not on file   Social History Narrative    Not on file      No family history on file    Past Surgical History:   Procedure Laterality Date    CARDIAC PACEMAKER PLACEMENT      CORONARY ANGIOPLASTY WITH STENT PLACEMENT      GALLBLADDER SURGERY      GA CYSTO/URETERO W/LITHOTRIPSY &INDWELL STENT INSRT Right 2018    Procedure: CYSTOSCOPY URETEROSCOPY WITH LITHOTRIPSY HOLMIUM LASER ,BASKET STONE EXTRACTION  RETROGRADE PYELOGRAM AND exchange STENT URETERAL;  Surgeon: Florian Monahan MD;  [facility];  Service: Urology    ND CYSTOURETHROSCOPY,URETER CATHETER Right 7/1/2018    Procedure: CYSTOSCOPY RETROGRADE PYELOGRAM WITH INSERTION STENT URETERAL;  Surgeon: Thais Pena MD;  Location[facility];  Service: Urology       Current Outpatient Medications:     acetaminophen (TYLENOL) 325 mg tablet, Take 1-2  tablet as needed every 8 hours as needed for headache   Do not exceed  More than 3gm/ day , Disp: 30 tablet, Rfl: 0    ALPRAZolam (XANAX) 0 5 mg tablet, Take 1 tablet (0 5 mg total) by mouth 4 (four) times a day as needed for anxiety, Disp: 30 tablet, Rfl: 0    apixaban (ELIQUIS) 2 5 mg, Take 1 tablet (2 5 mg total) by mouth 2 (two) times a day, Disp: 60 tablet, Rfl: 3    aspirin (ECOTRIN LOW STRENGTH) 81 mg EC tablet, Take 81 mg by mouth daily, Disp: , Rfl:     atorvastatin (LIPITOR) 20 mg tablet, Take 20 mg by mouth daily, Disp: , Rfl:     bumetanide (BUMEX) 2 mg tablet, Take 1 tablet (2 mg total) by mouth 2 (two) times a day, Disp: 60 tablet, Rfl: 0    carvedilol (COREG) 3 125 mg tablet, TAKE 1 TABLET BY MOUTH TWICE DAILY WITH MEALS, Disp: 56 tablet, Rfl: 10    EASY TOUCH LANCETS 28G MISC, USE AS DIRECTED , Disp: 100 each, Rfl: 11    gabapentin (NEURONTIN) 100 mg capsule, TAKE 1 CAPSULE BY MOUTH ONCE DAILY, Disp: 28 capsule, Rfl: 10    gabapentin (NEURONTIN) 300 mg capsule, Take 100 mg by mouth daily at bedtime , Disp: , Rfl:     insulin glargine (LANTUS) 100 units/mL subcutaneous injection, Inject 15 Units under the skin daily at bedtime, Disp: , Rfl: 0    isosorbide mononitrate (IMDUR) 30 mg 24 hr tablet, Take 3 tablets (90 mg total) by mouth daily, Disp: 30 tablet, Rfl: 0    melatonin 3 mg, Take 1 tablet (3 mg total) by mouth daily at bedtime, Disp: 15 tablet, Rfl: 0    metFORMIN (GLUCOPHAGE) 500 mg tablet, TAKE 1 TABLET BY MOUTH ONCE DAILY , Disp: 28 tablet, Rfl: 10    metolazone (ZAROXOLYN) 2 5 mg tablet, Take 1 tablet (2 5 mg total) by mouth see administration instructions Take 2 5 mg metolazone on Monday Wednesday and Friday, Disp: 5 tablet, Rfl: 0    Mirabegron ER 25 MG TB24, Take 25 mg by mouth daily, Disp: 30 tablet, Rfl: 5    nitroglycerin (NITROSTAT) 0 4 mg SL tablet, Place 1 tablet (0 4 mg total) under the tongue every 5 (five) minutes as needed for chest pain, Disp: 30 tablet, Rfl: 0    Omega-3 Fatty Acids (FISH OIL) 1,000 mg, Take 1,000 mg by mouth daily, Disp: , Rfl:     potassium chloride (K-DUR,KLOR-CON) 20 mEq tablet, Take 1 tablet (20 mEq total) by mouth 2 (two) times a day K dur 40meq BID on Monday, Wednesday and Friday while on Metolazone on Monday, Wednesday and Friday (Patient taking differently: Take 20 mEq by mouth 2 (two) times a day ), Disp: 90 tablet, Rfl: 3    spironolactone (ALDACTONE) 25 mg tablet, Take 1 tablet (25 mg total) by mouth daily, Disp: 30 tablet, Rfl: 0    tamsulosin (FLOMAX) 0 4 mg, TAKE 1 CAPSULE BY MOUTH WITH EVENING MEAL, Disp: 28 capsule, Rfl: 4  Allergies   Allergen Reactions    Iv Contrast [Iodinated Diagnostic Agents]        Labs:  Admission on 07/01/2019, Discharged on 07/03/2019   Component Date Value    WBC 07/01/2019 5 31     RBC 07/01/2019 3 09*    Hemoglobin 07/01/2019 9 5*    Hematocrit 07/01/2019 30 0*    MCV 07/01/2019 97     MCH 07/01/2019 30 7     MCHC 07/01/2019 31 7     RDW 07/01/2019 14 2     MPV 07/01/2019 10 6     Platelets 47/18/2171 207     nRBC 07/01/2019 0     Neutrophils Relative 07/01/2019 73     Immat GRANS % 07/01/2019 0     Lymphocytes Relative 07/01/2019 11*    Monocytes Relative 07/01/2019 11     Eosinophils Relative 07/01/2019 4     Basophils Relative 07/01/2019 1     Neutrophils Absolute 07/01/2019 3 90     Immature Grans Absolute 07/01/2019 0 02     Lymphocytes Absolute 07/01/2019 0 57*    Monocytes Absolute 07/01/2019 0 56     Eosinophils Absolute 07/01/2019 0 21     Basophils Absolute 07/01/2019 0 05     Protime 07/01/2019 19 1*    INR 07/01/2019 1 66*    PTT 07/01/2019 42*    Sodium 07/01/2019 130*    Potassium 07/01/2019 2 9*    Chloride 07/01/2019 89*    CO2 07/01/2019 38*    ANION GAP 07/01/2019 3*    BUN 07/01/2019 29*    Creatinine 07/01/2019 1 57*    Glucose 07/01/2019 204*    Calcium 07/01/2019 8 4     AST 07/01/2019 11     ALT 07/01/2019 12     Alkaline Phosphatase 07/01/2019 103     Total Protein 07/01/2019 6 7     Albumin 07/01/2019 2 5*    Total Bilirubin 07/01/2019 0 57     eGFR 07/01/2019 39     Magnesium 07/01/2019 1 8     Troponin I 07/01/2019 0 16*    NT-proBNP 07/01/2019 7,257*    TSH 3RD GENERATON 07/01/2019 1 280     Hemoglobin A1C 07/02/2019 8 2*    EAG 07/02/2019 189     Troponin I 07/02/2019 0 17*    Sodium 07/02/2019 136     Potassium 07/02/2019 2 8*    Chloride 07/02/2019 92*    CO2 07/02/2019 38*    ANION GAP 07/02/2019 6     BUN 07/02/2019 31*    Creatinine 07/02/2019 1 62*    Glucose 07/02/2019 193*    Calcium 07/02/2019 8 7     AST 07/02/2019 11     ALT 07/02/2019 12     Alkaline Phosphatase 07/02/2019 103     Total Protein 07/02/2019 6 5     Albumin 07/02/2019 2 8*    Total Bilirubin 07/02/2019 0 69     eGFR 07/02/2019 37     Magnesium 07/02/2019 2 0     Prealbumin 07/02/2019 15 1*    POC Glucose 07/01/2019 175*    Troponin I 07/02/2019 0 15*    POC Glucose 07/02/2019 239*    Troponin I 07/02/2019 0 12*    WBC 07/02/2019 5 95     RBC 07/02/2019 3 25*    Hemoglobin 07/02/2019 10 2*    Hematocrit 07/02/2019 31 6*    MCV 07/02/2019 97     MCH 07/02/2019 31 4     MCHC 07/02/2019 32 3     RDW 07/02/2019 14 4     MPV 07/02/2019 11 2     Platelets 65/84/1167 200     nRBC 07/02/2019 0     Cholesterol 07/02/2019 111     Triglycerides 07/02/2019 67     HDL, Direct 07/02/2019 40     LDL Calculated 07/02/2019 58     TSH 3RD GENERATON 07/02/2019 1 010     Uric Acid 07/02/2019 11 2*    Segmented % 07/02/2019 92*    Bands % 07/02/2019 1     Lymphocytes % 07/02/2019 1*    Monocytes % 07/02/2019 2*    Eosinophils, % 07/02/2019 2     Basophils % 07/02/2019 1     Atypical Lymphocytes % 07/02/2019 1*    Absolute Neutrophils 07/02/2019 5 53     Lymphocytes Absolute 07/02/2019 0 06*    Monocytes Absolute 07/02/2019 0 12     Eosinophils Absolute 07/02/2019 0 12     Basophils Absolute 07/02/2019 0 06     RBC Morphology 07/02/2019 Present     Anisocytosis 07/02/2019 Present     Polychromasia 07/02/2019 Present     Platelet Estimate 59/61/1317 Adequate     POC Glucose 07/02/2019 317*    Sodium 07/02/2019 129*    Potassium 07/02/2019 3 8     Chloride 07/02/2019 89*    CO2 07/02/2019 34*    ANION GAP 07/02/2019 6     BUN 07/02/2019 32*    Creatinine 07/02/2019 1 71*    Glucose 07/02/2019 308*    Calcium 07/02/2019 8 8     eGFR 07/02/2019 35     POC Glucose 07/02/2019 >500*    POC Glucose 07/02/2019 456*    POC Glucose 07/02/2019 490*    Ventricular Rate 07/01/2019 75     Atrial Rate 07/01/2019 119     QRSD Interval 07/01/2019 172     QT Interval 07/01/2019 514     QTC Interval 07/01/2019 573     QRS Axis 07/01/2019 263     T Wave Axis 07/01/2019 83     POC Glucose 07/02/2019 334*    POC Glucose 07/02/2019 226*    Sodium 07/03/2019 130*    Potassium 07/03/2019 3 6     Chloride 07/03/2019 92*    CO2 07/03/2019 35*    ANION GAP 07/03/2019 3*    BUN 07/03/2019 45*    Creatinine 07/03/2019 1 88*    Glucose 07/03/2019 186*    Calcium 07/03/2019 9 4     eGFR 07/03/2019 31     Magnesium 07/03/2019 2 3     POC Glucose 07/03/2019 199*    POC Glucose 07/03/2019 167*   No results displayed because visit has over 200 results  Imaging: Xr Chest 2 Views    Result Date: 6/14/2019  Narrative: CHEST INDICATION:   Chest Pain  COMPARISON:  None EXAM PERFORMED/VIEWS:  XR CHEST PA & LATERAL FINDINGS:  Left chest wall pacemaker is present  Cardiomediastinal silhouette appears unremarkable  Small bilateral pleural effusions are noted  Lungs are clear  Osseous structures appear within normal limits for patient age  Impression: Small bilateral pleural effusions  Workstation performed: [id number]       Review of Systems:  Review of Systems   Constitutional: Negative  HENT: Positive for hearing loss  Eyes: Positive for visual disturbance  Respiratory: Positive for shortness of breath  Cardiovascular: Positive for chest pain and leg swelling  Gastrointestinal: Negative  Genitourinary: Negative  Musculoskeletal: Positive for arthralgias  Skin: Negative  Neurological: Negative  Psychiatric/Behavioral: Negative  Physical Exam:  Physical Exam   Constitutional: He appears well-developed  HENT:   Head: Normocephalic  Eyes: Pupils are equal, round, and reactive to light  Conjunctivae are normal    Neck: Normal range of motion  Cardiovascular: Normal rate and regular rhythm  Pulmonary/Chest: Effort normal and breath sounds normal    Abdominal: Soft  Bowel sounds are normal    distended   Musculoskeletal: Normal range of motion  He exhibits edema  1+ tibial edema  Mid sternal chest pain reproduced with palpation    Neurological: He is alert  Skin: Skin is warm and dry  Capillary refill takes less than 2 seconds  Psychiatric: He has a normal mood and affect  Discussion/Summary:  1  Chronic diastolic heart failure NYHA class 3 stage C- On PE appears slightly decompensated  Shabbir LE multifactorial, Low albumin and Dietary indiscretion  He is not on Metolazone 2 5mg on M-W-F   K+ 2 6  K dur 40meq 2 extra doses orderd yesterday with an increased to  K dur to 40meq BID on days receiving Metolazone,  Am  BMP on Monday 7/22/19  HR and BP controlled RUE sitting 108/60 , Weight in the office 194 lb     Continue Bumex 2 mg b i d ,  Coreg 3 125 mg b i d , K-Dur 20 mEq BID  Maintain a 2 gram daily sodium diet and 1500 ml daily fluid restriction  Check daily weights  If you gained 3 pounds in one day, 5 pounds in one week, or experience worsening shortness of breath or increasing lower leg swelling  Please call the heart failure office at [phone]  Please bring a  list of your current medications and daily weights to the office visit  Continues with dyspnea and pain  I have ordered palliative Oxycodone 2mg every 8 hours  2  Paroxysmal atrial fibrillation- continue on Coreg 3 125 mg b i d , Decrease  Eliquis from 5 mg b i d  to 2 5mg BID due to age and Creat  3  CKD III- baseline creatinine 1 5 - 1 7-  Creat stable 1 69   4  HTN- 108/60    5  Chronic angina-  ,musculoskeletal in origin, tylenol 650mg BID, evaluae with next office visit  I have ordered Palliative Oxycodone 2mg every 8 hours  continue aspirin 81 mg daily, Coreg 3 125 mg b i d , Imdur 90 mg daily, SL NTG PRN  6  Mild to Mod MR - continue to monitor   7   SSS sp PPM no new significant high rate episodes normal device function

## 2019-07-18 NOTE — PATIENT INSTRUCTIONS
Maintain a 2 gram daily sodium diet and 1500 ml daily fluid restriction  Check daily weights  If you gained 3 pounds in one day, 5 pounds in one week, or experience worsening shortness of breath or increasing lower leg swelling  Please call the heart failure office at [phone]    Please bring a  list of your current medications and daily weights to the office visit    New medication for pain and dyspnea

## 2019-07-22 ENCOUNTER — PATIENT OUTREACH (OUTPATIENT)
Dept: CASE MANAGEMENT | Facility: OTHER | Age: 84
End: 2019-07-22

## 2019-07-24 ENCOUNTER — TELEPHONE (OUTPATIENT)
Dept: CARDIOLOGY CLINIC | Facility: CLINIC | Age: 84
End: 2019-07-24

## 2019-07-24 NOTE — TELEPHONE ENCOUNTER
[facility] called  Stated since Shavonne Duvall has be taking the Oxycodone 5mg/5ml q 8 hrs, he has been" acting drowsy and OFF"  Falling asleep  Diet ok  He also has a order for Liquor at night          Please advise

## 2019-07-25 ENCOUNTER — TELEPHONE (OUTPATIENT)
Dept: CARDIOLOGY CLINIC | Facility: CLINIC | Age: 84
End: 2019-07-25

## 2019-07-25 ENCOUNTER — HOSPITAL ENCOUNTER (OUTPATIENT)
Dept: RADIOLOGY | Facility: HOSPITAL | Age: 84
Discharge: HOME | End: 2019-07-25
Payer: MEDICARE

## 2019-07-25 DIAGNOSIS — N20.0 KIDNEY STONE: ICD-10-CM

## 2019-07-25 DIAGNOSIS — Z51.5 PALLIATIVE CARE STATUS: Primary | ICD-10-CM

## 2019-07-25 DIAGNOSIS — Z51.5 PALLIATIVE CARE PATIENT: Primary | ICD-10-CM

## 2019-07-25 PROCEDURE — 51798 US URINE CAPACITY MEASURE: CPT

## 2019-07-25 RX ORDER — OXYCODONE HCL 5 MG/5 ML
2 SOLUTION, ORAL ORAL SEE ADMIN INSTRUCTIONS
Qty: 360 ML | Refills: 1 | Status: CANCELLED | OUTPATIENT
Start: 2019-07-25

## 2019-07-25 NOTE — TELEPHONE ENCOUNTER
Faxed and scanned in to [facility] doctor orders for K-dur and PARADISEGloriveto Hackett) to [facility]  # [phone]

## 2019-07-29 DIAGNOSIS — I50.33 ACUTE ON CHRONIC DIASTOLIC (CONGESTIVE) HEART FAILURE (HCC): ICD-10-CM

## 2019-07-29 RX ORDER — METOLAZONE 2.5 MG/1
2.5 TABLET ORAL SEE ADMIN INSTRUCTIONS
Qty: 5 TABLET | Refills: 0
Start: 2019-07-29

## 2019-07-29 NOTE — PROGRESS NOTES
Elevated 7/29/19 BUN 45/Creat 1 85, from baseline of creat of 1 5- 1 7  Decrease Metolazone 2 5mg Three times a week to twice a week, Tuesday and Friday, BMP in 10 days

## 2019-07-30 ENCOUNTER — HOSPITAL ENCOUNTER (OUTPATIENT)
Dept: RADIOLOGY | Facility: HOSPITAL | Age: 84
Discharge: HOME | End: 2019-07-30
Payer: MEDICARE

## 2019-07-30 ENCOUNTER — TRANSCRIBE ORDERS (OUTPATIENT)
Dept: RADIOLOGY | Facility: HOSPITAL | Age: 84
End: 2019-07-30

## 2019-07-30 DIAGNOSIS — N20.0 NEPHROLITHIASIS: ICD-10-CM

## 2019-07-30 PROCEDURE — 74018 RADEX ABDOMEN 1 VIEW: CPT

## 2019-07-30 NOTE — PROGRESS NOTES
Cardiology Follow Up    Marcus Dominguez  12/21/1930  [id number]  [address] [facility]  [address] [place] [address] [address]    1  Chronic diastolic CHF (congestive heart failure) ([facility] [address] )     2  Coronary artery disease involving native coronary artery of native heart without angina pectoris     3  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 (LAD) artery     4  Paroxysmal atrial fibrillation - Sick sinus syndrome     5  Sick sinus syndrome (HCC)     6  Cardiac pacemaker in situ     7  Benign essential hypertension     8  Dyslipidemia         Discussion/Summary:  Mr Kevyn Au is a pleasant 66-year-old gentleman who presents to the office today for follow-up  He does appear volume overloaded on exam   His weight is up about 8 lb since his last office visit  I have asked that he be given metolazone tomorrow and then re-initiated on Monday- Wednesday-Friday dosing  We will have to tolerate a slightly higher creatinine in order to attempt to maintain euvolemic  His blood pressure is well controlled in the office today  His most recent lipids were reviewed and are acceptable on current therapy  He did have a device check during his last visit with me which revealed adequate device function  He is maintained on systemic anticoagulation with Eliquis given his atrial fibrillation  Follow-up will be arranged in a month with one of our advanced practitioners  Interval History:   Mr Kevyn Au is an 66-year-old gentleman who presents to the office today for follow-up  He was last seen in the office by one of our advanced practitioners  At that time his weight was about 194 lb  Follow-up blood work revealed his creatinine was slightly worse than his baseline  His metolazone was decreased from 3 times per week to 2 times per week  He continues to report shortness of breath with transfers    He reports chest pain at rest daily which is relieved by a "pill"  He denies utilization of added salt to his diet  With the limited activity he performs he denies any exertional chest pain  He does report inability to lie flat any sleeps in a recliner  He is weighed regularly at the facility  In the recent past he does report some increasing lower extremity edema  He drinks Cognac nightly  He is maintained on Eliquis without any bleeding consequences or falls  Problem List     Lower urinary tract symptoms    Malignant neoplasm of trigone of urinary bladder (HCC)    Prostate cancer (HCC)    Chronic diastolic congestive heart failure (HCC)    CKD (chronic kidney disease) stage 3, GFR 30-59 ml/min (Chronic)    Paroxysmal atrial fibrillation (HCC) (Chronic)    CAD (coronary artery disease) (Chronic)    Type 2 diabetes mellitus (HCC) (Chronic)    Lab Results   Component Value Date    HGBA1C 8 2 (H) 07/02/2019       No results for input(s): POCGLU in the last 72 hours  Blood Sugar Average: Last 72 hrs:          Cardiac pacemaker in situ (Chronic)    Right ureteral stone    Overview Signed 7/1/2018 11:57 AM by Ivette Berrios MD     Added automatically from request for surgery [id number]         Urinary tract infection with hematuria    Hyponatremia    Other chest pain    Laceration of right lower extremity    Anemia    Benign prostatic hyperplasia    Blindness of left eye    Kidney stone    Peripheral neuropathy        Past Medical History:   Diagnosis Date    A-fib ([address] )     Anemia     Bladder cancer ([address] )     CAD (coronary artery disease)     CHF (congestive heart failure) ([address]     Chronic kidney failure     Colon cancer ([address] )     Eye cancer, left ([address] )     Hypertension     Incontinence     Pacemaker     Prostate cancer ([address] )     Type 2 diabetes mellitus ([address] )     Wears hearing aid in both ears      Social History     Socioeconomic History    Marital status:       Spouse name: Not on file    Number of children: Not on file    Years of education: Not on file    Highest education level: Not on file   Occupational History    Not on file   Social Needs    Financial resource strain: Not on file    Food insecurity:     Worry: Not on file     Inability: Not on file    Transportation needs:     Medical: Not on file     Non-medical: Not on file   Tobacco Use    Smoking status: Former Smoker     Packs/day: 1 00     Years: 16 00     Pack years: 16 00     Types: Cigarettes     Start date: 5     Last attempt to quit:      Years since quittin 6    Smokeless tobacco: Never Used   Substance and Sexual Activity    Alcohol use: Yes     Comment: jayne cognac 1 oz/night     Drug use: No    Sexual activity: Not Currently   Lifestyle    Physical activity:     Days per week: Not on file     Minutes per session: Not on file    Stress: Not on file   Relationships    Social connections:     Talks on phone: Not on file     Gets together: Not on file     Attends [employer] service: Not on file     Active member of club or organization: Not on file     Attends meetings of clubs or organizations: Not on file     Relationship status: Not on file    Intimate partner violence:     Fear of current or ex partner: Not on file     Emotionally abused: Not on file     Physically abused: Not on file     Forced sexual activity: Not on file   Other Topics Concern    Not on file   Social History Narrative    Not on file      History reviewed  No pertinent family history    Past Surgical History:   Procedure Laterality Date    CARDIAC PACEMAKER PLACEMENT      CORONARY ANGIOPLASTY WITH STENT PLACEMENT      GALLBLADDER SURGERY      CA CYSTO/URETERO W/LITHOTRIPSY &INDWELL STENT INSRT Right 2018    Procedure: CYSTOSCOPY URETEROSCOPY WITH LITHOTRIPSY HOLMIUM LASER ,BASKET STONE EXTRACTION  RETROGRADE PYELOGRAM AND exchange STENT URETERAL;  Surgeon: Sabiha Madison MD;  Location:  MAIN OR;  Service: Urology    CT CYSTOURETHROSCOPY,URETER CATHETER Right 7/1/2018    Procedure: CYSTOSCOPY RETROGRADE PYELOGRAM WITH INSERTION STENT URETERAL;  Surgeon: Ralph Garcia MD;  Location: [facility];  Service: Urology       Current Outpatient Medications:     acetaminophen (TYLENOL) 325 mg tablet, Take 1-2  tablet as needed every 8 hours as needed for headache  Do not exceed  More than 3gm/ day , Disp: 30 tablet, Rfl: 0    ALPRAZolam (XANAX) 0 5 mg tablet, Take 1 tablet (0 5 mg total) by mouth 4 (four) times a day as needed for anxiety, Disp: 30 tablet, Rfl: 0    apixaban (ELIQUIS) 2 5 mg, Take 1 tablet (2 5 mg total) by mouth 2 (two) times a day, Disp: 60 tablet, Rfl: 3    aspirin (ECOTRIN LOW STRENGTH) 81 mg EC tablet, Take 81 mg by mouth daily, Disp: , Rfl:     atorvastatin (LIPITOR) 20 mg tablet, Take 20 mg by mouth daily, Disp: , Rfl:     bumetanide (BUMEX) 2 mg tablet, Take 1 tablet (2 mg total) by mouth 2 (two) times a day, Disp: 60 tablet, Rfl: 0    carvedilol (COREG) 3 125 mg tablet, TAKE 1 TABLET BY MOUTH TWICE DAILY WITH MEALS, Disp: 56 tablet, Rfl: 10    EASY TOUCH LANCETS 28G MISC, USE AS DIRECTED , Disp: 100 each, Rfl: 11    gabapentin (NEURONTIN) 100 mg capsule, TAKE 1 CAPSULE BY MOUTH ONCE DAILY, Disp: 28 capsule, Rfl: 10    gabapentin (NEURONTIN) 300 mg capsule, Take 100 mg by mouth daily at bedtime , Disp: , Rfl:     insulin glargine (LANTUS) 100 units/mL subcutaneous injection, Inject 15 Units under the skin daily at bedtime, Disp: , Rfl: 0    isosorbide mononitrate (IMDUR) 30 mg 24 hr tablet, Take 3 tablets (90 mg total) by mouth daily (Patient taking differently: Take 30 mg by mouth daily 60mg qd), Disp: 30 tablet, Rfl: 0    melatonin 3 mg, Take 1 tablet (3 mg total) by mouth daily at bedtime, Disp: 15 tablet, Rfl: 0    metFORMIN (GLUCOPHAGE) 500 mg tablet, TAKE 1 TABLET BY MOUTH ONCE DAILY  , Disp: 28 tablet, Rfl: 10    metolazone (ZAROXOLYN) 2 5 mg tablet, Take 1 tablet (2 5 mg total) by mouth see administration instructions Take 2 5 mg metolazone on Tuesday and Friday, Disp: 5 tablet, Rfl: 0    Mirabegron ER 25 MG TB24, Take 25 mg by mouth daily, Disp: 30 tablet, Rfl: 5    nitroglycerin (NITROSTAT) 0 4 mg SL tablet, Place 1 tablet (0 4 mg total) under the tongue every 5 (five) minutes as needed for chest pain, Disp: 30 tablet, Rfl: 0    Omega-3 Fatty Acids (FISH OIL) 1,000 mg, Take 1,000 mg by mouth daily, Disp: , Rfl:     oxyCODONE (ROXICODONE) 5 mg/5 mL solution, Take 2 mg by mouth every 8 (eight) hours, Disp: , Rfl:     potassium chloride (K-DUR,KLOR-CON) 20 mEq tablet, Take 1 tablet (20 mEq total) by mouth 2 (two) times a day K dur 40meq BID on Monday, Wednesday and Friday while on Metolazone on Monday, Wednesday and Friday, Disp: 90 tablet, Rfl: 3    spironolactone (ALDACTONE) 25 mg tablet, Take 1 tablet (25 mg total) by mouth daily, Disp: 30 tablet, Rfl: 0    tamsulosin (FLOMAX) 0 4 mg, TAKE 1 CAPSULE BY MOUTH WITH EVENING MEAL, Disp: 28 capsule, Rfl: 4  Allergies   Allergen Reactions    Iv Contrast [Iodinated Diagnostic Agents]        Labs:     Chemistry        Component Value Date/Time    K 3 6 07/03/2019 0509    CL 92 (L) 07/03/2019 0509    CO2 35 (H) 07/03/2019 0509    BUN 45 (H) 07/03/2019 0509    CREATININE 1 88 (H) 07/03/2019 0509        Component Value Date/Time    CALCIUM 9 4 07/03/2019 0509    ALKPHOS 103 07/02/2019 0030    AST 11 07/02/2019 0030    ALT 12 07/02/2019 0030            No results found for: CHOL  Lab Results   Component Value Date    HDL 40 07/02/2019    HDL 43 04/20/2019    HDL 33 (L) 07/05/2018     Lab Results   Component Value Date    LDLCALC 58 07/02/2019    LDLCALC 61 04/20/2019    LDLCALC 79 07/05/2018     Lab Results   Component Value Date    TRIG 67 07/02/2019    TRIG 78 04/20/2019    TRIG 75 07/05/2018     No results found for: CHOLHDL    Imaging: Ct Abdomen Pelvis Wo Contrast    Result Date: 7/1/2018  Narrative: CT ABDOMEN AND PELVIS WITHOUT IV CONTRAST INDICATION:   Right lower quadrant abdominal pain  Colon cancer  COMPARISON: None  TECHNIQUE:  CT examination of the abdomen and pelvis was performed without intravenous contrast   Axial, sagittal, and coronal 2D reformatted images were created from the source data and submitted for interpretation  Radiation dose length product (DLP) for this visit:  761 mGy-cm   This examination, like all CT scans performed in the [facility], was performed utilizing techniques to minimize radiation dose exposure, including the use of iterative reconstruction and automated exposure control  Enteric contrast was not administered  FINDINGS: ABDOMEN LOWER CHEST:  Pacer leads are noted in the right heart  Heart is enlarged  There is trace pericardial effusion  There is small moderate loculated effusion in the medial lower left chest   Small freely layering right pleural effusion is noted  Calcified right lower chest granuloma is noted  Emphysematous changes are noted in the lower lungs as are atelectatic changes  LIVER/BILIARY TREE:  Unremarkable  No noncontrast CT evidence of suspicious hepatic mass or biliary dilatation  GALLBLADDER:  Gallbladder is surgically absent  SPLEEN:  Unremarkable  PANCREAS:  Unremarkable  ADRENAL GLANDS:  Low-density thickening of adrenal glands bilaterally without discrete adrenal mass suggest adenomatous hyperplasia  KIDNEYS/URETERS:  Renal vascular calcifications and bilateral renal cysts are noted  There is a proximal right ureteral calculus, 9 mm in size at the approximate upper L4 vertebral endplate level  There is extensive periureteral stranding at the level of the calculus  There is distention of right extrarenal pelvis  Only mild prominence of right intrarenal calyces is noted  The right ureter is prominent in caliber both proximal to and distal to the ureteral calculus  No left ureteral calculi  No left-sided hydroureteronephrosis   STOMACH AND BOWEL: Anastomotic staple line is noted in the right upper quadrant  There has been resection of the ascending colon  No bowel obstruction  No free intraperitoneal gas  Sigmoid diverticulosis without evidence of acute diverticulitis  APPENDIX:  No findings to suggest appendicitis  ABDOMINOPELVIC CAVITY:  Nonspecific right lower quadrant mesenteric and presacral edema is noted  No abdominal pelvic abscess  No retroperitoneal, mesenteric, or pelvic lymphadenopathy  VESSELS:  Extensive atherosclerotic gastric calcification noted  PELVIS REPRODUCTIVE ORGANS:  Marked prostatomegaly noted  Prostate measures 6 7 x 6 5 cm in transverse dimensions by approximately 7 6 cm in craniocaudal dimension  URINARY BLADDER:  Diffuse thickening of the wall the urinary bladder with minimal perivesical inflammatory edema suggestive of cystitis or the sequela of chronic bladder obstruction  ABDOMINAL WALL/INGUINAL REGIONS:  Small fat-containing umbilical hernia is noted  OSSEOUS STRUCTURES:  No acute fracture or destructive osseous lesion  Advanced lumbar bilateral hip osteoarthritic degenerative changes  Impression: Right mid ureteral calculus, 9 mm in size  Although there is no significant hydronephrosis, there is prominence of right extrarenal pelvis and there is extensive inflammatory stranding surrounding the ureter at the site of the calculus, and these findings suggest that this calculus is the cause of the patient's right lower quadrant pain  Changes of prior ascending colectomy  Colonic diverticulosis without acute diverticulitis  Mild mesenteric and presacral edema is nonspecific  No noncontrast CT evidence of metastatic disease in the abdomen or pelvis  Pleural effusions, somewhat loculated on the left  Cardiomegaly  Workstation performed: [id number]     X-ray Chest 2 Views    Result Date: 7/4/2018  Narrative: CHEST INDICATION:   Chest pain  Trauma    Abrasions and lacerations involving right leg COMPARISON:  July 1, 2018Mit Kinds PERFORMED/VIEWS:  XR CHEST PA & LATERAL FINDINGS: Dual-lead left chest pacemaker is noted with intact pacer leads seen  Heart shadow is enlarged but unchanged from previous examination  There is small to moderate right pleural effusion  There is density in the posterior right suprahilar chest which appears to represent loculated fluid in the right major fissure  Left lung is clear  No pneumothorax  Glenohumeral and spinal degenerative changes  No acute osseous abnormality  Impression: Cardiomegaly  Right pleural effusion including some loculated fluid in the posterior aspect of the right major fissure  Workstation performed: [id number]     Xr Chest 2 Views    Result Date: 7/1/2018  Narrative: CHEST INDICATION:   Dyspnea  Severe abdominal pain this morning  COMPARISON:  May 22, 2018 EXAM PERFORMED/VIEWS:  XR CHEST PA & LATERAL FINDINGS: The heart mediastinum are stable  Pacing device unchanged in position  There is continued mild cardiomegaly  Small bilateral pleural effusions are again noted  Osseous structures appear within normal limits for patient age  Impression: Stable cardiomegaly  Small bilateral pleural effusions right greater than left  Workstation performed: [id number]     Xr Tibia Fibula 2 Views Right    Result Date: 7/4/2018  Narrative: RIGHT TIBIA AND FIBULA INDICATION:   Trauma  Right leg pain with abrasion and laceration  COMPARISON:  None VIEWS:  XR TIBIA FIBULA 2 VW RIGHT FINDINGS: There is no acute fracture or dislocation  No significant degenerative changes seen  No lytic or blastic lesions are seen  There are atherosclerotic calcifications  Soft tissues are otherwise unremarkable  Impression: No acute osseous abnormality  Workstation performed: [id number]     Fl Retrograde Pyelogram    Result Date: 7/2/2018  Narrative: RIGHT RETROGRADE PYELOGRAM INDICATION:   Right ureteral stone [N20 1]   COMPARISON: CT 7/1/2018 IMAGES:  3 FLUOROSCOPY TIME:   17 seconds CONTRAST: 8 mL of iohexol (OMNIPAQUE) FINDINGS: Fluoroscopic guidance provided for retrograde pyelogram  Contrast administration into the right kidney collecting system demonstrates dilatation  Final images reveal placement of a right ureteral stent Osseous and soft tissue detail limited by technique  Impression: Fluoroscopic guidance provided for right retrograde pyelogram   Placement of right ureteral stent  Please see procedure report for further details  Workstation performed: [id number]       Review of Systems   Cardiovascular: Positive for chest pain, dyspnea on exertion, leg swelling, orthopnea and paroxysmal nocturnal dyspnea  All other systems reviewed and are negative  Vitals:    08/02/19 1101   BP: 100/52   Pulse: 77   SpO2: 97%     Vitals:    08/02/19 1101   Weight: 91 6 kg (202 lb)     Height: 5' 7" (170 2 cm)   Body mass index is 31 64 kg/m²      Physical Exam:  General:  Alert and cooperative, appears stated age  HEENT:  PERRLA, EOMI, no scleral icterus, no conjunctival pallor  Neck:  No lymphadenopathy, no thyromegaly, no carotid bruits, elevated JVP  Heart[de-identified]  Regular rate and rhythm, normal S1/S2, no murmur  Lungs:  Clear to auscultation bilaterally   Abdomen:  Soft, non-tender, positive bowel sounds, no rebound or guarding,   no organomegaly   Extremities:  2+ edema   Skin:  No rashes or lesions on exposed skin  Neurologic:  Cranial nerves II-XII grossly intact without focal deficits

## 2019-07-31 ENCOUNTER — TELEPHONE (OUTPATIENT)
Dept: CARDIOLOGY CLINIC | Facility: CLINIC | Age: 84
End: 2019-07-31

## 2019-07-31 NOTE — TELEPHONE ENCOUNTER
Allyson Butts at [facility] called today to report a weight gain of 6 lbs overnight  Wt yesterday was 199 and today wt is 205  Two days ago he weighed 202, then yesterday 199, then today 205  Per Allyson Butts he is fine otherwise  No other symptoms  I scanned a daily wt log that was faxed to us, but it is not visible in the chart yet  In the office on 7/18 he weighed 194 which was the same wt at [facility] that day  So weights appear to correspond  On 7/29, you decreased his metolazone to 2 5 mg twice weekly  Janeth Barreto has a follow up appt with Dr Humaira Reeves on Friday  Please advise

## 2019-07-31 NOTE — TELEPHONE ENCOUNTER
Please continue to monitor  If weight is increased tomorrow have [facility] call us    Thank you

## 2019-08-02 ENCOUNTER — OFFICE VISIT (OUTPATIENT)
Dept: CARDIOLOGY CLINIC | Facility: CLINIC | Age: 84
End: 2019-08-02
Payer: MEDICARE

## 2019-08-02 VITALS
HEART RATE: 77 BPM | DIASTOLIC BLOOD PRESSURE: 52 MMHG | OXYGEN SATURATION: 97 % | SYSTOLIC BLOOD PRESSURE: 100 MMHG | HEIGHT: 67 IN | BODY MASS INDEX: 31.71 KG/M2 | WEIGHT: 202 LBS

## 2019-08-02 DIAGNOSIS — Z95.0 CARDIAC PACEMAKER IN SITU: Chronic | ICD-10-CM

## 2019-08-02 DIAGNOSIS — I48.0 PAROXYSMAL ATRIAL FIBRILLATION (HCC): Chronic | ICD-10-CM

## 2019-08-02 DIAGNOSIS — I25.10 CORONARY ARTERY DISEASE INVOLVING NATIVE CORONARY ARTERY OF NATIVE HEART WITHOUT ANGINA PECTORIS: Chronic | ICD-10-CM

## 2019-08-02 DIAGNOSIS — I49.5 SICK SINUS SYNDROME (HCC): Chronic | ICD-10-CM

## 2019-08-02 DIAGNOSIS — I50.32 CHRONIC DIASTOLIC CHF (CONGESTIVE HEART FAILURE) (HCC): Primary | ICD-10-CM

## 2019-08-02 DIAGNOSIS — E78.5 DYSLIPIDEMIA: ICD-10-CM

## 2019-08-02 DIAGNOSIS — I10 BENIGN ESSENTIAL HYPERTENSION: ICD-10-CM

## 2019-08-02 DIAGNOSIS — Z95.5 STATUS POST INSERTION OF DRUG-ELUTING STENT INTO LEFT ANTERIOR DESCENDING (LAD) ARTERY: ICD-10-CM

## 2019-08-02 PROCEDURE — 99214 OFFICE O/P EST MOD 30 MIN: CPT | Performed by: INTERNAL MEDICINE

## 2019-08-07 ENCOUNTER — APPOINTMENT (EMERGENCY)
Dept: RADIOLOGY | Facility: HOSPITAL | Age: 84
End: 2019-08-07
Payer: MEDICARE

## 2019-08-07 ENCOUNTER — HOSPITAL ENCOUNTER (EMERGENCY)
Facility: HOSPITAL | Age: 84
Discharge: HOME | End: 2019-08-07
Attending: EMERGENCY MEDICINE | Admitting: EMERGENCY MEDICINE
Payer: MEDICARE

## 2019-08-07 VITALS
TEMPERATURE: 97.8 F | DIASTOLIC BLOOD PRESSURE: 68 MMHG | OXYGEN SATURATION: 95 % | WEIGHT: 202 LBS | SYSTOLIC BLOOD PRESSURE: 136 MMHG | HEART RATE: 68 BPM | HEIGHT: 69 IN | BODY MASS INDEX: 29.92 KG/M2 | RESPIRATION RATE: 25 BRPM

## 2019-08-07 DIAGNOSIS — I50.9 CHRONIC CONGESTIVE HEART FAILURE (HCC): ICD-10-CM

## 2019-08-07 DIAGNOSIS — N39.0 UTI (URINARY TRACT INFECTION): Primary | ICD-10-CM

## 2019-08-07 LAB
ALBUMIN SERPL BCP-MCNC: 2.8 G/DL (ref 3.5–5)
ALP SERPL-CCNC: 105 U/L (ref 46–116)
ALT SERPL W P-5'-P-CCNC: 12 U/L (ref 12–78)
ANION GAP SERPL CALCULATED.3IONS-SCNC: 4 MMOL/L (ref 4–13)
APTT PPP: 42 SECONDS (ref 23–37)
AST SERPL W P-5'-P-CCNC: 15 U/L (ref 5–45)
ATRIAL RATE: 86 BPM
BACTERIA UR QL AUTO: ABNORMAL /HPF
BASOPHILS # BLD AUTO: 0.07 THOUSANDS/ΜL (ref 0–0.1)
BASOPHILS NFR BLD AUTO: 1 % (ref 0–1)
BILIRUB SERPL-MCNC: 0.58 MG/DL (ref 0.2–1)
BILIRUB UR QL STRIP: NEGATIVE
BUN SERPL-MCNC: 33 MG/DL (ref 5–25)
CALCIUM SERPL-MCNC: 8.9 MG/DL (ref 8.3–10.1)
CHLORIDE SERPL-SCNC: 92 MMOL/L (ref 100–108)
CLARITY UR: CLEAR
CO2 SERPL-SCNC: 35 MMOL/L (ref 21–32)
COLOR UR: YELLOW
CREAT SERPL-MCNC: 1.65 MG/DL (ref 0.6–1.3)
EOSINOPHIL # BLD AUTO: 0.14 THOUSAND/ΜL (ref 0–0.61)
EOSINOPHIL NFR BLD AUTO: 3 % (ref 0–6)
ERYTHROCYTE [DISTWIDTH] IN BLOOD BY AUTOMATED COUNT: 14.9 % (ref 11.6–15.1)
GFR SERPL CREATININE-BSD FRML MDRD: 37 ML/MIN/1.73SQ M
GLUCOSE SERPL-MCNC: 132 MG/DL (ref 65–140)
GLUCOSE UR STRIP-MCNC: NEGATIVE MG/DL
HCT VFR BLD AUTO: 31.2 % (ref 36.5–49.3)
HGB BLD-MCNC: 9.8 G/DL (ref 12–17)
HGB UR QL STRIP.AUTO: NEGATIVE
HYALINE CASTS #/AREA URNS LPF: ABNORMAL /LPF
IMM GRANULOCYTES # BLD AUTO: 0.01 THOUSAND/UL (ref 0–0.2)
IMM GRANULOCYTES NFR BLD AUTO: 0 % (ref 0–2)
INR PPP: 1.42 (ref 0.84–1.19)
KETONES UR STRIP-MCNC: NEGATIVE MG/DL
LEUKOCYTE ESTERASE UR QL STRIP: ABNORMAL
LIPASE SERPL-CCNC: 125 U/L (ref 73–393)
LYMPHOCYTES # BLD AUTO: 0.48 THOUSANDS/ΜL (ref 0.6–4.47)
LYMPHOCYTES NFR BLD AUTO: 10 % (ref 14–44)
MAGNESIUM SERPL-MCNC: 2.1 MG/DL (ref 1.6–2.6)
MCH RBC QN AUTO: 30 PG (ref 26.8–34.3)
MCHC RBC AUTO-ENTMCNC: 31.4 G/DL (ref 31.4–37.4)
MCV RBC AUTO: 95 FL (ref 82–98)
MONOCYTES # BLD AUTO: 0.66 THOUSAND/ΜL (ref 0.17–1.22)
MONOCYTES NFR BLD AUTO: 14 % (ref 4–12)
NEUTROPHILS # BLD AUTO: 3.5 THOUSANDS/ΜL (ref 1.85–7.62)
NEUTS SEG NFR BLD AUTO: 72 % (ref 43–75)
NITRITE UR QL STRIP: NEGATIVE
NON-SQ EPI CELLS URNS QL MICRO: ABNORMAL /HPF
NRBC BLD AUTO-RTO: 0 /100 WBCS
P AXIS: 103 DEGREES
PH UR STRIP.AUTO: 7 [PH] (ref 4.5–8)
PLATELET # BLD AUTO: 255 THOUSANDS/UL (ref 149–390)
PMV BLD AUTO: 9.9 FL (ref 8.9–12.7)
POTASSIUM SERPL-SCNC: 3.5 MMOL/L (ref 3.5–5.3)
PROT SERPL-MCNC: 7.2 G/DL (ref 6.4–8.2)
PROT UR STRIP-MCNC: NEGATIVE MG/DL
PROTHROMBIN TIME: 16.9 SECONDS (ref 11.6–14.5)
QRS AXIS: 259 DEGREES
QRSD INTERVAL: 162 MS
QT INTERVAL: 462 MS
QTC INTERVAL: 532 MS
RBC # BLD AUTO: 3.27 MILLION/UL (ref 3.88–5.62)
RBC #/AREA URNS AUTO: ABNORMAL /HPF
SODIUM SERPL-SCNC: 131 MMOL/L (ref 136–145)
SP GR UR STRIP.AUTO: 1.01 (ref 1–1.03)
T WAVE AXIS: 73 DEGREES
TROPONIN I SERPL-MCNC: 0.1 NG/ML
UROBILINOGEN UR QL STRIP.AUTO: 0.2 E.U./DL
VENTRICULAR RATE: 80 BPM
WBC # BLD AUTO: 4.86 THOUSAND/UL (ref 4.31–10.16)
WBC #/AREA URNS AUTO: ABNORMAL /HPF

## 2019-08-07 PROCEDURE — 93005 ELECTROCARDIOGRAM TRACING: CPT

## 2019-08-07 PROCEDURE — 85730 THROMBOPLASTIN TIME PARTIAL: CPT | Performed by: EMERGENCY MEDICINE

## 2019-08-07 PROCEDURE — 71046 X-RAY EXAM CHEST 2 VIEWS: CPT

## 2019-08-07 PROCEDURE — 87086 URINE CULTURE/COLONY COUNT: CPT

## 2019-08-07 PROCEDURE — 80053 COMPREHEN METABOLIC PANEL: CPT | Performed by: EMERGENCY MEDICINE

## 2019-08-07 PROCEDURE — 84484 ASSAY OF TROPONIN QUANT: CPT | Performed by: EMERGENCY MEDICINE

## 2019-08-07 PROCEDURE — 99284 EMERGENCY DEPT VISIT MOD MDM: CPT | Performed by: EMERGENCY MEDICINE

## 2019-08-07 PROCEDURE — 85025 COMPLETE CBC W/AUTO DIFF WBC: CPT | Performed by: EMERGENCY MEDICINE

## 2019-08-07 PROCEDURE — 36415 COLL VENOUS BLD VENIPUNCTURE: CPT | Performed by: EMERGENCY MEDICINE

## 2019-08-07 PROCEDURE — 85610 PROTHROMBIN TIME: CPT | Performed by: EMERGENCY MEDICINE

## 2019-08-07 PROCEDURE — 83690 ASSAY OF LIPASE: CPT | Performed by: EMERGENCY MEDICINE

## 2019-08-07 PROCEDURE — 81001 URINALYSIS AUTO W/SCOPE: CPT

## 2019-08-07 PROCEDURE — 83735 ASSAY OF MAGNESIUM: CPT | Performed by: EMERGENCY MEDICINE

## 2019-08-07 PROCEDURE — 93010 ELECTROCARDIOGRAM REPORT: CPT | Performed by: INTERNAL MEDICINE

## 2019-08-07 PROCEDURE — 99284 EMERGENCY DEPT VISIT MOD MDM: CPT

## 2019-08-07 PROCEDURE — 51798 US URINE CAPACITY MEASURE: CPT

## 2019-08-07 RX ORDER — POTASSIUM CHLORIDE 20 MEQ/1
20 TABLET, EXTENDED RELEASE ORAL ONCE
Status: DISCONTINUED | OUTPATIENT
Start: 2019-08-07 | End: 2019-08-07

## 2019-08-07 RX ORDER — CEPHALEXIN 250 MG/1
500 CAPSULE ORAL ONCE
Status: COMPLETED | OUTPATIENT
Start: 2019-08-07 | End: 2019-08-07

## 2019-08-07 RX ORDER — POTASSIUM CHLORIDE 20 MEQ/1
40 TABLET, EXTENDED RELEASE ORAL ONCE
Status: COMPLETED | OUTPATIENT
Start: 2019-08-07 | End: 2019-08-07

## 2019-08-07 RX ORDER — CEPHALEXIN 500 MG/1
500 CAPSULE ORAL EVERY 8 HOURS SCHEDULED
Qty: 21 CAPSULE | Refills: 0 | Status: SHIPPED | OUTPATIENT
Start: 2019-08-07 | End: 2019-08-17

## 2019-08-07 RX ADMIN — CEPHALEXIN 500 MG: 250 CAPSULE ORAL at 13:32

## 2019-08-07 RX ADMIN — POTASSIUM CHLORIDE 40 MEQ: 1500 TABLET, EXTENDED RELEASE ORAL at 13:32

## 2019-08-07 NOTE — ED PROVIDER NOTES
Emergency Department Note- Babar Hopper 80 y o  male MRN: [id number]    Unit/Bed#: ED 25 Encounter: [id number]        History of Present Illness   HPI:  Babar Hopper is a 80 y o  male who presents with possible urinary tract infection he lives at [address] apparently according to the nursing facility he was less active today however on my arrival to the room he is awake alert he is conversant he is oriented to the person place and time    no falls no head injury no complaints of chest pain or shortness of breath he does complain of mild bilateral lower extremity swelling    The patient has a history of congestive heart failure and is on diuretics      Patient states that he uses a wheelchair and electric scooter to get around  Patient has a history of prostate cancer  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  Patient denies chest pain denies shortness of breath denies abdominal pain  Denies diarrhea or vomiting  Review of Systems  REVIEW OF SYSTEMS  Constitutional:  denies fever, chills, no weight loss   Eyes:  Denies visual changes, denies eye pain    HENT:  Denies nasal congestion or sore throat   Respiratory:  Denies cough or shortness of breath, denies hemoptysis    Cardiovascular:  Denies chest pain, palpitations, or leg edema    GI:  Denies abdominal pain, nausea, vomiting, bloody stools, melena, or diarrhea   :  Denies dysuria, hematuria, polyuria   Musculoskeletal:  Denies back pain or joint pain   Integument:  Denies rash, denies color change    Neurologic:  Denies headache, focal weakness or sensory changes   Endocrine:  Denies polyuria or polydipsia   Lymphatic:  Denies swollen glands   Psychiatric:  Denies depression or anxiety     All system reviewed and negative except as noted above or in HPI    Historical Information   Past Medical History:   Diagnosis Date    A-fib ([facility] )     Anemia     Bladder cancer ([facility] )     CAD (coronary artery disease)  CHF (congestive heart failure) (HCC)     Chronic kidney failure     [facility]     Eye cancer, left ([facility]     Hypertension     Incontinence     Pacemaker     Prostate cancer ([facility]     Type 2 diabetes mellitus ([facility]     Wears hearing aid in both ears      Past Surgical History:   Procedure Laterality Date    CARDIAC PACEMAKER PLACEMENT      CORONARY ANGIOPLASTY WITH STENT PLACEMENT      GALLBLADDER SURGERY      PA CYSTO/URETERO W/LITHOTRIPSY &INDWELL STENT INSRT Right 2018    Procedure: CYSTOSCOPY URETEROSCOPY WITH LITHOTRIPSY HOLMIUM LASER ,BASKET STONE EXTRACTION  RETROGRADE PYELOGRAM AND exchange STENT URETERAL;  Surgeon: Priscila Morales MD;  [facility]: [facility];  Service: Urology    PA CYSTOURETHROSCOPY,URETER CATHETER Right 2018    Procedure: CYSTOSCOPY RETROGRADE PYELOGRAM WITH INSERTION STENT URETERAL;  Surgeon: Kinga Falcon MD;  Location[facility];  Service: Urology     Social History   Social History     Substance and Sexual Activity   Alcohol Use Yes    Comment: jayne cognac 1 oz/night      Social History     Substance and Sexual Activity   Drug Use No     Social History     Tobacco Use   Smoking Status Former Smoker    Packs/day: 1 00    Years: 16 00    Pack years: 16 00    Types: Cigarettes    Start date: 5    Last attempt to quit: Jie Pinedo Years since quittin 6   Smokeless Tobacco Never Used     Family History: History reviewed  No pertinent family history  Meds/Allergies     (Not in a hospital admission)  Allergies   Allergen Reactions    Iv Contrast [Iodinated Diagnostic Agents]        Objective   Vitals: Blood pressure 136/68, pulse 68, temperature 97 8 °F (36 6 °C), temperature source Oral, resp  rate (!) 25, height 5' 9" (1 753 m), weight 91 6 kg (202 lb), SpO2 95 %      PHYSICAL EXAM  Constitutional:  Well developed, well nourished, no acute distress, non toxic appearance   Eyes:   The left globe and cornea are opaque patient has mild ptosis of the left eye he is completely blind in left eye right eye pupil is reactive, EOMI, conjunctiva normal, sclera anicteric, no proptosis   HENT:  Atraumatic, external ears normal, nose normal, oropharynx moist, no pharyngeal exudates  Neck  no JVD, no bruits,  normal range of motion, no tenderness, supple, thyroid normal  hearing aid noted in the right ear  Respiratory:  No respiratory distress, normal breath sounds B/L, , no wheezing few bibasilar crackles    Cardiovascular:  NSR, no M/G/R  GI:  Soft,  Normal BS,  ND,  NT,  No mass or bruits obese abdomen     :  No costovertebral angle tenderness   Extremities:  positive edema, no tenderness, no deformities  Normal pulses   Musculoskeletal:   Back - no midline tenderness,  FROM  Upper and lower extremities   SKIN:   no rash, warm and dry    Neurologic:  Alert & oriented x 3, CN 2-12 intact other than left eye blindness, normal motor function, normal sensory function, no focal deficits noted, gait normal   Psychiatric:  Speech and behavior appropriate normal judgement and insight      Lab Results: Lab Results: I have personally reviewed pertinent lab results    Labs Reviewed   URINE MICROSCOPIC - Abnormal       Result Value Ref Range Status    RBC, UA None Seen  None Seen, 0-5 /hpf Final    WBC, UA 10-20 (*) None Seen, 0-5, 5-55, 5-65 /hpf Final    Epithelial Cells None Seen  None Seen, Occasional /hpf Final    Bacteria, UA None Seen  None Seen, Occasional /hpf Final    Hyaline Casts, UA 3-5 (*) None Seen /lpf Final   CBC AND DIFFERENTIAL - Abnormal    WBC 4 86  4 31 - 10 16 Thousand/uL Final    RBC 3 27 (*) 3 88 - 5 62 Million/uL Final    Hemoglobin 9 8 (*) 12 0 - 17 0 g/dL Final    Hematocrit 31 2 (*) 36 5 - 49 3 % Final    MCV 95  82 - 98 fL Final    MCH 30 0  26 8 - 34 3 pg Final    MCHC 31 4  31 4 - 37 4 g/dL Final    RDW 14 9  11 6 - 15 1 % Final    MPV 9 9  8 9 - 12 7 fL Final    Platelets 591  186 - 390 Thousands/uL Final    nRBC 0  /100 WBCs Final Neutrophils Relative 72  43 - 75 % Final    Immat GRANS % 0  0 - 2 % Final    Lymphocytes Relative 10 (*) 14 - 44 % Final    Monocytes Relative 14 (*) 4 - 12 % Final    Eosinophils Relative 3  0 - 6 % Final    Basophils Relative 1  0 - 1 % Final    Neutrophils Absolute 3 50  1 85 - 7 62 Thousands/µL Final    Immature Grans Absolute 0 01  0 00 - 0 20 Thousand/uL Final    Lymphocytes Absolute 0 48 (*) 0 60 - 4 47 Thousands/µL Final    Monocytes Absolute 0 66  0 17 - 1 22 Thousand/µL Final    Eosinophils Absolute 0 14  0 00 - 0 61 Thousand/µL Final    Basophils Absolute 0 07  0 00 - 0 10 Thousands/µL Final   PROTIME-INR - Abnormal    Protime 16 9 (*) 11 6 - 14 5 seconds Final    INR 1 42 (*) 0 84 - 1 19 Final   APTT - Abnormal    PTT 42 (*) 23 - 37 seconds Final    Comment: Therapeutic Heparin Range =  60-90 seconds   COMPREHENSIVE METABOLIC PANEL - Abnormal    Sodium 131 (*) 136 - 145 mmol/L Final    Potassium 3 5  3 5 - 5 3 mmol/L Final    Comment: Slightly Hemolyzed; Results May be Affected&XA&Slightly Hemolyzed; Results May be Affected    Chloride 92 (*) 100 - 108 mmol/L Final    CO2 35 (*) 21 - 32 mmol/L Final    ANION GAP 4  4 - 13 mmol/L Final    BUN 33 (*) 5 - 25 mg/dL Final    Creatinine 1 65 (*) 0 60 - 1 30 mg/dL Final    Comment: Standardized to IDMS reference method    Glucose 132  65 - 140 mg/dL Final    Comment:   If the patient is fasting, the ADA then defines impaired fasting glucose as > 100 mg/dL and diabetes as > or equal to 123 mg/dL  Specimen collection should occur prior to Sulfasalazine administration due to the potential for falsely depressed results  Specimen collection should occur prior to Sulfapyridine administration due to the potential for falsely elevated results  Calcium 8 9  8 3 - 10 1 mg/dL Final    AST 15  5 - 45 U/L Final    Comment: Slightly Hemolyzed; Results May be Affected&XA&Slightly Hemolyzed;  Results May be Affected  Specimen collection should occur prior to Sulfasalazine administration due to the potential for falsely depressed results  ALT 12  12 - 78 U/L Final    Comment:   Specimen collection should occur prior to Sulfasalazine and/or Sulfapyridine administration due to the potential for falsely depressed results  Alkaline Phosphatase 105  46 - 116 U/L Final    Total Protein 7 2  6 4 - 8 2 g/dL Final    Albumin 2 8 (*) 3 5 - 5 0 g/dL Final    Total Bilirubin 0 58  0 20 - 1 00 mg/dL Final    eGFR 37  ml/min/1 73sq m Final    Narrative:     [place] guidelines for Chronic Kidney Disease (CKD):     Stage 1 with normal or high GFR (GFR > 90 mL/min/1 73 square meters)    Stage 2 Mild CKD (GFR = 60-89 mL/min/1 73 square meters)    Stage 3A Moderate CKD (GFR = 45-59 mL/min/1 73 square meters)    Stage 3B Moderate CKD (GFR = 30-44 mL/min/1 73 square meters)    Stage 4 Severe CKD (GFR = 15-29 mL/min/1 73 square meters)    Stage 5 End Stage CKD (GFR <15 mL/min/1 73 square meters)  Note: GFR calculation is accurate only with a steady state creatinine   TROPONIN I - Abnormal    Troponin I 0 10 (*) <=0 04 ng/mL Final    Comment:   [employer] Chemistry analyzer 99% cutoff is > 0 04 ng/mL in network labs     o cTnI 99% cutoff is useful only when applied to patients in the clinical setting of myocardial ischemia   o cTnI 99% cutoff should be interpreted in the context of clinical history, ECG findings and possibly cardiac imaging to establish correct diagnosis  o cTnI 99% cutoff may be suggestive but clearly not indicative of a coronary event without the clinical setting of myocardial ischemia      Results indicate test should be repeated on new specimen collected within 4-6 hours of the original   POCT URINALYSIS DIPSTICK - Abnormal   ED URINE MACROSCOPIC - Abnormal    Color, UA Yellow   Final    Clarity, UA Clear   Final    pH, UA 7 0  4 5 - 8 0 Final    Leukocytes, UA Small (*) Negative Final    Nitrite, UA Negative  Negative Final Protein, UA Negative  Negative mg/dl Final    Glucose, UA Negative  Negative mg/dl Final    Ketones, UA Negative  Negative mg/dl Final    Urobilinogen, UA 0 2  0 2, 1 0 E U /dl E U /dl Final    Bilirubin, UA Negative  Negative Final    Blood, UA Negative  Negative Final    Specific [place], UA 1 015  1 003 - 1 030 Final    Narrative:     CLINITEK RESULT   LIPASE - Normal    Lipase 125  73 - 393 u/L Final   MAGNESIUM - Normal    Magnesium 2 1  1 6 - 2 6 mg/dL Final    Comment: Slightly Hemolyzed; Results May be Affected     Imaging: I have personally reviewed pertinent reports  XR chest 2 views   Final Result      Mild improvement in patchy infiltrates in the right lung, left lung base and partially loculated small right pleural effusion  Workstation performed: [id number]           EKG, Pathology, and Other Studies: I have personally reviewed pertinent films in PACS    EKG shows paced rhythm with left bundle branch block the patient's chest x-ray shows evidence of chronic heart failure cardiomegaly pacemaker,  Effusion,  mild vascular congestion similar to old chest x-ray  The patient's hemoglobin is at baseline the patient's sodium is at baseline at 130    The patient has CKD BUN and creatinine improved from prior  Urinalysis shows 10-20 white cells Will treat with Keflex  Most recent urine culture was earlier this year and had E coli that was sensitive to cephalosporin  Bladder scan show approx 200 cc     Pt has been urinating and produced sample in ED     Patient re-evaluated he is without complaints at the present time I will treat him with Keflex  Patient is requesting a meal prior to being sent back I will order this  Assessment/Plan     ED Medical Decision Making  Rule out urinary tract infection rule out congestive heart failure rule out anemia and or renal failure       Mich Mitchell MD  08/10/19 4545

## 2019-08-07 NOTE — ED NOTES
Pt able to give urine sample into urinal  Post void residual 224 on bladder scan     Monica Alaniz RN  08/07/19 8126

## 2019-08-07 NOTE — ED NOTES
Attempting to plan discharge ride back to [address] [facility]  08/07/19 3090

## 2019-08-07 NOTE — ED NOTES
Spoke to nurse at [facility] and they are aware of patient being discharged and ETA for      Mai Allen RN  08/07/19 4759

## 2019-08-07 NOTE — ED PROCEDURE NOTE
PROCEDURE  ECG 12 Lead Documentation Only  Date/Time: 8/7/2019 11:31 AM  Performed by: Marleni Solorio MD  Authorized by: Marleni Solorio MD     Patient location:  ED  Previous ECG:     Previous ECG:  Compared to current    Similarity:  No change  Rate:     ECG rate assessment: normal    Rhythm:     Rhythm: paced    QRS:     QRS intervals:   Wide  Conduction:     Conduction: abnormal      Abnormal conduction: complete LBBB    Comments:      Paced rhythm         Marleni Solorio MD  08/07/19 1137

## 2019-08-07 NOTE — ED NOTES
Attempted to call report to [facility] for pt return  Awaiting call back  Denies questions or concerns  Pt given food and environment made comfortable         Jenaro Hayes RN  08/07/19 1826

## 2019-08-08 LAB — BACTERIA UR CULT: NORMAL

## 2019-08-15 ENCOUNTER — TELEPHONE (OUTPATIENT)
Dept: CARDIOLOGY CLINIC | Facility: CLINIC | Age: 84
End: 2019-08-15

## 2019-08-15 ENCOUNTER — OFFICE VISIT (OUTPATIENT)
Dept: UROLOGY | Facility: AMBULATORY SURGERY CENTER | Age: 84
End: 2019-08-15
Payer: MEDICARE

## 2019-08-15 VITALS — HEART RATE: 69 BPM | SYSTOLIC BLOOD PRESSURE: 110 MMHG | DIASTOLIC BLOOD PRESSURE: 70 MMHG

## 2019-08-15 DIAGNOSIS — C61 PROSTATE CANCER (HCC): Primary | ICD-10-CM

## 2019-08-15 PROCEDURE — 99213 OFFICE O/P EST LOW 20 MIN: CPT | Performed by: PHYSICIAN ASSISTANT

## 2019-08-15 PROCEDURE — 1123F ACP DISCUSS/DSCN MKR DOCD: CPT | Performed by: PHYSICIAN ASSISTANT

## 2019-08-15 NOTE — TELEPHONE ENCOUNTER
Pt's wt is up  6 lbs in 24 hrs 202-208 lbs  Asymptomatic  Has F/U on 9/5/19      Labs- 8/7  Cr- 1 65  K-  3 5  Na 131      Taking: Metolazone 2 5 mg- M-W-F               Bumex 2 mg bid               Aldactone 25mg Qd               Potassium 20 meq bid increase to 40 mg bid on days with metolazone          Please advise

## 2019-08-15 NOTE — PROGRESS NOTES
UROLOGY PROGRESS NOTE   Patient Identifiers: Carlos Marmolejo (MRN [id number])  Date of Service: 8/15/2019    Subjective:      51-year-old man history of kidney stones and prostate cancer  He has had a ureteroscopy and stent in the past for kidney stones  His current KUB and ultrasound did not show any residual calculi  He had treatment for prostate cancer with radiation and androgen deprivation therapy in [place] in the past   His PSA is 0 04  Patient has  no complaints        Objective:     VITALS:    Vitals:    08/15/19 1254   BP: 110/70   Pulse: 69           LABS:  Lab Results   Component Value Date    HGB 9 8 (L) 08/07/2019    HCT 31 2 (L) 08/07/2019    WBC 4 86 08/07/2019     08/07/2019   ]    Lab Results   Component Value Date    K 3 5 08/07/2019    CL 92 (L) 08/07/2019    CO2 35 (H) 08/07/2019    BUN 33 (H) 08/07/2019    CREATININE 1 65 (H) 08/07/2019    CALCIUM 8 9 08/07/2019   ]        INPATIENT MEDS:    Current Outpatient Medications:     apixaban (ELIQUIS) 2 5 mg, Take 1 tablet (2 5 mg total) by mouth 2 (two) times a day, Disp: 60 tablet, Rfl: 3    aspirin (ECOTRIN LOW STRENGTH) 81 mg EC tablet, Take 81 mg by mouth daily, Disp: , Rfl:     atorvastatin (LIPITOR) 20 mg tablet, Take 20 mg by mouth daily, Disp: , Rfl:     bumetanide (BUMEX) 2 mg tablet, Take 1 tablet (2 mg total) by mouth 2 (two) times a day, Disp: 60 tablet, Rfl: 0    carvedilol (COREG) 3 125 mg tablet, TAKE 1 TABLET BY MOUTH TWICE DAILY WITH MEALS, Disp: 56 tablet, Rfl: 10    cephalexin (KEFLEX) 500 mg capsule, Take 1 capsule (500 mg total) by mouth every 8 (eight) hours for 10 days, Disp: 21 capsule, Rfl: 0    EASY TOUCH LANCETS 28G MISC, USE AS DIRECTED , Disp: 100 each, Rfl: 11    gabapentin (NEURONTIN) 100 mg capsule, TAKE 1 CAPSULE BY MOUTH ONCE DAILY, Disp: 28 capsule, Rfl: 10    gabapentin (NEURONTIN) 300 mg capsule, Take 100 mg by mouth daily at bedtime , Disp: , Rfl:     insulin glargine (LANTUS) 100 units/mL subcutaneous injection, Inject 15 Units under the skin daily at bedtime, Disp: , Rfl: 0    isosorbide mononitrate (IMDUR) 30 mg 24 hr tablet, Take 3 tablets (90 mg total) by mouth daily (Patient taking differently: Take 30 mg by mouth daily 60mg qd), Disp: 30 tablet, Rfl: 0    melatonin 3 mg, Take 1 tablet (3 mg total) by mouth daily at bedtime, Disp: 15 tablet, Rfl: 0    metFORMIN (GLUCOPHAGE) 500 mg tablet, TAKE 1 TABLET BY MOUTH ONCE DAILY  , Disp: 28 tablet, Rfl: 10    metolazone (ZAROXOLYN) 2 5 mg tablet, Take 1 tablet (2 5 mg total) by mouth see administration instructions Take 2 5 mg metolazone on Tuesday and Friday, Disp: 5 tablet, Rfl: 0    Mirabegron ER 25 MG TB24, Take 25 mg by mouth daily, Disp: 30 tablet, Rfl: 5    nitroglycerin (NITROSTAT) 0 4 mg SL tablet, Place 1 tablet (0 4 mg total) under the tongue every 5 (five) minutes as needed for chest pain, Disp: 30 tablet, Rfl: 0    Omega-3 Fatty Acids (FISH OIL) 1,000 mg, Take 1,000 mg by mouth daily, Disp: , Rfl:     oxyCODONE (ROXICODONE) 5 mg/5 mL solution, Take 2 mg by mouth every 8 (eight) hours, Disp: , Rfl:     potassium chloride (K-DUR,KLOR-CON) 20 mEq tablet, Take 1 tablet (20 mEq total) by mouth 2 (two) times a day K dur 40meq BID on Monday, Wednesday and Friday while on Metolazone on Monday, Wednesday and Friday, Disp: 90 tablet, Rfl: 3    spironolactone (ALDACTONE) 25 mg tablet, Take 1 tablet (25 mg total) by mouth daily, Disp: 30 tablet, Rfl: 0    tamsulosin (FLOMAX) 0 4 mg, TAKE 1 CAPSULE BY MOUTH WITH EVENING MEAL, Disp: 28 capsule, Rfl: 4    acetaminophen (TYLENOL) 325 mg tablet, Take 1-2  tablet as needed every 8 hours as needed for headache  Do not exceed  More than 3gm/ day   (Patient not taking: Reported on 8/15/2019), Disp: 30 tablet, Rfl: 0    ALPRAZolam (XANAX) 0 5 mg tablet, Take 1 tablet (0 5 mg total) by mouth 4 (four) times a day as needed for anxiety (Patient not taking: Reported on 8/15/2019), Disp: 30 tablet, Rfl: 0      Physical Exam:   /70 (BP Location: Left arm, Patient Position: Sitting, Cuff Size: Adult)   Pulse 69   GEN: no acute distress    RESP: breathing comfortably with no accessory muscle use    ABD: soft, non-tender, non-distended   INCISION:    EXT: no significant peripheral edema       RADIOLOGY:   RENAL ULTRASOUND   IMPRESSION:        1  Bilateral medical renal disease  Stable right interpolar exophytic simple cyst   2   Prostatomegaly, correlate with PSA  3   Incomplete distention of the bladder  Patient is unable to void fully and a post void residual could not be obtained  Assessment:     1  Kidney stones   2  Prostate cancer     Plan:   -    Follow-up in 1 year with PSA prior to visit  -  -  -

## 2019-08-16 LAB — HBA1C MFR BLD HPLC: 9 %

## 2019-08-26 ENCOUNTER — REMOTE DEVICE CLINIC VISIT (OUTPATIENT)
Dept: CARDIOLOGY CLINIC | Facility: CLINIC | Age: 84
End: 2019-08-26
Payer: MEDICARE

## 2019-08-26 DIAGNOSIS — Z95.0 CARDIAC PACEMAKER IN SITU: Primary | ICD-10-CM

## 2019-08-26 DIAGNOSIS — I50.32 CHRONIC DIASTOLIC HEART FAILURE (HCC): ICD-10-CM

## 2019-08-26 PROCEDURE — 93296 REM INTERROG EVL PM/IDS: CPT | Performed by: INTERNAL MEDICINE

## 2019-08-26 PROCEDURE — 93294 REM INTERROG EVL PM/LDLS PM: CPT | Performed by: INTERNAL MEDICINE

## 2019-08-26 RX ORDER — POTASSIUM CHLORIDE 20 MEQ/1
TABLET, EXTENDED RELEASE ORAL
Qty: 100 TABLET | Refills: 4 | Status: SHIPPED | OUTPATIENT
Start: 2019-08-26

## 2019-08-26 NOTE — PROGRESS NOTES
Results for orders placed or performed in visit on 08/26/19   Cardiac EP device report    Narrative    BIOT VVI PM  BIOTRONIK TRANSMISSION: BATTERY STATUS "OK"  -99% (DEPENDENT/CHB)  ALL AVAILABLE LEAD PARAMETERS WITHIN NORMAL LIMITS  NO SIGNIFICANT HIGH RATE EPISODES  NORMAL DEVICE FUNCTION   GV

## 2019-09-25 DIAGNOSIS — E08.59 DIABETES DUE TO UNDERLYING CONDITION W OTH CIRCULATORY COMP (HCC): ICD-10-CM

## 2019-09-25 RX ORDER — LANCETS 28 GAUGE
EACH MISCELLANEOUS
Qty: 100 EACH | Refills: 9 | OUTPATIENT
Start: 2019-09-25

## 2019-10-01 DIAGNOSIS — E78.5 DYSLIPIDEMIA: Primary | ICD-10-CM

## 2019-10-02 RX ORDER — ATORVASTATIN CALCIUM 20 MG/1
TABLET, FILM COATED ORAL
Qty: 28 TABLET | Refills: 0 | Status: SHIPPED | OUTPATIENT
Start: 2019-10-02

## 2019-10-10 DIAGNOSIS — E08.59 DIABETES DUE TO UNDERLYING CONDITION W OTH CIRCULATORY COMP (HCC): ICD-10-CM

## 2019-10-10 RX ORDER — LANCETS 28 GAUGE
EACH MISCELLANEOUS
Qty: 100 EACH | Refills: 9 | OUTPATIENT
Start: 2019-10-10

## 2019-12-05 NOTE — ASSESSMENT & PLAN NOTE
· Continue eliquis, Coreg, monitor on telemetry   · Heart rate is 59 beats per minute, rate controlled [FreeTextEntry1] : Vic is a pleasant 84-year-old male with history of hypertension, diabetes, Possible diabetic neuropathy?, Asx Paroxysmal atrial fibrillation, Asthma. \par \par No TIA, CVA or syncope.. On Cardizem  and ARB , his blood pressure is controlled. Last Holter recording did not show PAF . Without Cardizem, he does have rapid heart rate response to exercise. He is now off  theophylline. He is on Xarelto: ChadsVasc score is 4.    \par \par He Is on medications for diabetes.  He is careful regarding sweets.  He is followed closely by Dr. Barker. Her current medications, he has gained weight\par \par He does not feel a recurrence of PAF since last office visit \par \par

## 2020-05-21 NOTE — TELEPHONE ENCOUNTER
Left detailed message on identified voicemail.   Informing patient that a 30 day supply of her Oxycodone was sent to pharmacy.   * patient to use sparingly and schedule w/ pain management.    Please have him increase the torsemide to 80 mg BID for three days and continue to weigh daily  Thanks       Jessica Broussard

## 2020-12-14 NOTE — TELEPHONE ENCOUNTER
This should not be sent to pharmacy  Just send to the wellness center where his lives  They will make changes  He does not need a refill  Cannot be prescribed at this time    Phylicia Medrano Transposition Flap Text: The defect edges were debeveled with a #15 scalpel blade.  Given the location of the defect and the proximity to free margins a transposition flap was deemed most appropriate.  Using a sterile surgical marker, an appropriate transposition flap was drawn incorporating the defect.    The area thus outlined was incised deep to adipose tissue with a #15 scalpel blade.  The skin margins were undermined to an appropriate distance in all directions utilizing iris scissors.

## 2024-04-20 NOTE — NURSING NOTE
Discharge and education instructions reviewed. Patient verbalized understanding, teach-back successful. Patient denied questions at this time. No acute distress noted. Patient instructed to follow-up as noted - return to emergency department if symptoms worsen. Patient verbalized understanding. Discharged per EDMD with discharge instructions.     Pt d/c to [facility] [place]  D/c instructions faxed to facility  Awaiting for receiving facility RN to call back to give report

## 2025-04-03 NOTE — PLAN OF CARE
Problem: DISCHARGE PLANNING - CARE MANAGEMENT  Goal: Discharge to post-acute care or home with appropriate resources  INTERVENTIONS:  - Conduct assessment to determine patient/family and health care team treatment goals, and need for post-acute services based on payer coverage, community resources, and patient preferences, and barriers to discharge  - Address psychosocial, clinical, and financial barriers to discharge as identified in assessment in conjunction with the patient/family and health care team  - Arrange appropriate level of post-acute services according to patient's   needs and preference and payer coverage in collaboration with the physician and health care team  - Communicate with and update the patient/family, physician, and health care team regarding progress on the discharge plan  - Arrange appropriate transportation to post-acute venues   Outcome: Progressing
Problem: Nutrition/Hydration-ADULT  Goal: Nutrient/Hydration intake appropriate for improving, restoring or maintaining nutritional needs  Monitor and assess patient's nutrition/hydration status for malnutrition (ex- brittle hair, bruises, dry skin, pale skin and conjunctiva, muscle wasting, smooth red tongue, and disorientation)  Collaborate with interdisciplinary team and initiate plan and interventions as ordered  Monitor patient's weight and dietary intake as ordered or per policy  Utilize nutrition screening tool and intervene per policy  Determine patient's food preferences and provide high-protein, high-caloric foods as appropriate       INTERVENTIONS:  - Monitor oral intake, urinary output, labs, and treatment plans  - Assess nutrition and hydration status and recommend course of action  - Evaluate amount of meals eaten  - Assist patient with eating if necessary   - Allow adequate time for meals  - Recommend/ encourage appropriate diets, oral nutritional supplements, and vitamin/mineral supplements  - Order, calculate, and assess calorie counts as needed  - Recommend, monitor, and adjust tube feedings and TPN/PPN based on assessed needs  - Assess need for intravenous fluids  - Provide specific nutrition/hydration education as appropriate  - Include patient/family/caregiver in decisions related to nutrition   Outcome: Progressing
none

## (undated) DEVICE — BAG URINE DRAINAGE 2000ML ANTI RFLX LF

## (undated) DEVICE — GLOVE SRG BIOGEL ECLIPSE 7.5

## (undated) DEVICE — CATH FOLEY 18FR 5ML 2 WAY SILICONE ELASTIMER

## (undated) DEVICE — CATH FOLEY 12FR 5ML 2 WAY SILICONE ELASTIMER

## (undated) DEVICE — GUIDEWIRE .038 X 145

## (undated) DEVICE — LASER HOLMIUM FIBER 365 MIC

## (undated) DEVICE — GUIDEWIRE STRGHT TIP 0.035 IN  SOLO PLUS

## (undated) DEVICE — SPECIMEN CONTAINER STERILE PEEL PACK

## (undated) DEVICE — CATH URETERAL 5FR X 70 CM FLEX TIP POLYUR BARD

## (undated) DEVICE — 3M™ TEGADERM™ TRANSPARENT FILM DRESSING FRAME STYLE, 1624W, 2-3/8 IN X 2-3/4 IN (6 CM X 7 CM), 100/CT 4CT/CASE: Brand: 3M™ TEGADERM™

## (undated) DEVICE — SYRINGE 10ML LL

## (undated) DEVICE — BASKET STONE RTRVL ZERO TIP 2.4FR

## (undated) DEVICE — CYSTOSCOPY PACK: Brand: CONVERTORS

## (undated) DEVICE — PACK TUR